# Patient Record
Sex: MALE | Race: WHITE | Employment: OTHER | URBAN - METROPOLITAN AREA
[De-identification: names, ages, dates, MRNs, and addresses within clinical notes are randomized per-mention and may not be internally consistent; named-entity substitution may affect disease eponyms.]

---

## 2017-04-03 RX ORDER — ALPRAZOLAM 0.5 MG/1
TABLET ORAL
Qty: 30 TABLET | Refills: 0 | OUTPATIENT
Start: 2017-04-03

## 2017-04-10 RX ORDER — ALPRAZOLAM 0.5 MG/1
0.5 TABLET ORAL NIGHTLY PRN
Qty: 30 TABLET | Refills: 0 | OUTPATIENT
Start: 2017-04-10 | End: 2018-01-31 | Stop reason: SDUPTHER

## 2017-04-12 RX ORDER — OXYCODONE HYDROCHLORIDE 20 MG/1
TABLET ORAL
Refills: 0 | COMMUNITY
Start: 2017-03-18 | End: 2020-09-09

## 2017-04-12 RX ORDER — CYCLOBENZAPRINE HCL 10 MG
TABLET ORAL
Refills: 0 | COMMUNITY
Start: 2017-04-03 | End: 2018-07-19

## 2017-08-16 ENCOUNTER — OFFICE VISIT (OUTPATIENT)
Dept: FAMILY MEDICINE CLINIC | Age: 57
End: 2017-08-16
Payer: MEDICARE

## 2017-08-16 VITALS
WEIGHT: 245.4 LBS | HEART RATE: 96 BPM | HEIGHT: 71 IN | TEMPERATURE: 97.5 F | OXYGEN SATURATION: 97 % | BODY MASS INDEX: 34.35 KG/M2 | DIASTOLIC BLOOD PRESSURE: 82 MMHG | SYSTOLIC BLOOD PRESSURE: 168 MMHG

## 2017-08-16 DIAGNOSIS — M54.41 CHRONIC MIDLINE LOW BACK PAIN WITH BILATERAL SCIATICA: Chronic | ICD-10-CM

## 2017-08-16 DIAGNOSIS — Z23 NEED FOR PNEUMOCOCCAL VACCINATION: ICD-10-CM

## 2017-08-16 DIAGNOSIS — Z79.899 CHRONIC PRESCRIPTION BENZODIAZEPINE USE: ICD-10-CM

## 2017-08-16 DIAGNOSIS — G47.00 INSOMNIA, UNSPECIFIED TYPE: ICD-10-CM

## 2017-08-16 DIAGNOSIS — Z72.0 TOBACCO ABUSE: ICD-10-CM

## 2017-08-16 DIAGNOSIS — E78.2 MIXED HYPERLIPIDEMIA: ICD-10-CM

## 2017-08-16 DIAGNOSIS — M54.42 CHRONIC MIDLINE LOW BACK PAIN WITH BILATERAL SCIATICA: Chronic | ICD-10-CM

## 2017-08-16 DIAGNOSIS — G89.29 CHRONIC MIDLINE LOW BACK PAIN WITH BILATERAL SCIATICA: Chronic | ICD-10-CM

## 2017-08-16 DIAGNOSIS — E03.9 ACQUIRED HYPOTHYROIDISM: ICD-10-CM

## 2017-08-16 DIAGNOSIS — F33.1 MODERATE EPISODE OF RECURRENT MAJOR DEPRESSIVE DISORDER (HCC): Primary | ICD-10-CM

## 2017-08-16 DIAGNOSIS — Z12.5 SPECIAL SCREENING FOR MALIGNANT NEOPLASM OF PROSTATE: ICD-10-CM

## 2017-08-16 PROCEDURE — G8598 ASA/ANTIPLAT THER USED: HCPCS | Performed by: NURSE PRACTITIONER

## 2017-08-16 PROCEDURE — 99213 OFFICE O/P EST LOW 20 MIN: CPT | Performed by: NURSE PRACTITIONER

## 2017-08-16 PROCEDURE — G8427 DOCREV CUR MEDS BY ELIG CLIN: HCPCS | Performed by: NURSE PRACTITIONER

## 2017-08-16 PROCEDURE — G0009 ADMIN PNEUMOCOCCAL VACCINE: HCPCS | Performed by: NURSE PRACTITIONER

## 2017-08-16 PROCEDURE — 4004F PT TOBACCO SCREEN RCVD TLK: CPT | Performed by: NURSE PRACTITIONER

## 2017-08-16 PROCEDURE — 90732 PPSV23 VACC 2 YRS+ SUBQ/IM: CPT | Performed by: NURSE PRACTITIONER

## 2017-08-16 PROCEDURE — G8417 CALC BMI ABV UP PARAM F/U: HCPCS | Performed by: NURSE PRACTITIONER

## 2017-08-16 PROCEDURE — 3017F COLORECTAL CA SCREEN DOC REV: CPT | Performed by: NURSE PRACTITIONER

## 2017-08-16 RX ORDER — DULOXETIN HYDROCHLORIDE 30 MG/1
30 CAPSULE, DELAYED RELEASE ORAL DAILY
Qty: 30 CAPSULE | Refills: 0 | Status: SHIPPED | OUTPATIENT
Start: 2017-08-16 | End: 2018-01-26 | Stop reason: DRUGHIGH

## 2017-08-16 RX ORDER — FENTANYL 25 UG/H
PATCH TRANSDERMAL
Refills: 0 | COMMUNITY
Start: 2017-07-11 | End: 2018-06-26 | Stop reason: ALTCHOICE

## 2017-08-16 ASSESSMENT — ENCOUNTER SYMPTOMS
BLURRED VISION: 0
ABDOMINAL PAIN: 0
CHEST TIGHTNESS: 0
SHORTNESS OF BREATH: 0
VOMITING: 0
COUGH: 0
NAUSEA: 0
BACK PAIN: 1

## 2017-08-16 ASSESSMENT — PATIENT HEALTH QUESTIONNAIRE - PHQ9
3. TROUBLE FALLING OR STAYING ASLEEP: 2
SUM OF ALL RESPONSES TO PHQ9 QUESTIONS 1 & 2: 3
4. FEELING TIRED OR HAVING LITTLE ENERGY: 2
1. LITTLE INTEREST OR PLEASURE IN DOING THINGS: 2
6. FEELING BAD ABOUT YOURSELF - OR THAT YOU ARE A FAILURE OR HAVE LET YOURSELF OR YOUR FAMILY DOWN: 0
7. TROUBLE CONCENTRATING ON THINGS, SUCH AS READING THE NEWSPAPER OR WATCHING TELEVISION: 1
5. POOR APPETITE OR OVEREATING: 2
2. FEELING DOWN, DEPRESSED OR HOPELESS: 1
8. MOVING OR SPEAKING SO SLOWLY THAT OTHER PEOPLE COULD HAVE NOTICED. OR THE OPPOSITE, BEING SO FIGETY OR RESTLESS THAT YOU HAVE BEEN MOVING AROUND A LOT MORE THAN USUAL: 0
SUM OF ALL RESPONSES TO PHQ QUESTIONS 1-9: 10
9. THOUGHTS THAT YOU WOULD BE BETTER OFF DEAD, OR OF HURTING YOURSELF: 0

## 2018-01-26 ENCOUNTER — OFFICE VISIT (OUTPATIENT)
Dept: FAMILY MEDICINE CLINIC | Age: 58
End: 2018-01-26
Payer: MEDICARE

## 2018-01-26 VITALS
OXYGEN SATURATION: 98 % | BODY MASS INDEX: 34.72 KG/M2 | SYSTOLIC BLOOD PRESSURE: 170 MMHG | HEIGHT: 71 IN | DIASTOLIC BLOOD PRESSURE: 94 MMHG | TEMPERATURE: 97.5 F | WEIGHT: 248 LBS | HEART RATE: 98 BPM

## 2018-01-26 DIAGNOSIS — F33.1 MAJOR DEPRESSIVE DISORDER, RECURRENT, MODERATE (HCC): ICD-10-CM

## 2018-01-26 DIAGNOSIS — M54.16 CHRONIC LUMBAR RADICULOPATHY: Primary | ICD-10-CM

## 2018-01-26 DIAGNOSIS — M96.1 POSTLAMINECTOMY SYNDROME, LUMBAR REGION: Chronic | ICD-10-CM

## 2018-01-26 DIAGNOSIS — M51.36 DDD (DEGENERATIVE DISC DISEASE), LUMBAR: Chronic | ICD-10-CM

## 2018-01-26 DIAGNOSIS — F43.29 ADJUSTMENT DISORDER WITH OTHER SYMPTOM: ICD-10-CM

## 2018-01-26 DIAGNOSIS — J44.9 CHRONIC OBSTRUCTIVE PULMONARY DISEASE, UNSPECIFIED COPD TYPE (HCC): ICD-10-CM

## 2018-01-26 DIAGNOSIS — I48.20 CHRONIC ATRIAL FIBRILLATION (HCC): ICD-10-CM

## 2018-01-26 DIAGNOSIS — Z79.899 ENCOUNTER FOR LONG-TERM (CURRENT) USE OF MEDICATIONS: ICD-10-CM

## 2018-01-26 DIAGNOSIS — M54.10 RADICULAR LEG PAIN: ICD-10-CM

## 2018-01-26 DIAGNOSIS — Z12.11 SCREEN FOR COLON CANCER: ICD-10-CM

## 2018-01-26 DIAGNOSIS — F10.29 ALCOHOL DEPENDENCE WITH UNSPECIFIED ALCOHOL-INDUCED DISORDER (HCC): ICD-10-CM

## 2018-01-26 DIAGNOSIS — Z13.31 POSITIVE DEPRESSION SCREENING: ICD-10-CM

## 2018-01-26 DIAGNOSIS — I50.22 HEART FAILURE, SYSTOLIC, CHRONIC (HCC): ICD-10-CM

## 2018-01-26 PROCEDURE — G8431 POS CLIN DEPRES SCRN F/U DOC: HCPCS | Performed by: NURSE PRACTITIONER

## 2018-01-26 PROCEDURE — 99213 OFFICE O/P EST LOW 20 MIN: CPT | Performed by: NURSE PRACTITIONER

## 2018-01-26 RX ORDER — DULOXETIN HYDROCHLORIDE 60 MG/1
60 CAPSULE, DELAYED RELEASE ORAL DAILY
Qty: 30 CAPSULE | Refills: 3 | Status: SHIPPED | OUTPATIENT
Start: 2018-01-26 | End: 2019-02-26 | Stop reason: SDUPTHER

## 2018-01-26 ASSESSMENT — ENCOUNTER SYMPTOMS
BACK PAIN: 1
SHORTNESS OF BREATH: 0
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
## 2018-01-26 NOTE — PROGRESS NOTES
Visit Information    Have you changed or started any medications since your last visit including any over-the-counter medicines, vitamins, or herbal medicines? no   Have you stopped taking any of your medications? Is so, why? -  no  Are you having any side effects from any of your medications? - no    Have you seen any other physician or provider since your last visit?  no   Have you had any other diagnostic tests since your last visit?  no   Have you been seen in the emergency room and/or had an admission in a hospital since we last saw you?  no   Have you had your routine dental cleaning in the past 6 months?  no     Do you have an active MyChart account? If no, what is the barrier?   No: na    Patient Care Team:  Norma Stewart CNP as PCP - General (Nurse Practitioner)  Sonja Jacques MD as Consulting Physician (Pain Management)    Medical History Review  Past Medical, Family, and Social History reviewed and  contribute to the patient presenting condition    Health Maintenance   Topic Date Due    HIV screen  03/30/1974    DTaP/Tdap/Td vaccine (1 - Tdap) 03/30/1978    Diabetes screen  03/30/1999    Colon cancer screen colonoscopy  03/30/2009    Smoker: low dose lung CT screening  11/07/2015    TSH testing  08/26/2016    Flu vaccine (1) 09/01/2017    Potassium monitoring  09/15/2017    Creatinine monitoring  09/15/2017    Lipid screen  08/26/2020    Pneumococcal med risk  Completed    Hepatitis C screen  Completed

## 2018-01-27 ENCOUNTER — HOSPITAL ENCOUNTER (OUTPATIENT)
Age: 58
Discharge: HOME | End: 2018-01-27
Payer: MEDICARE

## 2018-01-27 DIAGNOSIS — E78.2 MIXED HYPERLIPIDEMIA: ICD-10-CM

## 2018-01-27 DIAGNOSIS — F33.1 MODERATE EPISODE OF RECURRENT MAJOR DEPRESSIVE DISORDER (HCC): ICD-10-CM

## 2018-01-27 DIAGNOSIS — G47.00 INSOMNIA, UNSPECIFIED TYPE: ICD-10-CM

## 2018-01-27 DIAGNOSIS — E03.9 ACQUIRED HYPOTHYROIDISM: ICD-10-CM

## 2018-01-27 DIAGNOSIS — Z12.5 SPECIAL SCREENING FOR MALIGNANT NEOPLASM OF PROSTATE: ICD-10-CM

## 2018-01-27 LAB
ALBUMIN SERPL-MCNC: 4.3 G/DL (ref 3.5–5.2)
ALBUMIN/GLOBULIN RATIO: ABNORMAL (ref 1–2.5)
ALP BLD-CCNC: 100 U/L (ref 40–129)
ALT SERPL-CCNC: 12 U/L (ref 5–41)
ANION GAP SERPL CALCULATED.3IONS-SCNC: 15 MMOL/L (ref 9–17)
AST SERPL-CCNC: 19 U/L
BILIRUB SERPL-MCNC: 0.36 MG/DL (ref 0.3–1.2)
BUN BLDV-MCNC: 5 MG/DL (ref 6–20)
BUN/CREAT BLD: 6 (ref 9–20)
CALCIUM SERPL-MCNC: 9.5 MG/DL (ref 8.6–10.4)
CHLORIDE BLD-SCNC: 95 MMOL/L (ref 98–107)
CHOLESTEROL/HDL RATIO: 4.9
CHOLESTEROL: 209 MG/DL
CO2: 26 MMOL/L (ref 20–31)
CREAT SERPL-MCNC: 0.82 MG/DL (ref 0.7–1.2)
GFR AFRICAN AMERICAN: >60 ML/MIN
GFR NON-AFRICAN AMERICAN: >60 ML/MIN
GFR SERPL CREATININE-BSD FRML MDRD: ABNORMAL ML/MIN/{1.73_M2}
GFR SERPL CREATININE-BSD FRML MDRD: ABNORMAL ML/MIN/{1.73_M2}
GLUCOSE BLD-MCNC: 118 MG/DL (ref 70–99)
HDLC SERPL-MCNC: 43 MG/DL
LDL CHOLESTEROL: 132 MG/DL (ref 0–130)
POTASSIUM SERPL-SCNC: 3.8 MMOL/L (ref 3.7–5.3)
PROSTATE SPECIFIC ANTIGEN: 0.17 UG/L
SODIUM BLD-SCNC: 136 MMOL/L (ref 135–144)
THYROXINE, FREE: 1.3 NG/DL (ref 0.93–1.7)
TOTAL PROTEIN: 7.6 G/DL (ref 6.4–8.3)
TRIGL SERPL-MCNC: 172 MG/DL
TSH SERPL DL<=0.05 MIU/L-ACNC: 3.14 MIU/L (ref 0.3–5)
VLDLC SERPL CALC-MCNC: ABNORMAL MG/DL (ref 1–30)

## 2018-01-27 PROCEDURE — G0103 PSA SCREENING: HCPCS

## 2018-01-27 PROCEDURE — 84439 ASSAY OF FREE THYROXINE: CPT

## 2018-01-27 PROCEDURE — 80053 COMPREHEN METABOLIC PANEL: CPT

## 2018-01-27 PROCEDURE — 80061 LIPID PANEL: CPT

## 2018-01-27 PROCEDURE — 84443 ASSAY THYROID STIM HORMONE: CPT

## 2018-01-27 PROCEDURE — 36415 COLL VENOUS BLD VENIPUNCTURE: CPT

## 2018-01-30 LAB

## 2018-01-31 DIAGNOSIS — Z79.899 ENCOUNTER FOR LONG-TERM (CURRENT) USE OF MEDICATIONS: ICD-10-CM

## 2018-01-31 RX ORDER — ALPRAZOLAM 0.5 MG/1
0.5 TABLET ORAL NIGHTLY PRN
Qty: 30 TABLET | Refills: 0 | Status: SHIPPED | OUTPATIENT
Start: 2018-01-31 | End: 2018-03-02

## 2018-02-01 RX ORDER — SIMVASTATIN 20 MG
20 TABLET ORAL EVERY EVENING
Qty: 90 TABLET | Refills: 1 | Status: SHIPPED | OUTPATIENT
Start: 2018-02-01 | End: 2019-07-01 | Stop reason: SDUPTHER

## 2018-02-09 ENCOUNTER — HOSPITAL ENCOUNTER (OUTPATIENT)
Dept: INTERVENTIONAL RADIOLOGY/VASCULAR | Age: 58
Discharge: HOME | End: 2018-02-11
Payer: MEDICARE

## 2018-02-09 ENCOUNTER — HOSPITAL ENCOUNTER (OUTPATIENT)
Dept: CT IMAGING | Age: 58
Discharge: HOME | End: 2018-02-11
Payer: MEDICARE

## 2018-02-09 VITALS
HEART RATE: 83 BPM | OXYGEN SATURATION: 98 % | RESPIRATION RATE: 15 BRPM | DIASTOLIC BLOOD PRESSURE: 64 MMHG | SYSTOLIC BLOOD PRESSURE: 147 MMHG | TEMPERATURE: 98.6 F

## 2018-02-09 DIAGNOSIS — M51.36 DDD (DEGENERATIVE DISC DISEASE), LUMBAR: Chronic | ICD-10-CM

## 2018-02-09 DIAGNOSIS — M54.10 RADICULAR LEG PAIN: ICD-10-CM

## 2018-02-09 DIAGNOSIS — M96.1 POSTLAMINECTOMY SYNDROME, LUMBAR REGION: Chronic | ICD-10-CM

## 2018-02-09 DIAGNOSIS — M54.16 CHRONIC LUMBAR RADICULOPATHY: ICD-10-CM

## 2018-02-09 PROCEDURE — 6360000004 HC RX CONTRAST MEDICATION: Performed by: RADIOLOGY

## 2018-02-09 PROCEDURE — 62304 MYELOGRAPHY LUMBAR INJECTION: CPT | Performed by: RADIOLOGY

## 2018-02-09 PROCEDURE — 72132 CT LUMBAR SPINE W/DYE: CPT

## 2018-02-09 PROCEDURE — 7100000010 HC PHASE II RECOVERY - FIRST 15 MIN

## 2018-02-09 PROCEDURE — 6370000000 HC RX 637 (ALT 250 FOR IP): Performed by: RADIOLOGY

## 2018-02-09 PROCEDURE — 7100000011 HC PHASE II RECOVERY - ADDTL 15 MIN

## 2018-02-09 PROCEDURE — 2580000003 HC RX 258: Performed by: RADIOLOGY

## 2018-02-09 RX ORDER — SODIUM CHLORIDE 9 MG/ML
INJECTION, SOLUTION INTRAVENOUS CONTINUOUS
Status: DISCONTINUED | OUTPATIENT
Start: 2018-02-09 | End: 2018-02-12 | Stop reason: HOSPADM

## 2018-02-09 RX ORDER — OXYCODONE HYDROCHLORIDE AND ACETAMINOPHEN 5; 325 MG/1; MG/1
2 TABLET ORAL ONCE
Status: COMPLETED | OUTPATIENT
Start: 2018-02-09 | End: 2018-02-09

## 2018-02-09 RX ORDER — SODIUM CHLORIDE 0.9 % (FLUSH) 0.9 %
10 SYRINGE (ML) INJECTION PRN
Status: DISCONTINUED | OUTPATIENT
Start: 2018-02-09 | End: 2018-02-12 | Stop reason: HOSPADM

## 2018-02-09 RX ORDER — ACETAMINOPHEN 325 MG/1
650 TABLET ORAL EVERY 4 HOURS PRN
Status: DISCONTINUED | OUTPATIENT
Start: 2018-02-09 | End: 2018-02-12 | Stop reason: HOSPADM

## 2018-02-09 RX ADMIN — SODIUM CHLORIDE: 9 INJECTION, SOLUTION INTRAVENOUS at 08:23

## 2018-02-09 RX ADMIN — OXYCODONE HYDROCHLORIDE AND ACETAMINOPHEN 2 TABLET: 5; 325 TABLET ORAL at 10:45

## 2018-02-09 RX ADMIN — IOPAMIDOL 12 ML: 408 INJECTION, SOLUTION INTRATHECAL at 09:48

## 2018-02-09 ASSESSMENT — PAIN DESCRIPTION - LOCATION
LOCATION: BACK
LOCATION: BACK

## 2018-02-09 ASSESSMENT — PAIN SCALES - GENERAL
PAINLEVEL_OUTOF10: 10
PAINLEVEL_OUTOF10: 8
PAINLEVEL_OUTOF10: 10

## 2018-02-09 ASSESSMENT — PAIN DESCRIPTION - PAIN TYPE
TYPE: CHRONIC PAIN
TYPE: CHRONIC PAIN

## 2018-02-09 ASSESSMENT — PAIN DESCRIPTION - PROGRESSION: CLINICAL_PROGRESSION: RAPIDLY IMPROVING

## 2018-02-09 NOTE — PROGRESS NOTES
Pt tolerated procedure without distress. Dressing applied to procedure site. Pt taken to ct for further testing and then will be taken to pacu.

## 2018-02-09 NOTE — H&P
DAY 3/25/16   Hector Byers CNP   furosemide (LASIX) 40 MG tablet TAKE 1 TABLET EVERY DAY 12/24/15   Yolis Vargas MD   diltiazem (CARDIZEM) 120 MG tablet Take 1 tablet by mouth 2 times daily 12/24/15   Yolis Vargas MD   digoxin [facility] TRANSPLANT [facility]) 250 MCG tablet Take 1 tablet by mouth daily 12/11/15   Yolis Vargas MD   levalbuterol [facility]) 45 MCG/ACT inhaler Inhale 1-2 puffs into the lungs every 6 hours as needed for Wheezing.  1/23/16  Yolis Vargas MD       This is a 61 y.o. male who is pleasant, cooperative, alert and oriented x3, in no acute distress. Heart: Heart sounds are normal.  HR regular rate and rhythm without murmur, gallop or rub. Lungs: Normal respiratory effort with good air exchange and clear to auscultation without wheezes or rales bilaterally   Abdomen: soft, nontender, nondistended with bowel sounds . Labs:  Recent Labs      01/27/18   1341   NA  136   K  3.8   CL  95*   CO2  26   BUN  5*   CREATININE  0.82   GLUCOSE  118*   AST  19   ALT  12   LABALBU  4.3       ALBERT HOLMAN, ANP-BC  Electronically signed 2/9/2018 at 8:07 AM     Hector Byers CNP Nurse Practitioner Signed   Progress Notes Date of Service: 1/26/2018  1:06 PM   Related encounter: Office Visit from 1/26/2018 in [facility] [address] All Collapse All    []Hide copied text  [][place]ver for attribution information    [address]  [id number]  [place], [facility] [address]  [phone]        Cy Crystal is a 61 y.o. male who presents today for his  medical conditions/complaints as noted below. Cy Crystal is c/o of Back Pain (needs referral to neurosurgeon,cannot have MRI,needs CT w/dye TTC,seeing pain mgt)  .     HPI:   Pt here for orders for new imaging on his back. Last testing was done in July 2016 and then had last back surgery 3 months later. Has never felt right since surgery and pain and mobility worsening.  He is using a cane and     Types: Cigarettes    Smokeless tobacco: Never Used         Comment: states smokes 1 PPD of cigarettes, down from 2 ppd    Alcohol use No      Current Facility-Administered Medications          Current Outpatient Prescriptions   Medication Sig Dispense Refill    DULoxetine (CYMBALTA) 60 MG extended release capsule Take 1 capsule by mouth daily 30 capsule 3    fentaNYL (DURAGESIC) 25 MCG/HR apply 1 patch and replace every 72 hours   0    cyclobenzaprine (FLEXERIL) 10 MG tablet     0    oxyCODONE (OXY-IR) 15 MG immediate release tablet take 1 tablet by mouth every 4 hours if needed maximum daily dose of 4   0    rivaroxaban (XARELTO) 20 MG TABS tablet Take 1 tablet by mouth daily 90 tablet 3    metoprolol (TOPROL-XL) 25 MG XL tablet Take 3 tablets bid 540 tablet 3    levothyroxine (SYNTHROID) 50 MCG tablet Take 1 tablet by mouth Daily 90 tablet 3    lisinopril (PRINIVIL;ZESTRIL) 2.5 MG tablet TAKE 1 TABLET EVERY DAY 90 tablet 2    simvastatin (ZOCOR) 10 MG tablet Take 1 tablet by mouth nightly 90 tablet 3    furosemide (LASIX) 40 MG tablet TAKE 1 TABLET EVERY DAY 90 tablet 2    diltiazem (CARDIZEM) 120 MG tablet Take 1 tablet by mouth 2 times daily 180 tablet 3    digoxin (LANOXIN) 250 MCG tablet Take 1 tablet by mouth daily 90 tablet 3      No current facility-administered medications for this visit.          No Known Allergies          Health Maintenance   Topic Date Due    HIV screen  03/30/1974    DTaP/Tdap/Td vaccine (1 - Tdap) 03/30/1978    Diabetes screen  03/30/1999    Colon cancer screen colonoscopy  03/30/2009    Smoker: low dose lung CT screening  11/07/2015    TSH testing  08/26/2016    Potassium monitoring  09/15/2017    Creatinine monitoring  09/15/2017    Flu vaccine (1) 01/16/2019 (Originally 9/1/2017)    Lipid screen  08/26/2020    Pneumococcal med risk  Completed    Hepatitis C screen  Completed         Subjective:      Review of Systems   Constitutional: Positive for activity change. Negative for chills, fatigue and fever. Respiratory: Negative for shortness of breath. Cardiovascular: Negative for chest pain and leg swelling. Gastrointestinal:        No bowel or bladder control issues   Genitourinary: Negative for flank pain. Musculoskeletal: Positive for arthralgias, back pain, gait problem and myalgias. Negative for joint swelling, neck pain and neck stiffness. Skin: Negative for rash and wound. Neurological: Negative for dizziness, weakness, numbness and headaches. Psychiatric/Behavioral: Positive for dysphoric mood and sleep disturbance. Negative for agitation, behavioral problems, confusion, self-injury and suicidal ideas. The patient is nervous/anxious.             Objective:      Physical Exam   Constitutional: He is oriented to person, place, and time. He appears well-developed and well-nourished. No distress. HENT:   Head: Normocephalic and atraumatic. Neck: Normal range of motion. Neck supple. Cardiovascular: Normal rate and normal heart sounds. An irregularly irregular rhythm present. Pulmonary/Chest: Effort normal and breath sounds normal.   Musculoskeletal: He exhibits tenderness (midline lumbar apsine, bilateral hips, very stiff, very limited ROM). He exhibits no edema. Neurological: He is alert and oriented to person, place, and time. No cranial nerve deficit. He exhibits normal muscle tone. Coordination normal.   Skin: Skin is warm and dry. No rash noted. He is not diaphoretic. No erythema. Psychiatric: He has a normal mood and affect. His behavior is normal.   Nursing note and vitals reviewed.        Assessment:      1. Chronic lumbar radiculopathy  CT LUMBAR SPINE W CONTRAST     XR INJ MYELOGRAM LUMBAR CT   2. DDD (degenerative disc disease), lumbar  CT LUMBAR SPINE W CONTRAST     XR INJ MYELOGRAM LUMBAR CT   3. Postlaminectomy syndrome, lumbar region  CT LUMBAR SPINE W CONTRAST     XR INJ MYELOGRAM LUMBAR CT   4.  Radicular leg pain CNP)  Documentation: Possible medication side effects, risk of tolerance and/or dependence, and alternative treatments discussed., No signs of potential drug abuse or diversion identified. Jenna Davidson CNP)  Will send xanax if appropriate     Quality & Risk Score Accuracy - MEDICARE ADVANTAGE    Visit Dx:  Chronic obstructive pulmonary disease, unspecified COPD type ([facility]  Asymptomatic. Continue current treatment plan and follow up at least yearly. Visit Dx: Heart failure, systolic, chronic (HCC)  Asymptomatic. Continue current treatment plan and follow up at least yearly. Visit Dx: Major depressive disorder, recurrent, moderate (HCC)  Worsening based upon symptoms and exam. See progress note/ orders/ disposition for monitoring and/ or treatment plan changes. Visit Dx:  Chronic atrial fibrillation ([facility]  Stable based upon review of recent symptoms and physical exam. Continue current treatment plan and follow up at least yearly. Visit Dx:  Alcohol dependence with unspecified alcohol-induced disorder ([facility]  Episodic based upon review of alcohol use history. See progress note/orders/disposition for treatment plan changes. Additional documentation:  Based on review of the following:  Alcohol use: No. 0 Standard drinks or equivalent Total use: 0 oz/week  Last edited 01/26/18 13:15 EST by Jenna Davidson CNP               Patient given educational materials - see patient instructions. Discussed use, benefit, and side effects of prescribed medications. All patient questions answered. Pt voiced understanding. Reviewed health maintenance.   Instructed to continue current medications, diet and exercise.     Electronically signed by Almita Kulkarni CNP on 1/26/2018 at 1:15 PM

## 2018-02-12 DIAGNOSIS — M96.1 POSTLAMINECTOMY SYNDROME, LUMBAR REGION: Primary | Chronic | ICD-10-CM

## 2018-04-20 RX ORDER — ALPRAZOLAM 0.5 MG/1
TABLET ORAL
Refills: 0 | COMMUNITY
Start: 2018-01-31 | End: 2018-04-20 | Stop reason: SDUPTHER

## 2018-04-20 RX ORDER — FENTANYL 75 UG/H
PATCH TRANSDERMAL
Refills: 0 | COMMUNITY
Start: 2018-03-20 | End: 2018-04-20

## 2018-04-23 RX ORDER — ALPRAZOLAM 0.5 MG/1
0.5 TABLET ORAL NIGHTLY PRN
Qty: 30 TABLET | Refills: 0 | Status: SHIPPED | OUTPATIENT
Start: 2018-04-23 | End: 2018-09-11 | Stop reason: SDUPTHER

## 2018-06-22 ENCOUNTER — TELEPHONE (OUTPATIENT)
Dept: FAMILY MEDICINE CLINIC | Age: 58
End: 2018-06-22

## 2018-06-26 ENCOUNTER — OFFICE VISIT (OUTPATIENT)
Dept: FAMILY MEDICINE CLINIC | Age: 58
End: 2018-06-26
Payer: MEDICARE

## 2018-06-26 ENCOUNTER — TELEPHONE (OUTPATIENT)
Dept: FAMILY MEDICINE CLINIC | Age: 58
End: 2018-06-26

## 2018-06-26 VITALS
TEMPERATURE: 98.2 F | RESPIRATION RATE: 16 BRPM | OXYGEN SATURATION: 97 % | DIASTOLIC BLOOD PRESSURE: 78 MMHG | HEART RATE: 100 BPM | HEIGHT: 71 IN | WEIGHT: 246 LBS | SYSTOLIC BLOOD PRESSURE: 148 MMHG | BODY MASS INDEX: 34.44 KG/M2

## 2018-06-26 DIAGNOSIS — Z12.11 SCREENING FOR COLON CANCER: ICD-10-CM

## 2018-06-26 DIAGNOSIS — Z01.818 PREOPERATIVE CLEARANCE: ICD-10-CM

## 2018-06-26 DIAGNOSIS — M16.12 PRIMARY OSTEOARTHRITIS OF LEFT HIP: Primary | ICD-10-CM

## 2018-06-26 PROCEDURE — 99212 OFFICE O/P EST SF 10 MIN: CPT | Performed by: INTERNAL MEDICINE

## 2018-06-26 RX ORDER — FENTANYL 75 UG/H
PATCH TRANSDERMAL
Refills: 0 | COMMUNITY
Start: 2018-06-21 | End: 2020-09-09 | Stop reason: SDUPTHER

## 2018-06-26 ASSESSMENT — ENCOUNTER SYMPTOMS
WHEEZING: 0
ABDOMINAL PAIN: 0
STRIDOR: 0
CHEST TIGHTNESS: 0
BLOOD IN STOOL: 0
COUGH: 0
SHORTNESS OF BREATH: 0
NAUSEA: 0
DIARRHEA: 0
COLOR CHANGE: 0
CHOKING: 0
BACK PAIN: 1
CONSTIPATION: 0

## 2018-06-28 ENCOUNTER — TELEPHONE (OUTPATIENT)
Dept: FAMILY MEDICINE CLINIC | Age: 58
End: 2018-06-28

## 2018-07-19 ENCOUNTER — TELEPHONE (OUTPATIENT)
Dept: PHARMACY | Facility: CLINIC | Age: 58
End: 2018-07-19

## 2018-07-19 DIAGNOSIS — Z79.899 ENCOUNTER FOR MEDICATION REVIEW: Primary | ICD-10-CM

## 2018-07-19 PROCEDURE — 1111F DSCHRG MED/CURRENT MED MERGE: CPT | Performed by: PHARMACIST

## 2018-07-19 RX ORDER — CELECOXIB 200 MG/1
200 CAPSULE ORAL DAILY
COMMUNITY
Start: 2018-06-30 | End: 2019-02-26

## 2018-07-19 RX ORDER — OXYCODONE HYDROCHLORIDE AND ACETAMINOPHEN 5; 325 MG/1; MG/1
2 TABLET ORAL
COMMUNITY
End: 2020-09-09

## 2018-07-19 NOTE — TELEPHONE ENCOUNTER
CLINICAL PHARMACY NOTE - Post-Discharge Transitions of Care (EVER)  Patient outreach to review discharge medications and provide medication review and management. Spoke with patient. Non-face-to-face services provided:  Assessment and support for treatment adherence and medication management. SUBJECTIVE/OBJECTIVE:     Raissa Shultz is a 60 y.o. male discharged from [facility] on 6/30/18. Primary diagnosis: Osteoarthritis of Left hip/ hip replacement            Discharge Medications (New Medications)  Medication Sig Comment    Celecoxib 200 mg capsule Take by mouth daily for 30      Oxycodone- Acetominophen One or two tablet every 4-6 hours PRN pain      Changed Medications  Medication Sig Comment    rivaroxaban (XARELTO) 10 MG TABS tablet Take 1 tablet by mouth for 12 dose Confirmed with patient to switch back to 20mg dose after 12 days     Continued Medications  Medication Sig Comment    fentaNYL (DURAGESIC) 75 MCG/HR apply 1 patch every 3 days     simvastatin (ZOCOR) 20 MG tablet Take 1 tablet by mouth every evening     DULoxetine (CYMBALTA) 60 MG extended release capsule Take 1 capsule by mouth daily     oxyCODONE (ROXICODONE) 20 MG immediate release tablet take 1 tablet by mouth every 4 hours if needed maximum daily dose of 4     metoprolol (TOPROL-XL) 25 MG XL tablet Take 3 tablets bid     levothyroxine (SYNTHROID) 50 MCG tablet Take 1 tablet by mouth Daily     lisinopril (PRINIVIL;ZESTRIL) 2.5 MG tablet TAKE 1 TABLET EVERY DAY     furosemide (LASIX) 40 MG tablet TAKE 1 TABLET EVERY DAY     diltiazem (CARDIZEM) 120 MG tablet Take 1 tablet by mouth 2 times daily     digoxin (LANOXIN) 250 MCG tablet Take 1 tablet by mouth daily      Additional Medications  · None per patient    Allergies  No Known Allergies     Compliance  Patient has filled new medications ordered after this hospital discharge. Patient is taking medications as directed by discharging physician.      Identified

## 2018-09-11 ENCOUNTER — OFFICE VISIT (OUTPATIENT)
Dept: FAMILY MEDICINE CLINIC | Age: 58
End: 2018-09-11
Payer: MEDICARE

## 2018-09-11 VITALS
HEART RATE: 83 BPM | TEMPERATURE: 97.9 F | OXYGEN SATURATION: 97 % | WEIGHT: 258 LBS | DIASTOLIC BLOOD PRESSURE: 78 MMHG | SYSTOLIC BLOOD PRESSURE: 156 MMHG | HEIGHT: 71 IN | BODY MASS INDEX: 36.12 KG/M2

## 2018-09-11 DIAGNOSIS — F39 MOOD DISORDER (HCC): ICD-10-CM

## 2018-09-11 DIAGNOSIS — Z01.818 PRE-OP EXAM: Primary | ICD-10-CM

## 2018-09-11 DIAGNOSIS — Z72.0 TOBACCO ABUSE: ICD-10-CM

## 2018-09-11 PROCEDURE — 99213 OFFICE O/P EST LOW 20 MIN: CPT | Performed by: NURSE PRACTITIONER

## 2018-09-11 RX ORDER — OXYCODONE HYDROCHLORIDE 30 MG/1
TABLET ORAL
Refills: 0 | COMMUNITY
Start: 2018-07-13 | End: 2020-09-09

## 2018-09-11 RX ORDER — ALPRAZOLAM 0.5 MG/1
0.5 TABLET ORAL NIGHTLY PRN
Qty: 30 TABLET | Refills: 0 | Status: SHIPPED | OUTPATIENT
Start: 2018-09-11 | End: 2018-12-17 | Stop reason: SDUPTHER

## 2018-09-11 ASSESSMENT — ENCOUNTER SYMPTOMS
SORE THROAT: 0
ABDOMINAL PAIN: 0
SINUS PRESSURE: 0
BLOOD IN STOOL: 0
VOMITING: 0
SHORTNESS OF BREATH: 0
RHINORRHEA: 0
DIARRHEA: 0
NAUSEA: 0
CHEST TIGHTNESS: 0
COUGH: 0
TROUBLE SWALLOWING: 0
CONSTIPATION: 0

## 2018-09-11 NOTE — PROGRESS NOTES
TABLET EVERY DAY 90 tablet 2     No current facility-administered medications for this visit. Allergies   Allergen Reactions    Sulfa Antibiotics Other (See Comments)       Health Maintenance   Topic Date Due    HIV screen  03/30/1974    DTaP/Tdap/Td vaccine (1 - Tdap) 03/30/1978    Diabetes screen  03/30/1999    Shingles Vaccine (1 of 2 - 2 Dose Series) 03/30/2009    Colon cancer screen colonoscopy  03/30/2009    Low dose CT lung screening  03/30/2014    Flu vaccine (1) 01/16/2019 (Originally 9/1/2018)    TSH testing  01/27/2019    Potassium monitoring  01/27/2019    Creatinine monitoring  01/27/2019    Lipid screen  01/27/2023    Pneumococcal med risk  Completed    Hepatitis C screen  Completed       Subjective:      Review of Systems   Constitutional: Negative for appetite change, chills, diaphoresis, fatigue and fever. HENT: Negative for congestion, postnasal drip, rhinorrhea, sinus pressure, sore throat and trouble swallowing. Eyes: Negative for visual disturbance. Respiratory: Negative for cough, chest tightness and shortness of breath. Cardiovascular: Negative for chest pain, palpitations and leg swelling. Gastrointestinal: Negative for abdominal pain, blood in stool, constipation, diarrhea, nausea and vomiting. Genitourinary: Negative for difficulty urinating, flank pain, hematuria and urgency. Musculoskeletal: Positive for arthralgias and gait problem (using cane). Skin: Negative for rash. Neurological: Negative for dizziness, weakness, light-headedness and headaches. Hematological: Negative for adenopathy. Psychiatric/Behavioral: Negative for sleep disturbance. The patient is nervous/anxious (would like refill on xanax, takes as needed, currently not taking any pain meds). Objective:     Physical Exam   Constitutional: He is oriented to person, place, and time. He appears well-developed and well-nourished. No distress.    HENT:   Head: Normocephalic and

## 2018-12-17 DIAGNOSIS — F39 MOOD DISORDER (HCC): ICD-10-CM

## 2018-12-17 RX ORDER — ALPRAZOLAM 0.5 MG/1
0.5 TABLET ORAL NIGHTLY PRN
Qty: 30 TABLET | Refills: 0 | Status: SHIPPED | OUTPATIENT
Start: 2018-12-17 | End: 2019-03-18 | Stop reason: SDUPTHER

## 2019-02-26 ENCOUNTER — OFFICE VISIT (OUTPATIENT)
Dept: FAMILY MEDICINE CLINIC | Age: 59
End: 2019-02-26
Payer: MEDICARE

## 2019-02-26 VITALS
HEART RATE: 96 BPM | WEIGHT: 273 LBS | SYSTOLIC BLOOD PRESSURE: 144 MMHG | BODY MASS INDEX: 38.22 KG/M2 | TEMPERATURE: 97.6 F | OXYGEN SATURATION: 97 % | DIASTOLIC BLOOD PRESSURE: 72 MMHG | HEIGHT: 71 IN

## 2019-02-26 DIAGNOSIS — Z12.5 SPECIAL SCREENING FOR MALIGNANT NEOPLASM OF PROSTATE: ICD-10-CM

## 2019-02-26 DIAGNOSIS — I50.22 CHRONIC SYSTOLIC CONGESTIVE HEART FAILURE (HCC): ICD-10-CM

## 2019-02-26 DIAGNOSIS — Z00.00 ROUTINE GENERAL MEDICAL EXAMINATION AT A HEALTH CARE FACILITY: ICD-10-CM

## 2019-02-26 DIAGNOSIS — F43.29 ADJUSTMENT DISORDER WITH OTHER SYMPTOM: ICD-10-CM

## 2019-02-26 DIAGNOSIS — J42 CHRONIC BRONCHITIS, UNSPECIFIED CHRONIC BRONCHITIS TYPE (HCC): ICD-10-CM

## 2019-02-26 DIAGNOSIS — Z12.11 SCREEN FOR COLON CANCER: ICD-10-CM

## 2019-02-26 DIAGNOSIS — Z23 NEED FOR PROPHYLACTIC VACCINATION AND INOCULATION AGAINST VARICELLA: ICD-10-CM

## 2019-02-26 DIAGNOSIS — E03.9 ACQUIRED HYPOTHYROIDISM: Primary | ICD-10-CM

## 2019-02-26 DIAGNOSIS — I48.21 PERMANENT ATRIAL FIBRILLATION (HCC): ICD-10-CM

## 2019-02-26 DIAGNOSIS — F39 MOOD DISORDER (HCC): ICD-10-CM

## 2019-02-26 DIAGNOSIS — I48.91 ATRIAL FIBRILLATION WITH RVR (HCC): ICD-10-CM

## 2019-02-26 DIAGNOSIS — Z13.220 SCREENING FOR LIPID DISORDERS: ICD-10-CM

## 2019-02-26 PROCEDURE — 99213 OFFICE O/P EST LOW 20 MIN: CPT | Performed by: NURSE PRACTITIONER

## 2019-02-26 RX ORDER — DULOXETIN HYDROCHLORIDE 60 MG/1
60 CAPSULE, DELAYED RELEASE ORAL DAILY
Qty: 30 CAPSULE | Refills: 3 | Status: SHIPPED | OUTPATIENT
Start: 2019-02-26 | End: 2019-07-01 | Stop reason: SDUPTHER

## 2019-02-26 ASSESSMENT — ENCOUNTER SYMPTOMS
NAUSEA: 0
CHEST TIGHTNESS: 0
DIARRHEA: 0
SHORTNESS OF BREATH: 1
BLURRED VISION: 0
VOMITING: 0
WHEEZING: 0
ORTHOPNEA: 0
BACK PAIN: 1
CONSTIPATION: 0
ABDOMINAL PAIN: 0
COUGH: 0

## 2019-03-18 DIAGNOSIS — F39 MOOD DISORDER (HCC): ICD-10-CM

## 2019-03-18 RX ORDER — ALPRAZOLAM 0.5 MG/1
0.5 TABLET ORAL NIGHTLY PRN
Qty: 30 TABLET | Refills: 0 | Status: SHIPPED | OUTPATIENT
Start: 2019-03-18 | End: 2019-05-10 | Stop reason: SDUPTHER

## 2019-05-10 DIAGNOSIS — F39 MOOD DISORDER (HCC): ICD-10-CM

## 2019-05-10 RX ORDER — ALPRAZOLAM 0.5 MG/1
TABLET ORAL
Qty: 30 TABLET | Refills: 0 | Status: SHIPPED | OUTPATIENT
Start: 2019-05-10 | End: 2019-06-25 | Stop reason: SDUPTHER

## 2019-06-21 ENCOUNTER — TELEPHONE (OUTPATIENT)
Dept: FAMILY MEDICINE CLINIC | Age: 59
End: 2019-06-21

## 2019-07-01 ENCOUNTER — OFFICE VISIT (OUTPATIENT)
Dept: FAMILY MEDICINE CLINIC | Age: 59
End: 2019-07-01
Payer: MEDICARE

## 2019-07-01 VITALS
DIASTOLIC BLOOD PRESSURE: 72 MMHG | TEMPERATURE: 95.8 F | WEIGHT: 251 LBS | HEIGHT: 71 IN | OXYGEN SATURATION: 98 % | BODY MASS INDEX: 35.14 KG/M2 | HEART RATE: 64 BPM | SYSTOLIC BLOOD PRESSURE: 128 MMHG

## 2019-07-01 DIAGNOSIS — Z23 NEED FOR PROPHYLACTIC VACCINATION AND INOCULATION AGAINST VARICELLA: ICD-10-CM

## 2019-07-01 DIAGNOSIS — Z00.00 ROUTINE GENERAL MEDICAL EXAMINATION AT A HEALTH CARE FACILITY: ICD-10-CM

## 2019-07-01 DIAGNOSIS — Z12.5 SCREENING FOR PROSTATE CANCER: ICD-10-CM

## 2019-07-01 DIAGNOSIS — Z13.1 ENCOUNTER FOR SCREENING FOR DIABETES MELLITUS: ICD-10-CM

## 2019-07-01 DIAGNOSIS — E03.9 ACQUIRED HYPOTHYROIDISM: ICD-10-CM

## 2019-07-01 DIAGNOSIS — Z13.220 SCREENING FOR LIPID DISORDERS: ICD-10-CM

## 2019-07-01 DIAGNOSIS — Z00.00 MEDICARE ANNUAL WELLNESS VISIT, INITIAL: Primary | ICD-10-CM

## 2019-07-01 DIAGNOSIS — F43.29 ADJUSTMENT DISORDER WITH OTHER SYMPTOM: ICD-10-CM

## 2019-07-01 DIAGNOSIS — Z72.0 TOBACCO ABUSE: ICD-10-CM

## 2019-07-01 DIAGNOSIS — Z12.11 SCREEN FOR COLON CANCER: ICD-10-CM

## 2019-07-01 PROCEDURE — G0438 PPPS, INITIAL VISIT: HCPCS | Performed by: NURSE PRACTITIONER

## 2019-07-01 RX ORDER — DULOXETIN HYDROCHLORIDE 60 MG/1
60 CAPSULE, DELAYED RELEASE ORAL DAILY
Qty: 90 CAPSULE | Refills: 1 | Status: SHIPPED | OUTPATIENT
Start: 2019-07-01 | End: 2020-09-09 | Stop reason: SDUPTHER

## 2019-07-01 RX ORDER — SIMVASTATIN 20 MG
20 TABLET ORAL EVERY EVENING
Qty: 90 TABLET | Refills: 1 | Status: ON HOLD | OUTPATIENT
Start: 2019-07-01 | End: 2022-07-21

## 2019-07-01 ASSESSMENT — LIFESTYLE VARIABLES
HAVE YOU OR SOMEONE ELSE BEEN INJURED AS A RESULT OF YOUR DRINKING: 0
HOW OFTEN DURING THE LAST YEAR HAVE YOU FAILED TO DO WHAT WAS NORMALLY EXPECTED FROM YOU BECAUSE OF DRINKING: 0
AUDIT-C TOTAL SCORE: 2
HOW OFTEN DURING THE LAST YEAR HAVE YOU BEEN UNABLE TO REMEMBER WHAT HAPPENED THE NIGHT BEFORE BECAUSE YOU HAD BEEN DRINKING: 0
HOW OFTEN DO YOU HAVE SIX OR MORE DRINKS ON ONE OCCASION: 0
HOW OFTEN DURING THE LAST YEAR HAVE YOU HAD A FEELING OF GUILT OR REMORSE AFTER DRINKING: 0
HOW MANY STANDARD DRINKS CONTAINING ALCOHOL DO YOU HAVE ON A TYPICAL DAY: 1
HOW OFTEN DURING THE LAST YEAR HAVE YOU FOUND THAT YOU WERE NOT ABLE TO STOP DRINKING ONCE YOU HAD STARTED: 0
HAS A RELATIVE, FRIEND, DOCTOR, OR ANOTHER HEALTH PROFESSIONAL EXPRESSED CONCERN ABOUT YOUR DRINKING OR SUGGESTED YOU CUT DOWN: 0
HOW OFTEN DURING THE LAST YEAR HAVE YOU NEEDED AN ALCOHOLIC DRINK FIRST THING IN THE MORNING TO GET YOURSELF GOING AFTER A NIGHT OF HEAVY DRINKING: 0
HOW OFTEN DO YOU HAVE A DRINK CONTAINING ALCOHOL: 1
AUDIT TOTAL SCORE: 2

## 2019-07-01 ASSESSMENT — ANXIETY QUESTIONNAIRES: GAD7 TOTAL SCORE: 1

## 2019-07-01 ASSESSMENT — PATIENT HEALTH QUESTIONNAIRE - PHQ9
SUM OF ALL RESPONSES TO PHQ QUESTIONS 1-9: 2
SUM OF ALL RESPONSES TO PHQ QUESTIONS 1-9: 2

## 2019-07-01 NOTE — PROGRESS NOTES
Problem Relation Age of Onset    Other Mother         dementia    Other Father        CareTeam (Including outside providers/suppliers regularly involved in providing care):   Patient Care Team:  RIVER Chin CNP as PCP - General (Nurse Practitioner)  RIVER Chin CNP as PCP - [facility] Empaneled Provider  Austen Coles MD as Consulting Physician (Pain Management)    Wt Readings from Last 3 Encounters:   07/01/19 251 lb (113.9 kg)   02/26/19 273 lb (123.8 kg)   09/11/18 258 lb (117 kg)     Vitals:    07/01/19 0902   BP: 128/72   Site: Right Lower Arm   Position: Sitting   Cuff Size: Medium Adult   Pulse: 64   Temp: 95.8 °F (35.4 °C)   TempSrc: Tympanic   SpO2: 98%   Weight: 251 lb (113.9 kg)   Height: 5' 10.84\" (1.799 m)     Body mass index is 35.17 kg/m². Based upon direct observation of the patient, evaluation of cognition reveals recent and remote memory intact. Patient's complete Health Risk Assessment and screening values have been reviewed and are found in Flowsheets. The following problems were reviewed today and where indicated follow up appointments were made and/or referrals ordered.     Positive Risk Factor Screenings with Interventions:     Cognitive:  Clock Drawing Test (CDT) Score: Normal  Total Score Interpretation: Positive Mini-Cog  Cognitive Impairment Interventions:  · Patient declines any further evaluation/treatment for cognitive impairment    Substance Abuse:  Social History     Tobacco History     Smoking Status  Current Every Day Smoker Smoking Frequency  2 packs/day for 40 years ([de-identified] pk yrs) Smoking Tobacco Type  Cigarettes    Smokeless Tobacco Use  Never Used    Tobacco Comment  states smokes 1 PPD of cigarettes, down from 2 ppd          Alcohol History     Alcohol Use Status  Yes Drinks/Week  0 Standard drinks or equivalent per week Amount  0.0 oz alcohol/wk Comment  daily, 2-3 drinks          Drug Use     Drug Use Status  No          Sexual Activity     Sexually exercise recommended- 3-5 times per week, 30-45 minutes per session, relaxation techniques discussed    Health Habits/Nutrition:  Health Habits/Nutrition  Do you exercise for at least 20 minutes 2-3 times per week?: (!) No  Have you lost any weight without trying in the past 3 months?: (!) Yes  Do you eat fewer than 2 meals per day?: (!) Yes  Have you seen a dentist within the past year?: (!) No  Body mass index is 35.17 kg/m². Health Habits/Nutrition Interventions:  · Inadequate physical activity:  patient agrees to increase physical activity as follows: per home bike 20 mins per day, he has been trying to loose weight changing to one meal per day d/t being sedentary, he will call dentist for appt  ·     Hearing/Vision:  Hearing/Vision  Do you or your family notice any trouble with your hearing?: No  Do you have difficulty driving, watching TV, or doing any of your daily activities because of your eyesight?: No  Have you had an eye exam within the past year?: (!) No  Hearing/Vision Interventions:  · Vision concerns:  patient encouraged to make appointment with his/her eye specialist    Safety:  Safety  Do you have working smoke detectors?: Yes  Have all throw rugs been removed or fastened?: (!) No  Do you have non-slip mats in all bathtubs?: (!) No  Do all of your stairways have a railing or banister?: Yes  Are your doorways, halls and stairs free of clutter?: Yes  Do you always fasten your seatbelt when you are in a car?: Yes  Safety Interventions:  · Home safety tips provided    ADL:  ADLs  In the past 7 days, did you need help from others to perform any of the following everyday activities? Eating, dressing, grooming, bathing, toileting, or walking/balance?: None  In the past 7 days, did you need help from others to take care of any of the following?  Laundry, housekeeping, banking/finances, shopping, telephone use, food preparation, transportation, or taking medications?: [employer]  ADL

## 2019-07-26 DIAGNOSIS — F39 MOOD DISORDER (HCC): ICD-10-CM

## 2019-07-26 RX ORDER — ALPRAZOLAM 0.5 MG/1
TABLET ORAL
Qty: 30 TABLET | Refills: 0 | Status: SHIPPED | OUTPATIENT
Start: 2019-07-26 | End: 2019-09-04 | Stop reason: SDUPTHER

## 2019-09-04 DIAGNOSIS — F39 MOOD DISORDER (HCC): ICD-10-CM

## 2019-09-04 RX ORDER — ALPRAZOLAM 0.5 MG/1
TABLET ORAL
Qty: 30 TABLET | Refills: 0 | Status: SHIPPED | OUTPATIENT
Start: 2019-09-04 | End: 2019-10-02 | Stop reason: SDUPTHER

## 2019-09-09 ENCOUNTER — TELEPHONE (OUTPATIENT)
Dept: FAMILY MEDICINE CLINIC | Age: 59
End: 2019-09-09

## 2019-09-09 DIAGNOSIS — M25.551 RIGHT HIP PAIN: Primary | ICD-10-CM

## 2019-10-02 DIAGNOSIS — F39 MOOD DISORDER (HCC): ICD-10-CM

## 2019-10-02 RX ORDER — ALPRAZOLAM 0.5 MG/1
TABLET ORAL
Qty: 30 TABLET | Refills: 0 | Status: SHIPPED | OUTPATIENT
Start: 2019-10-02 | End: 2019-11-03 | Stop reason: SDUPTHER

## 2019-11-03 DIAGNOSIS — F39 MOOD DISORDER (HCC): ICD-10-CM

## 2019-11-03 RX ORDER — ALPRAZOLAM 0.5 MG/1
TABLET ORAL
Qty: 30 TABLET | Refills: 0 | Status: SHIPPED | OUTPATIENT
Start: 2019-11-03 | End: 2019-12-02 | Stop reason: SDUPTHER

## 2019-12-02 DIAGNOSIS — F39 MOOD DISORDER (HCC): ICD-10-CM

## 2019-12-02 RX ORDER — ALPRAZOLAM 0.5 MG/1
TABLET ORAL
Qty: 30 TABLET | Refills: 0 | Status: SHIPPED | OUTPATIENT
Start: 2019-12-02 | End: 2019-12-30

## 2019-12-29 DIAGNOSIS — F39 MOOD DISORDER (HCC): ICD-10-CM

## 2019-12-30 RX ORDER — ALPRAZOLAM 0.5 MG/1
TABLET ORAL
Qty: 30 TABLET | Refills: 0 | Status: SHIPPED | OUTPATIENT
Start: 2019-12-30 | End: 2020-01-30

## 2020-07-15 LAB
CREATININE: 0.7 MG/DL
POTASSIUM (K+): 3.5

## 2020-09-09 ENCOUNTER — OFFICE VISIT (OUTPATIENT)
Dept: FAMILY MEDICINE CLINIC | Age: 60
End: 2020-09-09
Payer: MEDICARE

## 2020-09-09 VITALS
SYSTOLIC BLOOD PRESSURE: 135 MMHG | DIASTOLIC BLOOD PRESSURE: 76 MMHG | OXYGEN SATURATION: 94 % | HEART RATE: 91 BPM | BODY MASS INDEX: 32.9 KG/M2 | TEMPERATURE: 98.1 F | HEIGHT: 71 IN | WEIGHT: 235 LBS

## 2020-09-09 PROBLEM — E66.01 MORBIDLY OBESE (HCC): Status: ACTIVE | Noted: 2020-09-09

## 2020-09-09 PROCEDURE — G0008 ADMIN INFLUENZA VIRUS VAC: HCPCS | Performed by: NURSE PRACTITIONER

## 2020-09-09 PROCEDURE — G0439 PPPS, SUBSEQ VISIT: HCPCS | Performed by: NURSE PRACTITIONER

## 2020-09-09 PROCEDURE — 90686 IIV4 VACC NO PRSV 0.5 ML IM: CPT | Performed by: NURSE PRACTITIONER

## 2020-09-09 RX ORDER — DULOXETIN HYDROCHLORIDE 60 MG/1
60 CAPSULE, DELAYED RELEASE ORAL DAILY
Qty: 90 CAPSULE | Refills: 3 | Status: SHIPPED | OUTPATIENT
Start: 2020-09-09 | End: 2021-11-21

## 2020-09-09 RX ORDER — HYDROXYZINE 50 MG/1
50 TABLET, FILM COATED ORAL NIGHTLY
Qty: 30 TABLET | Refills: 0 | Status: SHIPPED | OUTPATIENT
Start: 2020-09-09 | End: 2020-10-20

## 2020-09-09 ASSESSMENT — PATIENT HEALTH QUESTIONNAIRE - PHQ9
SUM OF ALL RESPONSES TO PHQ9 QUESTIONS 1 & 2: 4
2. FEELING DOWN, DEPRESSED OR HOPELESS: 2
1. LITTLE INTEREST OR PLEASURE IN DOING THINGS: 2
SUM OF ALL RESPONSES TO PHQ QUESTIONS 1-9: 4
SUM OF ALL RESPONSES TO PHQ QUESTIONS 1-9: 4

## 2020-09-09 ASSESSMENT — LIFESTYLE VARIABLES: HOW OFTEN DO YOU HAVE A DRINK CONTAINING ALCOHOL: 0
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
## 2020-09-09 NOTE — PROGRESS NOTES
Visit Information    Have you changed or started any medications since your last visit including any over-the-counter medicines, vitamins, or herbal medicines? no   Have you stopped taking any of your medications? Is so, why? -  no  Are you having any side effects from any of your medications? - no    Have you seen any other physician or provider since your last visit?  no   Have you had any other diagnostic tests since your last visit?  no   Have you been seen in the emergency room and/or had an admission in a hospital since we last saw you?  no   Have you had your routine dental cleaning in the past 6 months?  no     Do you have an active MyChart account? If no, what is the barrier? No: na    Patient Care Team:  RIVER Campos CNP as PCP - General (Nurse Practitioner)  RIVER Campos CNP [place] [facility] [place] [facility] [place] [facility]  Denny Gordon MD as Consulting Physician (Pain Management)    Medical History Review  Past Medical, Family, and Social History reviewed and  contribute to the patient presenting condition    Health Maintenance   Topic Date Due    HIV screen  03/30/1974    DTaP/Tdap/Td vaccine (1 - Tdap) 03/30/1978    Shingles Vaccine (1 of 2) 03/30/2009    Colon cancer screen colonoscopy  03/30/2009    Lipid screen  01/27/2019    TSH testing  01/27/2019    Potassium monitoring  01/27/2019    Creatinine monitoring  01/27/2019    Annual Wellness Visit (AWV)  05/29/2019    Flu vaccine (1) 09/01/2020    Pneumococcal 0-64 years Vaccine  Completed    Hepatitis C screen  Completed    Hepatitis A vaccine  Aged Out    Hepatitis B vaccine  Aged Out    Hib vaccine  Aged Out    Meningococcal (ACWY) vaccine  Aged Out       After obtaining consent, and per orders of Saranya Velez CNP, injection of flu vaccine given in Left deltoid by Kathya Arcos. Patient instructed to remain in clinic for 20 minutes afterwards, and to report any adverse reaction to me immediately.

## 2020-09-09 NOTE — PATIENT INSTRUCTIONS
Personalized Preventive Plan for Eli Vasquez - 9/9/2020  Medicare offers a range of preventive health benefits. Some of the tests and screenings are paid in full while other may be subject to a deductible, co-insurance, and/or copay. Some of these benefits include a comprehensive review of your medical history including lifestyle, illnesses that may run in your family, and various assessments and screenings as appropriate. After reviewing your medical record and screening and assessments performed today your provider may have ordered immunizations, labs, imaging, and/or referrals for you. A list of these orders (if applicable) as well as your Preventive Care list are included within your After Visit Summary for your review. Other Preventive Recommendations:    · A preventive eye exam performed by an eye specialist is recommended every 1-2 years to screen for glaucoma; cataracts, macular degeneration, and other eye disorders. · A preventive dental visit is recommended every 6 months. · Try to get at least 150 minutes of exercise per week or 10,000 steps per day on a pedometer . · Order or download the FREE \"Exercise & Physical Activity: Your Everyday Guide\" from [employer]. Call 9-762.557.4690 or search [employer] online. · You need 8943-3150 mg of calcium and 7348-4080 IU of vitamin D per day. It is possible to meet your calcium requirement with diet alone, but a vitamin D supplement is usually necessary to meet this goal.  · When exposed to the sun, use a sunscreen that protects against both UVA and UVB radiation with an SPF of 30 or greater. Reapply every 2 to 3 hours or after sweating, drying off with a towel, or swimming. · Always wear a seat belt when traveling in a car. Always wear a helmet when riding a bicycle or motorcycle.

## 2020-09-10 ENCOUNTER — TELEPHONE (OUTPATIENT)
Dept: FAMILY MEDICINE CLINIC | Age: 60
End: 2020-09-10

## 2020-11-03 PROBLEM — I48.91 AF (ATRIAL FIBRILLATION) (HCC): Status: RESOLVED | Noted: 2020-11-03 | Resolved: 2020-11-03

## 2020-11-05 ENCOUNTER — TELEPHONE (OUTPATIENT)
Dept: OTHER | Facility: CLINIC | Age: 60
End: 2020-11-05

## 2021-01-27 NOTE — TELEPHONE ENCOUNTER
Patient is having heart surgery on Tuesday and he states that he needs the Xarelto.      Please advise   Thank you

## 2021-09-24 ENCOUNTER — HOSPITAL ENCOUNTER (OUTPATIENT)
Age: 61
Setting detail: SPECIMEN
Discharge: HOME | End: 2021-09-24
Payer: MEDICARE

## 2021-09-24 LAB
ABSOLUTE EOS #: 0.34 K/UL (ref 0–0.44)
ABSOLUTE IMMATURE GRANULOCYTE: 0.04 K/UL (ref 0–0.3)
ABSOLUTE LYMPH #: 1.78 K/UL (ref 1.1–3.7)
ABSOLUTE MONO #: 0.72 K/UL (ref 0.1–1.2)
ANION GAP SERPL CALCULATED.3IONS-SCNC: 14 MMOL/L (ref 9–17)
BASOPHILS # BLD: 0 % (ref 0–2)
BASOPHILS ABSOLUTE: 0.04 K/UL (ref 0–0.2)
BUN BLDV-MCNC: 5 MG/DL (ref 8–23)
BUN/CREAT BLD: ABNORMAL (ref 9–20)
CALCIUM SERPL-MCNC: 9.3 MG/DL (ref 8.6–10.4)
CHLORIDE BLD-SCNC: 98 MMOL/L (ref 98–107)
CO2: 25 MMOL/L (ref 20–31)
CREAT SERPL-MCNC: 0.81 MG/DL (ref 0.7–1.2)
DIFFERENTIAL TYPE: ABNORMAL
EOSINOPHILS RELATIVE PERCENT: 4 % (ref 1–4)
GFR AFRICAN AMERICAN: >60 ML/MIN
GFR NON-AFRICAN AMERICAN: >60 ML/MIN
GFR SERPL CREATININE-BSD FRML MDRD: ABNORMAL ML/MIN/{1.73_M2}
GFR SERPL CREATININE-BSD FRML MDRD: ABNORMAL ML/MIN/{1.73_M2}
GLUCOSE BLD-MCNC: 94 MG/DL (ref 70–99)
HCT VFR BLD CALC: 47.9 % (ref 40.7–50.3)
HEMOGLOBIN: 15 G/DL (ref 13–17)
IMMATURE GRANULOCYTES: 0 %
LYMPHOCYTES # BLD: 19 % (ref 24–43)
MCH RBC QN AUTO: 29.9 PG (ref 25.2–33.5)
MCHC RBC AUTO-ENTMCNC: 31.3 G/DL (ref 28.4–34.8)
MCV RBC AUTO: 95.4 FL (ref 82.6–102.9)
MONOCYTES # BLD: 8 % (ref 3–12)
NRBC AUTOMATED: 0 PER 100 WBC
PDW BLD-RTO: 13.2 % (ref 11.8–14.4)
PLATELET # BLD: 294 K/UL (ref 138–453)
PLATELET ESTIMATE: ABNORMAL
PMV BLD AUTO: 10.5 FL (ref 8.1–13.5)
POTASSIUM SERPL-SCNC: 4.1 MMOL/L (ref 3.7–5.3)
RBC # BLD: 5.02 M/UL (ref 4.21–5.77)
RBC # BLD: ABNORMAL 10*6/UL
SEG NEUTROPHILS: 69 % (ref 36–65)
SEGMENTED NEUTROPHILS ABSOLUTE COUNT: 6.38 K/UL (ref 1.5–8.1)
SODIUM BLD-SCNC: 137 MMOL/L (ref 135–144)
WBC # BLD: 9.3 K/UL (ref 3.5–11.3)
WBC # BLD: ABNORMAL 10*3/UL

## 2022-02-25 DIAGNOSIS — F43.29 ADJUSTMENT DISORDER WITH OTHER SYMPTOM: ICD-10-CM

## 2022-02-25 RX ORDER — DULOXETIN HYDROCHLORIDE 60 MG/1
CAPSULE, DELAYED RELEASE ORAL
Qty: 30 CAPSULE | Refills: 0 | OUTPATIENT
Start: 2022-02-25

## 2022-02-25 RX ORDER — METOPROLOL SUCCINATE 100 MG/1
TABLET, EXTENDED RELEASE ORAL
Qty: 90 TABLET | Refills: 0 | OUTPATIENT
Start: 2022-02-25

## 2022-03-09 ENCOUNTER — CARE COORDINATION (OUTPATIENT)
Dept: CARE COORDINATION | Age: 62
End: 2022-03-09

## 2022-07-21 ENCOUNTER — APPOINTMENT (OUTPATIENT)
Dept: GENERAL RADIOLOGY | Age: 62
DRG: 309 | End: 2022-07-21
Payer: MEDICARE

## 2022-07-21 ENCOUNTER — HOSPITAL ENCOUNTER (INPATIENT)
Age: 62
LOS: 1 days | Discharge: HOME | DRG: 309 | End: 2022-07-22
Attending: EMERGENCY MEDICINE | Admitting: INTERNAL MEDICINE
Payer: MEDICARE

## 2022-07-21 DIAGNOSIS — R06.02 SHORTNESS OF BREATH: Primary | ICD-10-CM

## 2022-07-21 DIAGNOSIS — R00.2 PALPITATIONS: ICD-10-CM

## 2022-07-21 DIAGNOSIS — I49.49 ECTOPIC CARDIAC BEATS: ICD-10-CM

## 2022-07-21 DIAGNOSIS — E87.3 RESPIRATORY ALKALOSIS: ICD-10-CM

## 2022-07-21 DIAGNOSIS — E87.6 HYPOKALEMIA: ICD-10-CM

## 2022-07-21 PROBLEM — E66.9 OBESITY (BMI 30.0-34.9): Status: ACTIVE | Noted: 2022-07-21

## 2022-07-21 PROBLEM — E66.811 OBESITY (BMI 30.0-34.9): Status: ACTIVE | Noted: 2022-07-21

## 2022-07-21 LAB
ABSOLUTE EOS #: <0.03 K/UL (ref 0–0.44)
ABSOLUTE IMMATURE GRANULOCYTE: 0.05 K/UL (ref 0–0.3)
ABSOLUTE LYMPH #: 3.12 K/UL (ref 1.1–3.7)
ABSOLUTE MONO #: 0.64 K/UL (ref 0.1–1.2)
ACTION: NORMAL
ALBUMIN SERPL-MCNC: 4 G/DL (ref 3.5–5.2)
ALP BLD-CCNC: 138 U/L (ref 40–129)
ALT SERPL-CCNC: 32 U/L (ref 5–41)
ANION GAP SERPL CALCULATED.3IONS-SCNC: 22 MMOL/L (ref 9–17)
AST SERPL-CCNC: 58 U/L
BASOPHILS # BLD: 0 % (ref 0–2)
BASOPHILS ABSOLUTE: 0.06 K/UL (ref 0–0.2)
BILIRUB SERPL-MCNC: 1.75 MG/DL (ref 0.3–1.2)
BUN BLDV-MCNC: 9 MG/DL (ref 8–23)
BUN/CREAT BLD: 8 (ref 9–20)
CALCIUM SERPL-MCNC: 9.6 MG/DL (ref 8.6–10.4)
CHLORIDE BLD-SCNC: 93 MMOL/L (ref 98–107)
CO2: 19 MMOL/L (ref 20–31)
CREAT SERPL-MCNC: 1.17 MG/DL (ref 0.7–1.2)
DATE AND TIME: NORMAL
DIGOXIN LEVEL: <0.3 NG/ML (ref 0.5–2)
EOSINOPHILS RELATIVE PERCENT: 0 % (ref 1–4)
GFR AFRICAN AMERICAN: >60 ML/MIN
GFR NON-AFRICAN AMERICAN: >60 ML/MIN
GFR SERPL CREATININE-BSD FRML MDRD: ABNORMAL ML/MIN/{1.73_M2}
GLUCOSE BLD-MCNC: 164 MG/DL (ref 70–99)
HCO3 VENOUS: 17.2 MMOL/L (ref 22–29)
HCT VFR BLD CALC: 56.4 % (ref 40.7–50.3)
HEMOGLOBIN: 19.3 G/DL (ref 13–17)
IMMATURE GRANULOCYTES: 0 %
LYMPHOCYTES # BLD: 20 % (ref 24–43)
MAGNESIUM: 2.1 MG/DL (ref 1.6–2.6)
MCH RBC QN AUTO: 31.4 PG (ref 25.2–33.5)
MCHC RBC AUTO-ENTMCNC: 34.2 G/DL (ref 28.4–34.8)
MCV RBC AUTO: 91.7 FL (ref 82.6–102.9)
MONOCYTES # BLD: 4 % (ref 3–12)
NOTIFY: NORMAL
NRBC AUTOMATED: 0 PER 100 WBC
O2 SAT, VEN: 100 % (ref 60–85)
PCO2, VEN: 14.3 MM HG (ref 41–51)
PDW BLD-RTO: 15.6 % (ref 11.8–14.4)
PH VENOUS: 7.69 (ref 7.32–7.43)
PLATELET # BLD: 206 K/UL (ref 138–453)
PMV BLD AUTO: 10 FL (ref 8.1–13.5)
PO2, VEN: 137.7 MM HG (ref 30–50)
POSITIVE BASE EXCESS, VEN: 2 (ref 0–3)
POTASSIUM SERPL-SCNC: 2.8 MMOL/L (ref 3.7–5.3)
POTASSIUM SERPL-SCNC: 3.7 MMOL/L (ref 3.7–5.3)
PRO-BNP: 5130 PG/ML
RBC # BLD: 6.15 M/UL (ref 4.21–5.77)
RBC # BLD: ABNORMAL 10*6/UL
READ BACK: NO
SEG NEUTROPHILS: 76 % (ref 36–65)
SEGMENTED NEUTROPHILS ABSOLUTE COUNT: 11.92 K/UL (ref 1.5–8.1)
SODIUM BLD-SCNC: 134 MMOL/L (ref 135–144)
THYROXINE, FREE: 1.5 NG/DL (ref 0.93–1.7)
TOTAL PROTEIN: 7.7 G/DL (ref 6.4–8.3)
TROPONIN, HIGH SENSITIVITY: 36 NG/L (ref 0–22)
TROPONIN, HIGH SENSITIVITY: 37 NG/L (ref 0–22)
TSH SERPL DL<=0.05 MIU/L-ACNC: 7.52 UIU/ML (ref 0.3–5)
WBC # BLD: 15.8 K/UL (ref 3.5–11.3)

## 2022-07-21 PROCEDURE — 99285 EMERGENCY DEPT VISIT HI MDM: CPT

## 2022-07-21 PROCEDURE — 82803 BLOOD GASES ANY COMBINATION: CPT

## 2022-07-21 PROCEDURE — 83735 ASSAY OF MAGNESIUM: CPT

## 2022-07-21 PROCEDURE — 96368 THER/DIAG CONCURRENT INF: CPT

## 2022-07-21 PROCEDURE — 2700000000 HC OXYGEN THERAPY PER DAY

## 2022-07-21 PROCEDURE — 6360000002 HC RX W HCPCS: Performed by: EMERGENCY MEDICINE

## 2022-07-21 PROCEDURE — 2060000000 HC ICU INTERMEDIATE R&B

## 2022-07-21 PROCEDURE — 85025 COMPLETE CBC W/AUTO DIFF WBC: CPT

## 2022-07-21 PROCEDURE — G0378 HOSPITAL OBSERVATION PER HR: HCPCS

## 2022-07-21 PROCEDURE — 84439 ASSAY OF FREE THYROXINE: CPT

## 2022-07-21 PROCEDURE — 2580000003 HC RX 258: Performed by: NURSE PRACTITIONER

## 2022-07-21 PROCEDURE — 96374 THER/PROPH/DIAG INJ IV PUSH: CPT

## 2022-07-21 PROCEDURE — 96375 TX/PRO/DX INJ NEW DRUG ADDON: CPT

## 2022-07-21 PROCEDURE — 2580000003 HC RX 258: Performed by: EMERGENCY MEDICINE

## 2022-07-21 PROCEDURE — 87040 BLOOD CULTURE FOR BACTERIA: CPT

## 2022-07-21 PROCEDURE — 94640 AIRWAY INHALATION TREATMENT: CPT

## 2022-07-21 PROCEDURE — 93005 ELECTROCARDIOGRAM TRACING: CPT | Performed by: EMERGENCY MEDICINE

## 2022-07-21 PROCEDURE — 80053 COMPREHEN METABOLIC PANEL: CPT

## 2022-07-21 PROCEDURE — 99222 1ST HOSP IP/OBS MODERATE 55: CPT | Performed by: NURSE PRACTITIONER

## 2022-07-21 PROCEDURE — 96366 THER/PROPH/DIAG IV INF ADDON: CPT

## 2022-07-21 PROCEDURE — 6370000000 HC RX 637 (ALT 250 FOR IP): Performed by: EMERGENCY MEDICINE

## 2022-07-21 PROCEDURE — 96365 THER/PROPH/DIAG IV INF INIT: CPT

## 2022-07-21 PROCEDURE — 84145 PROCALCITONIN (PCT): CPT

## 2022-07-21 PROCEDURE — 36415 COLL VENOUS BLD VENIPUNCTURE: CPT

## 2022-07-21 PROCEDURE — 84443 ASSAY THYROID STIM HORMONE: CPT

## 2022-07-21 PROCEDURE — 84484 ASSAY OF TROPONIN QUANT: CPT

## 2022-07-21 PROCEDURE — 71045 X-RAY EXAM CHEST 1 VIEW: CPT

## 2022-07-21 PROCEDURE — 96376 TX/PRO/DX INJ SAME DRUG ADON: CPT

## 2022-07-21 PROCEDURE — 80162 ASSAY OF DIGOXIN TOTAL: CPT

## 2022-07-21 PROCEDURE — 6360000002 HC RX W HCPCS: Performed by: NURSE PRACTITIONER

## 2022-07-21 PROCEDURE — 84132 ASSAY OF SERUM POTASSIUM: CPT

## 2022-07-21 PROCEDURE — 83880 ASSAY OF NATRIURETIC PEPTIDE: CPT

## 2022-07-21 RX ORDER — POTASSIUM CHLORIDE 20 MEQ/1
40 TABLET, EXTENDED RELEASE ORAL PRN
Status: DISCONTINUED | OUTPATIENT
Start: 2022-07-21 | End: 2022-07-22 | Stop reason: HOSPADM

## 2022-07-21 RX ORDER — LISINOPRIL 2.5 MG/1
1 TABLET ORAL DAILY
Status: DISCONTINUED | OUTPATIENT
Start: 2022-07-22 | End: 2022-07-21

## 2022-07-21 RX ORDER — SODIUM CHLORIDE 9 MG/ML
INJECTION, SOLUTION INTRAVENOUS CONTINUOUS
Status: ACTIVE | OUTPATIENT
Start: 2022-07-21 | End: 2022-07-22

## 2022-07-21 RX ORDER — LISINOPRIL 5 MG/1
5 TABLET ORAL DAILY
Status: DISCONTINUED | OUTPATIENT
Start: 2022-07-22 | End: 2022-07-22 | Stop reason: HOSPADM

## 2022-07-21 RX ORDER — SODIUM CHLORIDE 0.9 % (FLUSH) 0.9 %
10 SYRINGE (ML) INJECTION PRN
Status: DISCONTINUED | OUTPATIENT
Start: 2022-07-21 | End: 2022-07-22 | Stop reason: HOSPADM

## 2022-07-21 RX ORDER — MAGNESIUM SULFATE IN WATER 40 MG/ML
2000 INJECTION, SOLUTION INTRAVENOUS ONCE
Status: COMPLETED | OUTPATIENT
Start: 2022-07-21 | End: 2022-07-21

## 2022-07-21 RX ORDER — MORPHINE SULFATE 4 MG/ML
4 INJECTION, SOLUTION INTRAMUSCULAR; INTRAVENOUS
Status: DISCONTINUED | OUTPATIENT
Start: 2022-07-21 | End: 2022-07-22 | Stop reason: HOSPADM

## 2022-07-21 RX ORDER — ACETAMINOPHEN 325 MG/1
650 TABLET ORAL EVERY 6 HOURS PRN
Status: DISCONTINUED | OUTPATIENT
Start: 2022-07-21 | End: 2022-07-22 | Stop reason: HOSPADM

## 2022-07-21 RX ORDER — METOPROLOL SUCCINATE 50 MG/1
100 TABLET, EXTENDED RELEASE ORAL DAILY
Status: DISCONTINUED | OUTPATIENT
Start: 2022-07-22 | End: 2022-07-21

## 2022-07-21 RX ORDER — POTASSIUM CHLORIDE 7.45 MG/ML
10 INJECTION INTRAVENOUS
Status: COMPLETED | OUTPATIENT
Start: 2022-07-21 | End: 2022-07-21

## 2022-07-21 RX ORDER — ONDANSETRON 4 MG/1
4 TABLET, ORALLY DISINTEGRATING ORAL EVERY 8 HOURS PRN
Status: DISCONTINUED | OUTPATIENT
Start: 2022-07-21 | End: 2022-07-22 | Stop reason: HOSPADM

## 2022-07-21 RX ORDER — POTASSIUM CHLORIDE 20 MEQ/1
40 TABLET, EXTENDED RELEASE ORAL ONCE
Status: DISCONTINUED | OUTPATIENT
Start: 2022-07-21 | End: 2022-07-21

## 2022-07-21 RX ORDER — POTASSIUM CHLORIDE 7.45 MG/ML
10 INJECTION INTRAVENOUS PRN
Status: DISCONTINUED | OUTPATIENT
Start: 2022-07-21 | End: 2022-07-22 | Stop reason: HOSPADM

## 2022-07-21 RX ORDER — ONDANSETRON 2 MG/ML
4 INJECTION INTRAMUSCULAR; INTRAVENOUS ONCE
Status: COMPLETED | OUTPATIENT
Start: 2022-07-21 | End: 2022-07-21

## 2022-07-21 RX ORDER — SODIUM CHLORIDE FOR INHALATION 0.9 %
3 VIAL, NEBULIZER (ML) INHALATION EVERY 8 HOURS PRN
Status: DISCONTINUED | OUTPATIENT
Start: 2022-07-21 | End: 2022-07-22 | Stop reason: HOSPADM

## 2022-07-21 RX ORDER — METHYLPREDNISOLONE SODIUM SUCCINATE 125 MG/2ML
125 INJECTION, POWDER, LYOPHILIZED, FOR SOLUTION INTRAMUSCULAR; INTRAVENOUS ONCE
Status: COMPLETED | OUTPATIENT
Start: 2022-07-21 | End: 2022-07-21

## 2022-07-21 RX ORDER — MORPHINE SULFATE 4 MG/ML
4 INJECTION, SOLUTION INTRAMUSCULAR; INTRAVENOUS ONCE
Status: COMPLETED | OUTPATIENT
Start: 2022-07-21 | End: 2022-07-21

## 2022-07-21 RX ORDER — POLYETHYLENE GLYCOL 3350 17 G/17G
17 POWDER, FOR SOLUTION ORAL DAILY PRN
Status: DISCONTINUED | OUTPATIENT
Start: 2022-07-21 | End: 2022-07-22 | Stop reason: HOSPADM

## 2022-07-21 RX ORDER — POTASSIUM CHLORIDE 7.45 MG/ML
10 INJECTION INTRAVENOUS
Status: DISCONTINUED | OUTPATIENT
Start: 2022-07-21 | End: 2022-07-21

## 2022-07-21 RX ORDER — LANOLIN ALCOHOL/MO/W.PET/CERES
3 CREAM (GRAM) TOPICAL NIGHTLY PRN
Status: DISCONTINUED | OUTPATIENT
Start: 2022-07-21 | End: 2022-07-22 | Stop reason: HOSPADM

## 2022-07-21 RX ORDER — LEVALBUTEROL 1.25 MG/.5ML
1.25 SOLUTION, CONCENTRATE RESPIRATORY (INHALATION) ONCE
Status: COMPLETED | OUTPATIENT
Start: 2022-07-21 | End: 2022-07-21

## 2022-07-21 RX ORDER — SODIUM CHLORIDE 9 MG/ML
INJECTION, SOLUTION INTRAVENOUS CONTINUOUS
Status: DISCONTINUED | OUTPATIENT
Start: 2022-07-21 | End: 2022-07-21

## 2022-07-21 RX ORDER — ASPIRIN 81 MG/1
324 TABLET, CHEWABLE ORAL ONCE
Status: COMPLETED | OUTPATIENT
Start: 2022-07-21 | End: 2022-07-21

## 2022-07-21 RX ORDER — ONDANSETRON 2 MG/ML
4 INJECTION INTRAMUSCULAR; INTRAVENOUS EVERY 6 HOURS PRN
Status: DISCONTINUED | OUTPATIENT
Start: 2022-07-21 | End: 2022-07-22 | Stop reason: HOSPADM

## 2022-07-21 RX ORDER — SODIUM CHLORIDE 0.9 % (FLUSH) 0.9 %
5-40 SYRINGE (ML) INJECTION EVERY 12 HOURS SCHEDULED
Status: DISCONTINUED | OUTPATIENT
Start: 2022-07-21 | End: 2022-07-22 | Stop reason: HOSPADM

## 2022-07-21 RX ORDER — ACETAMINOPHEN 650 MG/1
650 SUPPOSITORY RECTAL EVERY 6 HOURS PRN
Status: DISCONTINUED | OUTPATIENT
Start: 2022-07-21 | End: 2022-07-22 | Stop reason: HOSPADM

## 2022-07-21 RX ORDER — SODIUM CHLORIDE 9 MG/ML
INJECTION, SOLUTION INTRAVENOUS PRN
Status: DISCONTINUED | OUTPATIENT
Start: 2022-07-21 | End: 2022-07-22 | Stop reason: HOSPADM

## 2022-07-21 RX ORDER — LEVALBUTEROL 1.25 MG/.5ML
1.25 SOLUTION, CONCENTRATE RESPIRATORY (INHALATION) EVERY 8 HOURS PRN
Status: DISCONTINUED | OUTPATIENT
Start: 2022-07-21 | End: 2022-07-22 | Stop reason: HOSPADM

## 2022-07-21 RX ORDER — 0.9 % SODIUM CHLORIDE 0.9 %
1000 INTRAVENOUS SOLUTION INTRAVENOUS ONCE
Status: DISCONTINUED | OUTPATIENT
Start: 2022-07-21 | End: 2022-07-21

## 2022-07-21 RX ORDER — MORPHINE SULFATE 2 MG/ML
2 INJECTION, SOLUTION INTRAMUSCULAR; INTRAVENOUS
Status: DISCONTINUED | OUTPATIENT
Start: 2022-07-21 | End: 2022-07-22 | Stop reason: HOSPADM

## 2022-07-21 RX ORDER — SODIUM CHLORIDE FOR INHALATION 0.9 %
3 VIAL, NEBULIZER (ML) INHALATION ONCE
Status: COMPLETED | OUTPATIENT
Start: 2022-07-21 | End: 2022-07-21

## 2022-07-21 RX ADMIN — SODIUM CHLORIDE, PRESERVATIVE FREE 10 ML: 5 INJECTION INTRAVENOUS at 22:39

## 2022-07-21 RX ADMIN — LEVALBUTEROL 1.25 MG: 1.25 SOLUTION, CONCENTRATE RESPIRATORY (INHALATION) at 18:52

## 2022-07-21 RX ADMIN — ASPIRIN 81 MG CHEWABLE TABLET 324 MG: 81 TABLET CHEWABLE at 17:39

## 2022-07-21 RX ADMIN — METHYLPREDNISOLONE SODIUM SUCCINATE 125 MG: 125 INJECTION, POWDER, FOR SOLUTION INTRAMUSCULAR; INTRAVENOUS at 17:40

## 2022-07-21 RX ADMIN — SODIUM CHLORIDE: 9 INJECTION, SOLUTION INTRAVENOUS at 22:38

## 2022-07-21 RX ADMIN — POTASSIUM CHLORIDE 10 MEQ: 7.46 INJECTION, SOLUTION INTRAVENOUS at 19:26

## 2022-07-21 RX ADMIN — MAGNESIUM SULFATE HEPTAHYDRATE 2000 MG: 40 INJECTION, SOLUTION INTRAVENOUS at 17:42

## 2022-07-21 RX ADMIN — Medication 3 ML: at 18:54

## 2022-07-21 RX ADMIN — ONDANSETRON 4 MG: 2 INJECTION INTRAMUSCULAR; INTRAVENOUS at 19:44

## 2022-07-21 RX ADMIN — POTASSIUM CHLORIDE 10 MEQ: 7.46 INJECTION, SOLUTION INTRAVENOUS at 20:25

## 2022-07-21 RX ADMIN — MORPHINE SULFATE 4 MG: 4 INJECTION, SOLUTION INTRAMUSCULAR; INTRAVENOUS at 18:09

## 2022-07-21 RX ADMIN — SODIUM CHLORIDE: 9 INJECTION, SOLUTION INTRAVENOUS at 19:28

## 2022-07-21 RX ADMIN — MORPHINE SULFATE 4 MG: 4 INJECTION, SOLUTION INTRAMUSCULAR; INTRAVENOUS at 22:32

## 2022-07-21 ASSESSMENT — ENCOUNTER SYMPTOMS
CONSTIPATION: 0
NAUSEA: 1
CHEST TIGHTNESS: 0
VOMITING: 1
EYES NEGATIVE: 1
BACK PAIN: 0
VOMITING: 0
SHORTNESS OF BREATH: 1
ABDOMINAL PAIN: 1
DIARRHEA: 0
NAUSEA: 0
COUGH: 0
WHEEZING: 0

## 2022-07-21 ASSESSMENT — PAIN SCALES - GENERAL
PAINLEVEL_OUTOF10: 8

## 2022-07-21 ASSESSMENT — PAIN DESCRIPTION - LOCATION: LOCATION: GENERALIZED;HIP;KNEE

## 2022-07-21 ASSESSMENT — PAIN SCALES - WONG BAKER: WONGBAKER_NUMERICALRESPONSE: 0

## 2022-07-21 NOTE — ED PROVIDER NOTES
[address] [facility]  Emergency Department Encounter     Pt Name: Lourdes Beverly  MRN: [id number]  [place] 1960  Date of evaluation: 7/21/22  PCP:  RIVER Tpaia CNP    CHIEF COMPLAINT       Chief Complaint   Patient presents with    Chest Pain    Shortness of Breath       HISTORY OF PRESENT ILLNESS  (Location/Symptom, Timing/Onset, Context/Setting, Quality, Duration, Modifying Factors, Severity.)    Lourdes Beverly is a 60 y.o. male who presents with shortness of breath, heart palpitations. Patient has a history of atrial fibrillation is anticoagulated. He also has a history of coronary artery disease, congestive heart failure, prior MI and has a AICD/pacemaker in place with his known most recent EF of 15%. He was found to be in atrial fibrillation by EMS. Placed on nonrebreather, given 20 mg push of Cardizem, however he became hypotensive. He is still currently smoking, however has no known diagnosis of COPD and does not wear oxygen at home. PAST MEDICAL / SURGICAL / SOCIAL / FAMILY HISTORY    has a past medical history of Adjustment disorder, AF (atrial fibrillation) ([address] AICD (automatic cardioverter/defibrillator) present, Alcohol dependence ([address] CAD (coronary artery disease), Cardiomyopathy ([address] CHF (congestive heart failure) ([address] COPD (chronic obstructive pulmonary disease) ([address] DDD (degenerative disc disease), lumbar, Herniated cervical disc, Hyperlipidemia, Hypertension, Major depression, Post laminectomy syndrome, Sciatica, Snores, Tobacco abuse, and Traumatic brain injury ([address] has a past surgical history that includes back surgery; Rotator cuff repair (Right); Carpal tunnel release (Right); Nerve Block (7/23/13); Nerve Block (N/A, 3/21/2013 ); other surgical history (4/25/2016); Tonsillectomy; pacemaker placement (09/2015); back surgery; other surgical history (02/09/2018); and joint replacement (Right, 06/2018).     Social History Socioeconomic History    Marital status:      Spouse name: Not on file    Number of children: Not on file    Years of education: Not on file    Highest education level: Not on file   Occupational History     Employer: [employer]   Tobacco Use    Smoking status: Every Day     Packs/day: 1.50     Years: 40.00     Pack years: 60.00     Types: Cigarettes    Smokeless tobacco: Never   Substance and Sexual Activity    Alcohol use: Not Currently     Alcohol/week: 0.0 standard drinks    Drug use: Not Currently     Comment: ediables with thc    Sexual activity: Not on file   Other Topics Concern    Not on file   Social History Narrative    Not on file     Social Determinants of Health     Financial Resource Strain: Not on file   Food Insecurity: Not on file   Transportation Needs: Not on file   Physical Activity: Not on file   Stress: Not on file   Social Connections: Not on file   Intimate Partner Violence: Not on file   Housing Stability: Not on file       Family History   Problem Relation Age of Onset    Other Mother         dementia    Other Father        Allergies:    Sulfa antibiotics    Home Medications:  Prior to Admission medications    Medication Sig Start Date End Date Taking?  Authorizing Provider   DULoxetine (CYMBALTA) 60 MG extended release capsule take 1 capsule by mouth once daily  Patient not taking: Reported on 7/21/2022 11/21/21   RIVER Wei CNP   metoprolol succinate (TOPROL XL) 100 MG extended release tablet take 1 tablet by mouth once daily 5/7/21   RIVER Wei CNP   hydrOXYzine (ATARAX) 50 MG tablet take 1 tablet by mouth every evening  Patient not taking: Reported on 7/21/2022 2/22/21   RIVER Wei CNP   rivaroxaban (XARELTO) 10 MG TABS tablet Take 2 tablets by mouth daily (with breakfast) 1/27/21   RIVER Wei CNP   simvastatin (ZOCOR) 20 MG tablet Take 1 tablet by mouth every evening  Patient not taking: Reported on 7/21/2022 7/1/19   Brenda Schmidt APRN - CNP   levothyroxine (SYNTHROID) 50 MCG tablet Take 1 tablet by mouth Daily  Patient not taking: No sig reported 3/25/16   Hannah Clemons APRTREVA Yanet CNP   lisinopril (PRINIVIL;ZESTRIL) 2.5 MG tablet TAKE 1 TABLET EVERY DAY 3/25/16   Hannah Clemons APRTREVA - CNP   furosemide (LASIX) 40 MG tablet TAKE 1 TABLET EVERY DAY  Patient not taking: No sig reported 12/24/15   Rc Fajardo MD   diltiazem (CARDIZEM) 120 MG tablet Take 1 tablet by mouth 2 times daily  Patient not taking: No sig reported 12/24/15   Rc Fajardo MD   digoxin (LANOXIN) 250 MCG tablet Take 1 tablet by mouth daily  Patient not taking: No sig reported 12/11/15   Rc Fajardo MD   [facility]) 45 MCG/ACT inhaler Inhale 1-2 puffs into the lungs every 6 hours as needed for Wheezing.  1/23/16  Rc Fajardo MD       REVIEW OF SYSTEMS    (2-9 systems for level 4, 10 or more for level 5)    Review of Systems   Constitutional:  Positive for diaphoresis. Respiratory:  Positive for shortness of breath. Cardiovascular:  Positive for chest pain and palpitations. Negative for leg swelling. Gastrointestinal:  Negative for nausea and vomiting. Genitourinary:  Negative for flank pain. Musculoskeletal:  Negative for back pain. Neurological:  Negative for dizziness, syncope, weakness and light-headedness. Hematological:  Bruises/bleeds easily. All other systems reviewed and are negative. PHYSICAL EXAM   (up to 7 for level 4, 8 or more for level 5)    VITALS:   Vitals:    07/21/22 1724 07/21/22 1853   BP: (!) 129/90    Pulse: (!) 125    Resp: 15    Temp: 97.3 °F (36.3 °C)    TempSrc: Oral    SpO2: 97% 99%   Weight: 230 lb (104.3 kg)    Height: 5' 11\" (1.803 m)        Physical Exam  Vitals and nursing note reviewed. Constitutional:       General: He is not in acute distress. Appearance: He is well-developed. He is obese. He is diaphoretic. HENT:      Head: Normocephalic and atraumatic.    Eyes: Conjunctiva/sclera: Conjunctivae normal.   Cardiovascular:      Rate and Rhythm: Regular rhythm. Tachycardia present. FrequentExtrasystoles are present. Heart sounds: Normal heart sounds. Pulmonary:      Effort: Tachypnea, accessory muscle usage and prolonged expiration present. No respiratory distress. Breath sounds: Decreased breath sounds present. No wheezing, rhonchi or rales. Abdominal:      General: There is no distension. Palpations: Abdomen is soft. Tenderness: There is no abdominal tenderness. Musculoskeletal:         General: Normal range of motion. Cervical back: Normal range of motion and neck supple. Right lower leg: No edema. Left lower leg: No edema. Skin:     General: Skin is warm. Coloration: Skin is not pale. Neurological:      General: No focal deficit present. Mental Status: He is alert.    Psychiatric:         Behavior: Behavior normal.       DIFFERENTIAL  DIAGNOSIS   PLAN (LABS / IMAGING / EKG):  Orders Placed This Encounter   Procedures    XR CHEST 1 VIEW    CBC with Auto Differential    Comprehensive Metabolic Panel w/ Reflex to MG    Troponin    Brain Natriuretic Peptide    Digoxin Level    TSH w/reflex to FT4    T4, Free    Magnesium    Troponin    Telemetry monitoring - continuous duration    Inpatient consult to Cardiology    Inpatient consult to Hospitalist    Respiratory care evaluation only    Pacer Interrogate    Venous Blood Gas, POC    Notification Panel, POC    EKG 12 Lead    EKG 12 Lead    Insert peripheral IV    ADMIT TO INPATIENT       MEDICATIONS ORDERED:  Orders Placed This Encounter   Medications    methylPREDNISolone sodium (SOLU-MEDROL) injection 125 mg    magnesium sulfate 2000 mg in 50 mL IVPB premix    levalbuterol (XOPENEX) nebulizer solution 1.25 mg    sodium chloride nebulizer 0.9 % solution 3 mL    aspirin chewable tablet 324 mg    morphine sulfate (PF) injection 4 mg    DISCONTD: potassium chloride 10 mEq/100 mL IVPB (Peripheral Line)    DISCONTD: potassium chloride (KLOR-CON M) extended release tablet 40 mEq    potassium bicarb-citric acid (EFFER-K) effervescent tablet 40 mEq    potassium chloride 10 mEq/100 mL IVPB (Peripheral Line)    0.9 % sodium chloride infusion    ondansetron (ZOFRAN) injection 4 mg     DIAGNOSTIC RESULTS / EMERGENCYDEPARTMENT COURSE / MDM   LABS:  Labs Reviewed   CBC WITH AUTO DIFFERENTIAL - Abnormal; Notable for the following components:       Result Value    WBC 15.8 (*)     RBC 6.15 (*)     Hemoglobin 19.3 (*)     Hematocrit 56.4 (*)     RDW 15.6 (*)     Seg Neutrophils 76 (*)     Lymphocytes 20 (*)     Eosinophils % 0 (*)     Segs Absolute 11.92 (*)     All other components within normal limits   COMPREHENSIVE METABOLIC PANEL W/ REFLEX TO MG FOR LOW K - Abnormal; Notable for the following components:    Glucose 164 (*)     Bun/Cre Ratio 8 (*)     Sodium 134 (*)     Potassium 2.8 (*)     Chloride 93 (*)     CO2 19 (*)     Anion Gap 22 (*)     Alkaline Phosphatase 138 (*)     AST 58 (*)     Total Bilirubin 1.75 (*)     All other components within normal limits   TROPONIN - Abnormal; Notable for the following components:    Troponin, High Sensitivity 37 (*)     All other components within normal limits   BRAIN NATRIURETIC PEPTIDE - Abnormal; Notable for the following components:    Pro-BNP 5,130 (*)     All other components within normal limits   DIGOXIN LEVEL - Abnormal; Notable for the following components:    Digoxin Lvl <0.3 (*)     All other components within normal limits   TSH WITH REFLEX - Abnormal; Notable for the following components:    TSH 7.52 (*)     All other components within normal limits   TROPONIN - Abnormal; Notable for the following components:    Troponin, High Sensitivity 36 (*)     All other components within normal limits   VENOUS BLOOD GAS, POINT OF CARE - Abnormal; Notable for the following components:    pH, Dennis 7.688 (*)     pCO2, Dennis 14.3 (*)     pO2, Dennis 137.7 (*) HCO3, Venous 17.2 (*)     O2 Sat, Dennis 100 (*)     All other components within normal limits   T4, FREE   MAGNESIUM   NOTIFICATION PANEL, POC       RADIOLOGY:  XR CHEST 1 VIEW    Result Date: 7/21/2022  EXAMINATION: ONE XRAY VIEW OF THE CHEST 7/21/2022 5:36 pm COMPARISON: 08/26/2015 HISTORY: ORDERING SYSTEM PROVIDED HISTORY: SOB TECHNOLOGIST PROVIDED HISTORY: SOB Reason for Exam: chest pain, SOB FINDINGS: Lungs are hyperinflated. No acute airspace disease, pneumothorax or pleural effusion. Heart size and configuration are normal.  There is a 3 lead left subclavian AICD. No acute bone finding. Hyperinflation with no evidence of acute disease. 3 lead left subclavian AICD. EKG    EKG Interpretation    Interpreted by emergency department physician    Rhythm: sinus tachycardia  Rate: 120-130  Axis: normal  Ectopy: premature atrial contraction  Conduction: Qtc 502  ST Segments: no acute change  T Waves: no acute change  Q Waves: aVr    Clinical Impression: non-specific EKG. No recent prior visual for comparison.      All EKG's are interpreted by the Emergency Department Physician whoeither signs or Co-signs this chart in the absence of a cardiologist.    EMERGENCY DEPARTMENT COURSE:  ED Course as of 07/21/22 2006   Thu Jul 21, 2022   1735 Dr. Jim Diaz MD is cardiologist  [AO]   [facility]  [AO]   1741 CBC with Auto Differential(!):    WBC 15.8(!)   RBC 6.15(!)   Hemoglobin Quant 19.3(!)   Hematocrit 56.4(!)   MCV 91.7   MCH 31.4   MCHC 34.2   RDW 15.6(!)   Platelet Count 424   MPV 10.0   NRBC Automated 0.0   Seg Neutrophils 76(!)   Lymphocytes 20(!)   Monocytes 4   Eosinophils % 0(!)   Basophils 0   Immature Granulocytes 0   Segs Absolute 11.92(!)   Absolute Lymph # 3.12   Absolute Mono # 0.64   Absolute Eos # <0.03   Basophils Absolute 0.06   Absolute Immature Granulocyte 0.05   RBC Morphology ANISOCYTOSIS PRESENT [AO]   1750 Venous Blood Gas, POC(!!):    pH, Dennis 7.688(!!)   pCO2, Dennis 14.3(!)   pO2, Dennis 137.7(!)   HCO3, Venous 17. 2(!)   Positive Base Excess, Dennis 2   O2 Sat, Dennis 100(!) [AO]   1750 XR CHEST 1 VIEW [AO]   [address] rep. Functionality okay. Around July 7th runs of SVT and frequent runs today since 11am. Questionable runs of VT with no shocks. Rep would like to make adjustments if admitted tomorrow morning  [AO]   1814 Digoxin Lvl(!): <0.3 [AO]   1814 TSH(!): 7.52 [AO]   1814 Pro-BNP(!): 5,130 [AO]   1814 Troponin, High Sensitivity(!): 37 [AO]   1814 Comprehensive Metabolic Panel w/ Reflex to MG(!!):    GLUCOSE, FASTING,(!)   BUN,BUNPL 9   Creatinine 1.17   Bun/Cre Ratio 8(!)   CALCIUM, SERUM, 176548 9.6   Sodium 134(!)   Potassium 2.8(!!)   Chloride 93(!)   CO2 19(!)   Anion Gap 22(!)   Alk Phos 138(!)   ALT 32   AST 58(!)   Bilirubin 1.75(!)   Total Protein 7.7   Albumin 4.0   GFR Non- >60   GFR  >60   GFR Comment      [AO]   1845 Spoke to Dr. Ivet Culp. Load with dig if needed. Amio with lower defib threshold. Will see in AM [AO]   1927 Repeat EKG unchanged from initial in ED. HR improved to 110. [AO]   1928 Thyroxine, Free: 1.50 [AO]   1940 Pt having emesis  [AO]   1947 Troponin, High Sensitivity(!): 36 [AO]   2002 [facility] accepted [AO]      ED Course User Index  [AO] Marisa Banda 1721, DO       MDM  Number of Diagnoses or Management Options  Ectopic cardiac beats  Hypokalemia  Palpitations  Respiratory alkalosis  Shortness of breath  Diagnosis management comments: 28-year-old male with multiple cardiac comorbidities, no formal diagnosis of history of COPD, however still currently smoking presents emergency department for palpitations, shortness of breath. Found to be in A. fib with RVR by EMS, given 20 mg of Cardizem with likely successful conversion, however blood pressure did dip a little bit. On initial evaluation he is diaphoretic, tachypneic. Decreased breath sounds bilaterally but no wheezes rhonchi or rales. No peripheral leg swelling.   EKG showed sinus tachycardia with frequent PACs. BNP elevated, however appears to also have been elevated in the past and with his chest x-ray and physical exam I do not believe that he is currently in an acute heart failure exacerbation. I believe the patient also has a likely undiagnosed COPD component was so given his Xopenex to avoid further tachycardia, Solu-Medrol, 2 g of magnesium. Is already anticoagulated on Xarelto for his history of A. fib and so therefore PE was very low on my concerns. He was on oxygen in the ER for comfort are not out of necessity. Potassium replaced IV as he cannot tolerate p.o. and had vomiting, given Zofran. Spoke to on-call cardiologist, states will see him in the morning. If he goes back into A. fib with RVR would recommend loading with digoxin as he has been on this and tolerated it well in the past.  Admit to hospitalist.  Echo July 2020 showed EF 35-40%       Amount and/or Complexity of Data Reviewed  Clinical lab tests: ordered and reviewed  Tests in the radiology section of CPT®: ordered and reviewed  Review and summarize past medical records: yes  Discuss the patient with other providers: yes  Independent visualization of images, tracings, or specimens: yes    Critical Care  Total time providing critical care: < 30 minutes    Patient Progress  Patient progress: stable      PROCEDURES:  Procedures     CONSULTS:  IP CONSULT TO CARDIOLOGY  IP CONSULT TO HOSPITALIST    CRITICAL CARE:  There was significant risk of life threatening deterioration of patient's condition requiring my direct management. Critical care time 22 minutes, excluding any documented procedures. FINAL IMPRESSION     1. Shortness of breath    2. Respiratory alkalosis    3. Palpitations    4. Hypokalemia    5. Ectopic cardiac beats       DISPOSITION / PLAN   DISPOSITION Admitted 07/21/2022 08:03:04 PM    Raquel Valverde, [place]  Emergency Medicine Physician    (Please note that portions of this note were completed with a voice recognition program.  Efforts were made to edit the dictations but occasionally words are mis-transcribed.)        Marisa Banda 1721, DO  07/21/22 2006

## 2022-07-22 VITALS
TEMPERATURE: 97.8 F | HEIGHT: 71 IN | DIASTOLIC BLOOD PRESSURE: 82 MMHG | OXYGEN SATURATION: 98 % | HEART RATE: 74 BPM | BODY MASS INDEX: 31.43 KG/M2 | RESPIRATION RATE: 17 BRPM | SYSTOLIC BLOOD PRESSURE: 117 MMHG | WEIGHT: 224.5 LBS

## 2022-07-22 LAB
ALBUMIN SERPL-MCNC: 3.8 G/DL (ref 3.5–5.2)
ALP BLD-CCNC: 103 U/L (ref 40–129)
ALT SERPL-CCNC: 26 U/L (ref 5–41)
ANION GAP SERPL CALCULATED.3IONS-SCNC: 16 MMOL/L (ref 9–17)
AST SERPL-CCNC: 37 U/L
BILIRUB SERPL-MCNC: 1.18 MG/DL (ref 0.3–1.2)
BUN BLDV-MCNC: 13 MG/DL (ref 8–23)
BUN/CREAT BLD: 13 (ref 9–20)
CALCIUM SERPL-MCNC: 8.9 MG/DL (ref 8.6–10.4)
CHLORIDE BLD-SCNC: 93 MMOL/L (ref 98–107)
CHOLESTEROL, FASTING: 176 MG/DL
CHOLESTEROL/HDL RATIO: 3.3
CO2: 23 MMOL/L (ref 20–31)
CREAT SERPL-MCNC: 1.03 MG/DL (ref 0.7–1.2)
EKG ATRIAL RATE: 110 BPM
EKG ATRIAL RATE: 124 BPM
EKG P AXIS: 64 DEGREES
EKG P AXIS: 77 DEGREES
EKG P-R INTERVAL: 136 MS
EKG P-R INTERVAL: 144 MS
EKG Q-T INTERVAL: 350 MS
EKG Q-T INTERVAL: 394 MS
EKG QRS DURATION: 100 MS
EKG QRS DURATION: 102 MS
EKG QTC CALCULATION (BAZETT): 502 MS
EKG QTC CALCULATION (BAZETT): 533 MS
EKG R AXIS: 52 DEGREES
EKG R AXIS: 9 DEGREES
EKG T AXIS: 52 DEGREES
EKG T AXIS: 66 DEGREES
EKG VENTRICULAR RATE: 110 BPM
EKG VENTRICULAR RATE: 124 BPM
GFR AFRICAN AMERICAN: >60 ML/MIN
GFR NON-AFRICAN AMERICAN: >60 ML/MIN
GFR SERPL CREATININE-BSD FRML MDRD: ABNORMAL ML/MIN/{1.73_M2}
GLUCOSE BLD-MCNC: 128 MG/DL (ref 70–99)
HCT VFR BLD CALC: 52.1 % (ref 40.7–50.3)
HDLC SERPL-MCNC: 54 MG/DL
HEMOGLOBIN: 17.3 G/DL (ref 13–17)
INR BLD: 1.4
LDL CHOLESTEROL: 99 MG/DL (ref 0–130)
LV EF: 60 %
LVEF MODALITY: NORMAL
MAGNESIUM: 2.6 MG/DL (ref 1.6–2.6)
MCH RBC QN AUTO: 31.2 PG (ref 25.2–33.5)
MCHC RBC AUTO-ENTMCNC: 33.2 G/DL (ref 28.4–34.8)
MCV RBC AUTO: 93.9 FL (ref 82.6–102.9)
NRBC AUTOMATED: 0 PER 100 WBC
PDW BLD-RTO: 15.2 % (ref 11.8–14.4)
PLATELET # BLD: 165 K/UL (ref 138–453)
PMV BLD AUTO: 10.2 FL (ref 8.1–13.5)
POTASSIUM SERPL-SCNC: 3.5 MMOL/L (ref 3.7–5.3)
PRO-BNP: 1697 PG/ML
PROCALCITONIN: 0.13 NG/ML
PROTHROMBIN TIME: 16.8 SEC (ref 11.5–14.2)
RBC # BLD: 5.55 M/UL (ref 4.21–5.77)
SODIUM BLD-SCNC: 132 MMOL/L (ref 135–144)
TOTAL PROTEIN: 6.8 G/DL (ref 6.4–8.3)
TRIGLYCERIDE, FASTING: 117 MG/DL
WBC # BLD: 8.5 K/UL (ref 3.5–11.3)

## 2022-07-22 PROCEDURE — 6370000000 HC RX 637 (ALT 250 FOR IP): Performed by: NURSE PRACTITIONER

## 2022-07-22 PROCEDURE — 36415 COLL VENOUS BLD VENIPUNCTURE: CPT

## 2022-07-22 PROCEDURE — 2580000003 HC RX 258: Performed by: NURSE PRACTITIONER

## 2022-07-22 PROCEDURE — 99232 SBSQ HOSP IP/OBS MODERATE 35: CPT | Performed by: INTERNAL MEDICINE

## 2022-07-22 PROCEDURE — 83880 ASSAY OF NATRIURETIC PEPTIDE: CPT

## 2022-07-22 PROCEDURE — 83735 ASSAY OF MAGNESIUM: CPT

## 2022-07-22 PROCEDURE — 85610 PROTHROMBIN TIME: CPT

## 2022-07-22 PROCEDURE — 80053 COMPREHEN METABOLIC PANEL: CPT

## 2022-07-22 PROCEDURE — 93306 TTE W/DOPPLER COMPLETE: CPT

## 2022-07-22 PROCEDURE — 80061 LIPID PANEL: CPT

## 2022-07-22 PROCEDURE — 85027 COMPLETE CBC AUTOMATED: CPT

## 2022-07-22 PROCEDURE — G0378 HOSPITAL OBSERVATION PER HR: HCPCS

## 2022-07-22 PROCEDURE — 93010 ELECTROCARDIOGRAM REPORT: CPT | Performed by: INTERNAL MEDICINE

## 2022-07-22 RX ORDER — METOPROLOL SUCCINATE 50 MG/1
50 TABLET, EXTENDED RELEASE ORAL DAILY
Qty: 30 TABLET | Refills: 3 | Status: SHIPPED | OUTPATIENT
Start: 2022-07-23

## 2022-07-22 RX ORDER — LISINOPRIL 5 MG/1
5 TABLET ORAL DAILY
COMMUNITY

## 2022-07-22 RX ORDER — METOPROLOL SUCCINATE 50 MG/1
100 TABLET, EXTENDED RELEASE ORAL DAILY
Status: DISCONTINUED | OUTPATIENT
Start: 2022-07-22 | End: 2022-07-22

## 2022-07-22 RX ORDER — SIMVASTATIN 20 MG
20 TABLET ORAL EVERY EVENING
Qty: 90 TABLET | Refills: 1 | Status: SHIPPED | OUTPATIENT
Start: 2022-07-22

## 2022-07-22 RX ORDER — METOPROLOL SUCCINATE 50 MG/1
50 TABLET, EXTENDED RELEASE ORAL DAILY
Status: DISCONTINUED | OUTPATIENT
Start: 2022-07-23 | End: 2022-07-22 | Stop reason: HOSPADM

## 2022-07-22 RX ADMIN — LISINOPRIL 5 MG: 5 TABLET ORAL at 08:54

## 2022-07-22 RX ADMIN — METOPROLOL SUCCINATE 100 MG: 50 TABLET, EXTENDED RELEASE ORAL at 10:03

## 2022-07-22 RX ADMIN — RIVAROXABAN 20 MG: 20 TABLET, FILM COATED ORAL at 17:00

## 2022-07-22 RX ADMIN — SODIUM CHLORIDE: 9 INJECTION, SOLUTION INTRAVENOUS at 09:05

## 2022-07-22 RX ADMIN — POTASSIUM CHLORIDE 40 MEQ: 1500 TABLET, EXTENDED RELEASE ORAL at 10:03

## 2022-07-22 NOTE — PROGRESS NOTES
[facility]  Office: [address], DO, Yari Arredondo, DO, Brittni Dry, DO, Lyssa Navarrou Blood, DO, Isabella Haney MD, Sherry Connolly MD, Guanako Tolentino MD, Prem Flores MD,  Eleazar Barrera MD, Tarsha Barnes MD, Kathie Rodríguez, DO, Ashutosh Martínez MD,  Nori Chappell MD, Osbaldo White MD, Jazmine Wyman, DO, Cristo Phillips MD, Oscar Mccallum MD, Yolande Chaidez MD, Claudell Orion, DO, Casper Aguirre MD, Susan Kyle MD, Milady Canseco, CNP,  Darin Vargas, CNP, Taty Pelletier, CNP, Ute Topete, CNP, Yaneth Lombardi PA-C, Honey Villa, [facility], Camille Parry, CNP, Berlinda Boast, CNP, Alanna Avina, CNP, Pari Rae, CNP, Prasanna Schwartz, CNS, Lanny Cox, [facility], Fercho Loya, CNP, Stephanie Ochoa, CNP, Briana Yu, CNP, Denys Rock, CNP           [facility]    Progress Note    7/22/2022    2:04 PM    Name:   Fauzia Dean  MRN:     [id number]     Acct:      [de-identified]   Room:   1002/1002-02   Day:  1  Admit Date:  7/21/2022  5:17 PM    PCP:   RIVER Carranza CNP  Code Status:  Full Code    Subjective:     C/C:   Chief Complaint   Patient presents with    Chest Pain    Shortness of Breath     Interval History Status: improved. Patient continues with some degree of chest pressure, described as mild at this time. Continued shortness of breath with mild orthopnea symptoms. Denies any cough or sputum duction, fevers or chills, abdominal pain or other acute complaints. Brief History: This is a 27-year-old male with history of chronic atrial fibrillation that presents after developing chest pressure and shortness of breath with diaphoresis after going around the block and was intermittent. He presented the emergency room was found to have rapid atrial fibrillation.   He went routine investigation emergency room and was given IV magnesium, breathing treatments and ultimately his heart rate was improving. Admitted for further evaluation and heart rate control. Review of Systems:     Constitutional:  negative for chills, fevers, sweats  Respiratory:  negative for cough, positive for dyspnea on exertion, shortness of breath, wheezing  Cardiovascular:  negative for chest pain, edema, positive for chest pressure/discomfort, palpitations  Gastrointestinal:  negative for abdominal pain, constipation, diarrhea, nausea, vomiting  Neurological:  negative for dizziness, headache    Medications: Allergies: Allergies   Allergen Reactions    Augmentin Es-600 [Amoxicillin-Pot Clavulanate]      Other reaction(s): Unknown    Sulfa Antibiotics Other (See Comments)       Current Meds:   Scheduled Meds:    metoprolol succinate  100 mg Oral Daily    sodium chloride flush  5-40 mL IntraVENous 2 times per day    rivaroxaban  20 mg Oral Daily    lisinopril  5 mg Oral Daily     Continuous Infusions:    sodium chloride       PRN Meds: sodium chloride flush, sodium chloride, ondansetron **OR** ondansetron, polyethylene glycol, acetaminophen **OR** acetaminophen, morphine **OR** morphine, levalbuterol, sodium chloride nebulizer, melatonin, potassium chloride **OR** potassium alternative oral replacement **OR** potassium chloride    Data:     Past Medical History:   has a past medical history of Adjustment disorder, AF (atrial fibrillation) ([facility] AICD (automatic cardioverter/defibrillator) present, Alcohol dependence ([facility] CAD (coronary artery disease), Cardiomyopathy ([facility] [address] CHF (congestive heart failure) ([address] COPD (chronic obstructive pulmonary disease) ([facility] [address] DDD (degenerative disc disease), lumbar, Herniated cervical disc, Hyperlipidemia, Hypertension, Major depression, Post laminectomy syndrome, Sciatica, Snores, Tobacco abuse, and Traumatic brain injury ([facility] [address] Social History:   reports that he has been smoking cigarettes. He has a 60.00 pack-year smoking history.  He has never used smokeless tobacco. He reports that he does not currently use alcohol. He reports that he does not currently use drugs. Family History:   Family History   Problem Relation Age of Onset    Other Mother         dementia    Other Father        Vitals:  /88   Pulse 79   Temp 97.7 °F (36.5 °C)   Resp 17   Ht 5' 11\" (1.803 m)   Wt 224 lb 8 oz (101.8 kg)   SpO2 96%   BMI 31.31 kg/m²   Temp (24hrs), Av.9 °F (36.6 °C), Min:97.3 °F (36.3 °C), Max:98.2 °F (36.8 °C)    No results for input(s): POCGLU in the last 72 hours. I/O (24Hr):   No intake or output data in the 24 hours ending 22 1404    Labs:  Hematology:  Recent Labs     22  0529   WBC 15.8* 8.5   RBC 6.15* 5.55   HGB 19.3* 17.3*   HCT 56.4* 52.1*   MCV 91.7 93.9   MCH 31.4 31.2   MCHC 34.2 33.2   RDW 15.6* 15.2*    165   MPV 10.0 10.2   INR  --  1.4     Chemistry:  Recent Labs     22  0529   *  --   --  132*   K 2.8*  --  3.7 3.5*   CL 93*  --   --  93*   CO2 19*  --   --  23   GLUCOSE 164*  --   --  128*   BUN 9  --   --  13   CREATININE 1.17  --   --  1.03   MG 2.1  --   --  2.6   ANIONGAP 22*  --   --  16   LABGLOM >60  --   --  >60   GFRAA >60  --   --  >60   CALCIUM 9.6  --   --  8.9   PROBNP 5,130*  --   --  1,697*   TROPHS 37* 36*  --   --    DIGOXIN <0.3*  --   --   --      Recent Labs     22  0529   PROT 7.7 6.8   LABALBU 4.0 3.8   TSH 7.52*  --    AST 58* 37   ALT 32 26   ALKPHOS 138* 103   BILITOT 1.75* 1.18   HDL  --  54   LDLCHOLESTEROL  --  99   CHOLHDLRATIO  --  3.3     ABG:No results found for: POCPH, PHART, PH, POCPCO2, XUD9SPY, PCO2, POCPO2, PO2ART, PO2, POCHCO3, OTO6RQF, HCO3, NBEA, PBEA, BEART, BE, THGBART, THB, EQU7YZR, YAKP4YNV, T6MDKCUU, O2SAT, FIO2  Lab Results   Component Value Date/Time    SPECIAL RT HAND 20ML 2022 11:03 PM     Lab Results   Component Value Date/Time    CULTURE NO GROWTH <24 HRS 2022 11:03 PM Radiology:  XR CHEST 1 VIEW    Result Date: 7/21/2022  Hyperinflation with no evidence of acute disease. 3 lead left subclavian AICD.        Physical Examination:        General appearance:  alert, cooperative and no distress  Mental Status:  oriented to person, place and time and normal affect  Lungs: Scattered rhonchi bilaterally, normal effort  Heart:  regular rate and rhythm, no murmur  Abdomen:  soft, nontender, nondistended, normal bowel sounds, no masses, hepatomegaly, splenomegaly  Extremities:  no edema, redness, tenderness in the calves  Skin:  no gross lesions, rashes, induration    Assessment:        Hospital Problems             Last Modified POA    * (Principal) Atrial fibrillation with RVR ([address] 7/21/2022 Yes    Hypokalemia 7/21/2022 Yes    Obesity (BMI 30.0-34.9) 7/21/2022 Yes    Hyperlipidemia 7/21/2022 Yes    Essential hypertension 7/21/2022 Yes    COPD without exacerbation ([address] 7/22/2022 Yes       Plan:        Continue with metoprolol for heart rate control  Monitor and control blood pressure  Aerosols as ordered  Await echocardiogram  Supplement potassium and magnesium as appropriate, keep potassium greater than 4 and magnesium greater than 2  Continue home Xarelto for stroke and DVT prophylaxis  Activity as tolerated  Discharge planning pending echo results and continued heart rate control    Jacqueline Ordaz DO  7/22/2022  2:04 PM

## 2022-07-22 NOTE — CONSULTS
and joint replacement (Right, 06/2018). Allergies: Allergies   Allergen Reactions    Augmentin Es-600 [Amoxicillin-Pot Clavulanate]      Other reaction(s): Unknown    Sulfa Antibiotics Other (See Comments)        Home Meds:    Prior to Admission medications    Medication Sig Start Date End Date Taking?  Authorizing Provider   DULoxetine (CYMBALTA) 60 MG extended release capsule take 1 capsule by mouth once daily  Patient not taking: Reported on 7/21/2022 11/21/21 7/21/22  RIVER Hilliard CNP   metoprolol succinate (TOPROL XL) 100 MG extended release tablet take 1 tablet by mouth once daily 5/7/21   RIVER Hilliard CNP   rivaroxaban (XARELTO) 10 MG TABS tablet Take 2 tablets by mouth daily (with breakfast) 1/27/21   RIVER Hilliard CNP   simvastatin (ZOCOR) 20 MG tablet Take 1 tablet by mouth every evening  Patient not taking: Reported on 7/21/2022 7/1/19 7/21/22  RIVER Hilliard CNP   lisinopril (PRINIVIL;ZESTRIL) 2.5 MG tablet TAKE 1 TABLET EVERY DAY 3/25/16   RIVER Hilliard CNP   levothyroxine (SYNTHROID) 50 MCG tablet Take 1 tablet by mouth Daily  Patient not taking: No sig reported 3/25/16 7/21/22  RIVER Hilliard CNP   furosemide (LASIX) 40 MG tablet TAKE 1 TABLET EVERY DAY  Patient not taking: No sig reported 12/24/15 7/21/22  Ami Guardado MD   diltiazem (CARDIZEM) 120 MG tablet Take 1 tablet by mouth 2 times daily  Patient not taking: No sig reported 12/24/15 7/21/22  Ami Guardado MD   levalbuterol [facility] HFA) 45 MCG/ACT inhaler Inhale 1-2 puffs into the lungs every 6 hours as needed for Wheezing.  1/23/16  Ami Guardado MD        [facility] Meds:    Current Facility-Administered Medications   Medication Dose Route Frequency Provider Last Rate Last Admin    sodium chloride flush 0.9 % injection 5-40 mL  5-40 mL IntraVENous 2 times per day RIVER Mast CNP   10 mL at 07/21/22 0297    sodium chloride flush 0.9 % injection 10 mL  10 mL IntraVENous PRN Jimmie Rebecca, APRN - CNP        0.9 % sodium chloride infusion   IntraVENous PRN Jimmie Rebecca, APRN - CNP        ondansetron (ZOFRAN-ODT) disintegrating tablet 4 mg  4 mg Oral Q8H PRN Jimmie [place], APRN - CNP        Or    ondansetron [facility]) injection 4 mg  4 mg IntraVENous Q6H PRN Jimmie Rebecca, APRN - CNP        polyethylene glycol (GLYCOLAX) packet 17 g  17 g Oral Daily PRN Jimmie [place], APRN - CNP        acetaminophen (TYLENOL) tablet 650 mg  650 mg Oral Q6H PRN Jimmie [place], APRN - CNP        Or    acetaminophen (TYLENOL) suppository 650 mg  650 mg Rectal Q6H PRN Jimmie Rebecca, APRN - CNP        morphine (PF) injection 2 mg  2 mg IntraVENous Q2H PRN Jimmie [place], APRN - CNP        Or    morphine sulfate (PF) injection 4 mg  4 mg IntraVENous Q2H PRN Jimmie Rebecca, APRN - CNP   4 mg at 07/21/22 2232    levalbuterol (XOPENEX) nebulizer solution 1.25 mg  1.25 mg Nebulization Q8H PRN Jimmie [place], APRN - CNP        sodium chloride nebulizer 0.9 % solution 3 mL  3 mL Nebulization Q8H PRN Jimmie [place], APRN - CNP        melatonin tablet 3 mg  3 mg Oral Nightly PRN Jimmie Rebecca, APRN - CNP        potassium chloride (KLOR-CON M) extended release tablet 40 mEq  40 mEq Oral PRN Harman Brittle, APRN - NP        Or    potassium bicarb-citric acid (EFFER-K) effervescent tablet 40 mEq  40 mEq Oral PRN Harman Brittle, APRN - NP        Or    potassium chloride 10 mEq/100 mL IVPB (Peripheral Line)  10 mEq IntraVENous PRN Harman Brittle, APRN - NP        0.9 % sodium chloride infusion   IntraVENous Continuous Harman Brittle, APRN - NP 75 mL/hr at 07/22/22 0905 New Bag at 07/22/22 0905    rivaroxaban (XARELTO) tablet 20 mg  20 mg Oral Daily Jimmie [place], APRN - CNP        lisinopril (PRINIVIL;ZESTRIL) tablet 5 mg  5 mg Oral Daily RIVER Diaz CNP   5 mg at 07/22/22 4644 Social History:       TOBACCO:   reports that he has been smoking cigarettes. He has a 60.00 pack-year smoking history. He has never used smokeless tobacco.  ETOH:   reports that he does not currently use alcohol. DRUGS:  reports that he does not currently use drugs. OCCUPATION:          Family Histroy:         Problem Relation Age of Onset    Other Mother         dementia    Other Father            Review of Systems:   Constitutional: there has been no unanticipated weight loss. There's been no change in energy level, sleep pattern, or activity level. Eyes: No visual changes or diplopia. No scleral icterus. ENT: No Headaches, hearing loss or vertigo. No mouth sores or sore throat. Cardiovascular:   See HPI  Respiratory: No cough or wheezing, no sputum production. No hematemesis. Gastrointestinal: No abdominal pain, appetite loss, blood in stools. No change in bowel or bladder habits. Genitourinary: No dysuria, trouble voiding, or hematuria. Musculoskeletal:  No gait disturbance, weakness or joint complaints. Integumentary: No rash or pruritis. Neurological: No headache, diplopia, change in muscle strength, numbness or tingling. No change in gait, balance, coordination, mood, affect, memory, mentation, behavior. Psychiatric: No anxiety, or depression. Endocrine: No temperature intolerance. No excessive thirst, fluid intake, or urination. No tremor. Hematologic/Lymphatic: No abnormal bruising or bleeding, blood clots or swollen lymph nodes. Allergic/Immunologic: No nasal congestion or hives. Physical Exam    Vital Signs: /78   Pulse 87   Temp 97.9 °F (36.6 °C) (Oral)   Resp 17   Ht 5' 11\" (1.803 m)   Wt 224 lb 8 oz (101.8 kg)   SpO2 94%   BMI 31.31 kg/m²  O2 Flow Rate (L/min): 2 L/min     Admission Weight: 230 lb (104.3 kg)     General appearance: Awake, Alert Cooperative    Head: Normocephalic, without obvious abnormality, atraumatic    Eyes: Conjunctivae/corneas clear. PERRL, EOM's intact. Fundi benign    Neck: no adenopathy, no carotid bruit, no JVD, supple, symmetrical, trachea midline and thyroid: not enlarged, symmetric, no tenderness/mass/nodules    Lungs: clear to auscultation bilaterally    Heart: regular rate and rhythm, S1, S2 normal, no murmur, click, rub or gallop    Abdomen: Soft, non-tender. Bowel sounds normal. No masses,  no organomegaly    Extremities: extremities normal, atraumatic, no cyanosis or edema    Skin: Skin color, texture, turgor normal. No rashes or lesions    Neurologic: Grossly normal        MEDICAL DECISION MAKING/TESTING    Echo/Stress:  Jt Keys  Echo complete W/ contrast  Order# [id number]  Reading physician: Cristina Pike MD Ordering physician: Brandy Lyons PA-C Study date: 7/14/20         Department    Name Address Phone Fax   [address] [id number] [phone]     PACS Images Roselind Severe)     Show images for Echo complete W/ contrast    Reason for Exam    Reason for exam? History of nonischemic cardiomyopathy, alcohol abuse     Interpretation Summary         Left Ventricle: Left ventricle appears normal in size. Left Ventricle: There is mild increased wall thickness/hypertrophy. Left Ventricle: Systolic function is moderately decreased with an ejection fraction of 35-40%. Left Ventricle: Grade I diastolic dysfunction (impaired relaxation) is present. Left Atrium: Left atrium is normal in size. The left atrial volume index is 17.0 mL/m2. Right Ventricle: Right ventricular size appears normal.    Right Ventricle: Systolic function is moderately reduced. Right Ventricle: A pacer wire is present in the right ventricle. Mitral Valve: There is mild regurgitation. There is no evidence of mitral valve stenosis. Tricuspid Valve: There is mild regurgitation. There is no evidence of tricuspid valve stenosis.         EKG:          Labs:      CBC: Recent Labs     22  0529   WBC 15.8* 8.5   HGB 19.3* 17.3*   HCT 56.4* 52.1*   MCV 91.7 93.9    165     BMP:   Recent Labs     22  2254 22  0529   *  --  132*   K 2.8* 3.7 3.5*   CL 93*  --  93*   CO2 19*  --  23   BUN 9  --  13   CREATININE 1.17  --  1.03     PT/INR:   Recent Labs     22  0529   PROTIME 16.8*   INR 1.4     APTT: No results for input(s): APTT in the last 72 hours. MAG:   Recent Labs     22  0529   MG 2.1 2.6     D Dimer: No results for input(s): DDIMER in the last 72 hours. Troponin T No results for input(s): TROPONINT in the last 72 hours. ProBNP Invalid input(s): PRO-BNP          Diagnosis:      Atrial fibrillation with RVR (HCC)  -history of paroxysmal atrial fibrillation with ablation  -continue anticoagulation with Xarelto     Chest pain  -sensitivity troponin 36  -does not appear to be ACS  -repeat echocardiogram    Heart failure with reduced ejection fraction   - TTE 2020 with an EF of 35-40%   -repeat echocardiogram  - on lisinopril and Toprol at home  -will start home Toprol      Hypokalemia      Obesity (BMI 30.0-34. 9)      Hyperlipidemia      Essential hypertension  - patient is hypertensive  -restarting home Toprol      COPD (chronic obstructive pulmonary disease) ([facility] [address]     Biventricular ICD   - will have device interrogated while in the hospital      Plan:    Patient has had no further afib on telemetry. Will check echocardiogram and have ICD interrogated. Anticipate no further cardiac workup. Will discuss with attending. Patient seen and examined. Care discussed with advanced practice provider and staff. Agree with above my findings below           [address]  [phone]              Patient Name:  Noemi Horton    :  1960  2022 3:42 PM      SUBJECTIVE       Mr. Vasquez Challenger  with history as abovee. 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 Had CRT-D with improvement in LV systolic function when paced. Prior afib and aflutter ablations but still with occasional breakthrough afib episodes. Admitted with RVR and generally feeling lousy. Converted to sinus rhythm and now feels well. Has not been taking beta blocker as apparently inadvertently dropped from meds. OBJECTIVE     Vital signs:    /88   Pulse 79   Temp 97.7 °F (36.5 °C) (Oral)   Resp 17   Ht 5' 11\" (1.803 m)   Wt 224 lb 8 oz (101.8 kg)   SpO2 96%   BMI 31.31 kg/m²  2 L/min  .tro    Admit Weight:  230 lb (104.3 kg)    Last 3 weights: Wt Readings from Last 3 Encounters:   07/22/22 224 lb 8 oz (101.8 kg)   09/09/20 235 lb (106.6 kg)   07/01/19 251 lb (113.9 kg)       BMI: Body mass index is 31.31 kg/m². Input/Output:     No intake or output data in the 24 hours ending 07/22/22 1542      Exam:     General appearance: awake and alert moves all ext   Lungs: no rhonchi, no wheezes, no rales. Neck veins flat. Heart: S1 and S2 no murmur. Device site clean. Abdomen: positive bowel sounds, no bruits, no masses  Extremities: warm and dry, no cyanosis, no clubbing        Laboratory Studies:     CBC:   Recent Labs     07/21/22 1732 07/22/22  0529   WBC 15.8* 8.5   HGB 19.3* 17.3*   HCT 56.4* 52.1*   MCV 91.7 93.9    165     BMP:   Recent Labs     07/21/22 1732 07/21/22  2254 07/22/22  0529   *  --  132*   K 2.8* 3.7 3.5*   CL 93*  --  93*   CO2 19*  --  23   BUN 9  --  13   CREATININE 1.17  --  1.03     PT/INR:   Recent Labs     07/22/22  0529   PROTIME 16.8*   INR 1.4     APTT: No results for input(s): APTT in the last 72 hours. MAG:   Recent Labs     07/21/22 1732 07/22/22  0529   MG 2.1 2.6     D Dimer: No results for input(s): DDIMER in the last 72 hours.   Troponin    Recent Labs     07/21/22 1732 07/21/22  1913   TROPHS 37* 36* BNP No results for input(s): BNP in the last 72 hours. Recent Labs     22  1732 22  0529   PROBNP 5,130* 1,697*         Pulse Ox: SpO2  Av.6 %  Min: 94 %  Max: 100 %  Supplemental O2: O2 Flow Rate (L/min): 2 L/min     Current Meds:    [START ON 2022] metoprolol succinate  50 mg Oral Daily    sodium chloride flush  5-40 mL IntraVENous 2 times per day    rivaroxaban  20 mg Oral Daily    lisinopril  5 mg Oral Daily     Continuous Infusions:    sodium chloride           XR CHEST 1 VIEW    Result Date: 2022  Hyperinflation with no evidence of acute disease. 3 lead left subclavian AICD. Echo:      ASSESSMENT     Principal Problem:    Atrial fibrillation with RVR (HCC)  Active Problems:    Hypokalemia    Obesity (BMI 30.0-34. 9)    Hyperlipidemia    Essential hypertension    COPD without exacerbation (HCC)  Resolved Problems:    * No resolved hospital problems. *      PLAN         Paroxysmal atrial fibrillation. Converted to sinus rhythm. Would continue Xarelto and resume Toprol 50mg daily. Device is interrogated and functioning normally. Does not seem afib episodes are very frequent. No further intervention or testing planned. Will review echo but will not change threapy at present. Non-ischemic cardiomyopathy. Lisinopril, Toprol and follow expectantly. Chronic systolic conjestive heart failure. Actually does not look grossly volume overloaded. BNP is elevated but probably does not need more diuretic. Potassium being corrected  You other diagnoses. No objection from my standpoint to discharge home. My office will call followup.

## 2022-07-22 NOTE — FLOWSHEET NOTE
[facility]  PROGRESS NOTE    Room # 1002/1002-02   Name: Yessenia Sadler            Baptism: [address]     Reason for visit: Routine    I visited the patient. Admit Date & Time: 7/21/2022  5:17 PM    Assessment:  Yessenia Sadler is a 60 y.o. male in the hospital because atrial fib. Upon entering the room pt was lying in bed      Intervention:  I introduced myself and my title as  I offered space for pt  to express feelings, needs, and concerns and provided a ministry presence. Pt did not express any needs or concerns to this writer    Outcome:  Pt expressed gratitude for visit    Plan:  Chaplains will remain available to offer spiritual and emotional support as needed.     Electronically signed by Ami Haines on 7/22/2022 at 9:39 AM.  Jen       07/22/22 8761   Encounter Summary   Service Provided For: Patient   Referral/Consult From: [facility] Family members   Last Encounter  07/22/22   Complexity of Encounter Low   Begin Time 0920   End Time  0925   Total Time Calculated 5 min   Encounter    Type Initial Screen/Assessment   Assessment/Intervention/Outcome   Assessment Calm;Coping   Intervention Active listening;Sustaining Presence/Ministry of presence   Outcome Engaged in conversation;Expressed Gratitude;Coping

## 2022-07-22 NOTE — PROGRESS NOTES
Transitions of Care Pharmacy Service   Medication Review    The patient's list of current home medications was reviewed with him earlier today prior to discharge. The list below reflects what he was taking at home prior to admission; it does not reflect the changes made to the list at discharge. Source(s) of information: patient, Care Everywhere,[facility], [place] refill report        Please feel free to call me with any questions about this encounter. Thank you. Georgette Bird, [address] [place] [address]   Transitions of Care Pharmacy Service  Phone:  [phone]  Fax: [phone]      Electronically signed by Georgette Bird [address] [place] [address] on 7/22/2022 at 6:16 PM         Prior to Admission medications    Medication Sig       lisinopril (PRINIVIL;ZESTRIL) 5 MG tablet Take 5 mg by mouth in the morning.        rivaroxaban (XARELTO) 20 MG TABS tablet Take 20 mg by mouth daily (with breakfast)
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
## 2022-07-22 NOTE — DISCHARGE SUMMARY
week  No follow-up provider specified. Requiring Further Evaluation/Follow Up POST HOSPITALIZATION/Incidental Findings: Follow-up labs at discretion of PCP    Diet: cardiac diet    Activity: As tolerated    Instructions to Patient: Take medication as prescribed    Discharge Medications:      Medication List        START taking these medications      metoprolol succinate 50 MG extended release tablet  Commonly known as: TOPROL XL  Take 1 tablet by mouth in the morning. Start taking on: July 23, 2022            CHANGE how you take these medications      lisinopril 5 MG tablet  Commonly known as: PRINIVIL;ZESTRIL  What changed: Another medication with the same name was removed. Continue taking this medication, and follow the directions you see here. CONTINUE taking these medications      simvastatin 20 MG tablet  Commonly known as: Zocor  Take 1 tablet by mouth every evening     Xarelto 20 MG Tabs tablet  Generic drug: rivaroxaban            STOP taking these medications      dilTIAZem 120 MG tablet  Commonly known as: CARDIZEM     DULoxetine 60 MG extended release capsule  Commonly known as: CYMBALTA     furosemide 40 MG tablet  Commonly known as: LASIX     levothyroxine 50 MCG tablet  Commonly known as: SYNTHROID               Where to Get Your Medications        These medications were sent to [address] [place] [address] [place], [facility] [phone]  [address] [place] [id number]      Phone: [phone]   metoprolol succinate 50 MG extended release tablet  simvastatin 20 MG tablet         No discharge procedures on file. Time Spent on discharge is   26 minutes  in patient examination, evaluation, counseling as well as medication reconciliation, prescriptions for required medications, discharge plan and follow up.     Electronically signed by   Bonita Wallace DO  7/22/2022  5:24 PM      Thank you Dr. Jolene Dowd, APRN - CNP for the opportunity to be involved in this patient's care.

## 2022-07-22 NOTE — CARE COORDINATION
DC Planning    Spoke with pt at bedside. Pt declines any needs, has insurance, pcp, rx and ride home.

## 2022-07-22 NOTE — PROGRESS NOTES
Spoke with Yari Monroe NP, updated on results from pacemaker interrogation, no new orders at this time

## 2022-07-27 LAB
CULTURE: NORMAL
CULTURE: NORMAL
Lab: NORMAL
Lab: NORMAL
SPECIMEN DESCRIPTION: NORMAL
SPECIMEN DESCRIPTION: NORMAL

## 2022-12-14 RX ORDER — METOPROLOL SUCCINATE 50 MG/1
TABLET, EXTENDED RELEASE ORAL
Qty: 30 TABLET | Refills: 3 | OUTPATIENT
Start: 2022-12-14

## 2023-02-02 RX ORDER — METOPROLOL SUCCINATE 50 MG/1
TABLET, EXTENDED RELEASE ORAL
Qty: 30 TABLET | Refills: 3 | OUTPATIENT
Start: 2023-02-02

## 2023-02-20 ENCOUNTER — OFFICE VISIT (OUTPATIENT)
Dept: FAMILY MEDICINE CLINIC | Age: 63
End: 2023-02-20
Payer: MEDICARE

## 2023-02-20 VITALS
DIASTOLIC BLOOD PRESSURE: 68 MMHG | HEART RATE: 101 BPM | OXYGEN SATURATION: 94 % | HEIGHT: 71 IN | TEMPERATURE: 97.2 F | SYSTOLIC BLOOD PRESSURE: 136 MMHG | BODY MASS INDEX: 32.31 KG/M2 | WEIGHT: 230.8 LBS

## 2023-02-20 DIAGNOSIS — Z00.00 MEDICARE ANNUAL WELLNESS VISIT, SUBSEQUENT: Primary | ICD-10-CM

## 2023-02-20 DIAGNOSIS — I10 PRIMARY HYPERTENSION: ICD-10-CM

## 2023-02-20 PROCEDURE — 3078F DIAST BP <80 MM HG: CPT | Performed by: NURSE PRACTITIONER

## 2023-02-20 PROCEDURE — 3017F COLORECTAL CA SCREEN DOC REV: CPT | Performed by: NURSE PRACTITIONER

## 2023-02-20 PROCEDURE — G0439 PPPS, SUBSEQ VISIT: HCPCS | Performed by: NURSE PRACTITIONER

## 2023-02-20 PROCEDURE — 3075F SYST BP GE 130 - 139MM HG: CPT | Performed by: NURSE PRACTITIONER

## 2023-02-20 PROCEDURE — G8484 FLU IMMUNIZE NO ADMIN: HCPCS | Performed by: NURSE PRACTITIONER

## 2023-02-20 RX ORDER — SACUBITRIL AND VALSARTAN 24; 26 MG/1; MG/1
TABLET, FILM COATED ORAL
COMMUNITY
Start: 2022-12-27

## 2023-02-20 RX ORDER — SIMVASTATIN 20 MG
20 TABLET ORAL EVERY EVENING
Qty: 30 TABLET | Refills: 11 | Status: SHIPPED | OUTPATIENT
Start: 2023-02-20

## 2023-02-20 RX ORDER — METOPROLOL SUCCINATE 50 MG/1
50 TABLET, EXTENDED RELEASE ORAL DAILY
Qty: 30 TABLET | Refills: 11 | Status: SHIPPED | OUTPATIENT
Start: 2023-02-20

## 2023-02-20 SDOH — ECONOMIC STABILITY: FOOD INSECURITY: WITHIN THE PAST 12 MONTHS, YOU WORRIED THAT YOUR FOOD WOULD RUN OUT BEFORE YOU GOT MONEY TO BUY MORE.: NEVER TRUE

## 2023-02-20 SDOH — ECONOMIC STABILITY: HOUSING INSECURITY
IN THE LAST 12 MONTHS, WAS THERE A TIME WHEN YOU DID NOT HAVE A STEADY PLACE TO SLEEP OR SLEPT IN A SHELTER (INCLUDING NOW)?: NO

## 2023-02-20 SDOH — ECONOMIC STABILITY: INCOME INSECURITY: HOW HARD IS IT FOR YOU TO PAY FOR THE VERY BASICS LIKE FOOD, HOUSING, MEDICAL CARE, AND HEATING?: NOT HARD AT ALL

## 2023-02-20 SDOH — ECONOMIC STABILITY: FOOD INSECURITY: WITHIN THE PAST 12 MONTHS, THE FOOD YOU BOUGHT JUST DIDN'T LAST AND YOU DIDN'T HAVE MONEY TO GET MORE.: NEVER TRUE

## 2023-02-20 ASSESSMENT — PATIENT HEALTH QUESTIONNAIRE - PHQ9
9. THOUGHTS THAT YOU WOULD BE BETTER OFF DEAD, OR OF HURTING YOURSELF: 0
6. FEELING BAD ABOUT YOURSELF - OR THAT YOU ARE A FAILURE OR HAVE LET YOURSELF OR YOUR FAMILY DOWN: 0
8. MOVING OR SPEAKING SO SLOWLY THAT OTHER PEOPLE COULD HAVE NOTICED. OR THE OPPOSITE, BEING SO FIGETY OR RESTLESS THAT YOU HAVE BEEN MOVING AROUND A LOT MORE THAN USUAL: 0
SUM OF ALL RESPONSES TO PHQ9 QUESTIONS 1 & 2: 0
10. IF YOU CHECKED OFF ANY PROBLEMS, HOW DIFFICULT HAVE THESE PROBLEMS MADE IT FOR YOU TO DO YOUR WORK, TAKE CARE OF THINGS AT HOME, OR GET ALONG WITH OTHER PEOPLE: 0
7. TROUBLE CONCENTRATING ON THINGS, SUCH AS READING THE NEWSPAPER OR WATCHING TELEVISION: 0
SUM OF ALL RESPONSES TO PHQ QUESTIONS 1-9: 0
SUM OF ALL RESPONSES TO PHQ QUESTIONS 1-9: 0
4. FEELING TIRED OR HAVING LITTLE ENERGY: 0
SUM OF ALL RESPONSES TO PHQ QUESTIONS 1-9: 0
5. POOR APPETITE OR OVEREATING: 0
1. LITTLE INTEREST OR PLEASURE IN DOING THINGS: 0
3. TROUBLE FALLING OR STAYING ASLEEP: 0
SUM OF ALL RESPONSES TO PHQ QUESTIONS 1-9: 0
2. FEELING DOWN, DEPRESSED OR HOPELESS: 0

## 2023-02-20 ASSESSMENT — LIFESTYLE VARIABLES
HOW OFTEN DO YOU HAVE A DRINK CONTAINING ALCOHOL: MONTHLY OR LESS
HOW MANY STANDARD DRINKS CONTAINING ALCOHOL DO YOU HAVE ON A TYPICAL DAY: 1 OR 2

## 2023-02-20 NOTE — PROGRESS NOTES
Medicare Annual Wellness Visit    Naina Brantley is here for Hypertension (Out of metoprolol and simvistatin) and Medicare AWV    Assessment & Plan   Medicare annual wellness visit, subsequent  F/u with cardio as planned  He is [facility]-  Declines colon cancer screening, vaccines  Stable on current meds  Declines smoking cessation  Primary hypertension   Controlled  Continue same meds  Refills given  Labs utd    Wt Readings from Last 3 Encounters:   02/20/23 230 lb 12.8 oz (104.7 kg)   07/22/22 224 lb 8 oz (101.8 kg)   09/09/20 235 lb (106.6 kg)     BP Readings from Last 3 Encounters:   02/20/23 136/68   07/22/22 117/82   09/09/20 135/76     Declines labs  Lab Results   Component Value Date    CHOL 209 (H) 01/27/2018    CHOL 163 08/26/2015    CHOL 211 (H) 12/05/2011     Lab Results   Component Value Date    TRIG 172 (H) 01/27/2018    TRIG 88 08/26/2015    TRIG 116 12/05/2011     Lab Results   Component Value Date    HDL 54 07/22/2022    HDL 43 01/27/2018    HDL 44 08/26/2015     Lab Results   Component Value Date    LDLCHOLESTEROL 99 07/22/2022    LDLCHOLESTEROL 132 (H) 01/27/2018    LDLCHOLESTEROL 101 08/26/2015     Lab Results   Component Value Date    VLDL NOT REPORTED 01/27/2018    VLDL NOT REPORTED 08/26/2015    VLDL NOT REPORTED 12/05/2011     Lab Results   Component Value Date    CHOLHDLRATIO 3.3 07/22/2022    CHOLHDLRATIO 4.9 01/27/2018    CHOLHDLRATIO 3.7 08/26/2015     Lab Results   Component Value Date    WBC 8.5 07/22/2022    HGB 17.3 (H) 07/22/2022    HCT 52.1 (H) 07/22/2022    MCV 93.9 07/22/2022     07/22/2022     Lab Results   Component Value Date/Time    MG 2.6 07/22/2022 05:29 AM       Chemistry        Component Value Date/Time     (L) 07/22/2022 0529    K 3.5 (L) 07/22/2022 0529    CL 93 (L) 07/22/2022 0529    CO2 23 07/22/2022 0529    BUN 13 07/22/2022 0529    CREATININE 1.03 07/22/2022 0529    CREATININE 0.7 07/15/2020 0000        Component Value Date/Time    CALCIUM 8.9 07/22/2022 0529    ALKPHOS 103 07/22/2022 0529    AST 37 07/22/2022 0529    ALT 26 07/22/2022 0529    BILITOT 1.18 07/22/2022 0529        Lab Results   Component Value Date    TSH 7.52 (H) 07/21/2022   Declines tx or new labs for thyroid       Recommendations for Preventive Services Due: see orders and patient instructions/AVS.  Recommended screening schedule for the next 5-10 years is provided to the patient in written form: see Patient Instructions/AVS.     Return for Medicare Annual Wellness Visit in 1 year. Subjective       Patient's complete Health Risk Assessment and screening values have been reviewed and are found in Flowsheets. The following problems were reviewed today and where indicated follow up appointments were made and/or referrals ordered. Positive Risk Factor Screenings with Interventions:              Self-assessment of health: In general, how would you say your health is?: (!) Poor    Weight and Activity:  Physical Activity: Inactive    Days of Exercise per Week: 0 days    Minutes of Exercise per Session: 0 min     On average, how many days per week do you engage in moderate to strenuous exercise (like a brisk walk)?: 0 days  Have you lost any weight without trying in the past 3 months?: No  Body mass index: (!) 32.19    Obesity Interventions:  Patient declines any further evaluation or treatment            Vision Screen:  Do you have difficulty driving, watching TV, or doing any of your daily activities because of your eyesight?: (!) Yes  Have you had an eye exam within the past year?: Yes  No results found.      Tobacco Use:  Tobacco Use: High Risk    Smoking Tobacco Use: Every Day    Smokeless Tobacco Use: Never    Passive Exposure: Not on file     E-cigarette/Vaping       Questions Responses    E-cigarette/Vaping Use     Start Date     Passive Exposure     Quit Date     Counseling Given     Comments           Interventions:  Patient declined any further intervention or treatment Objective   Vitals:    02/20/23 1416 02/20/23 1425   BP: (!) 158/80 136/68   Site: Left Upper Arm Left Upper Arm   Position: Sitting Sitting   Cuff Size: Medium Adult Medium Adult   Pulse: (!) 101    Temp: 97.2 °F (36.2 °C)    TempSrc: Tympanic    SpO2: 94%    Weight: 230 lb 12.8 oz (104.7 kg)    Height: 5' 11\" (1.803 m)       Body mass index is 32.19 kg/m². General Appearance: alert and oriented to person, place and time, well-developed and well-nourished, in no acute distress  Pulmonary/Chest: clear to auscultation bilaterally- no wheezes, rales or rhonchi, normal air movement, no respiratory distress  Cardiovascular: normal rate, normal S1 and S2, no gallops, intact distal pulses, and no carotid bruits  Neurologic: gait and coordination normal and speech normal       Allergies   Allergen Reactions    Augmentin Es-600 [Amoxicillin-Pot Clavulanate]      Other reaction(s): Unknown    Sulfa Antibiotics Other (See Comments)     Prior to Visit Medications    Medication Sig Taking?  Authorizing Provider   simvastatin (ZOCOR) 20 MG tablet Take 1 tablet by mouth every evening Yes RIVER Mcknight CNP   metoprolol succinate (TOPROL XL) 50 MG extended release tablet Take 1 tablet by mouth daily Yes RIVER Mcknight CNP   rivaroxaban (XARELTO) 20 MG TABS tablet Take 20 mg by mouth daily (with breakfast) Yes Historical Provider, MD   ENTRESTO 24-26 MG per tablet   Historical Provider, MD   DULoxetine (CYMBALTA) 60 MG extended release capsule take 1 capsule by mouth once daily  Patient not taking: Reported on 7/21/2022  RIVER Mcknight CNP   levothyroxine (SYNTHROID) 50 MCG tablet Take 1 tablet by mouth Daily  Patient not taking: No sig reported  RIVER Mcknight CNP   furosemide (LASIX) 40 MG tablet TAKE 1 TABLET EVERY DAY  Patient not taking: No sig reported  Ambrosio Gayle MD   diltiazem (CARDIZEM) 120 MG tablet Take 1 tablet by mouth 2 times daily  Patient not taking: No sig reported  Benedict Arreloa MD   levalbuterol [facility]) 45 MCG/ACT inhaler Inhale 1-2 puffs into the lungs every 6 hours as needed for Wheezing.   Benedict Arreola MD       [facility] (Including outside providers/suppliers regularly involved in providing care):   Patient Care Team:  RIVER Pardo CNP as PCP - General (Nurse Practitioner)  RIVER Pardo CNP as PCP - Empaneled Provider  Ashwin Avila MD as Consulting Physician (Pain Management)     Reviewed and updated this visit:  Tobacco  Allergies  Meds  Problems  Med Hx  Surg Hx  Soc Hx  Fam Hx               RIVER Pardo CNP

## 2023-02-20 NOTE — PATIENT INSTRUCTIONS
Advance Directives: Care Instructions  Overview  An advance directive is a legal way to state your wishes at the end of your life. It tells your family and your doctor what to do if you can't say what you want. There are two main types of advance directives. You can change them any time your wishes change. Living will. This form tells your family and your doctor your wishes about life support and other treatment. The form is also called a declaration. Medical power of . This form lets you name a person to make treatment decisions for you when you can't speak for yourself. This person is called a health care agent (health care proxy, health care surrogate). The form is also called a durable power of  for health care. If you do not have an advance directive, decisions about your medical care may be made by a family member, or by a doctor or a  who doesn't know you. It may help to think of an advance directive as a gift to the people who care for you. If you have one, they won't have to make tough decisions by themselves. For more information, including forms for your state, see the [address] website (www.caringinfo.org/planning/advance-directives/). Follow-up care is a key part of your treatment and safety. Be sure to make and go to all appointments, and call your doctor if you are having problems. It's also a good idea to know your test results and keep a list of the medicines you take. What should you include in an advance directive? Many states have a unique advance directive form. (It may ask you to address specific issues.) Or you might use a universal form that's approved by many states. If your form doesn't tell you what to address, it may be hard to know what to include in your advance directive. Use the questions below to help you get started. Who do you want to make decisions about your medical care if you are not able to?   What life-support measures do you want if you have a serious illness that gets worse over time or can't be cured? What are you most afraid of that might happen? (Maybe you're afraid of having pain, losing your independence, or being kept alive by machines.)  Where would you prefer to die? (Your home? A hospital? A nursing home?)  Do you want to donate your organs when you die? Do you want certain Anglican practices performed before you die? When should you call for help? Be sure to contact your doctor if you have any questions. Where can you learn more? Go to http://www.redd.com/ and enter R264 to learn more about \"Advance Directives: Care Instructions. \"  Current as of: June 16, 2022               Content Version: 13.5  © 5644-8293 Healthwise, [employer]. Care instructions adapted under license by [facility] ([facility]). If you have questions about a medical condition or this instruction, always ask your healthcare professional. [place] [address] any warranty or liability for your use of this information. Starting a Weight Loss Plan: Care Instructions  Overview     If you're thinking about losing weight, it can be hard to know where to start. Your doctor can help you set up a weight loss plan that best meets your needs. You may want to take a class on nutrition or exercise, or you could join a weight loss support group. If you have questions about how to make changes to your eating or exercise habits, ask your doctor about seeing a registered dietitian or an exercise specialist.  It can be a big challenge to lose weight. But you don't have to make huge changes at once. Make small changes, and stick with them. When those changes become habit, add a few more changes. If you don't think you're ready to make changes right now, try to pick a date in the future. Make an appointment to see your doctor to discuss whether the time is right for you to start a plan. Follow-up care is a key part of your treatment and safety.  Be sure to make and go to all appointments, and call your doctor if you are having problems. It's also a good idea to know your test results and keep a list of the medicines you take. How can you care for yourself at home? Set realistic goals. Many people expect to lose much more weight than is likely. A weight loss of 5% to 10% of your body weight may be enough to improve your health. Get family and friends involved to provide support. Talk to them about why you are trying to lose weight, and ask them to help. They can help by participating in exercise and having meals with you, even if they may be eating something different. Find what works best for you. If you do not have time or do not like to cook, a program that offers meal replacement bars or shakes may be better for you. Or if you like to prepare meals, finding a plan that includes daily menus and recipes may be best.  Ask your doctor about other health professionals who can help you achieve your weight loss goals. A dietitian can help you make healthy changes in your diet. An exercise specialist or  can help you develop a safe and effective exercise program.  A counselor or psychiatrist can help you cope with issues such as depression, anxiety, or family problems that can make it hard to focus on weight loss. Consider joining a support group for people who are trying to lose weight. Your doctor can suggest groups in your area. Where can you learn more? Go to http://www.woods.com/ and enter U357 to learn more about \"Starting a Weight Loss Plan: Care Instructions. \"  Current as of: August 25, 2022               Content Version: 13.5  © 2006-2022 Healthwise, Inc[employer]. Care instructions adapted under license by [facility] ([facility]).  If you have questions about a medical condition or this instruction, always ask your healthcare professional. [address] any warranty or liability for your use of this information. A Healthy Heart: Care Instructions  Your Care Instructions     Coronary artery disease, also called heart disease, occurs when a substance called plaque builds up in the vessels that supply oxygen-rich blood to your heart muscle. This can narrow the blood vessels and reduce blood flow. A heart attack happens when blood flow is completely blocked. A high-fat diet, smoking, and other factors increase the risk of heart disease. Your doctor has found that you have a chance of having heart disease. You can do lots of things to keep your heart healthy. It may not be easy, but you can change your diet, exercise more, and quit smoking. These steps really work to lower your chance of heart disease. Follow-up care is a key part of your treatment and safety. Be sure to make and go to all appointments, and call your doctor if you are having problems. It's also a good idea to know your test results and keep a list of the medicines you take. How can you care for yourself at home? Diet    Use less salt when you cook and eat. This helps lower your blood pressure. Taste food before salting. Add only a little salt when you think you need it. With time, your taste buds will adjust to less salt.     Eat fewer snack items, fast foods, canned soups, and other high-salt, high-fat, processed foods.     Read food labels and try to avoid saturated and trans fats. They increase your risk of heart disease by raising cholesterol levels.     Limit the amount of solid fat-butter, margarine, and shortening-you eat. Use olive, peanut, or canola oil when you cook. Bake, broil, and steam foods instead of frying them.     Eat a variety of fruit and vegetables every day. Dark green, deep orange, red, or yellow fruits and vegetables are especially good for you. Examples include spinach, carrots, peaches, and berries.     Foods high in fiber can reduce your cholesterol and provide important vitamins and minerals.  High-fiber foods include whole-grain cereals and breads, oatmeal, beans, brown rice, citrus fruits, and apples.     Eat lean proteins. Heart-healthy proteins include seafood, lean meats and poultry, eggs, beans, peas, nuts, seeds, and soy products.     Limit drinks and foods with added sugar. These include candy, desserts, and soda pop. Lifestyle changes    If your doctor recommends it, get more exercise. Walking is a good choice. Bit by bit, increase the amount you walk every day. Try for at least 30 minutes on most days of the week. You also may want to swim, bike, or do other activities.     Do not smoke. If you need help quitting, talk to your doctor about stop-smoking programs and medicines. These can increase your chances of quitting for good. Quitting smoking may be the most important step you can take to protect your heart. It is never too late to quit.     Limit alcohol to 2 drinks a day for men and 1 drink a day for women. Too much alcohol can cause health problems.     Manage other health problems such as diabetes, high blood pressure, and high cholesterol. If you think you may have a problem with alcohol or drug use, talk to your doctor. Medicines    Take your medicines exactly as prescribed. Call your doctor if you think you are having a problem with your medicine.     If your doctor recommends aspirin, take the amount directed each day. Make sure you take aspirin and not another kind of pain reliever, such as acetaminophen (Tylenol). When should you call for help? Call 911 if you have symptoms of a heart attack. These may include:    Chest pain or pressure, or a strange feeling in the chest.     Sweating.     Shortness of breath.     Pain, pressure, or a strange feeling in the back, neck, jaw, or upper belly or in one or both shoulders or arms.     Lightheadedness or sudden weakness.     A fast or irregular heartbeat.    After you call 911, the  may tell you to chew 1 adult-strength or 2 to 4 low-dose aspirin. Wait for an ambulance. Do not try to drive yourself. Watch closely for changes in your health, and be sure to contact your doctor if you have any problems. Where can you learn more? Go to http://www.redd.com/ and enter F075 to learn more about \"A Healthy Heart: Care Instructions. \"  Current as of: September 7, 2022               Content Version: 13.5  © 2006-2022 [employer]. Care instructions adapted under license by [address] [place] [address]. If you have questions about a medical condition or this instruction, always ask your healthcare professional. [place] [address] any warranty or liability for your use of this information. Personalized Preventive Plan for Sandra Dey - 2/20/2023  Medicare offers a range of preventive health benefits. Some of the tests and screenings are paid in full while other may be subject to a deductible, co-insurance, and/or copay. Some of these benefits include a comprehensive review of your medical history including lifestyle, illnesses that may run in your family, and various assessments and screenings as appropriate. After reviewing your medical record and screening and assessments performed today your provider may have ordered immunizations, labs, imaging, and/or referrals for you. A list of these orders (if applicable) as well as your Preventive Care list are included within your After Visit Summary for your review. Other Preventive Recommendations:    A preventive eye exam performed by an eye specialist is recommended every 1-2 years to screen for glaucoma; cataracts, macular degeneration, and other eye disorders. A preventive dental visit is recommended every 6 months. Try to get at least 150 minutes of exercise per week or 10,000 steps per day on a pedometer . Order or download the FREE \"Exercise & Physical Activity: Your Everyday Guide\" from [employer].  Call 9-419.638.7067 or search [employer] online. You need 2283-5723 mg of calcium and 1425-6172 IU of vitamin D per day. It is possible to meet your calcium requirement with diet alone, but a vitamin D supplement is usually necessary to meet this goal.  When exposed to the sun, use a sunscreen that protects against both UVA and UVB radiation with an SPF of 30 or greater. Reapply every 2 to 3 hours or after sweating, drying off with a towel, or swimming. Always wear a seat belt when traveling in a car. Always wear a helmet when riding a bicycle or motorcycle.

## 2023-02-20 NOTE — PROGRESS NOTES
Visit Information    Have you changed or started any medications since your last visit including any over-the-counter medicines, vitamins, or herbal medicines? no   Have you stopped taking any of your medications? Is so, why? -  no  Are you having any side effects from any of your medications? - no    Have you seen any other physician or provider since your last visit?  no   Have you had any other diagnostic tests since your last visit?  no   Have you been seen in the emergency room and/or had an admission in a hospital since we last saw you?  no   Have you had your routine dental cleaning in the past 6 months?  no     Do you have an active MyChart account? If no, what is the barrier?   No: na    Patient Care Team:  RIVER Swartz CNP as PCP - General (Nurse Practitioner)  RIVER Swartz CNP as PCP - Empaneled Provider  Julia Perry MD as Consulting Physician (Pain Management)    Medical History Review  Past Medical, Family, and Social History reviewed and  contribute to the patient presenting condition    Health Maintenance   Topic Date Due    Depression Monitoring  Never done    HIV screen  Never done    DTaP/Tdap/Td vaccine (1 - Tdap) Never done    Diabetes screen  Never done    Colorectal Cancer Screen  Never done    Shingles vaccine (1 of 2) Never done    COVID-19 Vaccine (2 - Booster for Alec series) 07/07/2021    Annual Wellness Visit (AWV)  07/22/2022    Flu vaccine (1) 08/01/2022    Lipids  07/22/2023    Pneumococcal 0-64 years Vaccine  Completed    Hepatitis C screen  Completed    Hepatitis A vaccine  Aged Out    Hib vaccine  Aged Out    Meningococcal (ACWY) vaccine  Aged Out    Low dose CT lung screening  Discontinued

## 2023-05-01 RX ORDER — METOPROLOL SUCCINATE 50 MG/1
50 TABLET, EXTENDED RELEASE ORAL DAILY
Qty: 30 TABLET | Refills: 11 | Status: SHIPPED | OUTPATIENT
Start: 2023-05-01

## 2023-06-29 ENCOUNTER — OFFICE VISIT (OUTPATIENT)
Dept: FAMILY MEDICINE CLINIC | Age: 63
End: 2023-06-29
Payer: MEDICARE

## 2023-06-29 ENCOUNTER — HOSPITAL ENCOUNTER (OUTPATIENT)
Age: 63
Setting detail: SPECIMEN
Discharge: HOME | End: 2023-06-29

## 2023-06-29 VITALS
DIASTOLIC BLOOD PRESSURE: 82 MMHG | HEART RATE: 130 BPM | OXYGEN SATURATION: 98 % | WEIGHT: 215.8 LBS | BODY MASS INDEX: 30.21 KG/M2 | TEMPERATURE: 97.4 F | SYSTOLIC BLOOD PRESSURE: 188 MMHG | HEIGHT: 71 IN

## 2023-06-29 DIAGNOSIS — E78.2 MIXED HYPERLIPIDEMIA: ICD-10-CM

## 2023-06-29 DIAGNOSIS — I10 PRIMARY HYPERTENSION: ICD-10-CM

## 2023-06-29 DIAGNOSIS — R73.9 ELEVATED BLOOD SUGAR: ICD-10-CM

## 2023-06-29 DIAGNOSIS — Z13.1 DIABETES MELLITUS SCREENING: ICD-10-CM

## 2023-06-29 DIAGNOSIS — F41.9 ANXIETY: ICD-10-CM

## 2023-06-29 DIAGNOSIS — I48.91 ATRIAL FIBRILLATION WITH RVR (HCC): ICD-10-CM

## 2023-06-29 DIAGNOSIS — Z00.00 ROUTINE GENERAL MEDICAL EXAMINATION AT A HEALTH CARE FACILITY: ICD-10-CM

## 2023-06-29 DIAGNOSIS — Z12.5 SPECIAL SCREENING FOR MALIGNANT NEOPLASM OF PROSTATE: ICD-10-CM

## 2023-06-29 DIAGNOSIS — R00.0 TACHYCARDIA: ICD-10-CM

## 2023-06-29 DIAGNOSIS — Z13.9 ENCOUNTER FOR SCREENING INVOLVING SOCIAL DETERMINANTS OF HEALTH (SDOH): ICD-10-CM

## 2023-06-29 DIAGNOSIS — F43.9 STRESS AT HOME: Primary | ICD-10-CM

## 2023-06-29 LAB
CHOLEST SERPL-MCNC: 216 MG/DL
CHOLESTEROL/HDL RATIO: 4.8
EST. AVERAGE GLUCOSE BLD GHB EST-MCNC: 88 MG/DL
HBA1C MFR BLD: 4.7 % (ref 4–6)
HDLC SERPL-MCNC: 45 MG/DL
LDLC SERPL CALC-MCNC: 149 MG/DL (ref 0–130)
PSA SERPL-MCNC: 0.39 NG/ML
TRIGL SERPL-MCNC: 111 MG/DL

## 2023-06-29 PROCEDURE — 3079F DIAST BP 80-89 MM HG: CPT | Performed by: NURSE PRACTITIONER

## 2023-06-29 PROCEDURE — G8427 DOCREV CUR MEDS BY ELIG CLIN: HCPCS | Performed by: NURSE PRACTITIONER

## 2023-06-29 PROCEDURE — G8417 CALC BMI ABV UP PARAM F/U: HCPCS | Performed by: NURSE PRACTITIONER

## 2023-06-29 PROCEDURE — 3017F COLORECTAL CA SCREEN DOC REV: CPT | Performed by: NURSE PRACTITIONER

## 2023-06-29 PROCEDURE — 3077F SYST BP >= 140 MM HG: CPT | Performed by: NURSE PRACTITIONER

## 2023-06-29 PROCEDURE — 99214 OFFICE O/P EST MOD 30 MIN: CPT | Performed by: NURSE PRACTITIONER

## 2023-06-29 PROCEDURE — 4004F PT TOBACCO SCREEN RCVD TLK: CPT | Performed by: NURSE PRACTITIONER

## 2023-06-29 RX ORDER — BUSPIRONE HYDROCHLORIDE 7.5 MG/1
7.5 TABLET ORAL 2 TIMES DAILY
Qty: 60 TABLET | Refills: 0 | Status: SHIPPED | OUTPATIENT
Start: 2023-06-29 | End: 2023-07-29

## 2023-06-29 ASSESSMENT — ENCOUNTER SYMPTOMS
ABDOMINAL PAIN: 0
VOMITING: 0
SHORTNESS OF BREATH: 0
NAUSEA: 0
CHEST TIGHTNESS: 0
COUGH: 0

## 2023-07-02 LAB
ARSENIC BLD-MCNC: <10 UG/L
CADMIUM BLD-MCNC: <1 UG/L
LEAD RBC-MCNC: 1 UG/DL (ref 0–4)
MERCURY BLD-MCNC: <2.5 UG/L

## 2023-07-20 DIAGNOSIS — F41.9 ANXIETY: ICD-10-CM

## 2023-07-20 DIAGNOSIS — F43.9 STRESS AT HOME: ICD-10-CM

## 2023-07-20 RX ORDER — BUSPIRONE HYDROCHLORIDE 7.5 MG/1
TABLET ORAL
Qty: 60 TABLET | Refills: 5 | Status: ON HOLD | OUTPATIENT
Start: 2023-07-20

## 2023-09-18 ENCOUNTER — OFFICE VISIT (OUTPATIENT)
Dept: FAMILY MEDICINE CLINIC | Age: 63
End: 2023-09-18
Payer: MEDICARE

## 2023-09-18 VITALS
WEIGHT: 208.6 LBS | TEMPERATURE: 96.8 F | BODY MASS INDEX: 29.2 KG/M2 | SYSTOLIC BLOOD PRESSURE: 156 MMHG | OXYGEN SATURATION: 98 % | HEIGHT: 71 IN | DIASTOLIC BLOOD PRESSURE: 88 MMHG | HEART RATE: 133 BPM

## 2023-09-18 DIAGNOSIS — I42.9 CARDIOMYOPATHY, UNSPECIFIED TYPE (HCC): ICD-10-CM

## 2023-09-18 DIAGNOSIS — R06.02 SOB (SHORTNESS OF BREATH) ON EXERTION: ICD-10-CM

## 2023-09-18 DIAGNOSIS — G89.4 CHRONIC PAIN SYNDROME: ICD-10-CM

## 2023-09-18 DIAGNOSIS — J44.9 CHRONIC OBSTRUCTIVE PULMONARY DISEASE, UNSPECIFIED COPD TYPE (HCC): ICD-10-CM

## 2023-09-18 DIAGNOSIS — I50.22 CHRONIC SYSTOLIC CONGESTIVE HEART FAILURE (HCC): ICD-10-CM

## 2023-09-18 DIAGNOSIS — K13.70 ORAL LESION: ICD-10-CM

## 2023-09-18 DIAGNOSIS — R62.7 FAILURE TO THRIVE IN ADULT: Primary | ICD-10-CM

## 2023-09-18 DIAGNOSIS — R11.2 NAUSEA AND VOMITING, UNSPECIFIED VOMITING TYPE: ICD-10-CM

## 2023-09-18 DIAGNOSIS — R63.4 UNINTENTIONAL WEIGHT LOSS: ICD-10-CM

## 2023-09-18 PROCEDURE — G8427 DOCREV CUR MEDS BY ELIG CLIN: HCPCS | Performed by: NURSE PRACTITIONER

## 2023-09-18 PROCEDURE — 3077F SYST BP >= 140 MM HG: CPT | Performed by: NURSE PRACTITIONER

## 2023-09-18 PROCEDURE — G8417 CALC BMI ABV UP PARAM F/U: HCPCS | Performed by: NURSE PRACTITIONER

## 2023-09-18 PROCEDURE — 99213 OFFICE O/P EST LOW 20 MIN: CPT | Performed by: NURSE PRACTITIONER

## 2023-09-18 PROCEDURE — 3017F COLORECTAL CA SCREEN DOC REV: CPT | Performed by: NURSE PRACTITIONER

## 2023-09-18 PROCEDURE — 3023F SPIROM DOC REV: CPT | Performed by: NURSE PRACTITIONER

## 2023-09-18 PROCEDURE — 4004F PT TOBACCO SCREEN RCVD TLK: CPT | Performed by: NURSE PRACTITIONER

## 2023-09-18 PROCEDURE — 3079F DIAST BP 80-89 MM HG: CPT | Performed by: NURSE PRACTITIONER

## 2023-09-18 RX ORDER — ESCITALOPRAM OXALATE 5 MG/1
5 TABLET ORAL DAILY
Status: ON HOLD | COMMUNITY
Start: 2023-08-27

## 2023-09-18 ASSESSMENT — ENCOUNTER SYMPTOMS
SHORTNESS OF BREATH: 1
COUGH: 0
VOMITING: 0
TROUBLE SWALLOWING: 1
CHEST TIGHTNESS: 0
NAUSEA: 0
BACK PAIN: 1
WHEEZING: 0
ABDOMINAL PAIN: 0
SORE THROAT: 0

## 2023-09-18 NOTE — PROGRESS NOTES
Visit Information    Have you changed or started any medications since your last visit including any over-the-counter medicines, vitamins, or herbal medicines? no   Have you stopped taking any of your medications? Is so, why? -  no  Are you having any side effects from any of your medications? - no    Have you seen any other physician or provider since your last visit?  no   Have you had any other diagnostic tests since your last visit?  no   Have you been seen in the emergency room and/or had an admission in a hospital since we last saw you?  no   Have you had your routine dental cleaning in the past 6 months?  no     Do you have an active MyChart account? If no, what is the barrier?   No: na    Patient Care Team:  RIVER Kim CNP as PCP - General (Nurse Practitioner)  RIVER Kim CNP as PCP - Empaneled Provider  Janice Davila MD as Consulting Physician (Pain Management)    Medical History Review  Past Medical, Family, and Social History reviewed and  contribute to the patient presenting condition    Health Maintenance   Topic Date Due    HIV screen  Never done    DTaP/Tdap/Td vaccine (1 - Tdap) Never done    Colorectal Cancer Screen  Never done    Shingles vaccine (1 of 2) Never done    Pneumococcal 0-64 years Vaccine (2 - PCV) 08/16/2018    COVID-19 Vaccine (2 - Booster for Alec series) 07/07/2021    Flu vaccine (1) 08/01/2023    Depression Monitoring  02/20/2024    Annual Wellness Visit (AWV)  02/21/2024    Lipids  06/29/2024    Diabetes screen  06/29/2026    Hepatitis C screen  Completed    Hepatitis A vaccine  Aged Out    Hepatitis B vaccine  Aged Out    Hib vaccine  Aged Out    Meningococcal (ACWY) vaccine  Aged Out    Low dose CT lung screening &/or counseling  Discontinued    Prostate Specific Antigen (PSA) Screening or Monitoring  Discontinued
Effort: Pulmonary effort is normal. No respiratory distress. Comments: Refusing heart/lung assessment  Musculoskeletal:      Cervical back: Neck supple. Skin:     General: Skin is warm and dry. Coloration: Skin is not pale. Findings: No erythema. Neurological:      General: No focal deficit present. Mental Status: He is alert and oriented to person, place, and time. Mental status is at baseline. Gait: Gait abnormal.   Psychiatric:         Mood and Affect: Mood normal.         Behavior: Behavior normal.         Thought Content: Thought content normal.         Judgment: Judgment normal.         Assessment:       Diagnosis Orders   1. Failure to thrive in adult  [facility]      2. Unintentional weight loss  [address] [facility]      3. Chronic obstructive pulmonary disease, unspecified COPD type [facility])  [facility]      4. Oral lesion  [address] [facility]      5. SOB (shortness of breath) on exertion  [facility]      6. Chronic systolic congestive heart failure [facility])  [address] [place] [address]      7. Cardiomyopathy, unspecified type [facility])  [facility]      8. Chronic pain syndrome  [facility]      9. Nausea and vomiting, unspecified vomiting type  [facility]        Wt Readings from Last 3 Encounters:   09/18/23 208 lb 9.6 oz (94.6 kg)   06/29/23 215 lb 12.8 oz (97.9 kg)   02/20/23 230 lb 12.8 oz (104.7 kg)     BP Readings from Last 3 Encounters:   09/18/23 (!) 156/88   06/29/23 (!) 188/82   02/20/23 136/68       Plan:      No follow-ups on file.   Orders Placed This Encounter   Procedures    [facility]

## 2023-09-19 ENCOUNTER — TELEPHONE (OUTPATIENT)
Dept: FAMILY MEDICINE CLINIC | Age: 63
End: 2023-09-19

## 2023-09-19 DIAGNOSIS — R63.4 UNINTENTIONAL WEIGHT LOSS: ICD-10-CM

## 2023-09-19 DIAGNOSIS — R06.02 SOB (SHORTNESS OF BREATH) ON EXERTION: ICD-10-CM

## 2023-09-19 DIAGNOSIS — I42.9 CARDIOMYOPATHY, UNSPECIFIED TYPE (HCC): ICD-10-CM

## 2023-09-19 DIAGNOSIS — I50.22 CHRONIC SYSTOLIC CONGESTIVE HEART FAILURE (HCC): ICD-10-CM

## 2023-09-19 DIAGNOSIS — J44.9 CHRONIC OBSTRUCTIVE PULMONARY DISEASE, UNSPECIFIED COPD TYPE (HCC): ICD-10-CM

## 2023-09-19 DIAGNOSIS — G89.4 CHRONIC PAIN SYNDROME: ICD-10-CM

## 2023-09-19 DIAGNOSIS — R62.7 FAILURE TO THRIVE IN ADULT: Primary | ICD-10-CM

## 2023-09-19 DIAGNOSIS — K13.70 ORAL LESION: ICD-10-CM

## 2023-09-19 NOTE — TELEPHONE ENCOUNTER
[facility] hospice called and stated that she went out to evaluate the patient and he does not qualify for hospice. Per connie patient was agreeable for palliative care.  Can you please place order

## 2023-09-20 ENCOUNTER — APPOINTMENT (OUTPATIENT)
Dept: CT IMAGING | Age: 63
DRG: 917 | End: 2023-09-20
Payer: MEDICARE

## 2023-09-20 ENCOUNTER — HOSPITAL ENCOUNTER (INPATIENT)
Age: 63
LOS: 5 days | Discharge: ANOTHER HOSPITAL | DRG: 917 | End: 2023-09-25
Attending: EMERGENCY MEDICINE | Admitting: STUDENT IN AN ORGANIZED HEALTH CARE EDUCATION/TRAINING PROGRAM
Payer: MEDICARE

## 2023-09-20 ENCOUNTER — APPOINTMENT (OUTPATIENT)
Dept: GENERAL RADIOLOGY | Age: 63
DRG: 917 | End: 2023-09-20
Payer: MEDICARE

## 2023-09-20 DIAGNOSIS — R41.82 ALTERED MENTAL STATUS, UNSPECIFIED ALTERED MENTAL STATUS TYPE: Primary | ICD-10-CM

## 2023-09-20 DIAGNOSIS — E87.20 LACTIC ACIDOSIS: ICD-10-CM

## 2023-09-20 DIAGNOSIS — A41.9 SEPTICEMIA (HCC): ICD-10-CM

## 2023-09-20 DIAGNOSIS — I42.8 NONISCHEMIC CARDIOMYOPATHY (HCC): ICD-10-CM

## 2023-09-20 LAB
ALBUMIN SERPL-MCNC: 4.9 G/DL (ref 3.5–5.2)
ALP SERPL-CCNC: 104 U/L (ref 40–129)
ALT SERPL-CCNC: 11 U/L (ref 5–41)
AMMONIA PLAS-SCNC: 323 UMOL/L (ref 16–60)
AMPHET UR QL SCN: NEGATIVE
ANION GAP SERPL CALCULATED.3IONS-SCNC: 39 MMOL/L (ref 9–17)
AST SERPL-CCNC: 22 U/L
ATYPICAL LYMPHOCYTE ABSOLUTE COUNT: 0.34 K/UL
ATYPICAL LYMPHOCYTES: 2 %
BACTERIA URNS QL MICRO: ABNORMAL
BARBITURATES UR QL SCN: NEGATIVE
BASOPHILS # BLD: 0.17 K/UL (ref 0–0.2)
BASOPHILS NFR BLD: 1 %
BENZODIAZ UR QL: NEGATIVE
BILIRUB DIRECT SERPL-MCNC: 0.2 MG/DL
BILIRUB INDIRECT SERPL-MCNC: 0.4 MG/DL (ref 0–1)
BILIRUB SERPL-MCNC: 0.6 MG/DL (ref 0.3–1.2)
BILIRUB UR QL STRIP: NEGATIVE
BUN SERPL-MCNC: 6 MG/DL (ref 8–23)
BUN/CREAT SERPL: 6 (ref 9–20)
CALCIUM SERPL-MCNC: 10.8 MG/DL (ref 8.6–10.4)
CANNABINOIDS UR QL SCN: POSITIVE
CASTS #/AREA URNS LPF: ABNORMAL /LPF
CASTS #/AREA URNS LPF: ABNORMAL /LPF
CHLORIDE SERPL-SCNC: 96 MMOL/L (ref 98–107)
CLARITY UR: CLEAR
CO2 SERPL-SCNC: 8 MMOL/L (ref 20–31)
COCAINE UR QL SCN: NEGATIVE
COLOR UR: YELLOW
CREAT SERPL-MCNC: 1 MG/DL (ref 0.7–1.2)
CRITICAL ACTION: NORMAL
CRITICAL ACTION: NORMAL
CRITICAL NOTIFICATION DATE/TIME: NORMAL
CRITICAL NOTIFICATION DATE/TIME: NORMAL
CRITICAL NOTIFICATION: NORMAL
CRITICAL VALUE READ BACK: YES
CRITICAL VALUE READ BACK: YES
EOSINOPHIL # BLD: 0.17 K/UL (ref 0–0.4)
EOSINOPHILS RELATIVE PERCENT: 1 % (ref 1–4)
EPI CELLS #/AREA URNS HPF: ABNORMAL /HPF (ref 0–5)
ERYTHROCYTE [DISTWIDTH] IN BLOOD BY AUTOMATED COUNT: 13.3 % (ref 11.8–14.4)
FENTANYL UR QL: NEGATIVE
FIO2: 100
FIO2: 100
GFR SERPL CREATININE-BSD FRML MDRD: >60 ML/MIN/1.73M2
GLUCOSE SERPL-MCNC: 180 MG/DL (ref 70–99)
GLUCOSE UR STRIP-MCNC: ABNORMAL MG/DL
HCO3 VENOUS: 10.6 MMOL/L (ref 22–29)
HCT VFR BLD AUTO: 57.1 % (ref 40.7–50.3)
HGB BLD-MCNC: 17.1 G/DL (ref 13–17)
HGB UR QL STRIP.AUTO: ABNORMAL
IMM GRANULOCYTES # BLD AUTO: 0 K/UL (ref 0–0.3)
IMM GRANULOCYTES NFR BLD: 0 %
KETONES UR STRIP-MCNC: ABNORMAL MG/DL
LACTATE BLDV-SCNC: 25.3 MMOL/L (ref 0.5–1.9)
LACTATE BLDV-SCNC: 4.7 MMOL/L (ref 0.5–1.9)
LEUKOCYTE ESTERASE UR QL STRIP: NEGATIVE
LYMPHOCYTES NFR BLD: 5.13 K/UL (ref 1–4.8)
LYMPHOCYTES RELATIVE PERCENT: 30 % (ref 24–44)
MAGNESIUM SERPL-MCNC: 2.7 MG/DL (ref 1.6–2.6)
MCH RBC QN AUTO: 29.7 PG (ref 25.2–33.5)
MCHC RBC AUTO-ENTMCNC: 29.9 G/DL (ref 28–38)
MCV RBC AUTO: 99.1 FL (ref 82.6–102.9)
METHADONE UR QL: NEGATIVE
MODE: AC
MODE: AC
MONOCYTES NFR BLD: 1.03 K/UL (ref 0.2–0.8)
MONOCYTES NFR BLD: 6 % (ref 1–7)
NEGATIVE BASE EXCESS, ART: 1.3 MMOL/L (ref 0–2)
NEGATIVE BASE EXCESS, ART: 16.4 MMOL/L (ref 0–2)
NEGATIVE BASE EXCESS, VEN: 22.6 MMOL/L (ref 0–2)
NEUTROPHILS NFR BLD: 60 % (ref 36–66)
NEUTS SEG NFR BLD: 10.26 K/UL (ref 1.8–7.7)
NITRITE UR QL STRIP: NEGATIVE
O2 DELIVERY DEVICE: ABNORMAL
O2 DELIVERY DEVICE: ABNORMAL
O2 SAT, VEN: 98.2 % (ref 60–85)
OPIATES UR QL SCN: NEGATIVE
OXYCODONE UR QL SCN: POSITIVE
PCO2, VEN: 50.9 MM HG (ref 41–51)
PCP UR QL SCN: NEGATIVE
PH UR STRIP: 6.5 [PH] (ref 5–8)
PH VENOUS: 6.92 (ref 7.32–7.43)
PHOSPHATE SERPL-MCNC: 4.1 MG/DL (ref 2.5–4.5)
PLATELET # BLD AUTO: 363 K/UL (ref 138–453)
PMV BLD AUTO: 10.4 FL (ref 8.1–13.5)
PO2, VEN: 173.9 MM HG (ref 30–50)
POC HCO3: 13.1 MMOL/L (ref 21–28)
POC HCO3: 22.4 MMOL/L (ref 21–28)
POC O2 SATURATION: 100 % (ref 94–98)
POC O2 SATURATION: 100 % (ref 94–98)
POC PCO2: 33.7 MM HG (ref 35–48)
POC PCO2: 42.9 MM HG (ref 35–48)
POC PH: 7.09 (ref 7.35–7.45)
POC PH: 7.43 (ref 7.35–7.45)
POC PO2: 395.2 MM HG (ref 83–108)
POC PO2: 621.4 MM HG (ref 83–108)
POTASSIUM SERPL-SCNC: 3.1 MMOL/L (ref 3.7–5.3)
PROT SERPL-MCNC: 8.5 G/DL (ref 6.4–8.3)
PROT UR STRIP-MCNC: ABNORMAL MG/DL
RBC # BLD AUTO: 5.76 M/UL (ref 4.21–5.77)
RBC #/AREA URNS HPF: ABNORMAL /HPF (ref 0–2)
SARS-COV-2 RDRP RESP QL NAA+PROBE: NOT DETECTED
SODIUM SERPL-SCNC: 143 MMOL/L (ref 135–144)
SP GR UR STRIP: 1.02 (ref 1–1.03)
SPECIMEN DESCRIPTION: NORMAL
TEST INFORMATION: ABNORMAL
TROPONIN I SERPL HS-MCNC: 27 NG/L (ref 0–22)
TROPONIN I SERPL HS-MCNC: 44 NG/L (ref 0–22)
UROBILINOGEN UR STRIP-ACNC: NORMAL EU/DL (ref 0–1)
WBC #/AREA URNS HPF: ABNORMAL /HPF (ref 0–5)
WBC OTHER # BLD: 17.1 K/UL (ref 3.5–11.3)

## 2023-09-20 PROCEDURE — 87635 SARS-COV-2 COVID-19 AMP PRB: CPT

## 2023-09-20 PROCEDURE — 36620 INSERTION CATHETER ARTERY: CPT

## 2023-09-20 PROCEDURE — 71045 X-RAY EXAM CHEST 1 VIEW: CPT

## 2023-09-20 PROCEDURE — 99285 EMERGENCY DEPT VISIT HI MDM: CPT

## 2023-09-20 PROCEDURE — 71260 CT THORAX DX C+: CPT

## 2023-09-20 PROCEDURE — 80307 DRUG TEST PRSMV CHEM ANLYZR: CPT

## 2023-09-20 PROCEDURE — 2700000000 HC OXYGEN THERAPY PER DAY

## 2023-09-20 PROCEDURE — 94761 N-INVAS EAR/PLS OXIMETRY MLT: CPT

## 2023-09-20 PROCEDURE — 96365 THER/PROPH/DIAG IV INF INIT: CPT

## 2023-09-20 PROCEDURE — 31500 INSERT EMERGENCY AIRWAY: CPT

## 2023-09-20 PROCEDURE — 36415 COLL VENOUS BLD VENIPUNCTURE: CPT

## 2023-09-20 PROCEDURE — 84484 ASSAY OF TROPONIN QUANT: CPT

## 2023-09-20 PROCEDURE — 74177 CT ABD & PELVIS W/CONTRAST: CPT

## 2023-09-20 PROCEDURE — G0480 DRUG TEST DEF 1-7 CLASSES: HCPCS

## 2023-09-20 PROCEDURE — 2000000000 HC ICU R&B

## 2023-09-20 PROCEDURE — 84100 ASSAY OF PHOSPHORUS: CPT

## 2023-09-20 PROCEDURE — 80179 DRUG ASSAY SALICYLATE: CPT

## 2023-09-20 PROCEDURE — 82803 BLOOD GASES ANY COMBINATION: CPT

## 2023-09-20 PROCEDURE — 83605 ASSAY OF LACTIC ACID: CPT

## 2023-09-20 PROCEDURE — 80143 DRUG ASSAY ACETAMINOPHEN: CPT

## 2023-09-20 PROCEDURE — 6360000002 HC RX W HCPCS: Performed by: EMERGENCY MEDICINE

## 2023-09-20 PROCEDURE — 99223 1ST HOSP IP/OBS HIGH 75: CPT | Performed by: NURSE PRACTITIONER

## 2023-09-20 PROCEDURE — 96360 HYDRATION IV INFUSION INIT: CPT

## 2023-09-20 PROCEDURE — 82140 ASSAY OF AMMONIA: CPT

## 2023-09-20 PROCEDURE — 81001 URINALYSIS AUTO W/SCOPE: CPT

## 2023-09-20 PROCEDURE — 2580000003 HC RX 258: Performed by: EMERGENCY MEDICINE

## 2023-09-20 PROCEDURE — 93005 ELECTROCARDIOGRAM TRACING: CPT | Performed by: EMERGENCY MEDICINE

## 2023-09-20 PROCEDURE — 83735 ASSAY OF MAGNESIUM: CPT

## 2023-09-20 PROCEDURE — 6360000004 HC RX CONTRAST MEDICATION: Performed by: EMERGENCY MEDICINE

## 2023-09-20 PROCEDURE — 80076 HEPATIC FUNCTION PANEL: CPT

## 2023-09-20 PROCEDURE — 36600 WITHDRAWAL OF ARTERIAL BLOOD: CPT

## 2023-09-20 PROCEDURE — 94002 VENT MGMT INPAT INIT DAY: CPT

## 2023-09-20 PROCEDURE — 85025 COMPLETE CBC W/AUTO DIFF WBC: CPT

## 2023-09-20 PROCEDURE — 70450 CT HEAD/BRAIN W/O DYE: CPT

## 2023-09-20 PROCEDURE — 2500000003 HC RX 250 WO HCPCS: Performed by: EMERGENCY MEDICINE

## 2023-09-20 PROCEDURE — 96375 TX/PRO/DX INJ NEW DRUG ADDON: CPT

## 2023-09-20 PROCEDURE — 96361 HYDRATE IV INFUSION ADD-ON: CPT

## 2023-09-20 PROCEDURE — 84146 ASSAY OF PROLACTIN: CPT

## 2023-09-20 PROCEDURE — 80048 BASIC METABOLIC PNL TOTAL CA: CPT

## 2023-09-20 RX ORDER — 0.9 % SODIUM CHLORIDE 0.9 %
1000 INTRAVENOUS SOLUTION INTRAVENOUS ONCE
Status: COMPLETED | OUTPATIENT
Start: 2023-09-20 | End: 2023-09-20

## 2023-09-20 RX ORDER — ETOMIDATE 2 MG/ML
20 INJECTION INTRAVENOUS ONCE
Status: COMPLETED | OUTPATIENT
Start: 2023-09-20 | End: 2023-09-20

## 2023-09-20 RX ORDER — ESCITALOPRAM OXALATE 10 MG/1
5 TABLET ORAL DAILY
Status: DISCONTINUED | OUTPATIENT
Start: 2023-09-21 | End: 2023-09-25 | Stop reason: HOSPADM

## 2023-09-20 RX ORDER — MIDAZOLAM HYDROCHLORIDE 1 MG/ML
2 INJECTION INTRAMUSCULAR; INTRAVENOUS ONCE
Status: COMPLETED | OUTPATIENT
Start: 2023-09-20 | End: 2023-09-20

## 2023-09-20 RX ORDER — METRONIDAZOLE 500 MG/100ML
500 INJECTION, SOLUTION INTRAVENOUS EVERY 8 HOURS
Status: DISCONTINUED | OUTPATIENT
Start: 2023-09-21 | End: 2023-09-21

## 2023-09-20 RX ORDER — BUSPIRONE HYDROCHLORIDE 5 MG/1
7.5 TABLET ORAL 2 TIMES DAILY
Status: DISCONTINUED | OUTPATIENT
Start: 2023-09-20 | End: 2023-09-25 | Stop reason: HOSPADM

## 2023-09-20 RX ORDER — FENTANYL CITRATE 50 UG/ML
100 INJECTION, SOLUTION INTRAMUSCULAR; INTRAVENOUS ONCE
Status: COMPLETED | OUTPATIENT
Start: 2023-09-20 | End: 2023-09-20

## 2023-09-20 RX ORDER — LACTULOSE 10 G/15ML
20 SOLUTION ORAL 3 TIMES DAILY
Status: DISCONTINUED | OUTPATIENT
Start: 2023-09-20 | End: 2023-09-21

## 2023-09-20 RX ORDER — SODIUM CHLORIDE 0.9 % (FLUSH) 0.9 %
5-40 SYRINGE (ML) INJECTION PRN
Status: DISCONTINUED | OUTPATIENT
Start: 2023-09-20 | End: 2023-09-25 | Stop reason: HOSPADM

## 2023-09-20 RX ORDER — SODIUM CHLORIDE 9 MG/ML
INJECTION, SOLUTION INTRAVENOUS PRN
Status: DISCONTINUED | OUTPATIENT
Start: 2023-09-20 | End: 2023-09-25 | Stop reason: HOSPADM

## 2023-09-20 RX ORDER — NOREPINEPHRINE BITARTRATE 0.06 MG/ML
1-100 INJECTION, SOLUTION INTRAVENOUS CONTINUOUS
Status: DISCONTINUED | OUTPATIENT
Start: 2023-09-20 | End: 2023-09-25 | Stop reason: HOSPADM

## 2023-09-20 RX ORDER — PROPOFOL 10 MG/ML
5-50 INJECTION, EMULSION INTRAVENOUS CONTINUOUS
Status: DISCONTINUED | OUTPATIENT
Start: 2023-09-20 | End: 2023-09-21

## 2023-09-20 RX ORDER — 0.9 % SODIUM CHLORIDE 0.9 %
80 INTRAVENOUS SOLUTION INTRAVENOUS ONCE
Status: COMPLETED | OUTPATIENT
Start: 2023-09-20 | End: 2023-09-20

## 2023-09-20 RX ORDER — SUCCINYLCHOLINE CHLORIDE 20 MG/ML
150 INJECTION INTRAMUSCULAR; INTRAVENOUS ONCE
Status: COMPLETED | OUTPATIENT
Start: 2023-09-20 | End: 2023-09-20

## 2023-09-20 RX ORDER — MIDAZOLAM HYDROCHLORIDE 1 MG/ML
1 INJECTION INTRAMUSCULAR; INTRAVENOUS EVERY 30 MIN PRN
Status: DISCONTINUED | OUTPATIENT
Start: 2023-09-20 | End: 2023-09-22

## 2023-09-20 RX ORDER — VECURONIUM BROMIDE 1 MG/ML
10 INJECTION, POWDER, LYOPHILIZED, FOR SOLUTION INTRAVENOUS ONCE
Status: COMPLETED | OUTPATIENT
Start: 2023-09-20 | End: 2023-09-20

## 2023-09-20 RX ORDER — SODIUM CHLORIDE 0.9 % (FLUSH) 0.9 %
5-40 SYRINGE (ML) INJECTION EVERY 12 HOURS SCHEDULED
Status: DISCONTINUED | OUTPATIENT
Start: 2023-09-20 | End: 2023-09-25 | Stop reason: HOSPADM

## 2023-09-20 RX ORDER — SODIUM CHLORIDE 0.9 % (FLUSH) 0.9 %
10 SYRINGE (ML) INJECTION PRN
Status: DISCONTINUED | OUTPATIENT
Start: 2023-09-20 | End: 2023-09-20

## 2023-09-20 RX ORDER — 0.9 % SODIUM CHLORIDE 0.9 %
1000 INTRAVENOUS SOLUTION INTRAVENOUS ONCE
Status: COMPLETED | OUTPATIENT
Start: 2023-09-20 | End: 2023-09-21

## 2023-09-20 RX ADMIN — Medication 5 MCG/MIN: at 21:50

## 2023-09-20 RX ADMIN — ETOMIDATE INJECTION 20 MG: 2 SOLUTION INTRAVENOUS at 18:40

## 2023-09-20 RX ADMIN — CEFEPIME 2000 MG: 2 INJECTION, POWDER, FOR SOLUTION INTRAVENOUS at 22:07

## 2023-09-20 RX ADMIN — SODIUM CHLORIDE 1000 ML: 9 INJECTION, SOLUTION INTRAVENOUS at 22:18

## 2023-09-20 RX ADMIN — SODIUM CHLORIDE 80 ML: 9 INJECTION, SOLUTION INTRAVENOUS at 19:48

## 2023-09-20 RX ADMIN — VECURONIUM BROMIDE 10 MG: 1 INJECTION, POWDER, LYOPHILIZED, FOR SOLUTION INTRAVENOUS at 18:57

## 2023-09-20 RX ADMIN — IOPAMIDOL 75 ML: 755 INJECTION, SOLUTION INTRAVENOUS at 19:48

## 2023-09-20 RX ADMIN — SODIUM CHLORIDE, PRESERVATIVE FREE 10 ML: 5 INJECTION INTRAVENOUS at 19:48

## 2023-09-20 RX ADMIN — FENTANYL CITRATE 100 MCG: 50 INJECTION INTRAMUSCULAR; INTRAVENOUS at 20:43

## 2023-09-20 RX ADMIN — SODIUM CHLORIDE 1000 ML: 9 INJECTION, SOLUTION INTRAVENOUS at 18:50

## 2023-09-20 RX ADMIN — MIDAZOLAM HYDROCHLORIDE 2 MG: 1 INJECTION, SOLUTION INTRAMUSCULAR; INTRAVENOUS at 18:57

## 2023-09-20 RX ADMIN — SODIUM CHLORIDE 1000 ML: 9 INJECTION, SOLUTION INTRAVENOUS at 20:01

## 2023-09-20 RX ADMIN — SODIUM CHLORIDE 1000 ML: 9 INJECTION, SOLUTION INTRAVENOUS at 20:00

## 2023-09-20 RX ADMIN — SUCCINYLCHOLINE CHLORIDE 150 MG: 20 INJECTION, SOLUTION INTRAMUSCULAR; INTRAVENOUS at 18:40

## 2023-09-20 RX ADMIN — PROPOFOL 20 MCG/KG/MIN: 10 INJECTION, EMULSION INTRAVENOUS at 18:50

## 2023-09-20 RX ADMIN — MIDAZOLAM 2 MG: 1 INJECTION INTRAMUSCULAR; INTRAVENOUS at 18:50

## 2023-09-20 RX ADMIN — Medication 2 MG/HR: at 21:18

## 2023-09-20 ASSESSMENT — PAIN - FUNCTIONAL ASSESSMENT: PAIN_FUNCTIONAL_ASSESSMENT: 0-10

## 2023-09-20 ASSESSMENT — PAIN SCALES - GENERAL: PAINLEVEL_OUTOF10: 0

## 2023-09-20 ASSESSMENT — PULMONARY FUNCTION TESTS
PIF_VALUE: 23
PIF_VALUE: 21

## 2023-09-20 NOTE — ED PROVIDER NOTES
RADIOLOGY TESTS ORDERED BELOW ARE REVIEWED BY THE ED CLINICIAN:    RADIOLOGY: All x-rays, CT, MRI, and formal ultrasound images (except ED bedside ultrasound) are read by the radiologist, see reports below, unless otherwise noted in MDM or here. Reports below are reviewed by myself. XR CHEST PORTABLE   Final Result   1. ETT has migrated more proximally now terminating about level of clavicular   heads. 2. No focal consolidation. CT ABDOMEN PELVIS W IV CONTRAST Additional Contrast? None   Final Result   1. No evidence of pulmonary embolism or acute pulmonary abnormality. 2.  Findings suggestive of diarrheal process, otherwise no acute inflammatory   process identified in the abdomen or pelvis. 3.  Cholelithiasis. 4.  Diverticulosis. CT CHEST PULMONARY EMBOLISM W CONTRAST   Final Result   1. No evidence of pulmonary embolism or acute pulmonary abnormality. 2.  Findings suggestive of diarrheal process, otherwise no acute inflammatory   process identified in the abdomen or pelvis. 3.  Cholelithiasis. 4.  Diverticulosis. CT HEAD WO CONTRAST   Final Result   No acute intracranial abnormality identified. Old left cerebellar infarct. XR CHEST PORTABLE   Final Result   No acute cardiopulmonary disease identified. Enteric tube which extends just into the stomach and should be advanced. XR CHEST PORTABLE    (Results Pending)   MRI BRAIN W WO CONTRAST    (Results Pending)       LABS: Lab orders shown below, the results are reviewed by myself, and all abnormals are listed below.   Labs Reviewed   AMMONIA - Abnormal; Notable for the following components:       Result Value    Ammonia 323 (*)     All other components within normal limits   BASIC METABOLIC PANEL - Abnormal; Notable for the following components:    Potassium 3.1 (*)     Chloride 96 (*)     CO2 8 (*)     Anion Gap 39 (*)     Glucose 180 (*)     BUN 6 (*)     Bun/Cre Ratio 6 (*)

## 2023-09-21 ENCOUNTER — APPOINTMENT (OUTPATIENT)
Dept: GENERAL RADIOLOGY | Age: 63
DRG: 917 | End: 2023-09-21
Payer: MEDICARE

## 2023-09-21 PROBLEM — E66.01 MORBIDLY OBESE (HCC): Status: RESOLVED | Noted: 2020-09-09 | Resolved: 2023-09-21

## 2023-09-21 PROBLEM — R65.10 SIRS (SYSTEMIC INFLAMMATORY RESPONSE SYNDROME) (HCC): Status: ACTIVE | Noted: 2023-09-21

## 2023-09-21 PROBLEM — G92.8 TOXIC METABOLIC ENCEPHALOPATHY: Status: ACTIVE | Noted: 2023-09-21

## 2023-09-21 PROBLEM — Z71.89 ACP (ADVANCE CARE PLANNING): Status: ACTIVE | Noted: 2023-09-21

## 2023-09-21 PROBLEM — Z71.89 DNR (DO NOT RESUSCITATE) DISCUSSION: Status: ACTIVE | Noted: 2023-09-21

## 2023-09-21 PROBLEM — Z71.89 GOALS OF CARE, COUNSELING/DISCUSSION: Status: ACTIVE | Noted: 2023-09-21

## 2023-09-21 PROBLEM — J96.21 ACUTE AND CHRONIC RESPIRATORY FAILURE WITH HYPOXIA (HCC): Status: ACTIVE | Noted: 2023-09-21

## 2023-09-21 PROBLEM — F05 DELIRIUM DUE TO ANOTHER MEDICAL CONDITION: Status: ACTIVE | Noted: 2023-09-21

## 2023-09-21 PROBLEM — Z51.5 PALLIATIVE CARE ENCOUNTER: Status: ACTIVE | Noted: 2023-09-21

## 2023-09-21 PROBLEM — Z86.73 HISTORY OF CVA (CEREBROVASCULAR ACCIDENT): Status: ACTIVE | Noted: 2023-09-21

## 2023-09-21 PROBLEM — E87.29 HIGH ANION GAP METABOLIC ACIDOSIS: Status: ACTIVE | Noted: 2023-09-21

## 2023-09-21 PROBLEM — R56.9 SEIZURE-LIKE ACTIVITY (HCC): Status: ACTIVE | Noted: 2023-09-21

## 2023-09-21 PROBLEM — E72.20 HYPERAMMONEMIA (HCC): Status: ACTIVE | Noted: 2023-09-21

## 2023-09-21 LAB
ALBUMIN SERPL-MCNC: 3.5 G/DL (ref 3.5–5.2)
ALP SERPL-CCNC: 70 U/L (ref 40–129)
ALT SERPL-CCNC: 7 U/L (ref 5–41)
AMMONIA PLAS-SCNC: 26 UMOL/L (ref 16–60)
AMMONIA PLAS-SCNC: 28 UMOL/L (ref 16–60)
ANION GAP SERPL CALCULATED.3IONS-SCNC: 13 MMOL/L (ref 9–17)
APAP SERPL-MCNC: <10 UG/ML (ref 10–30)
AST SERPL-CCNC: 18 U/L
BASOPHILS # BLD: 0.09 K/UL (ref 0–0.2)
BASOPHILS NFR BLD: 1 % (ref 0–2)
BILIRUB SERPL-MCNC: 0.3 MG/DL (ref 0.3–1.2)
BUN SERPL-MCNC: 6 MG/DL (ref 8–23)
BUN/CREAT SERPL: 8 (ref 9–20)
CALCIUM SERPL-MCNC: 8.4 MG/DL (ref 8.6–10.4)
CHLORIDE SERPL-SCNC: 107 MMOL/L (ref 98–107)
CO2 SERPL-SCNC: 21 MMOL/L (ref 20–31)
CREAT SERPL-MCNC: 0.8 MG/DL (ref 0.7–1.2)
EOSINOPHIL # BLD: 0.24 K/UL (ref 0–0.44)
EOSINOPHILS RELATIVE PERCENT: 2 % (ref 1–4)
ERYTHROCYTE [DISTWIDTH] IN BLOOD BY AUTOMATED COUNT: 13.7 % (ref 11.8–14.4)
ETHANOL PERCENT: <0.01 %
ETHANOLAMINE SERPL-MCNC: <10 MG/DL
FIO2: 40
FOLATE SERPL-MCNC: 2.6 NG/ML
GFR SERPL CREATININE-BSD FRML MDRD: >60 ML/MIN/1.73M2
GLUCOSE SERPL-MCNC: 114 MG/DL (ref 70–99)
HCT VFR BLD AUTO: 41.6 % (ref 40.7–50.3)
HGB BLD-MCNC: 13.7 G/DL (ref 13–17)
IMM GRANULOCYTES # BLD AUTO: 0.04 K/UL (ref 0–0.3)
IMM GRANULOCYTES NFR BLD: 0 %
LACTATE BLDV-SCNC: 0.8 MMOL/L (ref 0.5–2.2)
LACTATE BLDV-SCNC: 3 MMOL/L (ref 0.5–1.9)
LYMPHOCYTES NFR BLD: 3.83 K/UL (ref 1.1–3.7)
LYMPHOCYTES RELATIVE PERCENT: 35 % (ref 24–43)
MAGNESIUM SERPL-MCNC: 2.3 MG/DL (ref 1.6–2.6)
MCH RBC QN AUTO: 29.7 PG (ref 25.2–33.5)
MCHC RBC AUTO-ENTMCNC: 32.9 G/DL (ref 28.4–34.8)
MCV RBC AUTO: 90 FL (ref 82.6–102.9)
MONOCYTES NFR BLD: 1.16 K/UL (ref 0.1–1.2)
MONOCYTES NFR BLD: 11 % (ref 3–12)
NEGATIVE BASE EXCESS, ART: 2 MMOL/L (ref 0–2)
NEUTROPHILS NFR BLD: 51 % (ref 36–65)
NEUTS SEG NFR BLD: 5.5 K/UL (ref 1.5–8.1)
NRBC BLD-RTO: 0 PER 100 WBC
PATIENT TEMP: 37
PLATELET # BLD AUTO: 247 K/UL (ref 138–453)
PMV BLD AUTO: 9.9 FL (ref 8.1–13.5)
POC HCO3: 21.3 MMOL/L (ref 21–28)
POC O2 SATURATION: 99.8 % (ref 94–98)
POC PCO2: 31.6 MM HG (ref 35–48)
POC PH: 7.44 (ref 7.35–7.45)
POC PO2: 201 MM HG (ref 83–108)
POTASSIUM SERPL-SCNC: 2.9 MMOL/L (ref 3.7–5.3)
POTASSIUM SERPL-SCNC: 3.3 MMOL/L (ref 3.7–5.3)
PROCALCITONIN SERPL-MCNC: 0.24 NG/ML
PROCALCITONIN SERPL-MCNC: 0.27 NG/ML
PROLACTIN SERPL-MCNC: 16.6 NG/ML (ref 4.04–15.2)
PROT SERPL-MCNC: 6.1 G/DL (ref 6.4–8.3)
RBC # BLD AUTO: 4.62 M/UL (ref 4.21–5.77)
SALICYLATES SERPL-MCNC: <1 MG/DL (ref 3–10)
SODIUM SERPL-SCNC: 141 MMOL/L (ref 135–144)
TOXIC TRICYCLIC SC,BLOOD: NEGATIVE
TROPONIN I SERPL HS-MCNC: 82 NG/L (ref 0–22)
TROPONIN I SERPL HS-MCNC: 85 NG/L (ref 0–22)
TSH SERPL DL<=0.05 MIU/L-ACNC: 0.52 UIU/ML (ref 0.3–5)
VIT B12 SERPL-MCNC: 161 PG/ML (ref 232–1245)
WBC OTHER # BLD: 10.9 K/UL (ref 3.5–11.3)

## 2023-09-21 PROCEDURE — 6360000002 HC RX W HCPCS: Performed by: INTERNAL MEDICINE

## 2023-09-21 PROCEDURE — 85025 COMPLETE CBC W/AUTO DIFF WBC: CPT

## 2023-09-21 PROCEDURE — 6370000000 HC RX 637 (ALT 250 FOR IP): Performed by: STUDENT IN AN ORGANIZED HEALTH CARE EDUCATION/TRAINING PROGRAM

## 2023-09-21 PROCEDURE — 99233 SBSQ HOSP IP/OBS HIGH 50: CPT | Performed by: STUDENT IN AN ORGANIZED HEALTH CARE EDUCATION/TRAINING PROGRAM

## 2023-09-21 PROCEDURE — 71045 X-RAY EXAM CHEST 1 VIEW: CPT

## 2023-09-21 PROCEDURE — 82607 VITAMIN B-12: CPT

## 2023-09-21 PROCEDURE — 82140 ASSAY OF AMMONIA: CPT

## 2023-09-21 PROCEDURE — 84132 ASSAY OF SERUM POTASSIUM: CPT

## 2023-09-21 PROCEDURE — 37799 UNLISTED PX VASCULAR SURGERY: CPT

## 2023-09-21 PROCEDURE — 6360000002 HC RX W HCPCS: Performed by: NURSE PRACTITIONER

## 2023-09-21 PROCEDURE — 2000000000 HC ICU R&B

## 2023-09-21 PROCEDURE — 99222 1ST HOSP IP/OBS MODERATE 55: CPT | Performed by: NURSE PRACTITIONER

## 2023-09-21 PROCEDURE — 6370000000 HC RX 637 (ALT 250 FOR IP): Performed by: INTERNAL MEDICINE

## 2023-09-21 PROCEDURE — 84484 ASSAY OF TROPONIN QUANT: CPT

## 2023-09-21 PROCEDURE — 6370000000 HC RX 637 (ALT 250 FOR IP): Performed by: NURSE PRACTITIONER

## 2023-09-21 PROCEDURE — 99221 1ST HOSP IP/OBS SF/LOW 40: CPT

## 2023-09-21 PROCEDURE — 84443 ASSAY THYROID STIM HORMONE: CPT

## 2023-09-21 PROCEDURE — 99223 1ST HOSP IP/OBS HIGH 75: CPT | Performed by: NURSE PRACTITIONER

## 2023-09-21 PROCEDURE — 2700000000 HC OXYGEN THERAPY PER DAY

## 2023-09-21 PROCEDURE — 94640 AIRWAY INHALATION TREATMENT: CPT

## 2023-09-21 PROCEDURE — 6360000002 HC RX W HCPCS: Performed by: STUDENT IN AN ORGANIZED HEALTH CARE EDUCATION/TRAINING PROGRAM

## 2023-09-21 PROCEDURE — A4216 STERILE WATER/SALINE, 10 ML: HCPCS | Performed by: STUDENT IN AN ORGANIZED HEALTH CARE EDUCATION/TRAINING PROGRAM

## 2023-09-21 PROCEDURE — 84145 PROCALCITONIN (PCT): CPT

## 2023-09-21 PROCEDURE — 87040 BLOOD CULTURE FOR BACTERIA: CPT

## 2023-09-21 PROCEDURE — 94761 N-INVAS EAR/PLS OXIMETRY MLT: CPT

## 2023-09-21 PROCEDURE — C9113 INJ PANTOPRAZOLE SODIUM, VIA: HCPCS | Performed by: STUDENT IN AN ORGANIZED HEALTH CARE EDUCATION/TRAINING PROGRAM

## 2023-09-21 PROCEDURE — 80053 COMPREHEN METABOLIC PANEL: CPT

## 2023-09-21 PROCEDURE — 99222 1ST HOSP IP/OBS MODERATE 55: CPT | Performed by: PSYCHIATRY & NEUROLOGY

## 2023-09-21 PROCEDURE — 82803 BLOOD GASES ANY COMBINATION: CPT

## 2023-09-21 PROCEDURE — 83605 ASSAY OF LACTIC ACID: CPT

## 2023-09-21 PROCEDURE — 82746 ASSAY OF FOLIC ACID SERUM: CPT

## 2023-09-21 PROCEDURE — 94003 VENT MGMT INPAT SUBQ DAY: CPT

## 2023-09-21 PROCEDURE — 36415 COLL VENOUS BLD VENIPUNCTURE: CPT

## 2023-09-21 PROCEDURE — 83735 ASSAY OF MAGNESIUM: CPT

## 2023-09-21 PROCEDURE — 2580000003 HC RX 258: Performed by: STUDENT IN AN ORGANIZED HEALTH CARE EDUCATION/TRAINING PROGRAM

## 2023-09-21 PROCEDURE — 2580000003 HC RX 258: Performed by: NURSE PRACTITIONER

## 2023-09-21 RX ORDER — THIAMINE MONONITRATE (VIT B1) 100 MG
100 TABLET ORAL DAILY
Status: ON HOLD | COMMUNITY
End: 2023-10-02 | Stop reason: SDUPTHER

## 2023-09-21 RX ORDER — NICOTINE 21 MG/24HR
1 PATCH, TRANSDERMAL 24 HOURS TRANSDERMAL DAILY
Status: DISCONTINUED | OUTPATIENT
Start: 2023-09-21 | End: 2023-09-25 | Stop reason: HOSPADM

## 2023-09-21 RX ORDER — POTASSIUM CHLORIDE 20 MEQ/1
40 TABLET, EXTENDED RELEASE ORAL PRN
Status: DISCONTINUED | OUTPATIENT
Start: 2023-09-21 | End: 2023-09-25 | Stop reason: HOSPADM

## 2023-09-21 RX ORDER — MORPHINE SULFATE 2 MG/ML
2 INJECTION, SOLUTION INTRAMUSCULAR; INTRAVENOUS EVERY 4 HOURS PRN
Status: DISCONTINUED | OUTPATIENT
Start: 2023-09-21 | End: 2023-09-25 | Stop reason: HOSPADM

## 2023-09-21 RX ORDER — ENOXAPARIN SODIUM 100 MG/ML
40 INJECTION SUBCUTANEOUS DAILY
Status: DISCONTINUED | OUTPATIENT
Start: 2023-09-21 | End: 2023-09-22

## 2023-09-21 RX ORDER — POTASSIUM CHLORIDE 7.45 MG/ML
10 INJECTION INTRAVENOUS PRN
Status: DISCONTINUED | OUTPATIENT
Start: 2023-09-21 | End: 2023-09-25 | Stop reason: HOSPADM

## 2023-09-21 RX ORDER — POTASSIUM CHLORIDE 20 MEQ/1
40 TABLET, EXTENDED RELEASE ORAL ONCE
Status: COMPLETED | OUTPATIENT
Start: 2023-09-21 | End: 2023-09-21

## 2023-09-21 RX ORDER — IPRATROPIUM BROMIDE AND ALBUTEROL SULFATE 2.5; .5 MG/3ML; MG/3ML
1 SOLUTION RESPIRATORY (INHALATION) EVERY 4 HOURS PRN
Status: DISCONTINUED | OUTPATIENT
Start: 2023-09-21 | End: 2023-09-25 | Stop reason: HOSPADM

## 2023-09-21 RX ORDER — OXYCODONE HYDROCHLORIDE 5 MG/1
10 TABLET ORAL EVERY 4 HOURS PRN
Status: DISCONTINUED | OUTPATIENT
Start: 2023-09-21 | End: 2023-09-25 | Stop reason: HOSPADM

## 2023-09-21 RX ORDER — SPIRONOLACTONE 25 MG/1
25 TABLET ORAL DAILY
Status: ON HOLD | COMMUNITY
End: 2023-10-02 | Stop reason: SDUPTHER

## 2023-09-21 RX ORDER — 0.9 % SODIUM CHLORIDE 0.9 %
1000 INTRAVENOUS SOLUTION INTRAVENOUS ONCE
Status: COMPLETED | OUTPATIENT
Start: 2023-09-21 | End: 2023-09-21

## 2023-09-21 RX ORDER — OXYCODONE HYDROCHLORIDE 5 MG/1
5 TABLET ORAL EVERY 4 HOURS PRN
Status: DISCONTINUED | OUTPATIENT
Start: 2023-09-21 | End: 2023-09-25 | Stop reason: HOSPADM

## 2023-09-21 RX ORDER — METOPROLOL SUCCINATE 100 MG/1
100 TABLET, EXTENDED RELEASE ORAL DAILY
Status: ON HOLD | COMMUNITY
End: 2023-10-02 | Stop reason: HOSPADM

## 2023-09-21 RX ORDER — BUDESONIDE AND FORMOTEROL FUMARATE DIHYDRATE 160; 4.5 UG/1; UG/1
2 AEROSOL RESPIRATORY (INHALATION)
Status: DISCONTINUED | OUTPATIENT
Start: 2023-09-21 | End: 2023-09-25 | Stop reason: HOSPADM

## 2023-09-21 RX ORDER — QUETIAPINE FUMARATE 25 MG/1
50 TABLET, FILM COATED ORAL NIGHTLY
Status: DISCONTINUED | OUTPATIENT
Start: 2023-09-21 | End: 2023-09-25 | Stop reason: HOSPADM

## 2023-09-21 RX ORDER — FOLIC ACID 1 MG/1
1 TABLET ORAL DAILY
Status: ON HOLD | COMMUNITY
End: 2023-10-02 | Stop reason: SDUPTHER

## 2023-09-21 RX ADMIN — SODIUM CHLORIDE, PRESERVATIVE FREE 10 ML: 5 INJECTION INTRAVENOUS at 01:44

## 2023-09-21 RX ADMIN — BUSPIRONE HYDROCHLORIDE 7.5 MG: 5 TABLET ORAL at 00:41

## 2023-09-21 RX ADMIN — SODIUM CHLORIDE 40 MG: 9 INJECTION INTRAMUSCULAR; INTRAVENOUS; SUBCUTANEOUS at 10:23

## 2023-09-21 RX ADMIN — CEFEPIME 2000 MG: 2 INJECTION, POWDER, FOR SOLUTION INTRAVENOUS at 05:54

## 2023-09-21 RX ADMIN — OXYCODONE HYDROCHLORIDE 10 MG: 5 TABLET ORAL at 12:09

## 2023-09-21 RX ADMIN — ENOXAPARIN SODIUM 40 MG: 100 INJECTION SUBCUTANEOUS at 10:23

## 2023-09-21 RX ADMIN — POTASSIUM BICARBONATE 40 MEQ: 782 TABLET, EFFERVESCENT ORAL at 16:44

## 2023-09-21 RX ADMIN — POTASSIUM CHLORIDE 10 MEQ: 10 INJECTION, SOLUTION INTRAVENOUS at 08:54

## 2023-09-21 RX ADMIN — PROPOFOL 40 MCG/KG/MIN: 10 INJECTION, EMULSION INTRAVENOUS at 02:32

## 2023-09-21 RX ADMIN — ESCITALOPRAM OXALATE 5 MG: 10 TABLET ORAL at 09:13

## 2023-09-21 RX ADMIN — POTASSIUM CHLORIDE 40 MEQ: 1500 TABLET, EXTENDED RELEASE ORAL at 01:28

## 2023-09-21 RX ADMIN — SODIUM CHLORIDE 1000 ML: 9 INJECTION, SOLUTION INTRAVENOUS at 03:45

## 2023-09-21 RX ADMIN — SODIUM CHLORIDE, PRESERVATIVE FREE 10 ML: 5 INJECTION INTRAVENOUS at 20:39

## 2023-09-21 RX ADMIN — PROPOFOL 25 MCG/KG/MIN: 10 INJECTION, EMULSION INTRAVENOUS at 07:22

## 2023-09-21 RX ADMIN — MORPHINE SULFATE 2 MG: 2 INJECTION, SOLUTION INTRAMUSCULAR; INTRAVENOUS at 15:44

## 2023-09-21 RX ADMIN — POTASSIUM CHLORIDE 10 MEQ: 10 INJECTION, SOLUTION INTRAVENOUS at 10:58

## 2023-09-21 RX ADMIN — LACTULOSE 20 G: 20 SOLUTION ORAL at 00:41

## 2023-09-21 RX ADMIN — MORPHINE SULFATE 2 MG: 2 INJECTION, SOLUTION INTRAMUSCULAR; INTRAVENOUS at 11:40

## 2023-09-21 RX ADMIN — POTASSIUM CHLORIDE 10 MEQ: 10 INJECTION, SOLUTION INTRAVENOUS at 08:03

## 2023-09-21 RX ADMIN — METRONIDAZOLE 500 MG: 500 INJECTION, SOLUTION INTRAVENOUS at 08:53

## 2023-09-21 RX ADMIN — POTASSIUM CHLORIDE 10 MEQ: 10 INJECTION, SOLUTION INTRAVENOUS at 09:56

## 2023-09-21 RX ADMIN — BUDESONIDE AND FORMOTEROL FUMARATE DIHYDRATE 2 PUFF: 160; 4.5 AEROSOL RESPIRATORY (INHALATION) at 20:08

## 2023-09-21 RX ADMIN — METRONIDAZOLE 500 MG: 500 INJECTION, SOLUTION INTRAVENOUS at 00:41

## 2023-09-21 RX ADMIN — SODIUM CHLORIDE: 9 INJECTION, SOLUTION INTRAVENOUS at 00:39

## 2023-09-21 ASSESSMENT — PAIN SCALES - GENERAL
PAINLEVEL_OUTOF10: 10
PAINLEVEL_OUTOF10: 10
PAINLEVEL_OUTOF10: 0
PAINLEVEL_OUTOF10: 10

## 2023-09-21 ASSESSMENT — PULMONARY FUNCTION TESTS
PIF_VALUE: 14
PIF_VALUE: 21
PIF_VALUE: 23
PIF_VALUE: 22

## 2023-09-21 ASSESSMENT — PAIN DESCRIPTION - LOCATION
LOCATION: GENERALIZED
LOCATION: GENERALIZED

## 2023-09-21 ASSESSMENT — PAIN SCALES - WONG BAKER
WONGBAKER_NUMERICALRESPONSE: 0

## 2023-09-21 NOTE — CONSULTS
Palliative Care Inpatient Consult    NAME:  Varun LIZ RECORD NUMBER:  [id number]  AGE: 58 y.o. GENDER: male  : 1960  TODAY'S DATE:  2023    Reasons for Consultation:    Symptom and/or pain management  Provision of information regarding PC and/or hospice philosophies  Complex, time-intensive communication and interdisciplinary psychosocial support  Clarification of goals of care and/or assistance with difficult decision-making  Guidance in regards to resources and transition(s)    Members of PC team contributing to this consultation are : Roque Buck, Palliative Care APRN-CNP    Plan      Palliative Interaction: I went to see patient and he was lying in bed with eyes closed. He awakened to verbal stimuli and is oriented x2. He did report month as August and/or September but was unable to state year or reason for admission. He does have a 1:1 sitter and psych consult today. Patients daughter Toi Hernandez at bedside and asked to meet with writer. We went to conference room and placed son Stevenson on phone. Both report that patient does not have HCPOA. We discussed [place] Next of [address] [place] [address] law and patient is  and has 3 bio children. Majority of children atleast 2 of 3 are decision makers. I discussed patients history and current hospitalized problems in detail. Children tell me that they believe patient stopped drinking 6 months ago and started seeing psychologist at [facility] x3 months. Son reports that he Stopped going a week or two ago. Children are unsure if patient was taking his meds but believe he could have been non compliant. Family feels something neurological or psychological is going on, because patient was doing so well and finally starting to seek treatment. I discussed palliative care vs hospice in detail. Patient had palliative care vs hospice care order from PCP. [facility] reports that they have referral for hospice, but has not seen patient.  [facility] has order for

## 2023-09-21 NOTE — PROGRESS NOTES
Transitions of Care Pharmacy Service   Medication Review    The patient's list of current home medications has been reviewed. Source(s) of information: [employer], discharge medication list and sure scripts     Based on information provided by the above source(s), I have updated the patient's home med list as described below. I changed or updated the following medications on the patient's home medication list:  Discontinued metoprolol succinate (TOPROL XL) 50 MG extended release tablet  levalbuterol (XOPENEX HFA) 45 MCG/ACT inhaler  diltiazem (CARDIZEM) 120 MG tablet  furosemide (LASIX) 40 MG tablet  levothyroxine (SYNTHROID) 50 MCG tablet  DULoxetine (CYMBALTA) 60 MG extended release capsule     Added vitamin B-1 (THIAMINE) 100 MG tablet  spironolactone (ALDACTONE) 25 MG tablet  folic acid (FOLVITE) 1 MG tablet  metoprolol succinate (TOPROL XL) 100 MG extended release tablet     Adjusted   ENTRESTO 24-26 MG per tablet     Other Notes Patient is non-compliant with medication. He is overdue on refills on all of his medication. Patient has not filled or picked up ENTRESTO 24-26 MG per tablet since 11/25/2022. Patient metoprolol succinate (TOPROL XL) 50 MG extended release tablet was changed to  metoprolol succinate (TOPROL XL) 100 MG extended release tablet at discharge on 6/3/2023           Please feel free to call me with any questions about this encounter. Thank you. This note will be reviewed and co-signed by the [facility] Emergency Department Pharmacist. The pharmacist will review inpatient orders and contact the physician about any discrepancies.     Regino Murphy, pharmacy technician  Transitions [facility] Pharmacy Service  Phone:  [phone]  Fax: [phone]      Electronically signed by Regino Murphy on 9/21/2023 at 3:25 PM       Prior to Admission medications    Medication Sig   metoprolol succinate (TOPROL XL) 100 MG extended release tablet Take 1 tablet by mouth daily   folic acid (FOLVITE) 1

## 2023-09-21 NOTE — ED NOTES
Pt's son states he told his family he is under palliative care but will not disclose the reason, son states he has mentioned oral cancer and that he lost 30lbs in 1 month.       Crista Joiner RN  09/20/23 2030

## 2023-09-21 NOTE — PROGRESS NOTES
Patient was extubated; patient also received anointing from [place]. Aleah Mckenzie. Writer visited with daughter who current live in [place]; (arrived in town last night). Daughter and writer engaged in conversation, and daughter shares feelings and concern about moving back home to be with family (daughter shares family dynamics of drug and alcohol abuse). Writer provided words of encouragement and comfort. Daughter was tearful but very receptive and thankful for the visit and conversation. Writer stated he will pray for continued healing, rest, and comfort for the patient, as well as inner strength and peace for her and the family. Spiritual care will maintain daily follow up and support as needed or requested. 09/21/23 1254   Encounter Summary   Encounter Overview/Reason   Encounter   Service Provided For: Patient and family together   Referral/Consult From: Palliative Care;Clergy/;Rounding   Support System Children   Last Encounter  09/21/23  (9/21/2023 Anointed Elizabeth Varghese)   Complexity of Encounter Moderate   Begin Time 1145   End Time  1205   Total Time Calculated 20 min   Spiritual/Emotional needs   Type Spiritual Support   Rituals, Rites and Sacraments   Type Anointing   Grief, Loss, and Adjustments   Type Life Adjustments   Palliative Care   Type Palliative Care, Initial/Spiritual Assessment   Assessment/Intervention/Outcome   Assessment Decisional conflict; Tearful   Intervention Active listening;Confronted/Challenged; Discussed meaning/purpose;Discussed relationship with God;Nurtured Hope   Outcome Engaged in conversation;Encouraged;Expressed Gratitude   Plan and Referrals   Plan/Referrals Continue to visit, (comment); Continue Support (comment)

## 2023-09-21 NOTE — ED NOTES
Report received from [place], [place]. Sons at bedside. On monitor. On ventilator. Dr Candelaria Ingram at bedside. Nolberto Chapa, RT, at bedside. Awaiting bed assignment in ICU. HOB flat as BP low. Versed drip started. Propofol drip titrating down for low BP. IVF bolus cont wide open. RN remains at bedside.      Dmitry Prajapati RN  09/20/23 2122

## 2023-09-21 NOTE — PLAN OF CARE
Problem: Discharge Planning  Goal: Discharge to home or other facility with appropriate resources  Outcome: Not Progressing  Flowsheets (Taken 9/21/2023 0400)  Discharge to home or other facility with appropriate resources: Identify barriers to discharge with patient and caregiver     Problem: Pain  Goal: Verbalizes/displays adequate comfort level or baseline comfort level  Outcome: Progressing     Problem: Safety - Medical Restraint  Goal: Remains free of injury from restraints (Restraint for Interference with Medical Device)  Description: INTERVENTIONS:  1. Determine that other, less restrictive measures have been tried or would not be effective before applying the restraint  2. Evaluate the patient's condition at the time of restraint application  3. Inform patient/family regarding the reason for restraint  4.  Q2H: Monitor safety, psychosocial status, comfort, nutrition and hydration  Outcome: Progressing     Problem: Safety - Adult  Goal: Free from fall injury  Outcome: Progressing     Problem: Respiratory - Adult  Goal: Achieves optimal ventilation and oxygenation  9/21/2023 0416 by Astrid Sun RN  Outcome: Progressing     Problem: Discharge Planning  Goal: Discharge to home or other facility with appropriate resources  Outcome: Not Progressing  Flowsheets (Taken 9/21/2023 0400)  Discharge to home or other facility with appropriate resources: Identify barriers to discharge with patient and caregiver

## 2023-09-21 NOTE — PLAN OF CARE
Problem: Respiratory - Adult  Goal: Achieves optimal ventilation and oxygenation  Outcome: Progressing     Problem: Respiratory - Adult  Goal: Patient's breath sounds will be clear and equal  Outcome: Progressing     Problem: Respiratory - Adult  Goal: Adequate oxygenation  Outcome: Progressing
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
## 2023-09-21 NOTE — ED NOTES
Pt at max for propofol, pt eyes open and trying to talk, Dr. Dillon Arvizu made aware     Wiley Ross RN  09/20/23 2035

## 2023-09-21 NOTE — ED NOTES
Pt currently on Propofol at 40mg, pt turning head and attempting to lift arms.  Bilateral SWR in place     Janice Millard  09/20/23 2017

## 2023-09-21 NOTE — PROGRESS NOTES
Received report from [facility] ed. Pt came over intubated, was very restless, agitated, titrated sedation as appropriate. Got patient settled in icu bed. Family in waiting room. Spoke to family, they are agreeable with plan and care. Son Dinh Horan is next of kin and primary contact. Family left with no further questions, will come back tomorrow.

## 2023-09-21 NOTE — ED NOTES
Report given to Irma Biswas, ICU RN. Son, Debra Tripp at nurses station. Next of kin-phone number added to chart.  [place]  09/20/23 1078

## 2023-09-21 NOTE — ED NOTES
Pt arrived unresponsive, requiring ventilation via ambu, intubated per Dr. Mariela Kim without difficulty. Monitor shows sinus tach.  Defib pads placed     Victor Manuel Dorman RN  09/20/23 2016       Victor Manuel Dorman RN  09/20/23 2032

## 2023-09-21 NOTE — PLAN OF CARE
Daily Note     Today's date: 2019  Patient name: Amaury Lindo  : 1965  MRN: [id number]  Referring provider: Quinten Lopez MD  Dx:   Encounter Diagnosis     ICD-10-CM    1  Subacromial impingement of left shoulder M75 42                   Subjective: Patient reports her shoulder has been feeling good  Objective: See treatment diary below  Precautions: none     Daily Treatment Diary      Manual  12/31  1/3  1/8 1/10   1/15  1/17  1/21  1/24  1/29     STM to proximal biceps tendon and post cap 5'                     L shoulder distraction and post mob 5'                      L PROM   HS            SAHIL        medial n  glides    HS  HS  HS  HS  HS SAHIL  HS      ulnar nerve glides     HS  HS  HS  HS  HS  SAHIL  HS           Exercise Diary  12/31  1/3  1/8  1/10  1/15  1/17  1/21  1/24 1/29                             UBE nv  3'/3'  3'/3'  3'/3'  4'/4'  4'/4'  4'/4' 10'  4'/4'     pec stretch 3x30"            3x30"    3x30"     Chin tucks nv    5" 2x10  5" 2x10  5" 3x10  5" 2x10  5"   2x10  5" 2x10  5" 2x10     UT stretch 3x30"  30"x3                   Levator scap stretch nv  30"x3                   Rows and extensions nv  OTB 2x10 ea  OTB 2x10 ea  OTB 3x10 ea  OTB  3x10 ea  GTB  3x10 ea GTB  3x10  ea  GTB 3x10 ea  GTB 3x10 ea     sidelying ER nv  5"  2x10            2# 2x10       Band IR/ER nv  OTB 2x10                    T's, Y's, I's     2x10 ea 3x10  3x10  3x10  3x10  ea          B/L ER w/ TB     OTB 5" 2x10  OTB  5" 2x10  OTB  5" 2x10  OTB 5"  2x10  OTB  5"  2x10  OTB 5" 2x10  OTB 5" 3x10      medial n  glides     x10  x10  x10  x10  x10  x10  x10      scap punches       3" 3x10  2# 3"  3x10  2# 3" 3x10  3# 3"  3x10  3# 3x10  3# 3"  3x10                                                                                                           Modalities                       HP/CP PRN np                                                                       Assessment: Tolerated treatment well  Problem: Respiratory - Adult  Goal: Achieves optimal ventilation and oxygenation  Outcome: Progressing     Problem: Respiratory - Adult  Goal: Will be able to breathe spontaneously, without ventilator support  Description: Will be able to breathe spontaneously, without ventilator support  Outcome: Progressing     Problem: Respiratory - Adult  Goal: Patient's breath sounds will be clear and equal  Outcome: Progressing     Problem: Respiratory - Adult  Goal: Adequate oxygenation  Outcome: Progressing Patient exhibited good technique with therapeutic exercises      Plan: Continue per plan of care

## 2023-09-21 NOTE — H&P
[facility]  Office: [phone]  Jessie Simeon DO, Evan Jameson DO, Orlando March, DO, Blair Falcon MD, Shivani Flores MD, Bonnie Man MD, Joi Alcala MD,  Mari Martinez MD, Elise Colvin MD, Nehal Portillo DO, Tierra Millan MD,  Soy Sims MD, Emilia Reece MD, Shruti Sheppard DO, Evin Conley MD,  Bladimir Garvin MD, Tremaine Green MD, Caprice Lewis MD, Reinier Carmichael MD,  Roddy Gupta MD, Vee Martinez MD, Bubba Schwab, MD, Hernesto Abdi MD, Boaz Fernandes DO, Wilber Gastelum MD,  Rosalina Poole MD, Flory Baltazar MD, Torres Noriega, CNP,  Megan Amato, CNP,, Antonino Fischer, [facility],  Pretty Pan DNP, Felipe Tirado, [facility], Demar Raymond CNP, Chayo Zee, CNP, Flip Kohler, CNP, Lisa Hook, CNP, Judy Shanks, CNP, Jigna Vázquez, [facility], Lorna Barnes, CNP, Aditya Stoll, [address] [place] [address]    HISTORY AND PHYSICAL EXAMINATION            Date:   9/21/2023  Patient name:  Caprice Lewis  Date of admission:  9/20/2023  6:31 PM  MRN:   [id number]  Account:  [de-identified]  YOB: 1960  PCP:    RIVER Higgins CNP  Room:   6659/0487-42  Code Status:    Prior    Chief Complaint:     Chief Complaint   Patient presents with    Unresponsive       History Obtained From:     family member - brother, reason patient could not give history:  on ventilator    History of Present Illness:     Caprice Lewis is a 58 y.o. Unavailable / unknown male who presents with Unresponsive   and is admitted to the hospital for the management of Sepsis ([address] [facility]). Patient's brother and former wife at bedside. Patient presented to the eD unresponsive and required intubation.  Patient reportedly has been feeling poorly for the past few days and was being brought to the hospital by his son for evaluation when the  patient had seizure like activity in the RARE (A) None   Troponin    Collection Time: 09/20/23  8:36 PM   Result Value Ref Range    Troponin, High Sensitivity 44 (H) 0 - 22 ng/L   Lactate, Sepsis    Collection Time: 09/20/23  8:36 PM   Result Value Ref Range    Lactic Acid, Sepsis 4.7 (H) 0.5 - 1.9 mmol/L   Arterial Blood Gas, POC    Collection Time: 09/20/23  9:12 PM   Result Value Ref Range    POC pH 7.429 7.350 - 7.450    POC pCO2 33.7 (L) 35.0 - 48.0 mm Hg    POC PO2 395.2 (H) 83.0 - 108.0 mm Hg    POC HCO3 22.4 21.0 - 28.0 mmol/L    Negative Base Excess, Art 1.3 0.0 - 2.0 mmol/L    POC O2 .0 (H) 94.0 - 98.0 %    O2 Delivery Device Adult Ventilator     Mode AC     FIO2 100.0    Prolactin    Collection Time: 09/20/23 10:00 PM   Result Value Ref Range    Prolactin 16.60 (H) 4.04 - 15.2 ng/mL   Lactate, Sepsis    Collection Time: 09/21/23  1:06 AM   Result Value Ref Range    Lactic Acid, Sepsis 3.0 (H) 0.5 - 1.9 mmol/L   Ammonia    Collection Time: 09/21/23  1:20 AM   Result Value Ref Range    Ammonia 28 16 - 60 umol/L   Arterial Blood Gas, POC    Collection Time: 09/21/23  3:59 AM   Result Value Ref Range    POC pH 7.437 7.350 - 7.450    POC pCO2 31.6 (L) 35.0 - 48.0 mm Hg    POC PO2 201.0 (H) 83.0 - 108.0 mm Hg    POC HCO3 21.3 21.0 - 28.0 mmol/L    Negative Base Excess, Art 2.0 0.0 - 2.0 mmol/L    POC O2 SAT 99.8 (H) 94.0 - 98.0 %    FIO2 40.0     Pt Temp 37.0        Imaging/Diagnostics:  CT CHEST PULMONARY EMBOLISM W CONTRAST    Result Date: 9/20/2023  1. No evidence of pulmonary embolism or acute pulmonary abnormality. 2.  Findings suggestive of diarrheal process, otherwise no acute inflammatory process identified in the abdomen or pelvis. 3.  Cholelithiasis. 4.  Diverticulosis. CT ABDOMEN PELVIS W IV CONTRAST Additional Contrast? None    Result Date: 9/20/2023  1. No evidence of pulmonary embolism or acute pulmonary abnormality.  2.  Findings suggestive of diarrheal process, otherwise no acute inflammatory process identified in the

## 2023-09-21 NOTE — ED NOTES
To room #1108 per monitored stretcher. Remains on ventilator. Mallory Max, RT, and Sofía, RT with pt and RN. Pt opened eyes and has spontaneous purposeful movement with oral and OET suctioning. Bloody frothy oral secretions obtained. Bloody OET secretions obtained. Positive cough with strong gag reflex. Assisted to bed. Bedside updates given to [place] Hemp, ICU RN. Ex-wife and sonAddy Harshil to waiting room. Family wants to take home bags of belongings. Encouraged to allow ICU staff to [address] belongings going home with family and staying at bedside. SonBrad, requests that black glasses, cell phone,and cell phone  remain at bedside.  List of five home medications found in clothing bag, and given to ICU RN.      Wilian Montoya RN  09/20/23 0511

## 2023-09-21 NOTE — ED NOTES
Ex-wife, Shirley Reynoso, and brother, Bandar Michael speak with Cem Jolly CNP. Discussing hospital admission. Next of kin=son, Cristian Henson. Pt currently involved with palliative care, yet remains full code.       Mari Mosqueda, RN  09/20/23 1073

## 2023-09-21 NOTE — PROGRESS NOTES
[x] Tre Montes    [] [facility]    []  [address]       SUICIDE PRECAUTION ADMISSION    Orders    [x]  Suicide/Security Watch Precautions initiated as checked below:   9/21/23 11:15 AM EDT 1108/1108-01    [x] Notified physician:  Binu Zayas DO  9/21/23 12:58 PM EDT    [x] Orders obtained as appropriate:     [x] 1:1 Observer     [x] Psych Consult     [x] Psych Consult    Name: Dr. Jc Brought Date:9/21/23 Time: 6165    [x] 1:1 Observer, Notified by:  Christina Argueta, RN    Contact Nurse Supervisor    [x] Remove all personal clothes from room and place in snap/paper gown/pants. Slipper only    [x] Remove all personal belongings from room and secured away from patient. Documentation    [x] Initiate/continue paper constant observer flow sheet(every 15 minute documentation by constant observer)    [x] Start Suicide Precautions Flowsheet    [x] C-SSRS Screening and C-SSRS Risk Assessment Completed    [x] C-SSRS Frequent Screening Completed with transfer of care    [x] 1:1 Observer in place; instructions provided. Suicide precautions require observer approximately 4-7 feet from the patient, positioned between door and patient.     [x] Initiate RN assessment and documentation    [x] Initiate care plan Self harm/Suicidality    Environmental Scan  Search Criteria and Process: OPTIONAL, see Search Policy    [] Reason for search:    [] Nursing in presence of Security to search patient    [] Patient notified of reason for body assessment and belongings search:     Persons present during search:   Results of search and disposition:       Searchers Name: none     These items or items similar should be removed from the room:   [x] Chairs   [x] Telephone   [x] Trash cans and liners   [x] Disinfectant wipes and hand    [x] O2 flowmeters/Suction regulators not in use   [x] All personal clothing/belongings removed   [x] All unnecessary lead wires, electrical cords, draw cords, etc. (Please

## 2023-09-21 NOTE — PROGRESS NOTES
[facility]  Office: [phone]  Phuong Salinas DO, Antoine Alcaraz DO, Camryn March, DO, Celeste Nunez MD, Washington Gutierrez MD, Meagan Mccartney MD, Sandro Ramirez MD,  Harshil Arriola MD, Flex Díaz MD, Beatriz Durand, DO, Tuan Pillai MD,  Anibal Stewart MD, Brittny Ott MD, Kody Aviles DO, Carlos Alberto Knapp MD,  Dinesh Stanley MD, David Faith MD, Eliseo East MD, Jen Mulligan MD,  Jarek Hernandez MD, Rubina Reinoso MD, Denise Gil MD, Keenan Edmondson MD, Nikki Begum, DO, Pete Rockwell MD,  Binu Christianson MD, Kathleen Patrick MD, Jose Angel Tran, CNP,  Edilson Carbajal, CNP,, Chica [place], CNP,  Breonna Meier, [place], Brennon Crowell, [facility], Charo Earl, CNP, Delgado Galindo, CNP, Janusz Gamez, CNP, Kerrie Soni, CNP, Rayshawn Kerr, CNP, Sandra Hall, CNS, Fei Ariza, CNP, Emanuel Romero, [facility]    Progress Note    9/21/2023    9:00 AM    Name:   Eliseo East  MRN:     [id number]     Acct:      [de-identified]   Room:   [address] [place] [id number] Day:  1  Admit Date:  9/20/2023  6:31 PM    PCP:   RIVER Corley CNP  Code Status:  Prior    Subjective:     C/C:   Chief Complaint   Patient presents with    Unresponsive     Interval History Status: not changed. Vitals reviewed, afebrile and hemodynamically stable. Saturating well on ventilator. Labs reviewed, hypokalemia 2.9, lactic acidosis 3.0 but improving, elevated prolactin 16.6, leukocytosis 17.1 > 10.9. Urinalysis not consistent with UTI but urine drug tox positive for cannabinoids and oxycodone. Chest x-ray demonstrated no acute cardiopulmonary disease. CT head negative for intracranial abnormality but did demonstrate old left cerebral infarction.   CT PE and CT abdomen pelvis demonstrated no acute pulmonary emboli or acute pulmonary abnormality but did suggest diarrheal process with

## 2023-09-21 NOTE — CONSULTS
Pulmonary Medicine and [address] Teddy Murrieta MD      Patient - Kannan Peters   MRN -  [id number]   Acct # - [de-identified]   - 1960      Date of Admission -  2023  6:31 PM  Date of evaluation -  2023  Room - [address] [place] [address]   [facility],  Primary Care Physician - RIVER Daniel - CNP     Reason for Consult    Change in mental status    Assessment   Acute hypoxic respiratory failure  Propofol drip  Versed drip  Decrease sedation and do a CPAP trial with intention of extubation  If not extubated will start patient on tube feeds afternoon    Change in mental status,? OD on oxycodone, positive for marijuana  Hypotension/sepsis? IV cefepime  IV Flagyl  Obtain procalcitonin level  Titrate Levophed for MAP 65 to 75 mmHg    A-fib  CAD/cardiomyopathy/CHF, s/p AICD  COPD  Symbicort  DuoNebs    History of EtOH abuse  Watch for DTs  Hypokalemia  Replace potassium  Monitor levels  Elevated troponins  Repeat level  Elevated ammonia level  Repeat level  DVT prophylaxis with low molecular weight heparin  Will follow with you    KALPANA     Kannan Peters is 58 y.o.,  male, admitted because of change in mental status. Patient apparently has seen palliative care nurse couple of days before coming to emergency room for hospice/comfort care. Patient was brought in by his brother. Patient became unresponsive on the way to the emergency room. He was intubated for airway protection in the emergency room. Now patient sedated, intubated on the ventilator. Patient tox screen/alcohol levels were negative except for positive for opiates and marijuana.     PMHx   Past Medical History      Diagnosis Date    Adjustment disorder     AF (atrial fibrillation) ([facility])     AICD (automatic cardioverter/defibrillator) present 2015    PM    Alcohol dependence ([address])     CAD (coronary artery disease)     Dr. Ivan Levy cardiologist    Cardiomyopathy [facility])     CHF no masses or organomegaly  Neurologic - CN II-XII are grossly intact. There are no focal motor or sensory deficits, unable to do detailed exam secondary to sedation. Skin - No bruising or bleeding  Extremities - No cyanosis, clubbing or edema    Labs  - Old records and notes have been reviewed in [facility]   CBC:   Recent Labs     09/20/23 1845 09/21/23  0830   WBC 17.1* 10.9   HGB 17.1* 13.7   HCT 57.1* 41.6    247     BMP:   Recent Labs     09/20/23 1845 09/21/23  0608    141   K 3.1* 2.9*   CO2 8* 21   BUN 6* 6*   CREATININE 1.0 0.8   LABGLOM >60 >60   GLUCOSE 180* 114*   LIVER PROFILE:  Recent Labs     09/20/23 1845 09/21/23  0608   AST 22 18   ALT 11 7   LABALBU 4.9 3.5     ABG:  Lab Results   Component Value Date/Time    FIO2 40.0 09/21/2023 03:59 AM       Lab Results   Component Value Date/Time    POCPH 7.437 09/21/2023 03:59 AM    POCPCO2 31.6 09/21/2023 03:59 AM    POCPO2 201.0 09/21/2023 03:59 AM    POCHCO3 21.3 09/21/2023 03:59 AM    ENTJ0LCU 99.8 09/21/2023 03:59 AM    FIO2 40.0 09/21/2023 03:59 AM       Radiology    CXR  X-ray chest: 9/21/2023  Endotracheal tube is high    CT chest: No PE or definite infiltrate. (See actual reports for details)    \"Thank you for asking us to see this patient\"    Case discussed with nurse and ICU pharmacist.  Questions and concerns addressed.     Electronically signed by     Kari Good MD on 9/21/2023 at 9:51 AM  Pulmonary Critical Care and Sleep Medicine,  [facility]: [phone]

## 2023-09-21 NOTE — ACP (ADVANCE CARE PLANNING)
Advance Care Planning     Advance Care Planning (ACP) Physician/NP/PA Conversation    Date of Conversation: 9/20/2023  Conducted with:  Healthcare Decision Maker: Next of Kin by law (only applies in absence of above) (name) 2 of 3 atleast of bio children    Healthcare Decision Maker:      Primary Decision Maker: [address] - Next of [address] [facility] - [phone]    Primary Decision Maker: Jennifer Grimes - [phone]    Primary Decision Maker: Ivon Tejeda - Child - [phone]    Click here to [place] including selection of the Healthcare Decision Maker Relationship (ie \"Primary\")  Today we documented Decision Maker(s) consistent with Legal Next of Kin hierarchy. Care Preferences:    Hospitalization: \"If your health worsens and it becomes clear that your chance of recovery is unlikely, what would be your preference regarding hospitalization? \"  The patient would prefer hospitalization. Ventilation: \"If you were unable to breath on your own and your chance of recovery was unlikely, what would be your preference about the use of a ventilator (breathing machine) if it was available to you? \"  The patient is unsure. Resuscitation: \"In the event your heart stopped as a result of an underlying serious health condition, would you want attempts made to restart your heart, or would you prefer a natural death? \"  No, do NOT attempt to resuscitate.     treatment goals, benefit/burden of treatment options, ventilation preferences, hospitalization preferences, and resuscitation preferences    Conversation Outcomes / Follow-Up Plan:  ACP in process - information provided, considering goals and options  Reviewed DNR/DNI and patient elects DNR order - completed portable DNR form & placed order    Length of Voluntary ACP Conversation in minutes:  <16 minutes (Non-Billable)    Darwin Vallejo, APRN - CNP

## 2023-09-21 NOTE — CARE COORDINATION
Case Management Assessment  Initial Evaluation    Date/Time of Evaluation: 9/21/2023 4:10 PM  Assessment Completed by: Karin Marti RN    If patient is discharged prior to next notation, then this note serves as note for discharge by case management. Patient Name: Gene Bateman                   YOB: 1960  Diagnosis: Sepsis [facility]) [A41.9]                   Date / Time: 9/20/2023  6:31 PM    Patient Admission Status: Inpatient   Readmission Risk (Low < 19, Mod (19-27), High > 27): Readmission Risk Score: 13.2    Current PCP: RIVER Corbett CNP  PCP verified by CM? Yes    Chart Reviewed: Yes      History Provided by: Patient  Patient Orientation: Alert and Oriented, Person    Patient Cognition: Other (see comment) (delerium)    Hospitalization in the last 30 days (Readmission):  No    If yes, Readmission Assessment in CM Navigator will be completed. Advance Directives:      Code Status: DNR-CCA   Patient's Primary Decision Maker is: Legal Next of Kin    Primary Decision Maker: David Brooks - Next of [address] [facility] - [phone]    Primary Decision Maker: Ana Cristina [facility] [phone]    Primary Decision Maker: Ngoc Durbin - Child  [phone]    Discharge Planning:    Patient lives with: Family Members Type of Home: House  Primary Care Giver: Self  Patient Support Systems include: Children, Family Members   Current Financial resources: None  Current community resources: None  Current services prior to admission: None            Current DME:              Type of Home Care services:  OT, PT, Skilled Therapy    ADLS  Prior functional level: Independent in ADLs/IADLs  Current functional level: Assistance with the following:, Cooking, Housework, Shopping, Mobility, Bathing    PT AM-PAC:   /24  OT AM-PAC:   /24    Family can provide assistance at DC: No  Would you like Case Management to discuss the discharge plan with any other family members/significant others, and if so, who?  Yes (son [FreeTextEntry1] : no current complaints. [de-identified] : past hx atrial fib/flutter underwent ablation at [facility] wearing loop recorder returns for routine medical follow-up.receives regular cardiac evaluations and seen within last month.
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
## 2023-09-21 NOTE — PROGRESS NOTES
End Of Shift Note  [facility]  Summary of shift: pt arrived to icu at 2300. Pt was restless, combative, versed, propofol, titrated as needed. Pt settled down. Pt was calm throughout rest of shift. Levophed was titrated all throughout shift, see MAR. Per son Osmar Saldivar pt stated multiple times he wanted to be a DNR, Son is next of kin. ..but stated he wants pt to be awake and able to make own decision. Gave pt 40 meq of K for K of 3.1, K recheck 2.9. Regan Ferrara day shift RN contacted provider. Pt received 3L bolus of ns in ed and an additional 1L in icu. Neurology consulted. Ammonia 323 on admission, recheck 28. .. Pt remains of propofol, versed, levophed.      Vitals:    Vitals:    09/21/23 0620 09/21/23 0625 09/21/23 0630 09/21/23 0635   BP:       Pulse: 73 69 67 70   Resp: 24 24 24 24   Temp:       TempSrc:       SpO2: 100% 99% 99% 99%   Weight:       Height:            I&O:   Intake/Output Summary (Last 24 hours) at 9/21/2023 0710  Last data filed at 9/21/2023 8882  Gross per 24 hour   Intake 5148.9 ml   Output 1850 ml   Net 3298.9 ml       Resp Status: ventilator    Ventilator Settings:  Vent Mode: AC/PRVC Resp Rate (Set): 24 bmp/Vt (Set, mL): 550 mL/ /FiO2 : 30 %    Critical Care IV infusions:   propofol 25 mcg/kg/min (09/21/23 0636)    midazolam 6 mg/hr (09/21/23 0636)    norepinephrine 18 mcg/min (09/21/23 0636)    sodium chloride Stopped (09/21/23 0344)        LDA:   Peripheral IV 09/20/23 Right Antecubital (Active)   Number of days: 0       Peripheral IV 09/20/23 Left Forearm (Active)   Number of days: 0       NG/OG/NJ/NE Tube Nasogastric Left nostril (Active)   Number of days: 0       Urinary Catheter 09/20/23 Christian (Active)   Number of days: 0       ETT  (Active)   Number of days: 0       Arterial Line 09/20/23 Right Radial (Active)   Number of days: 0       Incision 09/14/16 Back Lower (Active)   Number of days: 2562       Puncture 02/09/18 Back (Active)   Number of days: 2049

## 2023-09-21 NOTE — PROGRESS NOTES
Comprehensive Nutrition Assessment    Type and Reason for Visit:  Initial    Nutrition Recommendations/Plan:   NPO diet  Advance diet when appropriate  Monitor medical plan and labs     Malnutrition Assessment:  Malnutrition Status:  Mild malnutrition (09/21/23 1331)    Context:  Acute Illness     Findings of the 6 clinical characteristics of malnutrition:  Energy Intake:  50% or less of estimated energy requirements for 5 or more days  Weight Loss:  5% over 1 month (7.1% loss in 2 months)     Body Fat Loss:  Unable to assess     Muscle Mass Loss:  Unable to assess    Fluid Accumulation:  No significant fluid accumulation     Strength:  Not Performed    Nutrition Assessment:    Patient admission is related to sepsis. Patient was brought into the ED after becoming unresponsive in his brothers car. Patient was intubated in the ED for airway protection. Per ICU huddle: patient has not shared his medical history with family but he did mention that he had oral cancer. Patient is reportedly sober from alcohol for 3-6 months. Admission ammonia was 326 (H) but dropped significantly to 28 (WDL) very quickly. Unsure if first ammonia lab was accurate. Patient requested Hospice Care due to severe COPD from PCP. Patient's PCP note from 9/18 sent a consult for palliative. Patient might be homeless. Patient was extubated this afternoon. Patient remains NPO at this time. According to electronic weight records patient has lost 7.1% loss in 2 months. Monitor diet advancement and labs. Nutrition Related Findings:    No edema. Hypoactive bowel sounds. Might be homeless. Claims to have oral cancer, severe COPD Wound Type: None       Current Nutrition Intake & Therapies:    Average Meal Intake: NPO  Average Supplements Intake: NPO  Diet NPO    Anthropometric Measures:  Height: 5' 11\" (180.3 cm)  Ideal Body Weight (IBW): 172 lbs (78 kg)       Current Body Weight: 208 lb (94.3 kg), 120.9 % IBW.  Weight Source: Not Specified  Current

## 2023-09-21 NOTE — CONSULTS
Department of Psychiatry  Consult Service   Psychiatric Assessment        REASON FOR CONSULT: Eval for suicidal ideation. Possible overdose? Moderate risk per screening    CONSULTING PHYSICIAN: Joanne Frorester    History obtained from: Patient and EMR    HISTORY OF PRESENT ILLNESS:          The patient is a 58 y.o. male with significant past history of hypokalemia, hyperlipidemia, hypertension, CAD, CHF, COPD, chronic low back pain, and mental illness who presents to the ER with altered mental status. He is admitted medically for the management of sepsis. Per ER documentation patient presented to the ER accompanied by his son. He was unresponsive after what was described like a seizure like episode. He required intubation upon admission to the ER. He was extubated this morning 9/21/23. Vanessa Riley is seen at bedside today. He is somewhat agitated and irritated however does agree to conversation with writer. He is alert to place and name but believes it is 2021 and cannot remember events leading up to hospitalization. This writer did attempt to ask Vanessa Riley if she could speak with his daughter who is at bedside however he becomes extremely irritated and states, \"no the fuck you cannot. \"  Daughter at this point leaves the room. Vanessa Riley is extremely confused, irritable, and not coherent. He makes very bizarre statements that make no sense whatsoever. He is very hard to engage in sustained meaningful conversation secondary to his significant thought disorganization. He is extremely tangential and circumstantial.  When asked who he currently lives with he responds, \"I was the prettiest and all city one and I did everything for everyone. \"  When asked about events leading up to hospitalization he cannot even tell this writer why he is in the hospital.  He is extremely paranoid and states, \"they are messing with my brain. They probably tried to kill me. \"  When asked who they are he states, \"my family. \"  This writer attempts History:        Procedure Laterality Date    BACK SURGERY      x2, Dr. Patrick Parisi      L3-4 decompression    CARDIAC ELECTROPHYSIOLOGY STUDY AND ABLATION      CARPAL TUNNEL RELEASE Right     JOINT REPLACEMENT Right 06/2018    NERVE BLOCK  07/23/2013    dduramorph  celestone 9mg    NERVE BLOCK N/A 03/21/2013    lumbar RASHMI    OTHER SURGICAL HISTORY  04/25/2016    Lumbar RASHMI    OTHER SURGICAL HISTORY  02/09/2018    lumbosacral myelogram    PACEMAKER PLACEMENT  09/2015    with defib    ROTATOR CUFF REPAIR Right     TONSILLECTOMY      HAS NO TONSILS, NEVER HAD SURGERY       Family Medical and Psychiatric History:     Endorses family psychiatric history and drug abuse history.         Problem Relation Age of Onset    Other Mother         dementia    Other Father        Medications Prior to Admission:   Medications Prior to Admission: metoprolol succinate (TOPROL XL) 100 MG extended release tablet, Take 1 tablet by mouth daily  folic acid (FOLVITE) 1 MG tablet, Take 1 tablet by mouth daily  spironolactone (ALDACTONE) 25 MG tablet, Take 1 tablet by mouth daily  vitamin B-1 (THIAMINE) 100 MG tablet, Take 1 tablet by mouth daily  escitalopram (LEXAPRO) 5 MG tablet, Take 1 tablet by mouth daily  busPIRone (BUSPAR) 7.5 MG tablet, take 1 tablet by mouth twice a day  [DISCONTINUED] metoprolol succinate (TOPROL XL) 50 MG extended release tablet, Take 1 tablet by mouth daily  ENTRESTO 24-26 MG per tablet, Take 1 tablet by mouth 2 times daily  simvastatin (ZOCOR) 20 MG tablet, Take 1 tablet by mouth every evening  rivaroxaban (XARELTO) 20 MG TABS tablet, Take 1 tablet by mouth daily (with breakfast)    Allergies:  Augmentin es-600 [amoxicillin-pot clavulanate] and Sulfa antibiotics    Lifetime Psychiatric Review of Systems         Obsessions and Compulsions: Denies       Shobha or Hypomania: Denies     Hallucinations: Denies     Panic Attacks:  Denies     Delusions:  Denies     Phobias:  Denies     Trauma:

## 2023-09-22 ENCOUNTER — APPOINTMENT (OUTPATIENT)
Age: 63
DRG: 917 | End: 2023-09-22
Attending: INTERNAL MEDICINE
Payer: MEDICARE

## 2023-09-22 ENCOUNTER — APPOINTMENT (OUTPATIENT)
Dept: MRI IMAGING | Age: 63
DRG: 917 | End: 2023-09-22
Payer: MEDICARE

## 2023-09-22 ENCOUNTER — APPOINTMENT (OUTPATIENT)
Dept: GENERAL RADIOLOGY | Age: 63
DRG: 917 | End: 2023-09-22
Payer: MEDICARE

## 2023-09-22 LAB
ANION GAP SERPL CALCULATED.3IONS-SCNC: 9 MMOL/L (ref 9–17)
BASOPHILS # BLD: 0.06 K/UL (ref 0–0.2)
BASOPHILS NFR BLD: 1 % (ref 0–2)
BUN SERPL-MCNC: 4 MG/DL (ref 8–23)
BUN/CREAT SERPL: 5 (ref 9–20)
CALCIUM SERPL-MCNC: 8.8 MG/DL (ref 8.6–10.4)
CHLORIDE SERPL-SCNC: 107 MMOL/L (ref 98–107)
CO2 SERPL-SCNC: 26 MMOL/L (ref 20–31)
CREAT SERPL-MCNC: 0.8 MG/DL (ref 0.7–1.2)
EOSINOPHIL # BLD: 0.4 K/UL (ref 0–0.44)
EOSINOPHILS RELATIVE PERCENT: 6 % (ref 1–4)
ERYTHROCYTE [DISTWIDTH] IN BLOOD BY AUTOMATED COUNT: 14.1 % (ref 11.8–14.4)
GFR SERPL CREATININE-BSD FRML MDRD: >60 ML/MIN/1.73M2
GLUCOSE SERPL-MCNC: 80 MG/DL (ref 70–99)
HCT VFR BLD AUTO: 39.5 % (ref 40.7–50.3)
HGB BLD-MCNC: 12.7 G/DL (ref 13–17)
IMM GRANULOCYTES # BLD AUTO: 0.02 K/UL (ref 0–0.3)
IMM GRANULOCYTES NFR BLD: 0 %
LYMPHOCYTES NFR BLD: 2.58 K/UL (ref 1.1–3.7)
LYMPHOCYTES RELATIVE PERCENT: 37 % (ref 24–43)
MAGNESIUM SERPL-MCNC: 2.2 MG/DL (ref 1.6–2.6)
MCH RBC QN AUTO: 30.4 PG (ref 25.2–33.5)
MCHC RBC AUTO-ENTMCNC: 32.2 G/DL (ref 28.4–34.8)
MCV RBC AUTO: 94.5 FL (ref 82.6–102.9)
MONOCYTES NFR BLD: 0.85 K/UL (ref 0.1–1.2)
MONOCYTES NFR BLD: 12 % (ref 3–12)
NEUTROPHILS NFR BLD: 44 % (ref 36–65)
NEUTS SEG NFR BLD: 3.13 K/UL (ref 1.5–8.1)
NRBC BLD-RTO: 0 PER 100 WBC
PLATELET # BLD AUTO: 165 K/UL (ref 138–453)
PMV BLD AUTO: 10 FL (ref 8.1–13.5)
POTASSIUM SERPL-SCNC: 4.1 MMOL/L (ref 3.7–5.3)
RBC # BLD AUTO: 4.18 M/UL (ref 4.21–5.77)
SODIUM SERPL-SCNC: 142 MMOL/L (ref 135–144)
WBC OTHER # BLD: 7 K/UL (ref 3.5–11.3)

## 2023-09-22 PROCEDURE — 0BH17EZ INSERTION OF ENDOTRACHEAL AIRWAY INTO TRACHEA, VIA NATURAL OR ARTIFICIAL OPENING: ICD-10-PCS | Performed by: STUDENT IN AN ORGANIZED HEALTH CARE EDUCATION/TRAINING PROGRAM

## 2023-09-22 PROCEDURE — 94640 AIRWAY INHALATION TREATMENT: CPT

## 2023-09-22 PROCEDURE — 6360000004 HC RX CONTRAST MEDICATION: Performed by: PSYCHIATRY & NEUROLOGY

## 2023-09-22 PROCEDURE — 99232 SBSQ HOSP IP/OBS MODERATE 35: CPT | Performed by: PSYCHIATRY & NEUROLOGY

## 2023-09-22 PROCEDURE — A4216 STERILE WATER/SALINE, 10 ML: HCPCS | Performed by: STUDENT IN AN ORGANIZED HEALTH CARE EDUCATION/TRAINING PROGRAM

## 2023-09-22 PROCEDURE — 99232 SBSQ HOSP IP/OBS MODERATE 35: CPT

## 2023-09-22 PROCEDURE — 2580000003 HC RX 258: Performed by: STUDENT IN AN ORGANIZED HEALTH CARE EDUCATION/TRAINING PROGRAM

## 2023-09-22 PROCEDURE — 93306 TTE W/DOPPLER COMPLETE: CPT

## 2023-09-22 PROCEDURE — 85025 COMPLETE CBC W/AUTO DIFF WBC: CPT

## 2023-09-22 PROCEDURE — 80048 BASIC METABOLIC PNL TOTAL CA: CPT

## 2023-09-22 PROCEDURE — 70553 MRI BRAIN STEM W/O & W/DYE: CPT

## 2023-09-22 PROCEDURE — 95816 EEG AWAKE AND DROWSY: CPT

## 2023-09-22 PROCEDURE — A9579 GAD-BASE MR CONTRAST NOS,1ML: HCPCS | Performed by: PSYCHIATRY & NEUROLOGY

## 2023-09-22 PROCEDURE — 6360000002 HC RX W HCPCS: Performed by: STUDENT IN AN ORGANIZED HEALTH CARE EDUCATION/TRAINING PROGRAM

## 2023-09-22 PROCEDURE — C9113 INJ PANTOPRAZOLE SODIUM, VIA: HCPCS | Performed by: STUDENT IN AN ORGANIZED HEALTH CARE EDUCATION/TRAINING PROGRAM

## 2023-09-22 PROCEDURE — 6370000000 HC RX 637 (ALT 250 FOR IP): Performed by: INTERNAL MEDICINE

## 2023-09-22 PROCEDURE — 36415 COLL VENOUS BLD VENIPUNCTURE: CPT

## 2023-09-22 PROCEDURE — 6370000000 HC RX 637 (ALT 250 FOR IP): Performed by: NURSE PRACTITIONER

## 2023-09-22 PROCEDURE — 99232 SBSQ HOSP IP/OBS MODERATE 35: CPT | Performed by: INTERNAL MEDICINE

## 2023-09-22 PROCEDURE — 2060000000 HC ICU INTERMEDIATE R&B

## 2023-09-22 PROCEDURE — 93005 ELECTROCARDIOGRAM TRACING: CPT | Performed by: STUDENT IN AN ORGANIZED HEALTH CARE EDUCATION/TRAINING PROGRAM

## 2023-09-22 PROCEDURE — 71045 X-RAY EXAM CHEST 1 VIEW: CPT

## 2023-09-22 PROCEDURE — 5A1935Z RESPIRATORY VENTILATION, LESS THAN 24 CONSECUTIVE HOURS: ICD-10-PCS | Performed by: STUDENT IN AN ORGANIZED HEALTH CARE EDUCATION/TRAINING PROGRAM

## 2023-09-22 PROCEDURE — 2580000003 HC RX 258: Performed by: NURSE PRACTITIONER

## 2023-09-22 PROCEDURE — 83735 ASSAY OF MAGNESIUM: CPT

## 2023-09-22 PROCEDURE — 99232 SBSQ HOSP IP/OBS MODERATE 35: CPT | Performed by: STUDENT IN AN ORGANIZED HEALTH CARE EDUCATION/TRAINING PROGRAM

## 2023-09-22 PROCEDURE — 6370000000 HC RX 637 (ALT 250 FOR IP): Performed by: STUDENT IN AN ORGANIZED HEALTH CARE EDUCATION/TRAINING PROGRAM

## 2023-09-22 PROCEDURE — 94761 N-INVAS EAR/PLS OXIMETRY MLT: CPT

## 2023-09-22 RX ORDER — PANTOPRAZOLE SODIUM 40 MG/1
40 TABLET, DELAYED RELEASE ORAL
Status: DISCONTINUED | OUTPATIENT
Start: 2023-09-23 | End: 2023-09-25 | Stop reason: HOSPADM

## 2023-09-22 RX ORDER — FOLIC ACID 1 MG/1
1 TABLET ORAL DAILY
Status: DISCONTINUED | OUTPATIENT
Start: 2023-09-22 | End: 2023-09-25 | Stop reason: HOSPADM

## 2023-09-22 RX ORDER — GAUZE BANDAGE 2" X 2"
100 BANDAGE TOPICAL DAILY
Status: DISCONTINUED | OUTPATIENT
Start: 2023-09-22 | End: 2023-09-25 | Stop reason: HOSPADM

## 2023-09-22 RX ORDER — SPIRONOLACTONE 25 MG/1
25 TABLET ORAL DAILY
Status: DISCONTINUED | OUTPATIENT
Start: 2023-09-22 | End: 2023-09-25 | Stop reason: HOSPADM

## 2023-09-22 RX ORDER — FUROSEMIDE 40 MG/1
40 TABLET ORAL DAILY
Status: DISCONTINUED | OUTPATIENT
Start: 2023-09-22 | End: 2023-09-25 | Stop reason: HOSPADM

## 2023-09-22 RX ORDER — METOPROLOL SUCCINATE 25 MG/1
25 TABLET, EXTENDED RELEASE ORAL DAILY
Status: DISCONTINUED | OUTPATIENT
Start: 2023-09-22 | End: 2023-09-25 | Stop reason: HOSPADM

## 2023-09-22 RX ORDER — LANOLIN ALCOHOL/MO/W.PET/CERES
1000 CREAM (GRAM) TOPICAL DAILY
Status: DISCONTINUED | OUTPATIENT
Start: 2023-09-22 | End: 2023-09-25 | Stop reason: HOSPADM

## 2023-09-22 RX ORDER — MULTIVITAMIN WITH IRON
1 TABLET ORAL DAILY
Status: DISCONTINUED | OUTPATIENT
Start: 2023-09-22 | End: 2023-09-25 | Stop reason: HOSPADM

## 2023-09-22 RX ADMIN — QUETIAPINE FUMARATE 50 MG: 25 TABLET ORAL at 19:27

## 2023-09-22 RX ADMIN — CYANOCOBALAMIN TAB 1000 MCG 1000 MCG: 1000 TAB at 10:13

## 2023-09-22 RX ADMIN — SODIUM CHLORIDE, PRESERVATIVE FREE 10 ML: 5 INJECTION INTRAVENOUS at 10:25

## 2023-09-22 RX ADMIN — FOLIC ACID 1 MG: 1 TABLET ORAL at 10:13

## 2023-09-22 RX ADMIN — MULTIVITAMIN TABLET 1 TABLET: TABLET at 10:15

## 2023-09-22 RX ADMIN — SACUBITRIL AND VALSARTAN 1 TABLET: 24; 26 TABLET, FILM COATED ORAL at 10:13

## 2023-09-22 RX ADMIN — BUDESONIDE AND FORMOTEROL FUMARATE DIHYDRATE 2 PUFF: 160; 4.5 AEROSOL RESPIRATORY (INHALATION) at 19:26

## 2023-09-22 RX ADMIN — SODIUM CHLORIDE, PRESERVATIVE FREE 10 ML: 5 INJECTION INTRAVENOUS at 19:32

## 2023-09-22 RX ADMIN — OXYCODONE HYDROCHLORIDE 5 MG: 5 TABLET ORAL at 19:43

## 2023-09-22 RX ADMIN — SPIRONOLACTONE 25 MG: 25 TABLET ORAL at 10:15

## 2023-09-22 RX ADMIN — SACUBITRIL AND VALSARTAN 1 TABLET: 24; 26 TABLET, FILM COATED ORAL at 19:27

## 2023-09-22 RX ADMIN — SODIUM CHLORIDE 40 MG: 9 INJECTION INTRAMUSCULAR; INTRAVENOUS; SUBCUTANEOUS at 10:13

## 2023-09-22 RX ADMIN — METOPROLOL SUCCINATE 25 MG: 25 TABLET, EXTENDED RELEASE ORAL at 10:15

## 2023-09-22 RX ADMIN — GADOTERIDOL 18 ML: 279.3 INJECTION, SOLUTION INTRAVENOUS at 16:28

## 2023-09-22 RX ADMIN — BUSPIRONE HYDROCHLORIDE 7.5 MG: 5 TABLET ORAL at 19:27

## 2023-09-22 RX ADMIN — BUSPIRONE HYDROCHLORIDE 7.5 MG: 5 TABLET ORAL at 10:15

## 2023-09-22 RX ADMIN — RIVAROXABAN 20 MG: 20 TABLET, FILM COATED ORAL at 10:26

## 2023-09-22 RX ADMIN — FUROSEMIDE 40 MG: 40 TABLET ORAL at 10:15

## 2023-09-22 RX ADMIN — ESCITALOPRAM OXALATE 5 MG: 10 TABLET ORAL at 10:13

## 2023-09-22 RX ADMIN — Medication 100 MG: at 10:15

## 2023-09-22 ASSESSMENT — ENCOUNTER SYMPTOMS
GASTROINTESTINAL NEGATIVE: 1
BACK PAIN: 1
RESPIRATORY NEGATIVE: 1

## 2023-09-22 ASSESSMENT — PAIN SCALES - GENERAL
PAINLEVEL_OUTOF10: 5
PAINLEVEL_OUTOF10: 2

## 2023-09-22 NOTE — CARE COORDINATION
Discharge planning    Pt. Alert and oriented. More calm today. EEG and MRI pending per neurology. Psych nurse following. Daughter was at bedside today. Extubated on 9/21. Possible home vs inpt. Rehab. Lives with brother.  Will need PT/OT to eval.

## 2023-09-22 NOTE — PLAN OF CARE
Problem: Respiratory - Adult  Goal: Achieves optimal ventilation and oxygenation  9/22/2023 1956 by Raul Ruiz RCP  Outcome: Progressing     Problem: Respiratory - Adult  Goal: Will be able to breathe spontaneously, without ventilator support  Description: Will be able to breathe spontaneously, without ventilator support  Outcome: Progressing     Problem: Respiratory - Adult  Goal: Patient's breath sounds will be clear and equal  Outcome: Progressing     Problem: Respiratory - Adult  Goal: Adequate oxygenation  Outcome: Progressing

## 2023-09-22 NOTE — PROGRESS NOTES
[facility]  Office: [phone]  Luis Daniel Cleaning DO, Concetta Benton, DO, Hernesto March, DO, Gerber Alvarez MD, Patric Hernadnez MD, Delia Rausch MD, Yumiko Kovacs MD,  Farhana Thomas MD, Cecy Pepe MD, Moira Cid DO, Ana Rosa Hernandez MD,  Marco Arreola MD, Tyshawn Rodriguez MD, Leo Nichole DO, Suzi Charlton MD,  Joanne Forrester DO, Bunny Arriaza MD, Nasreen Longo MD, oDrota Richardson MD, Barb Nsah MD,  Camille Mccrary MD, Nicholas Garcia MD, Daniel Avendano MD, Jeremy Echevarria MD, Carla Alcantara DO, Sheron Araya MD,  Yahir Byers MD, Elizabeth Prieto MD, Zohra Parikh, [facility],  Edith Mix, CNP,, Manuela Alcala, CNP,  John Childs, [facility], Nancy Garcia, CNP, Sapna Brooks, [facility], Raul Brown, CNP, Dolly Christianson, CNP, Lisa Amin, CNP, Jose Melara, CNP, Heather Gonzalez, CNS, Josue Fraire, CNP, Simba Ballwin, [address] [place] [address]    Progress Note    9/22/2023    7:58 AM    Name:   Dorota Richardson  MRN:     [id number]     Acct:      [de-identified]   Room:   [address] [place] [address] Day:  2  Admit Date:  9/20/2023  6:31 PM    PCP:   RIVER Henson CNP  Code Status:  DNR-CCA    Subjective:     C/C:   Chief Complaint   Patient presents with    Unresponsive     Interval History Status: not changed. Vitals reviewed, afebrile and hemodynamically stable. Saturating well on room air. Labs reviewed, folic acid and M58 low. Leukocytosis has completely resolved. Otherwise labs relatively stable. Urinalysis not consistent with UTI. Urine drug tox positive for cannabinoids and oxycodone. Chest x-ray demonstrated no acute cardiopulmonary disease. CT head negative for intracranial abnormality but did demonstrate old left cerebral infarction.     CT PE and CT abdomen pelvis demonstrated no acute pulmonary emboli or acute pulmonary abnormality but did suggest diarrheal process with

## 2023-09-22 NOTE — PROGRESS NOTES
Department of Psychiatry  Consult Progress Note  9/22/2023    Patient Name: Jewels Zamorano    Reason for initial consult:  Eval for suicidal ideation. Possible overdose. Moderate risk per screening          Interval History:      Cornelious Kussmaul is seen at bedside today. He is much more alert and oriented today and able to engage in meaningful conversation. A SLUMs was performed yesterday with a score of 4/30, today after repeat score is 25/30. Patient is alert and oriented to person, place time and situation. When asked about events leading up to hospitalization he states that he cannot remember. Per documentation patient came into ER unresponsive and needed intubation. Urine drug screen was positive for cannabis and oxycodone. Prior to hospitalization on 9/18/23, patient was seen by his PCP. At that time he was requesting hospice care from his PCP however unclear why patient was asking for hospice as he has no terminal illness that would require hospice care. At that appointment he made a comment that he was \"tired of living like this. \"  Today this writer asked patient if he remembered that appointment and he stated yes. When asked why he believes he needs hospice he states, \"I can't live this like anymore, my body is shutting down. \"  This writer asked if patient knows what hospice is for and he states, \"yes right before you die. \"  This writer asks if patient has a terminal illness and believes he is going to die and he states, \"well know but somebody like me doesn't need to live anymore. \"  He endorses significant depression all day nearly everyday. He reports that he lives in his brother's basement and that he can no longer find ramin in things. He endorses poor energy and motivation. He denies suicidal ideation currently stating he would not do something like this.   However, due to circumstances surrounding admission such as possible opioid overdose, recent appointment with PCP asking for hospice, and collateral

## 2023-09-22 NOTE — CONSULTS
[address] Andrey GarciaCash [facility]  [phone]               Cardiology Consult           Date of Admission:  9/20/2023  Date of Consultation:  9/22/2023      PCP:  RIVER Ramírez CNP    Consult for: Elevated troponin/determine if AICD is MRI compatible    History of Present Illness:  Ursula Givens is a 58 y.o. male who presents to the emergency room with unresponsiveness requiring intubation. According to the chart the patient was feeling poorly for a few days prior to this event and his son stated that he had seizure-like activity and went unresponsive. Patient has had a history of substance abuse in the past but had been reportedly sober for prior 3 months. Urine tox screen was positive for cannabinoids and opiates. CT scan of abdomen in the emergency room showed evidence of cholelithiasis and diverticulosis. Cardiology consulted because of elevated high-sensitivity troponins around 80 and flat. Also wanting to determine whether or not patient's AICD is MRI compatible. Patient has not been followed in our office for the last 14 months and has not had a device check performed since that time. He has a [employer] biventricular device that is not considered MRI compatible. Patient has a history of paroxysmal atrial fibrillation and atrial flutter with rapid ventricular response status post pulmonary vein isolation procedure and CTI dependent atrial flutter radiofrequency ablation in February 21. Patient had prior inappropriate ICD shocks due to atrial arrhythmias with RVR and baseline left bundle branch block. Patient also has a known history of chronic heart failure with reduced ejection fraction. He is status postplacement of a right atrial lead in February 2021. He is known to not have obstructive coronary disease. His last device check in our office was February 2022. He has not had the device checks since that time.     Patient denies

## 2023-09-22 NOTE — PROGRESS NOTES
[address]  PROGRESS NOTE    Room # 3401/3353-80   Name: Ciara Anaya              Reason for visit: Routine    I visited the patient. Admit Date & Time: 9/20/2023  6:31 PM    Assessment:  Ciara Anaya is a 58 y.o. male.  consult. Upon entering the room PT: + daughter is present. patient states about their medical condition, states struggles with their medical situation. States worries, fears frustrations. Patient states, treated well. PT: shares about Gnosticism background. PT: was confused, jumped to one topic to another, some not making any sense to . A few times PT: confronted  about a certain topic,  had to stand up for himself. Intervention:   provided a ministry presence, listening and prayer. PT: was grateful for the visit. Outcome:  Patient open to visit. Plan:  Chaplains will remain available to offer spiritual and emotional support as needed. Electronically signed by Chaplain Katharine, on 9/21/2023 at 8:15 PM.  [address]      09/21/23 2011   Encounter Summary   Encounter Overview/Reason  Follow-up; Spiritual/Emotional Needs   Service Provided For: Patient and family together   Referral/Consult From: [address]   Last Encounter  09/21/23   Complexity of Encounter High   Begin Time 0710   End Time  0731   Total Time Calculated 21 min   Crisis   Type Emotional distress   Spiritual/Emotional needs   Type Spiritual Support   Grief, Loss, and Adjustments   Type Life Adjustments; Adjustment to illness   Palliative Care   Type Palliative Care, Family Care   Assessment/Intervention/Outcome   Assessment Anxious; Angry; Concerns with suffering;Decisional conflict; Powerlessness;Questioning meaning and purpose; Unable to assess   Intervention Active listening;Discussed belief system/Gnosticism practices/elizabeth;Discussed illness injury and

## 2023-09-22 NOTE — PLAN OF CARE
Problem: Discharge Planning  Goal: Discharge to home or other facility with appropriate resources  Outcome: Progressing     Problem: Pain  Goal: Verbalizes/displays adequate comfort level or baseline comfort level  Outcome: Progressing     Problem: Safety - Medical Restraint  Goal: Remains free of injury from restraints (Restraint for Interference with Medical Device)  Description: INTERVENTIONS:  1. Determine that other, less restrictive measures have been tried or would not be effective before applying the restraint  2. Evaluate the patient's condition at the time of restraint application  3. Inform patient/family regarding the reason for restraint  4. Q2H: Monitor safety, psychosocial status, comfort, nutrition and hydration  Outcome: Progressing     Problem: Safety - Adult  Goal: Free from fall injury  Outcome: Progressing     Problem: Respiratory - Adult  Goal: Achieves optimal ventilation and oxygenation  Outcome: Progressing     Problem: Skin/Tissue Integrity  Goal: Absence of new skin breakdown  Description: 1. Monitor for areas of redness and/or skin breakdown  2. Assess vascular access sites hourly  3. Every 4-6 hours minimum:  Change oxygen saturation probe site  4. Every 4-6 hours:  If on nasal continuous positive airway pressure, respiratory therapy assess nares and determine need for appliance change or resting period.   Outcome: Progressing     Problem: ABCDS Injury Assessment  Goal: Absence of physical injury  Outcome: Progressing     Problem: Chronic Conditions and Co-morbidities  Goal: Patient's chronic conditions and co-morbidity symptoms are monitored and maintained or improved  Outcome: Progressing     Problem: Nutrition Deficit:  Goal: Optimize nutritional status  Outcome: Progressing
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
## 2023-09-23 PROBLEM — F33.2 MAJOR DEPRESSIVE DISORDER, RECURRENT SEVERE WITHOUT PSYCHOTIC FEATURES (HCC): Status: ACTIVE | Noted: 2023-09-23

## 2023-09-23 PROBLEM — R41.82 ALTERED MENTAL STATUS: Status: ACTIVE | Noted: 2023-09-23

## 2023-09-23 LAB
ANION GAP SERPL CALCULATED.3IONS-SCNC: 10 MMOL/L (ref 9–17)
BASOPHILS # BLD: 0.04 K/UL (ref 0–0.2)
BASOPHILS NFR BLD: 1 % (ref 0–2)
BUN SERPL-MCNC: 6 MG/DL (ref 8–23)
BUN/CREAT SERPL: 10 (ref 9–20)
CALCIUM SERPL-MCNC: 8.6 MG/DL (ref 8.6–10.4)
CHLORIDE SERPL-SCNC: 102 MMOL/L (ref 98–107)
CO2 SERPL-SCNC: 25 MMOL/L (ref 20–31)
CREAT SERPL-MCNC: 0.6 MG/DL (ref 0.7–1.2)
ECHO AO ROOT DIAM: 3.3 CM
ECHO AO ROOT INDEX: 1.54 CM/M2
ECHO AV MEAN GRADIENT: 10 MMHG
ECHO AV MEAN VELOCITY: 1.4 M/S
ECHO AV PEAK GRADIENT: 14 MMHG
ECHO AV PEAK VELOCITY: 1.8 M/S
ECHO AV VELOCITY RATIO: 0.61
ECHO AV VTI: 42.5 CM
ECHO BSA: 2.17 M2
ECHO EST RA PRESSURE: 3 MMHG
ECHO LA AREA 2C: 16.4 CM2
ECHO LA AREA 4C: 17.8 CM2
ECHO LA DIAMETER INDEX: 1.54 CM/M2
ECHO LA DIAMETER: 3.3 CM
ECHO LA MAJOR AXIS: 5 CM
ECHO LA MINOR AXIS: 5 CM
ECHO LA TO AORTIC ROOT RATIO: 1
ECHO LA VOL 2C: 43 ML (ref 18–58)
ECHO LA VOL 4C: 51 ML (ref 18–58)
ECHO LA VOL BP: 47 ML (ref 18–58)
ECHO LA VOL/BSA BIPLANE: 22 ML/M2 (ref 16–34)
ECHO LA VOLUME INDEX A2C: 20 ML/M2 (ref 16–34)
ECHO LA VOLUME INDEX A4C: 24 ML/M2 (ref 16–34)
ECHO LV E' LATERAL VELOCITY: 10 CM/S
ECHO LV E' SEPTAL VELOCITY: 7 CM/S
ECHO LV FRACTIONAL SHORTENING: 42 % (ref 28–44)
ECHO LV INTERNAL DIMENSION DIASTOLE INDEX: 2.43 CM/M2
ECHO LV INTERNAL DIMENSION DIASTOLIC: 5.2 CM (ref 4.2–5.9)
ECHO LV INTERNAL DIMENSION SYSTOLIC INDEX: 1.4 CM/M2
ECHO LV INTERNAL DIMENSION SYSTOLIC: 3 CM
ECHO LV IVSD: 1.3 CM (ref 0.6–1)
ECHO LV MASS 2D: 278.4 G (ref 88–224)
ECHO LV MASS INDEX 2D: 130.1 G/M2 (ref 49–115)
ECHO LV POSTERIOR WALL DIASTOLIC: 1.3 CM (ref 0.6–1)
ECHO LV RELATIVE WALL THICKNESS RATIO: 0.5
ECHO LVOT PEAK GRADIENT: 5 MMHG
ECHO LVOT PEAK VELOCITY: 1.1 M/S
ECHO MV A VELOCITY: 0.65 M/S
ECHO MV E DECELERATION TIME (DT): 257 MS
ECHO MV E VELOCITY: 0.76 M/S
ECHO MV E/A RATIO: 1.17
ECHO MV E/E' LATERAL: 7.6
ECHO MV E/E' RATIO (AVERAGED): 9.23
ECHO MV E/E' SEPTAL: 10.86
ECHO RIGHT VENTRICULAR SYSTOLIC PRESSURE (RVSP): 33 MMHG
ECHO TV REGURGITANT MAX VELOCITY: 2.76 M/S
ECHO TV REGURGITANT PEAK GRADIENT: 30 MMHG
EKG ATRIAL RATE: 150 BPM
EKG ATRIAL RATE: 71 BPM
EKG P AXIS: 70 DEGREES
EKG P-R INTERVAL: 150 MS
EKG P-R INTERVAL: 88 MS
EKG Q-T INTERVAL: 344 MS
EKG Q-T INTERVAL: 444 MS
EKG QRS DURATION: 106 MS
EKG QRS DURATION: 110 MS
EKG QTC CALCULATION (BAZETT): 482 MS
EKG QTC CALCULATION (BAZETT): 543 MS
EKG R AXIS: 9 DEGREES
EKG R AXIS: 94 DEGREES
EKG T AXIS: 74 DEGREES
EKG T AXIS: 91 DEGREES
EKG VENTRICULAR RATE: 150 BPM
EKG VENTRICULAR RATE: 71 BPM
EOSINOPHIL # BLD: 0.29 K/UL (ref 0–0.44)
EOSINOPHILS RELATIVE PERCENT: 4 % (ref 1–4)
ERYTHROCYTE [DISTWIDTH] IN BLOOD BY AUTOMATED COUNT: 13.7 % (ref 11.8–14.4)
GFR SERPL CREATININE-BSD FRML MDRD: >60 ML/MIN/1.73M2
GLUCOSE SERPL-MCNC: 93 MG/DL (ref 70–99)
HCT VFR BLD AUTO: 39 % (ref 40.7–50.3)
HGB BLD-MCNC: 12.8 G/DL (ref 13–17)
IMM GRANULOCYTES # BLD AUTO: 0.02 K/UL (ref 0–0.3)
IMM GRANULOCYTES NFR BLD: 0 %
LYMPHOCYTES NFR BLD: 2.6 K/UL (ref 1.1–3.7)
LYMPHOCYTES RELATIVE PERCENT: 38 % (ref 24–43)
MAGNESIUM SERPL-MCNC: 1.9 MG/DL (ref 1.6–2.6)
MCH RBC QN AUTO: 30.4 PG (ref 25.2–33.5)
MCHC RBC AUTO-ENTMCNC: 32.8 G/DL (ref 28.4–34.8)
MCV RBC AUTO: 92.6 FL (ref 82.6–102.9)
MONOCYTES NFR BLD: 0.7 K/UL (ref 0.1–1.2)
MONOCYTES NFR BLD: 10 % (ref 3–12)
NEUTROPHILS NFR BLD: 47 % (ref 36–65)
NEUTS SEG NFR BLD: 3.23 K/UL (ref 1.5–8.1)
NRBC BLD-RTO: 0 PER 100 WBC
PLATELET # BLD AUTO: 160 K/UL (ref 138–453)
PMV BLD AUTO: 10.2 FL (ref 8.1–13.5)
POTASSIUM SERPL-SCNC: 2.9 MMOL/L (ref 3.7–5.3)
POTASSIUM SERPL-SCNC: 3.7 MMOL/L (ref 3.7–5.3)
RBC # BLD AUTO: 4.21 M/UL (ref 4.21–5.77)
SODIUM SERPL-SCNC: 137 MMOL/L (ref 135–144)
WBC OTHER # BLD: 6.9 K/UL (ref 3.5–11.3)

## 2023-09-23 PROCEDURE — 83735 ASSAY OF MAGNESIUM: CPT

## 2023-09-23 PROCEDURE — 6370000000 HC RX 637 (ALT 250 FOR IP): Performed by: INTERNAL MEDICINE

## 2023-09-23 PROCEDURE — 94761 N-INVAS EAR/PLS OXIMETRY MLT: CPT

## 2023-09-23 PROCEDURE — 99232 SBSQ HOSP IP/OBS MODERATE 35: CPT | Performed by: STUDENT IN AN ORGANIZED HEALTH CARE EDUCATION/TRAINING PROGRAM

## 2023-09-23 PROCEDURE — 95816 EEG AWAKE AND DROWSY: CPT | Performed by: PSYCHIATRY & NEUROLOGY

## 2023-09-23 PROCEDURE — 93010 ELECTROCARDIOGRAM REPORT: CPT | Performed by: INTERNAL MEDICINE

## 2023-09-23 PROCEDURE — 6370000000 HC RX 637 (ALT 250 FOR IP): Performed by: NURSE PRACTITIONER

## 2023-09-23 PROCEDURE — 84132 ASSAY OF SERUM POTASSIUM: CPT

## 2023-09-23 PROCEDURE — 6360000002 HC RX W HCPCS: Performed by: STUDENT IN AN ORGANIZED HEALTH CARE EDUCATION/TRAINING PROGRAM

## 2023-09-23 PROCEDURE — 6370000000 HC RX 637 (ALT 250 FOR IP): Performed by: STUDENT IN AN ORGANIZED HEALTH CARE EDUCATION/TRAINING PROGRAM

## 2023-09-23 PROCEDURE — 36415 COLL VENOUS BLD VENIPUNCTURE: CPT

## 2023-09-23 PROCEDURE — 85025 COMPLETE CBC W/AUTO DIFF WBC: CPT

## 2023-09-23 PROCEDURE — 2580000003 HC RX 258: Performed by: NURSE PRACTITIONER

## 2023-09-23 PROCEDURE — 2060000000 HC ICU INTERMEDIATE R&B

## 2023-09-23 PROCEDURE — 51702 INSERT TEMP BLADDER CATH: CPT

## 2023-09-23 PROCEDURE — 99232 SBSQ HOSP IP/OBS MODERATE 35: CPT | Performed by: INTERNAL MEDICINE

## 2023-09-23 PROCEDURE — 99232 SBSQ HOSP IP/OBS MODERATE 35: CPT | Performed by: NURSE PRACTITIONER

## 2023-09-23 PROCEDURE — 80048 BASIC METABOLIC PNL TOTAL CA: CPT

## 2023-09-23 PROCEDURE — 99232 SBSQ HOSP IP/OBS MODERATE 35: CPT | Performed by: PSYCHIATRY & NEUROLOGY

## 2023-09-23 PROCEDURE — 94640 AIRWAY INHALATION TREATMENT: CPT

## 2023-09-23 RX ORDER — LANOLIN ALCOHOL/MO/W.PET/CERES
6 CREAM (GRAM) TOPICAL NIGHTLY PRN
Status: DISCONTINUED | OUTPATIENT
Start: 2023-09-23 | End: 2023-09-25 | Stop reason: HOSPADM

## 2023-09-23 RX ORDER — MAGNESIUM SULFATE IN WATER 40 MG/ML
2000 INJECTION, SOLUTION INTRAVENOUS ONCE
Status: COMPLETED | OUTPATIENT
Start: 2023-09-23 | End: 2023-09-23

## 2023-09-23 RX ORDER — ATORVASTATIN CALCIUM 10 MG/1
10 TABLET, FILM COATED ORAL NIGHTLY
Status: DISCONTINUED | OUTPATIENT
Start: 2023-09-23 | End: 2023-09-25 | Stop reason: HOSPADM

## 2023-09-23 RX ADMIN — BUSPIRONE HYDROCHLORIDE 7.5 MG: 5 TABLET ORAL at 21:26

## 2023-09-23 RX ADMIN — MAGNESIUM SULFATE HEPTAHYDRATE 2000 MG: 40 INJECTION, SOLUTION INTRAVENOUS at 11:46

## 2023-09-23 RX ADMIN — OXYCODONE HYDROCHLORIDE 10 MG: 5 TABLET ORAL at 11:40

## 2023-09-23 RX ADMIN — PANTOPRAZOLE SODIUM 40 MG: 40 TABLET, DELAYED RELEASE ORAL at 07:19

## 2023-09-23 RX ADMIN — FOLIC ACID 1 MG: 1 TABLET ORAL at 09:56

## 2023-09-23 RX ADMIN — Medication 100 MG: at 10:03

## 2023-09-23 RX ADMIN — QUETIAPINE FUMARATE 50 MG: 25 TABLET ORAL at 21:25

## 2023-09-23 RX ADMIN — POTASSIUM CHLORIDE 10 MEQ: 10 INJECTION, SOLUTION INTRAVENOUS at 11:42

## 2023-09-23 RX ADMIN — BUDESONIDE AND FORMOTEROL FUMARATE DIHYDRATE 2 PUFF: 160; 4.5 AEROSOL RESPIRATORY (INHALATION) at 20:53

## 2023-09-23 RX ADMIN — METOPROLOL SUCCINATE 25 MG: 25 TABLET, EXTENDED RELEASE ORAL at 09:57

## 2023-09-23 RX ADMIN — BUDESONIDE AND FORMOTEROL FUMARATE DIHYDRATE 2 PUFF: 160; 4.5 AEROSOL RESPIRATORY (INHALATION) at 08:03

## 2023-09-23 RX ADMIN — ATORVASTATIN CALCIUM 10 MG: 10 TABLET, FILM COATED ORAL at 21:25

## 2023-09-23 RX ADMIN — SODIUM CHLORIDE: 9 INJECTION, SOLUTION INTRAVENOUS at 07:17

## 2023-09-23 RX ADMIN — SODIUM CHLORIDE, PRESERVATIVE FREE 10 ML: 5 INJECTION INTRAVENOUS at 10:05

## 2023-09-23 RX ADMIN — POTASSIUM CHLORIDE 10 MEQ: 10 INJECTION, SOLUTION INTRAVENOUS at 08:35

## 2023-09-23 RX ADMIN — MULTIVITAMIN TABLET 1 TABLET: TABLET at 09:57

## 2023-09-23 RX ADMIN — POTASSIUM CHLORIDE 10 MEQ: 10 INJECTION, SOLUTION INTRAVENOUS at 10:04

## 2023-09-23 RX ADMIN — SPIRONOLACTONE 25 MG: 25 TABLET ORAL at 09:57

## 2023-09-23 RX ADMIN — SACUBITRIL AND VALSARTAN 1 TABLET: 24; 26 TABLET, FILM COATED ORAL at 21:25

## 2023-09-23 RX ADMIN — SODIUM CHLORIDE, PRESERVATIVE FREE 10 ML: 5 INJECTION INTRAVENOUS at 21:28

## 2023-09-23 RX ADMIN — BUSPIRONE HYDROCHLORIDE 7.5 MG: 5 TABLET ORAL at 09:57

## 2023-09-23 RX ADMIN — POTASSIUM CHLORIDE 10 MEQ: 10 INJECTION, SOLUTION INTRAVENOUS at 07:18

## 2023-09-23 RX ADMIN — RIVAROXABAN 20 MG: 20 TABLET, FILM COATED ORAL at 09:57

## 2023-09-23 RX ADMIN — SACUBITRIL AND VALSARTAN 1 TABLET: 24; 26 TABLET, FILM COATED ORAL at 09:57

## 2023-09-23 RX ADMIN — CYANOCOBALAMIN TAB 1000 MCG 1000 MCG: 1000 TAB at 09:57

## 2023-09-23 RX ADMIN — FUROSEMIDE 40 MG: 40 TABLET ORAL at 09:56

## 2023-09-23 RX ADMIN — POTASSIUM CHLORIDE 10 MEQ: 10 INJECTION, SOLUTION INTRAVENOUS at 14:25

## 2023-09-23 RX ADMIN — POTASSIUM CHLORIDE 10 MEQ: 10 INJECTION, SOLUTION INTRAVENOUS at 13:08

## 2023-09-23 RX ADMIN — ESCITALOPRAM OXALATE 5 MG: 10 TABLET ORAL at 09:56

## 2023-09-23 ASSESSMENT — ENCOUNTER SYMPTOMS
GASTROINTESTINAL NEGATIVE: 1
RESPIRATORY NEGATIVE: 1
BACK PAIN: 1

## 2023-09-23 ASSESSMENT — PAIN DESCRIPTION - DESCRIPTORS: DESCRIPTORS: ACHING;THROBBING

## 2023-09-23 ASSESSMENT — PAIN SCALES - GENERAL: PAINLEVEL_OUTOF10: 8

## 2023-09-23 NOTE — PROGRESS NOTES
Shift Handoff Suicide Safety  Nurse reporting off:Myriam Kumar  Nurse receiving report: Corrinne Dublin      [x] C-SSRS frequent screening flow sheet completed    [x] Care plan Self harm/Suicidality documented for shift    [x] Hourly rounding by RN documented    [x] RN checked constant observer flow sheet for Q15 minute documentation. [x] Reinforced suicide precautions with patient. [x] Reinforced that visitors will be screened and may be limited at the discretion of the nurse. Visitor belongings are subject to be searched. Belongings may not be allowed into the patient room. [x] Room screened for safety, items removed to create a safe environment include:     [x] Utensils from tray     [x] Extra Supplies      [x] Extra Linen     [x] Personal items     [x] Pt in a safety gown       [x] Safety tray ordered: yes      [x] Expectations were discussed with sitter. Sitter positioned near exit within 4 to 7 feet from patient(per policy). Sitter reminded that patient should be observed at all times including toileting and bathing. Sitter made aware that patients head is to be visible at all times(to mitigate the risk of patient using monitor cords to cause harm to self). Sitter has constant observer flow sheet. Patient and sitter included in hourly rounds.

## 2023-09-23 NOTE — PROGRESS NOTES
[facility]  Office: [phone]  Taylor Madrid, DO, Enedina Singh, DO, Darylene Given Blood, DO, Elyssa Hunter MD, Brendon Parisi MD, Rubina Carter MD, Cesario Ariza MD,  Rena Donovan MD, Halina Cox MD, Lynda Owen DO, Bernard Laws MD,  Alfred Devi MD, Salinas Arriola MD, Madalyn Haddad, DO, Kiki Hart MD,  Bethany Martini MD, Alex Bolivar MD, Heike Marques MD, Lupe Arredondo MD,  Shonda Alcantar MD, Emma Anglin MD, Marvin Rodgers MD, Jeff Paul MD, Cookie Wong DO, Jorge L Crowell MD,  Steven Leggett MD, Sergey Leonard MD, Valarie John, CNP,  Fei Alonzo, CNP,, Monik Kendrick, CNP,  Ellen Hughes, SCHUYLER, Genevieve Slater CNP, Wiley Augustine, CNP, Candy Rose, CNP, Luis Mix, CNP, Tomasz Mercer, CNP, Mason Contreras, CNP, Lety Schofield, CNS, Aure Myles, CNP, Christi Jin, [facility]    Progress Note    9/23/2023    7:43 AM    Name:   Heike Marques  MRN:     [id number]     Acct:      [de-identified]   Room:   [address] [place] [address] Day:  3  Admit Date:  9/20/2023  6:31 PM    PCP:   RIVER Perez CNP  Code Status:  DNR-CCA    Subjective:     C/C:   Chief Complaint   Patient presents with    Unresponsive     Interval History Status: not changed. Vitals reviewed, afebrile and hemodynamically stable. Saturating well on room air. Labs reviewed, hypokalemia. Urinalysis not consistent with UTI. Urine drug tox positive for cannabinoids and oxycodone. Chest x-ray demonstrated no acute cardiopulmonary disease. CT head negative for intracranial abnormality but did demonstrate old left cerebral infarction.     CT PE and CT abdomen pelvis demonstrated no acute pulmonary emboli or acute pulmonary abnormality but did suggest diarrheal process with no acute inflammatory process identified in the abdomen or pelvis with cholelithiasis and diverticulosis. Overnight patient improved. On examination patient resting comfortably in bed. Awake and alert to person place and time as well as situation. Plan for MRI and EEG per neurology. Plan for Echo and AICD interrogation per Cardiology. BHI admission per Psych due to possible OD in setting of depression. Brief History: This is a 58-year-old male with a significant past medical history of hypertension, hyperlipidemia, depression, tobacco use disorder, COPD, atrial fibrillation on Xarelto, CAD, CHF who initially presented to the emergency department by family for unresponsiveness. Per chart review patient had a seizure-like activity at home and went unresponsive for which family brought patient in. Patient feeling poorly over the past few days. Review of Systems:     Constitutional:  negative for chills, fevers, sweats  Respiratory:  positive for shortness of breath. negative for cough, wheezing  Cardiovascular:  negative for chest pain, chest pressure/discomfort, lower extremity edema, palpitations  Gastrointestinal:  negative for abdominal pain, constipation, diarrhea, nausea, vomiting  Neurological:  negative for dizziness, headache    Medications: Allergies:     Allergies   Allergen Reactions    Augmentin Es-600 [Amoxicillin-Pot Clavulanate]      Other reaction(s): Unknown    Sulfa Antibiotics Other (See Comments)       Current Meds:   Scheduled Meds:    magnesium sulfate  2,000 mg IntraVENous Once    folic acid  1 mg Oral Daily    multivitamin  1 tablet Oral Daily    thiamine mononitrate  100 mg Oral Daily    vitamin B-12  1,000 mcg Oral Daily    metoprolol succinate  25 mg Oral Daily    sacubitril-valsartan  1 tablet Oral BID    spironolactone  25 mg Oral Daily    furosemide  40 mg Oral Daily    rivaroxaban  20 mg Oral Daily with breakfast    pantoprazole  40 mg Oral QAM AC    budesonide-formoterol  2 puff Inhalation BID RT    nicotine  1 patch TransDERmal Daily    QUEtiapine

## 2023-09-23 NOTE — PLAN OF CARE
Problem: Discharge Planning  Goal: Discharge to home or other facility with appropriate resources  9/23/2023 0051 by Emilie Smith RN  Outcome: Progressing     Problem: Pain  Goal: Verbalizes/displays adequate comfort level or baseline comfort level  9/23/2023 0051 by Emilie Smith RN  Outcome: Progressing  Flowsheets (Taken 9/22/2023 1943)  Verbalizes/displays adequate comfort level or baseline comfort level:   Encourage patient to monitor pain and request assistance   Assess pain using appropriate pain scale   Administer analgesics based on type and severity of pain and evaluate response   Implement non-pharmacological measures as appropriate and evaluate response     Problem: Safety - Adult  Goal: Free from fall injury  9/23/2023 0051 by Emilie Smith RN  Outcome: Progressing     Problem: Respiratory - Adult  Goal: Achieves optimal ventilation and oxygenation  9/23/2023 0051 by Emilie Smith RN  Outcome: Progressing     Problem: Skin/Tissue Integrity  Goal: Absence of new skin breakdown  Description: 1. Monitor for areas of redness and/or skin breakdown  2. Assess vascular access sites hourly  3. Every 4-6 hours minimum:  Change oxygen saturation probe site  4. Every 4-6 hours:  If on nasal continuous positive airway pressure, respiratory therapy assess nares and determine need for appliance change or resting period.   9/23/2023 0051 by Emilie Smith RN  Outcome: Progressing     Problem: ABCDS Injury Assessment  Goal: Absence of physical injury  9/23/2023 0051 by Emilie Smith RN  Outcome: Progressing     Problem: Chronic Conditions and Co-morbidities  Goal: Patient's chronic conditions and co-morbidity symptoms are monitored and maintained or improved  9/23/2023 0051 by Emilie Smith RN  Outcome: Progressing     Problem: Nutrition Deficit:  Goal: Optimize nutritional status  9/23/2023 0051 by Emilie Smith RN  Outcome: Progressing     Problem: Safety - Medical Restraint  Goal: Remains free of injury from restraints

## 2023-09-23 NOTE — PLAN OF CARE
Problem: Respiratory - Adult  Goal: Achieves optimal ventilation and oxygenation  9/23/2023 0922 by Verito Gomez RCP  Outcome: Progressing  Flowsheets (Taken 9/23/2023 9463)  Achieves optimal ventilation and oxygenation: Respiratory therapy support as indicated  9/23/2023 0051 by [address]e Street RN  Outcome: Progressing  9/22/2023 1956 by Scott Tobin RCP  Outcome: Progressing  Goal: Will be able to breathe spontaneously, without ventilator support  Description: Will be able to breathe spontaneously, without ventilator support  9/22/2023 1956 by Scott Tobin RCP  Outcome: Progressing  Goal: Patient's breath sounds will be clear and equal  9/22/2023 1956 by Scott Tobin RCP  Outcome: Progressing  Goal: Adequate oxygenation  9/22/2023 1956 by Scott Tobin RCP  Outcome: Progressing
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
## 2023-09-23 NOTE — PROGRESS NOTES
This is a 58year old male with history of ETOH abuse, polysubstance abuse, CHF, atrial fibrillation who initially presented to the ED with AMS. He has apparently bee seen by palliative care prior to admission but hs daughter at bedside is unclear as the the reason why. He arrived unresponsive and was intubated for airway protection. He is now extubated and very agitated and somewhat combative. UDS positive for opiates and marijuana. Daughter states he has a lifetime history of this. She states at baseline he has some confusion but this is very different than baseline currently. Likely TME from hospital delirium, current metabolic derangements/ possible infection and possible sedation. Today much more calm and less agitated. Plan:      Primary team to manage co-morbidities and metabolic derangements   Ammonia level reviewed  HCT showed NAP, old infarct   Limit sedation  Discussed with daughter at bedside  EEG showed encephalopathy   MRI Brain with and without contrast reviewed- showed cerebellar encephalomalacia- likely on basis of polysubstance and ETOH abuse throughout his lifetime   Psych recs- likely BHI after DC   Patient appears at baseline mentation, EEG and MRI reviewed. Neurology to sign off. Lease call with questions. This note is created with the assistance of a speech-recognition program. While intending to generate a document that actually reflects the content of the visit, the document can still have some errors including those of syntax and sound a- like substitutions which may escape proofreading. In such instances, actual meaning can be extrapolated by contextual derivation.

## 2023-09-23 NOTE — PROGRESS NOTES
Crirical K 2.9, notified on call DO ainsley drew.  Following replacement protocol, starting iv K, needs 60mEq

## 2023-09-23 NOTE — PROGRESS NOTES
Shift Handoff Suicide Safety  Nurse reporting off:LY Pina  Nurse receiving report: Ta Lombardo RN      [x] C-SSRS frequent screening flow sheet completed    [x] Care plan Self harm/Suicidality documented for shift    [x] Hourly rounding by RN documented    [x] RN checked constant observer flow sheet for Q15 minute documentation. [x] Reinforced suicide precautions with patient. [x] Reinforced that visitors will be screened and may be limited at the discretion of the nurse. Visitor belongings are subject to be searched. Belongings may not be allowed into the patient room. [x] Room screened for safety, items removed to create a safe environment include:     [x] Utensils from tray     [x] Extra Supplies      [x] Extra Linen     [x] Personal items     [x] Pt in a safety gown       [x] Safety tray ordered: yes      [x] Expectations were discussed with sitter. Sitter positioned near exit within 4 to 7 feet from patient(per policy). Sitter reminded that patient should be observed at all times including toileting and bathing. Sitter made aware that patients head is to be visible at all times(to mitigate the risk of patient using monitor cords to cause harm to self). Sitter has constant observer flow sheet. Patient and sitter included in hourly rounds.

## 2023-09-23 NOTE — PROCEDURES
EEG REPORT       Patient: Julieta Grijalva Age: 58 y.o. MRN: [id number]      Referring Provider: No ref. provider found    History: This routine 30 minute scalp EEG was recorded with video- monitoring for a 58 y.o.. male who presented with encephalopathy. This EEG was performed to evaluate for focal and epileptiform abnormalities.      Julieta Grijalva   Current Facility-Administered Medications   Medication Dose Route Frequency Provider Last Rate Last Admin    magnesium sulfate 2000 mg in 50 mL IVPB premix  2,000 mg IntraVENous Once [place], DO        atorvastatin (LIPITOR) tablet 10 mg  10 mg Oral Nightly [place], DO        folic acid (FOLVITE) tablet 1 mg  1 mg Oral Daily [place], DO   1 mg at 09/23/23 5531    multivitamin 1 tablet  1 tablet Oral Daily [place], DO   1 tablet at 09/23/23 0957    thiamine mononitrate tablet 100 mg  100 mg Oral Daily [place], DO   100 mg at 09/23/23 1003    vitamin B-12 (CYANOCOBALAMIN) tablet 1,000 mcg  1,000 mcg Oral Daily [place], DO   1,000 mcg at 09/23/23 0957    metoprolol succinate (TOPROL XL) extended release tablet 25 mg  25 mg Oral Daily Ze German MD   25 mg at 09/23/23 0957    sacubitril-valsartan (ENTRESTO) 24-26 MG per tablet 1 tablet  1 tablet Oral BID Ze German MD   1 tablet at 09/23/23 0957    spironolactone (ALDACTONE) tablet 25 mg  25 mg Oral Daily Ze German MD   25 mg at 09/23/23 0957    furosemide (LASIX) tablet 40 mg  40 mg Oral Daily Ze German MD   40 mg at 09/23/23 1314    rivaroxaban (XARELTO) tablet 20 mg  20 mg Oral Daily with breakfast [facility]   20 mg at 09/23/23 0957    pantoprazole (PROTONIX) tablet 40 mg  40 mg Oral QAM AC [facility], DO   40 mg at 09/23/23 0719    potassium chloride (KLOR-CON M) extended release tablet 40 mEq  40 mEq Oral PRN [place], DO        Or    potassium bicarb-citric acid (EFFER-K) effervescent tablet 40 mEq  40 mEq Oral PRN [place], DO   40

## 2023-09-23 NOTE — PROGRESS NOTES
End Of Shift Note  [facility] ICU  Summary of shift: pt had un eventful night. Pt was calm and sleeping all through the night. Had complaints of pain at beginning of shift, gave prn oxycodone and pain subsided. Pt was very pleasant and agreeable. Vitals remained stable. Output was 1050, no BM.      Vitals:    Vitals:    09/23/23 0029 09/23/23 0100 09/23/23 0200 09/23/23 0300   BP:       Pulse: 69 60 69 61   Resp:       Temp:       TempSrc:       SpO2: 94% 94% 93% 93%   Weight:       Height:            I&O:   Intake/Output Summary (Last 24 hours) at 9/23/2023 0534  Last data filed at 9/23/2023 0533  Gross per 24 hour   Intake --   Output 2500 ml   Net -2500 ml       Resp Status: room air    Ventilator Settings:  Vent Mode: CPAP/PS Resp Rate (Set): 24 bmp/Vt (Set, mL): 550 mL/ /FiO2 : 30 %    Critical Care IV infusions:   norepinephrine Stopped (09/21/23 1058)    sodium chloride Stopped (09/21/23 0344)        LDA:   Peripheral IV 09/20/23 Right Antecubital (Active)   Number of days: 2       Peripheral IV 09/20/23 Left Forearm (Active)   Number of days: 2       Urinary Catheter 09/20/23 Christian (Active)   Number of days: 2       Incision 09/14/16 Back Lower (Active)   Number of days: 2564       Puncture 02/09/18 Back (Active)   Number of days: 2051

## 2023-09-23 NOTE — PLAN OF CARE
Pt remains safe and free from falls thus far in shift  Calm and cooperative today, pts brother did get him agitated when visiting  Christian remains in place, draining yellow clear urine with output of 1450 ml  EEG and Echo completed  C/o pain states all over so prn roxicodone given which helped relieve pain  VSS  Sitter remains in place and suicide precautions are still initiated  Potassium and magnesium replaced  Discharge planning in progress  Bed locked and lowest position  Call light in reach  Bed alarm on for safety  Care ongoing    Problem: Discharge Planning  Goal: Discharge to home or other facility with appropriate resources  9/23/2023 1430 by Ana Martin RN  Outcome: Progressing     Problem: Pain  Goal: Verbalizes/displays adequate comfort level or baseline comfort level  9/23/2023 1430 by Ana Martin RN  Outcome: Progressing     Problem: Safety - Adult  Goal: Free from fall injury  9/23/2023 1430 by Ana Martin RN  Outcome: Progressing     Problem: Skin/Tissue Integrity  Goal: Absence of new skin breakdown  Description: 1. Monitor for areas of redness and/or skin breakdown  2. Assess vascular access sites hourly  3. Every 4-6 hours minimum:  Change oxygen saturation probe site  4. Every 4-6 hours:  If on nasal continuous positive airway pressure, respiratory therapy assess nares and determine need for appliance change or resting period.   9/23/2023 1430 by Ana Martin RN  Outcome: Progressing     Problem: ABCDS Injury Assessment  Goal: Absence of physical injury  9/23/2023 1430 by Ana Martin RN  Outcome: Progressing     Problem: Chronic Conditions and Co-morbidities  Goal: Patient's chronic conditions and co-morbidity symptoms are monitored and maintained or improved  9/23/2023 1430 by Ana Martin RN  Outcome: Progressing     Problem: Nutrition Deficit:  Goal: Optimize nutritional status  9/23/2023 1430 by Ana Martin RN  Outcome: Progressing

## 2023-09-24 LAB
ANION GAP SERPL CALCULATED.3IONS-SCNC: 11 MMOL/L (ref 9–17)
BUN SERPL-MCNC: 9 MG/DL (ref 8–23)
BUN/CREAT SERPL: 13 (ref 9–20)
CALCIUM SERPL-MCNC: 8.9 MG/DL (ref 8.6–10.4)
CHLORIDE SERPL-SCNC: 102 MMOL/L (ref 98–107)
CO2 SERPL-SCNC: 27 MMOL/L (ref 20–31)
CREAT SERPL-MCNC: 0.7 MG/DL (ref 0.7–1.2)
GFR SERPL CREATININE-BSD FRML MDRD: >60 ML/MIN/1.73M2
GLUCOSE SERPL-MCNC: 122 MG/DL (ref 70–99)
MAGNESIUM SERPL-MCNC: 2.1 MG/DL (ref 1.6–2.6)
POTASSIUM SERPL-SCNC: 3.1 MMOL/L (ref 3.7–5.3)
POTASSIUM SERPL-SCNC: 3.6 MMOL/L (ref 3.7–5.3)
SODIUM SERPL-SCNC: 140 MMOL/L (ref 135–144)

## 2023-09-24 PROCEDURE — 2060000000 HC ICU INTERMEDIATE R&B

## 2023-09-24 PROCEDURE — 99232 SBSQ HOSP IP/OBS MODERATE 35: CPT | Performed by: STUDENT IN AN ORGANIZED HEALTH CARE EDUCATION/TRAINING PROGRAM

## 2023-09-24 PROCEDURE — 2580000003 HC RX 258: Performed by: NURSE PRACTITIONER

## 2023-09-24 PROCEDURE — 6370000000 HC RX 637 (ALT 250 FOR IP): Performed by: NURSE PRACTITIONER

## 2023-09-24 PROCEDURE — 84132 ASSAY OF SERUM POTASSIUM: CPT

## 2023-09-24 PROCEDURE — 6370000000 HC RX 637 (ALT 250 FOR IP): Performed by: STUDENT IN AN ORGANIZED HEALTH CARE EDUCATION/TRAINING PROGRAM

## 2023-09-24 PROCEDURE — 6370000000 HC RX 637 (ALT 250 FOR IP): Performed by: INTERNAL MEDICINE

## 2023-09-24 PROCEDURE — 83735 ASSAY OF MAGNESIUM: CPT

## 2023-09-24 PROCEDURE — 2700000000 HC OXYGEN THERAPY PER DAY

## 2023-09-24 PROCEDURE — 94640 AIRWAY INHALATION TREATMENT: CPT

## 2023-09-24 PROCEDURE — 36415 COLL VENOUS BLD VENIPUNCTURE: CPT

## 2023-09-24 PROCEDURE — 94761 N-INVAS EAR/PLS OXIMETRY MLT: CPT

## 2023-09-24 PROCEDURE — 80048 BASIC METABOLIC PNL TOTAL CA: CPT

## 2023-09-24 RX ORDER — BUSPIRONE HYDROCHLORIDE 5 MG/1
7.5 TABLET ORAL 2 TIMES DAILY
Status: CANCELLED | OUTPATIENT
Start: 2023-09-24

## 2023-09-24 RX ORDER — DOCUSATE SODIUM 100 MG/1
100 CAPSULE, LIQUID FILLED ORAL 2 TIMES DAILY
Status: DISCONTINUED | OUTPATIENT
Start: 2023-09-24 | End: 2023-09-25 | Stop reason: HOSPADM

## 2023-09-24 RX ORDER — MULTIVITAMIN WITH IRON
1 TABLET ORAL DAILY
Status: CANCELLED | OUTPATIENT
Start: 2023-09-25

## 2023-09-24 RX ORDER — METOPROLOL SUCCINATE 25 MG/1
25 TABLET, EXTENDED RELEASE ORAL DAILY
Status: CANCELLED | OUTPATIENT
Start: 2023-09-25

## 2023-09-24 RX ORDER — DOCUSATE SODIUM 100 MG/1
100 CAPSULE, LIQUID FILLED ORAL 2 TIMES DAILY
Status: CANCELLED | OUTPATIENT
Start: 2023-09-24

## 2023-09-24 RX ORDER — SPIRONOLACTONE 25 MG/1
25 TABLET ORAL DAILY
Status: CANCELLED | OUTPATIENT
Start: 2023-09-25

## 2023-09-24 RX ORDER — NICOTINE 21 MG/24HR
1 PATCH, TRANSDERMAL 24 HOURS TRANSDERMAL DAILY
Status: CANCELLED | OUTPATIENT
Start: 2023-09-25

## 2023-09-24 RX ORDER — LANOLIN ALCOHOL/MO/W.PET/CERES
1000 CREAM (GRAM) TOPICAL DAILY
Status: CANCELLED | OUTPATIENT
Start: 2023-09-25

## 2023-09-24 RX ORDER — BUDESONIDE AND FORMOTEROL FUMARATE DIHYDRATE 160; 4.5 UG/1; UG/1
2 AEROSOL RESPIRATORY (INHALATION)
Status: CANCELLED | OUTPATIENT
Start: 2023-09-24

## 2023-09-24 RX ORDER — LANOLIN ALCOHOL/MO/W.PET/CERES
6 CREAM (GRAM) TOPICAL NIGHTLY PRN
Status: CANCELLED | OUTPATIENT
Start: 2023-09-24

## 2023-09-24 RX ORDER — FOLIC ACID 1 MG/1
1 TABLET ORAL DAILY
Status: CANCELLED | OUTPATIENT
Start: 2023-09-25

## 2023-09-24 RX ORDER — GAUZE BANDAGE 2" X 2"
100 BANDAGE TOPICAL DAILY
Status: CANCELLED | OUTPATIENT
Start: 2023-09-25

## 2023-09-24 RX ORDER — ATORVASTATIN CALCIUM 10 MG/1
10 TABLET, FILM COATED ORAL NIGHTLY
Status: CANCELLED | OUTPATIENT
Start: 2023-09-24

## 2023-09-24 RX ORDER — SENNOSIDES A AND B 8.6 MG/1
1 TABLET, FILM COATED ORAL NIGHTLY
Status: DISCONTINUED | OUTPATIENT
Start: 2023-09-24 | End: 2023-09-25 | Stop reason: HOSPADM

## 2023-09-24 RX ORDER — IPRATROPIUM BROMIDE AND ALBUTEROL SULFATE 2.5; .5 MG/3ML; MG/3ML
1 SOLUTION RESPIRATORY (INHALATION) EVERY 4 HOURS PRN
Status: CANCELLED | OUTPATIENT
Start: 2023-09-24

## 2023-09-24 RX ORDER — FUROSEMIDE 40 MG/1
40 TABLET ORAL DAILY
Status: CANCELLED | OUTPATIENT
Start: 2023-09-25

## 2023-09-24 RX ORDER — BUDESONIDE AND FORMOTEROL FUMARATE DIHYDRATE 160; 4.5 UG/1; UG/1
2 AEROSOL RESPIRATORY (INHALATION)
Qty: 10.2 G | Refills: 3 | Status: ON HOLD | OUTPATIENT
Start: 2023-09-24 | End: 2023-10-02 | Stop reason: HOSPADM

## 2023-09-24 RX ORDER — ESCITALOPRAM OXALATE 10 MG/1
5 TABLET ORAL DAILY
Status: CANCELLED | OUTPATIENT
Start: 2023-09-25

## 2023-09-24 RX ADMIN — OXYCODONE HYDROCHLORIDE 10 MG: 5 TABLET ORAL at 16:53

## 2023-09-24 RX ADMIN — Medication 100 MG: at 10:30

## 2023-09-24 RX ADMIN — BUSPIRONE HYDROCHLORIDE 7.5 MG: 5 TABLET ORAL at 21:54

## 2023-09-24 RX ADMIN — BUDESONIDE AND FORMOTEROL FUMARATE DIHYDRATE 2 PUFF: 160; 4.5 AEROSOL RESPIRATORY (INHALATION) at 08:18

## 2023-09-24 RX ADMIN — POTASSIUM BICARBONATE 40 MEQ: 782 TABLET, EFFERVESCENT ORAL at 12:34

## 2023-09-24 RX ADMIN — OXYCODONE HYDROCHLORIDE 10 MG: 5 TABLET ORAL at 21:59

## 2023-09-24 RX ADMIN — CYANOCOBALAMIN TAB 1000 MCG 1000 MCG: 1000 TAB at 10:29

## 2023-09-24 RX ADMIN — BUDESONIDE AND FORMOTEROL FUMARATE DIHYDRATE 2 PUFF: 160; 4.5 AEROSOL RESPIRATORY (INHALATION) at 20:29

## 2023-09-24 RX ADMIN — SODIUM CHLORIDE, PRESERVATIVE FREE 10 ML: 5 INJECTION INTRAVENOUS at 10:47

## 2023-09-24 RX ADMIN — BUSPIRONE HYDROCHLORIDE 7.5 MG: 5 TABLET ORAL at 10:29

## 2023-09-24 RX ADMIN — SODIUM CHLORIDE, PRESERVATIVE FREE 10 ML: 5 INJECTION INTRAVENOUS at 22:00

## 2023-09-24 RX ADMIN — ESCITALOPRAM OXALATE 5 MG: 10 TABLET ORAL at 10:31

## 2023-09-24 RX ADMIN — SENNOSIDES 8.6 MG: 8.6 TABLET, FILM COATED ORAL at 21:58

## 2023-09-24 RX ADMIN — SACUBITRIL AND VALSARTAN 1 TABLET: 24; 26 TABLET, FILM COATED ORAL at 21:57

## 2023-09-24 RX ADMIN — OXYCODONE HYDROCHLORIDE 10 MG: 5 TABLET ORAL at 10:43

## 2023-09-24 RX ADMIN — ATORVASTATIN CALCIUM 10 MG: 10 TABLET, FILM COATED ORAL at 21:54

## 2023-09-24 RX ADMIN — FUROSEMIDE 40 MG: 40 TABLET ORAL at 10:30

## 2023-09-24 RX ADMIN — MULTIVITAMIN TABLET 1 TABLET: TABLET at 10:30

## 2023-09-24 RX ADMIN — FOLIC ACID 1 MG: 1 TABLET ORAL at 10:29

## 2023-09-24 RX ADMIN — Medication 6 MG: at 00:00

## 2023-09-24 RX ADMIN — SACUBITRIL AND VALSARTAN 1 TABLET: 24; 26 TABLET, FILM COATED ORAL at 10:31

## 2023-09-24 RX ADMIN — QUETIAPINE FUMARATE 50 MG: 25 TABLET ORAL at 21:58

## 2023-09-24 RX ADMIN — PANTOPRAZOLE SODIUM 40 MG: 40 TABLET, DELAYED RELEASE ORAL at 06:13

## 2023-09-24 RX ADMIN — DOCUSATE SODIUM 100 MG: 100 CAPSULE, LIQUID FILLED ORAL at 21:58

## 2023-09-24 RX ADMIN — Medication 6 MG: at 21:58

## 2023-09-24 RX ADMIN — RIVAROXABAN 20 MG: 20 TABLET, FILM COATED ORAL at 10:38

## 2023-09-24 RX ADMIN — METOPROLOL SUCCINATE 25 MG: 25 TABLET, EXTENDED RELEASE ORAL at 10:31

## 2023-09-24 RX ADMIN — SPIRONOLACTONE 25 MG: 25 TABLET ORAL at 10:30

## 2023-09-24 ASSESSMENT — PAIN SCALES - WONG BAKER
WONGBAKER_NUMERICALRESPONSE: 0

## 2023-09-24 ASSESSMENT — PAIN - FUNCTIONAL ASSESSMENT
PAIN_FUNCTIONAL_ASSESSMENT: PREVENTS OR INTERFERES SOME ACTIVE ACTIVITIES AND ADLS

## 2023-09-24 ASSESSMENT — PAIN SCALES - GENERAL
PAINLEVEL_OUTOF10: 8
PAINLEVEL_OUTOF10: 6
PAINLEVEL_OUTOF10: 8
PAINLEVEL_OUTOF10: 9

## 2023-09-24 ASSESSMENT — PAIN DESCRIPTION - ORIENTATION
ORIENTATION: OTHER (COMMENT)

## 2023-09-24 ASSESSMENT — PAIN DESCRIPTION - LOCATION
LOCATION: BACK;CHEST;HIP;LEG
LOCATION: BACK;CHEST;HIP;LEG
LOCATION: CHEST;BACK;HIP;LEG
LOCATION: BACK;CHEST;HIP
LOCATION: CHEST;BACK;HIP;LEG

## 2023-09-24 ASSESSMENT — PAIN DESCRIPTION - DESCRIPTORS
DESCRIPTORS: ACHING

## 2023-09-24 ASSESSMENT — PAIN DESCRIPTION - ONSET
ONSET: ON-GOING

## 2023-09-24 ASSESSMENT — PAIN DESCRIPTION - FREQUENCY
FREQUENCY: CONTINUOUS

## 2023-09-24 ASSESSMENT — PAIN DESCRIPTION - PAIN TYPE
TYPE: CHRONIC PAIN

## 2023-09-24 NOTE — DISCHARGE SUMMARY
[facility]  Office: [id number], DO, Jn Andersen, DO, Mynor Schwab, DO, Alessandra March, DO, Gabriela Holman MD, Valente Favre, MD, Aleida Turk MD, Michelle Curtis MD,  Santa Sawyer MD, Divya Killian MD, hCirag Newton DO, Howard Cartagena MD,  Veronique Rodriguez DO, Gabriella Bird MD, Desmond Perdue MD, Heather Luna DO, Jay Herrera MD,  Devyn Bangura DO, Gerardo Vaca MD, Trace Valderrama MD, Bard Todd MD, Raine Cordero MD,  Uzair Augustine MD, Janee Srinivasan MD, Brent Curtis MD, Justin Lomax MD, Dae Aquino DO, Michael Martin MD,  Linda Bee MD, Jose Alejandro Cuadra MD, Claudette Means, CNP,  Sanford Philip, CNP,, Guillermina Uribe, CNP,  Rhiannon Barnett, DNP, Jess Akins, CNP, Tena Alpers, CNP, Ashley Ruiz, CNP, Teresa Garcia, CNP, Otoniel Ralph, CNP, Kaila Serna, CNP, Usha Muñoz, CNS, Sita Downing, CNP, Zaid Mares, [address]    Discharge Summary     Patient ID: Bard Brooks  :  1960   MRN: [id number]     ACCOUNT:  [de-identified]   Patient's PCP: RIVER Carr CNP  Admit Date: 2023   Discharge Date: 2023     Length of Stay: 5  Code Status:  DNR-CCA  Admitting Physician: Devyn Bangura DO  Discharge Physician: Devyn Bangura DO     Active Discharge Diagnoses:     Hospital Problem Lists:  Principal Problem:    Toxic metabolic encephalopathy  Active Problems:    Hypokalemia    Hyperlipidemia    Essential hypertension    Major depression    Tobacco abuse    CAD (coronary artery disease)    Atrial fibrillation with RVR [facility])    Hypothyroidism    Chronic systolic congestive heart failure (HCC)    Seizure-like activity (HCC)    Hyperammonemia (HCC)    SIRS (systemic inflammatory response syndrome) ([address])    High anion gap metabolic acidosis    History of CVA (cerebrovascular accident)    Acute and chronic respiratory failure with hypoxia ([address])

## 2023-09-24 NOTE — PLAN OF CARE
Problem: Respiratory - Adult  Goal: Achieves optimal ventilation and oxygenation  9/24/2023 0848 by Luis A Herrera RCP  Outcome: Progressing  9/24/2023 0419 by Stephy Mccormick RN  Outcome: Progressing  9/23/2023 2023 by Joane Lesches, RCP  Outcome: Progressing  Goal: Will be able to breathe spontaneously, without ventilator support  Description: Will be able to breathe spontaneously, without ventilator support  9/23/2023 2023 by Joane Lesches, RCP  Outcome: Completed  Goal: Patient's breath sounds will be clear and equal  9/23/2023 2023 by Joane Lesches, RCP  Outcome: Completed  Goal: Adequate oxygenation  Outcome: Progressing

## 2023-09-24 NOTE — PLAN OF CARE
Problem: Respiratory - Adult  Goal: Achieves optimal ventilation and oxygenation  9/24/2023 1928 by Carlos Regan RCP  Outcome: Progressing  Flowsheets (Taken 9/24/2023 0900 by Maryann Rose RN)  Achieves optimal ventilation and oxygenation:   Assess for changes in respiratory status   Assess for changes in mentation and behavior   Position to facilitate oxygenation and minimize respiratory effort   Oxygen supplementation based on oxygen saturation or arterial blood gases   Respiratory therapy support as indicated   Assess and instruct to report shortness of breath or any respiratory difficulty   Assess the need for suctioning and aspirate as needed   Encourage broncho-pulmonary hygiene including cough, deep breathe, incentive spirometry     Problem: Respiratory - Adult  Goal: Adequate oxygenation  9/24/2023 1928 by Carlos Regan RCP  Outcome: Progressing

## 2023-09-24 NOTE — PROGRESS NOTES
9/20/2023  1. No evidence of pulmonary embolism or acute pulmonary abnormality. 2.  Findings suggestive of diarrheal process, otherwise no acute inflammatory process identified in the abdomen or pelvis. 3.  Cholelithiasis. 4.  Diverticulosis. CT HEAD WO CONTRAST    Result Date: 9/20/2023  No acute intracranial abnormality identified. Old left cerebellar infarct. XR CHEST PORTABLE    Result Date: 9/20/2023  No acute cardiopulmonary disease identified. Enteric tube which extends just into the stomach and should be advanced. Physical Examination:        General appearance:  Alert, in no distress. Mental Status:  oriented to person place and time. Lungs:  Breath sounds bilaterally. No wheezing or crackles. Heart:  regular rate and rhythm, no murmur  Abdomen:  soft, nontender, nondistended, bowel sounds sluggish.    Extremities:  no edema, redness, tenderness in the calves  Skin:  no gross lesions, rashes, induration    Assessment:        Hospital Problems             Last Modified POA    * (Principal) Toxic metabolic encephalopathy 2/45/0803 Yes    Hypokalemia 9/21/2023 Yes    Hyperlipidemia 9/21/2023 Yes    Essential hypertension 9/21/2023 Yes    Major depression 9/21/2023 Yes    Tobacco abuse 9/21/2023 Yes    CAD (coronary artery disease) 9/21/2023 Yes    Atrial fibrillation with RVR ([address]) 9/21/2023 Yes    Hypothyroidism 9/21/2023 Yes    Chronic systolic congestive heart failure ([address]) 9/21/2023 Yes    Seizure-like activity ([address]) 9/21/2023 Yes    Hyperammonemia ([address]) 9/21/2023 Yes    SIRS (systemic inflammatory response syndrome) ([address]) 9/21/2023 Yes    High anion gap metabolic acidosis 2/60/7825 Yes    History of CVA (cerebrovascular accident) 9/21/2023 Yes    Acute and chronic respiratory failure with hypoxia ([address]) 9/21/2023 Yes    Goals of care, counseling/discussion 9/21/2023 Yes    DNR (do not resuscitate) discussion 9/21/2023 Yes    ACP (advance care planning) 9/21/2023 Yes    Palliative care encounter 9/21/2023 Yes    Delirium due to another medical condition 9/21/2023 Yes    Altered mental status 9/23/2023 Yes    Major depressive disorder, recurrent severe without psychotic features ([address]) 9/23/2023 Yes   Plan:        Acute Toxic Metabolic Encephalopathy. Improved. Likely multifactorial from seizure like activity, electrolyte derangements, possible sepsis, dehydration. Neurology and Psych following. MRI brain negative for acute intracranial abnormality. EEG demonstrated mild to moderate encephalopathy. Psych recommending [facility] admission for possible suicidal ideation/attempt. High Anion Gap Metabolic Acidosis. Possibly secondary to Sepsis vs Dehydration. Resolved with IV Fluids. Seizure Like Activity. Neurology following. EEG did not demonstrate any seizure-like activity. Mild to moderate encephalopathy. SIRS Positive. No definitive source but meets SIRs criteria. Diarrheal disease on CT but no inflammation noted. ID consulted. Off Antibiotics. Hyperammonemia. Resolved with fluids. Elevated troponin. Troponin 27 > 44 > 85 > 82. Cardiology consulted. Plan for Echo and AICD interrogation. Echocardiogram negative. Cardiology signed off. Hypertension. Stable. Hyperlipidemia. Simvastatin 20 mg substituted with Lipitor 10 mg nightly. Afib with RVR. Currently in NSR. On Toprol XL 50 mg daily, Cardizem 120 mg BID and Xeralto 20 mg daily. CAD. Continue Lipitor 10 mg nightly, Toprol-XL 25 mg daily, Entresto twice daily and Aldactone 25 mg daily. Hypothyroidism. TSH 0.52. Has Levothyroxine 50 mcg daily per home meds but indicates patient does not take. Depression. Continue Lexapro and Buspar. Tobacco Use Disorder. Encourage cessation. History of CVA. Resume Simvastatin. DVT: Xeralto. GI: Protonix 40 mg daily. Discharge planning: Patient is medically stable for discharge.   MRI brain and EEG unremarkable for intracranial abnormality or seizure-like activity

## 2023-09-24 NOTE — DISCHARGE INSTR - COC
Continuity of Care Form    Patient Name: Julieta Grijalva   :  1960  MRN:  [id number]    Admit date:  2023  Discharge date:  ***    Code Status Order: DNR-CCA   Advance Directives:     Admitting Physician:  Jami Hassan DO  PCP: Kishore Byers, APRN - CNP    Discharging Nurse: [facility] Unit/Room#: 9223/0827-67  Discharging Unit Phone Number: ***    Emergency Contact:   Extended Emergency Contact Information  Primary Emergency Contact: [address] Phone: [phone]  Mobile Phone: [phone]  Relation: Next of Kin  Secondary Emergency Contact:  [address] Phone: [phone]  Mobile Phone: [phone]  Relation: Brother/Sister    Past Surgical History:  Past Surgical History:   Procedure Laterality Date    BACK SURGERY      x2, Dr. Praveen Pacheco      L3-4 decompression    CARDIAC ELECTROPHYSIOLOGY STUDY AND ABLATION      CARPAL TUNNEL RELEASE Right     JOINT REPLACEMENT Right 2018    NERVE BLOCK  2013    dduramorph  celestone 9mg    NERVE BLOCK N/A 2013    lumbar RASHMI    OTHER SURGICAL HISTORY  2016    Lumbar RASHMI    OTHER SURGICAL HISTORY  2018    lumbosacral myelogram    PACEMAKER PLACEMENT  2015    with defib    ROTATOR CUFF REPAIR Right     TONSILLECTOMY      HAS NO TONSILS, NEVER HAD SURGERY       Immunization History:   Immunization History   Administered Date(s) Administered    COVID-19, J&J, (age 18y+), IM, 0.5 mL 2021    Influenza, FLUARIX, FLULAVAL, FLUZONE (age 10 mo+) AND AFLURIA, (age 1 y+), PF, 0.5mL 2020    Pneumococcal, PPSV23, PNEUMOVAX 23, (age 2y+), SC/IM, 0.5mL 2017       Active Problems:  Patient Active Problem List   Diagnosis Code    Postlaminectomy syndrome, lumbar region M96.1    Cervical spondylosis with myelopathy M47.12    Arthropathy, unspecified, other specified sites M12.9    Cerebral contusion ([address]) S06. 33AA    DDD (degenerative disc disease), lumbar M51.36    Hyperlipidemia {EQUIPMENT:351639295}  Other Treatments: ***    Patient's personal belongings (please select all that are sent with patient):  {CHP DME Belongings:372131681}    RN SIGNATURE:  {Esignature:[id number]}    CASE MANAGEMENT/SOCIAL WORK SECTION    Inpatient Status Date: ***    Readmission Risk Assessment Score:  Readmission Risk              Risk of Unplanned Readmission:  18           Discharging to Facility/ Agency   Name:   Address:  Phone:  Fax:    Dialysis Facility (if applicable)   Name:  Address:  Dialysis Schedule:  Phone:  Fax:    / signature: {Esignature:[id number]}    PHYSICIAN SECTION    Prognosis: Fair    Condition at Discharge: Stable    Rehab Potential (if transferring to Rehab): Fair    Recommended Labs or Other Treatments After Discharge: Follow up with your PCP in 3 days. Call for an appointment as soon as possible. Follow-up with specialist as instructed. Call for an appointment as soon as possible. Medications as instructed. Return to the emergency department immediately for any new or worsening concerns. Physician Certification: I certify the above information and transfer of Karo Alvarez  is necessary for the continuing treatment of the diagnosis listed and that he requires BHI for less 30 days.      Update Admission H&P: No change in H&P    PHYSICIAN SIGNATURE:  Electronically signed by Susan Esparza DO on 9/24/23 at 6:48 PM EDT

## 2023-09-24 NOTE — PLAN OF CARE
Problem: Respiratory - Adult  Goal: Achieves optimal ventilation and oxygenation  9/23/2023 2023 by Leola Parkinson RCP  Outcome: Progressing

## 2023-09-24 NOTE — PROGRESS NOTES
Department of Psychiatry  Consult Progress Note  9/23/2023    Patient Name: Jose Reza    Reason for initial consult:  Eval for suicidal ideation. Possible overdose. Moderate risk per screening          Interval History:    Vanessa Orr is seen at bedside today with safety sitter present. He was alert and oriented to self, location, date, and situation. He was agreeable to conversation. Patient continues to minimize recent events and states that he did not attempt to overdose on opioids. He is now also modifying the conversation he had with his PCP regarding wanting to enter hospice. He is stating that he only talked about hospice with his PCP because this is what he wants to do \"in a few years when that time comes\". He does endorse low mood related to chronic pain and he states \"there is nothing that is going to fix any of this\". He continues to feel very hopeless and helpless. Patient reports he has had visitors from family and friends and this has lifted his spirits somewhat. He is encouraged to consider follow-up with community mental health support for ongoing psychotherapy. At this time, due to circumstances surrounding admission including possible overdose and history of depression, patient would likely be unsafe from himself outside of the hospital. Patient will require admission to [address] for safety and stability once medically cleared.          Mental Status Exam  Level of consciousness: Alert and awake   Appearance: Appropriate attire for setting, seated on bed, with poor grooming and hygiene   Behavior/Motor: Approachable, engages with interviewer, somewhat irritable  Attitude toward examiner: Mostly cooperative, attentive, good eye contact  Speech: normal rate, normal volume, slightly irritable tone  Mood: Euthymic, somewhat irritable  Affect: Mood-congruent  Thought processes: linear and coherent   Thought content:  Denies homicidal ideation  Suicidal Ideation: Denies suicidal ideations however it

## 2023-09-24 NOTE — PLAN OF CARE
Problem: Discharge Planning  Goal: Discharge to home or other facility with appropriate resources  9/24/2023 0419 by Sandy Carr RN  Outcome: Progressing  Flowsheets (Taken 9/23/2023 1945)  Discharge to home or other facility with appropriate resources: Identify barriers to discharge with patient and caregiver  9/23/2023 1430 by Richardean Landau, RN  Outcome: Progressing     Problem: Pain  Goal: Verbalizes/displays adequate comfort level or baseline comfort level  9/24/2023 0419 by Sandy Carr RN  Outcome: Progressing  9/23/2023 1430 by Richardean Landau, RN  Outcome: Progressing     Problem: Safety - Adult  Goal: Free from fall injury  9/24/2023 0419 by Sandy Carr RN  Outcome: Sade Parks (Taken 9/23/2023 1945)  Free From Fall Injury: Instruct family/caregiver on patient safety  9/23/2023 1430 by Richardean Landau, RN  Outcome: Progressing     Problem: Respiratory - Adult  Goal: Achieves optimal ventilation and oxygenation  9/24/2023 0419 by Sandy Carr RN  Outcome: Progressing  9/23/2023 2023 by Cynthia Ordonez RCP  Outcome: Progressing  9/23/2023 1430 by Richardean Landau, RN  Outcome: Progressing     Problem: Skin/Tissue Integrity  Goal: Absence of new skin breakdown  Description: 1. Monitor for areas of redness and/or skin breakdown  2. Assess vascular access sites hourly  3. Every 4-6 hours minimum:  Change oxygen saturation probe site  4. Every 4-6 hours:  If on nasal continuous positive airway pressure, respiratory therapy assess nares and determine need for appliance change or resting period.   9/24/2023 0419 by Sandy Carr RN  Outcome: Progressing  9/23/2023 1430 by Richardean Landau, RN  Outcome: Progressing     Problem: ABCDS Injury Assessment  Goal: Absence of physical injury  9/24/2023 0419 by Sandy Carr RN  Outcome: Progressing  Flowsheets (Taken 9/23/2023

## 2023-09-25 ENCOUNTER — HOSPITAL ENCOUNTER (INPATIENT)
Age: 63
LOS: 7 days | Discharge: HOME | DRG: 885 | End: 2023-10-02
Attending: PSYCHIATRY & NEUROLOGY | Admitting: PSYCHIATRY & NEUROLOGY
Payer: MEDICARE

## 2023-09-25 VITALS
HEIGHT: 71 IN | OXYGEN SATURATION: 92 % | SYSTOLIC BLOOD PRESSURE: 120 MMHG | TEMPERATURE: 98.5 F | HEART RATE: 66 BPM | DIASTOLIC BLOOD PRESSURE: 70 MMHG | BODY MASS INDEX: 28.95 KG/M2 | RESPIRATION RATE: 16 BRPM | WEIGHT: 206.79 LBS

## 2023-09-25 DIAGNOSIS — F43.9 STRESS AT HOME: ICD-10-CM

## 2023-09-25 DIAGNOSIS — F41.9 ANXIETY: ICD-10-CM

## 2023-09-25 PROBLEM — F32.A DEPRESSION WITH SUICIDAL IDEATION: Status: ACTIVE | Noted: 2023-09-25

## 2023-09-25 PROBLEM — R45.851 DEPRESSION WITH SUICIDAL IDEATION: Status: ACTIVE | Noted: 2023-09-25

## 2023-09-25 LAB
ANION GAP SERPL CALCULATED.3IONS-SCNC: 5 MMOL/L (ref 9–17)
BUN SERPL-MCNC: 9 MG/DL (ref 8–23)
BUN/CREAT SERPL: 11 (ref 9–20)
CALCIUM SERPL-MCNC: 9 MG/DL (ref 8.6–10.4)
CHLORIDE SERPL-SCNC: 103 MMOL/L (ref 98–107)
CO2 SERPL-SCNC: 32 MMOL/L (ref 20–31)
CREAT SERPL-MCNC: 0.8 MG/DL (ref 0.7–1.2)
GFR SERPL CREATININE-BSD FRML MDRD: >60 ML/MIN/1.73M2
GLUCOSE SERPL-MCNC: 111 MG/DL (ref 70–99)
MAGNESIUM SERPL-MCNC: 1.9 MG/DL (ref 1.6–2.6)
POTASSIUM SERPL-SCNC: 3.7 MMOL/L (ref 3.7–5.3)
SODIUM SERPL-SCNC: 140 MMOL/L (ref 135–144)

## 2023-09-25 PROCEDURE — 36415 COLL VENOUS BLD VENIPUNCTURE: CPT

## 2023-09-25 PROCEDURE — 83735 ASSAY OF MAGNESIUM: CPT

## 2023-09-25 PROCEDURE — 6370000000 HC RX 637 (ALT 250 FOR IP): Performed by: NURSE PRACTITIONER

## 2023-09-25 PROCEDURE — 6370000000 HC RX 637 (ALT 250 FOR IP): Performed by: INTERNAL MEDICINE

## 2023-09-25 PROCEDURE — 99231 SBSQ HOSP IP/OBS SF/LOW 25: CPT | Performed by: STUDENT IN AN ORGANIZED HEALTH CARE EDUCATION/TRAINING PROGRAM

## 2023-09-25 PROCEDURE — 94640 AIRWAY INHALATION TREATMENT: CPT

## 2023-09-25 PROCEDURE — 80048 BASIC METABOLIC PNL TOTAL CA: CPT

## 2023-09-25 PROCEDURE — 2580000003 HC RX 258: Performed by: NURSE PRACTITIONER

## 2023-09-25 PROCEDURE — 1240000000 HC EMOTIONAL WELLNESS R&B

## 2023-09-25 PROCEDURE — 2700000000 HC OXYGEN THERAPY PER DAY

## 2023-09-25 PROCEDURE — 94761 N-INVAS EAR/PLS OXIMETRY MLT: CPT

## 2023-09-25 PROCEDURE — 6370000000 HC RX 637 (ALT 250 FOR IP): Performed by: STUDENT IN AN ORGANIZED HEALTH CARE EDUCATION/TRAINING PROGRAM

## 2023-09-25 RX ORDER — MAGNESIUM HYDROXIDE/ALUMINUM HYDROXICE/SIMETHICONE 120; 1200; 1200 MG/30ML; MG/30ML; MG/30ML
30 SUSPENSION ORAL EVERY 6 HOURS PRN
Status: DISCONTINUED | OUTPATIENT
Start: 2023-09-25 | End: 2023-10-02 | Stop reason: HOSPADM

## 2023-09-25 RX ORDER — POLYETHYLENE GLYCOL 3350 17 G/17G
17 POWDER, FOR SOLUTION ORAL DAILY PRN
Status: DISCONTINUED | OUTPATIENT
Start: 2023-09-25 | End: 2023-10-02 | Stop reason: HOSPADM

## 2023-09-25 RX ORDER — ACETAMINOPHEN 325 MG/1
650 TABLET ORAL EVERY 4 HOURS PRN
Status: DISCONTINUED | OUTPATIENT
Start: 2023-09-25 | End: 2023-10-02 | Stop reason: HOSPADM

## 2023-09-25 RX ORDER — HYDROXYZINE 50 MG/1
50 TABLET, FILM COATED ORAL 3 TIMES DAILY PRN
Status: DISCONTINUED | OUTPATIENT
Start: 2023-09-25 | End: 2023-10-02 | Stop reason: HOSPADM

## 2023-09-25 RX ORDER — TRAZODONE HYDROCHLORIDE 50 MG/1
50 TABLET ORAL NIGHTLY PRN
Status: DISCONTINUED | OUTPATIENT
Start: 2023-09-25 | End: 2023-10-02 | Stop reason: HOSPADM

## 2023-09-25 RX ORDER — POLYETHYLENE GLYCOL 3350 17 G
2 POWDER IN PACKET (EA) ORAL
Status: DISCONTINUED | OUTPATIENT
Start: 2023-09-25 | End: 2023-10-02 | Stop reason: HOSPADM

## 2023-09-25 RX ADMIN — DOCUSATE SODIUM 100 MG: 100 CAPSULE, LIQUID FILLED ORAL at 08:55

## 2023-09-25 RX ADMIN — BUSPIRONE HYDROCHLORIDE 7.5 MG: 5 TABLET ORAL at 08:55

## 2023-09-25 RX ADMIN — PANTOPRAZOLE SODIUM 40 MG: 40 TABLET, DELAYED RELEASE ORAL at 07:13

## 2023-09-25 RX ADMIN — BUDESONIDE AND FORMOTEROL FUMARATE DIHYDRATE 2 PUFF: 160; 4.5 AEROSOL RESPIRATORY (INHALATION) at 09:44

## 2023-09-25 RX ADMIN — SACUBITRIL AND VALSARTAN 1 TABLET: 24; 26 TABLET, FILM COATED ORAL at 08:54

## 2023-09-25 RX ADMIN — RIVAROXABAN 20 MG: 20 TABLET, FILM COATED ORAL at 08:55

## 2023-09-25 RX ADMIN — HYDROXYZINE HYDROCHLORIDE 50 MG: 50 TABLET, FILM COATED ORAL at 20:36

## 2023-09-25 RX ADMIN — OXYCODONE HYDROCHLORIDE 10 MG: 5 TABLET ORAL at 07:20

## 2023-09-25 RX ADMIN — MULTIVITAMIN TABLET 1 TABLET: TABLET at 08:54

## 2023-09-25 RX ADMIN — FUROSEMIDE 40 MG: 40 TABLET ORAL at 08:54

## 2023-09-25 RX ADMIN — CYANOCOBALAMIN TAB 1000 MCG 1000 MCG: 1000 TAB at 08:56

## 2023-09-25 RX ADMIN — TRAZODONE HYDROCHLORIDE 50 MG: 50 TABLET ORAL at 20:36

## 2023-09-25 RX ADMIN — Medication 100 MG: at 08:54

## 2023-09-25 RX ADMIN — ESCITALOPRAM OXALATE 5 MG: 10 TABLET ORAL at 08:56

## 2023-09-25 RX ADMIN — SPIRONOLACTONE 25 MG: 25 TABLET ORAL at 08:55

## 2023-09-25 RX ADMIN — FOLIC ACID 1 MG: 1 TABLET ORAL at 08:55

## 2023-09-25 RX ADMIN — METOPROLOL SUCCINATE 25 MG: 25 TABLET, EXTENDED RELEASE ORAL at 08:54

## 2023-09-25 RX ADMIN — SODIUM CHLORIDE, PRESERVATIVE FREE 10 ML: 5 INJECTION INTRAVENOUS at 09:00

## 2023-09-25 RX ADMIN — OXYCODONE HYDROCHLORIDE 10 MG: 5 TABLET ORAL at 16:56

## 2023-09-25 ASSESSMENT — PAIN SCALES - GENERAL
PAINLEVEL_OUTOF10: 8
PAINLEVEL_OUTOF10: 8
PAINLEVEL_OUTOF10: 0
PAINLEVEL_OUTOF10: 8

## 2023-09-25 ASSESSMENT — LIFESTYLE VARIABLES
HOW MANY STANDARD DRINKS CONTAINING ALCOHOL DO YOU HAVE ON A TYPICAL DAY: 1 OR 2
HOW OFTEN DO YOU HAVE A DRINK CONTAINING ALCOHOL: NEVER

## 2023-09-25 ASSESSMENT — SLEEP AND FATIGUE QUESTIONNAIRES
DO YOU HAVE DIFFICULTY SLEEPING: YES
AVERAGE NUMBER OF SLEEP HOURS: 6
DO YOU USE A SLEEP AID: YES
SLEEP PATTERN: DISTURBED/INTERRUPTED SLEEP

## 2023-09-25 ASSESSMENT — PATIENT HEALTH QUESTIONNAIRE - PHQ9
SUM OF ALL RESPONSES TO PHQ QUESTIONS 1-9: 0
2. FEELING DOWN, DEPRESSED OR HOPELESS: 0
SUM OF ALL RESPONSES TO PHQ QUESTIONS 1-9: 0
1. LITTLE INTEREST OR PLEASURE IN DOING THINGS: 0
SUM OF ALL RESPONSES TO PHQ QUESTIONS 1-9: 0
SUM OF ALL RESPONSES TO PHQ QUESTIONS 1-9: 0
SUM OF ALL RESPONSES TO PHQ9 QUESTIONS 1 & 2: 0

## 2023-09-25 ASSESSMENT — PAIN SCALES - WONG BAKER: WONGBAKER_NUMERICALRESPONSE: 2

## 2023-09-25 ASSESSMENT — PAIN DESCRIPTION - LOCATION: LOCATION: GENERALIZED

## 2023-09-25 NOTE — PROGRESS NOTES
Diverticulosis. CT ABDOMEN PELVIS W IV CONTRAST Additional Contrast? None    Result Date: 9/20/2023  1. No evidence of pulmonary embolism or acute pulmonary abnormality. 2.  Findings suggestive of diarrheal process, otherwise no acute inflammatory process identified in the abdomen or pelvis. 3.  Cholelithiasis. 4.  Diverticulosis. CT HEAD WO CONTRAST    Result Date: 9/20/2023  No acute intracranial abnormality identified. Old left cerebellar infarct. XR CHEST PORTABLE    Result Date: 9/20/2023  No acute cardiopulmonary disease identified. Enteric tube which extends just into the stomach and should be advanced. Physical Examination:        General appearance:  Alert, in no distress. Mental Status:  oriented to person place and time. Lungs:  Breath sounds bilaterally. No wheezing or crackles. Heart:  regular rate and rhythm, no murmur  Abdomen:  soft, nontender, nondistended, bowel sounds sluggish.    Extremities:  no edema, redness, tenderness in the calves  Skin:  no gross lesions, rashes, induration    Assessment:        Hospital Problems             Last Modified POA    * (Principal) Toxic metabolic encephalopathy 5/83/4131 Yes    Hypokalemia 9/21/2023 Yes    Hyperlipidemia 9/21/2023 Yes    Essential hypertension 9/21/2023 Yes    Major depression 9/21/2023 Yes    Tobacco abuse 9/21/2023 Yes    CAD (coronary artery disease) 9/21/2023 Yes    Atrial fibrillation with RVR ([address]) 9/21/2023 Yes    Hypothyroidism 9/21/2023 Yes    Chronic systolic congestive heart failure ([address]) 9/21/2023 Yes    Seizure-like activity ([address]) 9/21/2023 Yes    Hyperammonemia ([address]) 9/21/2023 Yes    SIRS (systemic inflammatory response syndrome) ([address]) 9/21/2023 Yes    High anion gap metabolic acidosis 6/70/2882 Yes    History of CVA (cerebrovascular accident) 9/21/2023 Yes    Acute and chronic respiratory failure with hypoxia ([address]) 9/21/2023 Yes    Goals of care, counseling/discussion 9/21/2023 Yes    DNR (do not for discharge. MRI brain and EEG unremarkable for intracranial abnormality or seizure-like activity respectively. Neurology signed off. Echocardiogram unremarkable. Cardiology signed off. Psych recommending discharge to [facility] due to concern for suicidal ideation/attempt with opiate overdose. DC to [facility] today.     Devyn Bangura DO  9/25/2023  7:56 AM

## 2023-09-25 NOTE — BH NOTE
Patient given tobacco quitline number 71924231220 at this time, refusing to call at this time, states \" I just dont want to quit now\"- patient given information as to the dangers of long term tobacco use. Continue to reinforce the importance of tobacco cessation.

## 2023-09-25 NOTE — PLAN OF CARE
Problem: Discharge Planning  Goal: Discharge to home or other facility with appropriate resources  9/25/2023 0413 by Jaymie Lindsey RN  Outcome: Progressing     Problem: Pain  Goal: Verbalizes/displays adequate comfort level or baseline comfort level  9/25/2023 0413 by Jaymie Lindsey RN  Outcome: Progressing     Problem: Safety - Adult  Goal: Free from fall injury  9/25/2023 0413 by Jaymie Lindsey RN  Outcome: Progressing     Problem: Respiratory - Adult  Goal: Achieves optimal ventilation and oxygenation  9/25/2023 0413 by Jaymie Lindsey RN  Outcome: Progressing     Problem: Skin/Tissue Integrity  Goal: Absence of new skin breakdown  Description: 1. Monitor for areas of redness and/or skin breakdown  2. Assess vascular access sites hourly  3. Every 4-6 hours minimum:  Change oxygen saturation probe site  4. Every 4-6 hours:  If on nasal continuous positive airway pressure, respiratory therapy assess nares and determine need for appliance change or resting period.   9/25/2023 0413 by Jaymie Lindsey RN  Outcome: Progressing     Problem: Chronic Conditions and Co-morbidities  Goal: Patient's chronic conditions and co-morbidity symptoms are monitored and maintained or improved  9/25/2023 0413 by Jaymie Lindsey RN  Outcome: Progressing     Problem: Nutrition Deficit:  Goal: Optimize nutritional status  9/25/2023 0413 by Jaymie Lindsey RN  Outcome: Progressing

## 2023-09-25 NOTE — CARE COORDINATION
Discharge planning    Patient voluntary slip signed for psych admission. [facility] assess notified. Patient information provided. Waiting on bed availability for I.
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
## 2023-09-25 NOTE — PROGRESS NOTES
RN sent message to Pretty Greer NP for Psychiatry about Dr. Linda Romo and the rest of the medical teams feels patient is medically stable to be transferred to [facility] and placed Discharge orders. We would like to get the process started for the transfer over to [facility]. Awaiting response.

## 2023-09-25 NOTE — PLAN OF CARE
Problem: Discharge Planning  Goal: Discharge to home or other facility with appropriate resources  Outcome: Progressing  Flowsheets  Taken 9/24/2023 1630  Discharge to home or other facility with appropriate resources:   Identify barriers to discharge with patient and caregiver   Arrange for needed discharge resources and transportation as appropriate   Identify discharge learning needs (meds, wound care, etc)   Refer to discharge planning if patient needs post-hospital services based on physician order or complex needs related to functional status, cognitive ability or social support system  Taken 9/24/2023 0900  Discharge to home or other facility with appropriate resources:   Identify barriers to discharge with patient and caregiver   Arrange for needed discharge resources and transportation as appropriate   Identify discharge learning needs (meds, wound care, etc)   Refer to discharge planning if patient needs post-hospital services based on physician order or complex needs related to functional status, cognitive ability or social support system     Problem: Pain  Goal: Verbalizes/displays adequate comfort level or baseline comfort level  Outcome: Progressing  Flowsheets (Taken 9/24/2023 0800)  Verbalizes/displays adequate comfort level or baseline comfort level:   Encourage patient to monitor pain and request assistance   Assess pain using appropriate pain scale   Administer analgesics based on type and severity of pain and evaluate response   Implement non-pharmacological measures as appropriate and evaluate response   Notify Licensed Independent Practitioner if interventions unsuccessful or patient reports new pain   Consider cultural and social influences on pain and pain management     Problem: Safety - Adult  Goal: Free from fall injury  Outcome: Progressing  Flowsheets (Taken 9/24/2023 2125)  Free From Fall Injury: Instruct family/caregiver on patient safety     Problem: Respiratory - Adult  Goal:

## 2023-09-25 NOTE — PROGRESS NOTES
Report given to [facility] nurse.  Pt left with ems after 6pm.

## 2023-09-26 LAB
MICROORGANISM SPEC CULT: NORMAL
MICROORGANISM SPEC CULT: NORMAL
SERVICE CMNT-IMP: NORMAL
SERVICE CMNT-IMP: NORMAL
SPECIMEN DESCRIPTION: NORMAL
SPECIMEN DESCRIPTION: NORMAL

## 2023-09-26 PROCEDURE — 6370000000 HC RX 637 (ALT 250 FOR IP): Performed by: PSYCHIATRY & NEUROLOGY

## 2023-09-26 PROCEDURE — 97166 OT EVAL MOD COMPLEX 45 MIN: CPT

## 2023-09-26 PROCEDURE — APPSS60 APP SPLIT SHARED TIME 46-60 MINUTES: Performed by: NURSE PRACTITIONER

## 2023-09-26 PROCEDURE — 6370000000 HC RX 637 (ALT 250 FOR IP): Performed by: NURSE PRACTITIONER

## 2023-09-26 PROCEDURE — 90792 PSYCH DIAG EVAL W/MED SRVCS: CPT | Performed by: PSYCHIATRY & NEUROLOGY

## 2023-09-26 PROCEDURE — 97535 SELF CARE MNGMENT TRAINING: CPT

## 2023-09-26 PROCEDURE — 6370000000 HC RX 637 (ALT 250 FOR IP): Performed by: STUDENT IN AN ORGANIZED HEALTH CARE EDUCATION/TRAINING PROGRAM

## 2023-09-26 PROCEDURE — 99223 1ST HOSP IP/OBS HIGH 75: CPT | Performed by: INTERNAL MEDICINE

## 2023-09-26 PROCEDURE — 1240000000 HC EMOTIONAL WELLNESS R&B

## 2023-09-26 RX ORDER — PROPRANOLOL HYDROCHLORIDE 20 MG/1
10 TABLET ORAL 3 TIMES DAILY
Status: DISCONTINUED | OUTPATIENT
Start: 2023-09-26 | End: 2023-10-02 | Stop reason: HOSPADM

## 2023-09-26 RX ORDER — DOCUSATE SODIUM 100 MG/1
100 CAPSULE, LIQUID FILLED ORAL 2 TIMES DAILY
Status: DISCONTINUED | OUTPATIENT
Start: 2023-09-26 | End: 2023-10-02 | Stop reason: HOSPADM

## 2023-09-26 RX ORDER — ESCITALOPRAM OXALATE 10 MG/1
5 TABLET ORAL DAILY
Status: DISCONTINUED | OUTPATIENT
Start: 2023-09-26 | End: 2023-09-26

## 2023-09-26 RX ORDER — METOPROLOL SUCCINATE 25 MG/1
25 TABLET, EXTENDED RELEASE ORAL DAILY
Status: DISCONTINUED | OUTPATIENT
Start: 2023-09-26 | End: 2023-09-26

## 2023-09-26 RX ORDER — GAUZE BANDAGE 2" X 2"
100 BANDAGE TOPICAL DAILY
Status: DISCONTINUED | OUTPATIENT
Start: 2023-09-26 | End: 2023-10-02 | Stop reason: HOSPADM

## 2023-09-26 RX ORDER — IPRATROPIUM BROMIDE AND ALBUTEROL SULFATE 2.5; .5 MG/3ML; MG/3ML
1 SOLUTION RESPIRATORY (INHALATION) EVERY 4 HOURS PRN
Status: DISCONTINUED | OUTPATIENT
Start: 2023-09-26 | End: 2023-10-02 | Stop reason: HOSPADM

## 2023-09-26 RX ORDER — BUDESONIDE AND FORMOTEROL FUMARATE DIHYDRATE 160; 4.5 UG/1; UG/1
2 AEROSOL RESPIRATORY (INHALATION)
Status: DISCONTINUED | OUTPATIENT
Start: 2023-09-26 | End: 2023-10-02 | Stop reason: HOSPADM

## 2023-09-26 RX ORDER — LANOLIN ALCOHOL/MO/W.PET/CERES
6 CREAM (GRAM) TOPICAL NIGHTLY PRN
Status: DISCONTINUED | OUTPATIENT
Start: 2023-09-26 | End: 2023-10-02 | Stop reason: HOSPADM

## 2023-09-26 RX ORDER — SPIRONOLACTONE 25 MG/1
25 TABLET ORAL DAILY
Status: DISCONTINUED | OUTPATIENT
Start: 2023-09-26 | End: 2023-10-02 | Stop reason: HOSPADM

## 2023-09-26 RX ORDER — LANOLIN ALCOHOL/MO/W.PET/CERES
1000 CREAM (GRAM) TOPICAL DAILY
Status: DISCONTINUED | OUTPATIENT
Start: 2023-09-26 | End: 2023-10-02 | Stop reason: HOSPADM

## 2023-09-26 RX ORDER — FUROSEMIDE 40 MG/1
40 TABLET ORAL DAILY
Status: DISCONTINUED | OUTPATIENT
Start: 2023-09-26 | End: 2023-10-02 | Stop reason: HOSPADM

## 2023-09-26 RX ORDER — FOLIC ACID 1 MG/1
1 TABLET ORAL DAILY
Status: DISCONTINUED | OUTPATIENT
Start: 2023-09-26 | End: 2023-10-02 | Stop reason: HOSPADM

## 2023-09-26 RX ORDER — DULOXETIN HYDROCHLORIDE 20 MG/1
20 CAPSULE, DELAYED RELEASE ORAL DAILY
Status: DISCONTINUED | OUTPATIENT
Start: 2023-09-26 | End: 2023-10-02 | Stop reason: HOSPADM

## 2023-09-26 RX ORDER — BUSPIRONE HYDROCHLORIDE 7.5 MG/1
7.5 TABLET ORAL 2 TIMES DAILY
Status: DISCONTINUED | OUTPATIENT
Start: 2023-09-26 | End: 2023-10-02 | Stop reason: HOSPADM

## 2023-09-26 RX ORDER — ATORVASTATIN CALCIUM 10 MG/1
10 TABLET, FILM COATED ORAL NIGHTLY
Status: DISCONTINUED | OUTPATIENT
Start: 2023-09-26 | End: 2023-10-02 | Stop reason: HOSPADM

## 2023-09-26 RX ORDER — M-VIT,TX,IRON,MINS/CALC/FOLIC 27MG-0.4MG
1 TABLET ORAL DAILY
Status: DISCONTINUED | OUTPATIENT
Start: 2023-09-26 | End: 2023-10-02 | Stop reason: HOSPADM

## 2023-09-26 RX ADMIN — Medication 2 PUFF: at 21:48

## 2023-09-26 RX ADMIN — SACUBITRIL AND VALSARTAN 1 TABLET: 24; 26 TABLET, FILM COATED ORAL at 21:45

## 2023-09-26 RX ADMIN — FUROSEMIDE 40 MG: 40 TABLET ORAL at 10:50

## 2023-09-26 RX ADMIN — BUSPIRONE HYDROCHLORIDE 7.5 MG: 7.5 TABLET ORAL at 10:50

## 2023-09-26 RX ADMIN — DULOXETINE HYDROCHLORIDE 20 MG: 20 CAPSULE, DELAYED RELEASE ORAL at 10:50

## 2023-09-26 RX ADMIN — FOLIC ACID 1 MG: 1 TABLET ORAL at 10:51

## 2023-09-26 RX ADMIN — HYDROXYZINE HYDROCHLORIDE 50 MG: 50 TABLET, FILM COATED ORAL at 09:54

## 2023-09-26 RX ADMIN — Medication 6 MG: at 21:48

## 2023-09-26 RX ADMIN — ATORVASTATIN CALCIUM 10 MG: 10 TABLET, FILM COATED ORAL at 21:48

## 2023-09-26 RX ADMIN — POLYETHYLENE GLYCOL 3350 17 G: 17 POWDER, FOR SOLUTION ORAL at 12:48

## 2023-09-26 RX ADMIN — BUSPIRONE HYDROCHLORIDE 7.5 MG: 7.5 TABLET ORAL at 21:48

## 2023-09-26 RX ADMIN — NICOTINE POLACRILEX 2 MG: 2 LOZENGE ORAL at 10:53

## 2023-09-26 RX ADMIN — THIAMINE HCL TAB 100 MG 100 MG: 100 TAB at 10:50

## 2023-09-26 RX ADMIN — TRAZODONE HYDROCHLORIDE 50 MG: 50 TABLET ORAL at 21:47

## 2023-09-26 RX ADMIN — SPIRONOLACTONE 25 MG: 25 TABLET ORAL at 12:28

## 2023-09-26 RX ADMIN — RIVAROXABAN 20 MG: 20 TABLET, FILM COATED ORAL at 12:29

## 2023-09-26 RX ADMIN — CYANOCOBALAMIN TAB 1000 MCG 1000 MCG: 1000 TAB at 12:28

## 2023-09-26 RX ADMIN — DOCUSATE SODIUM 100 MG: 100 CAPSULE, LIQUID FILLED ORAL at 21:47

## 2023-09-26 RX ADMIN — MULTIPLE VITAMINS W/ MINERALS TAB 1 TABLET: TAB at 10:50

## 2023-09-26 RX ADMIN — ACETAMINOPHEN 650 MG: 325 TABLET ORAL at 12:48

## 2023-09-26 RX ADMIN — Medication 2 PUFF: at 12:30

## 2023-09-26 RX ADMIN — SACUBITRIL AND VALSARTAN 1 TABLET: 24; 26 TABLET, FILM COATED ORAL at 12:30

## 2023-09-26 RX ADMIN — PROPRANOLOL HYDROCHLORIDE 10 MG: 20 TABLET ORAL at 21:46

## 2023-09-26 RX ADMIN — DOCUSATE SODIUM 100 MG: 100 CAPSULE, LIQUID FILLED ORAL at 10:50

## 2023-09-26 ASSESSMENT — PAIN DESCRIPTION - PAIN TYPE: TYPE: CHRONIC PAIN

## 2023-09-26 ASSESSMENT — PAIN DESCRIPTION - FREQUENCY: FREQUENCY: CONTINUOUS

## 2023-09-26 ASSESSMENT — PAIN DESCRIPTION - DESCRIPTORS: DESCRIPTORS: DISCOMFORT;ACHING

## 2023-09-26 ASSESSMENT — PAIN DESCRIPTION - ONSET: ONSET: ON-GOING

## 2023-09-26 ASSESSMENT — PAIN SCALES - GENERAL: PAINLEVEL_OUTOF10: 9

## 2023-09-26 ASSESSMENT — PAIN DESCRIPTION - LOCATION: LOCATION: GENERALIZED

## 2023-09-26 NOTE — H&P
Department of Psychiatry  Attending Physician Psychiatric Assessment     Reason for Admission to Psychiatric Unit:  Concerns about patient's safety in the community    CHIEF COMPLAINT:  Depression and possible overdose attempt    History obtained from: Patient, electronic medical record          HISTORY OF PRESENT ILLNESS:    Mercy Villalta is a 58 y.o. male who has a past medical history of adjustment disorder; alcohol dependence; major depression; TBI; atrial fibrillation; CAD; CHF; hyperlipidemia; hypertension; automatic cardioverter/defibrillator. Patient presented to the ED on 9/20/23 unresponsive by his son and was intubated following what was described like a seizure like episode. He was then admitted medically for sepsis. Patient was extubated on 9/21/23. He is admitted voluntarily to the [facility] for depression like symptoms and possible overdose attempt. Prior to his admission to the hospital patient visited his PCP on 9/18/23 for a referral request for hospice care. At that time patient reported severe COPD, a \"bad heart\", and the belief that he might have oral cancer, because of mouth lesions and exposed jaw bone, recent weight loss, and being short of breath all the time. However, he had not followed up with any medical testing for cancer screening, seen a dentist, or followed up with other medical providers who could have helped in making this diagnosis. [facility] was consulted on 9/21/23 for evaluation for suicidal ideation, following a possible overdose. His urine toxicology report upon admission to the hospital was positive for oxycodone and cannabinoid. At that time he was very agitated and delirious, making it difficult to assess him. He was only A&O to place and person. He thought it was 2021, and he didn't know what led to his hospitalization. A SLUMS exam was performed with a score of 4/30. A SLUMS test was given again the following day in which the patient scored 25/30.  At this and other
Department of Psychiatry  Attending Physician Psychiatric Assessment     Reason for Admission to Psychiatric Unit:  Concerns about patient's safety in the community    CHIEF COMPLAINT:  Depression and possible overdose attempt    History obtained from: Patient, electronic medical record          HISTORY OF PRESENT ILLNESS:    Tania Xie is a 58 y.o. male who has a past medical history of adjustment disorder; alcohol dependence; major depression; TBI; atrial fibrillation; CAD; CHF; hyperlipidemia; hypertension; and automatic cardioverter/defibrillator. Patient presented to the ED on 9/20/23 unresponsive by his son and was intubated following what was described like a seizure like episode. He was then admitted medically for sepsis. Patient was extubated on 9/21/23. Patient exhibiting delusional thoughts and altered mental status initially. Noted that prior to overdose patient requesting to be placed on hospice, but has no terminal diagnoses. Patient was unable to confirm that his overdose was not a suicide attempt. He is admitted voluntarily to the [facility]. Reviewed EMR, prior to his admission to the hospital patient visited his PCP on 9/18/23 for a referral request for hospice care. At that time patient reported severe COPD, a \"bad heart\", and the belief that he might have oral cancer, because of mouth lesions and exposed jaw bone, recent weight loss, and being short of breath all the time. However, he had not followed up with any medical testing for cancer screening, seen a dentist, or followed up with other medical providers who could have helped in making this diagnosis. Patient also had  similar presentation to [place] ED, where he overdosed and required Narcan for arousal. Patient had expressed at that time, that he believed his family was trying to poison him. Uncertain if patient was admitted to another psychiatric facility at that time.      [facility] was consulted on 9/21/23 for evaluation for suicidal
sent to Dr. Jimbo Guan APRN - CNP    Please note that this chart was generated using voice recognition Dragon dictation software. Although every effort was made to ensure the accuracy of this automated transcription, some errors in transcription may have occurred.

## 2023-09-26 NOTE — BH NOTE
Internal Medicine messaged regarding PT being a new admit last, DNRCC-Arrest, and home medications needing to be restarted/ordered.

## 2023-09-26 NOTE — GROUP NOTE
Group Therapy Note    Date: 9/26/2023    Group Start Time: 1000  Group End Time: 2219  Group Topic: Psychoeducation    [address], LSW        Group Therapy Note    Attendees: 0/8       patient refused to attend psychoeducation group at [address] after encouragement from staff.   1:1 talk time provided as alternative to group session

## 2023-09-26 NOTE — GROUP NOTE
Group Therapy Note    Date: 9/26/2023    Group Start Time: 0902  Group End Time: 5283  Group Topic: Group Documentation    FERCHO Gil RN        Group Therapy Note    Attendees: 1/8  Community Meeting Group Note        Date: September 26, 2023 Start Time:  9:02am   End Time:  9:07am      Number of Participants in Group & Unit Census:  1/8    Topic: Goals group    Goal of Group: To establish attainable daily goal(s)      Comments:     Patient did not participate in [employer] group, despite staff encouragement and explanation of benefits. Patient remain seclusive to self. Q15 minute safety checks maintained for patient safety and will continue to encourage patient to attend unit programming.

## 2023-09-26 NOTE — GROUP NOTE
Group Therapy Note    Date: 9/26/2023    Group Start Time: 1100  Group End Time: 9707  Group Topic: Cognitive Skills    COSME Howard    Cognitive Skills Group Note        Date: September 26, 2023 Start Time: 11am  End Time:  1140am      Number of Participants in Group & Unit Census:  2/8    Topic: cognitive skills     Goal of Group: pt will demonstrate improved coping skills and improved self awareness       Comments:     Patient did not participate in Cognitive Skills group, despite staff encouragement and explanation of benefits. Patient remain seclusive to self. Q15 minute safety checks maintained for patient safety and will continue to encourage patient to attend unit programming.             Signature:  Murriel Habermann

## 2023-09-26 NOTE — CARE COORDINATION
[facility] Biopsychosocial Assessment    Current Level of Psychosocial Functioning     Independent xx  Dependent    Minimal Assist     Comments:    Psychosocial High Risk Factors (check all that apply)    Unable to obtain meds   Chronic illness/pain    Substance abuse   Lack of Family Support   Financial stress   Isolation   Inadequate Community Resources xx  Suicide attempt(s)  Not taking medications xx  Victim of crime   Developmental Delay  Unable to manage personal needs    Age 72 or older   Homeless  No transportation   Readmission within 30 days  Unemployment  Traumatic Event    Comments:   Psychiatric Advanced Directives: none reported     Family to Involve in Treatment: pt denies having family support, lives with his brother     Sexual Orientation:  n/a    Patient Strengths: pt has stable living environment     Patient Barriers: pt is not linked with outpatient resource       Opiate Education Provided:  pt denies       CMHC/mental health history: pt is not linked with outpatient provider; to be linked with appropriate resource depending upon discharge plan/location     Plan of Care   medication management, group/individual therapies, family meetings, psycho -education, treatment team meetings to assist with stabilization    Initial Discharge Plan:  pt states he will return home at discharge       Clinical Summary:  Osiris Chacon is a 58year old single male who has been admitted to [address] [place] [address] with report of paranoia and suicidal ideation. SW met with pt for assessment, pt is observed as hostile throughout assessment, SW offered reassurance and support. Pt is discharged focused throughout assessment, states he will return home although he states he will return home with his brother despite claiming his brother \"is trying to poison\" him. Pt is not linked with outpatient mental health provider. Pt does not want SW to reach out to any supports. Pt denies AOD issues, pt chart does report history of alcohol abuse.  Francisco Hatchet

## 2023-09-27 PROCEDURE — APPSS30 APP SPLIT SHARED TIME 16-30 MINUTES

## 2023-09-27 PROCEDURE — 1240000000 HC EMOTIONAL WELLNESS R&B

## 2023-09-27 PROCEDURE — 6370000000 HC RX 637 (ALT 250 FOR IP): Performed by: NURSE PRACTITIONER

## 2023-09-27 PROCEDURE — 6370000000 HC RX 637 (ALT 250 FOR IP): Performed by: STUDENT IN AN ORGANIZED HEALTH CARE EDUCATION/TRAINING PROGRAM

## 2023-09-27 PROCEDURE — 99232 SBSQ HOSP IP/OBS MODERATE 35: CPT | Performed by: PSYCHIATRY & NEUROLOGY

## 2023-09-27 PROCEDURE — 6370000000 HC RX 637 (ALT 250 FOR IP): Performed by: PSYCHIATRY & NEUROLOGY

## 2023-09-27 RX ADMIN — MULTIPLE VITAMINS W/ MINERALS TAB 1 TABLET: TAB at 08:37

## 2023-09-27 RX ADMIN — DOCUSATE SODIUM 100 MG: 100 CAPSULE, LIQUID FILLED ORAL at 08:38

## 2023-09-27 RX ADMIN — FUROSEMIDE 40 MG: 40 TABLET ORAL at 08:37

## 2023-09-27 RX ADMIN — BUSPIRONE HYDROCHLORIDE 7.5 MG: 7.5 TABLET ORAL at 20:43

## 2023-09-27 RX ADMIN — ATORVASTATIN CALCIUM 10 MG: 10 TABLET, FILM COATED ORAL at 20:43

## 2023-09-27 RX ADMIN — TRAZODONE HYDROCHLORIDE 50 MG: 50 TABLET ORAL at 20:43

## 2023-09-27 RX ADMIN — BUSPIRONE HYDROCHLORIDE 7.5 MG: 7.5 TABLET ORAL at 08:36

## 2023-09-27 RX ADMIN — DULOXETINE HYDROCHLORIDE 20 MG: 20 CAPSULE, DELAYED RELEASE ORAL at 08:39

## 2023-09-27 RX ADMIN — PROPRANOLOL HYDROCHLORIDE 10 MG: 20 TABLET ORAL at 08:37

## 2023-09-27 RX ADMIN — SACUBITRIL AND VALSARTAN 1 TABLET: 24; 26 TABLET, FILM COATED ORAL at 20:43

## 2023-09-27 RX ADMIN — THIAMINE HCL TAB 100 MG 100 MG: 100 TAB at 08:37

## 2023-09-27 RX ADMIN — Medication 6 MG: at 20:43

## 2023-09-27 RX ADMIN — SACUBITRIL AND VALSARTAN 1 TABLET: 24; 26 TABLET, FILM COATED ORAL at 08:38

## 2023-09-27 RX ADMIN — Medication 2 PUFF: at 20:45

## 2023-09-27 RX ADMIN — RIVAROXABAN 20 MG: 20 TABLET, FILM COATED ORAL at 08:38

## 2023-09-27 RX ADMIN — PROPRANOLOL HYDROCHLORIDE 10 MG: 20 TABLET ORAL at 15:23

## 2023-09-27 RX ADMIN — DOCUSATE SODIUM 100 MG: 100 CAPSULE, LIQUID FILLED ORAL at 20:43

## 2023-09-27 RX ADMIN — FOLIC ACID 1 MG: 1 TABLET ORAL at 08:39

## 2023-09-27 RX ADMIN — CYANOCOBALAMIN TAB 1000 MCG 1000 MCG: 1000 TAB at 08:38

## 2023-09-27 RX ADMIN — Medication 2 PUFF: at 08:42

## 2023-09-27 RX ADMIN — PROPRANOLOL HYDROCHLORIDE 10 MG: 20 TABLET ORAL at 20:43

## 2023-09-27 RX ADMIN — HYDROXYZINE HYDROCHLORIDE 50 MG: 50 TABLET, FILM COATED ORAL at 11:48

## 2023-09-27 RX ADMIN — SPIRONOLACTONE 25 MG: 25 TABLET ORAL at 08:38

## 2023-09-27 NOTE — GROUP NOTE
Group Therapy Note    Date: 9/27/2023    Group Start Time: 1100  Group End Time: 4961  Group Topic: Cognitive Skills    COSME Saini        Group Therapy Note    Attendees: 6/11       Patient's Goal:  pt will demonstrate improved coping skills and improved social skills     Notes:   pt was pleasant and participated well    Status After Intervention:  Improved    Participation Level:  Active Listener and Interactive    Participation Quality: Appropriate, Attentive, Sharing, and Supportive      Speech:  normal      Thought Process/Content: loose and disorganized at times       Affective Functioning: Congruent      Mood: euthymic      Level of consciousness:  Alert      Response to Learning: Able to verbalize current knowledge/experience and Progressing to goal      Endings: None Reported    Modes of Intervention: Education, Support, Socialization, and Activity      Discipline Responsible: Psychoeducational Specialist      Signature:  Ector Gambino

## 2023-09-27 NOTE — GROUP NOTE
Group Therapy Note    Date: 9/27/2023    Group Start Time: 1330  Group End Time: 7088  Group Topic: Recreational    FERCHO Humphrey, CTRS    Recreation Group Note        Date: September 27, 2023 Start Time: 1:30pm  End Time:  210pm      Number of Participants in Group & Unit Census:  3/11    Topic: recreation group     Goal of Group: pt will demonstrate improved coping skills and improved leisure awareness       Comments:     Patient did not participate in Recreation group, despite staff encouragement and explanation of benefits. Patient remain seclusive to self. Q15 minute safety checks maintained for patient safety and will continue to encourage patient to attend unit programming.                 Signature:  Ector Gambino

## 2023-09-27 NOTE — GROUP NOTE
Group Therapy Note    Date: 9/27/2023    Group Start Time: 1000  Group End Time: 1030  Group Topic: Psychoeducation    FABIAN Pompa        Group Therapy Note    Attendees: 3/11       patient refused to attend psychoeducation group at [address] after encouragement from staff.   1:1 talk time provided as alternative to group session

## 2023-09-28 PROCEDURE — 99232 SBSQ HOSP IP/OBS MODERATE 35: CPT | Performed by: INTERNAL MEDICINE

## 2023-09-28 PROCEDURE — 99232 SBSQ HOSP IP/OBS MODERATE 35: CPT | Performed by: PSYCHIATRY & NEUROLOGY

## 2023-09-28 PROCEDURE — APPSS30 APP SPLIT SHARED TIME 16-30 MINUTES

## 2023-09-28 PROCEDURE — 6370000000 HC RX 637 (ALT 250 FOR IP): Performed by: NURSE PRACTITIONER

## 2023-09-28 PROCEDURE — 6370000000 HC RX 637 (ALT 250 FOR IP): Performed by: PSYCHIATRY & NEUROLOGY

## 2023-09-28 PROCEDURE — 6370000000 HC RX 637 (ALT 250 FOR IP): Performed by: STUDENT IN AN ORGANIZED HEALTH CARE EDUCATION/TRAINING PROGRAM

## 2023-09-28 PROCEDURE — 1240000000 HC EMOTIONAL WELLNESS R&B

## 2023-09-28 RX ADMIN — SPIRONOLACTONE 25 MG: 25 TABLET ORAL at 08:18

## 2023-09-28 RX ADMIN — SACUBITRIL AND VALSARTAN 1 TABLET: 24; 26 TABLET, FILM COATED ORAL at 08:17

## 2023-09-28 RX ADMIN — TRAZODONE HYDROCHLORIDE 50 MG: 50 TABLET ORAL at 20:48

## 2023-09-28 RX ADMIN — MULTIPLE VITAMINS W/ MINERALS TAB 1 TABLET: TAB at 08:18

## 2023-09-28 RX ADMIN — ATORVASTATIN CALCIUM 10 MG: 10 TABLET, FILM COATED ORAL at 20:47

## 2023-09-28 RX ADMIN — SACUBITRIL AND VALSARTAN 1 TABLET: 24; 26 TABLET, FILM COATED ORAL at 20:48

## 2023-09-28 RX ADMIN — DOCUSATE SODIUM 100 MG: 100 CAPSULE, LIQUID FILLED ORAL at 20:47

## 2023-09-28 RX ADMIN — PROPRANOLOL HYDROCHLORIDE 10 MG: 20 TABLET ORAL at 20:48

## 2023-09-28 RX ADMIN — FUROSEMIDE 40 MG: 40 TABLET ORAL at 08:17

## 2023-09-28 RX ADMIN — DULOXETINE HYDROCHLORIDE 20 MG: 20 CAPSULE, DELAYED RELEASE ORAL at 08:18

## 2023-09-28 RX ADMIN — RIVAROXABAN 20 MG: 20 TABLET, FILM COATED ORAL at 08:20

## 2023-09-28 RX ADMIN — THIAMINE HCL TAB 100 MG 100 MG: 100 TAB at 08:17

## 2023-09-28 RX ADMIN — PROPRANOLOL HYDROCHLORIDE 10 MG: 20 TABLET ORAL at 08:18

## 2023-09-28 RX ADMIN — PROPRANOLOL HYDROCHLORIDE 10 MG: 20 TABLET ORAL at 14:27

## 2023-09-28 RX ADMIN — BUSPIRONE HYDROCHLORIDE 7.5 MG: 7.5 TABLET ORAL at 20:47

## 2023-09-28 RX ADMIN — Medication 2 PUFF: at 08:18

## 2023-09-28 RX ADMIN — NICOTINE POLACRILEX 2 MG: 2 LOZENGE ORAL at 10:04

## 2023-09-28 RX ADMIN — FOLIC ACID 1 MG: 1 TABLET ORAL at 08:18

## 2023-09-28 RX ADMIN — CYANOCOBALAMIN TAB 1000 MCG 1000 MCG: 1000 TAB at 08:18

## 2023-09-28 RX ADMIN — BUSPIRONE HYDROCHLORIDE 7.5 MG: 7.5 TABLET ORAL at 08:18

## 2023-09-28 RX ADMIN — Medication 2 PUFF: at 20:46

## 2023-09-28 RX ADMIN — Medication 6 MG: at 20:47

## 2023-09-28 RX ADMIN — DOCUSATE SODIUM 100 MG: 100 CAPSULE, LIQUID FILLED ORAL at 08:18

## 2023-09-28 NOTE — BH NOTE
Patient Daughter Claudy Irby (Chavez Cedeno) [phone] called because she is worried that Odell Arrieta will talk his way into being discharged to soon. She states he continues to be paranoid and making delusional statements when she talks to him. He has made several statement that he is going into hospice. She talked about how the brothers have been trying to get Odell Arrieta out of the house and they continue to take his money to pay bills. They have went as far as taking Odell Arrieta to the shelter.  She would like to speak with the providers regarding patient treatment plan and diagnosis so she can further help him outside of the hospital.

## 2023-09-28 NOTE — GROUP NOTE
Group Therapy Note    Date: 9/28/2023    Group Start Time: 1330  Group End Time: 9944  Group Topic: Recreational    [address], [place]    Recreation Group Note        Date: September 28, 2023 Start Time: 1:30pm End Time:  2:10 pm      Number of Participants in Group & Unit Census:  2/12    Topic: Leisure awareness, leisure participation, social skills    Goal of Group: To increase awareness of healthy leisure activities. To increase leisure participation. To improve social skills through communication with others. Comments:     Patient did not participate in Recreation group, despite staff encouragement and explanation of benefits. Patient remain seclusive to self. Q15 minute safety checks maintained for patient safety and will continue to encourage patient to attend unit programming.           Signature:  Glendell Angelucci, [place]

## 2023-09-28 NOTE — GROUP NOTE
Group Therapy Note    Date: 9/28/2023    Group Start Time: 1100  Group End Time: 1150  Group Topic: Cognitive Skills    FERCHO NEVAREZ Cherelle Lar[place], [place]    Cognitive Skills Group Note        Date: September 28, 2023 Start Time: 11am End Time:  11:50 am      Number of Participants in Group & Unit Census:  2/11    Topic: Cognitive skills, concentration, decision making    Goal of Group: To increase interpersonal interactions with peers. To improve cognitive thinking through turn taking, decision making, and improving concentration/focus as it relates to task      Comments:     Patient did not participate in Cognitive Skills group, despite staff encouragement and explanation of benefits. Patient remain seclusive to self. Q15 minute safety checks maintained for patient safety and will continue to encourage patient to attend unit programming.         Signature:  Sophie Abel

## 2023-09-29 ENCOUNTER — APPOINTMENT (OUTPATIENT)
Dept: GENERAL RADIOLOGY | Age: 63
DRG: 885 | End: 2023-09-29
Attending: PSYCHIATRY & NEUROLOGY
Payer: MEDICARE

## 2023-09-29 PROBLEM — F23 ACUTE PSYCHOSIS (HCC): Status: ACTIVE | Noted: 2023-09-29

## 2023-09-29 PROCEDURE — 6360000002 HC RX W HCPCS

## 2023-09-29 PROCEDURE — 2040000000 HC PSYCH ICU R&B

## 2023-09-29 PROCEDURE — 6370000000 HC RX 637 (ALT 250 FOR IP): Performed by: STUDENT IN AN ORGANIZED HEALTH CARE EDUCATION/TRAINING PROGRAM

## 2023-09-29 PROCEDURE — 99232 SBSQ HOSP IP/OBS MODERATE 35: CPT | Performed by: INTERNAL MEDICINE

## 2023-09-29 PROCEDURE — 6370000000 HC RX 637 (ALT 250 FOR IP): Performed by: PSYCHIATRY & NEUROLOGY

## 2023-09-29 PROCEDURE — 99232 SBSQ HOSP IP/OBS MODERATE 35: CPT | Performed by: PSYCHIATRY & NEUROLOGY

## 2023-09-29 PROCEDURE — 6370000000 HC RX 637 (ALT 250 FOR IP): Performed by: NURSE PRACTITIONER

## 2023-09-29 PROCEDURE — APPSS30 APP SPLIT SHARED TIME 16-30 MINUTES

## 2023-09-29 PROCEDURE — 73502 X-RAY EXAM HIP UNI 2-3 VIEWS: CPT

## 2023-09-29 RX ORDER — DIPHENHYDRAMINE HYDROCHLORIDE 50 MG/ML
50 INJECTION INTRAMUSCULAR; INTRAVENOUS EVERY 6 HOURS PRN
Status: DISCONTINUED | OUTPATIENT
Start: 2023-09-29 | End: 2023-10-02 | Stop reason: HOSPADM

## 2023-09-29 RX ORDER — HALOPERIDOL 5 MG/1
5 TABLET ORAL EVERY 6 HOURS PRN
Status: DISCONTINUED | OUTPATIENT
Start: 2023-09-29 | End: 2023-10-02 | Stop reason: HOSPADM

## 2023-09-29 RX ORDER — LIDOCAINE 4 G/G
1 PATCH TOPICAL DAILY
Status: DISCONTINUED | OUTPATIENT
Start: 2023-09-29 | End: 2023-10-02 | Stop reason: HOSPADM

## 2023-09-29 RX ORDER — RISPERIDONE 1 MG/1
0.5 TABLET ORAL 2 TIMES DAILY
Status: DISCONTINUED | OUTPATIENT
Start: 2023-09-29 | End: 2023-10-02 | Stop reason: HOSPADM

## 2023-09-29 RX ORDER — HALOPERIDOL 5 MG/ML
5 INJECTION INTRAMUSCULAR EVERY 6 HOURS PRN
Status: DISCONTINUED | OUTPATIENT
Start: 2023-09-29 | End: 2023-10-02 | Stop reason: HOSPADM

## 2023-09-29 RX ADMIN — TRAZODONE HYDROCHLORIDE 50 MG: 50 TABLET ORAL at 21:25

## 2023-09-29 RX ADMIN — SACUBITRIL AND VALSARTAN 1 TABLET: 24; 26 TABLET, FILM COATED ORAL at 08:05

## 2023-09-29 RX ADMIN — FUROSEMIDE 40 MG: 40 TABLET ORAL at 08:06

## 2023-09-29 RX ADMIN — ATORVASTATIN CALCIUM 10 MG: 10 TABLET, FILM COATED ORAL at 21:26

## 2023-09-29 RX ADMIN — HYDROXYZINE HYDROCHLORIDE 50 MG: 50 TABLET, FILM COATED ORAL at 21:25

## 2023-09-29 RX ADMIN — SPIRONOLACTONE 25 MG: 25 TABLET ORAL at 08:05

## 2023-09-29 RX ADMIN — Medication 6 MG: at 21:25

## 2023-09-29 RX ADMIN — FOLIC ACID 1 MG: 1 TABLET ORAL at 08:05

## 2023-09-29 RX ADMIN — RISPERIDONE 0.5 MG: 1 TABLET ORAL at 08:05

## 2023-09-29 RX ADMIN — DOCUSATE SODIUM 100 MG: 100 CAPSULE, LIQUID FILLED ORAL at 08:05

## 2023-09-29 RX ADMIN — THIAMINE HCL TAB 100 MG 100 MG: 100 TAB at 08:06

## 2023-09-29 RX ADMIN — DOCUSATE SODIUM 100 MG: 100 CAPSULE, LIQUID FILLED ORAL at 21:25

## 2023-09-29 RX ADMIN — ACETAMINOPHEN 650 MG: 325 TABLET ORAL at 21:25

## 2023-09-29 RX ADMIN — RISPERIDONE 0.5 MG: 1 TABLET ORAL at 21:25

## 2023-09-29 RX ADMIN — RIVAROXABAN 20 MG: 20 TABLET, FILM COATED ORAL at 08:05

## 2023-09-29 RX ADMIN — DULOXETINE HYDROCHLORIDE 20 MG: 20 CAPSULE, DELAYED RELEASE ORAL at 08:05

## 2023-09-29 RX ADMIN — SACUBITRIL AND VALSARTAN 1 TABLET: 24; 26 TABLET, FILM COATED ORAL at 21:25

## 2023-09-29 RX ADMIN — HALOPERIDOL LACTATE 5 MG: 5 INJECTION, SOLUTION INTRAMUSCULAR at 12:24

## 2023-09-29 RX ADMIN — BUSPIRONE HYDROCHLORIDE 7.5 MG: 7.5 TABLET ORAL at 21:25

## 2023-09-29 RX ADMIN — PROPRANOLOL HYDROCHLORIDE 10 MG: 20 TABLET ORAL at 08:05

## 2023-09-29 RX ADMIN — Medication 2 PUFF: at 21:25

## 2023-09-29 RX ADMIN — DIPHENHYDRAMINE HYDROCHLORIDE 50 MG: 50 INJECTION INTRAMUSCULAR; INTRAVENOUS at 12:25

## 2023-09-29 RX ADMIN — Medication 2 PUFF: at 08:09

## 2023-09-29 RX ADMIN — NICOTINE POLACRILEX 2 MG: 2 LOZENGE ORAL at 19:28

## 2023-09-29 RX ADMIN — CYANOCOBALAMIN TAB 1000 MCG 1000 MCG: 1000 TAB at 08:05

## 2023-09-29 RX ADMIN — MULTIPLE VITAMINS W/ MINERALS TAB 1 TABLET: TAB at 08:05

## 2023-09-29 RX ADMIN — BUSPIRONE HYDROCHLORIDE 7.5 MG: 7.5 TABLET ORAL at 08:05

## 2023-09-29 ASSESSMENT — PAIN SCALES - GENERAL
PAINLEVEL_OUTOF10: 3
PAINLEVEL_OUTOF10: 0
PAINLEVEL_OUTOF10: 8

## 2023-09-29 ASSESSMENT — PAIN DESCRIPTION - LOCATION: LOCATION: GENERALIZED

## 2023-09-29 NOTE — GROUP NOTE
Group Therapy Note    Date: 9/29/2023    Group Start Time: 1123  Group End Time: 7405  Group Topic: Recreational    [facility]    Abril Esparza    Recreation Group Note        Date: 0719 Start Time: 2134  End Time: 4243      Number of Participants in Group & Unit Census:  2/5    Topic: Patients engaged in games group, requesting a specific game of [place]. Goal of Group: Goals to increase sense of community; Increase socialization; Normalization of the environment; Demonstrate positive use of time;       Comments:     Patient did not participate in Recreation group, despite staff encouragement and explanation of benefits. Patient remain seclusive to self. Q15 minute safety checks maintained for patient safety and will continue to encourage patient to attend unit programming.

## 2023-09-29 NOTE — BH NOTE
Patient was walking with walker throughout day room patient is upset about interaction with NP and wanting discharged. Patient appears to have bumped his foot into a chair which results in a slow, controlled exaggerated fall. Patient gets self up and yells that he needs out of this damn place. Patient reports that he fell because his hip gave out and he now needs a MRI of his hip and Bilateral knees.  Patient allows vitals and assessment

## 2023-09-29 NOTE — GROUP NOTE
Group Therapy Note    Date: 9/29/2023    Group Start Time: 1100  Group End Time: 6336  Group Topic: Cognitive Skills    STLENO BHHarinder Jiménez    Cognitive Skills Group Note        Date: September 29, 2023 Start Time: 11am  End Time: 11:45am      Number of Participants in Group & Unit Census:  3/12    Topic: cognitive skills     Goal of Group: pt will demonstrate improved coping skills and improved leisure awareness      Comments:     Patient did not participate in Cognitive Skills group, despite staff encouragement and explanation of benefits. Patient remain seclusive to self. Q15 minute safety checks maintained for patient safety and will continue to encourage patient to attend unit programming.               Signature:  Nat Reynaga

## 2023-09-30 PROCEDURE — 99232 SBSQ HOSP IP/OBS MODERATE 35: CPT | Performed by: INTERNAL MEDICINE

## 2023-09-30 PROCEDURE — 6370000000 HC RX 637 (ALT 250 FOR IP): Performed by: STUDENT IN AN ORGANIZED HEALTH CARE EDUCATION/TRAINING PROGRAM

## 2023-09-30 PROCEDURE — 99232 SBSQ HOSP IP/OBS MODERATE 35: CPT

## 2023-09-30 PROCEDURE — 6370000000 HC RX 637 (ALT 250 FOR IP): Performed by: NURSE PRACTITIONER

## 2023-09-30 PROCEDURE — 6370000000 HC RX 637 (ALT 250 FOR IP): Performed by: PSYCHIATRY & NEUROLOGY

## 2023-09-30 PROCEDURE — 2040000000 HC PSYCH ICU R&B

## 2023-09-30 PROCEDURE — 6370000000 HC RX 637 (ALT 250 FOR IP): Performed by: INTERNAL MEDICINE

## 2023-09-30 PROCEDURE — 97162 PT EVAL MOD COMPLEX 30 MIN: CPT

## 2023-09-30 RX ADMIN — MULTIPLE VITAMINS W/ MINERALS TAB 1 TABLET: TAB at 09:03

## 2023-09-30 RX ADMIN — Medication 2 PUFF: at 21:37

## 2023-09-30 RX ADMIN — FOLIC ACID 1 MG: 1 TABLET ORAL at 08:36

## 2023-09-30 RX ADMIN — SPIRONOLACTONE 25 MG: 25 TABLET ORAL at 08:36

## 2023-09-30 RX ADMIN — RIVAROXABAN 20 MG: 20 TABLET, FILM COATED ORAL at 09:05

## 2023-09-30 RX ADMIN — Medication 6 MG: at 21:36

## 2023-09-30 RX ADMIN — ACETAMINOPHEN 650 MG: 325 TABLET ORAL at 21:36

## 2023-09-30 RX ADMIN — TRAZODONE HYDROCHLORIDE 50 MG: 50 TABLET ORAL at 21:36

## 2023-09-30 RX ADMIN — NICOTINE POLACRILEX 2 MG: 2 LOZENGE ORAL at 12:52

## 2023-09-30 RX ADMIN — BUSPIRONE HYDROCHLORIDE 7.5 MG: 7.5 TABLET ORAL at 09:03

## 2023-09-30 RX ADMIN — THIAMINE HCL TAB 100 MG 100 MG: 100 TAB at 09:03

## 2023-09-30 RX ADMIN — RISPERIDONE 0.5 MG: 1 TABLET ORAL at 21:36

## 2023-09-30 RX ADMIN — NICOTINE POLACRILEX 2 MG: 2 LOZENGE ORAL at 10:16

## 2023-09-30 RX ADMIN — ACETAMINOPHEN 650 MG: 325 TABLET ORAL at 09:02

## 2023-09-30 RX ADMIN — FUROSEMIDE 40 MG: 40 TABLET ORAL at 09:06

## 2023-09-30 RX ADMIN — ATORVASTATIN CALCIUM 10 MG: 10 TABLET, FILM COATED ORAL at 21:37

## 2023-09-30 RX ADMIN — BUSPIRONE HYDROCHLORIDE 7.5 MG: 7.5 TABLET ORAL at 21:37

## 2023-09-30 RX ADMIN — SACUBITRIL AND VALSARTAN 1 TABLET: 24; 26 TABLET, FILM COATED ORAL at 21:36

## 2023-09-30 RX ADMIN — NICOTINE POLACRILEX 2 MG: 2 LOZENGE ORAL at 17:33

## 2023-09-30 RX ADMIN — DULOXETINE HYDROCHLORIDE 20 MG: 20 CAPSULE, DELAYED RELEASE ORAL at 09:03

## 2023-09-30 RX ADMIN — HYDROXYZINE HYDROCHLORIDE 50 MG: 50 TABLET, FILM COATED ORAL at 21:36

## 2023-09-30 RX ADMIN — RISPERIDONE 0.5 MG: 1 TABLET ORAL at 08:36

## 2023-09-30 RX ADMIN — HYDROXYZINE HYDROCHLORIDE 50 MG: 50 TABLET, FILM COATED ORAL at 09:04

## 2023-09-30 RX ADMIN — CYANOCOBALAMIN TAB 1000 MCG 1000 MCG: 1000 TAB at 08:36

## 2023-09-30 RX ADMIN — DOCUSATE SODIUM 100 MG: 100 CAPSULE, LIQUID FILLED ORAL at 21:37

## 2023-09-30 RX ADMIN — Medication 2 PUFF: at 08:36

## 2023-09-30 RX ADMIN — SACUBITRIL AND VALSARTAN 1 TABLET: 24; 26 TABLET, FILM COATED ORAL at 09:05

## 2023-09-30 RX ADMIN — PROPRANOLOL HYDROCHLORIDE 10 MG: 20 TABLET ORAL at 09:04

## 2023-09-30 RX ADMIN — DOCUSATE SODIUM 100 MG: 100 CAPSULE, LIQUID FILLED ORAL at 09:03

## 2023-09-30 ASSESSMENT — PAIN DESCRIPTION - LOCATION
LOCATION: HIP
LOCATION: GENERALIZED

## 2023-09-30 ASSESSMENT — LIFESTYLE VARIABLES: HOW OFTEN DO YOU HAVE A DRINK CONTAINING ALCOHOL: NEVER

## 2023-09-30 ASSESSMENT — PAIN SCALES - GENERAL
PAINLEVEL_OUTOF10: 3
PAINLEVEL_OUTOF10: 3
PAINLEVEL_OUTOF10: 8

## 2023-09-30 ASSESSMENT — PAIN DESCRIPTION - DESCRIPTORS: DESCRIPTORS: ACHING;DISCOMFORT

## 2023-09-30 NOTE — GROUP NOTE
Group Therapy Note    Date: 9/30/2023    Group Start Time: 0945  Group End Time: 1288  Group Topic: Group Therapy    [facility]    Destiney Kerr RN        Group Therapy Note    Attendees: 3/7       Patient's Goal:  To remove people from my life that bring me problems and to focus on me. Notes:  Patient is calm and cooperative    Status After Intervention:  Improved    Participation Level:  Active Listener and Interactive    Participation Quality: Appropriate and Attentive      Speech:  normal      Thought Process/Content: Logical  Linear      Affective Functioning: Congruent      Mood: depressed      Level of consciousness:  Alert, Oriented x4, and Attentive      Response to Learning: Able to verbalize current knowledge/experience and Capable of insight      Endings: None Reported    Modes of Intervention: Support, Socialization, and Problem-solving      Discipline Responsible: Registered Nurse      Signature:  Destiney Kerr RN

## 2023-10-01 PROCEDURE — 6370000000 HC RX 637 (ALT 250 FOR IP): Performed by: INTERNAL MEDICINE

## 2023-10-01 PROCEDURE — 6370000000 HC RX 637 (ALT 250 FOR IP): Performed by: STUDENT IN AN ORGANIZED HEALTH CARE EDUCATION/TRAINING PROGRAM

## 2023-10-01 PROCEDURE — 6370000000 HC RX 637 (ALT 250 FOR IP): Performed by: NURSE PRACTITIONER

## 2023-10-01 PROCEDURE — 6370000000 HC RX 637 (ALT 250 FOR IP): Performed by: PSYCHIATRY & NEUROLOGY

## 2023-10-01 PROCEDURE — 2040000000 HC PSYCH ICU R&B

## 2023-10-01 RX ADMIN — DULOXETINE HYDROCHLORIDE 20 MG: 20 CAPSULE, DELAYED RELEASE ORAL at 08:25

## 2023-10-01 RX ADMIN — FOLIC ACID 1 MG: 1 TABLET ORAL at 08:25

## 2023-10-01 RX ADMIN — TRAZODONE HYDROCHLORIDE 50 MG: 50 TABLET ORAL at 21:43

## 2023-10-01 RX ADMIN — Medication 2 PUFF: at 21:44

## 2023-10-01 RX ADMIN — BUSPIRONE HYDROCHLORIDE 7.5 MG: 7.5 TABLET ORAL at 08:25

## 2023-10-01 RX ADMIN — RISPERIDONE 0.5 MG: 1 TABLET ORAL at 08:24

## 2023-10-01 RX ADMIN — NICOTINE POLACRILEX 2 MG: 2 LOZENGE ORAL at 06:44

## 2023-10-01 RX ADMIN — ATORVASTATIN CALCIUM 10 MG: 10 TABLET, FILM COATED ORAL at 21:44

## 2023-10-01 RX ADMIN — NICOTINE POLACRILEX 2 MG: 2 LOZENGE ORAL at 15:24

## 2023-10-01 RX ADMIN — HYDROXYZINE HYDROCHLORIDE 50 MG: 50 TABLET, FILM COATED ORAL at 21:43

## 2023-10-01 RX ADMIN — SACUBITRIL AND VALSARTAN 1 TABLET: 24; 26 TABLET, FILM COATED ORAL at 21:44

## 2023-10-01 RX ADMIN — ACETAMINOPHEN 650 MG: 325 TABLET ORAL at 21:43

## 2023-10-01 RX ADMIN — DOCUSATE SODIUM 100 MG: 100 CAPSULE, LIQUID FILLED ORAL at 08:24

## 2023-10-01 RX ADMIN — MULTIPLE VITAMINS W/ MINERALS TAB 1 TABLET: TAB at 08:25

## 2023-10-01 RX ADMIN — BUSPIRONE HYDROCHLORIDE 7.5 MG: 7.5 TABLET ORAL at 21:44

## 2023-10-01 RX ADMIN — THIAMINE HCL TAB 100 MG 100 MG: 100 TAB at 08:24

## 2023-10-01 RX ADMIN — PROPRANOLOL HYDROCHLORIDE 10 MG: 20 TABLET ORAL at 15:27

## 2023-10-01 RX ADMIN — RIVAROXABAN 20 MG: 20 TABLET, FILM COATED ORAL at 08:25

## 2023-10-01 RX ADMIN — CYANOCOBALAMIN TAB 1000 MCG 1000 MCG: 1000 TAB at 08:23

## 2023-10-01 RX ADMIN — DOCUSATE SODIUM 100 MG: 100 CAPSULE, LIQUID FILLED ORAL at 21:44

## 2023-10-01 RX ADMIN — RISPERIDONE 0.5 MG: 1 TABLET ORAL at 21:44

## 2023-10-01 RX ADMIN — Medication 6 MG: at 21:44

## 2023-10-01 RX ADMIN — Medication 2 PUFF: at 08:23

## 2023-10-01 ASSESSMENT — LIFESTYLE VARIABLES: HOW OFTEN DO YOU HAVE A DRINK CONTAINING ALCOHOL: NEVER

## 2023-10-01 ASSESSMENT — PAIN SCALES - GENERAL: PAINLEVEL_OUTOF10: 3

## 2023-10-01 NOTE — GROUP NOTE
Group Therapy Note    Date: 10/1/2023    Group Start Time: 1000  Group End Time: 3864  Group Topic: Music Therapy    [facility]    Henry Cassidy        Group Therapy Note    Attendees: 3/7       Patient's Goal:  Patients shared music, and linked themes in their songs with mental health quotes, as provided to patient by this writer on a handout. Goals to reflect on mental health related topics they find important; Increase self-expression; Increase sense of community; increase socialization;     Notes:  Patient attended and participated group having positive interactions with peers and staff. Shared music and focused on the topic of \"things you want versus things you need. \" Talked about being an addict much of his life and how his consequences matched the actions he was choosing. States this was hard for him to accept, but also realizes his choices going forward are more important now than his past poor choices    Status After Intervention:  Improved    Participation Level:  Active Listener and Interactive    Participation Quality: Appropriate, Attentive, and Sharing      Speech:  normal      Thought Process/Content: Logical  Linear      Affective Functioning: Congruent      Mood: euthymic      Level of consciousness:  Alert and Attentive      Response to Learning: Able to verbalize current knowledge/experience, Capable of insight, and Progressing to goal      Endings: None Reported    Modes of Intervention: Support, Socialization, Exploration, Activity, Media, and Reality-testing      Discipline Responsible: Psychoeducational Specialist      Signature:  Henry Cassidy

## 2023-10-02 VITALS
HEIGHT: 71 IN | RESPIRATION RATE: 14 BRPM | HEART RATE: 91 BPM | SYSTOLIC BLOOD PRESSURE: 128 MMHG | WEIGHT: 206 LBS | TEMPERATURE: 98.1 F | DIASTOLIC BLOOD PRESSURE: 65 MMHG | BODY MASS INDEX: 28.84 KG/M2

## 2023-10-02 PROCEDURE — 99239 HOSP IP/OBS DSCHRG MGMT >30: CPT | Performed by: PSYCHIATRY & NEUROLOGY

## 2023-10-02 PROCEDURE — 6370000000 HC RX 637 (ALT 250 FOR IP): Performed by: STUDENT IN AN ORGANIZED HEALTH CARE EDUCATION/TRAINING PROGRAM

## 2023-10-02 PROCEDURE — 6370000000 HC RX 637 (ALT 250 FOR IP): Performed by: PSYCHIATRY & NEUROLOGY

## 2023-10-02 PROCEDURE — 6370000000 HC RX 637 (ALT 250 FOR IP): Performed by: INTERNAL MEDICINE

## 2023-10-02 RX ORDER — PSEUDOEPHEDRINE HCL 30 MG
100 TABLET ORAL 2 TIMES DAILY
Qty: 60 CAPSULE | Refills: 0 | Status: SHIPPED | OUTPATIENT
Start: 2023-10-02

## 2023-10-02 RX ORDER — SACUBITRIL AND VALSARTAN 24; 26 MG/1; MG/1
1 TABLET, FILM COATED ORAL 2 TIMES DAILY
Qty: 60 TABLET | Refills: 0 | Status: SHIPPED | OUTPATIENT
Start: 2023-10-02

## 2023-10-02 RX ORDER — THIAMINE MONONITRATE (VIT B1) 100 MG
100 TABLET ORAL DAILY
Qty: 30 TABLET | Refills: 0 | Status: SHIPPED | OUTPATIENT
Start: 2023-10-02

## 2023-10-02 RX ORDER — BUSPIRONE HYDROCHLORIDE 7.5 MG/1
7.5 TABLET ORAL 2 TIMES DAILY
Qty: 60 TABLET | Refills: 0 | Status: SHIPPED | OUTPATIENT
Start: 2023-10-02

## 2023-10-02 RX ORDER — FOLIC ACID 1 MG/1
1 TABLET ORAL DAILY
Qty: 30 TABLET | Refills: 0 | Status: SHIPPED | OUTPATIENT
Start: 2023-10-02

## 2023-10-02 RX ORDER — LIDOCAINE 4 G/G
1 PATCH TOPICAL DAILY
Qty: 30 PATCH | Refills: 0 | Status: SHIPPED | OUTPATIENT
Start: 2023-10-03

## 2023-10-02 RX ORDER — HYDROXYZINE HYDROCHLORIDE 10 MG/1
10 TABLET, FILM COATED ORAL 3 TIMES DAILY PRN
Qty: 30 TABLET | Refills: 0 | Status: SHIPPED | OUTPATIENT
Start: 2023-10-02 | End: 2023-10-12

## 2023-10-02 RX ORDER — FUROSEMIDE 40 MG/1
40 TABLET ORAL DAILY
Qty: 30 TABLET | Refills: 0 | Status: SHIPPED | OUTPATIENT
Start: 2023-10-03

## 2023-10-02 RX ORDER — RISPERIDONE 0.5 MG/1
0.5 TABLET ORAL 2 TIMES DAILY
Qty: 60 TABLET | Refills: 0 | Status: SHIPPED | OUTPATIENT
Start: 2023-10-02

## 2023-10-02 RX ORDER — BUDESONIDE AND FORMOTEROL FUMARATE DIHYDRATE 160; 4.5 UG/1; UG/1
2 AEROSOL RESPIRATORY (INHALATION)
Qty: 10.2 G | Refills: 0 | Status: SHIPPED | OUTPATIENT
Start: 2023-10-02

## 2023-10-02 RX ORDER — SPIRONOLACTONE 25 MG/1
25 TABLET ORAL DAILY
Qty: 30 TABLET | Refills: 0 | Status: SHIPPED | OUTPATIENT
Start: 2023-10-02

## 2023-10-02 RX ORDER — ATORVASTATIN CALCIUM 10 MG/1
10 TABLET, FILM COATED ORAL NIGHTLY
Qty: 30 TABLET | Refills: 0 | Status: SHIPPED | OUTPATIENT
Start: 2023-10-02

## 2023-10-02 RX ORDER — PROPRANOLOL HYDROCHLORIDE 10 MG/1
10 TABLET ORAL 3 TIMES DAILY
Qty: 90 TABLET | Refills: 0 | Status: SHIPPED | OUTPATIENT
Start: 2023-10-02

## 2023-10-02 RX ORDER — TRAZODONE HYDROCHLORIDE 50 MG/1
50 TABLET ORAL NIGHTLY PRN
Qty: 30 TABLET | Refills: 0 | Status: SHIPPED | OUTPATIENT
Start: 2023-10-02

## 2023-10-02 RX ORDER — M-VIT,TX,IRON,MINS/CALC/FOLIC 27MG-0.4MG
1 TABLET ORAL DAILY
Qty: 30 TABLET | Refills: 0 | Status: SHIPPED | OUTPATIENT
Start: 2023-10-03

## 2023-10-02 RX ORDER — DULOXETIN HYDROCHLORIDE 20 MG/1
20 CAPSULE, DELAYED RELEASE ORAL DAILY
Qty: 30 CAPSULE | Refills: 0 | Status: SHIPPED | OUTPATIENT
Start: 2023-10-03

## 2023-10-02 RX ADMIN — CYANOCOBALAMIN TAB 1000 MCG 1000 MCG: 1000 TAB at 08:31

## 2023-10-02 RX ADMIN — PROPRANOLOL HYDROCHLORIDE 10 MG: 20 TABLET ORAL at 08:32

## 2023-10-02 RX ADMIN — DOCUSATE SODIUM 100 MG: 100 CAPSULE, LIQUID FILLED ORAL at 08:31

## 2023-10-02 RX ADMIN — BUSPIRONE HYDROCHLORIDE 7.5 MG: 7.5 TABLET ORAL at 08:31

## 2023-10-02 RX ADMIN — THIAMINE HCL TAB 100 MG 100 MG: 100 TAB at 08:31

## 2023-10-02 RX ADMIN — FOLIC ACID 1 MG: 1 TABLET ORAL at 08:31

## 2023-10-02 RX ADMIN — RIVAROXABAN 20 MG: 20 TABLET, FILM COATED ORAL at 08:31

## 2023-10-02 RX ADMIN — DULOXETINE HYDROCHLORIDE 20 MG: 20 CAPSULE, DELAYED RELEASE ORAL at 08:31

## 2023-10-02 RX ADMIN — Medication 2 PUFF: at 08:31

## 2023-10-02 RX ADMIN — RISPERIDONE 0.5 MG: 1 TABLET ORAL at 08:31

## 2023-10-02 RX ADMIN — SPIRONOLACTONE 25 MG: 25 TABLET ORAL at 08:31

## 2023-10-02 RX ADMIN — SACUBITRIL AND VALSARTAN 1 TABLET: 24; 26 TABLET, FILM COATED ORAL at 08:50

## 2023-10-02 RX ADMIN — FUROSEMIDE 40 MG: 40 TABLET ORAL at 08:31

## 2023-10-02 RX ADMIN — MULTIPLE VITAMINS W/ MINERALS TAB 1 TABLET: TAB at 08:31

## 2023-10-02 ASSESSMENT — PAIN SCALES - GENERAL: PAINLEVEL_OUTOF10: 3

## 2023-10-02 ASSESSMENT — PAIN DESCRIPTION - LOCATION: LOCATION: HIP

## 2023-10-02 ASSESSMENT — PAIN DESCRIPTION - FREQUENCY: FREQUENCY: CONTINUOUS

## 2023-10-02 ASSESSMENT — PAIN DESCRIPTION - ORIENTATION: ORIENTATION: RIGHT

## 2023-10-02 ASSESSMENT — PAIN DESCRIPTION - ONSET: ONSET: ON-GOING

## 2023-10-02 ASSESSMENT — PAIN DESCRIPTION - DESCRIPTORS: DESCRIPTORS: ACHING;DISCOMFORT

## 2023-10-02 ASSESSMENT — PAIN DESCRIPTION - PAIN TYPE: TYPE: CHRONIC PAIN

## 2023-10-02 NOTE — BH NOTE
[facility]  Discharge Note     Pt belongings: Retrieved from room/safe, reviewed and packed to take with. Dental Appliances: None  Vision - Corrective Lenses: Eyeglasses  Hearing Aid: None  Jewelry: None  Body Piercings Removed: N/A  Clothing: Other (Comment) (None)  Other Valuables: Other (Comment) (None)       Patient discharged to Home via Taxi. Instructed on discharge instructions, pt verbalizes understanding and signs AVS. Pt in control at time of discharge. Pt ambulates to the [facility] entrance with psych staff x2. Rx Sent with patient . No complaints voiced at this time. Status EXAM upon discharge:  Mental Status and Behavioral Exam  Normal: Yes  Level of Assistance: Independent/Self  Facial Expression: Brightened  Affect: Appropriate  Level of Consciousness: Alert  Frequency of Checks: 4 times per hour, close  Mood:Normal: Yes  Mood: Anxious  Motor Activity:Normal: Yes  Motor Activity: Unusual posture/gait  Eye Contact: Good  Observed Behavior: Cooperative, Friendly  Sexual Misconduct History: Current - no  Preception: [place] to person, [place] to time, [place] to place, [place] to situation  Attention:Normal: Yes  Attention: Distractible  Thought Processes: Unremarkable  Thought Content:Normal: Yes  Thought Content: Preoccupations  Depression Symptoms: No problems reported or observed. Anxiety Symptoms: No problems reported or observed. Shobha Symptoms: No problems reported or observed.   Hallucinations: None  Delusions: No  Delusions: Paranoid  Memory:Normal: Yes  Memory: Poor recent  Insight and Judgment: Yes  Insight and Judgment: Poor insight, Poor judgment    Keven Soni LPN

## 2023-10-02 NOTE — GROUP NOTE
Group Therapy Note    Date: 10/2/2023    Group Start Time: 1100  Group End Time: 1150  Group Topic: Recreational    [facility]    Alethea Mcpherson        Group Therapy Note    Attendees: 1/5       Patient's Goal:  Increase cognitive stimulation; Increase rapport with staff; Increase socialization;    Notes: Patient was only attendee, and requested to listen to music and play a game of chess. Patinet was pleasant and engaging throughout and expressed \"Oh man this is exactly what I needed. Some good music, a game of chess. Jamil [place] Jamil [place] \"     Status After Intervention:  Improved    Participation Level:  Active Listener and Interactive    Participation Quality: Appropriate, Attentive, and Sharing      Speech:  normal      Thought Process/Content: Logical  Linear      Affective Functioning: Congruent      Mood: euthymic      Level of consciousness:  Alert and Attentive      Response to Learning: Able to verbalize current knowledge/experience and Progressing to goal      Endings: None Reported    Modes of Intervention: Socialization, Activity, and Media      Discipline Responsible: Psychoeducational Specialist      Signature:  Alethea Mcpherson

## 2023-10-02 NOTE — BH NOTE
Patient given tobacco quitline number 57144801696 at this time, refusing to call at this time, states \" I just dont want to quit now\"- patient given information as to the dangers of long term tobacco use.  Continue to reinforce the importance of tobacco cessation
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
## 2023-10-02 NOTE — DISCHARGE INSTRUCTIONS
Information:  Medications:   Medication summary provided   I understand that I should take only the medications on my list.     -why and when I need to take each medicine.     -which side effects to watch for.     -that I should carry my medication information at all times in case of     Emergency situations. I will take all of my medicines to follow up appointments.     -check with my physician or pharmacist before taking any new    Medication, over the counter product or drink alcohol.    -Ask about food, drug or dietary supplement interactions.    -discard old lists and update records with medication providers. Notify Physician:  Notify physician if you notice:   Always call 911 if you feel your life is in danger  In case of an emergency call 911 immediately! If 911 is not available call your local emergency medical system for help    Behavioral Health Follow Up:  Original Referral Source: [facility]  Discharge Diagnosis: Depression with suicidal ideation [F32. A, R45.851]  Recommendations for Level of Care: [address]  Patient status at discharge: Stable  My hospital  was: [facility], [address] [place] [address]  Aftercare plan faxed: Yes   -faxed by: RN   -date: 10/2/23   -time: 1100  Prescriptions: Sent home with patient    Smoking: Quit Smoking. Call the [employer]'s smoking quitline at 3-849-10Q-QUIT  Know the signs of a heart attack   If you have any of the following symptoms call 911 immediately, do not wait more    Than five minutes. 1. Pressure, fullness and/ or squeezing in the center of the chest spreading to    The jaw, neck or shoulder. 2. Chest discomfort with light headedness, fainting, sweating, nausea or    Shortness of breath. 3. Upper abdominal pressure or discomfort. 4. Lower chest pain, back pain, unusual fatigue, shortness of breath, nausea   Or dizziness.      General Information:   Questions regarding your bill: Call HELP program [phone]     Suicide Hotline

## 2023-10-02 NOTE — CARE COORDINATION
Name: Varun Rios    : 1960    Discharge Date: 10/2/23    Primary Auth/Cert #: .    Destination: Private residence    *If you have any specific discharge questions, please contact the /discharge planner:  . Discharge Medications:      Medication List        START taking these medications      atorvastatin 10 MG tablet  Commonly known as: LIPITOR  Take 1 tablet by mouth nightly  Replaces: simvastatin 20 MG tablet  Notes to patient: For cholesterol     cyanocobalamin 1000 MCG tablet  Take 1 tablet by mouth daily  Start taking on: October 3, 2023  Notes to patient: For supplement     docusate 100 MG Caps  Commonly known as: COLACE, DULCOLAX  Take 100 mg by mouth 2 times daily  Notes to patient: For constipation     hydrOXYzine HCl 10 MG tablet  Commonly known as: ATARAX  Take 1 tablet by mouth 3 times daily as needed for Anxiety  Notes to patient: For anxiety     lidocaine 4 % external patch  Place 1 patch onto the skin daily  Start taking on: October 3, 2023  Notes to patient: For pain/discomfort     propranolol 10 MG tablet  Commonly known as: INDERAL  Take 1 tablet by mouth 3 times daily  Notes to patient: For blood pressure     risperiDONE 0.5 MG tablet  Commonly known as: RISPERDAL  Take 1 tablet by mouth 2 times daily  Notes to patient: For thought process     therapeutic multivitamin-minerals tablet  Take 1 tablet by mouth daily  Start taking on: October 3, 2023  Notes to patient: For supplement     traZODone 50 MG tablet  Commonly known as: DESYREL  Take 1 tablet by mouth nightly as needed for Sleep  Notes to patient: For sleep            CHANGE how you take these medications      DULoxetine 20 MG extended release capsule  Commonly known as: CYMBALTA  Take 1 capsule by mouth daily  Start taking on: October 3, 2023  What changed:   medication strength  how much to take  Notes to patient:  For mood     furosemide 40 MG tablet  Commonly known as: LASIX  Take 1 tablet by mouth

## 2023-10-03 NOTE — DISCHARGE SUMMARY
mouth nightly as needed for Sleep, Disp-30 tablet, R-0Normal      vitamin B-12 1000 MCG tablet Take 1 tablet by mouth daily, Disp-30 tablet, R-3Normal           CONTINUE these medications which have CHANGED    Details   budesonide-formoterol (SYMBICORT) 160-4.5 MCG/ACT AERO Inhale 2 puffs into the lungs in the morning and 2 puffs in the evening., Disp-10.2 g, R-0Normal      busPIRone (BUSPAR) 7.5 MG tablet Take 1 tablet by mouth 2 times daily, Disp-60 tablet, R-0Normal      DULoxetine (CYMBALTA) 20 MG extended release capsule Take 1 capsule by mouth daily, Disp-30 capsule, R-0Normal      ENTRESTO 24-26 MG per tablet Take 1 tablet by mouth 2 times daily, Disp-60 tablet, R-0, DAWNormal      folic acid (FOLVITE) 1 MG tablet Take 1 tablet by mouth daily, Disp-30 tablet, R-0Normal      furosemide (LASIX) 40 MG tablet Take 1 tablet by mouth daily, Disp-30 tablet, R-0Normal      rivaroxaban (XARELTO) 20 MG TABS tablet Take 1 tablet by mouth daily (with breakfast), Disp-30 tablet, R-0Normal      spironolactone (ALDACTONE) 25 MG tablet Take 1 tablet by mouth daily, Disp-30 tablet, R-0Normal      vitamin B-1 (THIAMINE) 100 MG tablet Take 1 tablet by mouth daily, Disp-30 tablet, R-0Normal           STOP taking these medications       metoprolol succinate (TOPROL XL) 100 MG extended release tablet Comments:   Reason for Stopping:         escitalopram (LEXAPRO) 5 MG tablet Comments:   Reason for Stopping:         simvastatin (ZOCOR) 20 MG tablet Comments:   Reason for Stopping:         levothyroxine (SYNTHROID) 50 MCG tablet Comments:   Reason for Stopping:         diltiazem (CARDIZEM) 120 MG tablet Comments:   Reason for Stopping:                Core Measures statement:   Not applicable    Discharge Exam:  Level of consciousness:  Within normal limits  Appearance: Street clothes, seated, with good grooming  Behavior/Motor: No abnormalities noted  Attitude toward examiner:  Cooperative, attentive, good eye contact  Speech:

## 2023-10-10 ENCOUNTER — NURSE TRIAGE (OUTPATIENT)
Dept: OTHER | Facility: CLINIC | Age: 63
End: 2023-10-10

## 2023-10-10 NOTE — TELEPHONE ENCOUNTER
Location of patient: [place]    Received call from [address] at [facility] with [employer]. Subjective: Caller states \"I just got out of [facility]'s ICU and all that other shit. I totally quit taking my medication because it is very very bad. Only about 10 % of my body works right and it isn't getting better. I do have bed sores. \"     Current Symptoms: cold with difficulty breathing, bed sores,     Onset:  unsure     Associated Symptoms: NA    Pain Severity: 12/10; ;     Temperature: unsure     What has been tried: n/a    LMP: NA Pregnant: NA    Recommended disposition: could not complete triage \"patient kept saying I just need tos peak with Ira\"     Care advice provided, patient verbalizes understanding; denies any other questions or concerns; instructed to call back for any new or worsening symptoms. Connected with Bearl File in the office. Attention Provider: Thank you for allowing me to participate in the care of your patient. The patient was connected to triage in response to information provided to the ECC/PSC. Please do not respond through this encounter as the response is not directed to a shared pool.       Reason for Disposition   Caller has already spoken with the PCP (or office), and has no further questions    Protocols used: No Contact or Duplicate Contact Call-ADULT-[place]

## 2023-10-11 ENCOUNTER — OFFICE VISIT (OUTPATIENT)
Dept: FAMILY MEDICINE CLINIC | Age: 63
End: 2023-10-11
Payer: MEDICARE

## 2023-10-11 VITALS
OXYGEN SATURATION: 96 % | HEIGHT: 71 IN | TEMPERATURE: 97.1 F | WEIGHT: 205.4 LBS | DIASTOLIC BLOOD PRESSURE: 70 MMHG | BODY MASS INDEX: 28.76 KG/M2 | SYSTOLIC BLOOD PRESSURE: 136 MMHG | HEART RATE: 101 BPM

## 2023-10-11 DIAGNOSIS — Z13.9 ENCOUNTER FOR SCREENING INVOLVING SOCIAL DETERMINANTS OF HEALTH (SDOH): ICD-10-CM

## 2023-10-11 DIAGNOSIS — F32.A ANXIETY AND DEPRESSION: Primary | ICD-10-CM

## 2023-10-11 DIAGNOSIS — F41.9 ANXIETY AND DEPRESSION: Primary | ICD-10-CM

## 2023-10-11 DIAGNOSIS — F43.9 STRESS AT HOME: ICD-10-CM

## 2023-10-11 DIAGNOSIS — R45.86 MOOD SWINGS: ICD-10-CM

## 2023-10-11 PROCEDURE — G8417 CALC BMI ABV UP PARAM F/U: HCPCS | Performed by: NURSE PRACTITIONER

## 2023-10-11 PROCEDURE — 4004F PT TOBACCO SCREEN RCVD TLK: CPT | Performed by: NURSE PRACTITIONER

## 2023-10-11 PROCEDURE — 3075F SYST BP GE 130 - 139MM HG: CPT | Performed by: NURSE PRACTITIONER

## 2023-10-11 PROCEDURE — 1111F DSCHRG MED/CURRENT MED MERGE: CPT | Performed by: NURSE PRACTITIONER

## 2023-10-11 PROCEDURE — G8427 DOCREV CUR MEDS BY ELIG CLIN: HCPCS | Performed by: NURSE PRACTITIONER

## 2023-10-11 PROCEDURE — 99214 OFFICE O/P EST MOD 30 MIN: CPT | Performed by: NURSE PRACTITIONER

## 2023-10-11 PROCEDURE — G8484 FLU IMMUNIZE NO ADMIN: HCPCS | Performed by: NURSE PRACTITIONER

## 2023-10-11 PROCEDURE — 3017F COLORECTAL CA SCREEN DOC REV: CPT | Performed by: NURSE PRACTITIONER

## 2023-10-11 PROCEDURE — 3078F DIAST BP <80 MM HG: CPT | Performed by: NURSE PRACTITIONER

## 2023-10-11 ASSESSMENT — ENCOUNTER SYMPTOMS
ABDOMINAL PAIN: 0
NAUSEA: 0
COUGH: 0
CHEST TIGHTNESS: 0
VOMITING: 0
SHORTNESS OF BREATH: 0

## 2023-10-26 ENCOUNTER — APPOINTMENT (OUTPATIENT)
Dept: CT IMAGING | Age: 63
End: 2023-10-26
Payer: MEDICARE

## 2023-10-26 ENCOUNTER — APPOINTMENT (OUTPATIENT)
Dept: GENERAL RADIOLOGY | Age: 63
End: 2023-10-26
Payer: MEDICARE

## 2023-10-26 ENCOUNTER — TELEPHONE (OUTPATIENT)
Dept: FAMILY MEDICINE CLINIC | Age: 63
End: 2023-10-26

## 2023-10-26 ENCOUNTER — HOSPITAL ENCOUNTER (OUTPATIENT)
Age: 63
Setting detail: OBSERVATION
Discharge: HOME | End: 2023-10-28
Attending: EMERGENCY MEDICINE | Admitting: STUDENT IN AN ORGANIZED HEALTH CARE EDUCATION/TRAINING PROGRAM
Payer: MEDICARE

## 2023-10-26 DIAGNOSIS — R41.3 AMNESIA: Primary | ICD-10-CM

## 2023-10-26 DIAGNOSIS — I21.4 NSTEMI (NON-ST ELEVATED MYOCARDIAL INFARCTION) (HCC): ICD-10-CM

## 2023-10-26 PROBLEM — I48.0 PAROXYSMAL ATRIAL FIBRILLATION (HCC): Status: ACTIVE | Noted: 2020-11-03

## 2023-10-26 LAB
AMPHET UR QL SCN: NEGATIVE
ANION GAP SERPL CALCULATED.3IONS-SCNC: 17 MMOL/L (ref 9–17)
APAP SERPL-MCNC: <10 UG/ML (ref 10–30)
BARBITURATES UR QL SCN: NEGATIVE
BASOPHILS # BLD: 0.05 K/UL (ref 0–0.2)
BASOPHILS NFR BLD: 0 % (ref 0–2)
BENZODIAZ UR QL: NEGATIVE
BUN SERPL-MCNC: 8 MG/DL (ref 8–23)
BUN/CREAT SERPL: 8 (ref 9–20)
CALCIUM SERPL-MCNC: 9.9 MG/DL (ref 8.6–10.4)
CANNABINOIDS UR QL SCN: POSITIVE
CHLORIDE SERPL-SCNC: 99 MMOL/L (ref 98–107)
CHP ED QC CHECK: NORMAL
CK SERPL-CCNC: 54 U/L (ref 39–308)
CO2 SERPL-SCNC: 22 MMOL/L (ref 20–31)
COCAINE UR QL SCN: NEGATIVE
CREAT SERPL-MCNC: 1 MG/DL (ref 0.7–1.2)
D DIMER PPP FEU-MCNC: 0.89 UG/ML FEU (ref 0–0.59)
EOSINOPHIL # BLD: 0.04 K/UL (ref 0–0.44)
EOSINOPHILS RELATIVE PERCENT: 0 % (ref 1–4)
ERYTHROCYTE [DISTWIDTH] IN BLOOD BY AUTOMATED COUNT: 13.5 % (ref 11.8–14.4)
ETHANOL PERCENT: <0.01 %
ETHANOLAMINE SERPL-MCNC: <10 MG/DL
FENTANYL UR QL: NEGATIVE
GFR SERPL CREATININE-BSD FRML MDRD: >60 ML/MIN/1.73M2
GLUCOSE BLD-MCNC: 119 MG/DL (ref 75–110)
GLUCOSE SERPL-MCNC: 112 MG/DL (ref 70–99)
HCT VFR BLD AUTO: 48.8 % (ref 40.7–50.3)
HGB BLD-MCNC: 16.1 G/DL (ref 13–17)
IMM GRANULOCYTES # BLD AUTO: 0.07 K/UL (ref 0–0.3)
IMM GRANULOCYTES NFR BLD: 1 %
LACTATE BLDV-SCNC: 1.6 MMOL/L (ref 0.5–2.2)
LACTATE BLDV-SCNC: 4.1 MMOL/L (ref 0.5–2.2)
LYMPHOCYTES NFR BLD: 2.01 K/UL (ref 1.1–3.7)
LYMPHOCYTES RELATIVE PERCENT: 15 % (ref 24–43)
MAGNESIUM SERPL-MCNC: 2.1 MG/DL (ref 1.6–2.6)
MCH RBC QN AUTO: 30.1 PG (ref 25.2–33.5)
MCHC RBC AUTO-ENTMCNC: 33 G/DL (ref 28.4–34.8)
MCV RBC AUTO: 91.4 FL (ref 82.6–102.9)
METHADONE UR QL: NEGATIVE
MONOCYTES NFR BLD: 1.05 K/UL (ref 0.1–1.2)
MONOCYTES NFR BLD: 8 % (ref 3–12)
NEUTROPHILS NFR BLD: 76 % (ref 36–65)
NEUTS SEG NFR BLD: 10.6 K/UL (ref 1.5–8.1)
NRBC BLD-RTO: 0 PER 100 WBC
OPIATES UR QL SCN: NEGATIVE
OXYCODONE UR QL SCN: NEGATIVE
PCP UR QL SCN: NEGATIVE
PLATELET # BLD AUTO: 313 K/UL (ref 138–453)
PMV BLD AUTO: 10.3 FL (ref 8.1–13.5)
POTASSIUM SERPL-SCNC: 3.5 MMOL/L (ref 3.7–5.3)
RBC # BLD AUTO: 5.34 M/UL (ref 4.21–5.77)
SALICYLATES SERPL-MCNC: <1 MG/DL (ref 3–10)
SODIUM SERPL-SCNC: 138 MMOL/L (ref 135–144)
T4 FREE SERPL-MCNC: 1.2 NG/DL (ref 0.9–1.7)
TEST INFORMATION: ABNORMAL
TROPONIN I SERPL HS-MCNC: 73 NG/L (ref 0–22)
TSH SERPL DL<=0.05 MIU/L-ACNC: 2.29 UIU/ML (ref 0.3–5)
WBC OTHER # BLD: 13.8 K/UL (ref 3.5–11.3)

## 2023-10-26 PROCEDURE — 99285 EMERGENCY DEPT VISIT HI MDM: CPT

## 2023-10-26 PROCEDURE — 80307 DRUG TEST PRSMV CHEM ANLYZR: CPT

## 2023-10-26 PROCEDURE — 83735 ASSAY OF MAGNESIUM: CPT

## 2023-10-26 PROCEDURE — 96374 THER/PROPH/DIAG INJ IV PUSH: CPT

## 2023-10-26 PROCEDURE — 84439 ASSAY OF FREE THYROXINE: CPT

## 2023-10-26 PROCEDURE — G0480 DRUG TEST DEF 1-7 CLASSES: HCPCS

## 2023-10-26 PROCEDURE — 85025 COMPLETE CBC W/AUTO DIFF WBC: CPT

## 2023-10-26 PROCEDURE — 84484 ASSAY OF TROPONIN QUANT: CPT

## 2023-10-26 PROCEDURE — 93005 ELECTROCARDIOGRAM TRACING: CPT | Performed by: EMERGENCY MEDICINE

## 2023-10-26 PROCEDURE — G0378 HOSPITAL OBSERVATION PER HR: HCPCS

## 2023-10-26 PROCEDURE — 70450 CT HEAD/BRAIN W/O DYE: CPT

## 2023-10-26 PROCEDURE — 94761 N-INVAS EAR/PLS OXIMETRY MLT: CPT

## 2023-10-26 PROCEDURE — 84443 ASSAY THYROID STIM HORMONE: CPT

## 2023-10-26 PROCEDURE — 2580000003 HC RX 258: Performed by: NURSE PRACTITIONER

## 2023-10-26 PROCEDURE — 94640 AIRWAY INHALATION TREATMENT: CPT

## 2023-10-26 PROCEDURE — 82550 ASSAY OF CK (CPK): CPT

## 2023-10-26 PROCEDURE — 71045 X-RAY EXAM CHEST 1 VIEW: CPT

## 2023-10-26 PROCEDURE — 80143 DRUG ASSAY ACETAMINOPHEN: CPT

## 2023-10-26 PROCEDURE — 80048 BASIC METABOLIC PNL TOTAL CA: CPT

## 2023-10-26 PROCEDURE — 96361 HYDRATE IV INFUSION ADD-ON: CPT

## 2023-10-26 PROCEDURE — 83605 ASSAY OF LACTIC ACID: CPT

## 2023-10-26 PROCEDURE — 6370000000 HC RX 637 (ALT 250 FOR IP): Performed by: NURSE PRACTITIONER

## 2023-10-26 PROCEDURE — 80179 DRUG ASSAY SALICYLATE: CPT

## 2023-10-26 PROCEDURE — 6360000002 HC RX W HCPCS: Performed by: EMERGENCY MEDICINE

## 2023-10-26 PROCEDURE — 99222 1ST HOSP IP/OBS MODERATE 55: CPT | Performed by: NURSE PRACTITIONER

## 2023-10-26 PROCEDURE — 71260 CT THORAX DX C+: CPT

## 2023-10-26 PROCEDURE — 6360000004 HC RX CONTRAST MEDICATION: Performed by: EMERGENCY MEDICINE

## 2023-10-26 PROCEDURE — 2580000003 HC RX 258: Performed by: EMERGENCY MEDICINE

## 2023-10-26 PROCEDURE — 85379 FIBRIN DEGRADATION QUANT: CPT

## 2023-10-26 PROCEDURE — 36415 COLL VENOUS BLD VENIPUNCTURE: CPT

## 2023-10-26 PROCEDURE — 82947 ASSAY GLUCOSE BLOOD QUANT: CPT

## 2023-10-26 RX ORDER — ONDANSETRON 4 MG/1
4 TABLET, ORALLY DISINTEGRATING ORAL EVERY 8 HOURS PRN
Status: DISCONTINUED | OUTPATIENT
Start: 2023-10-26 | End: 2023-10-28 | Stop reason: HOSPADM

## 2023-10-26 RX ORDER — PROPRANOLOL HYDROCHLORIDE 10 MG/1
10 TABLET ORAL 3 TIMES DAILY
Status: DISCONTINUED | OUTPATIENT
Start: 2023-10-26 | End: 2023-10-28 | Stop reason: HOSPADM

## 2023-10-26 RX ORDER — SODIUM CHLORIDE 9 MG/ML
INJECTION, SOLUTION INTRAVENOUS PRN
Status: DISCONTINUED | OUTPATIENT
Start: 2023-10-26 | End: 2023-10-28 | Stop reason: HOSPADM

## 2023-10-26 RX ORDER — BUSPIRONE HYDROCHLORIDE 5 MG/1
7.5 TABLET ORAL 2 TIMES DAILY
Status: DISCONTINUED | OUTPATIENT
Start: 2023-10-26 | End: 2023-10-28 | Stop reason: HOSPADM

## 2023-10-26 RX ORDER — POTASSIUM CHLORIDE 20 MEQ/1
40 TABLET, EXTENDED RELEASE ORAL PRN
Status: DISCONTINUED | OUTPATIENT
Start: 2023-10-26 | End: 2023-10-28 | Stop reason: HOSPADM

## 2023-10-26 RX ORDER — SPIRONOLACTONE 25 MG/1
25 TABLET ORAL DAILY
Status: DISCONTINUED | OUTPATIENT
Start: 2023-10-27 | End: 2023-10-28 | Stop reason: HOSPADM

## 2023-10-26 RX ORDER — ACETAMINOPHEN 325 MG/1
650 TABLET ORAL EVERY 6 HOURS PRN
Status: DISCONTINUED | OUTPATIENT
Start: 2023-10-26 | End: 2023-10-28 | Stop reason: HOSPADM

## 2023-10-26 RX ORDER — SODIUM CHLORIDE 0.9 % (FLUSH) 0.9 %
5-40 SYRINGE (ML) INJECTION EVERY 12 HOURS SCHEDULED
Status: DISCONTINUED | OUTPATIENT
Start: 2023-10-26 | End: 2023-10-28 | Stop reason: HOSPADM

## 2023-10-26 RX ORDER — SODIUM CHLORIDE 0.9 % (FLUSH) 0.9 %
10 SYRINGE (ML) INJECTION PRN
Status: DISCONTINUED | OUTPATIENT
Start: 2023-10-26 | End: 2023-10-26

## 2023-10-26 RX ORDER — SODIUM CHLORIDE 0.9 % (FLUSH) 0.9 %
10 SYRINGE (ML) INJECTION PRN
Status: DISCONTINUED | OUTPATIENT
Start: 2023-10-26 | End: 2023-10-28 | Stop reason: HOSPADM

## 2023-10-26 RX ORDER — 0.9 % SODIUM CHLORIDE 0.9 %
100 INTRAVENOUS SOLUTION INTRAVENOUS ONCE
Status: COMPLETED | OUTPATIENT
Start: 2023-10-26 | End: 2023-10-26

## 2023-10-26 RX ORDER — RISPERIDONE 0.25 MG/1
0.5 TABLET ORAL 2 TIMES DAILY
Status: DISCONTINUED | OUTPATIENT
Start: 2023-10-26 | End: 2023-10-27

## 2023-10-26 RX ORDER — FUROSEMIDE 40 MG/1
40 TABLET ORAL DAILY
Status: DISCONTINUED | OUTPATIENT
Start: 2023-10-27 | End: 2023-10-28 | Stop reason: HOSPADM

## 2023-10-26 RX ORDER — ATORVASTATIN CALCIUM 10 MG/1
10 TABLET, FILM COATED ORAL NIGHTLY
Status: DISCONTINUED | OUTPATIENT
Start: 2023-10-26 | End: 2023-10-28 | Stop reason: HOSPADM

## 2023-10-26 RX ORDER — ONDANSETRON 2 MG/ML
4 INJECTION INTRAMUSCULAR; INTRAVENOUS ONCE
Status: COMPLETED | OUTPATIENT
Start: 2023-10-26 | End: 2023-10-26

## 2023-10-26 RX ORDER — 0.9 % SODIUM CHLORIDE 0.9 %
1000 INTRAVENOUS SOLUTION INTRAVENOUS ONCE
Status: COMPLETED | OUTPATIENT
Start: 2023-10-26 | End: 2023-10-26

## 2023-10-26 RX ORDER — M-VIT,TX,IRON,MINS/CALC/FOLIC 27MG-0.4MG
1 TABLET ORAL DAILY
Status: DISCONTINUED | OUTPATIENT
Start: 2023-10-27 | End: 2023-10-28 | Stop reason: HOSPADM

## 2023-10-26 RX ORDER — POTASSIUM CHLORIDE 7.45 MG/ML
10 INJECTION INTRAVENOUS PRN
Status: DISCONTINUED | OUTPATIENT
Start: 2023-10-26 | End: 2023-10-28 | Stop reason: HOSPADM

## 2023-10-26 RX ORDER — ONDANSETRON 2 MG/ML
4 INJECTION INTRAMUSCULAR; INTRAVENOUS EVERY 6 HOURS PRN
Status: DISCONTINUED | OUTPATIENT
Start: 2023-10-26 | End: 2023-10-28 | Stop reason: HOSPADM

## 2023-10-26 RX ORDER — POLYETHYLENE GLYCOL 3350 17 G/17G
17 POWDER, FOR SOLUTION ORAL DAILY PRN
Status: DISCONTINUED | OUTPATIENT
Start: 2023-10-26 | End: 2023-10-28 | Stop reason: HOSPADM

## 2023-10-26 RX ORDER — FOLIC ACID 1 MG/1
1 TABLET ORAL DAILY
Status: DISCONTINUED | OUTPATIENT
Start: 2023-10-27 | End: 2023-10-28 | Stop reason: HOSPADM

## 2023-10-26 RX ORDER — ACETAMINOPHEN 650 MG/1
650 SUPPOSITORY RECTAL EVERY 6 HOURS PRN
Status: DISCONTINUED | OUTPATIENT
Start: 2023-10-26 | End: 2023-10-28 | Stop reason: HOSPADM

## 2023-10-26 RX ORDER — BUDESONIDE AND FORMOTEROL FUMARATE DIHYDRATE 160; 4.5 UG/1; UG/1
2 AEROSOL RESPIRATORY (INHALATION)
Status: DISCONTINUED | OUTPATIENT
Start: 2023-10-26 | End: 2023-10-28 | Stop reason: HOSPADM

## 2023-10-26 RX ORDER — TRAZODONE HYDROCHLORIDE 50 MG/1
50 TABLET ORAL NIGHTLY PRN
Status: DISCONTINUED | OUTPATIENT
Start: 2023-10-26 | End: 2023-10-28 | Stop reason: HOSPADM

## 2023-10-26 RX ORDER — MAGNESIUM SULFATE 1 G/100ML
1000 INJECTION INTRAVENOUS PRN
Status: DISCONTINUED | OUTPATIENT
Start: 2023-10-26 | End: 2023-10-28 | Stop reason: HOSPADM

## 2023-10-26 RX ORDER — DULOXETIN HYDROCHLORIDE 20 MG/1
20 CAPSULE, DELAYED RELEASE ORAL DAILY
Status: DISCONTINUED | OUTPATIENT
Start: 2023-10-27 | End: 2023-10-28 | Stop reason: HOSPADM

## 2023-10-26 RX ORDER — GAUZE BANDAGE 2" X 2"
100 BANDAGE TOPICAL DAILY
Status: DISCONTINUED | OUTPATIENT
Start: 2023-10-27 | End: 2023-10-28 | Stop reason: HOSPADM

## 2023-10-26 RX ORDER — LANOLIN ALCOHOL/MO/W.PET/CERES
1000 CREAM (GRAM) TOPICAL DAILY
Status: DISCONTINUED | OUTPATIENT
Start: 2023-10-27 | End: 2023-10-27

## 2023-10-26 RX ORDER — PSEUDOEPHEDRINE HCL 30 MG
100 TABLET ORAL 2 TIMES DAILY
Status: DISCONTINUED | OUTPATIENT
Start: 2023-10-26 | End: 2023-10-28 | Stop reason: HOSPADM

## 2023-10-26 RX ADMIN — SODIUM CHLORIDE 1000 ML: 9 INJECTION, SOLUTION INTRAVENOUS at 16:43

## 2023-10-26 RX ADMIN — BUDESONIDE AND FORMOTEROL FUMARATE DIHYDRATE 2 PUFF: 160; 4.5 AEROSOL RESPIRATORY (INHALATION) at 21:40

## 2023-10-26 RX ADMIN — SODIUM CHLORIDE 100 ML: 9 INJECTION, SOLUTION INTRAVENOUS at 17:05

## 2023-10-26 RX ADMIN — ONDANSETRON 4 MG: 2 INJECTION INTRAMUSCULAR; INTRAVENOUS at 16:48

## 2023-10-26 RX ADMIN — SODIUM CHLORIDE, PRESERVATIVE FREE 10 ML: 5 INJECTION INTRAVENOUS at 22:00

## 2023-10-26 RX ADMIN — IOPAMIDOL 75 ML: 755 INJECTION, SOLUTION INTRAVENOUS at 17:05

## 2023-10-26 RX ADMIN — SODIUM CHLORIDE, PRESERVATIVE FREE 10 ML: 5 INJECTION INTRAVENOUS at 17:05

## 2023-10-26 ASSESSMENT — PAIN DESCRIPTION - LOCATION: LOCATION: HEAD

## 2023-10-26 ASSESSMENT — PAIN DESCRIPTION - DESCRIPTORS: DESCRIPTORS: SHARP;THROBBING

## 2023-10-26 ASSESSMENT — ENCOUNTER SYMPTOMS
NAUSEA: 0
ABDOMINAL PAIN: 0
EYE PAIN: 0
BACK PAIN: 0
VOMITING: 0
CHEST TIGHTNESS: 0
SORE THROAT: 0
EYE REDNESS: 0
FACIAL SWELLING: 0
COUGH: 0
ABDOMINAL DISTENTION: 0
SHORTNESS OF BREATH: 0
BACK PAIN: 1
EYE DISCHARGE: 0
DIARRHEA: 0
PHOTOPHOBIA: 0

## 2023-10-26 ASSESSMENT — LIFESTYLE VARIABLES
HOW MANY STANDARD DRINKS CONTAINING ALCOHOL DO YOU HAVE ON A TYPICAL DAY: PATIENT DOES NOT DRINK
HOW OFTEN DO YOU HAVE A DRINK CONTAINING ALCOHOL: 2-4 TIMES A MONTH

## 2023-10-26 ASSESSMENT — PAIN DESCRIPTION - FREQUENCY: FREQUENCY: INTERMITTENT

## 2023-10-26 ASSESSMENT — PAIN - FUNCTIONAL ASSESSMENT: PAIN_FUNCTIONAL_ASSESSMENT: 0-10

## 2023-10-26 ASSESSMENT — PAIN SCALES - GENERAL: PAINLEVEL_OUTOF10: 8
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
## 2023-10-26 NOTE — ED NOTES
Patient presents to ER from home due to memory loss. Upon examination patient is oriented to self and place. Patient states he woke up this AM with memory loss. Patient states he is unsure of fall or injury. Patient states he smokes marijuana and cigarettes. Patient states hx of Afib. Patient states he is unsure if he took medication today. Patient states he was dropped off by his brother. Patient A/O x 4, equal chest expansion with non labored breathing, wheels locked, bed in lowest position, call light in reach.      Adarsh Bolivar RN  10/26/23 2195

## 2023-10-26 NOTE — TELEPHONE ENCOUNTER
Patient calling with complaint of memory loss all day yesterday and waking up this PM throwing up, he is very concerned and doesn't know what is going on, I advised ER for eval. Patient agreed and scheduled appt with you for Tuesday next week

## 2023-10-26 NOTE — ED PROVIDER NOTES
EMERGENCY DEPARTMENT ENCOUNTER    Pt Name: Tania Xie  MRN: [id number]  [address] [facility] [address] 1960  Date of evaluation: 10/26/23  CHIEF COMPLAINT       Chief Complaint   Patient presents with    Tachycardia     Hx of A-fib    Headache     Onset this am    Nausea & Vomiting    Memory Loss     Doesn't remember  past yesterday     HISTORY OF PRESENT ILLNESS   HPI   The patient is a 42-year-old male with a history of atrial fibrillation and CHF who presented to the emergency department secondary to memory changes, headache, nausea vomiting and tachycardia. Patient states he has no recollection of the events yesterday or today. He was brought to the ER by his brother. No history of trauma or injury per the patient but he cannot remember. He is oriented to place. Patient cannot recall if he had a previous history of a stroke. Does take a blood thinner and he states he has atrial fibrillation. He does have a AICD is unsure who interrogated this or when it was last interrogated. Does not know his cardiology is. He denied a previous history of TIA or stroke. States he eats edibles and has been eating them regularly. Patient planes of a headache. Denies vision changes. Denies chest pain, shortness of breath, nausea, vomiting, fevers or chills. REVIEW OF SYSTEMS     Review of Systems   Constitutional:  Negative for chills, diaphoresis and fever. HENT:  Negative for congestion, ear pain and facial swelling. Eyes:  Negative for pain, discharge and visual disturbance. Respiratory:  Negative for chest tightness and shortness of breath. Cardiovascular:  Negative for chest pain and palpitations. Gastrointestinal:  Negative for abdominal distention and abdominal pain. Genitourinary:  Negative for difficulty urinating and flank pain. Musculoskeletal:  Negative for back pain. Skin:  Negative for wound. Neurological:  Positive for headaches. Negative for dizziness and light-headedness.         Memory

## 2023-10-27 ENCOUNTER — APPOINTMENT (OUTPATIENT)
Dept: MRI IMAGING | Age: 63
End: 2023-10-27
Payer: MEDICARE

## 2023-10-27 PROBLEM — Z95.810 AICD (AUTOMATIC CARDIOVERTER/DEFIBRILLATOR) PRESENT: Status: ACTIVE | Noted: 2023-10-27

## 2023-10-27 PROBLEM — E44.0 MODERATE MALNUTRITION (HCC): Status: ACTIVE | Noted: 2023-10-27

## 2023-10-27 PROBLEM — F12.90 CANNABIS USE DISORDER: Status: ACTIVE | Noted: 2023-10-27

## 2023-10-27 PROBLEM — F19.90 SUBSTANCE USE DISORDER: Status: ACTIVE | Noted: 2023-10-27

## 2023-10-27 PROBLEM — R41.89 COGNITIVE IMPAIRMENT: Status: ACTIVE | Noted: 2023-10-27

## 2023-10-27 LAB
AMMONIA PLAS-SCNC: 23 UMOL/L (ref 16–60)
ANION GAP SERPL CALCULATED.3IONS-SCNC: 10 MMOL/L (ref 9–17)
BUN SERPL-MCNC: 8 MG/DL (ref 8–23)
BUN/CREAT SERPL: 11 (ref 9–20)
CALCIUM SERPL-MCNC: 9.2 MG/DL (ref 8.6–10.4)
CHLORIDE SERPL-SCNC: 105 MMOL/L (ref 98–107)
CO2 SERPL-SCNC: 23 MMOL/L (ref 20–31)
CREAT SERPL-MCNC: 0.7 MG/DL (ref 0.7–1.2)
FOLATE SERPL-MCNC: 6.3 NG/ML
GFR SERPL CREATININE-BSD FRML MDRD: >60 ML/MIN/1.73M2
GLUCOSE SERPL-MCNC: 104 MG/DL (ref 70–99)
INR PPP: 1.2
MAGNESIUM SERPL-MCNC: 2.1 MG/DL (ref 1.6–2.6)
POTASSIUM SERPL-SCNC: 3.5 MMOL/L (ref 3.7–5.3)
PROTHROMBIN TIME: 15.3 SEC (ref 11.5–14.2)
SODIUM SERPL-SCNC: 138 MMOL/L (ref 135–144)
VIT B12 SERPL-MCNC: 228 PG/ML (ref 232–1245)

## 2023-10-27 PROCEDURE — 70551 MRI BRAIN STEM W/O DYE: CPT

## 2023-10-27 PROCEDURE — 84425 ASSAY OF VITAMIN B-1: CPT

## 2023-10-27 PROCEDURE — 94640 AIRWAY INHALATION TREATMENT: CPT

## 2023-10-27 PROCEDURE — 82746 ASSAY OF FOLIC ACID SERUM: CPT

## 2023-10-27 PROCEDURE — 94761 N-INVAS EAR/PLS OXIMETRY MLT: CPT

## 2023-10-27 PROCEDURE — 95819 EEG AWAKE AND ASLEEP: CPT | Performed by: PSYCHIATRY & NEUROLOGY

## 2023-10-27 PROCEDURE — 99232 SBSQ HOSP IP/OBS MODERATE 35: CPT | Performed by: STUDENT IN AN ORGANIZED HEALTH CARE EDUCATION/TRAINING PROGRAM

## 2023-10-27 PROCEDURE — G0378 HOSPITAL OBSERVATION PER HR: HCPCS

## 2023-10-27 PROCEDURE — 99223 1ST HOSP IP/OBS HIGH 75: CPT | Performed by: PSYCHIATRY & NEUROLOGY

## 2023-10-27 PROCEDURE — 36415 COLL VENOUS BLD VENIPUNCTURE: CPT

## 2023-10-27 PROCEDURE — 95816 EEG AWAKE AND DROWSY: CPT

## 2023-10-27 PROCEDURE — 6370000000 HC RX 637 (ALT 250 FOR IP): Performed by: NURSE PRACTITIONER

## 2023-10-27 PROCEDURE — 80048 BASIC METABOLIC PNL TOTAL CA: CPT

## 2023-10-27 PROCEDURE — 2580000003 HC RX 258: Performed by: NURSE PRACTITIONER

## 2023-10-27 PROCEDURE — 82140 ASSAY OF AMMONIA: CPT

## 2023-10-27 PROCEDURE — 83735 ASSAY OF MAGNESIUM: CPT

## 2023-10-27 PROCEDURE — 82607 VITAMIN B-12: CPT

## 2023-10-27 PROCEDURE — 85610 PROTHROMBIN TIME: CPT

## 2023-10-27 PROCEDURE — 99221 1ST HOSP IP/OBS SF/LOW 40: CPT | Performed by: NURSE PRACTITIONER

## 2023-10-27 RX ORDER — CYANOCOBALAMIN 1000 UG/ML
1000 INJECTION, SOLUTION INTRAMUSCULAR; SUBCUTANEOUS DAILY
Status: DISCONTINUED | OUTPATIENT
Start: 2023-10-27 | End: 2023-10-27

## 2023-10-27 RX ORDER — CYANOCOBALAMIN 1000 UG/ML
1000 INJECTION, SOLUTION INTRAMUSCULAR; SUBCUTANEOUS DAILY
Status: DISCONTINUED | OUTPATIENT
Start: 2023-10-27 | End: 2023-10-28 | Stop reason: HOSPADM

## 2023-10-27 RX ADMIN — TRAZODONE HYDROCHLORIDE 50 MG: 50 TABLET ORAL at 19:38

## 2023-10-27 RX ADMIN — SODIUM CHLORIDE, PRESERVATIVE FREE 10 ML: 5 INJECTION INTRAVENOUS at 19:38

## 2023-10-27 RX ADMIN — POTASSIUM CHLORIDE 40 MEQ: 1500 TABLET, EXTENDED RELEASE ORAL at 16:14

## 2023-10-27 RX ADMIN — BUDESONIDE AND FORMOTEROL FUMARATE DIHYDRATE 2 PUFF: 160; 4.5 AEROSOL RESPIRATORY (INHALATION) at 07:28

## 2023-10-27 RX ADMIN — SODIUM CHLORIDE, PRESERVATIVE FREE 10 ML: 5 INJECTION INTRAVENOUS at 09:00

## 2023-10-27 RX ADMIN — RISPERIDONE 0.5 MG: 0.25 TABLET, FILM COATED ORAL at 19:38

## 2023-10-27 NOTE — CONSULTS
MD SPENCER   rivaroxaban (XARELTO) 20 MG TABS tablet Take 1 tablet by mouth daily (with breakfast) 10/3/23   Nicole Lea MD   atorvastatin (LIPITOR) 10 MG tablet Take 1 tablet by mouth nightly 10/2/23   Elaine Fernandez MD   spironolactone (ALDACTONE) 25 MG tablet Take 1 tablet by mouth daily 10/2/23   Elaine Fernandez MD   Multiple Vitamins-Minerals (THERAPEUTIC MULTIVITAMIN-MINERALS) tablet Take 1 tablet by mouth daily 10/3/23   Elaine Fernandez MD   traZODone (DESYREL) 50 MG tablet Take 1 tablet by mouth nightly as needed for Sleep 10/2/23   Elaine Fernandez MD   vitamin B-1 (THIAMINE) 100 MG tablet Take 1 tablet by mouth daily 10/2/23   Elaine Fernandez MD   vitamin B-12 1000 MCG tablet Take 1 tablet by mouth daily 10/3/23   Elaine Fernandez MD   levothyroxine (SYNTHROID) 50 MCG tablet Take 1 tablet by mouth Daily  Patient not taking: No sig reported 3/25/16 7/21/22  RIVER Knox CNP   diltiazem (CARDIZEM) 120 MG tablet Take 1 tablet by mouth 2 times daily  Patient not taking: No sig reported 12/24/15 7/21/22  Mikki Hoover MD   levalbuterol [facility]) 45 MCG/ACT inhaler Inhale 1-2 puffs into the lungs every 6 hours as needed for Wheezing.  1/23/16  Mikki Hoover MD        Allergies:     Augmentin es-600 [amoxicillin-pot clavulanate] and Sulfa antibiotics    Social History:     Tobacco:    reports that he has been smoking cigarettes. He has a 40.00 pack-year smoking history. He has never used smokeless tobacco.  Alcohol:      reports that he does not currently use alcohol. Drug Use:  reports current drug use. Drug: Marijuana Guillermina Howe). Family History:     Family History   Problem Relation Age of Onset    Other Mother         dementia    Other Father        Review of Systems:       Constitutional Negative for fever and chills   HEENT Negative for ear discharge, ear pain, nosebleed. Negative for photophobia, headache.     Musculoskeletal +chronic back pain   Respiratory Negative for without atrophy or fasciculation     Motor function  Strength: 5/5 RUE, 5/5 LUE, 4+/5 bilateral LE limited due to chronic back pain  Normal bulk and tone. No tremors                      Sensory function Intact to touch throughout     Cerebellar Intact finger-nose-finger testing. Reflex function 1/4 symmetric throughout . Downgoing plantar response bilaterally. (-)Handley's sign bilaterally    Gait                  Deferred             Diagnostics:      Laboratory Testing:  CBC:   Recent Labs     10/26/23  1543   WBC 13.8*   HGB 16.1        BMP:    Recent Labs     10/26/23  1543 10/27/23  0535    138   K 3.5* 3.5*   CL 99 105   CO2 22 23   BUN 8 8   CREATININE 1.0 0.7   GLUCOSE 112* 104*         Lab Results   Component Value Date    CHOL 216 (H) 06/29/2023    LDLCHOLESTEROL 149 (H) 06/29/2023    HDL 45 06/29/2023    TRIG 111 06/29/2023    ALT 7 09/21/2023    AST 18 09/21/2023    TSH 2.29 10/26/2023    INR 1.2 10/27/2023    LABA1C 4.7 06/29/2023    TTSIRXCZ36 161 (L) 09/21/2023    MG 2.1 10/26/2023    PHOS 4.1 09/20/2023       Imaging/Diagnostics:      Results for orders placed during the hospital encounter of 09/20/23    MRI BRAIN W WO CONTRAST    Narrative  EXAMINATION:  MRI OF THE BRAIN WITHOUT AND WITH CONTRAST  9/22/2023 4:18 pm    TECHNIQUE:  Multiplanar multisequence MRI of the head/brain was performed without and  with the administration of intravenous contrast.    COMPARISON:  None. HISTORY:  ORDERING SYSTEM PROVIDED HISTORY: ams  TECHNOLOGIST PROVIDED HISTORY:  ams    FINDINGS:  INTRACRANIAL STRUCTURES/VENTRICLES:  No acute infarct or mass. Left greater  than right cerebellar encephalomalacia. Mild chronic white matter  microvascular ischemic changes. No mass effect or midline shift. No evidence  of an acute intracranial hemorrhage. The ventricles and sulci are normal in  size and configuration. The sellar/suprasellar regions appear unremarkable.   The normal signal voids within

## 2023-10-27 NOTE — PLAN OF CARE
Problem: Discharge Planning  Goal: Discharge to home or other facility with appropriate resources  Outcome: Progressing     Problem: Pain  Goal: Verbalizes/displays adequate comfort level or baseline comfort level  Outcome: Progressing     Problem: Safety - Adult  Goal: Free from fall injury  Outcome: Progressing     Problem: Respiratory - Adult  Goal: Able to breathe comfortably  10/26/2023 2139 by Erna Oliva RCP  Outcome: Progressing  Goal: Patient's breath sounds will be clear and equal  10/26/2023 2139 by Erna Oliva RCP  Outcome: Progressing     Problem: Chronic Conditions and Co-morbidities  Goal: Patient's chronic conditions and co-morbidity symptoms are monitored and maintained or improved  Outcome: Progressing

## 2023-10-27 NOTE — PLAN OF CARE
Problem: Discharge Planning  Goal: Discharge to home or other facility with appropriate resources  10/27/2023 1940 by Peter Moreno RN  Outcome: Progressing     Problem: Pain  Goal: Verbalizes/displays adequate comfort level or baseline comfort level  10/27/2023 1940 by Peter Moreno RN  Outcome: Progressing     Problem: Safety - Adult  Goal: Free from fall injury  10/27/2023 1940 by Peter Moreno RN  Outcome: Progressing     Problem: Respiratory - Adult  Goal: Able to breathe comfortably  10/27/2023 0730 by Donell Nicholson RCP  Outcome: Progressing

## 2023-10-27 NOTE — PROGRESS NOTES
Patient receives Sacrament of the Sick (anointing) from [facility] .    [address] [place] will follow as needed. (writer charting for [employer] .)    31/88/69 1021   Encounter Summary   Encounter Overview/Reason  [facility]   Service Provided For: Patient   Referral/Consult From: Elsa   Last Encounter  10/27/23   Rituals, Rites and Sacraments   Type Sacrament of Sick; Anointing

## 2023-10-27 NOTE — PLAN OF CARE
Pt alert and oriented. Up stand by. MRI and EEG completed. Was reported his AICD was interrogated in MRI and had not fired. Psych and neuro both saw patient today.   Problem: Discharge Planning  Goal: Discharge to home or other facility with appropriate resources  10/27/2023 1936 by Jocelyn Moreno RN  Outcome: Progressing     Problem: Pain  Goal: Verbalizes/displays adequate comfort level or baseline comfort level  10/27/2023 1936 by Jocelyn Moreno RN  Outcome: Progressing     Problem: Safety - Adult  Goal: Free from fall injury  10/27/2023 1936 by Jocelyn Moreno RN  Outcome: Progressing     Problem: Chronic Conditions and Co-morbidities  Goal: Patient's chronic conditions and co-morbidity symptoms are monitored and maintained or improved  10/27/2023 1936 by Jocelyn Moreno RN  Outcome: Progressing     Problem: Nutrition Deficit:  Goal: Optimize nutritional status  Outcome: Progressing

## 2023-10-27 NOTE — PLAN OF CARE
Problem: Respiratory - Adult  Goal: Able to breathe comfortably  10/27/2023 0730 by Josefina Abdul RCP  Outcome: Progressing     Problem: Respiratory - Adult  Goal: Patient's breath sounds will be clear and equal  10/27/2023 0730 by Josefina Abdul RCP  Outcome: Progressing

## 2023-10-27 NOTE — PROCEDURES
EEG REPORT       Patient: Eliseo East Age: 58 y.o. MRN: [id number]      Referring Provider: No ref. provider found    History: This routine 30 minute scalp EEG was recorded with video- monitoring for a 58 y.o.. male  who presented with encephalopathy. This EEG was performed to evaluate for focal and epileptiform abnormalities.      Eliseo East   Current Facility-Administered Medications   Medication Dose Route Frequency Provider Last Rate Last Admin    cyanocobalamin injection 1,000 mcg  1,000 mcg SubCUTAneous Daily Calista Elder MD        atorvastatin (LIPITOR) tablet 10 mg  10 mg Oral Nightly Brennon Place, APRN - NP        budesonide-formoterol (SYMBICORT) 160-4.5 MCG/ACT inhaler 2 puff  2 puff Inhalation BID RT Forsyth Place, APRN - NP   2 puff at 10/27/23 0728    busPIRone (BUSPAR) tablet 7.5 mg  7.5 mg Oral BID Brennon Place, APRN - NP        docusate (COLACE, DULCOLAX) CAPS 100 mg  100 mg Oral BID Forsyth Place, APRN - NP        DULoxetine (CYMBALTA) extended release capsule 20 mg  20 mg Oral Daily Forsyth Place, APRN - NP        sacubitril-valsartan (ENTRESTO) 24-26 MG per tablet 1 tablet  1 tablet Oral BID Forsyth Place, APRN - NP        folic acid (FOLVITE) tablet 1 mg  1 mg Oral Daily Forsyth Place, APRN - NP        furosemide (LASIX) tablet 40 mg  40 mg Oral Daily Forsyth Place, APRN - NP        therapeutic multivitamin-minerals 1 tablet  1 tablet Oral Daily Forsyth Place, APRN - NP        propranolol (INDERAL) tablet 10 mg  10 mg Oral TID Brennon Place, APRN - NP        risperiDONE (RisperDAL) tablet 0.5 mg  0.5 mg Oral BID Brennon Place, APRN - NP        rivaroxaban Arnaldo Rout) tablet 20 mg  20 mg Oral Dinner [facility], APRN - NP        traZODone (DESYREL) tablet 50 mg  50 mg Oral Nightly PRN Forsyth Place, APRN - NP        thiamine mononitrate tablet 100 mg  100 mg Oral Daily Brennon Place, APRN - NP        sodium chloride flush 0.9 % injection 5-40 mL  5-40 mL IntraVENous 2

## 2023-10-27 NOTE — CARE COORDINATION
Case Management Assessment  Initial Evaluation    Date/Time of Evaluation: 10/27/2023 3:29 PM  Assessment Completed by: FABIAN Blair    If patient is discharged prior to next notation, then this note serves as note for discharge by case management. Patient Name: Kerri Ny                   YOB: 1960  Diagnosis: Memory loss [R41.3]  Amnesia [R41.3]  NSTEMI (non-ST elevated myocardial infarction) [facility]) [I21.4]                   Date / Time: 10/26/2023  3:22 PM    Patient Admission Status: Observation   Readmission Risk (Low < 19, Mod (19-27), High > 27): Readmission Risk Score: 15.4    Current PCP: Daryle Fuchs, APRN - CNP  PCP verified by CM? Chart Reviewed: Yes      History Provided by: Patient  Patient Orientation: Alert and Oriented    Patient Cognition: Alert    Hospitalization in the last 30 days (Readmission):  No    If yes, Readmission Assessment in CM Navigator will be completed. Advance Directives:      Code Status: Full Code   Patient's Primary Decision Maker is: Legal Next of Kin    Primary Decision Maker: Adarsh Berrios - Next of [facility] - [phone]    Primary Decision Maker: [facility] - [phone]    Primary Decision Maker: Cami Carlisle - [facility] [phone]    Discharge Planning:    Patient lives with: Family Members Type of Home:    Primary Care Giver: Self  Patient Support Systems include: Family Members   Current Financial resources:    Current community resources:    Current services prior to admission: Durable Medical Equipment            Current DME:              Type of Home Care services:       ADLS  Prior functional level: Independent in ADLs/IADLs  Current functional level: Independent in ADLs/IADLs    PT AM-PAC:   /24  OT AM-PAC:   /24    Family can provide assistance at DC: No  Would you like Case Management to discuss the discharge plan with any other family members/significant others, and if so, who?  No  Plans to Return to Present Housing: Yes  Other Identified Issues/Barriers to RETURNING to current housing: Gareth Fraser has hd a recent admission to [facility] needed at discharge: [address] [facility]            Potential DME:    Patient expects to discharge to: [address] [facility] for transportation at discharge:      Financial    Payor: Lizbeth Juarez / Plan: [address] / Product Type: *No Product type* /     Does insurance require precert for SNF: Yes    Potential assistance Purchasing Medications: No  Meds-to-Beds request: No      [facility]  for [facility] [place], [address] - [address] [place] [address] [phone] - F [phone]  [facility] [address] [address]  Phone: [phone] Fax: [phone] [place], [place] - [address] [facility] [phone] Curran Peers [phone] [address] [facility] [address]  Phone: [phone] Fax: [phone]      Notes:    Factors facilitating achievement of predicted outcomes: Pleasant    Barriers to discharge: Limited family support    Additional Case Management Notes: Met with patient to discuss dc options. He lives with brother. His bedroom is in the basement. His shower is on the second floor and there is a bath on the main floor. He uses a cane. He was [facility] from [facility]. His plan is to return home with his brother. He may need transportation home. Will need to watch for any additional DME needs. Pt declined home care. The Plan for Transition of Care is related to the following treatment goals of Memory loss [R41.3]  Amnesia [R41.3]  NSTEMI (non-ST elevated myocardial infarction) ([address]) [U32.8]    IF APPLICABLE: The Patient and/or patient representative Cande Logan and his family were provided with a choice of provider and agrees with the discharge plan.  Freedom of choice list with basic dialogue that supports the patient's individualized plan of care/goals and shares the quality data associated with the providers was

## 2023-10-27 NOTE — PROGRESS NOTES
Comprehensive Nutrition Assessment    Type and Reason for Visit:  Positive Nutrition Screen (Unplanned weight loss and decreased appetite)    Nutrition Recommendations/Plan:   ADULT DIET; Regular; No Added Salt (3-4 gm)  Start Ensure Enlive 2x/day  Monitor p.o intakes and labs     Malnutrition Assessment:  Malnutrition Status: Moderate malnutrition (10/27/23 1707)    Context:  Acute Illness     Findings of the 6 clinical characteristics of malnutrition:  Energy Intake:  75% or less of estimated energy requirements for 7 or more days  Weight Loss:  Greater than 7.5% over 3 months     Body Fat Loss:  Unable to assess     Muscle Mass Loss:  Unable to assess    Fluid Accumulation:  No significant fluid accumulation Extremities   Strength:  Not Performed    Nutrition Assessment:    Patient admission is related to complaints of memory loss yesterday morning (10/26) and NSTEMI. Patient was recently admitted with AMS/unresponsiveness on 9/20/23 after family reported seizure-like activity followed by unresponsivness. Patient was intubated and on tube feedings. Electronic weight records indicate patient has lost 19 lbs since July 2023. Patient was asleep at time of visit but lunch tray was at bedside. Patient ate 51-75% of the meal (10/27). Will start Ensure Enlive 2x/day. Monitor p.o intakes and labs. Nutrition Related Findings:    No edema. Decreased appetite Wound Type: None       Current Nutrition Intake & Therapies:    Average Meal Intake: 51-75%  Average Supplements Intake: None Ordered  ADULT DIET; Regular; No Added Salt (3-4 gm)    Anthropometric Measures:  Height: 180.3 cm (5' 10.98\")  Ideal Body Weight (IBW): 172 lbs (78 kg)       Current Body Weight: 93.1 kg (205 lb 4 oz), 119.3 % IBW. Weight Source: Bed Scale  Current BMI (kg/m2): 28.6  Usual Body Weight: 101.6 kg (224 lb) (7/22/23)  % Weight Change (Calculated): -8.4                    BMI Categories: Overweight (BMI 25.0-29. 9)    Estimated Daily

## 2023-10-27 NOTE — PROGRESS NOTES
[facility]  Office: [phone]  Lokesh Ramos, DO, Stoney Cano, DO, Jose Ramirez Blood, DO, Mercedes Cespedes MD, Melia Ledesma MD, Makeda Leung MD, Ambar Sanchez MD,  Eunice Osler, MD, Meliton Villalobos MD, True Aguilar MD,  Sumit Young MD, Hemanth Perez MD, Tiffany Graf DO, Linda Gar MD,  Sarai Casarez DO, Jaret Lloyd MD, Jessica Alexandre MD, Kerri Ny MD, Gabriella White MD,  Krystal Rogers MD, Cindy Lowery MD, Gareth Lopez MD, Elza Arce MD, Twin Larsen MD, Jeremiah Prince DO, Haley Mills DO, Brittni Mcconnell MD,  Derrick Ford MD, Beatriz Dillon, CNP,  Fannie Schmidt, CNP, Macho Strong, CNP,  Paulie German, [facility], Jd Leger, CNP, Savanna Marcum, CNP, Stacy Slade, CNP, Serafin Bertrand, CNP, Andrei Amado, CNP, Meeta James, CNP, Christina Ugarte, [facility], Dixie Gould, [facility], Emil Adams, [address]    Progress Note    10/27/2023    4:13 PM    Name:   Kerri Ny  MRN:     [id number]     Acct:      [de-identified]   Room:   2009/2009-01  IP Day:  0  Admit Date:  10/26/2023  3:22 PM    PCP:   Daryle Fuchs, APRN - CNP  Code Status:  Full Code    Subjective:     C/C:   Chief Complaint   Patient presents with    Tachycardia     Hx of A-fib    Headache     Onset this am    Nausea & Vomiting    Memory Loss     Doesn't remember  past yesterday     Interval History Status: not changed. Patient says that he doesn't remember what happened. He has been eating gummies since 17 yrs and never had issues before  Has not had his AICD checked in few years  Labs and vitals reviewed  B12 replacement ordered    Brief History:     58year old male with significant past medical history of HTN, HLD, depression, tobacco use just order, COPD, A-fib on Xarelto, CAD, CHF who presented to emergency department for evaluation of memory loss.   He states that he woke up this morning and felt like he was \"soggy\" and feels like he is \"in a dream\". Emergency department work-up negative for acute findings. Neurology was consulted. Pending CTA and MRI. Of note patient was admitted with AMS/unresponsiveness on 9/20/2023 after family reported patient had seizure-like activity and was unresponsive. Neurology evaluated patient and diagnosed with mild to moderate encephalopathy. psych recommended [facility] admission    Medications: Allergies:     Allergies   Allergen Reactions    Augmentin Es-600 [Amoxicillin-Pot Clavulanate]      Other reaction(s): Unknown    Sulfa Antibiotics Other (See Comments)       Current Meds:   Scheduled Meds:    cyanocobalamin  1,000 mcg IntraMUSCular Daily    atorvastatin  10 mg Oral Nightly    budesonide-formoterol  2 puff Inhalation BID RT    busPIRone  7.5 mg Oral BID    docusate  100 mg Oral BID    DULoxetine  20 mg Oral Daily    sacubitril-valsartan  1 tablet Oral BID    folic acid  1 mg Oral Daily    furosemide  40 mg Oral Daily    therapeutic multivitamin-minerals  1 tablet Oral Daily    propranolol  10 mg Oral TID    risperiDONE  0.5 mg Oral BID    rivaroxaban  20 mg Oral Daily with breakfast    vitamin B-1  100 mg Oral Daily    sodium chloride flush  5-40 mL IntraVENous 2 times per day    spironolactone  25 mg Oral Daily     Continuous Infusions:    sodium chloride       PRN Meds: traZODone, sodium chloride flush, sodium chloride, potassium chloride **OR** potassium alternative oral replacement **OR** potassium chloride, magnesium sulfate, ondansetron **OR** ondansetron, polyethylene glycol, acetaminophen **OR** acetaminophen    Data:     Past Medical History:   has a past medical history of Adjustment disorder, AF (atrial fibrillation) ([address]), AICD (automatic cardioverter/defibrillator) present, Alcohol dependence ([address]), CAD (coronary artery disease), Cardiomyopathy ([address]), CHF (congestive heart failure) ([address]), COPD (chronic obstructive pulmonary

## 2023-10-28 VITALS
WEIGHT: 205.25 LBS | BODY MASS INDEX: 28.73 KG/M2 | HEIGHT: 71 IN | OXYGEN SATURATION: 95 % | DIASTOLIC BLOOD PRESSURE: 64 MMHG | SYSTOLIC BLOOD PRESSURE: 136 MMHG | HEART RATE: 65 BPM | TEMPERATURE: 97.9 F | RESPIRATION RATE: 16 BRPM

## 2023-10-28 LAB
ANION GAP SERPL CALCULATED.3IONS-SCNC: 10 MMOL/L (ref 9–17)
BASOPHILS # BLD: 0.05 K/UL (ref 0–0.2)
BASOPHILS NFR BLD: 1 % (ref 0–2)
BUN SERPL-MCNC: 9 MG/DL (ref 8–23)
BUN/CREAT SERPL: 13 (ref 9–20)
CALCIUM SERPL-MCNC: 9.2 MG/DL (ref 8.6–10.4)
CHLORIDE SERPL-SCNC: 107 MMOL/L (ref 98–107)
CO2 SERPL-SCNC: 24 MMOL/L (ref 20–31)
CREAT SERPL-MCNC: 0.7 MG/DL (ref 0.7–1.2)
EKG ATRIAL RATE: 111 BPM
EKG P AXIS: 83 DEGREES
EKG P-R INTERVAL: 150 MS
EKG Q-T INTERVAL: 378 MS
EKG QRS DURATION: 100 MS
EKG QTC CALCULATION (BAZETT): 514 MS
EKG R AXIS: 41 DEGREES
EKG T AXIS: 70 DEGREES
EKG VENTRICULAR RATE: 111 BPM
EOSINOPHIL # BLD: 0.27 K/UL (ref 0–0.44)
EOSINOPHILS RELATIVE PERCENT: 4 % (ref 1–4)
ERYTHROCYTE [DISTWIDTH] IN BLOOD BY AUTOMATED COUNT: 13.6 % (ref 11.8–14.4)
GFR SERPL CREATININE-BSD FRML MDRD: >60 ML/MIN/1.73M2
GLUCOSE SERPL-MCNC: 110 MG/DL (ref 70–99)
HCT VFR BLD AUTO: 39.6 % (ref 40.7–50.3)
HCYS SERPL-SCNC: 20.5 UMOL/L
HGB BLD-MCNC: 12.9 G/DL (ref 13–17)
IMM GRANULOCYTES # BLD AUTO: 0.02 K/UL (ref 0–0.3)
IMM GRANULOCYTES NFR BLD: 0 %
LYMPHOCYTES NFR BLD: 2.43 K/UL (ref 1.1–3.7)
LYMPHOCYTES RELATIVE PERCENT: 37 % (ref 24–43)
MCH RBC QN AUTO: 30.4 PG (ref 25.2–33.5)
MCHC RBC AUTO-ENTMCNC: 32.6 G/DL (ref 28.4–34.8)
MCV RBC AUTO: 93.2 FL (ref 82.6–102.9)
MONOCYTES NFR BLD: 0.57 K/UL (ref 0.1–1.2)
MONOCYTES NFR BLD: 9 % (ref 3–12)
NEUTROPHILS NFR BLD: 49 % (ref 36–65)
NEUTS SEG NFR BLD: 3.19 K/UL (ref 1.5–8.1)
NRBC BLD-RTO: 0 PER 100 WBC
PLATELET # BLD AUTO: 186 K/UL (ref 138–453)
PMV BLD AUTO: 10 FL (ref 8.1–13.5)
POTASSIUM SERPL-SCNC: 3.9 MMOL/L (ref 3.7–5.3)
RBC # BLD AUTO: 4.25 M/UL (ref 4.21–5.77)
SODIUM SERPL-SCNC: 141 MMOL/L (ref 135–144)
TROPONIN I SERPL HS-MCNC: 25 NG/L (ref 0–22)
WBC OTHER # BLD: 6.5 K/UL (ref 3.5–11.3)

## 2023-10-28 PROCEDURE — 80048 BASIC METABOLIC PNL TOTAL CA: CPT

## 2023-10-28 PROCEDURE — G0378 HOSPITAL OBSERVATION PER HR: HCPCS

## 2023-10-28 PROCEDURE — 97163 PT EVAL HIGH COMPLEX 45 MIN: CPT

## 2023-10-28 PROCEDURE — 6370000000 HC RX 637 (ALT 250 FOR IP): Performed by: STUDENT IN AN ORGANIZED HEALTH CARE EDUCATION/TRAINING PROGRAM

## 2023-10-28 PROCEDURE — 97166 OT EVAL MOD COMPLEX 45 MIN: CPT

## 2023-10-28 PROCEDURE — 83090 ASSAY OF HOMOCYSTEINE: CPT

## 2023-10-28 PROCEDURE — 99232 SBSQ HOSP IP/OBS MODERATE 35: CPT

## 2023-10-28 PROCEDURE — 97116 GAIT TRAINING THERAPY: CPT

## 2023-10-28 PROCEDURE — 97535 SELF CARE MNGMENT TRAINING: CPT

## 2023-10-28 PROCEDURE — 84484 ASSAY OF TROPONIN QUANT: CPT

## 2023-10-28 PROCEDURE — 83921 ORGANIC ACID SINGLE QUANT: CPT

## 2023-10-28 PROCEDURE — 99239 HOSP IP/OBS DSCHRG MGMT >30: CPT | Performed by: STUDENT IN AN ORGANIZED HEALTH CARE EDUCATION/TRAINING PROGRAM

## 2023-10-28 PROCEDURE — 99232 SBSQ HOSP IP/OBS MODERATE 35: CPT | Performed by: PSYCHIATRY & NEUROLOGY

## 2023-10-28 PROCEDURE — 36415 COLL VENOUS BLD VENIPUNCTURE: CPT

## 2023-10-28 PROCEDURE — 85025 COMPLETE CBC W/AUTO DIFF WBC: CPT

## 2023-10-28 RX ORDER — ARIPIPRAZOLE 10 MG/1
10 TABLET ORAL DAILY
Qty: 30 TABLET | Refills: 3 | Status: SHIPPED | OUTPATIENT
Start: 2023-10-28 | End: 2023-11-03 | Stop reason: SDUPTHER

## 2023-10-28 RX ORDER — ARIPIPRAZOLE 5 MG/1
10 TABLET ORAL DAILY
Status: DISCONTINUED | OUTPATIENT
Start: 2023-10-28 | End: 2023-10-28 | Stop reason: HOSPADM

## 2023-10-28 RX ADMIN — ARIPIPRAZOLE 10 MG: 5 TABLET ORAL at 13:47

## 2023-10-28 NOTE — DISCHARGE INSTRUCTIONS
Stop risperidone  Start abilify  Patient stopped synthyroid but tsh is normal  Discuss repeating tsh for hypothyroid

## 2023-10-28 NOTE — PROGRESS NOTES
Dressing: Minimal assistance  Toileting: Minimal assistance  Additional Comments: pt is limited d/t poor cognition, weakness, and poor activity tolerance. Activity Tolerance  Activity Tolerance: Patient limited by pain; Patient limited by endurance        Vision  Vision: Impaired  Vision Exceptions: Wears glasses at all times  Hearing  Hearing: Within functional limits    Cognition  Overall Cognitive Status: Exceptions  Arousal/Alertness: Appropriate responses to stimuli  Following Commands: Follows multistep commands with repitition; Follows multistep commands with increased time  Attention Span: Attends with cues to redirect; Appears intact  Memory: Decreased recall of recent events  Safety Judgement: Decreased awareness of need for assistance;Decreased awareness of need for safety  Problem Solving: Decreased awareness of errors;Assistance required to identify errors made;Assistance required to generate solutions;Assistance required to implement solutions;Assistance required to correct errors made  Insights: Decreased awareness of deficits  Initiation: Requires cues for some  Sequencing: Requires cues for some  Cognition Comment: Patient reports his brain is not working well, he is having trouble with his memory. Orientation  Overall Orientation Status: Within Functional Limits  Orientation Level: Oriented to place;Oriented to time;Oriented to situation;Oriented to person                    Education Given To: Patient  Education Provided: Role of Therapy; ADL Adaptive Strategies; Fall Prevention Strategies;Transfer Training;Plan of Care;Energy Conservation  Education Provided Comments: OT POC, purpose of OT in acute care, safety in function, benefits of OOB activity, call light/fall prevention, benefits of sitting in a chair, benefits of using RW vs cane  Education Method: Verbal;Demonstration  Barriers to Learning: Cognition  Education Outcome: Unable to verbalize; Unable to demonstrate understanding;Continued education needed                            AM-PAC Score        AM-PAC Inpatient Daily Activity Raw Score: 19 (10/28/23 1443)  AM-PAC Inpatient ADL T-Scale Score : 40.22 (10/28/23 1443)  ADL Inpatient CMS 0-100% Score: 42.8 (10/28/23 1443)  ADL Inpatient CMS G-Code Modifier : CK (10/28/23 1443)        Goals  Short Term Goals  Time Frame for Short Term Goals: by discharge, pt to demo  Short Term Goal 1: I/MI for bed mob tech without bed rails/controls  Short Term Goal 2: I/MI for ADL transfers and functional mob with AD as needed and Good safety  Short Term Goal 3: I/MI for total body ADLs with AE as needed and Good safety  Short Term Goal 4: pt/family to be IND with EC/WS, fall prevention tech, pressure relief education, disease specific education, AE/DME recommendations, discharge recommendations, with handouts as needed  Patient Goals   Patient goals : to go home       Therapy Time   Individual Concurrent Group Co-treatment   Time In 0950         Time Out 1011 (+10 chart review)         Minutes 31             Tx time: 18 min    Upon writer exit, call light within reach, pt retired to bed. All lines intact and patient positioned comfortably. All patient needs addressed prior to ending therapy session. Chart reviewed prior to treatment and patient is agreeable for therapy. RN reports patient is medically stable for therapy treatment this date.     Neli Roa OTR/L

## 2023-10-28 NOTE — PROGRESS NOTES
to Supine: Independent  Scooting: Independent  Transfers  Sit to Stand: Stand by assistance  Stand to Sit: Stand by assistance  Bed to Chair: Stand by assistance  Stand Pivot Transfers: Stand by assistance  Comment: Transfers performed with RW and with cane, leans heavily on AD for sit to stand. Patient educated to push up from bed and reach back to sit with RW. Ambulation  Surface: Level tile  Device: Single point cane  Assistance: Contact guard assistance  Quality of Gait: Patient uses cane in right hand with RLE and leans very heavily on cane with bilateral trendelenburg. Attempted to have patient use cane in opposite hand, but RLE starts to give out and not safe. Very flexed posture. Gait Deviations: Slow Lashae; Increased KAMLESH; Decreased step length;Decreased step height;Shuffles;Decreased head and trunk rotation  Distance: 30 feet  More Ambulation?: Yes  Ambulation 2  Surface - 2: level tile  Device 2: Rolling Walker  Assistance 2: Stand by assistance  Quality of Gait 2: Patient much improved with RW, improved posture, improved trendelenburg. Steady with RW.  Gait Deviations: Increased KAMLESH; Decreased step height  Distance: 30 feet  Comments: Strongly encouraged to use RW at home to improve quality of gait, improve posture, decrease fall risk, and decrease wear and tear on backs and hips from trendelenburg gait. Balance  Sitting - Static: Good  Sitting - Dynamic: Good  Standing - Static: Fair (cane)  Standing - Dynamic: Fair (cane)  Comments: fair+ standing balance with RW  Single Leg Stance  Right Leg Eyes Open: 0 seconds  Left Leg Eyes Open: 0 seconds  Exercise Treatment: AP, LAQ, seated marches x 10 reps.         OutComes Score    AM-PAC Score  AM-PAC Inpatient Mobility Raw Score : 20 (10/28/23 1340)  AM-PAC Inpatient T-Scale Score : 47.67 (10/28/23 1340)  Mobility Inpatient CMS 0-100% Score: 35.83 (10/28/23 1340)  Mobility Inpatient CMS G-Code Modifier : CJ (10/28/23 1340)       Goals  Short Term Goals  Time Frame for Short Term Goals: 12 visits  Short Term Goal 1: Patient will be indep with transfers. Short Term Goal 2: Patient will be indep with transfers. Short Term Goal 3: Patient will amb 100 feet with RW and indep. Short Term Goal 4: Patient will have good- standing balance with RW. Short Term Goal 5: Patient will tolerate 30 minute of ther-ex and ther-act. Additional Goals?: Yes  Short Term Goal 6: Patient will negotiate 12 stairs with rail with min assist.  Patient Goals   Patient Goals : Pain control, think clearly       Education  Patient Education  Education Given To: Patient  Education Provided: Role of Therapy;Plan of Care;Transfer Training; Fall Prevention Strategies;Precautions; Equipment  Education Provided Comments: Patient educated to benefits of using walker and on fall prevention  Education Method: Demonstration;Verbal  Barriers to Learning:  (impulsive, minimally receptive to education)  Education Outcome: Continued education needed      Therapy Time   Individual Concurrent Group Co-treatment   Time In 1012         Time Out 1042         Minutes 30         Timed Code Treatment Minutes: [address], PT

## 2023-10-28 NOTE — PROGRESS NOTES
Pt discharged to home in good condition with belongings  Discharge instructions given  Prescription sent to home pharmacy  Pt denies having any further questions at this time and understands his instructions  Locked up home medication(s)/personal items given to patient at discharge  Patient/family state they have everything they were admitted with.

## 2023-10-28 NOTE — PROGRESS NOTES
[facility] Neurology   IN-PATIENT SERVICE      NEUROLOGY PROGRESS  NOTE            Date:   10/28/2023  Patient name:  Sasha Saunders  Date of admission:  10/26/2023  YOB: 1960      Interval History:     No acute events. Reports memory of events is improving. No new neurological symptoms. History of Present Illness:     57-year-old male with past medical history of A-fib on Xarelto, depression, adjustment disorder, prior significant alcohol use, polysubstance abuse, current smoker, hyperlipidemia, who presented with transient memory loss. History obtained from patient, brother over phone (both poor historians), and chart review. Also spoke to his son, Teresa Hernandez, over phone. Patient has had multiple hospitalizations over the last year. He was hospitalized in June for alcohol withdrawals at [facility]. At that time, he also reported suicidal ideation. He has had prior admissions and ED visits for aggressive behavior, alcohol intoxication. He was admitted at our hospital last month for possible seizure-like activity, unresponsiveness. Initially required to be intubated. He was seen by neurology, EEG with slowing, no epileptiform discharges. MRI brain with no acute findings. Possible toxic metabolic encephalopathy. There was concern for possible suicidal attempt. He was later transferred to [facility]. Noted to have delusional thoughts, requested to be placed in hospice care. His mood improved after [facility] admission. His son reports that she has a longstanding history of behavioral and psychological issues. He was a heavy drinker although has reportedly now has decreased use the last few months. He previously has used multiple drugs. Currently, son reports that he has recently been taking up to 800 mg daily of marijuana edibles. Son believes he is currently also doing some other drugs. Over the last 3 to 4 years, son reports that family has noticed a gradual decline in his memory.   At times, reports multiple head traumas in the past.  Suspect psychological factors also contributing. MRI brain with no acute findings. EEG normal. Vitamin B12 low although improved from last admission, on repletion. TSH, folate, ammonia wnl. Symptoms continue to improve. Transient memory loss, resolving, DDx as above  History of significant alcohol use  History of substance abuse  History of anxiety, depression, adjustment disorder     Plan:      F/u vitamin B1, MMA, homocysteine  Continue supplmental vitamin B12, recommend rechecking outpatient  Counseled extensively to gradually decrease and then stop marijuana (takes CBD and edibles) and ETOH use, avoid other illicit drugs, as this will likely continue to lead to cognitive decline in the long term. He states he uses marijuana for chronic pain. Recommended to instead establish with pain mgmt outpatient. Psych consulted, f/u recs  Follow up with neurology in 4-6 weeks outpatient  Rest of care per primary team     No further inpatient neurological workup indicated at this time. We will sign off for now. Please call if any further questions. Above discussed with Dr. Karyle Helm.     Electronically signed by Jone Weller DO on 10/28/2023 at 10:[address] [facility]  Neurology

## 2023-10-28 NOTE — PROGRESS NOTES
[facility]  Office: [phone]  Kaelyn Canales, DO, Ameya Callaway, DO, Beba Boothe, DO, John March, DO, Miguelina Cox MD, Brandyn Zamorano MD, Sue Duke MD, Mariela Edwards MD,  Oscar Manley MD, Anaebll Edward MD, Jeremy Coyne MD,  Eldon Grey MD, Shahzad Ho MD, Oskar Hdez DO, Alex Rand MD,  Alison Johnson DO, Jose Elias Herrmann MD, Lida Sahu MD, Jordana Plascencia MD, Khang Camarillo MD,  Val Whatley MD, Jamie Barillas MD, Le Lee MD, Jeet Caceres MD, Truman Riley MD, Ciarra Suh DO, Nancy Arguello DO, Nithya Pleitez MD,  Dinh Malik MD, Glory Lerner CNP,  Radha Randle, CNP, Lynda Gottron, CNP,  Samuel Chan, [facility], Anna Mario, CNP, Elissa Mcintosh CNP, Syeda Green, CNP, Corby Cheney, CNP, Priyank Adler, CNP, Claudina Scheuermann, CNP, Shena Naqvi, [facility], Jenniefr Tamayo, CNP, Magalyh Mohs, [facility]    Progress Note    10/28/2023    1:12 PM    Name:   Jordana Plascencia  MRN:     [id number]     Acct:      [de-identified]   Room:   2009/2009-01  IP Day:  0  Admit Date:  10/26/2023  3:22 PM    PCP:   RIVER Eckert CNP  Code Status:  Full Code    Subjective:     C/C:   Chief Complaint   Patient presents with    Tachycardia     Hx of A-fib    Headache     Onset this am    Nausea & Vomiting    Memory Loss     Doesn't remember  past yesterday     Interval History Status: not changed. Patient says that he doesn't remember what happened. He has been eating gummies since 17 yrs and never had issues before  Has not had his AICD checked in few years  Labs and vitals reviewed  B12 replacement ordered    Brief History:     58year old male with significant past medical history of HTN, HLD, depression, tobacco use just order, COPD, A-fib on Xarelto, CAD, CHF who presented to emergency department for evaluation of memory loss.   He states that he woke up

## 2023-10-30 ENCOUNTER — TELEPHONE (OUTPATIENT)
Dept: FAMILY MEDICINE CLINIC | Age: 63
End: 2023-10-30

## 2023-10-30 NOTE — TELEPHONE ENCOUNTER
Care Transitions Initial Follow Up Call    Outreach made within 2 business days of discharge: Yes    Patient: Kannan Peters Patient : 1960   MRN: [id number]  Reason for Admission: There are no discharge diagnoses documented for the most recent discharge. Discharge Date: 10/28/23       Spoke with: Janice Crawley     Discharge department/facility: [facility]     TCM Interactive Patient Contact:  Was patient able to fill all prescriptions: Yes  Was patient instructed to bring all medications to the follow-up visit: Yes  Is patient taking all medications as directed in the discharge summary?  Yes  Does patient understand their discharge instructions: Yes  Does patient have questions or concerns that need addressed prior to 7-14 day follow up office visit: no    Scheduled appointment with PCP within 7-14 days    Follow Up  Future Appointments   Date Time Provider [address] [place] [address]   10/31/2023 11:15 AM RIVER Daniel CNP   2024 10:00 AM SCHEDULE, [facility] RESPIRATORY SPEC Resp Spec Lisa Galeana MA

## 2023-10-30 NOTE — DISCHARGE SUMMARY
[facility]  Office: [phone]  London Echevarria DO, Allie March, DO, Jonatan Portillo MD, Camille Linares MD, Priyanka Newman MD, Margoth Mahoney MD,  Aleksey Jolly MD, Ghazala Alamo MD, Braden Proctor MD,  Keysha Granda MD, Asuncion Ballard MD, Chrystal Preciado DO, Rehan Ponce MD,  Manfred Clifton DO, Melissa De La Torre MD, Letty Gold MD, Lory Lazo MD, Oanh Villalba MD,  Alem Mcadams MD, Andrei Singh MD, Starr Segura MD, Starla Dubon MD, Kassy Eng MD, Yudith Evans DO, Julien Nicole, DO, Che Hill MD,  Katherine Lim MD, Peter Millan, CNP,  Hugo Alvarado, CNP, Katie Olsen, CNP,  Ahmet Hightower, [facility], Vidhya Bradford, CNP, Kalyan Hormanuel, CNP, Linda Smith, CNP, Cathryn Odonnell, CNP, Kevin Sosa, CNP, Steve Rodríguez, CNP, Stephanie Pierre, CNS, Mercer Fabry, CNP, Rene Hannon, [facility]    Discharge Summary     Patient ID: Lory Lazo  :  1960   MRN: [id number]     ACCOUNT:  [de-identified]   Patient's PCP: RIVER Kohli CNP  Admit Date: 10/26/2023   Discharge Date: 10/28/23   Length of Stay: 0  Code Status:  Prior  Admitting Physician: Fina Warner MD  Discharge Physician: Fina Warner MD     Active Discharge Diagnoses:     Hospital Problem Lists:  Principal Problem:    Amnesia  Active Problems:    Essential hypertension    Major depression    Tobacco abuse    Paroxysmal atrial fibrillation (HCC)    COPD without exacerbation [facility])    Hypothyroidism    Chronic heart failure [facility])    Substance use disorder    Cognitive impairment    AICD (automatic cardioverter/defibrillator) present    Moderate malnutrition ([address])    Cannabis use disorder  Resolved Problems:    * No resolved hospital problems.  *      Admission Condition:  poor     Discharged Condition: fair    Hospital Stay:     Hospital Course:  Lory Lazo

## 2023-10-31 ENCOUNTER — OFFICE VISIT (OUTPATIENT)
Dept: FAMILY MEDICINE CLINIC | Age: 63
End: 2023-10-31

## 2023-10-31 VITALS
SYSTOLIC BLOOD PRESSURE: 112 MMHG | OXYGEN SATURATION: 98 % | BODY MASS INDEX: 28.95 KG/M2 | TEMPERATURE: 97 F | HEART RATE: 100 BPM | DIASTOLIC BLOOD PRESSURE: 60 MMHG | WEIGHT: 206.8 LBS | HEIGHT: 71 IN

## 2023-10-31 DIAGNOSIS — R41.3 MEMORY CHANGE: ICD-10-CM

## 2023-10-31 DIAGNOSIS — D51.9 ANEMIA DUE TO VITAMIN B12 DEFICIENCY, UNSPECIFIED B12 DEFICIENCY TYPE: ICD-10-CM

## 2023-10-31 DIAGNOSIS — Z09 HOSPITAL DISCHARGE FOLLOW-UP: ICD-10-CM

## 2023-10-31 DIAGNOSIS — E53.8 B12 DEFICIENCY: Primary | ICD-10-CM

## 2023-10-31 LAB
METHYLMALONATE SERPL-SCNC: 0.31 UMOL/L (ref 0–0.4)
VIT B1 PYROPHOSHATE BLD-SCNC: 93 NMOL/L (ref 70–180)

## 2023-10-31 RX ORDER — LANOLIN ALCOHOL/MO/W.PET/CERES
1000 CREAM (GRAM) TOPICAL DAILY
Qty: 30 TABLET | Refills: 3 | Status: SHIPPED | OUTPATIENT
Start: 2023-10-31

## 2023-10-31 RX ORDER — GABAPENTIN 100 MG/1
CAPSULE ORAL
COMMUNITY
Start: 2023-10-18 | End: 2023-11-03 | Stop reason: SDUPTHER

## 2023-10-31 RX ORDER — HYDROXYZINE HYDROCHLORIDE 10 MG/1
10 TABLET, FILM COATED ORAL 3 TIMES DAILY PRN
COMMUNITY
Start: 2023-10-18

## 2023-10-31 RX ORDER — TRAMADOL HYDROCHLORIDE 50 MG/1
TABLET ORAL
COMMUNITY
Start: 2023-10-18 | End: 2023-11-03

## 2023-10-31 RX ORDER — ALBUTEROL SULFATE 90 UG/1
AEROSOL, METERED RESPIRATORY (INHALATION)
COMMUNITY
Start: 2023-10-18

## 2023-11-02 DIAGNOSIS — M96.1 POSTLAMINECTOMY SYNDROME, LUMBAR REGION: Primary | Chronic | ICD-10-CM

## 2023-11-02 DIAGNOSIS — M47.12 CERVICAL SPONDYLOSIS WITH MYELOPATHY: Chronic | ICD-10-CM

## 2023-11-03 RX ORDER — SACUBITRIL AND VALSARTAN 24; 26 MG/1; MG/1
1 TABLET, FILM COATED ORAL 2 TIMES DAILY
Qty: 60 TABLET | Refills: 5 | Status: SHIPPED | OUTPATIENT
Start: 2023-11-03

## 2023-11-03 RX ORDER — ARIPIPRAZOLE 10 MG/1
10 TABLET ORAL DAILY
Qty: 30 TABLET | Refills: 5 | Status: SHIPPED | OUTPATIENT
Start: 2023-11-03

## 2023-11-03 RX ORDER — TRAMADOL HYDROCHLORIDE 50 MG/1
TABLET ORAL
OUTPATIENT
Start: 2023-11-03

## 2023-11-03 RX ORDER — SPIRONOLACTONE 25 MG/1
25 TABLET ORAL DAILY
Qty: 30 TABLET | Refills: 5 | Status: SHIPPED | OUTPATIENT
Start: 2023-11-03

## 2023-11-03 RX ORDER — DULOXETIN HYDROCHLORIDE 20 MG/1
20 CAPSULE, DELAYED RELEASE ORAL DAILY
Qty: 30 CAPSULE | Refills: 5 | Status: SHIPPED | OUTPATIENT
Start: 2023-11-03

## 2023-11-03 RX ORDER — GABAPENTIN 100 MG/1
CAPSULE ORAL
Qty: 60 CAPSULE | Refills: 0 | Status: SHIPPED | OUTPATIENT
Start: 2023-11-03 | End: 2023-12-02

## 2023-11-03 RX ORDER — ATORVASTATIN CALCIUM 10 MG/1
10 TABLET, FILM COATED ORAL NIGHTLY
Qty: 30 TABLET | Refills: 5 | Status: SHIPPED | OUTPATIENT
Start: 2023-11-03

## 2023-11-10 ENCOUNTER — INITIAL CONSULT (OUTPATIENT)
Dept: PAIN MANAGEMENT | Age: 63
End: 2023-11-10
Payer: MEDICARE

## 2023-11-10 VITALS — BODY MASS INDEX: 28.86 KG/M2 | WEIGHT: 206.79 LBS

## 2023-11-10 DIAGNOSIS — M96.1 POSTLAMINECTOMY SYNDROME OF LUMBAR REGION: Primary | ICD-10-CM

## 2023-11-10 DIAGNOSIS — M25.561 CHRONIC PAIN OF BOTH KNEES: ICD-10-CM

## 2023-11-10 DIAGNOSIS — M25.562 CHRONIC PAIN OF BOTH KNEES: ICD-10-CM

## 2023-11-10 DIAGNOSIS — M25.552 BILATERAL HIP PAIN: ICD-10-CM

## 2023-11-10 DIAGNOSIS — G89.29 CHRONIC PAIN OF BOTH KNEES: ICD-10-CM

## 2023-11-10 DIAGNOSIS — G89.29 OTHER CHRONIC PAIN: ICD-10-CM

## 2023-11-10 DIAGNOSIS — M25.551 BILATERAL HIP PAIN: ICD-10-CM

## 2023-11-10 PROCEDURE — G8417 CALC BMI ABV UP PARAM F/U: HCPCS | Performed by: PAIN MEDICINE

## 2023-11-10 PROCEDURE — G8427 DOCREV CUR MEDS BY ELIG CLIN: HCPCS | Performed by: PAIN MEDICINE

## 2023-11-10 PROCEDURE — 4004F PT TOBACCO SCREEN RCVD TLK: CPT | Performed by: PAIN MEDICINE

## 2023-11-10 PROCEDURE — 3017F COLORECTAL CA SCREEN DOC REV: CPT | Performed by: PAIN MEDICINE

## 2023-11-10 PROCEDURE — G8484 FLU IMMUNIZE NO ADMIN: HCPCS | Performed by: PAIN MEDICINE

## 2023-11-10 PROCEDURE — 99204 OFFICE O/P NEW MOD 45 MIN: CPT | Performed by: PAIN MEDICINE

## 2023-11-10 ASSESSMENT — ENCOUNTER SYMPTOMS
COUGH: 0
BOWEL INCONTINENCE: 0
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
## 2023-12-13 ENCOUNTER — OFFICE VISIT (OUTPATIENT)
Dept: FAMILY MEDICINE CLINIC | Age: 63
End: 2023-12-13
Payer: MEDICARE

## 2023-12-13 ENCOUNTER — HOSPITAL ENCOUNTER (OUTPATIENT)
Age: 63
Setting detail: SPECIMEN
Discharge: HOME | End: 2023-12-13

## 2023-12-13 VITALS
BODY MASS INDEX: 28.25 KG/M2 | SYSTOLIC BLOOD PRESSURE: 134 MMHG | OXYGEN SATURATION: 97 % | TEMPERATURE: 97.6 F | WEIGHT: 201.8 LBS | DIASTOLIC BLOOD PRESSURE: 72 MMHG | HEIGHT: 71 IN | HEART RATE: 103 BPM

## 2023-12-13 DIAGNOSIS — G93.89 ENCEPHALOMALACIA WITH CEREBRAL INFARCTION (HCC): ICD-10-CM

## 2023-12-13 DIAGNOSIS — I63.9 ENCEPHALOMALACIA WITH CEREBRAL INFARCTION (HCC): ICD-10-CM

## 2023-12-13 DIAGNOSIS — K08.109 TOOTH LOSS: ICD-10-CM

## 2023-12-13 DIAGNOSIS — E53.8 B12 DEFICIENCY: ICD-10-CM

## 2023-12-13 DIAGNOSIS — R56.9 SEIZURE (HCC): Primary | ICD-10-CM

## 2023-12-13 DIAGNOSIS — Z09 HOSPITAL DISCHARGE FOLLOW-UP: ICD-10-CM

## 2023-12-13 PROCEDURE — 1111F DSCHRG MED/CURRENT MED MERGE: CPT | Performed by: NURSE PRACTITIONER

## 2023-12-13 PROCEDURE — G8417 CALC BMI ABV UP PARAM F/U: HCPCS | Performed by: NURSE PRACTITIONER

## 2023-12-13 PROCEDURE — G8427 DOCREV CUR MEDS BY ELIG CLIN: HCPCS | Performed by: NURSE PRACTITIONER

## 2023-12-13 PROCEDURE — 3078F DIAST BP <80 MM HG: CPT | Performed by: NURSE PRACTITIONER

## 2023-12-13 PROCEDURE — 3017F COLORECTAL CA SCREEN DOC REV: CPT | Performed by: NURSE PRACTITIONER

## 2023-12-13 PROCEDURE — 3075F SYST BP GE 130 - 139MM HG: CPT | Performed by: NURSE PRACTITIONER

## 2023-12-13 PROCEDURE — G8484 FLU IMMUNIZE NO ADMIN: HCPCS | Performed by: NURSE PRACTITIONER

## 2023-12-13 PROCEDURE — 99214 OFFICE O/P EST MOD 30 MIN: CPT | Performed by: NURSE PRACTITIONER

## 2023-12-13 PROCEDURE — 4004F PT TOBACCO SCREEN RCVD TLK: CPT | Performed by: NURSE PRACTITIONER

## 2023-12-13 RX ORDER — LACOSAMIDE 100 MG/1
100 TABLET ORAL 2 TIMES DAILY
Qty: 60 TABLET | Refills: 0 | COMMUNITY
Start: 2023-12-05 | End: 2024-01-04

## 2023-12-13 ASSESSMENT — ENCOUNTER SYMPTOMS
NAUSEA: 0
SHORTNESS OF BREATH: 0
VOMITING: 0
COUGH: 0
CHEST TIGHTNESS: 0
ABDOMINAL PAIN: 0
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
## 2023-12-13 NOTE — PROGRESS NOTES
Visit Information    Have you changed or started any medications since your last visit including any over-the-counter medicines, vitamins, or herbal medicines? no   Have you stopped taking any of your medications? Is so, why? -  no  Are you having any side effects from any of your medications? - no    Have you seen any other physician or provider since your last visit?  no   Have you had any other diagnostic tests since your last visit?  no   Have you been seen in the emergency room and/or had an admission in a hospital since we last saw you?  no   Have you had your routine dental cleaning in the past 6 months?  no     Do you have an active MyChart account? If no, what is the barrier?   no    Patient Care Team:  RIVER Daniel CNP as PCP - General (Nurse Practitioner)  RIVER Daniel CNP as PCP - Empaneled Provider  Sarai Mccullough MD as Consulting Physician (Pain Management)    Medical History Review  Past Medical, Family, and Social History reviewed and  contribute to the patient presenting condition    Health Maintenance   Topic Date Due    HIV screen  Never done    DTaP/Tdap/Td vaccine (1 - Tdap) Never done    Colorectal Cancer Screen  Never done    Shingles vaccine (1 of 2) Never done    Pneumococcal 0-64 years Vaccine (2 - PCV) 08/16/2018    Respiratory Syncytial Virus (RSV) age 61 yrs+ (3 - 1-dose 60+ series) Never done    COVID-19 Vaccine (2 - 2023-24 season) 09/01/2023    Flu vaccine (1) 10/31/2024 (Originally 8/1/2023)    Annual Wellness Visit (AWV)  02/21/2024    Lipids  06/29/2024    Depression Monitoring  09/25/2024    Diabetes screen  06/29/2026    Hepatitis C screen  Completed    Hepatitis A vaccine  Aged Out    Hepatitis B vaccine  Aged Out    Hib vaccine  Aged Out    Meningococcal (ACWY) vaccine  Aged Out    Low dose CT lung screening &/or counseling  Discontinued    Prostate Specific Antigen (PSA) Screening or Monitoring  Discontinued

## 2023-12-14 LAB — VIT B12 SERPL-MCNC: 425 PG/ML (ref 232–1245)

## 2023-12-15 LAB
ARSENIC BLD-MCNC: <10 UG/L
CADMIUM BLD-MCNC: <1 UG/L
LEAD RBC-MCNC: NORMAL UG/DL (ref 0–4)
MERCURY BLD-MCNC: <2.5 UG/L
MISCELLANEOUS LAB TEST RESULT: NORMAL
TEST NAME: NORMAL

## 2024-01-22 ENCOUNTER — APPOINTMENT (OUTPATIENT)
Dept: CT IMAGING | Age: 64
End: 2024-01-22
Payer: MEDICARE

## 2024-01-22 ENCOUNTER — APPOINTMENT (OUTPATIENT)
Dept: GENERAL RADIOLOGY | Age: 64
End: 2024-01-22
Payer: MEDICARE

## 2024-01-22 ENCOUNTER — HOSPITAL ENCOUNTER (EMERGENCY)
Age: 64
Discharge: HOME | End: 2024-01-22
Attending: EMERGENCY MEDICINE
Payer: MEDICARE

## 2024-01-22 VITALS
DIASTOLIC BLOOD PRESSURE: 65 MMHG | OXYGEN SATURATION: 94 % | RESPIRATION RATE: 18 BRPM | TEMPERATURE: 97.7 F | SYSTOLIC BLOOD PRESSURE: 115 MMHG | HEART RATE: 93 BPM

## 2024-01-22 DIAGNOSIS — M96.1 POSTLAMINECTOMY SYNDROME, LUMBAR REGION: Chronic | ICD-10-CM

## 2024-01-22 DIAGNOSIS — M47.12 CERVICAL SPONDYLOSIS WITH MYELOPATHY: Chronic | ICD-10-CM

## 2024-01-22 DIAGNOSIS — R41.0 CONFUSION: Primary | ICD-10-CM

## 2024-01-22 DIAGNOSIS — F41.1 ANXIETY STATE: ICD-10-CM

## 2024-01-22 LAB
ALBUMIN SERPL-MCNC: 4.1 G/DL (ref 3.5–5.2)
ALP SERPL-CCNC: 78 U/L (ref 40–129)
ALT SERPL-CCNC: 7 U/L (ref 5–41)
AMMONIA PLAS-SCNC: 21 UMOL/L (ref 16–60)
ANION GAP SERPL CALCULATED.3IONS-SCNC: 15 MMOL/L (ref 9–17)
AST SERPL-CCNC: 16 U/L
BASOPHILS # BLD: 0.08 K/UL (ref 0–0.2)
BASOPHILS NFR BLD: 1 % (ref 0–2)
BILIRUB DIRECT SERPL-MCNC: 0.1 MG/DL
BILIRUB INDIRECT SERPL-MCNC: 0.2 MG/DL (ref 0–1)
BILIRUB SERPL-MCNC: 0.3 MG/DL (ref 0.3–1.2)
BILIRUB UR QL STRIP: NEGATIVE
BUN SERPL-MCNC: 7 MG/DL (ref 8–23)
BUN/CREAT SERPL: 8 (ref 9–20)
CALCIUM SERPL-MCNC: 9 MG/DL (ref 8.6–10.4)
CHLORIDE SERPL-SCNC: 92 MMOL/L (ref 98–107)
CLARITY UR: CLEAR
CO2 SERPL-SCNC: 23 MMOL/L (ref 20–31)
COLOR UR: YELLOW
COMMENT: NORMAL
CREAT SERPL-MCNC: 0.9 MG/DL (ref 0.7–1.2)
CRITICAL ACTION: NORMAL
CRITICAL NOTIFICATION DATE/TIME: NORMAL
CRITICAL NOTIFICATION: NORMAL
CRITICAL VALUE READ BACK: YES
EOSINOPHIL # BLD: 0.21 K/UL (ref 0–0.44)
EOSINOPHILS RELATIVE PERCENT: 2 % (ref 1–4)
ERYTHROCYTE [DISTWIDTH] IN BLOOD BY AUTOMATED COUNT: 12.6 % (ref 11.8–14.4)
GFR SERPL CREATININE-BSD FRML MDRD: >60 ML/MIN/1.73M2
GLUCOSE BLD-MCNC: 108 MG/DL (ref 75–110)
GLUCOSE SERPL-MCNC: 110 MG/DL (ref 70–99)
GLUCOSE UR STRIP-MCNC: NEGATIVE MG/DL
HCO3 VENOUS: 23.7 MMOL/L (ref 22–29)
HCT VFR BLD AUTO: 37.8 % (ref 40.7–50.3)
HGB BLD-MCNC: 12.8 G/DL (ref 13–17)
HGB UR QL STRIP.AUTO: NEGATIVE
IMM GRANULOCYTES # BLD AUTO: 0.06 K/UL (ref 0–0.3)
IMM GRANULOCYTES NFR BLD: 1 %
KETONES UR STRIP-MCNC: NEGATIVE MG/DL
LEUKOCYTE ESTERASE UR QL STRIP: NEGATIVE
LYMPHOCYTES NFR BLD: 2.81 K/UL (ref 1.1–3.7)
LYMPHOCYTES RELATIVE PERCENT: 23 % (ref 24–43)
MAGNESIUM SERPL-MCNC: 1.9 MG/DL (ref 1.6–2.6)
MCH RBC QN AUTO: 30.5 PG (ref 25.2–33.5)
MCHC RBC AUTO-ENTMCNC: 33.9 G/DL (ref 28.4–34.8)
MCV RBC AUTO: 90 FL (ref 82.6–102.9)
MONOCYTES NFR BLD: 0.88 K/UL (ref 0.1–1.2)
MONOCYTES NFR BLD: 7 % (ref 3–12)
NEGATIVE BASE EXCESS, VEN: 1 MMOL/L (ref 0–2)
NEUTROPHILS NFR BLD: 66 % (ref 36–65)
NEUTS SEG NFR BLD: 8.01 K/UL (ref 1.5–8.1)
NITRITE UR QL STRIP: NEGATIVE
NRBC BLD-RTO: 0 PER 100 WBC
O2 SAT, VEN: 56.1 % (ref 60–85)
PCO2, VEN: 38.4 MM HG (ref 41–51)
PH UR STRIP: 6 [PH] (ref 5–8)
PH VENOUS: 7.4 (ref 7.32–7.43)
PLATELET # BLD AUTO: 319 K/UL (ref 138–453)
PMV BLD AUTO: 9.7 FL (ref 8.1–13.5)
PO2, VEN: 29.4 MM HG (ref 30–50)
POTASSIUM SERPL-SCNC: 3.2 MMOL/L (ref 3.7–5.3)
PROT SERPL-MCNC: 6.9 G/DL (ref 6.4–8.3)
PROT UR STRIP-MCNC: NEGATIVE MG/DL
RBC # BLD AUTO: 4.2 M/UL (ref 4.21–5.77)
SODIUM SERPL-SCNC: 130 MMOL/L (ref 135–144)
SP GR UR STRIP: 1.01 (ref 1–1.03)
UROBILINOGEN UR STRIP-ACNC: NORMAL EU/DL (ref 0–1)
WBC OTHER # BLD: 12.1 K/UL (ref 3.5–11.3)

## 2024-01-22 PROCEDURE — 82947 ASSAY GLUCOSE BLOOD QUANT: CPT

## 2024-01-22 PROCEDURE — 80307 DRUG TEST PRSMV CHEM ANLYZR: CPT

## 2024-01-22 PROCEDURE — 80048 BASIC METABOLIC PNL TOTAL CA: CPT

## 2024-01-22 PROCEDURE — G0480 DRUG TEST DEF 1-7 CLASSES: HCPCS

## 2024-01-22 PROCEDURE — 96374 THER/PROPH/DIAG INJ IV PUSH: CPT

## 2024-01-22 PROCEDURE — 70450 CT HEAD/BRAIN W/O DYE: CPT

## 2024-01-22 PROCEDURE — 83735 ASSAY OF MAGNESIUM: CPT

## 2024-01-22 PROCEDURE — 6360000002 HC RX W HCPCS: Performed by: EMERGENCY MEDICINE

## 2024-01-22 PROCEDURE — 80143 DRUG ASSAY ACETAMINOPHEN: CPT

## 2024-01-22 PROCEDURE — 71045 X-RAY EXAM CHEST 1 VIEW: CPT

## 2024-01-22 PROCEDURE — 81003 URINALYSIS AUTO W/O SCOPE: CPT

## 2024-01-22 PROCEDURE — 82803 BLOOD GASES ANY COMBINATION: CPT

## 2024-01-22 PROCEDURE — 80179 DRUG ASSAY SALICYLATE: CPT

## 2024-01-22 PROCEDURE — 93005 ELECTROCARDIOGRAM TRACING: CPT | Performed by: EMERGENCY MEDICINE

## 2024-01-22 PROCEDURE — 82140 ASSAY OF AMMONIA: CPT

## 2024-01-22 PROCEDURE — 99285 EMERGENCY DEPT VISIT HI MDM: CPT

## 2024-01-22 PROCEDURE — 85025 COMPLETE CBC W/AUTO DIFF WBC: CPT

## 2024-01-22 PROCEDURE — 80076 HEPATIC FUNCTION PANEL: CPT

## 2024-01-22 RX ORDER — DULOXETIN HYDROCHLORIDE 20 MG/1
20 CAPSULE, DELAYED RELEASE ORAL DAILY
Qty: 30 CAPSULE | Refills: 5 | Status: SHIPPED | OUTPATIENT
Start: 2024-01-22

## 2024-01-22 RX ORDER — LORAZEPAM 1 MG/1
1 TABLET ORAL EVERY 8 HOURS PRN
Qty: 6 TABLET | Refills: 0 | Status: SHIPPED | OUTPATIENT
Start: 2024-01-22 | End: 2024-02-21

## 2024-01-22 RX ORDER — GABAPENTIN 100 MG/1
CAPSULE ORAL
Qty: 60 CAPSULE | Refills: 0 | Status: SHIPPED | OUTPATIENT
Start: 2024-01-22 | End: 2024-02-20

## 2024-01-22 RX ORDER — SPIRONOLACTONE 25 MG/1
25 TABLET ORAL DAILY
Qty: 30 TABLET | Refills: 5 | Status: SHIPPED | OUTPATIENT
Start: 2024-01-22

## 2024-01-22 RX ORDER — HYDROXYZINE HYDROCHLORIDE 10 MG/1
10 TABLET, FILM COATED ORAL 3 TIMES DAILY PRN
Qty: 90 TABLET | Refills: 0 | Status: SHIPPED | OUTPATIENT
Start: 2024-01-22

## 2024-01-22 RX ORDER — LORAZEPAM 2 MG/ML
1 INJECTION INTRAMUSCULAR ONCE
Status: COMPLETED | OUTPATIENT
Start: 2024-01-22 | End: 2024-01-22

## 2024-01-22 RX ORDER — SACUBITRIL AND VALSARTAN 24; 26 MG/1; MG/1
1 TABLET, FILM COATED ORAL 2 TIMES DAILY
Qty: 60 TABLET | Refills: 5 | Status: SHIPPED | OUTPATIENT
Start: 2024-01-22

## 2024-01-22 RX ADMIN — LORAZEPAM 1 MG: 2 INJECTION INTRAMUSCULAR; INTRAVENOUS at 22:43

## 2024-01-22 ASSESSMENT — ENCOUNTER SYMPTOMS
EYE REDNESS: 0
COLOR CHANGE: 0
EYE DISCHARGE: 0
DIARRHEA: 0
SORE THROAT: 0
SHORTNESS OF BREATH: 0
RHINORRHEA: 0
VOMITING: 0
NAUSEA: 0
COUGH: 0

## 2024-01-22 ASSESSMENT — PAIN - FUNCTIONAL ASSESSMENT: PAIN_FUNCTIONAL_ASSESSMENT: 0-10

## 2024-01-23 LAB
APAP SERPL-MCNC: <10 UG/ML (ref 10–30)
EKG ATRIAL RATE: 107 BPM
EKG P AXIS: 81 DEGREES
EKG P-R INTERVAL: 160 MS
EKG Q-T INTERVAL: 394 MS
EKG QRS DURATION: 104 MS
EKG QTC CALCULATION (BAZETT): 525 MS
EKG R AXIS: -5 DEGREES
EKG T AXIS: 45 DEGREES
EKG VENTRICULAR RATE: 107 BPM
ETHANOL PERCENT: <0.01 %
ETHANOLAMINE SERPL-MCNC: <10 MG/DL
SALICYLATES SERPL-MCNC: <1 MG/DL (ref 3–10)
TOXIC TRICYCLIC SC,BLOOD: NEGATIVE

## 2024-01-23 NOTE — ED NOTES
Patient presents to ED per son and daughter in law from home with c/o left side upper arm numbness and weakness, confusion, memory loss, slurred speech and falls. Patient states symptoms started about a week ago, son states patient texted him around 2 pm today. Patient is A&O x 4, blood sugar was just above 100, no neuro deficits noted on assessment.

## 2024-01-23 NOTE — ED PROVIDER NOTES
EMERGENCY DEPARTMENT ENCOUNTER    Pt Name: Mahesh Brownlee  MRN: [id number]  Birthdate 1960  Date of evaluation: 1/22/24  CHIEF COMPLAINT       Chief Complaint   Patient presents with    Altered Mental Status     Pt family states around 2pm patient has been confused, having falls, slurring speech. Slurred speech for about week.       HISTORY OF PRESENT ILLNESS   This is a 63-year-old male with an extensive medical history that presents with complaints of confusion.  Patient states that for the past week or so he has been having problems with some confusion and just generally not acting appropriately.  The patient was noted by his family to be confused today, he has been having some falls and they are stating that he had some slurred speech.  The patient denies any chest pain, he has no fevers or chills, he denies any cough or sputum production.           REVIEW OF SYSTEMS     Review of Systems   Constitutional:  Positive for fatigue. Negative for chills and fever.   HENT:  Negative for rhinorrhea and sore throat.    Eyes:  Negative for discharge, redness and visual disturbance.   Respiratory:  Negative for cough and shortness of breath.    Cardiovascular:  Negative for chest pain, palpitations and leg swelling.   Gastrointestinal:  Negative for diarrhea, nausea and vomiting.   Musculoskeletal:  Negative for arthralgias, myalgias and neck pain.   Skin:  Negative for color change and rash.   Neurological:  Positive for dizziness and speech difficulty. Negative for seizures, weakness and headaches.        Confusion     Psychiatric/Behavioral:  Negative for hallucinations, self-injury and suicidal ideas.      PASTMEDICAL HISTORY     Past Medical History:   Diagnosis Date    Adjustment disorder     AF (atrial fibrillation) ([facility])     AICD (automatic cardioverter/defibrillator) present 09/2015    PM    Alcohol dependence ([facility])     CAD (coronary artery disease)     Dr. Bolton cardiologist    Cardiomyopathy ([facility])

## 2024-02-15 DIAGNOSIS — M96.1 POSTLAMINECTOMY SYNDROME, LUMBAR REGION: Chronic | ICD-10-CM

## 2024-02-15 DIAGNOSIS — M47.12 CERVICAL SPONDYLOSIS WITH MYELOPATHY: Chronic | ICD-10-CM

## 2024-02-16 RX ORDER — GABAPENTIN 100 MG/1
CAPSULE ORAL
Qty: 180 CAPSULE | Refills: 0 | Status: SHIPPED | OUTPATIENT
Start: 2024-02-16 | End: 2024-03-16

## 2024-02-22 ENCOUNTER — ENROLLMENT (OUTPATIENT)
Dept: PHARMACY | Facility: CLINIC | Age: 64
End: 2024-02-22

## 2024-03-03 ENCOUNTER — HOSPITAL ENCOUNTER (EMERGENCY)
Age: 64
Discharge: HOME | End: 2024-03-03
Attending: EMERGENCY MEDICINE
Payer: MEDICARE

## 2024-03-03 VITALS
BODY MASS INDEX: 28.7 KG/M2 | TEMPERATURE: 97.3 F | WEIGHT: 205 LBS | OXYGEN SATURATION: 96 % | HEIGHT: 71 IN | RESPIRATION RATE: 16 BRPM | DIASTOLIC BLOOD PRESSURE: 94 MMHG | SYSTOLIC BLOOD PRESSURE: 148 MMHG | HEART RATE: 101 BPM

## 2024-03-03 DIAGNOSIS — F10.930 ALCOHOL WITHDRAWAL SYNDROME WITHOUT COMPLICATION (HCC): Primary | ICD-10-CM

## 2024-03-03 LAB
ANION GAP SERPL CALCULATED.3IONS-SCNC: 17 MMOL/L (ref 9–17)
BASOPHILS # BLD: 0.05 K/UL (ref 0–0.2)
BASOPHILS NFR BLD: 0 % (ref 0–2)
BUN SERPL-MCNC: 12 MG/DL (ref 8–23)
BUN/CREAT SERPL: 15 (ref 9–20)
CALCIUM SERPL-MCNC: 9.1 MG/DL (ref 8.6–10.4)
CHLORIDE SERPL-SCNC: 99 MMOL/L (ref 98–107)
CO2 SERPL-SCNC: 25 MMOL/L (ref 20–31)
CREAT SERPL-MCNC: 0.8 MG/DL (ref 0.7–1.2)
EOSINOPHIL # BLD: 0.04 K/UL (ref 0–0.44)
EOSINOPHILS RELATIVE PERCENT: 0 % (ref 1–4)
ERYTHROCYTE [DISTWIDTH] IN BLOOD BY AUTOMATED COUNT: 13.5 % (ref 11.8–14.4)
ETHANOL PERCENT: <0.01 %
ETHANOLAMINE SERPL-MCNC: <10 MG/DL
GFR SERPL CREATININE-BSD FRML MDRD: >60 ML/MIN/1.73M2
GLUCOSE SERPL-MCNC: 143 MG/DL (ref 70–99)
HCT VFR BLD AUTO: 40.9 % (ref 40.7–50.3)
HGB BLD-MCNC: 13.8 G/DL (ref 13–17)
IMM GRANULOCYTES # BLD AUTO: 0.07 K/UL (ref 0–0.3)
IMM GRANULOCYTES NFR BLD: 1 %
LYMPHOCYTES NFR BLD: 1.43 K/UL (ref 1.1–3.7)
LYMPHOCYTES RELATIVE PERCENT: 10 % (ref 24–43)
MCH RBC QN AUTO: 29.7 PG (ref 25.2–33.5)
MCHC RBC AUTO-ENTMCNC: 33.7 G/DL (ref 28.4–34.8)
MCV RBC AUTO: 88.1 FL (ref 82.6–102.9)
MONOCYTES NFR BLD: 0.8 K/UL (ref 0.1–1.2)
MONOCYTES NFR BLD: 6 % (ref 3–12)
NEUTROPHILS NFR BLD: 83 % (ref 36–65)
NEUTS SEG NFR BLD: 11.82 K/UL (ref 1.5–8.1)
NRBC BLD-RTO: 0 PER 100 WBC
PLATELET # BLD AUTO: 207 K/UL (ref 138–453)
PMV BLD AUTO: 8.9 FL (ref 8.1–13.5)
POTASSIUM SERPL-SCNC: 3.3 MMOL/L (ref 3.7–5.3)
RBC # BLD AUTO: 4.64 M/UL (ref 4.21–5.77)
SODIUM SERPL-SCNC: 141 MMOL/L (ref 135–144)
WBC OTHER # BLD: 14.2 K/UL (ref 3.5–11.3)

## 2024-03-03 PROCEDURE — 2580000003 HC RX 258: Performed by: EMERGENCY MEDICINE

## 2024-03-03 PROCEDURE — 96361 HYDRATE IV INFUSION ADD-ON: CPT

## 2024-03-03 PROCEDURE — G0480 DRUG TEST DEF 1-7 CLASSES: HCPCS

## 2024-03-03 PROCEDURE — 36415 COLL VENOUS BLD VENIPUNCTURE: CPT

## 2024-03-03 PROCEDURE — 96375 TX/PRO/DX INJ NEW DRUG ADDON: CPT

## 2024-03-03 PROCEDURE — 96376 TX/PRO/DX INJ SAME DRUG ADON: CPT

## 2024-03-03 PROCEDURE — 99284 EMERGENCY DEPT VISIT MOD MDM: CPT

## 2024-03-03 PROCEDURE — 96374 THER/PROPH/DIAG INJ IV PUSH: CPT

## 2024-03-03 PROCEDURE — 80048 BASIC METABOLIC PNL TOTAL CA: CPT

## 2024-03-03 PROCEDURE — 85025 COMPLETE CBC W/AUTO DIFF WBC: CPT

## 2024-03-03 PROCEDURE — 6360000002 HC RX W HCPCS: Performed by: EMERGENCY MEDICINE

## 2024-03-03 RX ORDER — ONDANSETRON 2 MG/ML
4 INJECTION INTRAMUSCULAR; INTRAVENOUS ONCE
Status: COMPLETED | OUTPATIENT
Start: 2024-03-03 | End: 2024-03-03

## 2024-03-03 RX ORDER — CHLORDIAZEPOXIDE HYDROCHLORIDE 25 MG/1
25 CAPSULE, GELATIN COATED ORAL 3 TIMES DAILY PRN
Qty: 9 CAPSULE | Refills: 0 | Status: SHIPPED | OUTPATIENT
Start: 2024-03-03 | End: 2024-03-06

## 2024-03-03 RX ORDER — 0.9 % SODIUM CHLORIDE 0.9 %
1000 INTRAVENOUS SOLUTION INTRAVENOUS ONCE
Status: COMPLETED | OUTPATIENT
Start: 2024-03-03 | End: 2024-03-03

## 2024-03-03 RX ORDER — LORAZEPAM 2 MG/ML
1 INJECTION INTRAMUSCULAR ONCE
Status: COMPLETED | OUTPATIENT
Start: 2024-03-03 | End: 2024-03-03

## 2024-03-03 RX ORDER — ALPRAZOLAM 0.5 MG/1
0.5 TABLET ORAL 3 TIMES DAILY PRN
Qty: 12 TABLET | Refills: 0 | Status: SHIPPED | OUTPATIENT
Start: 2024-03-03 | End: 2024-04-11

## 2024-03-03 RX ADMIN — SODIUM CHLORIDE 1000 ML: 9 INJECTION, SOLUTION INTRAVENOUS at 09:31

## 2024-03-03 RX ADMIN — LORAZEPAM 1 MG: 2 INJECTION, SOLUTION INTRAMUSCULAR; INTRAVENOUS at 10:49

## 2024-03-03 RX ADMIN — ONDANSETRON 4 MG: 2 INJECTION INTRAMUSCULAR; INTRAVENOUS at 09:31

## 2024-03-03 RX ADMIN — LORAZEPAM 1 MG: 2 INJECTION, SOLUTION INTRAMUSCULAR; INTRAVENOUS at 09:31

## 2024-03-03 RX ADMIN — SODIUM CHLORIDE 1000 ML: 9 INJECTION, SOLUTION INTRAVENOUS at 10:06

## 2024-03-03 RX ADMIN — LORAZEPAM 1 MG: 2 INJECTION, SOLUTION INTRAMUSCULAR; INTRAVENOUS at 10:05

## 2024-03-03 ASSESSMENT — PAIN - FUNCTIONAL ASSESSMENT: PAIN_FUNCTIONAL_ASSESSMENT: 0-10

## 2024-03-03 ASSESSMENT — PAIN SCALES - GENERAL: PAINLEVEL_OUTOF10: 10

## 2024-03-03 NOTE — ED NOTES
Pt's brother to nurses' station requesting water - informed pt cannot have anything until cleared by physician.
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
## 2024-03-03 NOTE — ED NOTES
Dr. Paez at bedside to mike pt.  Pt reports he is a daily drinker and has not had a drink since yesterday d/t running out. States he is not trying to quit, that he is taking a break. Moderate amount of active emesis in trash can. C/o not feeling well. No active tremors present. Pt behaving appropriately and cooperative with care. Pt states appreciation for care received.

## 2024-03-03 NOTE — ED NOTES
Pt resting on stretcher with brother at bedside. Pt states he is feeling better and denies nausea at this time - requests and is given water - states appreciation. HR improved. IV fluids infusing without difficulty. Pt c/o continuing mild tremors - will administer ativan as ordered.

## 2024-03-27 ENCOUNTER — TELEPHONE (OUTPATIENT)
Dept: FAMILY MEDICINE CLINIC | Age: 64
End: 2024-03-27

## 2024-03-27 NOTE — TELEPHONE ENCOUNTER
Meenakshi from the area office contacted office stating on paperwork scanned into chart on 3/22 needs providers signature on the 2nd page and refaxed.     fx- [phone]    Please advise.

## 2024-04-05 ENCOUNTER — TELEMEDICINE (OUTPATIENT)
Dept: FAMILY MEDICINE CLINIC | Age: 64
End: 2024-04-05
Payer: MEDICARE

## 2024-04-05 DIAGNOSIS — I50.42 CHRONIC COMBINED SYSTOLIC AND DIASTOLIC HEART FAILURE (HCC): ICD-10-CM

## 2024-04-05 DIAGNOSIS — F43.9 STRESS AT HOME: Primary | ICD-10-CM

## 2024-04-05 DIAGNOSIS — F41.9 ANXIETY AND DEPRESSION: ICD-10-CM

## 2024-04-05 DIAGNOSIS — I42.9 CARDIOMYOPATHY, UNSPECIFIED TYPE (HCC): ICD-10-CM

## 2024-04-05 DIAGNOSIS — F32.A ANXIETY AND DEPRESSION: ICD-10-CM

## 2024-04-05 PROCEDURE — 99213 OFFICE O/P EST LOW 20 MIN: CPT | Performed by: NURSE PRACTITIONER

## 2024-04-05 PROCEDURE — 1123F ACP DISCUSS/DSCN MKR DOCD: CPT | Performed by: NURSE PRACTITIONER

## 2024-04-05 PROCEDURE — 3017F COLORECTAL CA SCREEN DOC REV: CPT | Performed by: NURSE PRACTITIONER

## 2024-04-05 PROCEDURE — G8427 DOCREV CUR MEDS BY ELIG CLIN: HCPCS | Performed by: NURSE PRACTITIONER

## 2024-04-05 RX ORDER — LORAZEPAM 1 MG/1
1 TABLET ORAL 3 TIMES DAILY
COMMUNITY

## 2024-04-05 SDOH — ECONOMIC STABILITY: FOOD INSECURITY: WITHIN THE PAST 12 MONTHS, THE FOOD YOU BOUGHT JUST DIDN'T LAST AND YOU DIDN'T HAVE MONEY TO GET MORE.: NEVER TRUE

## 2024-04-05 SDOH — ECONOMIC STABILITY: FOOD INSECURITY: WITHIN THE PAST 12 MONTHS, YOU WORRIED THAT YOUR FOOD WOULD RUN OUT BEFORE YOU GOT MONEY TO BUY MORE.: NEVER TRUE

## 2024-04-05 SDOH — ECONOMIC STABILITY: INCOME INSECURITY: HOW HARD IS IT FOR YOU TO PAY FOR THE VERY BASICS LIKE FOOD, HOUSING, MEDICAL CARE, AND HEATING?: NOT HARD AT ALL

## 2024-04-05 ASSESSMENT — PATIENT HEALTH QUESTIONNAIRE - PHQ9
SUM OF ALL RESPONSES TO PHQ QUESTIONS 1-9: 0
4. FEELING TIRED OR HAVING LITTLE ENERGY: NOT AT ALL
SUM OF ALL RESPONSES TO PHQ QUESTIONS 1-9: 0
1. LITTLE INTEREST OR PLEASURE IN DOING THINGS: NOT AT ALL
3. TROUBLE FALLING OR STAYING ASLEEP: NOT AT ALL
SUM OF ALL RESPONSES TO PHQ QUESTIONS 1-9: 0
SUM OF ALL RESPONSES TO PHQ9 QUESTIONS 1 & 2: 0
2. FEELING DOWN, DEPRESSED OR HOPELESS: NOT AT ALL
9. THOUGHTS THAT YOU WOULD BE BETTER OFF DEAD, OR OF HURTING YOURSELF: NOT AT ALL
10. IF YOU CHECKED OFF ANY PROBLEMS, HOW DIFFICULT HAVE THESE PROBLEMS MADE IT FOR YOU TO DO YOUR WORK, TAKE CARE OF THINGS AT HOME, OR GET ALONG WITH OTHER PEOPLE: NOT DIFFICULT AT ALL
7. TROUBLE CONCENTRATING ON THINGS, SUCH AS READING THE NEWSPAPER OR WATCHING TELEVISION: NOT AT ALL
SUM OF ALL RESPONSES TO PHQ QUESTIONS 1-9: 0
6. FEELING BAD ABOUT YOURSELF - OR THAT YOU ARE A FAILURE OR HAVE LET YOURSELF OR YOUR FAMILY DOWN: NOT AT ALL
5. POOR APPETITE OR OVEREATING: NOT AT ALL
8. MOVING OR SPEAKING SO SLOWLY THAT OTHER PEOPLE COULD HAVE NOTICED. OR THE OPPOSITE, BEING SO FIGETY OR RESTLESS THAT YOU HAVE BEEN MOVING AROUND A LOT MORE THAN USUAL: NOT AT ALL

## 2024-04-05 ASSESSMENT — LIFESTYLE VARIABLES
HOW MANY STANDARD DRINKS CONTAINING ALCOHOL DO YOU HAVE ON A TYPICAL DAY: PATIENT DOES NOT DRINK
HOW OFTEN DO YOU HAVE A DRINK CONTAINING ALCOHOL: NEVER

## 2024-04-05 NOTE — PROGRESS NOTES
Mahesh Brownlee, was evaluated through a synchronous (real-time) audio  encounter. The patient (or guardian if applicable) is aware that this is a billable service, which includes applicable co-pays. This Virtual Visit was conducted with patient's (and/or legal guardian's) consent. Patient identification was verified, and a caregiver was present when appropriate.   The patient was located at Home: [address] [facility] [place] [address]  Provider was located at Home (Appt Dept State): [place]  Confirm you are appropriately licensed, registered, or certified to deliver care in the state where the patient is located as indicated above. If you are not or unsure, please re-schedule the visit: Yes, I confirm.     Mahesh Brownlee (:  1960) is a Established patient, presenting virtually for evaluation of the following:    Assessment & Plan   Below is the assessment and plan developed based on review of pertinent history, physical exam, labs, studies, and medications.  1. Stress at home  Moving into assisted living with no other option for housing  Feels overwhelmed and losing all of his money    2. Anxiety and depression  Continue cymbalta for now and getting ativan from another provider he is unsure who she is?    3. Cardiomyopathy, unspecified type (HCC)  Continues with cardio and on entreso    4. Chronic combined systolic and diastolic heart failure (HCC)  See above      Form filled out for assisted living  Avoid tobacco, drugs and alcohol    No follow-ups on file.       Subjective   HPI  Patient calling in today for assistance to fill out form received by [facility] assisted living.  States he is currently living with his brother who is now going to sell his home and he will be homeless.  He found 1 nursing facility and the entire city that will take his insurance and accept new clients.  States he is very upset about this and very worried as they are taking away all of his assets and will have only $600 to live on for

## 2024-04-05 NOTE — PROGRESS NOTES
Visit Information    Have you changed or started any medications since your last visit including any over-the-counter medicines, vitamins, or herbal medicines? no   Have you stopped taking any of your medications? Is so, why? -  no  Are you having any side effects from any of your medications? - no    Have you seen any other physician or provider since your last visit?  no   Have you had any other diagnostic tests since your last visit?  no   Have you been seen in the emergency room and/or had an admission in a hospital since we last saw you?  no   Have you had your routine dental cleaning in the past 6 months?  no     Do you have an active MyChart account? If no, what is the barrier?  No: na    Patient Care Team:  Ira Roberts APRN - CNP as PCP - General (Nurse Practitioner)  Ira Roberts APRN - CNP as PCP - Empaneled Provider  Kunal Conteh MD as Consulting Physician (Pain Management)  Amauri Fischer MD as Consulting Physician (Pulmonary Disease)  Shiloh Johansen [facility] as Pharmacist (Pharmacist)    Medical History Review  Past Medical, Family, and Social History reviewed and  contribute to the patient presenting condition    Health Maintenance   Topic Date Due    HIV screen  Never done    Colorectal Cancer Screen  Never done    Shingles vaccine (1 of 2) Never done    Pneumococcal 65+ years Vaccine (2 of 2 - PCV) 08/16/2018    Respiratory Syncytial Virus (RSV) Pregnant or age 60 yrs+ (1 - 1-dose 60+ series) Never done    COVID-19 Vaccine (2 - 2023-24 season) 09/01/2023    Annual Wellness Visit (Medicare Advantage)  01/01/2024    DTaP/Tdap/Td vaccine (1 - Tdap) 04/25/2024 (Originally 3/30/1978)    Flu vaccine (Season Ended) 10/31/2024 (Originally 8/1/2024)    Lipids  06/29/2024    Depression Monitoring  09/25/2024    Diabetes screen  06/29/2026    AAA screen  Completed    Hepatitis C screen  Completed    Hepatitis A vaccine  Aged Out    Hepatitis B vaccine  Aged Out    Hib vaccine  Aged Out    Polio

## 2024-04-15 ENCOUNTER — TELEPHONE (OUTPATIENT)
Dept: FAMILY MEDICINE CLINIC | Age: 64
End: 2024-04-15

## 2024-04-15 NOTE — TELEPHONE ENCOUNTER
I do not know as I did not order the placement here? I only filled out form for his current needs in regards to daily care

## 2024-04-15 NOTE — TELEPHONE ENCOUNTER
Pt contacted office asking for clarification on if he is going into hospice, states he only wants to go into hospice not a nursing home type of care. Pt is set to move into [facility] the first week on may, but wants to make sure its for hospice.     Please advise.

## 2024-04-16 NOTE — TELEPHONE ENCOUNTER
Pt is stating he will need percocet and valium if he has to be placed in a nursing home setting pt has scheduled an appointment to talk to pcp  about his needs

## 2024-04-17 ENCOUNTER — OFFICE VISIT (OUTPATIENT)
Dept: FAMILY MEDICINE CLINIC | Age: 64
End: 2024-04-17
Payer: MEDICARE

## 2024-04-17 VITALS
HEART RATE: 89 BPM | SYSTOLIC BLOOD PRESSURE: 138 MMHG | DIASTOLIC BLOOD PRESSURE: 70 MMHG | TEMPERATURE: 97.5 F | BODY MASS INDEX: 29.26 KG/M2 | OXYGEN SATURATION: 96 % | HEIGHT: 71 IN | WEIGHT: 209 LBS

## 2024-04-17 DIAGNOSIS — Z51.81 ENCOUNTER FOR THERAPEUTIC DRUG LEVEL MONITORING: ICD-10-CM

## 2024-04-17 DIAGNOSIS — R41.3 SHORT-TERM MEMORY LOSS: Primary | ICD-10-CM

## 2024-04-17 PROCEDURE — 3078F DIAST BP <80 MM HG: CPT | Performed by: NURSE PRACTITIONER

## 2024-04-17 PROCEDURE — 1123F ACP DISCUSS/DSCN MKR DOCD: CPT | Performed by: NURSE PRACTITIONER

## 2024-04-17 PROCEDURE — 4004F PT TOBACCO SCREEN RCVD TLK: CPT | Performed by: NURSE PRACTITIONER

## 2024-04-17 PROCEDURE — G8427 DOCREV CUR MEDS BY ELIG CLIN: HCPCS | Performed by: NURSE PRACTITIONER

## 2024-04-17 PROCEDURE — G8417 CALC BMI ABV UP PARAM F/U: HCPCS | Performed by: NURSE PRACTITIONER

## 2024-04-17 PROCEDURE — 3017F COLORECTAL CA SCREEN DOC REV: CPT | Performed by: NURSE PRACTITIONER

## 2024-04-17 PROCEDURE — 99214 OFFICE O/P EST MOD 30 MIN: CPT | Performed by: NURSE PRACTITIONER

## 2024-04-17 PROCEDURE — 3075F SYST BP GE 130 - 139MM HG: CPT | Performed by: NURSE PRACTITIONER

## 2024-04-17 RX ORDER — METOPROLOL TARTRATE 50 MG/1
50 TABLET, FILM COATED ORAL 2 TIMES DAILY
COMMUNITY

## 2024-04-17 ASSESSMENT — LIFESTYLE VARIABLES: HOW OFTEN DO YOU HAVE A DRINK CONTAINING ALCOHOL: 2-4 TIMES A MONTH

## 2024-04-17 ASSESSMENT — ENCOUNTER SYMPTOMS
VOMITING: 0
COUGH: 0
EYE ITCHING: 0
CHEST TIGHTNESS: 0
PHOTOPHOBIA: 0
ABDOMINAL PAIN: 0
SHORTNESS OF BREATH: 0
TROUBLE SWALLOWING: 0
NAUSEA: 0
EYE DISCHARGE: 0

## 2024-04-17 ASSESSMENT — PATIENT HEALTH QUESTIONNAIRE - PHQ9
10. IF YOU CHECKED OFF ANY PROBLEMS, HOW DIFFICULT HAVE THESE PROBLEMS MADE IT FOR YOU TO DO YOUR WORK, TAKE CARE OF THINGS AT HOME, OR GET ALONG WITH OTHER PEOPLE: EXTREMELY DIFFICULT
2. FEELING DOWN, DEPRESSED OR HOPELESS: NEARLY EVERY DAY
4. FEELING TIRED OR HAVING LITTLE ENERGY: NEARLY EVERY DAY
9. THOUGHTS THAT YOU WOULD BE BETTER OFF DEAD, OR OF HURTING YOURSELF: NOT AT ALL
SUM OF ALL RESPONSES TO PHQ QUESTIONS 1-9: 8
SUM OF ALL RESPONSES TO PHQ9 QUESTIONS 1 & 2: 3
1. LITTLE INTEREST OR PLEASURE IN DOING THINGS: NOT AT ALL
7. TROUBLE CONCENTRATING ON THINGS, SUCH AS READING THE NEWSPAPER OR WATCHING TELEVISION: NOT AT ALL
6. FEELING BAD ABOUT YOURSELF - OR THAT YOU ARE A FAILURE OR HAVE LET YOURSELF OR YOUR FAMILY DOWN: NOT AT ALL
3. TROUBLE FALLING OR STAYING ASLEEP: MORE THAN HALF THE DAYS
5. POOR APPETITE OR OVEREATING: NOT AT ALL
SUM OF ALL RESPONSES TO PHQ QUESTIONS 1-9: 8
SUM OF ALL RESPONSES TO PHQ QUESTIONS 1-9: 8
8. MOVING OR SPEAKING SO SLOWLY THAT OTHER PEOPLE COULD HAVE NOTICED. OR THE OPPOSITE, BEING SO FIGETY OR RESTLESS THAT YOU HAVE BEEN MOVING AROUND A LOT MORE THAN USUAL: NOT AT ALL
SUM OF ALL RESPONSES TO PHQ QUESTIONS 1-9: 8

## 2024-04-17 NOTE — PROGRESS NOTES
Visit Information    Have you changed or started any medications since your last visit including any over-the-counter medicines, vitamins, or herbal medicines? no   Have you stopped taking any of your medications? Is so, why? -  no  Are you having any side effects from any of your medications? - no    Have you seen any other physician or provider since your last visit?  no   Have you had any other diagnostic tests since your last visit?  no   Have you been seen in the emergency room and/or had an admission in a hospital since we last saw you?  no   Have you had your routine dental cleaning in the past 6 months?  no     Do you have an active MyChart account? If no, what is the barrier?  no    Patient Care Team:  Ira Roberts APRN - CNP as PCP - General (Nurse Practitioner)  Ira Roberts APRN - CNP as PCP - Empaneled Provider  Kunal Conteh MD as Consulting Physician (Pain Management)  Amauri Fischer MD as Consulting Physician (Pulmonary Disease)  Shiloh Johansen RPH as Pharmacist (Pharmacist)    Medical History Review  Past Medical, Family, and Social History reviewed and  contribute to the patient presenting condition    Health Maintenance   Topic Date Due    HIV screen  Never done    Colorectal Cancer Screen  Never done    Shingles vaccine (1 of 2) Never done    Pneumococcal 65+ years Vaccine (2 of 2 - PCV) 08/16/2018    Respiratory Syncytial Virus (RSV) Pregnant or age 60 yrs+ (1 - 1-dose 60+ series) Never done    COVID-19 Vaccine (2 - 2023-24 season) 09/01/2023    Annual Wellness Visit (Medicare Advantage)  01/01/2024    DTaP/Tdap/Td vaccine (1 - Tdap) 04/25/2024 (Originally 3/30/1978)    Flu vaccine (Season Ended) 10/31/2024 (Originally 8/1/2024)    Lipids  06/29/2024    Depression Monitoring  04/05/2025    Diabetes screen  06/29/2026    AAA screen  Completed    Hepatitis C screen  Completed    Hepatitis A vaccine  Aged Out    Hepatitis B vaccine  Aged Out    Hib vaccine  Aged Out    Polio

## 2024-04-18 ENCOUNTER — HOSPITAL ENCOUNTER (OUTPATIENT)
Dept: CT IMAGING | Facility: CLINIC | Age: 64
Discharge: HOME | End: 2024-04-20
Payer: MEDICARE

## 2024-04-18 DIAGNOSIS — R41.3 SHORT-TERM MEMORY LOSS: Primary | ICD-10-CM

## 2024-04-18 DIAGNOSIS — R41.3 SHORT-TERM MEMORY LOSS: ICD-10-CM

## 2024-04-18 DIAGNOSIS — S06.9X9S TRAUMATIC BRAIN INJURY WITH LOSS OF CONSCIOUSNESS, SEQUELA (HCC): ICD-10-CM

## 2024-04-18 DIAGNOSIS — Z86.73 HISTORY OF CVA (CEREBROVASCULAR ACCIDENT): ICD-10-CM

## 2024-04-18 PROCEDURE — 70450 CT HEAD/BRAIN W/O DYE: CPT

## 2024-04-22 DIAGNOSIS — M47.12 CERVICAL SPONDYLOSIS WITH MYELOPATHY: Chronic | ICD-10-CM

## 2024-04-22 DIAGNOSIS — M96.1 POSTLAMINECTOMY SYNDROME, LUMBAR REGION: Chronic | ICD-10-CM

## 2024-04-23 RX ORDER — GABAPENTIN 100 MG/1
CAPSULE ORAL
Qty: 180 CAPSULE | Refills: 3 | OUTPATIENT
Start: 2024-04-23

## 2024-05-07 ENCOUNTER — APPOINTMENT (OUTPATIENT)
Dept: GENERAL RADIOLOGY | Age: 64
End: 2024-05-07
Payer: MEDICARE

## 2024-05-07 ENCOUNTER — APPOINTMENT (OUTPATIENT)
Dept: CT IMAGING | Age: 64
End: 2024-05-07
Payer: MEDICARE

## 2024-05-07 ENCOUNTER — HOSPITAL ENCOUNTER (EMERGENCY)
Age: 64
Discharge: HOME | End: 2024-05-07
Attending: EMERGENCY MEDICINE
Payer: MEDICARE

## 2024-05-07 VITALS
DIASTOLIC BLOOD PRESSURE: 86 MMHG | TEMPERATURE: 97.8 F | SYSTOLIC BLOOD PRESSURE: 159 MMHG | WEIGHT: 200 LBS | OXYGEN SATURATION: 100 % | BODY MASS INDEX: 28 KG/M2 | HEIGHT: 71 IN | RESPIRATION RATE: 20 BRPM | HEART RATE: 99 BPM

## 2024-05-07 DIAGNOSIS — F10.920 ACUTE ALCOHOLIC INTOXICATION WITHOUT COMPLICATION (HCC): Primary | ICD-10-CM

## 2024-05-07 LAB
ALBUMIN SERPL-MCNC: 4.1 G/DL (ref 3.5–5.2)
ALP SERPL-CCNC: 100 U/L (ref 40–129)
ALT SERPL-CCNC: 16 U/L (ref 5–41)
AMMONIA PLAS-SCNC: 24 UMOL/L (ref 16–60)
AMPHET UR QL SCN: NEGATIVE
ANION GAP SERPL CALCULATED.3IONS-SCNC: 17 MMOL/L (ref 9–17)
APAP SERPL-MCNC: <10 UG/ML (ref 10–30)
AST SERPL-CCNC: 35 U/L
BACTERIA URNS QL MICRO: ABNORMAL
BARBITURATES UR QL SCN: NEGATIVE
BASOPHILS # BLD: 0.04 K/UL (ref 0–0.2)
BASOPHILS NFR BLD: 1 % (ref 0–2)
BENZODIAZ UR QL: NEGATIVE
BILIRUB SERPL-MCNC: 0.5 MG/DL (ref 0.3–1.2)
BILIRUB UR QL STRIP: NEGATIVE
BUN SERPL-MCNC: 8 MG/DL (ref 8–23)
BUN/CREAT SERPL: 9 (ref 9–20)
CALCIUM SERPL-MCNC: 8.6 MG/DL (ref 8.6–10.4)
CANNABINOIDS UR QL SCN: POSITIVE
CASTS #/AREA URNS LPF: ABNORMAL /LPF
CASTS #/AREA URNS LPF: ABNORMAL /LPF
CHLORIDE SERPL-SCNC: 105 MMOL/L (ref 98–107)
CLARITY UR: CLEAR
CO2 SERPL-SCNC: 20 MMOL/L (ref 20–31)
COCAINE UR QL SCN: NEGATIVE
COLOR UR: YELLOW
CREAT SERPL-MCNC: 0.9 MG/DL (ref 0.7–1.2)
EOSINOPHIL # BLD: 0.27 K/UL (ref 0–0.44)
EOSINOPHILS RELATIVE PERCENT: 4 % (ref 1–4)
EPI CELLS #/AREA URNS HPF: ABNORMAL /HPF (ref 0–5)
ERYTHROCYTE [DISTWIDTH] IN BLOOD BY AUTOMATED COUNT: 13.9 % (ref 11.8–14.4)
ETHANOL PERCENT: 0.19 %
ETHANOLAMINE SERPL-MCNC: 194 MG/DL
FENTANYL UR QL: NEGATIVE
GFR, ESTIMATED: >90 ML/MIN/1.73M2
GLUCOSE SERPL-MCNC: 122 MG/DL (ref 70–99)
GLUCOSE UR STRIP-MCNC: NEGATIVE MG/DL
HCT VFR BLD AUTO: 43.5 % (ref 40.7–50.3)
HGB BLD-MCNC: 14.3 G/DL (ref 13–17)
HGB UR QL STRIP.AUTO: NEGATIVE
IMM GRANULOCYTES # BLD AUTO: 0.02 K/UL (ref 0–0.3)
IMM GRANULOCYTES NFR BLD: 0 %
KETONES UR STRIP-MCNC: NEGATIVE MG/DL
LEUKOCYTE ESTERASE UR QL STRIP: NEGATIVE
LIPASE SERPL-CCNC: 16 U/L (ref 13–60)
LYMPHOCYTES NFR BLD: 2.06 K/UL (ref 1.1–3.7)
LYMPHOCYTES RELATIVE PERCENT: 28 % (ref 24–43)
MAGNESIUM SERPL-MCNC: 1.9 MG/DL (ref 1.6–2.6)
MCH RBC QN AUTO: 29.7 PG (ref 25.2–33.5)
MCHC RBC AUTO-ENTMCNC: 32.9 G/DL (ref 28.4–34.8)
MCV RBC AUTO: 90.2 FL (ref 82.6–102.9)
METHADONE UR QL: NEGATIVE
MONOCYTES NFR BLD: 0.41 K/UL (ref 0.1–1.2)
MONOCYTES NFR BLD: 6 % (ref 3–12)
MUCOUS THREADS URNS QL MICRO: ABNORMAL
NEUTROPHILS NFR BLD: 61 % (ref 36–65)
NEUTS SEG NFR BLD: 4.59 K/UL (ref 1.5–8.1)
NITRITE UR QL STRIP: NEGATIVE
NRBC BLD-RTO: 0 PER 100 WBC
OPIATES UR QL SCN: NEGATIVE
OXYCODONE UR QL SCN: NEGATIVE
PCP UR QL SCN: NEGATIVE
PH UR STRIP: 6 [PH] (ref 5–8)
PLATELET # BLD AUTO: 245 K/UL (ref 138–453)
PMV BLD AUTO: 8.6 FL (ref 8.1–13.5)
POTASSIUM SERPL-SCNC: 3.5 MMOL/L (ref 3.7–5.3)
PROT SERPL-MCNC: 7 G/DL (ref 6.4–8.3)
PROT UR STRIP-MCNC: ABNORMAL MG/DL
RBC # BLD AUTO: 4.82 M/UL (ref 4.21–5.77)
RBC #/AREA URNS HPF: ABNORMAL /HPF (ref 0–2)
SALICYLATES SERPL-MCNC: <1 MG/DL (ref 3–10)
SODIUM SERPL-SCNC: 142 MMOL/L (ref 135–144)
SP GR UR STRIP: 1.02 (ref 1–1.03)
TEST INFORMATION: ABNORMAL
UROBILINOGEN UR STRIP-ACNC: NORMAL EU/DL (ref 0–1)
WBC #/AREA URNS HPF: ABNORMAL /HPF (ref 0–5)
WBC OTHER # BLD: 7.4 K/UL (ref 3.5–11.3)

## 2024-05-07 PROCEDURE — 71045 X-RAY EXAM CHEST 1 VIEW: CPT

## 2024-05-07 PROCEDURE — 82140 ASSAY OF AMMONIA: CPT

## 2024-05-07 PROCEDURE — 83735 ASSAY OF MAGNESIUM: CPT

## 2024-05-07 PROCEDURE — 6360000002 HC RX W HCPCS: Performed by: PHYSICIAN ASSISTANT

## 2024-05-07 PROCEDURE — 80179 DRUG ASSAY SALICYLATE: CPT

## 2024-05-07 PROCEDURE — 80143 DRUG ASSAY ACETAMINOPHEN: CPT

## 2024-05-07 PROCEDURE — 6370000000 HC RX 637 (ALT 250 FOR IP): Performed by: PHYSICIAN ASSISTANT

## 2024-05-07 PROCEDURE — 93005 ELECTROCARDIOGRAM TRACING: CPT | Performed by: PHYSICIAN ASSISTANT

## 2024-05-07 PROCEDURE — 83690 ASSAY OF LIPASE: CPT

## 2024-05-07 PROCEDURE — 72125 CT NECK SPINE W/O DYE: CPT

## 2024-05-07 PROCEDURE — G0480 DRUG TEST DEF 1-7 CLASSES: HCPCS

## 2024-05-07 PROCEDURE — 96376 TX/PRO/DX INJ SAME DRUG ADON: CPT

## 2024-05-07 PROCEDURE — 70450 CT HEAD/BRAIN W/O DYE: CPT

## 2024-05-07 PROCEDURE — 80307 DRUG TEST PRSMV CHEM ANLYZR: CPT

## 2024-05-07 PROCEDURE — 2580000003 HC RX 258: Performed by: PHYSICIAN ASSISTANT

## 2024-05-07 PROCEDURE — 80053 COMPREHEN METABOLIC PANEL: CPT

## 2024-05-07 PROCEDURE — 99285 EMERGENCY DEPT VISIT HI MDM: CPT

## 2024-05-07 PROCEDURE — 81001 URINALYSIS AUTO W/SCOPE: CPT

## 2024-05-07 PROCEDURE — 96374 THER/PROPH/DIAG INJ IV PUSH: CPT

## 2024-05-07 PROCEDURE — 85025 COMPLETE CBC W/AUTO DIFF WBC: CPT

## 2024-05-07 RX ORDER — 0.9 % SODIUM CHLORIDE 0.9 %
1000 INTRAVENOUS SOLUTION INTRAVENOUS ONCE
Status: COMPLETED | OUTPATIENT
Start: 2024-05-07 | End: 2024-05-07

## 2024-05-07 RX ORDER — LORAZEPAM 2 MG/ML
1 INJECTION INTRAMUSCULAR ONCE
Status: COMPLETED | OUTPATIENT
Start: 2024-05-07 | End: 2024-05-07

## 2024-05-07 RX ORDER — LORAZEPAM 2 MG/ML
0.5 INJECTION INTRAMUSCULAR ONCE
Status: DISCONTINUED | OUTPATIENT
Start: 2024-05-07 | End: 2024-05-07

## 2024-05-07 RX ADMIN — POTASSIUM BICARBONATE 40 MEQ: 782 TABLET, EFFERVESCENT ORAL at 18:43

## 2024-05-07 RX ADMIN — LORAZEPAM 1 MG: 2 INJECTION INTRAMUSCULAR; INTRAVENOUS at 16:03

## 2024-05-07 RX ADMIN — METOPROLOL TARTRATE 50 MG: 25 TABLET, FILM COATED ORAL at 17:22

## 2024-05-07 RX ADMIN — SODIUM CHLORIDE 1000 ML: 9 INJECTION, SOLUTION INTRAVENOUS at 15:56

## 2024-05-07 RX ADMIN — LORAZEPAM 1 MG: 2 INJECTION INTRAMUSCULAR; INTRAVENOUS at 17:23

## 2024-05-07 RX ADMIN — SODIUM CHLORIDE 1000 ML: 9 INJECTION, SOLUTION INTRAVENOUS at 17:23

## 2024-05-07 ASSESSMENT — LIFESTYLE VARIABLES
HOW OFTEN DO YOU HAVE A DRINK CONTAINING ALCOHOL: 4 OR MORE TIMES A WEEK
HOW MANY STANDARD DRINKS CONTAINING ALCOHOL DO YOU HAVE ON A TYPICAL DAY: PATIENT UNABLE TO ANSWER

## 2024-05-07 NOTE — ED NOTES
Patient reports that he is an alcoholic. He drinks rum daily. Reports that he has been a drinker for 30 years. Reports that he has minimal contact with his children and grandchildren. Patient states that he has been in treatment before but has no interest at this time. LSW provided patient with treatment resources should he change his mind.

## 2024-05-07 NOTE — ED PROVIDER NOTES
eMERGENCY dEPARTMENT eNCOUnter   Independent Attestation     Pt Name: Mahesh Brownlee  MRN: [id number]  Birthdate 1960  Date of evaluation: 5/7/24     Mahesh Brownlee is a 64 y.o. male with CC: Alcohol Problem, Shaking (Last drink was 2 days ago, presents to the ER with shaking and anxious. Unwilling to state how much he drinks a day, here for help), and Anxiety      Based on the medical record the care appears appropriate.  I was personally available for consultation in the Emergency Department.    Elton Weaver DO  Attending Emergency Physician                  Elton Weaver DO  05/07/24 0134    
159/89 (!) 159/89 (!) 163/125 (!) 159/86   Pulse: (!) 117 (!) 125 (!) 114 99   Resp: 21  23 20   Temp:       TempSrc:       SpO2: 97%  98% 100%   Weight:       Height:         HEARTSCORE:0    Patient's vitals improve greatly after Ativan and fluids.  Patient has not been taking his blood pressure medication as well.  1 dose was given here in the ER.  Patient has no intention to stop drinking.  [facility] but declined.  He was offered transfer to a alcohol treatment facility but declined.  Patient will be discharged home with outpatient follow-up.  Patient appears to be clinically sober.  Able to stand without difficulty in the exam room.    6:18 PM EDT  Patient still requesting dc home.  Current vitals are hr 90, rr 18, bp 159/86    Evaluation and treatment course in the ED, and plan of care upon discharge was discussed in length with the patient. Patient had no further questions prior to being discharged and was instructed to return to the ED for new or worsening symptoms.          The patient was involved in his/her plan of care. The testing that was ordered was discussed with the patient. Any medications that may have been ordered were discussed with the patient.       I have reviewed the patient's previous medical records using the electronic health record that we have available.      Labs and imaging were reviewed.     CONSULTS:  None    PROCEDURES:  Procedures        FINAL IMPRESSION      1. Acute alcoholic intoxication without complication (HCC)          DISPOSITION/PLAN   DISPOSITION Discharge - Pending Orders Complete 05/07/2024 06:14:55 PM      PATIENTREFERRED TO:   Ira Roberts, APRN - CNP  [id number]  [facility] [id number] [phone]    In 3 days        DISCHARGE MEDICATIONS:     New Prescriptions    No medications on file           (Please note that portions of this note were completed with a voice recognition program.  Efforts were made to edit thedictations but

## 2024-05-07 NOTE — ED NOTES
Patient lives with brother, brother states he has had a drinking issue since age 12, today when brother got home he noticed patient having shaking and was anxious and agreed to come to hosp. When interviewing patient he denied wanting to harm self of others and denies drug use other than ETOH. States last drank 2 days ago, currently feeling very anxious, tearful and shaky. Asked if he had ever gone through rehab and patient stated No and stated he was not interested in rehab. Gait very unsteady, cooperative with care.

## 2024-05-07 NOTE — DISCHARGE INSTRUCTIONS
Follow up with doctor  in 3 -4 days.  Return to ER immediately if symptoms worsen or persist.  You were offered admission to the hospital also was offered resources to be transferred to a treatment facility for alcohol but you have declined.  You are aware that you are at risk for death loss of quality of life and having seizures.

## 2024-05-09 LAB
EKG ATRIAL RATE: 120 BPM
EKG P AXIS: 82 DEGREES
EKG P-R INTERVAL: 134 MS
EKG Q-T INTERVAL: 344 MS
EKG QRS DURATION: 108 MS
EKG QTC CALCULATION (BAZETT): 486 MS
EKG R AXIS: -39 DEGREES
EKG T AXIS: 36 DEGREES
EKG VENTRICULAR RATE: 120 BPM

## 2024-05-13 ENCOUNTER — HOSPITAL ENCOUNTER (INPATIENT)
Age: 64
LOS: 9 days | Discharge: NURSING, REHAB OR LONG-TERM CARE | DRG: 896 | End: 2024-05-22
Attending: STUDENT IN AN ORGANIZED HEALTH CARE EDUCATION/TRAINING PROGRAM | Admitting: INTERNAL MEDICINE
Payer: MEDICARE

## 2024-05-13 DIAGNOSIS — E87.6 HYPOKALEMIA: ICD-10-CM

## 2024-05-13 DIAGNOSIS — R45.851 SUICIDAL IDEATION: Primary | ICD-10-CM

## 2024-05-13 DIAGNOSIS — I42.9 CARDIOMYOPATHY, UNSPECIFIED TYPE (HCC): ICD-10-CM

## 2024-05-13 DIAGNOSIS — F10.930 ALCOHOL WITHDRAWAL SYNDROME WITHOUT COMPLICATION (HCC): ICD-10-CM

## 2024-05-13 DIAGNOSIS — M96.1 POSTLAMINECTOMY SYNDROME, LUMBAR REGION: Chronic | ICD-10-CM

## 2024-05-13 DIAGNOSIS — M47.12 CERVICAL SPONDYLOSIS WITH MYELOPATHY: Chronic | ICD-10-CM

## 2024-05-13 DIAGNOSIS — F10.939 ALCOHOL WITHDRAWAL SYNDROME WITH COMPLICATION (HCC): ICD-10-CM

## 2024-05-13 DIAGNOSIS — I50.42 CHRONIC COMBINED SYSTOLIC AND DIASTOLIC HEART FAILURE (HCC): ICD-10-CM

## 2024-05-13 LAB
ALBUMIN SERPL-MCNC: 3.7 G/DL (ref 3.5–5.2)
ALP SERPL-CCNC: 88 U/L (ref 40–129)
ALT SERPL-CCNC: 61 U/L (ref 5–41)
AMPHET UR QL SCN: NEGATIVE
ANION GAP SERPL CALCULATED.3IONS-SCNC: 11 MMOL/L (ref 9–17)
AST SERPL-CCNC: 74 U/L
ATYPICAL LYMPHOCYTE ABSOLUTE COUNT: 0.14 K/UL
ATYPICAL LYMPHOCYTES: 2 %
BARBITURATES UR QL SCN: NEGATIVE
BASOPHILS # BLD: 0 K/UL (ref 0–0.2)
BASOPHILS NFR BLD: 0 % (ref 0–2)
BENZODIAZ UR QL: NEGATIVE
BILIRUB SERPL-MCNC: 0.4 MG/DL (ref 0.3–1.2)
BILIRUB UR QL STRIP: NEGATIVE
BUN SERPL-MCNC: 13 MG/DL (ref 8–23)
CALCIUM SERPL-MCNC: 8.9 MG/DL (ref 8.6–10.4)
CANNABINOIDS UR QL SCN: NEGATIVE
CHLORIDE SERPL-SCNC: 98 MMOL/L (ref 98–107)
CLARITY UR: CLEAR
CO2 SERPL-SCNC: 26 MMOL/L (ref 20–31)
COCAINE UR QL SCN: NEGATIVE
COLOR UR: YELLOW
COMMENT: NORMAL
CREAT SERPL-MCNC: 1 MG/DL (ref 0.7–1.2)
EOSINOPHIL # BLD: 0.07 K/UL (ref 0–0.4)
EOSINOPHILS RELATIVE PERCENT: 1 % (ref 0–4)
ERYTHROCYTE [DISTWIDTH] IN BLOOD BY AUTOMATED COUNT: 14.9 % (ref 11.5–14.9)
ETHANOL PERCENT: <0.01 %
ETHANOLAMINE SERPL-MCNC: <10 MG/DL
FENTANYL UR QL: NEGATIVE
GFR, ESTIMATED: 84 ML/MIN/1.73M2
GLUCOSE SERPL-MCNC: 117 MG/DL (ref 70–99)
GLUCOSE UR STRIP-MCNC: NEGATIVE MG/DL
HCT VFR BLD AUTO: 36.3 % (ref 41–53)
HGB BLD-MCNC: 12.2 G/DL (ref 13.5–17.5)
HGB UR QL STRIP.AUTO: NEGATIVE
KETONES UR STRIP-MCNC: NEGATIVE MG/DL
LEUKOCYTE ESTERASE UR QL STRIP: NEGATIVE
LYMPHOCYTES NFR BLD: 2.06 K/UL (ref 1–4.8)
LYMPHOCYTES RELATIVE PERCENT: 29 % (ref 24–44)
MCH RBC QN AUTO: 30.4 PG (ref 26–34)
MCHC RBC AUTO-ENTMCNC: 33.7 G/DL (ref 31–37)
MCV RBC AUTO: 90.1 FL (ref 80–100)
METHADONE UR QL: NEGATIVE
MONOCYTES NFR BLD: 0.64 K/UL (ref 0.1–1.3)
MONOCYTES NFR BLD: 9 % (ref 1–7)
MORPHOLOGY: NORMAL
NEUTROPHILS NFR BLD: 59 % (ref 36–66)
NEUTS SEG NFR BLD: 4.19 K/UL (ref 1.3–9.1)
NITRITE UR QL STRIP: NEGATIVE
OPIATES UR QL SCN: NEGATIVE
OXYCODONE UR QL SCN: NEGATIVE
PCP UR QL SCN: NEGATIVE
PH UR STRIP: 6.5 [PH] (ref 5–8)
PLATELET # BLD AUTO: 179 K/UL (ref 150–450)
PMV BLD AUTO: 8.2 FL (ref 6–12)
POTASSIUM SERPL-SCNC: 2.8 MMOL/L (ref 3.7–5.3)
PROT SERPL-MCNC: 6.4 G/DL (ref 6.4–8.3)
PROT UR STRIP-MCNC: NEGATIVE MG/DL
RBC # BLD AUTO: 4.03 M/UL (ref 4.5–5.9)
SODIUM SERPL-SCNC: 135 MMOL/L (ref 135–144)
SP GR UR STRIP: 1 (ref 1–1.03)
TEST INFORMATION: NORMAL
UROBILINOGEN UR STRIP-ACNC: NORMAL EU/DL (ref 0–1)
WBC OTHER # BLD: 7.1 K/UL (ref 3.5–11)

## 2024-05-13 PROCEDURE — 80053 COMPREHEN METABOLIC PANEL: CPT

## 2024-05-13 PROCEDURE — 81003 URINALYSIS AUTO W/O SCOPE: CPT

## 2024-05-13 PROCEDURE — 2580000003 HC RX 258: Performed by: NURSE PRACTITIONER

## 2024-05-13 PROCEDURE — 6360000002 HC RX W HCPCS: Performed by: STUDENT IN AN ORGANIZED HEALTH CARE EDUCATION/TRAINING PROGRAM

## 2024-05-13 PROCEDURE — 6370000000 HC RX 637 (ALT 250 FOR IP): Performed by: STUDENT IN AN ORGANIZED HEALTH CARE EDUCATION/TRAINING PROGRAM

## 2024-05-13 PROCEDURE — G0480 DRUG TEST DEF 1-7 CLASSES: HCPCS

## 2024-05-13 PROCEDURE — 36415 COLL VENOUS BLD VENIPUNCTURE: CPT

## 2024-05-13 PROCEDURE — 2580000003 HC RX 258: Performed by: STUDENT IN AN ORGANIZED HEALTH CARE EDUCATION/TRAINING PROGRAM

## 2024-05-13 PROCEDURE — 80307 DRUG TEST PRSMV CHEM ANLYZR: CPT

## 2024-05-13 PROCEDURE — 85025 COMPLETE CBC W/AUTO DIFF WBC: CPT

## 2024-05-13 PROCEDURE — 93005 ELECTROCARDIOGRAM TRACING: CPT | Performed by: STUDENT IN AN ORGANIZED HEALTH CARE EDUCATION/TRAINING PROGRAM

## 2024-05-13 PROCEDURE — 2060000000 HC ICU INTERMEDIATE R&B

## 2024-05-13 PROCEDURE — 99285 EMERGENCY DEPT VISIT HI MDM: CPT

## 2024-05-13 PROCEDURE — 6370000000 HC RX 637 (ALT 250 FOR IP): Performed by: NURSE PRACTITIONER

## 2024-05-13 RX ORDER — LORAZEPAM 1 MG/1
4 TABLET ORAL
Status: DISCONTINUED | OUTPATIENT
Start: 2024-05-13 | End: 2024-05-19

## 2024-05-13 RX ORDER — GAUZE BANDAGE 2" X 2"
100 BANDAGE TOPICAL DAILY
Status: CANCELLED | OUTPATIENT
Start: 2024-05-14

## 2024-05-13 RX ORDER — ENOXAPARIN SODIUM 100 MG/ML
40 INJECTION SUBCUTANEOUS DAILY
Status: CANCELLED | OUTPATIENT
Start: 2024-05-14

## 2024-05-13 RX ORDER — POLYETHYLENE GLYCOL 3350 17 G/17G
17 POWDER, FOR SOLUTION ORAL DAILY PRN
Status: DISCONTINUED | OUTPATIENT
Start: 2024-05-13 | End: 2024-05-22 | Stop reason: HOSPADM

## 2024-05-13 RX ORDER — ONDANSETRON 2 MG/ML
4 INJECTION INTRAMUSCULAR; INTRAVENOUS EVERY 6 HOURS PRN
Status: CANCELLED | OUTPATIENT
Start: 2024-05-13

## 2024-05-13 RX ORDER — MAGNESIUM SULFATE HEPTAHYDRATE 40 MG/ML
2000 INJECTION, SOLUTION INTRAVENOUS PRN
Status: DISCONTINUED | OUTPATIENT
Start: 2024-05-13 | End: 2024-05-22 | Stop reason: HOSPADM

## 2024-05-13 RX ORDER — HYDROXYZINE HYDROCHLORIDE 10 MG/1
10 TABLET, FILM COATED ORAL 3 TIMES DAILY PRN
Status: DISCONTINUED | OUTPATIENT
Start: 2024-05-13 | End: 2024-05-22 | Stop reason: HOSPADM

## 2024-05-13 RX ORDER — LORAZEPAM 2 MG/ML
1 INJECTION INTRAMUSCULAR
Status: DISCONTINUED | OUTPATIENT
Start: 2024-05-13 | End: 2024-05-19

## 2024-05-13 RX ORDER — ACETAMINOPHEN 650 MG/1
650 SUPPOSITORY RECTAL EVERY 6 HOURS PRN
Status: DISCONTINUED | OUTPATIENT
Start: 2024-05-13 | End: 2024-05-22 | Stop reason: HOSPADM

## 2024-05-13 RX ORDER — POTASSIUM CHLORIDE 7.45 MG/ML
10 INJECTION INTRAVENOUS PRN
Status: DISCONTINUED | OUTPATIENT
Start: 2024-05-13 | End: 2024-05-22 | Stop reason: HOSPADM

## 2024-05-13 RX ORDER — GAUZE BANDAGE 2" X 2"
100 BANDAGE TOPICAL DAILY
Status: DISCONTINUED | OUTPATIENT
Start: 2024-05-13 | End: 2024-05-22

## 2024-05-13 RX ORDER — LORAZEPAM 2 MG/ML
3 INJECTION INTRAMUSCULAR
Status: DISCONTINUED | OUTPATIENT
Start: 2024-05-13 | End: 2024-05-19

## 2024-05-13 RX ORDER — LORAZEPAM 1 MG/1
3 TABLET ORAL
Status: DISCONTINUED | OUTPATIENT
Start: 2024-05-13 | End: 2024-05-19

## 2024-05-13 RX ORDER — LORAZEPAM 1 MG/1
2 TABLET ORAL
Status: DISCONTINUED | OUTPATIENT
Start: 2024-05-13 | End: 2024-05-19

## 2024-05-13 RX ORDER — ONDANSETRON 4 MG/1
4 TABLET, ORALLY DISINTEGRATING ORAL EVERY 8 HOURS PRN
Status: CANCELLED | OUTPATIENT
Start: 2024-05-13

## 2024-05-13 RX ORDER — LORAZEPAM 2 MG/ML
4 INJECTION INTRAMUSCULAR
Status: DISCONTINUED | OUTPATIENT
Start: 2024-05-13 | End: 2024-05-19

## 2024-05-13 RX ORDER — LORAZEPAM 1 MG/1
1 TABLET ORAL
Status: DISCONTINUED | OUTPATIENT
Start: 2024-05-13 | End: 2024-05-19

## 2024-05-13 RX ORDER — SODIUM CHLORIDE 0.9 % (FLUSH) 0.9 %
5-40 SYRINGE (ML) INJECTION PRN
Status: DISCONTINUED | OUTPATIENT
Start: 2024-05-13 | End: 2024-05-16 | Stop reason: SDUPTHER

## 2024-05-13 RX ORDER — METOPROLOL TARTRATE 50 MG/1
50 TABLET, FILM COATED ORAL 2 TIMES DAILY
Status: DISCONTINUED | OUTPATIENT
Start: 2024-05-13 | End: 2024-05-22 | Stop reason: HOSPADM

## 2024-05-13 RX ORDER — NICOTINE 21 MG/24HR
1 PATCH, TRANSDERMAL 24 HOURS TRANSDERMAL DAILY
Status: DISCONTINUED | OUTPATIENT
Start: 2024-05-13 | End: 2024-05-22 | Stop reason: HOSPADM

## 2024-05-13 RX ORDER — SODIUM CHLORIDE 0.9 % (FLUSH) 0.9 %
5-40 SYRINGE (ML) INJECTION PRN
Status: DISCONTINUED | OUTPATIENT
Start: 2024-05-13 | End: 2024-05-22 | Stop reason: HOSPADM

## 2024-05-13 RX ORDER — NICOTINE 21 MG/24HR
1 PATCH, TRANSDERMAL 24 HOURS TRANSDERMAL DAILY
Status: DISCONTINUED | OUTPATIENT
Start: 2024-05-14 | End: 2024-05-13

## 2024-05-13 RX ORDER — SODIUM CHLORIDE 9 MG/ML
INJECTION, SOLUTION INTRAVENOUS PRN
Status: DISCONTINUED | OUTPATIENT
Start: 2024-05-13 | End: 2024-05-22 | Stop reason: HOSPADM

## 2024-05-13 RX ORDER — POTASSIUM CHLORIDE 7.45 MG/ML
10 INJECTION INTRAVENOUS
Status: COMPLETED | OUTPATIENT
Start: 2024-05-13 | End: 2024-05-14

## 2024-05-13 RX ORDER — SODIUM CHLORIDE 9 MG/ML
INJECTION, SOLUTION INTRAVENOUS PRN
Status: DISCONTINUED | OUTPATIENT
Start: 2024-05-13 | End: 2024-05-16 | Stop reason: SDUPTHER

## 2024-05-13 RX ORDER — NICOTINE 21 MG/24HR
1 PATCH, TRANSDERMAL 24 HOURS TRANSDERMAL DAILY
Status: CANCELLED | OUTPATIENT
Start: 2024-05-13

## 2024-05-13 RX ORDER — SODIUM CHLORIDE 0.9 % (FLUSH) 0.9 %
5-40 SYRINGE (ML) INJECTION EVERY 12 HOURS SCHEDULED
Status: DISCONTINUED | OUTPATIENT
Start: 2024-05-13 | End: 2024-05-16 | Stop reason: SDUPTHER

## 2024-05-13 RX ORDER — POTASSIUM CHLORIDE 20 MEQ/1
40 TABLET, EXTENDED RELEASE ORAL PRN
Status: DISCONTINUED | OUTPATIENT
Start: 2024-05-13 | End: 2024-05-22 | Stop reason: HOSPADM

## 2024-05-13 RX ORDER — FOLIC ACID 1 MG/1
1 TABLET ORAL DAILY
Status: DISCONTINUED | OUTPATIENT
Start: 2024-05-14 | End: 2024-05-22 | Stop reason: HOSPADM

## 2024-05-13 RX ORDER — GABAPENTIN 100 MG/1
100 CAPSULE ORAL 2 TIMES DAILY
Status: DISCONTINUED | OUTPATIENT
Start: 2024-05-13 | End: 2024-05-22 | Stop reason: HOSPADM

## 2024-05-13 RX ORDER — SODIUM CHLORIDE 0.9 % (FLUSH) 0.9 %
5-40 SYRINGE (ML) INJECTION EVERY 12 HOURS SCHEDULED
Status: DISCONTINUED | OUTPATIENT
Start: 2024-05-13 | End: 2024-05-22 | Stop reason: HOSPADM

## 2024-05-13 RX ORDER — ACETAMINOPHEN 325 MG/1
650 TABLET ORAL EVERY 6 HOURS PRN
Status: DISCONTINUED | OUTPATIENT
Start: 2024-05-13 | End: 2024-05-22 | Stop reason: HOSPADM

## 2024-05-13 RX ORDER — LORAZEPAM 2 MG/ML
2 INJECTION INTRAMUSCULAR
Status: DISCONTINUED | OUTPATIENT
Start: 2024-05-13 | End: 2024-05-19

## 2024-05-13 RX ORDER — M-VIT,TX,IRON,MINS/CALC/FOLIC 27MG-0.4MG
1 TABLET ORAL DAILY
Status: DISCONTINUED | OUTPATIENT
Start: 2024-05-14 | End: 2024-05-22 | Stop reason: HOSPADM

## 2024-05-13 RX ORDER — SPIRONOLACTONE 25 MG/1
25 TABLET ORAL DAILY
Status: DISCONTINUED | OUTPATIENT
Start: 2024-05-14 | End: 2024-05-22 | Stop reason: HOSPADM

## 2024-05-13 RX ADMIN — POTASSIUM BICARBONATE 40 MEQ: 782 TABLET, EFFERVESCENT ORAL at 22:01

## 2024-05-13 RX ADMIN — SODIUM CHLORIDE, PRESERVATIVE FREE 10 ML: 5 INJECTION INTRAVENOUS at 22:26

## 2024-05-13 RX ADMIN — LORAZEPAM 2 MG: 1 TABLET ORAL at 20:52

## 2024-05-13 RX ADMIN — POTASSIUM CHLORIDE 10 MEQ: 7.46 INJECTION, SOLUTION INTRAVENOUS at 23:25

## 2024-05-13 RX ADMIN — THIAMINE HCL TAB 100 MG 100 MG: 100 TAB at 20:53

## 2024-05-13 RX ADMIN — GABAPENTIN 100 MG: 100 CAPSULE ORAL at 23:19

## 2024-05-13 RX ADMIN — POTASSIUM CHLORIDE 10 MEQ: 7.46 INJECTION, SOLUTION INTRAVENOUS at 22:06

## 2024-05-13 RX ADMIN — SODIUM CHLORIDE, PRESERVATIVE FREE 10 ML: 5 INJECTION INTRAVENOUS at 23:19

## 2024-05-13 RX ADMIN — LORAZEPAM 2 MG: 2 INJECTION INTRAMUSCULAR; INTRAVENOUS at 22:24

## 2024-05-13 ASSESSMENT — PAIN DESCRIPTION - LOCATION: LOCATION: BACK;HIP;KNEE

## 2024-05-13 ASSESSMENT — PAIN SCALES - GENERAL: PAINLEVEL_OUTOF10: 10

## 2024-05-13 NOTE — ED NOTES
Mahesh Brownlee is a 64 year old male who presents to the ED via pt's son. Pt is suicidal. Pt states pt has been trying to \"drink self to death.\" Pt denies HI/AH/VH. Pt drinks alcohol on a daily basis. Pt drinks a 12 pack of beer and liquor on a daily basis. Pt smokes marijuana. Pt is currently not linked. Pt was last admitted to the [facility] 9/25/23.Pt reports poor sleep and a poor appetite.       Level of Care Disposition:. Pt to be medically admitted, per ED

## 2024-05-14 PROBLEM — R45.851 SUICIDAL IDEATION: Status: ACTIVE | Noted: 2024-05-14

## 2024-05-14 LAB
ANION GAP SERPL CALCULATED.3IONS-SCNC: 8 MMOL/L (ref 9–17)
BASOPHILS # BLD: 0 K/UL (ref 0–0.2)
BASOPHILS NFR BLD: 1 % (ref 0–2)
BUN SERPL-MCNC: 9 MG/DL (ref 8–23)
CALCIUM SERPL-MCNC: 8.7 MG/DL (ref 8.6–10.4)
CHLORIDE SERPL-SCNC: 105 MMOL/L (ref 98–107)
CO2 SERPL-SCNC: 26 MMOL/L (ref 20–31)
CREAT SERPL-MCNC: 0.9 MG/DL (ref 0.7–1.2)
EKG ATRIAL RATE: 103 BPM
EKG P AXIS: 70 DEGREES
EKG P-R INTERVAL: 192 MS
EKG Q-T INTERVAL: 378 MS
EKG QRS DURATION: 108 MS
EKG QTC CALCULATION (BAZETT): 495 MS
EKG R AXIS: -8 DEGREES
EKG T AXIS: 67 DEGREES
EKG VENTRICULAR RATE: 103 BPM
EOSINOPHIL # BLD: 0.2 K/UL (ref 0–0.4)
EOSINOPHILS RELATIVE PERCENT: 3 % (ref 0–4)
ERYTHROCYTE [DISTWIDTH] IN BLOOD BY AUTOMATED COUNT: 14.6 % (ref 11.5–14.9)
GFR, ESTIMATED: >90 ML/MIN/1.73M2
GLUCOSE BLD-MCNC: 113 MG/DL (ref 75–110)
GLUCOSE BLD-MCNC: 115 MG/DL (ref 75–110)
GLUCOSE BLD-MCNC: 129 MG/DL (ref 75–110)
GLUCOSE BLD-MCNC: 97 MG/DL (ref 75–110)
GLUCOSE SERPL-MCNC: 111 MG/DL (ref 70–99)
HCT VFR BLD AUTO: 34.1 % (ref 41–53)
HGB BLD-MCNC: 11.4 G/DL (ref 13.5–17.5)
LYMPHOCYTES NFR BLD: 2 K/UL (ref 1–4.8)
LYMPHOCYTES RELATIVE PERCENT: 38 % (ref 24–44)
MAGNESIUM SERPL-MCNC: 2.1 MG/DL (ref 1.6–2.6)
MCH RBC QN AUTO: 30.6 PG (ref 26–34)
MCHC RBC AUTO-ENTMCNC: 33.5 G/DL (ref 31–37)
MCV RBC AUTO: 91.4 FL (ref 80–100)
MONOCYTES NFR BLD: 0.9 K/UL (ref 0.1–1.3)
MONOCYTES NFR BLD: 16 % (ref 1–7)
NEUTROPHILS NFR BLD: 42 % (ref 36–66)
NEUTS SEG NFR BLD: 2.2 K/UL (ref 1.3–9.1)
PHOSPHATE SERPL-MCNC: 2.5 MG/DL (ref 2.5–4.5)
PLATELET # BLD AUTO: 151 K/UL (ref 150–450)
PMV BLD AUTO: 8 FL (ref 6–12)
POTASSIUM SERPL-SCNC: 3.1 MMOL/L (ref 3.7–5.3)
RBC # BLD AUTO: 3.73 M/UL (ref 4.5–5.9)
SODIUM SERPL-SCNC: 139 MMOL/L (ref 135–144)
TSH SERPL DL<=0.05 MIU/L-ACNC: 3.38 UIU/ML (ref 0.3–5)
WBC OTHER # BLD: 5.3 K/UL (ref 3.5–11)

## 2024-05-14 PROCEDURE — 6360000002 HC RX W HCPCS: Performed by: STUDENT IN AN ORGANIZED HEALTH CARE EDUCATION/TRAINING PROGRAM

## 2024-05-14 PROCEDURE — 93010 ELECTROCARDIOGRAM REPORT: CPT | Performed by: INTERNAL MEDICINE

## 2024-05-14 PROCEDURE — 6370000000 HC RX 637 (ALT 250 FOR IP): Performed by: STUDENT IN AN ORGANIZED HEALTH CARE EDUCATION/TRAINING PROGRAM

## 2024-05-14 PROCEDURE — 6370000000 HC RX 637 (ALT 250 FOR IP): Performed by: INTERNAL MEDICINE

## 2024-05-14 PROCEDURE — 85025 COMPLETE CBC W/AUTO DIFF WBC: CPT

## 2024-05-14 PROCEDURE — 83735 ASSAY OF MAGNESIUM: CPT

## 2024-05-14 PROCEDURE — 6370000000 HC RX 637 (ALT 250 FOR IP): Performed by: NURSE PRACTITIONER

## 2024-05-14 PROCEDURE — 99221 1ST HOSP IP/OBS SF/LOW 40: CPT | Performed by: PSYCHIATRY & NEUROLOGY

## 2024-05-14 PROCEDURE — 82947 ASSAY GLUCOSE BLOOD QUANT: CPT

## 2024-05-14 PROCEDURE — 99223 1ST HOSP IP/OBS HIGH 75: CPT | Performed by: INTERNAL MEDICINE

## 2024-05-14 PROCEDURE — 2060000000 HC ICU INTERMEDIATE R&B

## 2024-05-14 PROCEDURE — 80048 BASIC METABOLIC PNL TOTAL CA: CPT

## 2024-05-14 PROCEDURE — 84100 ASSAY OF PHOSPHORUS: CPT

## 2024-05-14 PROCEDURE — 2580000003 HC RX 258: Performed by: NURSE PRACTITIONER

## 2024-05-14 PROCEDURE — 99222 1ST HOSP IP/OBS MODERATE 55: CPT | Performed by: PSYCHIATRY & NEUROLOGY

## 2024-05-14 PROCEDURE — 84443 ASSAY THYROID STIM HORMONE: CPT

## 2024-05-14 PROCEDURE — 36415 COLL VENOUS BLD VENIPUNCTURE: CPT

## 2024-05-14 RX ORDER — POTASSIUM CHLORIDE 20 MEQ/1
40 TABLET, EXTENDED RELEASE ORAL ONCE
Status: COMPLETED | OUTPATIENT
Start: 2024-05-14 | End: 2024-05-14

## 2024-05-14 RX ADMIN — POTASSIUM CHLORIDE 40 MEQ: 1500 TABLET, EXTENDED RELEASE ORAL at 20:26

## 2024-05-14 RX ADMIN — LORAZEPAM 1 MG: 1 TABLET ORAL at 20:25

## 2024-05-14 RX ADMIN — LORAZEPAM 2 MG: 1 TABLET ORAL at 23:27

## 2024-05-14 RX ADMIN — POTASSIUM CHLORIDE 10 MEQ: 7.46 INJECTION, SOLUTION INTRAVENOUS at 01:31

## 2024-05-14 RX ADMIN — SPIRONOLACTONE 25 MG: 25 TABLET ORAL at 08:26

## 2024-05-14 RX ADMIN — SODIUM CHLORIDE, PRESERVATIVE FREE 10 ML: 5 INJECTION INTRAVENOUS at 20:25

## 2024-05-14 RX ADMIN — METOPROLOL TARTRATE 50 MG: 50 TABLET, FILM COATED ORAL at 20:25

## 2024-05-14 RX ADMIN — GABAPENTIN 100 MG: 100 CAPSULE ORAL at 08:25

## 2024-05-14 RX ADMIN — GABAPENTIN 100 MG: 100 CAPSULE ORAL at 20:25

## 2024-05-14 RX ADMIN — POTASSIUM BICARBONATE 40 MEQ: 782 TABLET, EFFERVESCENT ORAL at 08:37

## 2024-05-14 RX ADMIN — RIVAROXABAN 20 MG: 20 TABLET, FILM COATED ORAL at 08:26

## 2024-05-14 RX ADMIN — Medication 1 TABLET: at 08:26

## 2024-05-14 RX ADMIN — THIAMINE HCL TAB 100 MG 100 MG: 100 TAB at 08:25

## 2024-05-14 RX ADMIN — SODIUM CHLORIDE, PRESERVATIVE FREE 10 ML: 5 INJECTION INTRAVENOUS at 08:26

## 2024-05-14 RX ADMIN — POTASSIUM CHLORIDE 10 MEQ: 7.46 INJECTION, SOLUTION INTRAVENOUS at 00:30

## 2024-05-14 RX ADMIN — FOLIC ACID 1 MG: 1 TABLET ORAL at 08:26

## 2024-05-14 NOTE — H&P
daily  Paroxysmal atrial fibrillation, on Xarelto.  Will continue  Combined heart failure, EF of 35-40% with grade 1 diastolic dysfunction noted on echocardiogram 2020.  Will repeat echocardiogram, currently patient appears euvolemic.  Given pressures are low we will hold off on Entresto.  Has an ICD, will interrogate    Consultations:   IP CONSULT TO SOCIAL WORK  IP CONSULT TO SOCIAL WORK  IP CONSULT TO PSYCHIATRY  IP CONSULT TO NEUROLOGY    Patient is admitted as inpatient status because of co-morbidities listed above, severity of signs and symptoms as outlined, requirement for current medical therapies and most importantly because of direct risk to patient if care not provided in a hospital setting.    Fior Mccord MD  5/14/2024  7:59 PM    Copy sent to Ira Benavides, APRN - CNP    Please note that this chart was generated using voice recognition Dragon dictation software.  Although every effort was made to ensure the accuracy of this automated transcription, some errors in transcription may have occurred.

## 2024-05-14 NOTE — ED NOTES
Report given to LY Mcneal from U.   Report method by phone   The following was reviewed with receiving RN:   Current vital signs:  BP (!) 144/79   Pulse 96   Temp 98 °F (36.7 °C) (Oral)   Resp 16   SpO2 99%                MEWS Score: 1     Any medication or safety alerts were reviewed. Any pending diagnostics and notifications were also reviewed, as well as any safety concerns or issues, abnormal labs, abnormal imaging, and abnormal assessment findings. Questions were answered.

## 2024-05-14 NOTE — ED NOTES
Mode of arrival (squad #, walk in, police, etc) : walk in, escorted by family member        Chief complaint(s): suicidal        Arrival Note (brief scenario, treatment PTA, etc).: pt says he is suicidal and wants to drink himself to death. Has been drinking daily for past few decades, says he experiences alcohol withdrawal and has not drank since Saturday.         C= \"Have you ever felt that you should Cut down on your drinking?\"  Yes  A= \"Have people Annoyed you by criticizing your drinking?\"  Yes  G= \"Have you ever felt bad or Guilty about your drinking?\"  Yes  E= \"Have you ever had a drink as an Eye-opener first thing in the morning to steady your nerves or to help a hangover?\"  Yes      Deferred []      Reason for deferring: N/A    *If yes to two or more: probable alcohol abuse.*
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
## 2024-05-15 ENCOUNTER — APPOINTMENT (OUTPATIENT)
Age: 64
DRG: 896 | End: 2024-05-15
Attending: INTERNAL MEDICINE
Payer: MEDICARE

## 2024-05-15 LAB
ANION GAP SERPL CALCULATED.3IONS-SCNC: 8 MMOL/L (ref 9–17)
BASOPHILS # BLD: 0.06 K/UL (ref 0–0.2)
BASOPHILS NFR BLD: 1 % (ref 0–2)
BUN SERPL-MCNC: 9 MG/DL (ref 8–23)
CALCIUM SERPL-MCNC: 8.7 MG/DL (ref 8.6–10.4)
CHLORIDE SERPL-SCNC: 104 MMOL/L (ref 98–107)
CO2 SERPL-SCNC: 24 MMOL/L (ref 20–31)
CREAT SERPL-MCNC: 0.8 MG/DL (ref 0.7–1.2)
EOSINOPHIL # BLD: 0.19 K/UL (ref 0–0.4)
EOSINOPHILS RELATIVE PERCENT: 3 % (ref 0–4)
ERYTHROCYTE [DISTWIDTH] IN BLOOD BY AUTOMATED COUNT: 15.2 % (ref 11.5–14.9)
GFR, ESTIMATED: >90 ML/MIN/1.73M2
GLUCOSE SERPL-MCNC: 97 MG/DL (ref 70–99)
HCT VFR BLD AUTO: 35.5 % (ref 41–53)
HGB BLD-MCNC: 11.5 G/DL (ref 13.5–17.5)
LYMPHOCYTES NFR BLD: 2.33 K/UL (ref 1–4.8)
LYMPHOCYTES RELATIVE PERCENT: 37 % (ref 24–44)
MAGNESIUM SERPL-MCNC: 2.1 MG/DL (ref 1.6–2.6)
MCH RBC QN AUTO: 30.8 PG (ref 26–34)
MCHC RBC AUTO-ENTMCNC: 32.4 G/DL (ref 31–37)
MCV RBC AUTO: 95 FL (ref 80–100)
MONOCYTES NFR BLD: 1.07 K/UL (ref 0.1–1.3)
MONOCYTES NFR BLD: 17 % (ref 1–7)
MORPHOLOGY: NORMAL
NEUTROPHILS NFR BLD: 42 % (ref 36–66)
NEUTS SEG NFR BLD: 2.65 K/UL (ref 1.3–9.1)
PLATELET # BLD AUTO: 169 K/UL (ref 150–450)
PMV BLD AUTO: 7.7 FL (ref 6–12)
POTASSIUM SERPL-SCNC: 3.8 MMOL/L (ref 3.7–5.3)
RBC # BLD AUTO: 3.74 M/UL (ref 4.5–5.9)
SODIUM SERPL-SCNC: 136 MMOL/L (ref 135–144)
WBC OTHER # BLD: 6.3 K/UL (ref 3.5–11)

## 2024-05-15 PROCEDURE — 2060000000 HC ICU INTERMEDIATE R&B

## 2024-05-15 PROCEDURE — 80048 BASIC METABOLIC PNL TOTAL CA: CPT

## 2024-05-15 PROCEDURE — 6360000002 HC RX W HCPCS: Performed by: STUDENT IN AN ORGANIZED HEALTH CARE EDUCATION/TRAINING PROGRAM

## 2024-05-15 PROCEDURE — 99231 SBSQ HOSP IP/OBS SF/LOW 25: CPT | Performed by: INTERNAL MEDICINE

## 2024-05-15 PROCEDURE — 6370000000 HC RX 637 (ALT 250 FOR IP): Performed by: NURSE PRACTITIONER

## 2024-05-15 PROCEDURE — 83735 ASSAY OF MAGNESIUM: CPT

## 2024-05-15 PROCEDURE — 6370000000 HC RX 637 (ALT 250 FOR IP): Performed by: STUDENT IN AN ORGANIZED HEALTH CARE EDUCATION/TRAINING PROGRAM

## 2024-05-15 PROCEDURE — 85025 COMPLETE CBC W/AUTO DIFF WBC: CPT

## 2024-05-15 PROCEDURE — 36415 COLL VENOUS BLD VENIPUNCTURE: CPT

## 2024-05-15 PROCEDURE — 93306 TTE W/DOPPLER COMPLETE: CPT

## 2024-05-15 PROCEDURE — 2580000003 HC RX 258: Performed by: NURSE PRACTITIONER

## 2024-05-15 RX ORDER — LORAZEPAM 0.5 MG/1
0.5 TABLET ORAL EVERY 12 HOURS PRN
Status: DISCONTINUED | OUTPATIENT
Start: 2024-05-15 | End: 2024-05-15

## 2024-05-15 RX ADMIN — SPIRONOLACTONE 25 MG: 25 TABLET ORAL at 09:24

## 2024-05-15 RX ADMIN — THIAMINE HCL TAB 100 MG 100 MG: 100 TAB at 09:24

## 2024-05-15 RX ADMIN — SODIUM CHLORIDE, PRESERVATIVE FREE 10 ML: 5 INJECTION INTRAVENOUS at 09:26

## 2024-05-15 RX ADMIN — Medication 1 TABLET: at 09:24

## 2024-05-15 RX ADMIN — HYDROXYZINE HYDROCHLORIDE 10 MG: 10 TABLET ORAL at 16:06

## 2024-05-15 RX ADMIN — METOPROLOL TARTRATE 50 MG: 50 TABLET, FILM COATED ORAL at 20:30

## 2024-05-15 RX ADMIN — LORAZEPAM 2 MG: 2 INJECTION INTRAMUSCULAR; INTRAVENOUS at 09:30

## 2024-05-15 RX ADMIN — GABAPENTIN 100 MG: 100 CAPSULE ORAL at 20:30

## 2024-05-15 RX ADMIN — SODIUM CHLORIDE, PRESERVATIVE FREE 10 ML: 5 INJECTION INTRAVENOUS at 20:31

## 2024-05-15 RX ADMIN — FOLIC ACID 1 MG: 1 TABLET ORAL at 10:20

## 2024-05-15 RX ADMIN — METOPROLOL TARTRATE 50 MG: 50 TABLET, FILM COATED ORAL at 09:24

## 2024-05-15 RX ADMIN — GABAPENTIN 100 MG: 100 CAPSULE ORAL at 09:24

## 2024-05-15 RX ADMIN — RIVAROXABAN 20 MG: 20 TABLET, FILM COATED ORAL at 09:24

## 2024-05-15 ASSESSMENT — PAIN DESCRIPTION - LOCATION: LOCATION: GENERALIZED

## 2024-05-15 ASSESSMENT — PAIN SCALES - GENERAL: PAINLEVEL_OUTOF10: 10

## 2024-05-16 PROCEDURE — 6370000000 HC RX 637 (ALT 250 FOR IP): Performed by: STUDENT IN AN ORGANIZED HEALTH CARE EDUCATION/TRAINING PROGRAM

## 2024-05-16 PROCEDURE — 99232 SBSQ HOSP IP/OBS MODERATE 35: CPT | Performed by: PSYCHIATRY & NEUROLOGY

## 2024-05-16 PROCEDURE — 99232 SBSQ HOSP IP/OBS MODERATE 35: CPT | Performed by: INTERNAL MEDICINE

## 2024-05-16 PROCEDURE — 2060000000 HC ICU INTERMEDIATE R&B

## 2024-05-16 PROCEDURE — 2580000003 HC RX 258: Performed by: NURSE PRACTITIONER

## 2024-05-16 PROCEDURE — 6370000000 HC RX 637 (ALT 250 FOR IP): Performed by: NURSE PRACTITIONER

## 2024-05-16 RX ADMIN — SODIUM CHLORIDE, PRESERVATIVE FREE 10 ML: 5 INJECTION INTRAVENOUS at 21:38

## 2024-05-16 RX ADMIN — HYDROXYZINE HYDROCHLORIDE 10 MG: 10 TABLET ORAL at 00:25

## 2024-05-16 RX ADMIN — RIVAROXABAN 20 MG: 20 TABLET, FILM COATED ORAL at 09:34

## 2024-05-16 RX ADMIN — SACUBITRIL AND VALSARTAN 1 TABLET: 24; 26 TABLET, FILM COATED ORAL at 09:34

## 2024-05-16 RX ADMIN — HYDROXYZINE HYDROCHLORIDE 10 MG: 10 TABLET ORAL at 09:34

## 2024-05-16 RX ADMIN — GABAPENTIN 100 MG: 100 CAPSULE ORAL at 09:35

## 2024-05-16 RX ADMIN — METOPROLOL TARTRATE 50 MG: 50 TABLET, FILM COATED ORAL at 09:34

## 2024-05-16 RX ADMIN — FOLIC ACID 1 MG: 1 TABLET ORAL at 09:34

## 2024-05-16 RX ADMIN — Medication 1 TABLET: at 09:34

## 2024-05-16 RX ADMIN — SPIRONOLACTONE 25 MG: 25 TABLET ORAL at 09:35

## 2024-05-16 RX ADMIN — POLYETHYLENE GLYCOL 3350 17 G: 17 POWDER, FOR SOLUTION ORAL at 09:35

## 2024-05-16 RX ADMIN — GABAPENTIN 100 MG: 100 CAPSULE ORAL at 21:38

## 2024-05-16 RX ADMIN — SODIUM CHLORIDE, PRESERVATIVE FREE 10 ML: 5 INJECTION INTRAVENOUS at 09:37

## 2024-05-16 RX ADMIN — HYDROXYZINE HYDROCHLORIDE 10 MG: 10 TABLET ORAL at 19:04

## 2024-05-16 RX ADMIN — THIAMINE HCL TAB 100 MG 100 MG: 100 TAB at 09:35

## 2024-05-16 RX ADMIN — METOPROLOL TARTRATE 50 MG: 50 TABLET, FILM COATED ORAL at 21:38

## 2024-05-16 RX ADMIN — ACETAMINOPHEN 650 MG: 325 TABLET ORAL at 19:03

## 2024-05-16 RX ADMIN — SACUBITRIL AND VALSARTAN 1 TABLET: 24; 26 TABLET, FILM COATED ORAL at 21:37

## 2024-05-16 ASSESSMENT — PAIN DESCRIPTION - LOCATION: LOCATION: GENERALIZED

## 2024-05-16 ASSESSMENT — PAIN DESCRIPTION - DESCRIPTORS: DESCRIPTORS: ACHING

## 2024-05-16 ASSESSMENT — PAIN SCALES - GENERAL: PAINLEVEL_OUTOF10: 10

## 2024-05-17 LAB
ECHO AO ROOT DIAM: 3.2 CM
ECHO AO ROOT INDEX: 1.49 CM/M2
ECHO AR MAX VEL PISA: 4.1 M/S
ECHO AV AREA PEAK VELOCITY: 1.1 CM2
ECHO AV AREA VTI: 1.1 CM2
ECHO AV AREA/BSA PEAK VELOCITY: 0.5 CM2/M2
ECHO AV AREA/BSA VTI: 0.5 CM2/M2
ECHO AV MEAN GRADIENT: 16 MMHG
ECHO AV MEAN VELOCITY: 1.9 M/S
ECHO AV PEAK GRADIENT: 27 MMHG
ECHO AV PEAK VELOCITY: 2.6 M/S
ECHO AV REGURGITANT PHT: 392 MS
ECHO AV VELOCITY RATIO: 0.35
ECHO AV VTI: 55.7 CM
ECHO BSA: 2.16 M2
ECHO EST RA PRESSURE: 3 MMHG
ECHO LA AREA 2C: 20.5 CM2
ECHO LA AREA 4C: 18.6 CM2
ECHO LA DIAMETER INDEX: 1.86 CM/M2
ECHO LA DIAMETER: 4 CM
ECHO LA MAJOR AXIS: 5.5 CM
ECHO LA MINOR AXIS: 5.5 CM
ECHO LA TO AORTIC ROOT RATIO: 1.25
ECHO LA VOL BP: 55 ML (ref 18–58)
ECHO LA VOL MOD A2C: 60 ML (ref 18–58)
ECHO LA VOL MOD A4C: 51 ML (ref 18–58)
ECHO LA VOL/BSA BIPLANE: 26 ML/M2 (ref 16–34)
ECHO LA VOLUME INDEX MOD A2C: 28 ML/M2 (ref 16–34)
ECHO LA VOLUME INDEX MOD A4C: 24 ML/M2 (ref 16–34)
ECHO LV FRACTIONAL SHORTENING: 28 % (ref 28–44)
ECHO LV INTERNAL DIMENSION DIASTOLE INDEX: 2.47 CM/M2
ECHO LV INTERNAL DIMENSION DIASTOLIC: 5.3 CM (ref 4.2–5.9)
ECHO LV INTERNAL DIMENSION SYSTOLIC INDEX: 1.77 CM/M2
ECHO LV INTERNAL DIMENSION SYSTOLIC: 3.8 CM
ECHO LV IVSD: 1.2 CM (ref 0.6–1)
ECHO LV MASS 2D: 256.6 G (ref 88–224)
ECHO LV MASS INDEX 2D: 119.3 G/M2 (ref 49–115)
ECHO LV POSTERIOR WALL DIASTOLIC: 1.2 CM (ref 0.6–1)
ECHO LV RELATIVE WALL THICKNESS RATIO: 0.45
ECHO LVOT AREA: 3.1 CM2
ECHO LVOT AV VTI INDEX: 0.35
ECHO LVOT DIAM: 2 CM
ECHO LVOT MEAN GRADIENT: 2 MMHG
ECHO LVOT PEAK GRADIENT: 3 MMHG
ECHO LVOT PEAK VELOCITY: 0.9 M/S
ECHO LVOT STROKE VOLUME INDEX: 28.8 ML/M2
ECHO LVOT SV: 61.9 ML
ECHO LVOT VTI: 19.7 CM
ECHO MV A VELOCITY: 0.71 M/S
ECHO MV AREA VTI: 2 CM2
ECHO MV E DECELERATION TIME (DT): 243 MS
ECHO MV E VELOCITY: 0.95 M/S
ECHO MV E/A RATIO: 1.34
ECHO MV LVOT VTI INDEX: 1.57
ECHO MV MAX VELOCITY: 1 M/S
ECHO MV MEAN GRADIENT: 1 MMHG
ECHO MV MEAN VELOCITY: 0.5 M/S
ECHO MV PEAK GRADIENT: 4 MMHG
ECHO MV VTI: 31 CM
ECHO RA AREA 4C: 18.5 CM2
ECHO RA END SYSTOLIC VOLUME APICAL 4 CHAMBER INDEX BSA: 23 ML/M2
ECHO RA VOLUME: 49 ML
ECHO RIGHT VENTRICULAR SYSTOLIC PRESSURE (RVSP): 35 MMHG
ECHO RV TAPSE: 2.3 CM (ref 1.7–?)
ECHO TV REGURGITANT MAX VELOCITY: 2.81 M/S
ECHO TV REGURGITANT PEAK GRADIENT: 18 MMHG

## 2024-05-17 PROCEDURE — 97166 OT EVAL MOD COMPLEX 45 MIN: CPT

## 2024-05-17 PROCEDURE — 6370000000 HC RX 637 (ALT 250 FOR IP): Performed by: NURSE PRACTITIONER

## 2024-05-17 PROCEDURE — 2580000003 HC RX 258: Performed by: NURSE PRACTITIONER

## 2024-05-17 PROCEDURE — 2060000000 HC ICU INTERMEDIATE R&B

## 2024-05-17 PROCEDURE — 97530 THERAPEUTIC ACTIVITIES: CPT

## 2024-05-17 PROCEDURE — 6370000000 HC RX 637 (ALT 250 FOR IP): Performed by: STUDENT IN AN ORGANIZED HEALTH CARE EDUCATION/TRAINING PROGRAM

## 2024-05-17 PROCEDURE — 97116 GAIT TRAINING THERAPY: CPT

## 2024-05-17 PROCEDURE — 99232 SBSQ HOSP IP/OBS MODERATE 35: CPT | Performed by: INTERNAL MEDICINE

## 2024-05-17 PROCEDURE — 97162 PT EVAL MOD COMPLEX 30 MIN: CPT

## 2024-05-17 PROCEDURE — 99232 SBSQ HOSP IP/OBS MODERATE 35: CPT | Performed by: PSYCHIATRY & NEUROLOGY

## 2024-05-17 RX ADMIN — Medication 1 TABLET: at 08:30

## 2024-05-17 RX ADMIN — THIAMINE HCL TAB 100 MG 100 MG: 100 TAB at 08:30

## 2024-05-17 RX ADMIN — METOPROLOL TARTRATE 50 MG: 50 TABLET, FILM COATED ORAL at 20:42

## 2024-05-17 RX ADMIN — RIVAROXABAN 20 MG: 20 TABLET, FILM COATED ORAL at 08:30

## 2024-05-17 RX ADMIN — SODIUM CHLORIDE, PRESERVATIVE FREE 10 ML: 5 INJECTION INTRAVENOUS at 20:42

## 2024-05-17 RX ADMIN — HYDROXYZINE HYDROCHLORIDE 10 MG: 10 TABLET ORAL at 04:46

## 2024-05-17 RX ADMIN — GABAPENTIN 100 MG: 100 CAPSULE ORAL at 08:30

## 2024-05-17 RX ADMIN — LORAZEPAM 2 MG: 1 TABLET ORAL at 20:42

## 2024-05-17 RX ADMIN — HYDROXYZINE HYDROCHLORIDE 10 MG: 10 TABLET ORAL at 20:42

## 2024-05-17 RX ADMIN — LORAZEPAM 2 MG: 1 TABLET ORAL at 16:20

## 2024-05-17 RX ADMIN — LORAZEPAM 3 MG: 1 TABLET ORAL at 23:31

## 2024-05-17 RX ADMIN — GABAPENTIN 100 MG: 100 CAPSULE ORAL at 20:42

## 2024-05-17 RX ADMIN — FOLIC ACID 1 MG: 1 TABLET ORAL at 08:30

## 2024-05-17 RX ADMIN — ACETAMINOPHEN 650 MG: 325 TABLET ORAL at 04:46

## 2024-05-17 RX ADMIN — SODIUM CHLORIDE, PRESERVATIVE FREE 10 ML: 5 INJECTION INTRAVENOUS at 08:30

## 2024-05-17 RX ADMIN — SPIRONOLACTONE 25 MG: 25 TABLET ORAL at 08:30

## 2024-05-17 RX ADMIN — METOPROLOL TARTRATE 50 MG: 50 TABLET, FILM COATED ORAL at 08:30

## 2024-05-17 RX ADMIN — SACUBITRIL AND VALSARTAN 1 TABLET: 24; 26 TABLET, FILM COATED ORAL at 20:42

## 2024-05-17 RX ADMIN — LORAZEPAM 2 MG: 1 TABLET ORAL at 11:27

## 2024-05-17 RX ADMIN — ACETAMINOPHEN 650 MG: 325 TABLET ORAL at 11:27

## 2024-05-17 RX ADMIN — LORAZEPAM 1 MG: 1 TABLET ORAL at 18:32

## 2024-05-17 ASSESSMENT — PAIN SCALES - GENERAL
PAINLEVEL_OUTOF10: 3
PAINLEVEL_OUTOF10: 8
PAINLEVEL_OUTOF10: 8

## 2024-05-17 ASSESSMENT — PAIN DESCRIPTION - DESCRIPTORS: DESCRIPTORS: ACHING

## 2024-05-17 ASSESSMENT — PAIN DESCRIPTION - ORIENTATION: ORIENTATION: MID

## 2024-05-17 ASSESSMENT — PAIN - FUNCTIONAL ASSESSMENT: PAIN_FUNCTIONAL_ASSESSMENT: ACTIVITIES ARE NOT PREVENTED

## 2024-05-17 ASSESSMENT — PAIN DESCRIPTION - LOCATION
LOCATION: HEAD
LOCATION: GENERALIZED;HEAD;KNEE
LOCATION: HEAD

## 2024-05-17 NOTE — DISCHARGE INSTR - COC
Continuity of Care Form    Patient Name: Mahesh Brownlee   :  1960  MRN:  [id number]    Admit date:  2024  Discharge date:  24    Code Status Order: Full Code   Advance Directives:     Admitting Physician:  Baltazar Brooks MD  PCP: Ira Roberts, APRN - CNP    Discharging Nurse: Kirstie Andrade  Discharging Hospital Unit/Room#: 2099/2099-01  Discharging Unit Phone Number: [phone]    Emergency Contact:   Extended Emergency Contact Information  Primary Emergency Contact: Brad Whitley  Home Phone: [phone]  Mobile Phone: [phone]  Relation: Next of Kin  Secondary Emergency Contact: Michael Whitley  Home Phone: [phone]  Mobile Phone: [phone]  Relation: Child    Past Surgical History:  Past Surgical History:   Procedure Laterality Date    BACK SURGERY      x2, Dr. Ángel Regalado     BACK SURGERY      L3-4 decompression    CARDIAC ELECTROPHYSIOLOGY STUDY AND ABLATION      CARPAL TUNNEL RELEASE Right     JOINT REPLACEMENT Right 2018    NERVE BLOCK  2013    dduramorph  celestone 9mg    NERVE BLOCK N/A 2013    lumbar RASHMI    OTHER SURGICAL HISTORY  2016    Lumbar RASHMI    OTHER SURGICAL HISTORY  2018    lumbosacral myelogram    PACEMAKER PLACEMENT  2015    with defib    ROTATOR CUFF REPAIR Right     TONSILLECTOMY      HAS NO TONSILS, NEVER HAD SURGERY       Immunization History:   Immunization History   Administered Date(s) Administered    COVID-19, J&J, (age 18y+), IM, 0.5 mL 2021    Influenza, FLUARIX, FLULAVAL, FLUZONE (age 6 mo+) AND AFLURIA, (age 3 y+), PF, 0.5mL 2020    Pneumococcal, PPSV23, PNEUMOVAX 23, (age 2y+), SC/IM, 0.5mL 2017       Active Problems:  Patient Active Problem List   Diagnosis Code    Postlaminectomy syndrome, lumbar region M96.1    Cervical spondylosis with myelopathy M47.12    Arthropathy, unspecified, other specified sites M12.9    Cerebral contusion (HCC) S06.33AA    DDD (degenerative disc disease), lumbar M51.36

## 2024-05-17 NOTE — DISCHARGE INSTR - DIET

## 2024-05-18 LAB
ALBUMIN SERPL-MCNC: 3.1 G/DL (ref 3.5–5.2)
ALP SERPL-CCNC: 67 U/L (ref 40–129)
ALT SERPL-CCNC: 26 U/L (ref 5–41)
ANION GAP SERPL CALCULATED.3IONS-SCNC: 9 MMOL/L (ref 9–17)
AST SERPL-CCNC: 22 U/L
BASOPHILS # BLD: 0.1 K/UL (ref 0–0.2)
BASOPHILS NFR BLD: 1 % (ref 0–2)
BILIRUB SERPL-MCNC: 0.2 MG/DL (ref 0.3–1.2)
BUN SERPL-MCNC: 12 MG/DL (ref 8–23)
CALCIUM SERPL-MCNC: 8.9 MG/DL (ref 8.6–10.4)
CHLORIDE SERPL-SCNC: 107 MMOL/L (ref 98–107)
CO2 SERPL-SCNC: 22 MMOL/L (ref 20–31)
CREAT SERPL-MCNC: 0.7 MG/DL (ref 0.7–1.2)
EOSINOPHIL # BLD: 0.2 K/UL (ref 0–0.4)
EOSINOPHILS RELATIVE PERCENT: 4 % (ref 0–4)
ERYTHROCYTE [DISTWIDTH] IN BLOOD BY AUTOMATED COUNT: 15.3 % (ref 11.5–14.9)
GFR, ESTIMATED: >90 ML/MIN/1.73M2
GLUCOSE SERPL-MCNC: 88 MG/DL (ref 70–99)
HCT VFR BLD AUTO: 37.5 % (ref 41–53)
HGB BLD-MCNC: 12.2 G/DL (ref 13.5–17.5)
LYMPHOCYTES NFR BLD: 2.2 K/UL (ref 1–4.8)
LYMPHOCYTES RELATIVE PERCENT: 40 % (ref 24–44)
MCH RBC QN AUTO: 30.4 PG (ref 26–34)
MCHC RBC AUTO-ENTMCNC: 32.5 G/DL (ref 31–37)
MCV RBC AUTO: 93.5 FL (ref 80–100)
MONOCYTES NFR BLD: 0.8 K/UL (ref 0.1–1.3)
MONOCYTES NFR BLD: 15 % (ref 1–7)
NEUTROPHILS NFR BLD: 40 % (ref 36–66)
NEUTS SEG NFR BLD: 2.3 K/UL (ref 1.3–9.1)
PLATELET # BLD AUTO: 291 K/UL (ref 150–450)
PMV BLD AUTO: 7.2 FL (ref 6–12)
POTASSIUM SERPL-SCNC: 4.2 MMOL/L (ref 3.7–5.3)
PROT SERPL-MCNC: 5.6 G/DL (ref 6.4–8.3)
RBC # BLD AUTO: 4.01 M/UL (ref 4.5–5.9)
SODIUM SERPL-SCNC: 138 MMOL/L (ref 135–144)
WBC OTHER # BLD: 5.6 K/UL (ref 3.5–11)

## 2024-05-18 PROCEDURE — 2060000000 HC ICU INTERMEDIATE R&B

## 2024-05-18 PROCEDURE — 2580000003 HC RX 258: Performed by: NURSE PRACTITIONER

## 2024-05-18 PROCEDURE — 36415 COLL VENOUS BLD VENIPUNCTURE: CPT

## 2024-05-18 PROCEDURE — 6370000000 HC RX 637 (ALT 250 FOR IP): Performed by: STUDENT IN AN ORGANIZED HEALTH CARE EDUCATION/TRAINING PROGRAM

## 2024-05-18 PROCEDURE — 85025 COMPLETE CBC W/AUTO DIFF WBC: CPT

## 2024-05-18 PROCEDURE — 99232 SBSQ HOSP IP/OBS MODERATE 35: CPT | Performed by: INTERNAL MEDICINE

## 2024-05-18 PROCEDURE — 6370000000 HC RX 637 (ALT 250 FOR IP): Performed by: NURSE PRACTITIONER

## 2024-05-18 PROCEDURE — 80053 COMPREHEN METABOLIC PANEL: CPT

## 2024-05-18 RX ADMIN — SACUBITRIL AND VALSARTAN 1 TABLET: 24; 26 TABLET, FILM COATED ORAL at 08:45

## 2024-05-18 RX ADMIN — METOPROLOL TARTRATE 50 MG: 50 TABLET, FILM COATED ORAL at 22:09

## 2024-05-18 RX ADMIN — THIAMINE HCL TAB 100 MG 100 MG: 100 TAB at 08:45

## 2024-05-18 RX ADMIN — LORAZEPAM 4 MG: 1 TABLET ORAL at 08:45

## 2024-05-18 RX ADMIN — LORAZEPAM 2 MG: 1 TABLET ORAL at 17:43

## 2024-05-18 RX ADMIN — METOPROLOL TARTRATE 50 MG: 50 TABLET, FILM COATED ORAL at 08:45

## 2024-05-18 RX ADMIN — RIVAROXABAN 20 MG: 20 TABLET, FILM COATED ORAL at 08:45

## 2024-05-18 RX ADMIN — Medication 1 TABLET: at 08:45

## 2024-05-18 RX ADMIN — SPIRONOLACTONE 25 MG: 25 TABLET ORAL at 08:45

## 2024-05-18 RX ADMIN — SODIUM CHLORIDE, PRESERVATIVE FREE 10 ML: 5 INJECTION INTRAVENOUS at 08:46

## 2024-05-18 RX ADMIN — SACUBITRIL AND VALSARTAN 1 TABLET: 24; 26 TABLET, FILM COATED ORAL at 22:09

## 2024-05-18 RX ADMIN — LORAZEPAM 2 MG: 1 TABLET ORAL at 22:19

## 2024-05-18 RX ADMIN — GABAPENTIN 100 MG: 100 CAPSULE ORAL at 08:45

## 2024-05-18 RX ADMIN — SODIUM CHLORIDE, PRESERVATIVE FREE 10 ML: 5 INJECTION INTRAVENOUS at 22:11

## 2024-05-18 RX ADMIN — FOLIC ACID 1 MG: 1 TABLET ORAL at 08:45

## 2024-05-18 RX ADMIN — LORAZEPAM 3 MG: 1 TABLET ORAL at 13:18

## 2024-05-18 RX ADMIN — ACETAMINOPHEN 650 MG: 325 TABLET ORAL at 17:38

## 2024-05-18 RX ADMIN — GABAPENTIN 100 MG: 100 CAPSULE ORAL at 22:09

## 2024-05-18 RX ADMIN — LORAZEPAM 2 MG: 1 TABLET ORAL at 03:31

## 2024-05-18 ASSESSMENT — PAIN SCALES - GENERAL
PAINLEVEL_OUTOF10: 8
PAINLEVEL_OUTOF10: 5

## 2024-05-18 ASSESSMENT — PAIN DESCRIPTION - LOCATION: LOCATION: HEAD

## 2024-05-18 ASSESSMENT — PAIN DESCRIPTION - DESCRIPTORS: DESCRIPTORS: ACHING
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
## 2024-05-19 PROCEDURE — 6370000000 HC RX 637 (ALT 250 FOR IP): Performed by: NURSE PRACTITIONER

## 2024-05-19 PROCEDURE — 99232 SBSQ HOSP IP/OBS MODERATE 35: CPT | Performed by: INTERNAL MEDICINE

## 2024-05-19 PROCEDURE — 2580000003 HC RX 258: Performed by: NURSE PRACTITIONER

## 2024-05-19 PROCEDURE — 6370000000 HC RX 637 (ALT 250 FOR IP): Performed by: INTERNAL MEDICINE

## 2024-05-19 PROCEDURE — 6370000000 HC RX 637 (ALT 250 FOR IP): Performed by: STUDENT IN AN ORGANIZED HEALTH CARE EDUCATION/TRAINING PROGRAM

## 2024-05-19 PROCEDURE — 2060000000 HC ICU INTERMEDIATE R&B

## 2024-05-19 RX ORDER — LORAZEPAM 0.5 MG/1
0.5 TABLET ORAL EVERY 8 HOURS PRN
Status: DISCONTINUED | OUTPATIENT
Start: 2024-05-19 | End: 2024-05-22 | Stop reason: HOSPADM

## 2024-05-19 RX ORDER — HYDROCODONE BITARTRATE AND ACETAMINOPHEN 5; 325 MG/1; MG/1
1 TABLET ORAL EVERY 6 HOURS PRN
Status: DISCONTINUED | OUTPATIENT
Start: 2024-05-19 | End: 2024-05-22 | Stop reason: HOSPADM

## 2024-05-19 RX ADMIN — GABAPENTIN 100 MG: 100 CAPSULE ORAL at 21:09

## 2024-05-19 RX ADMIN — SODIUM CHLORIDE, PRESERVATIVE FREE 10 ML: 5 INJECTION INTRAVENOUS at 21:10

## 2024-05-19 RX ADMIN — SPIRONOLACTONE 25 MG: 25 TABLET ORAL at 09:22

## 2024-05-19 RX ADMIN — HYDROXYZINE HYDROCHLORIDE 10 MG: 10 TABLET ORAL at 17:47

## 2024-05-19 RX ADMIN — Medication 1 TABLET: at 09:22

## 2024-05-19 RX ADMIN — RIVAROXABAN 20 MG: 20 TABLET, FILM COATED ORAL at 09:22

## 2024-05-19 RX ADMIN — LORAZEPAM 0.5 MG: 0.5 TABLET ORAL at 21:13

## 2024-05-19 RX ADMIN — ACETAMINOPHEN 650 MG: 325 TABLET ORAL at 12:25

## 2024-05-19 RX ADMIN — METOPROLOL TARTRATE 50 MG: 50 TABLET, FILM COATED ORAL at 21:09

## 2024-05-19 RX ADMIN — ACETAMINOPHEN 650 MG: 325 TABLET ORAL at 21:22

## 2024-05-19 RX ADMIN — GABAPENTIN 100 MG: 100 CAPSULE ORAL at 09:22

## 2024-05-19 RX ADMIN — THIAMINE HCL TAB 100 MG 100 MG: 100 TAB at 09:22

## 2024-05-19 RX ADMIN — SACUBITRIL AND VALSARTAN 1 TABLET: 24; 26 TABLET, FILM COATED ORAL at 09:22

## 2024-05-19 RX ADMIN — FOLIC ACID 1 MG: 1 TABLET ORAL at 09:22

## 2024-05-19 RX ADMIN — METOPROLOL TARTRATE 50 MG: 50 TABLET, FILM COATED ORAL at 09:22

## 2024-05-19 RX ADMIN — SODIUM CHLORIDE, PRESERVATIVE FREE 10 ML: 5 INJECTION INTRAVENOUS at 09:22

## 2024-05-19 RX ADMIN — HYDROXYZINE HYDROCHLORIDE 10 MG: 10 TABLET ORAL at 09:22

## 2024-05-19 RX ADMIN — SACUBITRIL AND VALSARTAN 1 TABLET: 24; 26 TABLET, FILM COATED ORAL at 21:09

## 2024-05-19 RX ADMIN — LORAZEPAM 0.5 MG: 0.5 TABLET ORAL at 13:27

## 2024-05-19 ASSESSMENT — PAIN DESCRIPTION - DESCRIPTORS
DESCRIPTORS: ACHING
DESCRIPTORS: ACHING

## 2024-05-19 ASSESSMENT — PAIN SCALES - GENERAL
PAINLEVEL_OUTOF10: 8
PAINLEVEL_OUTOF10: 0
PAINLEVEL_OUTOF10: 6
PAINLEVEL_OUTOF10: 8

## 2024-05-19 ASSESSMENT — PAIN DESCRIPTION - LOCATION
LOCATION: HEAD
LOCATION: HEAD

## 2024-05-20 PROCEDURE — 6370000000 HC RX 637 (ALT 250 FOR IP): Performed by: INTERNAL MEDICINE

## 2024-05-20 PROCEDURE — 2580000003 HC RX 258: Performed by: NURSE PRACTITIONER

## 2024-05-20 PROCEDURE — 99232 SBSQ HOSP IP/OBS MODERATE 35: CPT | Performed by: PSYCHIATRY & NEUROLOGY

## 2024-05-20 PROCEDURE — 97116 GAIT TRAINING THERAPY: CPT

## 2024-05-20 PROCEDURE — 6370000000 HC RX 637 (ALT 250 FOR IP): Performed by: STUDENT IN AN ORGANIZED HEALTH CARE EDUCATION/TRAINING PROGRAM

## 2024-05-20 PROCEDURE — 97530 THERAPEUTIC ACTIVITIES: CPT

## 2024-05-20 PROCEDURE — 2060000000 HC ICU INTERMEDIATE R&B

## 2024-05-20 PROCEDURE — 97110 THERAPEUTIC EXERCISES: CPT

## 2024-05-20 PROCEDURE — 6370000000 HC RX 637 (ALT 250 FOR IP): Performed by: NURSE PRACTITIONER

## 2024-05-20 PROCEDURE — 99232 SBSQ HOSP IP/OBS MODERATE 35: CPT | Performed by: INTERNAL MEDICINE

## 2024-05-20 RX ADMIN — GABAPENTIN 100 MG: 100 CAPSULE ORAL at 07:57

## 2024-05-20 RX ADMIN — METOPROLOL TARTRATE 50 MG: 50 TABLET, FILM COATED ORAL at 07:57

## 2024-05-20 RX ADMIN — RIVAROXABAN 20 MG: 20 TABLET, FILM COATED ORAL at 07:57

## 2024-05-20 RX ADMIN — FOLIC ACID 1 MG: 1 TABLET ORAL at 07:57

## 2024-05-20 RX ADMIN — LORAZEPAM 0.5 MG: 0.5 TABLET ORAL at 13:18

## 2024-05-20 RX ADMIN — LORAZEPAM 0.5 MG: 0.5 TABLET ORAL at 04:18

## 2024-05-20 RX ADMIN — HYDROCODONE BITARTRATE AND ACETAMINOPHEN 1 TABLET: 5; 325 TABLET ORAL at 20:47

## 2024-05-20 RX ADMIN — METOPROLOL TARTRATE 50 MG: 50 TABLET, FILM COATED ORAL at 19:45

## 2024-05-20 RX ADMIN — HYDROCODONE BITARTRATE AND ACETAMINOPHEN 1 TABLET: 5; 325 TABLET ORAL at 08:17

## 2024-05-20 RX ADMIN — SODIUM CHLORIDE, PRESERVATIVE FREE 10 ML: 5 INJECTION INTRAVENOUS at 07:59

## 2024-05-20 RX ADMIN — THIAMINE HCL TAB 100 MG 100 MG: 100 TAB at 07:57

## 2024-05-20 RX ADMIN — SACUBITRIL AND VALSARTAN 1 TABLET: 24; 26 TABLET, FILM COATED ORAL at 07:57

## 2024-05-20 RX ADMIN — SACUBITRIL AND VALSARTAN 1 TABLET: 24; 26 TABLET, FILM COATED ORAL at 19:45

## 2024-05-20 RX ADMIN — LORAZEPAM 0.5 MG: 0.5 TABLET ORAL at 21:47

## 2024-05-20 RX ADMIN — SPIRONOLACTONE 25 MG: 25 TABLET ORAL at 07:57

## 2024-05-20 RX ADMIN — Medication 1 TABLET: at 07:57

## 2024-05-20 RX ADMIN — SODIUM CHLORIDE, PRESERVATIVE FREE 10 ML: 5 INJECTION INTRAVENOUS at 20:49

## 2024-05-20 RX ADMIN — GABAPENTIN 100 MG: 100 CAPSULE ORAL at 20:48

## 2024-05-20 RX ADMIN — HYDROCODONE BITARTRATE AND ACETAMINOPHEN 1 TABLET: 5; 325 TABLET ORAL at 14:21

## 2024-05-20 RX ADMIN — ACETAMINOPHEN 650 MG: 325 TABLET ORAL at 04:18

## 2024-05-20 ASSESSMENT — PAIN DESCRIPTION - FREQUENCY
FREQUENCY: CONTINUOUS
FREQUENCY: CONTINUOUS

## 2024-05-20 ASSESSMENT — PAIN DESCRIPTION - DESCRIPTORS
DESCRIPTORS: ACHING
DESCRIPTORS: OTHER (COMMENT)
DESCRIPTORS: ACHING;BURNING;SHARP
DESCRIPTORS: ACHING

## 2024-05-20 ASSESSMENT — PAIN - FUNCTIONAL ASSESSMENT
PAIN_FUNCTIONAL_ASSESSMENT: ACTIVITIES ARE NOT PREVENTED
PAIN_FUNCTIONAL_ASSESSMENT: ACTIVITIES ARE NOT PREVENTED

## 2024-05-20 ASSESSMENT — PAIN SCALES - GENERAL
PAINLEVEL_OUTOF10: 8
PAINLEVEL_OUTOF10: 8
PAINLEVEL_OUTOF10: 6
PAINLEVEL_OUTOF10: 8
PAINLEVEL_OUTOF10: 8
PAINLEVEL_OUTOF10: 0
PAINLEVEL_OUTOF10: 4
PAINLEVEL_OUTOF10: 8

## 2024-05-20 ASSESSMENT — PAIN DESCRIPTION - LOCATION
LOCATION: OTHER (COMMENT)
LOCATION: OTHER (COMMENT)
LOCATION: HEAD
LOCATION: OTHER (COMMENT)
LOCATION: GENERALIZED

## 2024-05-20 ASSESSMENT — PAIN DESCRIPTION - ORIENTATION: ORIENTATION: MID

## 2024-05-20 ASSESSMENT — PAIN DESCRIPTION - ONSET
ONSET: ON-GOING
ONSET: ON-GOING

## 2024-05-20 ASSESSMENT — PAIN DESCRIPTION - PAIN TYPE
TYPE: CHRONIC PAIN
TYPE: CHRONIC PAIN

## 2024-05-21 PROCEDURE — 6370000000 HC RX 637 (ALT 250 FOR IP): Performed by: STUDENT IN AN ORGANIZED HEALTH CARE EDUCATION/TRAINING PROGRAM

## 2024-05-21 PROCEDURE — 6370000000 HC RX 637 (ALT 250 FOR IP): Performed by: NURSE PRACTITIONER

## 2024-05-21 PROCEDURE — 1200000000 HC SEMI PRIVATE

## 2024-05-21 PROCEDURE — 99232 SBSQ HOSP IP/OBS MODERATE 35: CPT | Performed by: PSYCHIATRY & NEUROLOGY

## 2024-05-21 PROCEDURE — 97530 THERAPEUTIC ACTIVITIES: CPT

## 2024-05-21 PROCEDURE — 97535 SELF CARE MNGMENT TRAINING: CPT

## 2024-05-21 PROCEDURE — 2580000003 HC RX 258: Performed by: NURSE PRACTITIONER

## 2024-05-21 PROCEDURE — 97116 GAIT TRAINING THERAPY: CPT

## 2024-05-21 PROCEDURE — 6370000000 HC RX 637 (ALT 250 FOR IP): Performed by: INTERNAL MEDICINE

## 2024-05-21 PROCEDURE — 99232 SBSQ HOSP IP/OBS MODERATE 35: CPT | Performed by: INTERNAL MEDICINE

## 2024-05-21 RX ORDER — DULOXETIN HYDROCHLORIDE 20 MG/1
20 CAPSULE, DELAYED RELEASE ORAL DAILY
Qty: 30 CAPSULE | Refills: 11 | Status: SHIPPED | OUTPATIENT
Start: 2024-05-21

## 2024-05-21 RX ADMIN — SODIUM CHLORIDE, PRESERVATIVE FREE 10 ML: 5 INJECTION INTRAVENOUS at 09:11

## 2024-05-21 RX ADMIN — Medication 1 TABLET: at 09:03

## 2024-05-21 RX ADMIN — SPIRONOLACTONE 25 MG: 25 TABLET ORAL at 09:03

## 2024-05-21 RX ADMIN — GABAPENTIN 100 MG: 100 CAPSULE ORAL at 09:02

## 2024-05-21 RX ADMIN — THIAMINE HCL TAB 100 MG 100 MG: 100 TAB at 09:03

## 2024-05-21 RX ADMIN — HYDROCODONE BITARTRATE AND ACETAMINOPHEN 1 TABLET: 5; 325 TABLET ORAL at 09:08

## 2024-05-21 RX ADMIN — FOLIC ACID 1 MG: 1 TABLET ORAL at 09:03

## 2024-05-21 RX ADMIN — SACUBITRIL AND VALSARTAN 1 TABLET: 24; 26 TABLET, FILM COATED ORAL at 09:02

## 2024-05-21 RX ADMIN — RIVAROXABAN 20 MG: 20 TABLET, FILM COATED ORAL at 09:02

## 2024-05-21 RX ADMIN — HYDROXYZINE HYDROCHLORIDE 10 MG: 10 TABLET ORAL at 22:13

## 2024-05-21 RX ADMIN — LORAZEPAM 0.5 MG: 0.5 TABLET ORAL at 20:05

## 2024-05-21 RX ADMIN — HYDROCODONE BITARTRATE AND ACETAMINOPHEN 1 TABLET: 5; 325 TABLET ORAL at 15:45

## 2024-05-21 RX ADMIN — SACUBITRIL AND VALSARTAN 1 TABLET: 24; 26 TABLET, FILM COATED ORAL at 20:10

## 2024-05-21 RX ADMIN — LORAZEPAM 0.5 MG: 0.5 TABLET ORAL at 09:08

## 2024-05-21 RX ADMIN — HYDROXYZINE HYDROCHLORIDE 10 MG: 10 TABLET ORAL at 11:41

## 2024-05-21 RX ADMIN — HYDROCODONE BITARTRATE AND ACETAMINOPHEN 1 TABLET: 5; 325 TABLET ORAL at 22:10

## 2024-05-21 RX ADMIN — METOPROLOL TARTRATE 50 MG: 50 TABLET, FILM COATED ORAL at 09:02

## 2024-05-21 RX ADMIN — GABAPENTIN 100 MG: 100 CAPSULE ORAL at 20:05

## 2024-05-21 ASSESSMENT — PAIN SCALES - WONG BAKER
WONGBAKER_NUMERICALRESPONSE: NO HURT
WONGBAKER_NUMERICALRESPONSE: NO HURT

## 2024-05-21 ASSESSMENT — PAIN SCALES - GENERAL
PAINLEVEL_OUTOF10: 8
PAINLEVEL_OUTOF10: 10
PAINLEVEL_OUTOF10: 4
PAINLEVEL_OUTOF10: 8
PAINLEVEL_OUTOF10: 10

## 2024-05-21 ASSESSMENT — PAIN DESCRIPTION - LOCATION
LOCATION: GENERALIZED

## 2024-05-21 ASSESSMENT — PAIN DESCRIPTION - DESCRIPTORS
DESCRIPTORS: ACHING
DESCRIPTORS: ACHING;DISCOMFORT;SHARP;SORE

## 2024-05-21 ASSESSMENT — PAIN DESCRIPTION - ORIENTATION
ORIENTATION: RIGHT;LEFT
ORIENTATION: RIGHT;LEFT

## 2024-05-21 ASSESSMENT — PAIN - FUNCTIONAL ASSESSMENT
PAIN_FUNCTIONAL_ASSESSMENT: PREVENTS OR INTERFERES SOME ACTIVE ACTIVITIES AND ADLS
PAIN_FUNCTIONAL_ASSESSMENT: PREVENTS OR INTERFERES SOME ACTIVE ACTIVITIES AND ADLS

## 2024-05-22 VITALS
DIASTOLIC BLOOD PRESSURE: 95 MMHG | HEART RATE: 78 BPM | HEIGHT: 71 IN | SYSTOLIC BLOOD PRESSURE: 140 MMHG | TEMPERATURE: 98 F | WEIGHT: 210.1 LBS | RESPIRATION RATE: 19 BRPM | OXYGEN SATURATION: 100 % | BODY MASS INDEX: 29.41 KG/M2

## 2024-05-22 LAB
BASOPHILS # BLD: 0.1 K/UL (ref 0–0.2)
BASOPHILS NFR BLD: 1 % (ref 0–2)
EOSINOPHIL # BLD: 0.3 K/UL (ref 0–0.4)
EOSINOPHILS RELATIVE PERCENT: 4 % (ref 0–4)
ERYTHROCYTE [DISTWIDTH] IN BLOOD BY AUTOMATED COUNT: 15.3 % (ref 11.5–14.9)
HCT VFR BLD AUTO: 38 % (ref 41–53)
HGB BLD-MCNC: 12.8 G/DL (ref 13.5–17.5)
LYMPHOCYTES NFR BLD: 2.4 K/UL (ref 1–4.8)
LYMPHOCYTES RELATIVE PERCENT: 36 % (ref 24–44)
MCH RBC QN AUTO: 31 PG (ref 26–34)
MCHC RBC AUTO-ENTMCNC: 33.8 G/DL (ref 31–37)
MCV RBC AUTO: 91.8 FL (ref 80–100)
MONOCYTES NFR BLD: 0.7 K/UL (ref 0.1–1.3)
MONOCYTES NFR BLD: 11 % (ref 1–7)
NEUTROPHILS NFR BLD: 48 % (ref 36–66)
NEUTS SEG NFR BLD: 3.2 K/UL (ref 1.3–9.1)
PLATELET # BLD AUTO: 353 K/UL (ref 150–450)
PMV BLD AUTO: 7.1 FL (ref 6–12)
RBC # BLD AUTO: 4.13 M/UL (ref 4.5–5.9)
WBC OTHER # BLD: 6.7 K/UL (ref 3.5–11)

## 2024-05-22 PROCEDURE — 6370000000 HC RX 637 (ALT 250 FOR IP): Performed by: STUDENT IN AN ORGANIZED HEALTH CARE EDUCATION/TRAINING PROGRAM

## 2024-05-22 PROCEDURE — 6370000000 HC RX 637 (ALT 250 FOR IP): Performed by: INTERNAL MEDICINE

## 2024-05-22 PROCEDURE — 99239 HOSP IP/OBS DSCHRG MGMT >30: CPT | Performed by: INTERNAL MEDICINE

## 2024-05-22 PROCEDURE — 36415 COLL VENOUS BLD VENIPUNCTURE: CPT

## 2024-05-22 PROCEDURE — 6370000000 HC RX 637 (ALT 250 FOR IP): Performed by: NURSE PRACTITIONER

## 2024-05-22 PROCEDURE — 85025 COMPLETE CBC W/AUTO DIFF WBC: CPT

## 2024-05-22 PROCEDURE — 97110 THERAPEUTIC EXERCISES: CPT

## 2024-05-22 RX ORDER — LORAZEPAM 1 MG/1
0.5 TABLET ORAL 2 TIMES DAILY PRN
Qty: 3 TABLET | Refills: 0 | Status: SHIPPED | OUTPATIENT
Start: 2024-05-22 | End: 2024-05-25

## 2024-05-22 RX ORDER — GAUZE BANDAGE 2" X 2"
100 BANDAGE TOPICAL DAILY
Status: DISCONTINUED | OUTPATIENT
Start: 2024-05-22 | End: 2024-05-22 | Stop reason: HOSPADM

## 2024-05-22 RX ORDER — DULOXETIN HYDROCHLORIDE 20 MG/1
20 CAPSULE, DELAYED RELEASE ORAL DAILY
Status: DISCONTINUED | OUTPATIENT
Start: 2024-05-22 | End: 2024-05-22 | Stop reason: HOSPADM

## 2024-05-22 RX ADMIN — SPIRONOLACTONE 25 MG: 25 TABLET ORAL at 08:57

## 2024-05-22 RX ADMIN — SACUBITRIL AND VALSARTAN 1 TABLET: 24; 26 TABLET, FILM COATED ORAL at 09:02

## 2024-05-22 RX ADMIN — METOPROLOL TARTRATE 50 MG: 50 TABLET, FILM COATED ORAL at 08:57

## 2024-05-22 RX ADMIN — Medication 1 TABLET: at 08:57

## 2024-05-22 RX ADMIN — DULOXETINE HYDROCHLORIDE 20 MG: 20 CAPSULE, DELAYED RELEASE ORAL at 12:45

## 2024-05-22 RX ADMIN — THIAMINE HCL TAB 100 MG 100 MG: 100 TAB at 08:57

## 2024-05-22 RX ADMIN — HYDROCODONE BITARTRATE AND ACETAMINOPHEN 1 TABLET: 5; 325 TABLET ORAL at 15:05

## 2024-05-22 RX ADMIN — GABAPENTIN 100 MG: 100 CAPSULE ORAL at 08:57

## 2024-05-22 RX ADMIN — FOLIC ACID 1 MG: 1 TABLET ORAL at 08:57

## 2024-05-22 RX ADMIN — HYDROXYZINE HYDROCHLORIDE 10 MG: 10 TABLET ORAL at 12:45

## 2024-05-22 RX ADMIN — LORAZEPAM 0.5 MG: 0.5 TABLET ORAL at 17:24

## 2024-05-22 RX ADMIN — HYDROCODONE BITARTRATE AND ACETAMINOPHEN 1 TABLET: 5; 325 TABLET ORAL at 08:57

## 2024-05-22 RX ADMIN — RIVAROXABAN 20 MG: 20 TABLET, FILM COATED ORAL at 08:57

## 2024-05-22 RX ADMIN — LORAZEPAM 0.5 MG: 0.5 TABLET ORAL at 09:06

## 2024-05-22 ASSESSMENT — PAIN SCALES - WONG BAKER: WONGBAKER_NUMERICALRESPONSE: NO HURT

## 2024-05-22 ASSESSMENT — PAIN DESCRIPTION - DESCRIPTORS
DESCRIPTORS: ACHING;SHARP
DESCRIPTORS: ACHING;DISCOMFORT

## 2024-05-22 ASSESSMENT — PAIN SCALES - GENERAL
PAINLEVEL_OUTOF10: 5
PAINLEVEL_OUTOF10: 9
PAINLEVEL_OUTOF10: 8
PAINLEVEL_OUTOF10: 10

## 2024-05-22 ASSESSMENT — PAIN DESCRIPTION - LOCATION
LOCATION: GENERALIZED
LOCATION: OTHER (COMMENT)

## 2024-05-22 NOTE — PROGRESS NOTES
[facility]   IN-PATIENT SERVICE   [facility]    Progress note            Date:   5/18/2024  Patient name:  Mahesh Brownlee  Date of admission:  5/13/2024  7:48 PM  MRN:   [id number]  Account:  [id number]  YOB: 1960  PCP:    Ira Roberts APRN - CNP  Room:   [id number]  Code Status:    Full Code    Chief Complaint:     Chief Complaint   Patient presents with    Suicidal    Alcohol Problem       History Obtained From:     patient    History of Present Illness:     Mahesh Brownlee is a 64 y.o. Non- / non  male who presents with Suicidal and Alcohol Problem and is admitted to the hospital for the management of Alcohol withdrawal syndrome with complication (HCC).  Medical history is significant for paroxysmal A-fib, chronic combined systolic and diastolic heart failure, HTN, HLD, COPD, tobacco abuse, alcohol abuse, hypothyroidism, history of CVA, TBI and depression.  Presented to ED with suicidal ideation.  Reports plan of drinking himself to death.  Admits to alcohol abuse daily consisting of beer and liquor but will not report amounts.  States that last drink was Saturday night.  EtOH level in ED 0.  Showing signs of withdrawal with tremors and elevated heart rate.  Hypokalemia potassium 2.8. EKG sinus tachycardia. Denies fever, chills, chest pain, cough, diarrhea, and urinary symptoms. Symptoms are reported as acute, constant and moderate in severity.     5/16  Patient reports he is depressed, has nothing in life to look forward to.  Still continues to have mild tremors.  Reports mild nausea however no emesis.  Has tolerated his diet.  Having regular bowel movements.      Past Medical History:     Past Medical History:   Diagnosis Date    Adjustment disorder     AF (atrial fibrillation) (HCC)     AICD (automatic cardioverter/defibrillator) present 09/2015    PM    Alcohol dependence (HCC)     CAD (coronary artery disease)     Dr. Bolton cardiologist    
    [facility]   IN-PATIENT SERVICE   [facility]    HISTORY AND PHYSICAL EXAMINATION            Date:   5/16/2024  Patient name:  Mahesh Brownlee  Date of admission:  5/13/2024  7:48 PM  MRN:   [id number]  Account:  [id number]  YOB: 1960  PCP:    Ira Roberts APRN - CNP  Room:   [id number]  Code Status:    Full Code    Chief Complaint:     Chief Complaint   Patient presents with    Suicidal    Alcohol Problem       History Obtained From:     patient    History of Present Illness:     Mahesh Brownlee is a 64 y.o. Non- / non  male who presents with Suicidal and Alcohol Problem and is admitted to the hospital for the management of Alcohol withdrawal syndrome with complication (HCC).  Medical history is significant for paroxysmal A-fib, chronic combined systolic and diastolic heart failure, HTN, HLD, COPD, tobacco abuse, alcohol abuse, hypothyroidism, history of CVA, TBI and depression.  Presented to ED with suicidal ideation.  Reports plan of drinking himself to death.  Admits to alcohol abuse daily consisting of beer and liquor but will not report amounts.  States that last drink was Saturday night.  EtOH level in ED 0.  Showing signs of withdrawal with tremors and elevated heart rate.  Hypokalemia potassium 2.8. EKG sinus tachycardia. Denies fever, chills, chest pain, cough, diarrhea, and urinary symptoms. Symptoms are reported as acute, constant and moderate in severity.     5/16  Patient reports he is depressed, has nothing in life to look forward to.  Still continues to have mild tremors.  Reports mild nausea however no emesis.  Has tolerated his diet.  Having regular bowel movements.      Past Medical History:     Past Medical History:   Diagnosis Date    Adjustment disorder     AF (atrial fibrillation) (HCC)     AICD (automatic cardioverter/defibrillator) present 09/2015    PM    Alcohol dependence (HCC)     CAD (coronary artery disease)     Dr. Bolton 
    [facility]   IN-PATIENT SERVICE   [facility]    Progress note            Date:   5/17/2024  Patient name:  Mahesh Brownlee  Date of admission:  5/13/2024  7:48 PM  MRN:   [id number]  Account:  [id number]  YOB: 1960  PCP:    Ira Roberts APRN - CNP  Room:   [id number]  Code Status:    Full Code    Chief Complaint:     Chief Complaint   Patient presents with    Suicidal    Alcohol Problem       History Obtained From:     patient    History of Present Illness:     Mahesh Brownlee is a 64 y.o. Non- / non  male who presents with Suicidal and Alcohol Problem and is admitted to the hospital for the management of Alcohol withdrawal syndrome with complication (HCC).  Medical history is significant for paroxysmal A-fib, chronic combined systolic and diastolic heart failure, HTN, HLD, COPD, tobacco abuse, alcohol abuse, hypothyroidism, history of CVA, TBI and depression.  Presented to ED with suicidal ideation.  Reports plan of drinking himself to death.  Admits to alcohol abuse daily consisting of beer and liquor but will not report amounts.  States that last drink was Saturday night.  EtOH level in ED 0.  Showing signs of withdrawal with tremors and elevated heart rate.  Hypokalemia potassium 2.8. EKG sinus tachycardia. Denies fever, chills, chest pain, cough, diarrhea, and urinary symptoms. Symptoms are reported as acute, constant and moderate in severity.     5/16  Patient reports he is depressed, has nothing in life to look forward to.  Still continues to have mild tremors.  Reports mild nausea however no emesis.  Has tolerated his diet.  Having regular bowel movements.      Past Medical History:     Past Medical History:   Diagnosis Date    Adjustment disorder     AF (atrial fibrillation) (HCC)     AICD (automatic cardioverter/defibrillator) present 09/2015    PM    Alcohol dependence (HCC)     CAD (coronary artery disease)     Dr. Bolton cardiologist    
   05/16/24 1418   Encounter Summary   Encounter Overview/Reason Volunteer Encounter   Service Provided For Patient   Referral/Consult From Rounding   Last Encounter  05/16/24   Complexity of Encounter Low   Spiritual/Emotional needs   Type Spiritual Support   Rituals, Rites and Sacraments   Type Baptism Communion       
   05/17/24 1443   Encounter Summary   Encounter Overview/Reason Volunteer Encounter   Service Provided For Patient   Referral/Consult From Rounding   Last Encounter  05/17/24   Complexity of Encounter Low   Spiritual/Emotional needs   Type Spiritual Support   Rituals, Rites and Sacraments   Type Sikh Communion   Assessment/Intervention/Outcome   Intervention Prayer (assurance of)/[place]       
   05/19/24 1301   Encounter Summary   Encounter Overview/Reason Volunteer Encounter   Service Provided For Patient   Referral/Consult From Rounding   Last Encounter  05/19/24   Complexity of Encounter Low   Spiritual/Emotional needs   Type Spiritual Support   Rituals, Rites and Sacraments   Type Yarsanism Communion       
   05/21/24 1905   Encounter Summary   Encounter Overview/Reason Volunteer Encounter   Service Provided For Patient   Referral/Consult From Rounding   Last Encounter  05/21/24   Complexity of Encounter Low   Spiritual/Emotional needs   Type Spiritual Support   Rituals, Rites and Sacraments   Type Pentecostal Communion       
   05/22/24 1219   Encounter Summary   Encounter Overview/Reason Volunteer Encounter   Service Provided For Patient   Referral/Consult From Rounding   Last Encounter  05/22/24   Complexity of Encounter Low   Spiritual/Emotional needs   Type Spiritual Support   Rituals, Rites and Sacraments   Type Anabaptism Communion       
  Physician Progress Note      PATIENT:               EFREM FORREST  CSN #:                  [id number]  :                       1960  ADMIT DATE:       2024 7:48 PM  DISCH DATE:  RESPONDING  PROVIDER #:        Eleanor Sanchez MD          QUERY TEXT:    Patient admitted with Alcohol withdrawal syndrome, noted to have Paroxysmal    atrial fibrillation and is maintained on Xarelto . If possible, please   document in progress notes and discharge summary if you are evaluating and/or   treating any of the following:    The medical record reflects the following:  Risk Factors: Paroxysmal  atrial fibrillation, Xarelto  Clinical Indicators: per H&P: Paroxysmal atrial fibrillation, on Xarelto  Treatment: Continue Xarelto    Thank you, Padmaja CDS  Options provided:  -- Secondary hypercoagulable state in a patient with atrial fibrillation  -- Other - I will add my own diagnosis  -- Disagree - Not applicable / Not valid  -- Disagree - Clinically unable to determine / Unknown  -- Refer to Clinical Documentation Reviewer    PROVIDER RESPONSE TEXT:    This patient has secondary hypercoagulable state in a patient with atrial   fibrillation.    Query created by: Felicita Crystal on 2024 3:51 PM      Electronically signed by:  Eleanor Sanchez MD 2024 4:39 PM          
Attempted to interrogate patient's pacemaker. Pt does not know what kind he has, or have his card. States its been over 20 years. Used Smarter Remarketer device that is available and it did not work.   
[facility]   INPATIENT OCCUPATIONAL THERAPY  PROGRESS NOTE  Date: 2024  Patient Name: Mahesh Brownlee       Room:   MRN: [id number]    : 1960  (64 y.o.)  Gender: male      Diagnosis: Suicidal ideation      Discharge Recommendations:  Further Occupational Therapy is recommended upon facility discharge.    OT Equipment Recommendations  Other: TBD    Restrictions/Precautions  Restrictions/Precautions  Restrictions/Precautions: Fall Risk;General Precautions;Seizure  Required Braces or Orthoses?: No  Implants present? : Metal implants;Pacemaker (L JUAN; back sx)    O2 Device: None (Room air)    Subjective  Subjective  Subjective: \"I do not want to use the walker because that means I gave up.\"  Comments: Ok per RN for OT/PT    Objective  Cognition  Overall Cognitive Status: Exceptions  Arousal/Alertness: Appears intact  Following Commands: Follows multistep commands with increased time;Follows multistep commands with repitition  Attention Span: Appears intact  Memory: Appears intact  Safety Judgement: Decreased awareness of need for assistance;Decreased awareness of need for safety  Problem Solving: Assistance required to identify errors made;Assistance required to correct errors made  Insights: Decreased awareness of deficits  Initiation: Requires cues for some  Sequencing: Requires cues for some    Activities of Daily Living  ADL  Feeding: Setup, Based on clinical judgement  Grooming: Stand by assistance, Based on clinical judgement  UE Bathing: Stand by assistance, Based on clinical judgement  LE Bathing: Based on clinical judgement, Maximum assistance  UE Dressing: Stand by assistance, Based on clinical judgement  LE Dressing: Based on clinical judgement, Dependent/Total  LE Dressing Skilled Clinical Factors: pt unable to reach B feet when instructed and unable to complete figure 4 when attempted  Toileting: Minimal assistance, Based on clinical judgement  Functional 
Comprehensive Nutrition Assessment    Type and Reason for Visit:  RD Nutrition Re-Screen/LOS    Nutrition Recommendations/Plan:   Will continue Regular diet.     Nutrition Assessment:    Pt admitted due to Suicidal Ideation/ETOH WD. He is consuming all food provided and has no complaints. Discharge is expected soon.    Nutrition Related Findings:      Wound Type: None       Current Nutrition Intake & Therapies:    Average Meal Intake: %     ADULT DIET; Regular    Anthropometric Measures:  Height: 180.3 cm (5' 11\")  Ideal Body Weight (IBW): 172 lbs (78 kg)       Current Body Weight: 95.3 kg (210 lb), 122.1 % IBW. Weight Source: Bed Scale  Current BMI (kg/m2): 29.3                          BMI Categories: Overweight (BMI 25.0-29.9)    Nutrition Diagnosis:   No nutrition diagnosis at this time      Nutrition Interventions:   Food and/or Nutrient Delivery: Continue Current Diet                      Nutrition Monitoring and Evaluation:      Food/Nutrient Intake Outcomes: Food and Nutrient Intake       Discharge Planning:    Continue current diet     Katherine Hernandez RD, LD  Contact: [phone]    
Date:   5/19/2024  Patient name: Mahesh Brownlee  Date of admission:  5/13/2024  7:48 PM  MRN:   [id number]  YOB: 1960  PCP: Ira Roberts, APRN - CNP    Reason for Admission: Suicidal ideation [R45.851]  Hypokalemia [E87.6]  Alcohol withdrawal syndrome with complication (HCC) [F10.939]  Alcohol withdrawal syndrome without complication (HCC) [F10.930]    Cardiology follow-up: Paroxysmal A-fib, history of ablation for A-fib, CHF systolic and diastolic       Referring physician: Dr Eleanor Sanchez     Impression     History of paroxysmal A-fib at present in sinus rhythm  History of ablation for a flutter 2-2020  Congestive heart failure systolic and diastolic  Status post A ICD about 10 years ago for cardiac arrhythmias, [place]  History of nonischemic dilated cardiomyopathy  History of COPD secondary to smoking  Alcohol abuse  Hypertension  Hyperlipidemia  History of CVA        Investigation workup     ECG 5/13/2024  Sinus tachycardia heart rate 103, PAC in pattern of bigeminy     2D echo 5/15/2024  Normal LV size mild LVH ejection fraction 50 to 55%  ICD leads noted in the right heart  Mild aortic stenosis mean gradient 16 and peak gradient 27 mmHg  Moderate aortic regurgitation  RVSP 35 mmHg  Normal IVC dimension and respiratory variation     Chest x-ray 5/7/2024  No acute cardiopulmonary process     MRI brain 10/27/2023  No acute infarct or acute intracranial process identified  Remote cerebellar infarcts unchanged  Mild chronic small vessel ischemic change    History of present illness     64-year-old gentleman with a past medical history of paroxysmal A-fib, hypertension, systolic and diastolic CHF, ICD placement presented to the ER with the suicidal and alcohol problem.  He got hospitalized for the management of alcohol withdrawal syndrome.  On admission he had a signs of withdrawal tremors and tachycardia he also had a severe hypokalemia potassium 2.8.  It was very difficult to obtain a good 
Date:   5/22/2024  Patient name: Mahesh Brownlee  Date of admission:  5/13/2024  7:48 PM  MRN:   [id number]  YOB: 1960  PCP: Ira Roberts, APRN - CNP    Reason for Admission: Suicidal ideation [R45.851]  Hypokalemia [E87.6]  Alcohol withdrawal syndrome with complication (HCC) [F10.939]  Alcohol withdrawal syndrome without complication (HCC) [F10.930]    Cardiology follow-up: Paroxysmal A-fib, history of ablation for A-fib, CHF systolic and diastolic        Referring physician: Dr Eleanor Sanchez     Impression     History of paroxysmal A-fib at present in sinus rhythm  History of ablation for a flutter 2-2020  Congestive heart failure systolic and diastolic  Status post A ICD about 10 years ago for cardiac arrhythmias, [place]  History of nonischemic dilated cardiomyopathy  History of COPD secondary to smoking  Alcohol abuse  Hypertension  Hyperlipidemia  History of CVA    Investigation workup     ECG 5/13/2024  Sinus tachycardia heart rate 103, PAC in pattern of bigeminy     2D echo 5/15/2024  Normal LV size mild LVH ejection fraction 50 to 55%  ICD leads noted in the right heart  Mild aortic stenosis mean gradient 16 and peak gradient 27 mmHg  Moderate aortic regurgitation  RVSP 35 mmHg  Normal IVC dimension and respiratory variation     Chest x-ray 5/7/2024  No acute cardiopulmonary process     MRI brain 10/27/2023  No acute infarct or acute intracranial process identified  Remote cerebellar infarcts unchanged  Mild chronic small vessel ischemic change      Lab work on admission  Sodium 135, potassium 2.8, BUN 13, creatinine 1.0, glucose 117, albumin 3.7, ALT 61, AST 74  WBC 7.1, hemoglobin 12.2, platelets 179     Current evaluation  Patient seen and examined  He was resting on bed  He denied any palpitation  He does get dizziness on standing  Also complaining of vague chest pain left upper chest on and  Shaking both hands note  Hemodynamically stable  No obvious sign of cardiac 
Department of Psychiatry  Behavioral Health Consult    REASON FOR CONSULT: Depression, suicidal ideation    CONSULTING PHYSICIAN: Dr. Mccord    History obtained from: Patient/EMR    HISTORY OF PRESENT ILLNESS:      The patient was seen at bedside.  The patient is excepting medical treatment.  He is a little dysphoric.  He denies any active suicidal thoughts.  The patient does not want to go to residential rehab.  He would like to get admitted to psychiatry but he has no clear plan of what he wants to do once he leaves the psychiatry inpatient unit.  He wants to avoid going to a shelter.    I have discussed the patient with Dr. Jiménez who knows him from his recent admission.  The patient has not previously benefited from admission to psychiatry.  He is not interested in quitting alcohol and he is chronically suicidal.  He does make suicidal statements to try to get what he wants.    I was paged by the nursing staff to say that the patient was saying he was going to shoot himself.  The patient has previously reported not having any access to guns.  The patient can be discharged to homeless shelter       The patient is not currently receiving care for the above psychiatric illness. Did not follow-up with community mental health provider upon discharge from [facility]. Linked with [facility].       Psychiatric Review of Systems           Obsessions and Compulsions: Denies       Shobha or Hypomania: Denies     Hallucinations: Denies     Panic Attacks:  Denies     Delusions:  Denies     Phobias:  Denies     Trauma: Denies      Substance Abuse History:  ETOH: Current alcohol usage:  Type of Drink(s):  Bbeer and Liquor/Mixed drink.  Frequency of use:  Daily.  Duration of alcohol use:  30+ years.  Approximate date of last drink:  5/13/24.  Marijuana: \"a lot\" a bag a day  Opiates: denies  Other Drugs: denies    Past Psychiatric History:  Prior Diagnosis: Depression with suicidal ideation, acute psychosis     Hospitalization: yes  Hx of 
Discharge called to Ira, all questions answered. All personal belongings sent with patient. Pt A&Ox4.   
Dr. Dotson notified on new consult for CHF  
Iftikhar Lea MD (psych) notified of consult.   
Occupational Therapy  Facility/Department: [facility] PROGRESSIVE CARE  Daily Treatment Note  NAME: Mahesh Brownlee  : 1960  MRN: [id number]    Date of Service: 2024    RN reports patient is medically stable for therapy treatment this date.    Chart reviewed prior to treatment and patient is agreeable for therapy.  All lines intact and patient positioned comfortably at end of treatment.  All patient needs addressed prior to ending therapy session.      Discharge Recommendations:  Patient would benefit from continued therapy after discharge  Pt currently functioning below baseline.  Recommend daily inpatient skilled therapy at time of discharge to maximize long term outcomes and prevent re-admission. Please refer to AM-PAC score for current level of function.  OT Equipment Recommendations  Equipment Needed:  (CTA)    Patient Diagnosis(es): The primary encounter diagnosis was Suicidal ideation. Diagnoses of Hypokalemia, Alcohol withdrawal syndrome without complication (HCC), Cardiomyopathy, unspecified type (HCC), and Chronic combined systolic and diastolic heart failure (HCC) were also pertinent to this visit.     Assessment    Activity Tolerance: Patient limited by pain (Pain in buttocks and BLE.)  Discharge Recommendations: Patient would benefit from continued therapy after discharge  Equipment Needed:  (CTA)      Plan   Occupational Therapy Plan  Times Per Week: 4-6  Current Treatment Recommendations: Self-Care / ADL;Strengthening;Balance training;Functional mobility training;Endurance training;Pain management;Safety education & training;Patient/Caregiver education & training;Equipment evaluation, education, & procurement;Home management training;Cognitive/Perceptual training;Coordination training     Restrictions  Restrictions/Precautions  Restrictions/Precautions: Fall Risk;General Precautions;Seizure  Required Braces or Orthoses?: No  Implants present? : Metal implants;Pacemaker (L JUAN; back sx)  Position 
Patient reports he feels worthless, does not have a family, wife, no place to go, reports not being interested in anything in life currently.  Wants to go to the I unit.   
Physical Therapy  DATE: 2024    NAME: Mahesh Brownlee  MRN: [id number]   : 1960    Patient not seen this date for Physical Therapy due to:      [] Cancel by RN or physician due to:    [] Hemodialysis    [] Critical Lab Value Level     [] Blood transfusion in progress    [] Acute or unstable cardiovascular status   _MAP < 55 or more than >115  _HR < 40 or > 130    [] Acute or unstable pulmonary status   -FiO2 > 60%   _RR < 5 or >40    _O2 sats < 85%    [] Strict Bedrest    [] Off Unit for surgery or procedure    [] Off Unit for testing       [] Pending imaging to R/O fracture    [x] Refusal by Patient; checked on pt @ [id number]. Pt lying in bed stating that he is to DC this date. Politely declining at this time.       [] Other      [] PT being discontinued at this time. Patient independent. No further needs.     [] PT being discontinued at this time as the patient has been transferred to hospice care. No further needs.      Electronically signed by Fadumo Koroma PTA on 24 at 2:14 PM EDT     
Physical Therapy  Facility/Department: [facility] CARE  Physical Therapy Initial Assessment    Name: Mahesh Brownlee  : 1960  MRN: [id number]  Date of Service: 2024    Discharge Recommendations:  Therapy recommended at discharge, Patient would benefit from continued therapy after discharge          Patient Diagnosis(es): The primary encounter diagnosis was Suicidal ideation. Diagnoses of Hypokalemia, Alcohol withdrawal syndrome without complication (HCC), Cardiomyopathy, unspecified type (HCC), and Chronic combined systolic and diastolic heart failure (HCC) were also pertinent to this visit.  Past Medical History:  has a past medical history of Adjustment disorder, AF (atrial fibrillation) (HCC), AICD (automatic cardioverter/defibrillator) present, Alcohol dependence (HCC), CAD (coronary artery disease), Cardiomyopathy (HCC), CHF (congestive heart failure) (HCC), COPD (chronic obstructive pulmonary disease) (HCC), DDD (degenerative disc disease), lumbar, Herniated cervical disc, Hyperlipidemia, Hypertension, Major depression, Post laminectomy syndrome, Sciatica, Snores, Tobacco abuse, and Traumatic brain injury (HCC).  Past Surgical History:  has a past surgical history that includes back surgery; Rotator cuff repair (Right); Carpal tunnel release (Right); Nerve Block (2013); Nerve Block (N/A, 2013); other surgical history (2016); Tonsillectomy; pacemaker placement (2015); back surgery; other surgical history (2018); joint replacement (Right, 2018); and Cardiac electrophysiology study and ablation.    Assessment   Assessment: Presents with B LE weakness, also reports pain in his legs/back limiting activity. High fall risk at this time. Will benefit from continued therapy at discharge  Treatment Diagnosis: Impaired fucntion  Therapy Prognosis: Fair  Decision Making: Medium Complexity  Requires PT Follow-Up: Yes  Activity Tolerance  Activity Tolerance: Patient limited by 
Physical Therapy  Facility/Department: [facility] CARE  Physical Therapy Initial Assessment    Name: Mahesh Brownlee  : 1960  MRN: [id number]  Date of Service: 2024    Discharge Recommendations:  Therapy recommended at discharge, Patient would benefit from continued therapy after discharge          Patient Diagnosis(es): The primary encounter diagnosis was Suicidal ideation. Diagnoses of Hypokalemia, Alcohol withdrawal syndrome without complication (HCC), Cardiomyopathy, unspecified type (HCC), and Chronic combined systolic and diastolic heart failure (HCC) were also pertinent to this visit.  Past Medical History:  has a past medical history of Adjustment disorder, AF (atrial fibrillation) (HCC), AICD (automatic cardioverter/defibrillator) present, Alcohol dependence (HCC), CAD (coronary artery disease), Cardiomyopathy (HCC), CHF (congestive heart failure) (HCC), COPD (chronic obstructive pulmonary disease) (HCC), DDD (degenerative disc disease), lumbar, Herniated cervical disc, Hyperlipidemia, Hypertension, Major depression, Post laminectomy syndrome, Sciatica, Snores, Tobacco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

## 2024-05-22 NOTE — PLAN OF CARE
Problem: Discharge Planning  Goal: Discharge to home or other facility with appropriate resources  5/14/2024 1558 by Mila Cavazos RN  Outcome: Progressing     Problem: Pain  Goal: Verbalizes/displays adequate comfort level or baseline comfort level  5/14/2024 1558 by Mila Cavazos RN  Outcome: Progressing     Problem: Skin/Tissue Integrity  Goal: Absence of new skin breakdown  Description: 1.  Monitor for areas of redness and/or skin breakdown  2.  Assess vascular access sites hourly  3.  Every 4-6 hours minimum:  Change oxygen saturation probe site  4.  Every 4-6 hours:  If on nasal continuous positive airway pressure, respiratory therapy assess nares and determine need for appliance change or resting period.  5/14/2024 1558 by Mila Cavazos RN  Outcome: Progressing     Problem: Safety - Adult  Goal: Free from fall injury  5/14/2024 1558 by Mila Cavazos RN  Outcome: Progressing     Problem: Self Harm/Suicidality  Goal: Will have no self-injury during hospital stay  Description: INTERVENTIONS:  1.  Ensure constant observer at bedside with Q15M safety checks  2.  Maintain a safe environment  3.  Secure patient belongings  4.  Ensure family/visitors adhere to safety recommendations  5.  Ensure safety tray has been added to patient's diet order  6.  Every shift and PRN: Re-assess suicidal risk via Frequent Screener    5/14/2024 1558 by Mila Cavazos RN  Outcome: Progressing     Problem: Chronic Conditions and Co-morbidities  Goal: Patient's chronic conditions and co-morbidity symptoms are monitored and maintained or improved  Outcome: Progressing     
  Problem: Discharge Planning  Goal: Discharge to home or other facility with appropriate resources  5/15/2024 0518 by Nallely Hood RN  Outcome: Progressing  Flowsheets (Taken 5/14/2024 2015)  Discharge to home or other facility with appropriate resources: Identify barriers to discharge with patient and caregiver     Problem: Pain  Goal: Verbalizes/displays adequate comfort level or baseline comfort level  5/15/2024 0518 by Nallely Hood, RN  Outcome: Progressing  Flowsheets (Taken 5/14/2024 2342)  Verbalizes/displays adequate comfort level or baseline comfort level:   Encourage patient to monitor pain and request assistance   Assess pain using appropriate pain scale   Administer analgesics based on type and severity of pain and evaluate response   Implement non-pharmacological measures as appropriate and evaluate response     Problem: Skin/Tissue Integrity  Goal: Absence of new skin breakdown  Description: 1.  Monitor for areas of redness and/or skin breakdown  2.  Assess vascular access sites hourly  3.  Every 4-6 hours minimum:  Change oxygen saturation probe site  4.  Every 4-6 hours:  If on nasal continuous positive airway pressure, respiratory therapy assess nares and determine need for appliance change or resting period.  5/15/2024 0518 by Nallely Hood, RN  Outcome: Progressing     Problem: Safety - Adult  Goal: Free from fall injury  5/15/2024 0518 by Nallely Hood RN  Outcome: Progressing     Problem: Self Harm/Suicidality  Goal: Will have no self-injury during hospital stay  Description: INTERVENTIONS:  1.  Ensure constant observer at bedside with Q15M safety checks  2.  Maintain a safe environment  3.  Secure patient belongings  4.  Ensure family/visitors adhere to safety recommendations  5.  Ensure safety tray has been added to patient's diet order  6.  Every shift and PRN: Re-assess suicidal risk via Frequent Screener    5/15/2024 0518 by Nallely Hood, RN  Outcome: Progressing  Flowsheets (Taken 
  Problem: Discharge Planning  Goal: Discharge to home or other facility with appropriate resources  5/16/2024 1720 by Gayatri Main RN  Outcome: Progressing  Note: Discharge plan pending     Problem: Pain  Goal: Verbalizes/displays adequate comfort level or baseline comfort level  5/16/2024 1720 by Gayatri Main RN  Outcome: Progressing     Problem: Skin/Tissue Integrity  Goal: Absence of new skin breakdown  Description: 1.  Monitor for areas of redness and/or skin breakdown  2.  Assess vascular access sites hourly  3.  Every 4-6 hours minimum:  Change oxygen saturation probe site  4.  Every 4-6 hours:  If on nasal continuous positive airway pressure, respiratory therapy assess nares and determine need for appliance change or resting period.  5/16/2024 1720 by Gayatri Main RN  Outcome: Progressing  Note: Free from skin breakdown      Problem: Self Harm/Suicidality  Goal: Will have no self-injury during hospital stay  Description: INTERVENTIONS:  1.  Ensure constant observer at bedside with Q15M safety checks  2.  Maintain a safe environment  3.  Secure patient belongings  4.  Ensure family/visitors adhere to safety recommendations  5.  Ensure safety tray has been added to patient's diet order  6.  Every shift and PRN: Re-assess suicidal risk via Frequent Screener    5/16/2024 1720 by Gayatri Main RN  Outcome: Progressing  Note: Patient denies any plans of harming self this shift.      
  Problem: Discharge Planning  Goal: Discharge to home or other facility with appropriate resources  5/16/2024 1722 by Gayatri Main RN  Outcome: Progressing  Note: Discharge plan pending.      Problem: Pain  Goal: Verbalizes/displays adequate comfort level or baseline comfort level  5/16/2024 1720 by Gayatri Main RN  Outcome: Progressing     Problem: Skin/Tissue Integrity  Goal: Absence of new skin breakdown  Description: 1.  Monitor for areas of redness and/or skin breakdown  2.  Assess vascular access sites hourly  3.  Every 4-6 hours minimum:  Change oxygen saturation probe site  4.  Every 4-6 hours:  If on nasal continuous positive airway pressure, respiratory therapy assess nares and determine need for appliance change or resting period.  5/16/2024 1720 by Gayatri Main RN  Outcome: Progressing  Note: Free from skin breakdown      Problem: Self Harm/Suicidality  Goal: Will have no self-injury during hospital stay  Description: INTERVENTIONS:  1.  Ensure constant observer at bedside with Q15M safety checks  2.  Maintain a safe environment  3.  Secure patient belongings  4.  Ensure family/visitors adhere to safety recommendations  5.  Ensure safety tray has been added to patient's diet order  6.  Every shift and PRN: Re-assess suicidal risk via Frequent Screener    5/16/2024 1722 by Gayatri Main RN  Note: Free from self harm this shift.      Problem: Safety - Adult  Goal: Free from fall injury  5/16/2024 1722 by Gayatri Main RN  Outcome: Progressing  Note: Free from injury this shift.      
  Problem: Discharge Planning  Goal: Discharge to home or other facility with appropriate resources  5/17/2024 1523 by Gayatri Main RN  Outcome: Progressing  Note:   Per case management-   Writer spoke to Miya at [facility], states Macarena will be here today to complete on-site. Requesting therapy notes once evals are completed.                     Problem: Self Harm/Suicidality  Goal: Will have no self-injury during hospital stay  Description: INTERVENTIONS:  1.  Ensure constant observer at bedside with Q15M safety checks  2.  Maintain a safe environment  3.  Secure patient belongings  4.  Ensure family/visitors adhere to safety recommendations  5.  Ensure safety tray has been added to patient's diet order  6.  Every shift and PRN: Re-assess suicidal risk via Frequent Screener    5/17/2024 1523 by Gayatri Main RN  Outcome: Progressing  Note: Patient reporting  thoughts of harming self. Psych is aware.      Problem: Chronic Conditions and Co-morbidities  Goal: Patient's chronic conditions and co-morbidity symptoms are monitored and maintained or improved  5/17/2024 1523 by Gayatri Main RN  Outcome: Progressing     
  Problem: Discharge Planning  Goal: Discharge to home or other facility with appropriate resources  5/17/2024 2156 by Radha Mccoy RN  Outcome: Progressing  Flowsheets (Taken 5/17/2024 2029)  Discharge to home or other facility with appropriate resources: Identify barriers to discharge with patient and caregiver  5/17/2024 1523 by Gayatri Main RN  Outcome: Progressing  Note:   Per case management-   Writer spoke to Miya at [facility], states Macarena will be here today to complete on-site. Requesting therapy notes once evals are completed.                     Problem: Pain  Goal: Verbalizes/displays adequate comfort level or baseline comfort level  Outcome: Progressing     Problem: Skin/Tissue Integrity  Goal: Absence of new skin breakdown  Description: 1.  Monitor for areas of redness and/or skin breakdown  2.  Assess vascular access sites hourly  3.  Every 4-6 hours minimum:  Change oxygen saturation probe site  4.  Every 4-6 hours:  If on nasal continuous positive airway pressure, respiratory therapy assess nares and determine need for appliance change or resting period.  Outcome: Progressing     Problem: Safety - Adult  Goal: Free from fall injury  Outcome: Progressing     Problem: Self Harm/Suicidality  Goal: Will have no self-injury during hospital stay  Description: INTERVENTIONS:  1.  Ensure constant observer at bedside with Q15M safety checks  2.  Maintain a safe environment  3.  Secure patient belongings  4.  Ensure family/visitors adhere to safety recommendations  5.  Ensure safety tray has been added to patient's diet order  6.  Every shift and PRN: Re-assess suicidal risk via Frequent Screener    5/17/2024 2156 by Radha Mccoy RN  Outcome: Progressing  5/17/2024 1523 by Gayatri Main RN  Outcome: Progressing  Note: Patient reporting  thoughts of harming self. Psych is aware.      Problem: Chronic Conditions and Co-morbidities  Goal: Patient's chronic conditions and co-morbidity symptoms are monitored 
  Problem: Discharge Planning  Goal: Discharge to home or other facility with appropriate resources  5/20/2024 0043 by Frances Dixon RN  Outcome: Progressing  5/19/2024 1913 by Libra Malcolm RN  Outcome: Progressing  Flowsheets (Taken 5/19/2024 0900)  Discharge to home or other facility with appropriate resources:   Identify barriers to discharge with patient and caregiver   Arrange for needed discharge resources and transportation as appropriate   Identify discharge learning needs (meds, wound care, etc)   Refer to discharge planning if patient needs post-hospital services based on physician order or complex needs related to functional status, cognitive ability or social support system     Problem: Pain  Goal: Verbalizes/displays adequate comfort level or baseline comfort level  5/20/2024 0043 by Frances Dixon RN  Outcome: Progressing  Note: Patient with headache pain.  Medicated with tylenol with relief of pain.  5/19/2024 1913 by Libra Malcolm RN  Outcome: Progressing     Problem: Skin/Tissue Integrity  Goal: Absence of new skin breakdown  Description: 1.  Monitor for areas of redness and/or skin breakdown  2.  Assess vascular access sites hourly  3.  Every 4-6 hours minimum:  Change oxygen saturation probe site  4.  Every 4-6 hours:  If on nasal continuous positive airway pressure, respiratory therapy assess nares and determine need for appliance change or resting period.  5/20/2024 0043 by Frances Dixon RN  Outcome: Progressing  Note: Skin intact.  Able to turn and reposition self.  5/19/2024 1913 by Libra Malcolm RN  Outcome: Progressing     Problem: Safety - Adult  Goal: Free from fall injury  5/20/2024 0043 by Frances Dixon RN  Outcome: Progressing  Note: Patient alert and oriented.  Bed alarm on.  Using call light appropriately.  5/19/2024 1913 by Libra Malcolm RN  Outcome: Progressing     Problem: Chronic Conditions and Co-morbidities  Goal: Patient's chronic conditions 
  Problem: Discharge Planning  Goal: Discharge to home or other facility with appropriate resources  5/21/2024 0103 by Demetria Dumont RN  Outcome: Progressing  Flowsheets (Taken 5/20/2024 1936)  Discharge to home or other facility with appropriate resources:   Identify barriers to discharge with patient and caregiver   Arrange for needed discharge resources and transportation as appropriate   Identify discharge learning needs (meds, wound care, etc)   Refer to discharge planning if patient needs post-hospital services based on physician order or complex needs related to functional status, cognitive ability or social support system     Problem: Pain  Goal: Verbalizes/displays adequate comfort level or baseline comfort level  5/21/2024 0103 by Demetria Dumont RN  Outcome: Progressing     Problem: Skin/Tissue Integrity  Goal: Absence of new skin breakdown  Description: 1.  Monitor for areas of redness and/or skin breakdown  2.  Assess vascular access sites hourly  3.  Every 4-6 hours minimum:  Change oxygen saturation probe site  4.  Every 4-6 hours:  If on nasal continuous positive airway pressure, respiratory therapy assess nares and determine need for appliance change or resting period.  5/21/2024 0103 by Demetria Dumont RN  Outcome: Progressing     Problem: Safety - Adult  Goal: Free from fall injury  5/21/2024 0103 by Demetria Dumont RN  Outcome: Progressing     Problem: Chronic Conditions and Co-morbidities  Goal: Patient's chronic conditions and co-morbidity symptoms are monitored and maintained or improved  Outcome: Progressing  Flowsheets (Taken 5/20/2024 1936)  Care Plan - Patient's Chronic Conditions and Co-Morbidity Symptoms are Monitored and Maintained or Improved: Monitor and assess patient's chronic conditions and comorbid symptoms for stability, deterioration, or improvement     Problem: ABCDS Injury Assessment  Goal: Absence of physical injury  Outcome: Progressing     
  Problem: Discharge Planning  Goal: Discharge to home or other facility with appropriate resources  Outcome: Progressing     Problem: Pain  Goal: Verbalizes/displays adequate comfort level or baseline comfort level  Outcome: Progressing     Problem: Skin/Tissue Integrity  Goal: Absence of new skin breakdown  Description: 1.  Monitor for areas of redness and/or skin breakdown  2.  Assess vascular access sites hourly  3.  Every 4-6 hours minimum:  Change oxygen saturation probe site  4.  Every 4-6 hours:  If on nasal continuous positive airway pressure, respiratory therapy assess nares and determine need for appliance change or resting period.  Outcome: Progressing     Problem: Safety - Adult  Goal: Free from fall injury  Outcome: Progressing     
  Problem: Discharge Planning  Goal: Discharge to home or other facility with appropriate resources  Outcome: Progressing     Problem: Pain  Goal: Verbalizes/displays adequate comfort level or baseline comfort level  Outcome: Progressing     Problem: Skin/Tissue Integrity  Goal: Absence of new skin breakdown  Description: 1.  Monitor for areas of redness and/or skin breakdown  2.  Assess vascular access sites hourly  3.  Every 4-6 hours minimum:  Change oxygen saturation probe site  4.  Every 4-6 hours:  If on nasal continuous positive airway pressure, respiratory therapy assess nares and determine need for appliance change or resting period.  Outcome: Progressing     Problem: Safety - Adult  Goal: Free from fall injury  Outcome: Progressing     Problem: Chronic Conditions and Co-morbidities  Goal: Patient's chronic conditions and co-morbidity symptoms are monitored and maintained or improved  Outcome: Progressing     Problem: ABCDS Injury Assessment  Goal: Absence of physical injury  Outcome: Progressing     
  Problem: Discharge Planning  Goal: Discharge to home or other facility with appropriate resources  Outcome: Progressing     Problem: Pain  Goal: Verbalizes/displays adequate comfort level or baseline comfort level  Outcome: Progressing     Problem: Skin/Tissue Integrity  Goal: Absence of new skin breakdown  Description: 1.  Monitor for areas of redness and/or skin breakdown  2.  Assess vascular access sites hourly  3.  Every 4-6 hours minimum:  Change oxygen saturation probe site  4.  Every 4-6 hours:  If on nasal continuous positive airway pressure, respiratory therapy assess nares and determine need for appliance change or resting period.  Outcome: Progressing     Problem: Safety - Adult  Goal: Free from fall injury  Outcome: Progressing  Flowsheets (Taken 5/22/2024 0433)  Free From Fall Injury: Instruct family/caregiver on patient safety     Problem: Chronic Conditions and Co-morbidities  Goal: Patient's chronic conditions and co-morbidity symptoms are monitored and maintained or improved  Outcome: Progressing     Problem: ABCDS Injury Assessment  Goal: Absence of physical injury  Outcome: Progressing  Flowsheets (Taken 5/22/2024 2683)  Absence of Physical Injury: Implement safety measures based on patient assessment     
  Problem: Discharge Planning  Goal: Discharge to home or other facility with appropriate resources  Outcome: Progressing  Flowsheets (Taken 5/14/2024 0005)  Discharge to home or other facility with appropriate resources: Identify barriers to discharge with patient and caregiver     Problem: Pain  Goal: Verbalizes/displays adequate comfort level or baseline comfort level  Outcome: Progressing     Problem: Skin/Tissue Integrity  Goal: Absence of new skin breakdown  Description: 1.  Monitor for areas of redness and/or skin breakdown  2.  Assess vascular access sites hourly  3.  Every 4-6 hours minimum:  Change oxygen saturation probe site  4.  Every 4-6 hours:  If on nasal continuous positive airway pressure, respiratory therapy assess nares and determine need for appliance change or resting period.  Outcome: Progressing     Problem: Safety - Adult  Goal: Free from fall injury  Outcome: Progressing   Sitter 1:1, suicide and seizure precautions, call light within reach, hourly rounding, close to nurse's station  
  Problem: Discharge Planning  Goal: Discharge to home or other facility with appropriate resources  Outcome: Progressing  Flowsheets (Taken 5/15/2024 1944)  Discharge to home or other facility with appropriate resources: Identify barriers to discharge with patient and caregiver     Problem: Pain  Goal: Verbalizes/displays adequate comfort level or baseline comfort level  5/16/2024 0511 by Nallely Hood RN  Outcome: Progressing  Flowsheets (Taken 5/15/2024 1930)  Verbalizes/displays adequate comfort level or baseline comfort level:   Encourage patient to monitor pain and request assistance   Assess pain using appropriate pain scale   Administer analgesics based on type and severity of pain and evaluate response   Implement non-pharmacological measures as appropriate and evaluate response     Problem: Skin/Tissue Integrity  Goal: Absence of new skin breakdown  Description: 1.  Monitor for areas of redness and/or skin breakdown  2.  Assess vascular access sites hourly  3.  Every 4-6 hours minimum:  Change oxygen saturation probe site  4.  Every 4-6 hours:  If on nasal continuous positive airway pressure, respiratory therapy assess nares and determine need for appliance change or resting period.  5/16/2024 0511 by Nallely Hood RN  Outcome: Progressing     Problem: Safety - Adult  Goal: Free from fall injury  5/16/2024 0511 by Nallely Hood RN  Outcome: Progressing     Problem: Self Harm/Suicidality  Goal: Will have no self-injury during hospital stay  Description: INTERVENTIONS:  1.  Ensure constant observer at bedside with Q15M safety checks  2.  Maintain a safe environment  3.  Secure patient belongings  4.  Ensure family/visitors adhere to safety recommendations  5.  Ensure safety tray has been added to patient's diet order  6.  Every shift and PRN: Re-assess suicidal risk via Frequent Screener    5/16/2024 0511 by Nallely Hood, RN  Outcome: Progressing     Problem: Chronic Conditions and Co-morbidities  Goal: 
  Problem: Discharge Planning  Goal: Discharge to home or other facility with appropriate resources  Outcome: Progressing  Flowsheets (Taken 5/18/2024 0839)  Discharge to home or other facility with appropriate resources:   Identify barriers to discharge with patient and caregiver   Arrange for needed discharge resources and transportation as appropriate   Identify discharge learning needs (meds, wound care, etc)   Refer to discharge planning if patient needs post-hospital services based on physician order or complex needs related to functional status, cognitive ability or social support system     Problem: Pain  Goal: Verbalizes/displays adequate comfort level or baseline comfort level  Outcome: Progressing     Problem: Skin/Tissue Integrity  Goal: Absence of new skin breakdown  Description: 1.  Monitor for areas of redness and/or skin breakdown  2.  Assess vascular access sites hourly  3.  Every 4-6 hours minimum:  Change oxygen saturation probe site  4.  Every 4-6 hours:  If on nasal continuous positive airway pressure, respiratory therapy assess nares and determine need for appliance change or resting period.  Outcome: Progressing     Problem: Safety - Adult  Goal: Free from fall injury  Outcome: Progressing     Problem: Self Harm/Suicidality  Goal: Will have no self-injury during hospital stay  Description: INTERVENTIONS:  1.  Ensure constant observer at bedside with Q15M safety checks  2.  Maintain a safe environment  3.  Secure patient belongings  4.  Ensure family/visitors adhere to safety recommendations  5.  Ensure safety tray has been added to patient's diet order  6.  Every shift and PRN: Re-assess suicidal risk via Frequent Screener    Outcome: Progressing     Problem: Chronic Conditions and Co-morbidities  Goal: Patient's chronic conditions and co-morbidity symptoms are monitored and maintained or improved  Outcome: Progressing  Flowsheets (Taken 5/18/2024 0839)  Care Plan - Patient's Chronic 
  Problem: Pain  Goal: Verbalizes/displays adequate comfort level or baseline comfort level  5/15/2024 1729 by Selina Ye RN  Outcome: Progressing     Problem: Skin/Tissue Integrity  Goal: Absence of new skin breakdown  Description: 1.  Monitor for areas of redness and/or skin breakdown  2.  Assess vascular access sites hourly  3.  Every 4-6 hours minimum:  Change oxygen saturation probe site  4.  Every 4-6 hours:  If on nasal continuous positive airway pressure, respiratory therapy assess nares and determine need for appliance change or resting period.  5/15/2024 1729 by Selina Ye RN  Outcome: Progressing     Problem: Safety - Adult  Goal: Free from fall injury  5/15/2024 1729 by Selina Ye RN  Outcome: Progressing     Problem: Self Harm/Suicidality  Goal: Will have no self-injury during hospital stay  Description: INTERVENTIONS:  1.  Ensure constant observer at bedside with Q15M safety checks  2.  Maintain a safe environment  3.  Secure patient belongings  4.  Ensure family/visitors adhere to safety recommendations  5.  Ensure safety tray has been added to patient's diet order  6.  Every shift and PRN: Re-assess suicidal risk via Frequent Screener    5/15/2024 1729 by Selina Ye RN  Outcome: Progressing     Problem: Self Harm/Suicidality  Goal: Will have no self-injury during hospital stay  Description: INTERVENTIONS:  1.  Ensure constant observer at bedside with Q15M safety checks  2.  Maintain a safe environment  3.  Secure patient belongings  4.  Ensure family/visitors adhere to safety recommendations  5.  Ensure safety tray has been added to patient's diet order  6.  Every shift and PRN: Re-assess suicidal risk via Frequent Screener    5/15/2024 1729 by Selina Ye RN  Outcome: Progressing     Problem: Chronic Conditions and Co-morbidities  Goal: Patient's chronic conditions and co-morbidity symptoms are monitored and maintained or improved  5/15/2024 1729 by Ephraim 
  Problem: Self Harm/Suicidality  Goal: Will have no self-injury during hospital stay  Description: INTERVENTIONS:  1.  Ensure constant observer at bedside with Q15M safety checks  2.  Maintain a safe environment  3.  Secure patient belongings  4.  Ensure family/visitors adhere to safety recommendations  5.  Ensure safety tray has been added to patient's diet order  6.  Every shift and PRN: Re-assess suicidal risk via Frequent Screener    Outcome: Progressing  Flowsheets (Taken 5/14/2024 0005)  Will have no self-injury during hospital stay:   Ensure constant observer at bedside with Q15M safety checks   Maintain a safe environment   Secure patient belongings   Ensure safety tray has been added to patient's diet order   Every shift and PRN: Re-assess suicidal risk via Frequent Screener     
Conditions and Co-Morbidity Symptoms are Monitored and Maintained or Improved:   Monitor and assess patient's chronic conditions and comorbid symptoms for stability, deterioration, or improvement   Collaborate with multidisciplinary team to address chronic and comorbid conditions and prevent exacerbation or deterioration   Update acute care plan with appropriate goals if chronic or comorbid symptoms are exacerbated and prevent overall improvement and discharge     Problem: ABCDS Injury Assessment  Goal: Absence of physical injury  Outcome: Progressing     
Conditions and Co-morbidities  Goal: Patient's chronic conditions and co-morbidity symptoms are monitored and maintained or improved  Outcome: Progressing  Flowsheets (Taken 5/16/2024 2005)  Care Plan - Patient's Chronic Conditions and Co-Morbidity Symptoms are Monitored and Maintained or Improved: Monitor and assess patient's chronic conditions and comorbid symptoms for stability, deterioration, or improvement     Problem: ABCDS Injury Assessment  Goal: Absence of physical injury  Outcome: Progressing     
Will have no self-injury during hospital stay  Description: INTERVENTIONS:  1.  Ensure constant observer at bedside with Q15M safety checks  2.  Maintain a safe environment  3.  Secure patient belongings  4.  Ensure family/visitors adhere to safety recommendations  5.  Ensure safety tray has been added to patient's diet order  6.  Every shift and PRN: Re-assess suicidal risk via Frequent Screener    5/19/2024 0251 by Frances Dixon RN  Outcome: Progressing  Note: Suicide precautions DC.  5/18/2024 1704 by Libra Malcolm RN  Outcome: Progressing     Problem: Chronic Conditions and Co-morbidities  Goal: Patient's chronic conditions and co-morbidity symptoms are monitored and maintained or improved  5/19/2024 0251 by Frances Dixon RN  Outcome: Progressing  5/18/2024 1704 by Libra Malcolm RN  Outcome: Progressing  Flowsheets (Taken 5/18/2024 0832)  Care Plan - Patient's Chronic Conditions and Co-Morbidity Symptoms are Monitored and Maintained or Improved:   Monitor and assess patient's chronic conditions and comorbid symptoms for stability, deterioration, or improvement   Collaborate with multidisciplinary team to address chronic and comorbid conditions and prevent exacerbation or deterioration   Update acute care plan with appropriate goals if chronic or comorbid symptoms are exacerbated and prevent overall improvement and discharge     Problem: ABCDS Injury Assessment  Goal: Absence of physical injury  5/19/2024 0251 by Frances Dixon RN  Outcome: Progressing  5/18/2024 1704 by Libra Malcolm RN  Outcome: Progressing

## 2024-05-22 NOTE — CARE COORDINATION
Authorization started in [employer].  Auth ID: [id number]   Electronically signed by FABIAN Blackman on 5/21/2024 at 4:57 PM    
Call back from Alexa at [facility]. Patient is able to admit to facility today.     Transportation arranged for patient to go to [facility] at 1800 via LFN. Facility informed. Bedside nurse informed.     Number for Report: [phone]    
Insurance Authorization approved via [employer] portal.     Auth ID# [id number] Good through 5/24    Message to Tanesha at [facility] to see if patient is able to admit to facility today.   
ONGOING DISCHARGE  NOTE:        Writer reviewed notes, Patient is agreeable to discharge to [place] [address]  [facility] [address]  Phone [phone]  Fax [phone]       Pre cert has not been started.       Facility to do a onsite on Monday to see if they can accept pt     Will continue to follow for additional discharge needs.      If patient is discharged prior to next notation, then this note serves as note for discharge by case management.    Electronically signed by Nahomi Landaverde RN on 5/19/2024 at 8:19 AM    
ONGOING DISCHARGE  NOTE:      Writer reviewed notes, Patient is agreeable to discharge to [place] [address]  [facility] [address]  Phone [phone]  Fax [phone]      Pre cert has not been started.      Facility to do a onsite on Monday to see if they can accept pt    Will continue to follow for additional discharge needs.     If patient is discharged prior to next notation, then this note serves as note for discharge by case management.    Electronically signed by Nahomi Landaverde RN on 5/18/2024 at 2:16 PM    
ONGOING DISCHARGE PLAN:    Patient is alert and oriented x4.    Spoke with patient regarding discharge plan and patient states he would like to go to SNF. He does NOT want inpatient substance abuse center  He would like SNF in the [place] area.  Referral sent to [facility], [facility], and [facility].    Received call from Tessie at [facility] stating they are out of network with pt's insurance.    Per Dr. Mccord's notes, pt wants BHI. Per psych notes, possible BHI pending recommendations from attending.    Will continue to follow for additional discharge needs.    If patient is discharged prior to next notation, then this note serves as note for discharge by case management.    Electronically signed by Fani Haile RN on 5/16/2024 at 1:15 PM    Spoke with Tessie from [facility] and Rosina from [facility]. Both state pt's insurance is out of network. Spoke with Miya from [facility] who states they will do an on-site eval tomorrow. Informed Miya that pt may need to go to I, but that is to be determined.    Electronically signed by Fani Haile RN on 5/16/2024 at 3:55 PM    
Patient was provided with AOD resources. Patient expresses interest in inpatient rehab at this time. Writer will meet with patient again to obtain choice and coordinate admission to treatment facility.   
Writer is following for potential discharge placement.     Writer placed message to [facility] regarding on-site visit. Facility denies this pt.    Writer met with pt to discuss denial and other SNF options along with potential barriers to admission to facility due to alcohol use. Pt states he is agreeable to writer sending referrals in the [place] area.     Referrals sent to [facility], [facility], [facility], [facility], [facility].  Electronically signed by FABIAN Blackman on 5/20/2024 at 3:07 PM    
Writer is following for potential discharge placement.    Writer placed message to Alexa with [facility]. Concerns of home discharge plan. Writer met with pt, pt confirms his brother is moving so he will not have a place to return at discharge.    Writer placed call to Alexa to inform of this. Taking this referral to [facility].    Writer spoke to Niyah with [facility], requests referral faxed to [phone].    Writer placed message to Otis with [facility] to check determination of referral. Niyah has taken over admissions at [facility] per Otis.    Writer received message from Niyah with [facility] that facility could attempt a one time authorization with this pt.  Electronically signed by FABIAN Blackman on 5/21/2024 at 4:42 PM    Writer spoke to Alexa with [facility]. Facility is agreeable to accept this pt if he is willing to start Medicaid process with the facility, Alexa will e-mail forms to [employer].    Writer met with pt to discuss. Pt states he is agreeable to filling out any Medicaid paperwork for facility. Writer placed message to Alexa with e-mail address to send form.  Electronically signed by FABIAN Blackman on 5/21/2024 at 4:45 PM    
Writer met with patient to obtain AOD treatment choice. Patient reports he is here for pain and does not understand why he would go to treatment for alcohol use. Patient eludes to \"not wanting to be here\" and when asked to elaborate he asked if being suicidal would prevent him going to alcohol treatment facility. Patient states he \"is only here because he sold all of his guns\".    Bedside nurse notified via TSO3.   
Writer spoke to Miya at [facility], states [facility] will be here today to complete on-site. Requesting therapy notes once evals are completed.   
another message left with facility to see if patient is able to admit to facility today.     
if so, who? No  Plans to Return to Present Housing: Unknown at present  Other Identified Issues/Barriers to RETURNING to current housing: no  Potential Assistance needed at discharge: Other (Comment)            Potential DME:    Patient expects to discharge to: Behavioral Health/Substance/Detox  Plan for transportation at discharge: Family    Financial    Payor: [place] MEDICARE / Plan: [place] MEDICARE COMPLETE / Product Type: *No Product type* /     Does insurance require precert for SNF: No    Potential assistance Purchasing Medications: No  Meds-to-Beds request:        [facility] - [place], [place] - [address] - P [phone] - F [phone]  [address]  [address]  [facility] [address]  Phone: [phone] Fax: [phone]      Notes:    Factors facilitating achievement of predicted outcomes: Motivated, Cooperative, and Pleasant    Barriers to discharge: Medical complications    Additional Case Management Notes: 5/14/24 [place] [employer] Pt is from home with his brother LORNA cane and walker VNS denies Pt states that he may not be able to go back.  Pt has sitter at bedside, psych consult. Will follow for needs. .//tv       The Plan for Transition of Care is related to the following treatment goals of Suicidal ideation [R45.851]  Hypokalemia [E87.6]  Alcohol withdrawal syndrome with complication (HCC) [F10.939]  Alcohol withdrawal syndrome without complication (HCC) [F10.930]    IF APPLICABLE: The Patient and/or patient representative Mahesh and his family were provided with a choice of provider and agrees with the discharge plan. Freedom of choice list with basic dialogue that supports the patient's individualized plan of care/goals and shares the quality data associated with the providers was provided to: Patient   Patient Representative Name:       The Patient and/or Patient Representative Agree with the Discharge Plan? Yes    Nahomi Landaverde RN  Case Management Department  Ph:  Fax:

## 2024-05-24 NOTE — DISCHARGE SUMMARY
IN-PATIENT SERVICE   [facility] Internal Medicine    Discharge Summary     Patient ID: Mahesh Brownlee  :  1960   MRN: [id number]     ACCOUNT:  [id number]   Patient's PCP: Ira Roberts APRN - CNP  Admit Date: 2024   Discharge Date: 2024    Length of Stay: 9  Code Status:  Prior  Admitting Physician: Fior Mccord MD  Discharge Physician: Nidia Ritter MD     Active Discharge Diagnoses:     Primary Problem  Alcohol withdrawal syndrome with complication (HCC)      Hospital Problems  Active Hospital Problems    Diagnosis Date Noted    Suicidal ideation [R45.851] 2024    Alcohol withdrawal syndrome with complication (HCC) [F10.939] 2024       Admission Condition:  fair     Discharged Condition: fair    Hospital Stay:     Hospital Course:  Mahesh Brownlee is a 64 y.o. male who was admitted for the management of Alcohol withdrawal syndrome with complication (HCC) , presented to ER with Suicidal and Alcohol Problem    Mahesh Brownlee is a 64 y.o. Non- / non  male who presents with Suicidal and Alcohol Problem and is admitted to the hospital for the management of Alcohol withdrawal syndrome with complication (HCC). Medical history is significant for paroxysmal A-fib, chronic combined systolic and diastolic heart failure, HTN, HLD, COPD, tobacco abuse, alcohol abuse, hypothyroidism, history of CVA, TBI and depression. Presented to ED with suicidal ideation. Reports plan of drinking himself to death. Admits to alcohol abuse daily consisting of beer and liquor but will not report amounts. States that last drink was Saturday night. EtOH level in ED 0. Showing signs of withdrawal with tremors and elevated heart rate. Hypokalemia potassium 2.8. EKG sinus tachycardia. Denies fever, chills, chest pain, cough, diarrhea, and urinary symptoms. Symptoms are reported as acute, constant and moderate in severity.     Patient was treated for alcohol withdrawal, seen by

## 2024-05-31 RX ORDER — SPIRONOLACTONE 25 MG/1
25 TABLET ORAL DAILY
Qty: 90 TABLET | Refills: 3 | Status: SHIPPED | OUTPATIENT
Start: 2024-05-31

## 2024-06-02 RX ORDER — RIVAROXABAN 20 MG/1
20 TABLET, FILM COATED ORAL DAILY
Qty: 90 TABLET | Refills: 3 | Status: SHIPPED | OUTPATIENT
Start: 2024-06-02

## 2024-06-16 RX ORDER — SACUBITRIL AND VALSARTAN 24; 26 MG/1; MG/1
1 TABLET, FILM COATED ORAL 2 TIMES DAILY
Qty: 180 TABLET | Refills: 3 | Status: SHIPPED | OUTPATIENT
Start: 2024-06-16

## 2024-07-02 ENCOUNTER — OFFICE VISIT (OUTPATIENT)
Dept: NEUROLOGY | Age: 64
End: 2024-07-02
Payer: MEDICARE

## 2024-07-02 VITALS
HEIGHT: 71 IN | BODY MASS INDEX: 30.52 KG/M2 | SYSTOLIC BLOOD PRESSURE: 95 MMHG | HEART RATE: 64 BPM | WEIGHT: 218 LBS | DIASTOLIC BLOOD PRESSURE: 60 MMHG

## 2024-07-02 DIAGNOSIS — Z86.73 HISTORY OF CVA (CEREBROVASCULAR ACCIDENT): ICD-10-CM

## 2024-07-02 DIAGNOSIS — R41.3 MEMORY DIFFICULTIES: Primary | ICD-10-CM

## 2024-07-02 DIAGNOSIS — F10.11 HISTORY OF ETOH ABUSE: ICD-10-CM

## 2024-07-02 DIAGNOSIS — R26.2 DIFFICULTY IN WALKING: ICD-10-CM

## 2024-07-02 PROCEDURE — 1123F ACP DISCUSS/DSCN MKR DOCD: CPT | Performed by: PSYCHIATRY & NEUROLOGY

## 2024-07-02 PROCEDURE — 3078F DIAST BP <80 MM HG: CPT | Performed by: PSYCHIATRY & NEUROLOGY

## 2024-07-02 PROCEDURE — 4004F PT TOBACCO SCREEN RCVD TLK: CPT | Performed by: PSYCHIATRY & NEUROLOGY

## 2024-07-02 PROCEDURE — 3074F SYST BP LT 130 MM HG: CPT | Performed by: PSYCHIATRY & NEUROLOGY

## 2024-07-02 PROCEDURE — G8427 DOCREV CUR MEDS BY ELIG CLIN: HCPCS | Performed by: PSYCHIATRY & NEUROLOGY

## 2024-07-02 PROCEDURE — 3017F COLORECTAL CA SCREEN DOC REV: CPT | Performed by: PSYCHIATRY & NEUROLOGY

## 2024-07-02 PROCEDURE — G8417 CALC BMI ABV UP PARAM F/U: HCPCS | Performed by: PSYCHIATRY & NEUROLOGY

## 2024-07-02 PROCEDURE — 99204 OFFICE O/P NEW MOD 45 MIN: CPT | Performed by: PSYCHIATRY & NEUROLOGY

## 2024-07-02 RX ORDER — METOPROLOL SUCCINATE 50 MG/1
TABLET, EXTENDED RELEASE ORAL
COMMUNITY
Start: 2024-05-19

## 2024-07-02 RX ORDER — LORAZEPAM 0.5 MG/1
0.5 TABLET ORAL 3 TIMES DAILY PRN
COMMUNITY
Start: 2024-04-17

## 2024-07-02 NOTE — PROGRESS NOTES
NEUROLOGY CONSULT  Patient Name:       Mahesh Brownlee  :        1960  Clinic Visit Date:    2024        Dear Ira Benavides, APRN - CNP     I had the opportunity to see your patient, Mr. Mahesh Brownlee in neurology consultation today. As you know he  is a 64 y.o.  male with c/o \"I don't know why am I here\". He denies headaches, dizziness, nausea and vomiting, etc. But he also stated that he has been having memory difficulties describing it as \"major problem\".  He is presently at [facility].  He stated that he has been on rehab facility after having had left hip replacement and back surgery x 3 so far. He also had severe arthritic disease in both knees and rt hip. He was told to have surgery done on both knees for advanced arthritic disease.  He also stated that he has ongoing difficulty walking and has had history of CVA and was unable to provide further details.  Also adds that he has been having intermittent confusion spells along with ongoing memory difficulties.      Hospitalization summary:  Patient was admitted to [facility] on 10/26/2023 for new onset A Fib and was evaluated by neurology for headaches.   63-year-old male with past medical history of A-fib on Xarelto, depression, adjustment disorder, prior significant alcohol use, polysubstance abuse, current smoker, hyperlipidemia, who presented with transient memory loss.  History obtained from patient, brother over phone (both poor historians), and chart review. Also spoke to his son, Brad, over phone.     Patient has had multiple hospitalizations over the last year.  He was hospitalized in  for alcohol withdrawals at [facility].  At that time, he also reported suicidal ideation.  He has had prior admissions and ED visits for aggressive behavior, alcohol intoxication.  He was admitted at our hospital last month for possible seizure-like activity, unresponsiveness.  Initially required to be intubated.  He was seen by

## 2024-07-14 ENCOUNTER — HOSPITAL ENCOUNTER (INPATIENT)
Age: 64
LOS: 4 days | Discharge: PSYCHIATRIC OR SUBSTANCE USE FACILITY | End: 2024-07-18
Attending: EMERGENCY MEDICINE | Admitting: PSYCHIATRY & NEUROLOGY
Payer: MEDICARE

## 2024-07-14 ENCOUNTER — APPOINTMENT (OUTPATIENT)
Dept: CT IMAGING | Age: 64
End: 2024-07-14
Payer: MEDICARE

## 2024-07-14 ENCOUNTER — APPOINTMENT (OUTPATIENT)
Dept: GENERAL RADIOLOGY | Age: 64
End: 2024-07-14
Payer: MEDICARE

## 2024-07-14 DIAGNOSIS — G93.41 METABOLIC ENCEPHALOPATHY: ICD-10-CM

## 2024-07-14 DIAGNOSIS — R41.0 CONFUSION: Primary | ICD-10-CM

## 2024-07-14 LAB
ALBUMIN SERPL-MCNC: 4.5 G/DL (ref 3.5–5.2)
ALBUMIN/GLOB SERPL: 2 {RATIO} (ref 1–2.5)
ALP SERPL-CCNC: 74 U/L (ref 40–129)
ALT SERPL-CCNC: 6 U/L (ref 10–50)
AMMONIA PLAS-SCNC: 26 UMOL/L (ref 16–60)
AMPHET UR QL SCN: NEGATIVE
ANION GAP SERPL CALCULATED.3IONS-SCNC: 12 MMOL/L (ref 9–16)
APAP SERPL-MCNC: <5 UG/ML (ref 10–30)
AST SERPL-CCNC: 22 U/L (ref 10–50)
BACTERIA URNS QL MICRO: ABNORMAL
BARBITURATES UR QL SCN: NEGATIVE
BASOPHILS # BLD: 0.04 K/UL (ref 0–0.2)
BASOPHILS NFR BLD: 0 % (ref 0–2)
BENZODIAZ UR QL: POSITIVE
BILIRUB SERPL-MCNC: 0.5 MG/DL (ref 0–1.2)
BILIRUB UR QL STRIP: NEGATIVE
BODY TEMPERATURE: 37
BUN SERPL-MCNC: 10 MG/DL (ref 8–23)
CALCIUM SERPL-MCNC: 9.4 MG/DL (ref 8.6–10.4)
CANNABINOIDS UR QL SCN: POSITIVE
CASTS #/AREA URNS LPF: ABNORMAL /LPF (ref 0–8)
CHLORIDE SERPL-SCNC: 101 MMOL/L (ref 98–107)
CLARITY UR: CLEAR
CO2 SERPL-SCNC: 24 MMOL/L (ref 20–31)
COCAINE UR QL SCN: NEGATIVE
COHGB MFR BLD: 2.6 % (ref 0–5)
COLOR UR: YELLOW
CREAT SERPL-MCNC: 0.8 MG/DL (ref 0.7–1.2)
EOSINOPHIL # BLD: <0.03 K/UL (ref 0–0.44)
EOSINOPHILS RELATIVE PERCENT: 0 % (ref 1–4)
EPI CELLS #/AREA URNS HPF: ABNORMAL /HPF (ref 0–5)
ERYTHROCYTE [DISTWIDTH] IN BLOOD BY AUTOMATED COUNT: 14 % (ref 11.8–14.4)
ERYTHROCYTE [SEDIMENTATION RATE] IN BLOOD BY PHOTOMETRIC METHOD: 3 MM/HR (ref 0–20)
ETHANOL PERCENT: <0.01 %
ETHANOLAMINE SERPL-MCNC: <10 MG/DL (ref 0–0.08)
FENTANYL UR QL: NEGATIVE
FIO2 ON VENT: ABNORMAL %
FOLATE SERPL-MCNC: >20 NG/ML (ref 4.8–24.2)
GFR, ESTIMATED: >90 ML/MIN/1.73M2
GLUCOSE BLD-MCNC: 115 MG/DL (ref 75–110)
GLUCOSE SERPL-MCNC: 115 MG/DL (ref 74–99)
GLUCOSE UR STRIP-MCNC: NEGATIVE MG/DL
HCO3 VENOUS: 25.2 MMOL/L (ref 24–30)
HCT VFR BLD AUTO: 40.9 % (ref 40.7–50.3)
HGB BLD-MCNC: 13.7 G/DL (ref 13–17)
HGB UR QL STRIP.AUTO: ABNORMAL
IMM GRANULOCYTES # BLD AUTO: 0.04 K/UL (ref 0–0.3)
IMM GRANULOCYTES NFR BLD: 0 %
KETONES UR STRIP-MCNC: NEGATIVE MG/DL
LEUKOCYTE ESTERASE UR QL STRIP: NEGATIVE
LYMPHOCYTES NFR BLD: 1.43 K/UL (ref 1.1–3.7)
LYMPHOCYTES RELATIVE PERCENT: 9 % (ref 24–43)
MAGNESIUM SERPL-MCNC: 1.8 MG/DL (ref 1.6–2.4)
MCH RBC QN AUTO: 30.9 PG (ref 25.2–33.5)
MCHC RBC AUTO-ENTMCNC: 33.5 G/DL (ref 28.4–34.8)
MCV RBC AUTO: 92.3 FL (ref 82.6–102.9)
METHADONE UR QL: NEGATIVE
MONOCYTES NFR BLD: 0.82 K/UL (ref 0.1–1.2)
MONOCYTES NFR BLD: 5 % (ref 3–12)
NEGATIVE BASE EXCESS, VEN: 0.3 MMOL/L (ref 0–2)
NEUTROPHILS NFR BLD: 86 % (ref 36–65)
NEUTS SEG NFR BLD: 12.88 K/UL (ref 1.5–8.1)
NITRITE UR QL STRIP: NEGATIVE
NRBC BLD-RTO: 0 PER 100 WBC
O2 SAT, VEN: 44.4 % (ref 60–85)
OPIATES UR QL SCN: NEGATIVE
OXYCODONE UR QL SCN: POSITIVE
PCO2 VENOUS: 46.5 MM HG (ref 39–55)
PCP UR QL SCN: NEGATIVE
PH UR STRIP: 7 [PH] (ref 5–8)
PH VENOUS: 7.35 (ref 7.32–7.42)
PLATELET # BLD AUTO: 278 K/UL (ref 138–453)
PMV BLD AUTO: 10.1 FL (ref 8.1–13.5)
PO2 VENOUS: 27.5 MM HG (ref 30–50)
POTASSIUM SERPL-SCNC: 3.9 MMOL/L (ref 3.7–5.3)
PROCALCITONIN SERPL-MCNC: 0.07 NG/ML (ref 0–0.09)
PROT SERPL-MCNC: 7.1 G/DL (ref 6.6–8.7)
PROT UR STRIP-MCNC: NEGATIVE MG/DL
RBC # BLD AUTO: 4.43 M/UL (ref 4.21–5.77)
RBC #/AREA URNS HPF: ABNORMAL /HPF (ref 0–4)
SALICYLATES SERPL-MCNC: <0.5 MG/DL (ref 0–10)
SODIUM SERPL-SCNC: 137 MMOL/L (ref 136–145)
SP GR UR STRIP: 1.04 (ref 1–1.03)
TEST INFORMATION: ABNORMAL
TROPONIN I SERPL HS-MCNC: 18 NG/L (ref 0–22)
TROPONIN I SERPL HS-MCNC: 20 NG/L (ref 0–22)
TSH SERPL DL<=0.05 MIU/L-ACNC: 1.31 UIU/ML (ref 0.27–4.2)
UROBILINOGEN UR STRIP-ACNC: NORMAL EU/DL (ref 0–1)
VIT B12 SERPL-MCNC: 385 PG/ML (ref 232–1245)
WBC #/AREA URNS HPF: ABNORMAL /HPF (ref 0–5)
WBC OTHER # BLD: 15.2 K/UL (ref 3.5–11.3)

## 2024-07-14 PROCEDURE — 84145 PROCALCITONIN (PCT): CPT

## 2024-07-14 PROCEDURE — 93005 ELECTROCARDIOGRAM TRACING: CPT | Performed by: STUDENT IN AN ORGANIZED HEALTH CARE EDUCATION/TRAINING PROGRAM

## 2024-07-14 PROCEDURE — 80307 DRUG TEST PRSMV CHEM ANLYZR: CPT

## 2024-07-14 PROCEDURE — 99285 EMERGENCY DEPT VISIT HI MDM: CPT

## 2024-07-14 PROCEDURE — 83735 ASSAY OF MAGNESIUM: CPT

## 2024-07-14 PROCEDURE — 82947 ASSAY GLUCOSE BLOOD QUANT: CPT

## 2024-07-14 PROCEDURE — 6360000002 HC RX W HCPCS: Performed by: EMERGENCY MEDICINE

## 2024-07-14 PROCEDURE — 70498 CT ANGIOGRAPHY NECK: CPT

## 2024-07-14 PROCEDURE — 84425 ASSAY OF VITAMIN B-1: CPT

## 2024-07-14 PROCEDURE — 84443 ASSAY THYROID STIM HORMONE: CPT

## 2024-07-14 PROCEDURE — 82805 BLOOD GASES W/O2 SATURATION: CPT

## 2024-07-14 PROCEDURE — 87086 URINE CULTURE/COLONY COUNT: CPT

## 2024-07-14 PROCEDURE — G0480 DRUG TEST DEF 1-7 CLASSES: HCPCS

## 2024-07-14 PROCEDURE — 6360000004 HC RX CONTRAST MEDICATION: Performed by: STUDENT IN AN ORGANIZED HEALTH CARE EDUCATION/TRAINING PROGRAM

## 2024-07-14 PROCEDURE — 2580000003 HC RX 258: Performed by: STUDENT IN AN ORGANIZED HEALTH CARE EDUCATION/TRAINING PROGRAM

## 2024-07-14 PROCEDURE — 80053 COMPREHEN METABOLIC PANEL: CPT

## 2024-07-14 PROCEDURE — 85652 RBC SED RATE AUTOMATED: CPT

## 2024-07-14 PROCEDURE — 70450 CT HEAD/BRAIN W/O DYE: CPT

## 2024-07-14 PROCEDURE — 81001 URINALYSIS AUTO W/SCOPE: CPT

## 2024-07-14 PROCEDURE — 82607 VITAMIN B-12: CPT

## 2024-07-14 PROCEDURE — 85025 COMPLETE CBC W/AUTO DIFF WBC: CPT

## 2024-07-14 PROCEDURE — 80179 DRUG ASSAY SALICYLATE: CPT

## 2024-07-14 PROCEDURE — 82140 ASSAY OF AMMONIA: CPT

## 2024-07-14 PROCEDURE — 71045 X-RAY EXAM CHEST 1 VIEW: CPT

## 2024-07-14 PROCEDURE — 36415 COLL VENOUS BLD VENIPUNCTURE: CPT

## 2024-07-14 PROCEDURE — 99223 1ST HOSP IP/OBS HIGH 75: CPT | Performed by: PSYCHIATRY & NEUROLOGY

## 2024-07-14 PROCEDURE — 82746 ASSAY OF FOLIC ACID SERUM: CPT

## 2024-07-14 PROCEDURE — 2580000003 HC RX 258: Performed by: EMERGENCY MEDICINE

## 2024-07-14 PROCEDURE — 2060000000 HC ICU INTERMEDIATE R&B

## 2024-07-14 PROCEDURE — 2500000003 HC RX 250 WO HCPCS: Performed by: EMERGENCY MEDICINE

## 2024-07-14 PROCEDURE — 80143 DRUG ASSAY ACETAMINOPHEN: CPT

## 2024-07-14 PROCEDURE — 84484 ASSAY OF TROPONIN QUANT: CPT

## 2024-07-14 RX ORDER — 0.9 % SODIUM CHLORIDE 0.9 %
1000 INTRAVENOUS SOLUTION INTRAVENOUS ONCE
Status: COMPLETED | OUTPATIENT
Start: 2024-07-14 | End: 2024-07-14

## 2024-07-14 RX ADMIN — SODIUM CHLORIDE 1000 ML: 9 INJECTION, SOLUTION INTRAVENOUS at 13:50

## 2024-07-14 RX ADMIN — THIAMINE HYDROCHLORIDE: 100 INJECTION, SOLUTION INTRAMUSCULAR; INTRAVENOUS at 22:00

## 2024-07-14 RX ADMIN — IOPAMIDOL 90 ML: 755 INJECTION, SOLUTION INTRAVENOUS at 15:23

## 2024-07-14 ASSESSMENT — PAIN - FUNCTIONAL ASSESSMENT: PAIN_FUNCTIONAL_ASSESSMENT: 0-10

## 2024-07-14 ASSESSMENT — LIFESTYLE VARIABLES: HOW OFTEN DO YOU HAVE A DRINK CONTAINING ALCOHOL: NEVER

## 2024-07-14 NOTE — ED PROVIDER NOTES
[facility] ED  eMERGENCY dEPARTMENT eNCOUnter   Attending Attestation     Pt Name: Mahesh Brownlee  MRN: [id number]  Birthdate 1960  Date of evaluation: 7/14/24       Mahesh Brownlee is a 64 y.o. male who presents with Altered Mental Status      1:49 PM EDT      History: Patient presents with altered mental status including worsening confusion.  Patient did have an episode of vomiting this morning and seemed to have worsening mental status after this.  Patient is coming from a facility.        Exam: Heart rate and rhythm are regular.  Lungs are clear to auscultation bilaterally.    Abdomen is soft, nontender.  Patient has no memory of what happened earlier today, patient has no memory of his history.  Patient is alert and oriented to self only.    Plan for labs, likely admission.  Concern for infection with vomiting and worsening confusion.      EKG shows normal sinus rhythm with rate of 78.  Normal axis.  No ST elevation or depression.  T waves are upright except in lead V2.  No blocks or arrhythmias.  Nonspecific EKG.      I performed a history and physical examination of the patient and discussed management with the resident. I reviewed the resident’s note and agree with the documented findings and plan of care. Any areas of disagreement are noted on the chart. I was personally present for the key portions of any procedures. I have documented in the chart those procedures where I was not present during the key portions. I have personally reviewed all images and agree with the resident's interpretation. I have reviewed the emergency nurses triage note. I agree with the chief complaint, past medical history, past surgical history, allergies, medications, social and family history as documented unless otherwise noted below. Documentation of the HPI, Physical Exam and Medical Decision Making performed by medical students or scribes is based on my personal performance of the HPI, PE and MDM. For  Phys Assistant/ Nurse Practitioner cases/documentation I have had a face to face evaluation of this patient and have completed at least one if not all key elements of the E/M (history, physical exam, and MDM). Additional findings are as noted.    For APC cases I have personally evaluated and examined the patient in conjunction with the APC and agree with the treatment plan and disposition of the patient as recorded by the APC.    Tomy Rivas MD  Attending Emergency  Physician       Tomy Rivas MD  07/14/24 [id number]

## 2024-07-14 NOTE — ED PROVIDER NOTES
[facility]  FACULTY HANDOFF       Handoff taken on the following patient from prior Attending Physician:  Pt Name: Mahesh Brownlee  PCP:  Ira Roberts, APRN - CNP    Attestation  I was available and discussed any additional care issues that arose and coordinated the management plans with the resident(s) caring for the patient during my duty period. Any areas of disagreement with resident's documentation of care or procedures are noted on the chart. I was personally present for the key portions of any/all procedures during my duty period. I have documented in the chart those procedures where I was not present during the key portions.           Fabian Witt MD  07/14/24 [id number]

## 2024-07-14 NOTE — ED NOTES
Patient suddenly tachycardic 130 bpm on monitor. Patient hypoxic 87%. Patient altered on arrival to the room. Nonverbal. Looking mostly only to the left. Patient tapping his right finger on the bedrail. Patient slowly became verbal. Unable to identify objects initially.   Patient placed on ceribell.  Patient placed on 2L NC oxygen with improvement of oxygen saturation to 96%.  Neurology resident at bedside.

## 2024-07-14 NOTE — CONSULTS
Endovascular Neurosurgery Consult      Reason for evaluation:     SUBJECTIVE:   History of Chief Complaint:    This is a 64 year old male with hx of A.fib on Xarelto, CAD, COPD, Htn, HLD, adjustment disorder, alcohol abuse, polysubstance abuse, and current smoking. Pt presented with recurrent episodes of vomiting and altered mental status. 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     Allergies  is allergic to augmentin es-600 [amoxicillin-pot clavulanate] and sulfa antibiotics.  Medications  Prior to Admission medications    Medication Sig Start Date End Date Taking? Authorizing Provider   LORazepam (ATIVAN) 0.5 MG tablet Take 1 tablet by mouth 3 times daily as needed for Anxiety. 4/17/24   Madan Julien MD   metoprolol succinate (TOPROL XL) 50 MG extended release tablet  5/19/24   Madan Julien MD   ENTRESTO 24-26 MG per tablet TAKE 1 TABLET BY MOUTH TWICE  DAILY 6/16/24   Ira Roberts APRN - CNP   XARELTO 20 MG TABS tablet TAKE 1 TABLET BY MOUTH DAILY  WITH BREAKFAST  Patient not taking: Reported on 7/2/2024 6/2/24   Ira Roberts, APRN - CNP   spironolactone (ALDACTONE) 25 MG tablet TAKE 1 TABLET BY MOUTH DAILY 5/31/24   Ira Roberts APRN - CNP   DULoxetine (CYMBALTA) 20 MG extended release capsule TAKE 1 CAPSULE BY MOUTH DAILY 5/21/24   Ira Roberts, RIVER - DYLAN   metoprolol tartrate (LOPRESSOR) 50 MG tablet Take 1 tablet by mouth 2 times daily    Madan Julien MD   gabapentin (NEURONTIN) 100 MG capsule TAKE 2 CAPSULES BY MOUTH EVERY  EVENING FOR NEUROPATHIC PAIN 2/16/24 7/2/24  Ira Roberts, APRN - CNP   hydrOXYzine HCl (ATARAX) 10 MG tablet Take 1 tablet by mouth 3 times daily as needed for Anxiety 1/22/24   Ira Roberts, RIVER - CNP   atorvastatin (LIPITOR) 10 MG tablet Take 1 tablet by mouth nightly  Patient not taking: Reported on 7/2/2024 11/3/23   Ira Roberts,  home Xarelto  - Get MRI brain   - Further recs after reviewing MRI brain      Discussed with Dr. Donovan.    Please arrange follow-up with Dr. Scotty Benson clinic in 4 weeks, and with Dr. Donovan in 3-4 months.    Scotty Benson MD, Pager [phone]  Electronically signed 07/14/24 at 10:03 PM  Stroke, Neurocritical Care & Neurointervention  [facility]  [facility]

## 2024-07-14 NOTE — ED NOTES
The following labs were labeled with appropriate pt sticker and tubed to lab:     [x] Blue     [x] Lavender   [] on ice  [x] Green/yellow  [x] Green/black [] on ice  [] Grey  [] on ice  [] Yellow  [x] Red  [] Pink  [] Type/ Screen  [] ABG  [] VBG    [] COVID-19 swab    [] Rapid  [] PCR  [] Flu swab  [] Peds Viral Panel     [] Urine Sample  [] Fecal Sample  [] Pelvic Cultures  [] Blood Cultures  [] X 2  [] STREP Cultures  [] Wound Cultures

## 2024-07-14 NOTE — ED PROVIDER NOTES
[facility] ED  Emergency Department Encounter  Emergency Medicine Resident     Pt Name:Mahesh Brownlee  MRN: [id number]  Birthdate 1960  Date of evaluation: 7/14/24  PCP:  Ira Roberts APRN - CNP  Note Started: 12:58 PM EDT      CHIEF COMPLAINT       Chief Complaint   Patient presents with    Altered Mental Status       HISTORY OF PRESENT ILLNESS  (Location/Symptom, Timing/Onset, Context/Setting, Quality, Duration, Modifying Factors, Severity.)      Mahesh Brownlee is a 64 y.o. male with PMH including A-fib s/p AICD placement, alcohol dependence, CHF, COPD, HTN, and history of TBI with memory issues who presents emergency department via EMS after several episodes of vomiting after taking his home pain medication while at the nursing facility.  Patient has baseline memory deficit, but after discussing on the phone with his nursing facility he is usually alert and orient x 4 and is very pleasant.  Last known well by nursing roughly 8 am.  On arrival here, does not remember vomiting this morning.  He does not recall that he had a stroke in the past or he had a traumatic brain injury.  When asked if he had chest pain or shortness of breath he responds \"probably.\"  When asked if he had active chest pain or shortness of breath at this moment he is able to tell me he does not.  He denies active headaches, visual changes, numbness and tingling in any of his extremities, slurred speech.  No slurred speech on arrival.  No vomiting on arrival.    Patient is able to tell me his full name, however thinks it is September and 2022 and believes that it is Thursday.  I informed him that the date is July 14, 2024 and it is Sunday    PAST MEDICAL / SURGICAL / SOCIAL / FAMILY HISTORY      has a past medical history of Adjustment disorder, AF (atrial fibrillation) (HCC), AICD (automatic cardioverter/defibrillator) present, Alcohol dependence (HCC), CAD (coronary artery disease), Cardiomyopathy (HCC), CHF  dry.      Findings: No erythema or rash.   Neurological:      Mental Status: He is alert.      GCS: GCS eye subscore is 4. GCS verbal subscore is 5. GCS motor subscore is 6.      Cranial Nerves: No cranial nerve deficit, dysarthria or facial asymmetry.      Comments: Oriented to self only.           DDX/DIAGNOSTIC RESULTS / EMERGENCY DEPARTMENT COURSE / [facility]     Medical Decision Making  64 y.o. male with PMH including A-fib s/p AICD placement, alcohol dependence, CHF, COPD, HTN, and history of TBI with memory issues who presents emergency department via EMS after several episodes of vomiting after taking his home pain medication while at the nursing facility.  Patient has baseline memory deficit and struggles with a thorough history.  See HPI and physical exam for further detail.  Patient arrives in no apparent distress.  Questions patient baseline.  Will confirm this with the nursing facility.  Neuroexam normal aside from his orientation to time.  Moving extremities with baseline strength and coordination (bilateral LE weakness at baseline).  Sensation intact all 4 extremities.  Cranial nerves II through XII intact.  No need for stroke alert at this time as patient was noted to have intermittent bouts of confusion per most recent neurology note.  Patient denies chest pain, shortness of breath, abdominal pains.  Plan for cardiac workup and altered mental status workup.  Will discuss the case with patient's nursing home nursing staff and if any acute changes in his mentation, then plan for CT head and CTA head and neck    Anticipate neurology consult    Amount and/or Complexity of Data Reviewed  Labs: ordered. Decision-making details documented in ED Course.  Radiology: ordered. Decision-making details documented in ED Course.  ECG/medicine tests: ordered.    Risk  Prescription drug management.        EKG  Normal sinus rhythm.  Ventricular rate 78.  Normal axis.  Normal EKG    All EKG's are interpreted by the

## 2024-07-14 NOTE — ED PROVIDER NOTES
[facility] ED  Emergency Department  Emergency Medicine Resident Turn-Over     Note Started: 5:24 PM EDT    Care of Mahesh Brownlee was assumed from Dr. Rubi and is being seen for Altered Mental Status  .  The patient's initial evaluation and plan have been discussed with the prior provider who initially evaluated the patient.     EMERGENCY DEPARTMENT COURSE / MEDICAL DECISION MAKING:       MEDICATIONS GIVEN:  Orders Placed This Encounter   Medications    sodium chloride 0.9 % bolus 1,000 mL    iopamidol (ISOVUE-370) 76 % injection 90 mL    folic acid 1 mg, thiamine (B-1) 100 mg in sodium chloride 0.9 % 50 mL IVPB    rivaroxaban (XARELTO) tablet 20 mg     Order Specific Question:   Indication of Use     Answer:   A Fib/A Flutter       LABS / RADIOLOGY:     Labs Reviewed   COMPREHENSIVE METABOLIC PANEL - Abnormal; Notable for the following components:       Result Value    Glucose 115 (*)     ALT 6 (*)     All other components within normal limits   CBC WITH AUTO DIFFERENTIAL - Abnormal; Notable for the following components:    WBC 15.2 (*)     Neutrophils % 86 (*)     Lymphocytes % 9 (*)     Eosinophils % 0 (*)     Neutrophils Absolute 12.88 (*)     All other components within normal limits   URINALYSIS WITH MICROSCOPIC - Abnormal; Notable for the following components:    Specific Gravity, UA 1.042 (*)     Urine Hgb TRACE (*)     All other components within normal limits   TOX SCR, BLD, ED - Abnormal; Notable for the following components:    Acetaminophen Level <5 (*)     All other components within normal limits   URINE DRUG SCREEN - Abnormal; Notable for the following components:    Benzodiazepine Screen, Urine POSITIVE (*)     Cannabinoid Scrn, Ur POSITIVE (*)     Oxycodone Screen, Ur POSITIVE (*)     All other components within normal limits   BLOOD GAS, VENOUS - Abnormal; Notable for the following components:    PO2, Dennis 27.5 (*)     O2 Sat, Dennis 44.4 (*)     All other components within  morning of nausea, vomiting, worsening confusion compared to normal.  ANO x 1, to self only.  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  Neuroendovascular was consulted who states they feel this is likely chronic.  Subacute stroke shown as well which they feel is part lower bleed to be further than subacute.  Recommending neurology evaluation.  Neurology was consulted and pending recommendations.  Labs otherwise notable for slight leukocytosis at 15.2.  Chest x-ray unremarkable.  Pending urinalysis.    ED Course as of 07/15/24 0330   Sun Jul 14, 2024   1405 Ethanol Lvl: <10 [GC]   1405 Acetaminophen Level(!): <5 [GC]   1405 Salicyclic Acid: <0.5 [GC]   1406 Ammonia: 26 [GC]   1406 CMP unremarkable.  Normal LFTs.  Normal renal function.  Electrolytes unremarkable.  Glucose 115 [GC]   1434 Troponin 20, down to 18 with normal EKG. Troponins at baseline [GC]   1619 CT HEAD WO CONTRAST [GC]   1619 CTA HEAD NECK W CONTRAST  IMPRESSION:  1. No acute intracranial abnormality.  2. 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  3. 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  4. No significant stenosis or large vessel occlusion in the head or neck.  5. Left upper lobe hazy opacity may represent atelectasis versus airspace  disease such as pneumonia.   [GC]   1733 Reevaluated patient.  Continues to be in no apparent distress however he asked me where he has been staying for the last couple months because he is unsure.  I discussed that he is staying [facility] and he \"had no idea.\" [GC]   1736 Discussed case with the stroke team given the left vertebral artery occlusion.  They state that this head is chronic and there is nothing acute to intervene on.  They are recommending neurology to evaluate for confusion [GC]   1737 Neurology consulted.  Awaiting their recommendations.  Patient signed out to co-resident for further

## 2024-07-14 NOTE — ED NOTES
Patient presents to ED via EMS from [facility] for evaluation of altered mental status. Patient was given a percocet around 0930 this morning, vomited shortly after, and has been acting abnormally since around 1100. Patient has hx alcohol use, TIA in the past. Patient is alert and oriented to person and place on arrival. Unable to provide the correct year/month/day. Patient denies any pain or physical complaints.  Patient is following commands. Respirations even and unlabored. Patient changed into gown, placed on full cardiac monitor, BP cuff, and pulse ox. EKG completed. IV established. Call light within reach. Will continue to monitor.

## 2024-07-14 NOTE — ED NOTES
Patient woke up and called out to the nurse's station because he urinated on himself. Patient does not recall doing it, must have urinated while in his sleep. Underwear and shorts are saturated in urine. Underwear and shorts removed. Patient's gown changed. Patient's linens changed. Patient assisted back into bed and placed back on full monitor. New warm blankets given. Awaiting neurology consult. Patient given urinal.  Patient resting comfortably and in no acute distress. Respirations even and nonlabored. Patient denies any needs at this time. Bed in lowest position. Call light within reach. Will continue to monitor.

## 2024-07-14 NOTE — CONSULTS
[facility] Neurology   IN-PATIENT SERVICE   [facility]    Neurology Consult Note            Date:   7/14/2024  Patient name:  Mahesh Brownlee  Date of admission:  7/14/2024 12:50 PM  MRN:   [id number]  Account:  [id number]  YOB: 1960  PCP:    Ira Roberts APRN - CNP  Room:   23/23  Code Status:    Prior    Chief Complaint:     Chief Complaint   Patient presents with    Altered Mental Status       History Obtained From:     patient, electronic medical record    History of Present Illness:     64-year-old male with past medical history of atrial fibrillation on Xarelto, CAD, COPD, hypertension, hyperlipidemia, adjustment disorder, significant alcohol abuse history, polysubstance abuse, current smoker presents  to [place] [facility] presents with several episodes of vomiting.  This    As per the nursing staff, last known well is roughly 8 AM on 7/14/2024.  Patient was noted to have memory deficits with evidence of not remembering that he had a stroke or TBI.  CTA showed chronic left vertebral artery occlusion, endovascular says nothing to intervene on.  Neurology consulted for confusion.    Patient was seen in outpatient neurology office on 7/2. B12, folate, and TSH are unremarkable. According to EMR, patient does have significant hx of alcohol abuse, substance abuse, and uses high dose marijuana edibles.   He was recently admitted to [facility] in June 2024 for alcohol withdrawal.  At that time he also reported suicidal ideation.  He has prior ER admissions for aggressive behavior and alcohol intoxication.  Prior EEG showed slowing with no epileptiform discharges.  MRI brain showed no acute findings.  Etiology was thought to be toxic metabolic encephalopathy.  He was also admitted to [facility] for delusional thoughts. MMSE done at office visit on 7/2 showed 28/30.     Pertinent labs include ammonia 26, troponin 20, tox screen negative,    Pertinent labs include right extremity WBC 15.2.   hearing   IX,X -     Symmetrical palate  XI    -     Symmetrical shoulder shrug  XII   -     Midline tongue, no atrophy    MOTOR FUNCTION: RUE: Significant for good strength of grade 5/5 in proximal and distal muscle groups   LUE: Significant for good strength of grade 5/5 in proximal and distal muscle groups   RLE: Significant for good strength of grade 5/5 in proximal and distal muscle groups   LLE: Significant for good strength of grade 5/5 in proximal and distal muscle groups      Normal bulk, normal tone and no involuntary movements, no tremor   SENSORY FUNCTION:  Normal touch, normal pinprick, normal vibration, normal proprioception   CEREBELLAR FUNCTION:  Intact fine motor control over upper limbs and lower limbs   REFLEX FUNCTION:  Symmetric in upper and lower extremities, no Babinski sign   STATION and GAIT  Normal gait and tandem station, normal tip toes and heel walking     INITIAL NIH STROKE SCALE:    1a.  Level of consciousness:  {LEVEL OF CONSCIOUSNESS:327538012}  1b.  Level of consciousness questions:  {QUESTIONS:917940129}  1c.  Level of consciousness questions:  {COMMANDS:405336207}  2.    Best Gaze:  {BEST GAZE:325371615}  3.    Visual:  {VISUAL:143799036}  4.    Facial Palsy:  {FACIAL PALSY:339524863}  5a.  Motor left arm:  {MOTOR-ARM:496479677}  5b.  Motor right arm:  {MOTOR-ARM:369038176}  6a.  Motor left leg:  {MOTOR-LE}  6b.  Motor right leg:  {MOTOR-LE}  7.    Limb Ataxia:  {LIMB ATAXIA:652888391}  8.    Sensory:  {SENSORY:741686494}  9.    Best Language:  {BEST LANGUAGE:485936985}  10.  Dysarthria:  {DYSARTHRIA:420506295}  11.  Extinction and Inattention:  {EXTINCTION AND INATTENTION:555728616}    TOTAL:  {NUMBERS 0-42:194429147}    Investigations:      Laboratory Testing:  Recent Results (from the past 24 hour(s))   Comprehensive Metabolic Panel    Collection Time: 24  1:04 PM   Result Value Ref Range    Sodium 137 136 - 145 mmol/L    Potassium 3.9 3.7 - 5.3

## 2024-07-15 PROBLEM — G93.41 METABOLIC ENCEPHALOPATHY: Status: ACTIVE | Noted: 2024-07-15

## 2024-07-15 PROBLEM — I50.20 HFREF (HEART FAILURE WITH REDUCED EJECTION FRACTION) (HCC): Status: ACTIVE | Noted: 2024-07-15

## 2024-07-15 PROBLEM — I65.02 OCCLUSION AND STENOSIS OF LEFT VERTEBRAL ARTERY: Status: ACTIVE | Noted: 2024-07-15

## 2024-07-15 PROBLEM — R41.0 CONFUSION: Status: ACTIVE | Noted: 2023-09-21

## 2024-07-15 PROBLEM — I48.92 PAROXYSMAL ATRIAL FLUTTER (HCC): Status: ACTIVE | Noted: 2024-07-15

## 2024-07-15 PROBLEM — F10.96 ALCOHOLIC KORSAKOFF SYNDROME (HCC): Status: ACTIVE | Noted: 2024-07-15

## 2024-07-15 PROBLEM — Z87.898 HISTORY OF ALCOHOL USE DISORDER: Status: ACTIVE | Noted: 2024-07-15

## 2024-07-15 LAB
EKG ATRIAL RATE: 78 BPM
EKG P AXIS: 83 DEGREES
EKG P-R INTERVAL: 166 MS
EKG Q-T INTERVAL: 402 MS
EKG QRS DURATION: 98 MS
EKG QTC CALCULATION (BAZETT): 458 MS
EKG R AXIS: 14 DEGREES
EKG T AXIS: 44 DEGREES
EKG VENTRICULAR RATE: 78 BPM
ETHANOL PERCENT: <0.01 %
ETHANOLAMINE SERPL-MCNC: <10 MG/DL (ref 0–0.08)
LACTIC ACID, WHOLE BLOOD: 0.9 MMOL/L (ref 0.7–2.1)
MICROORGANISM SPEC CULT: NORMAL
SERVICE CMNT-IMP: NORMAL
SPECIMEN DESCRIPTION: NORMAL

## 2024-07-15 PROCEDURE — 99222 1ST HOSP IP/OBS MODERATE 55: CPT | Performed by: INTERNAL MEDICINE

## 2024-07-15 PROCEDURE — 99223 1ST HOSP IP/OBS HIGH 75: CPT | Performed by: STUDENT IN AN ORGANIZED HEALTH CARE EDUCATION/TRAINING PROGRAM

## 2024-07-15 PROCEDURE — 83605 ASSAY OF LACTIC ACID: CPT

## 2024-07-15 PROCEDURE — 6370000000 HC RX 637 (ALT 250 FOR IP)

## 2024-07-15 PROCEDURE — 2580000003 HC RX 258

## 2024-07-15 PROCEDURE — 95719 EEG PHYS/QHP EA INCR W/O VID: CPT | Performed by: PSYCHIATRY & NEUROLOGY

## 2024-07-15 PROCEDURE — 6360000002 HC RX W HCPCS

## 2024-07-15 PROCEDURE — 36415 COLL VENOUS BLD VENIPUNCTURE: CPT

## 2024-07-15 PROCEDURE — 2060000000 HC ICU INTERMEDIATE R&B

## 2024-07-15 PROCEDURE — 99233 SBSQ HOSP IP/OBS HIGH 50: CPT | Performed by: PSYCHIATRY & NEUROLOGY

## 2024-07-15 PROCEDURE — 95813 EEG EXTND MNTR 61-119 MIN: CPT

## 2024-07-15 PROCEDURE — 95708 EEG WO VID EA 12-26HR UNMNTR: CPT

## 2024-07-15 RX ORDER — METOPROLOL TARTRATE 50 MG/1
50 TABLET, FILM COATED ORAL 2 TIMES DAILY
Status: DISCONTINUED | OUTPATIENT
Start: 2024-07-15 | End: 2024-07-16

## 2024-07-15 RX ORDER — THIAMINE HYDROCHLORIDE 100 MG/ML
100 INJECTION, SOLUTION INTRAMUSCULAR; INTRAVENOUS DAILY
Status: DISCONTINUED | OUTPATIENT
Start: 2024-07-17 | End: 2024-07-18 | Stop reason: HOSPADM

## 2024-07-15 RX ORDER — POTASSIUM CHLORIDE 7.45 MG/ML
10 INJECTION INTRAVENOUS PRN
Status: DISCONTINUED | OUTPATIENT
Start: 2024-07-15 | End: 2024-07-18 | Stop reason: HOSPADM

## 2024-07-15 RX ORDER — UREA 10 %
1000 LOTION (ML) TOPICAL DAILY
Status: DISCONTINUED | OUTPATIENT
Start: 2024-07-15 | End: 2024-07-18 | Stop reason: HOSPADM

## 2024-07-15 RX ORDER — SODIUM CHLORIDE 0.9 % (FLUSH) 0.9 %
5-40 SYRINGE (ML) INJECTION PRN
Status: DISCONTINUED | OUTPATIENT
Start: 2024-07-15 | End: 2024-07-18 | Stop reason: HOSPADM

## 2024-07-15 RX ORDER — ACETAMINOPHEN 650 MG/1
650 SUPPOSITORY RECTAL EVERY 6 HOURS PRN
Status: DISCONTINUED | OUTPATIENT
Start: 2024-07-15 | End: 2024-07-18 | Stop reason: HOSPADM

## 2024-07-15 RX ORDER — POLYETHYLENE GLYCOL 3350 17 G/17G
17 POWDER, FOR SOLUTION ORAL DAILY PRN
Status: DISCONTINUED | OUTPATIENT
Start: 2024-07-15 | End: 2024-07-18 | Stop reason: HOSPADM

## 2024-07-15 RX ORDER — LORAZEPAM 2 MG/ML
4 INJECTION INTRAMUSCULAR EVERY 5 MIN PRN
Status: DISCONTINUED | OUTPATIENT
Start: 2024-07-15 | End: 2024-07-18 | Stop reason: HOSPADM

## 2024-07-15 RX ORDER — SODIUM CHLORIDE 0.9 % (FLUSH) 0.9 %
5-40 SYRINGE (ML) INJECTION EVERY 12 HOURS SCHEDULED
Status: DISCONTINUED | OUTPATIENT
Start: 2024-07-15 | End: 2024-07-18 | Stop reason: HOSPADM

## 2024-07-15 RX ORDER — MAGNESIUM SULFATE IN WATER 40 MG/ML
2000 INJECTION, SOLUTION INTRAVENOUS PRN
Status: DISCONTINUED | OUTPATIENT
Start: 2024-07-15 | End: 2024-07-18 | Stop reason: HOSPADM

## 2024-07-15 RX ORDER — LORAZEPAM 0.5 MG/1
0.5 TABLET ORAL 3 TIMES DAILY PRN
Status: DISCONTINUED | OUTPATIENT
Start: 2024-07-15 | End: 2024-07-18 | Stop reason: HOSPADM

## 2024-07-15 RX ORDER — ONDANSETRON 2 MG/ML
4 INJECTION INTRAMUSCULAR; INTRAVENOUS EVERY 6 HOURS PRN
Status: DISCONTINUED | OUTPATIENT
Start: 2024-07-15 | End: 2024-07-18 | Stop reason: HOSPADM

## 2024-07-15 RX ORDER — HYDROXYZINE HYDROCHLORIDE 10 MG/1
10 TABLET, FILM COATED ORAL 3 TIMES DAILY PRN
Status: DISCONTINUED | OUTPATIENT
Start: 2024-07-15 | End: 2024-07-18 | Stop reason: HOSPADM

## 2024-07-15 RX ORDER — FOLIC ACID 1 MG/1
1 TABLET ORAL DAILY
Status: DISCONTINUED | OUTPATIENT
Start: 2024-07-15 | End: 2024-07-18 | Stop reason: HOSPADM

## 2024-07-15 RX ORDER — ENOXAPARIN SODIUM 100 MG/ML
40 INJECTION SUBCUTANEOUS DAILY
Status: DISCONTINUED | OUTPATIENT
Start: 2024-07-15 | End: 2024-07-15

## 2024-07-15 RX ORDER — DONEPEZIL HYDROCHLORIDE 5 MG/1
5 TABLET, FILM COATED ORAL DAILY
Status: ON HOLD | COMMUNITY
End: 2024-07-22

## 2024-07-15 RX ORDER — POTASSIUM CHLORIDE 20 MEQ/1
40 TABLET, EXTENDED RELEASE ORAL PRN
Status: DISCONTINUED | OUTPATIENT
Start: 2024-07-15 | End: 2024-07-18 | Stop reason: HOSPADM

## 2024-07-15 RX ORDER — ONDANSETRON 4 MG/1
4 TABLET, ORALLY DISINTEGRATING ORAL EVERY 8 HOURS PRN
Status: DISCONTINUED | OUTPATIENT
Start: 2024-07-15 | End: 2024-07-18 | Stop reason: HOSPADM

## 2024-07-15 RX ORDER — SODIUM CHLORIDE 9 MG/ML
INJECTION, SOLUTION INTRAVENOUS PRN
Status: DISCONTINUED | OUTPATIENT
Start: 2024-07-15 | End: 2024-07-18 | Stop reason: HOSPADM

## 2024-07-15 RX ORDER — ACETAMINOPHEN 325 MG/1
650 TABLET ORAL EVERY 6 HOURS PRN
Status: DISCONTINUED | OUTPATIENT
Start: 2024-07-15 | End: 2024-07-18 | Stop reason: HOSPADM

## 2024-07-15 RX ORDER — DULOXETIN HYDROCHLORIDE 20 MG/1
20 CAPSULE, DELAYED RELEASE ORAL DAILY
Status: DISCONTINUED | OUTPATIENT
Start: 2024-07-15 | End: 2024-07-18 | Stop reason: HOSPADM

## 2024-07-15 RX ORDER — SPIRONOLACTONE 25 MG/1
25 TABLET ORAL DAILY
Status: DISCONTINUED | OUTPATIENT
Start: 2024-07-15 | End: 2024-07-18 | Stop reason: HOSPADM

## 2024-07-15 RX ADMIN — METOPROLOL TARTRATE 50 MG: 50 TABLET, FILM COATED ORAL at 20:51

## 2024-07-15 RX ADMIN — METOPROLOL TARTRATE 50 MG: 50 TABLET, FILM COATED ORAL at 10:24

## 2024-07-15 RX ADMIN — SPIRONOLACTONE 25 MG: 25 TABLET, FILM COATED ORAL at 10:24

## 2024-07-15 RX ADMIN — THIAMINE HYDROCHLORIDE: 100 INJECTION, SOLUTION INTRAMUSCULAR; INTRAVENOUS at 15:12

## 2024-07-15 RX ADMIN — FOLIC ACID 1 MG: 1 TABLET ORAL at 10:24

## 2024-07-15 RX ADMIN — THIAMINE HYDROCHLORIDE: 100 INJECTION, SOLUTION INTRAMUSCULAR; INTRAVENOUS at 21:33

## 2024-07-15 RX ADMIN — DULOXETINE HYDROCHLORIDE 20 MG: 20 CAPSULE, DELAYED RELEASE ORAL at 10:24

## 2024-07-15 RX ADMIN — HYDROXYZINE HYDROCHLORIDE 10 MG: 10 TABLET ORAL at 20:51

## 2024-07-15 RX ADMIN — THIAMINE HYDROCHLORIDE: 100 INJECTION, SOLUTION INTRAMUSCULAR; INTRAVENOUS at 10:34

## 2024-07-15 RX ADMIN — SODIUM CHLORIDE, PRESERVATIVE FREE 10 ML: 5 INJECTION INTRAVENOUS at 20:52

## 2024-07-15 RX ADMIN — Medication 1000 MCG: at 11:37

## 2024-07-15 RX ADMIN — RIVAROXABAN 20 MG: 20 TABLET, FILM COATED ORAL at 10:24

## 2024-07-15 RX ADMIN — SACUBITRIL AND VALSARTAN 1 TABLET: 24; 26 TABLET, FILM COATED ORAL at 21:32

## 2024-07-15 RX ADMIN — SACUBITRIL AND VALSARTAN 1 TABLET: 24; 26 TABLET, FILM COATED ORAL at 10:24

## 2024-07-15 RX ADMIN — LORAZEPAM 0.5 MG: 0.5 TABLET ORAL at 20:57

## 2024-07-15 RX ADMIN — SODIUM CHLORIDE, PRESERVATIVE FREE 10 ML: 5 INJECTION INTRAVENOUS at 10:24

## 2024-07-15 ASSESSMENT — ENCOUNTER SYMPTOMS
COLOR CHANGE: 0
NAUSEA: 0
VOMITING: 0

## 2024-07-15 NOTE — PROCEDURES
PROCEDURE NOTE  Date: 7/15/2024   Name: Mahesh Brownlee  YOB: 1960    Procedures            Date: 7/15/2024  Referring physician: Dr. Tate    Indication  Patient aged 64 y with encephalopathy. EEG done to assess for epileptiform activity.    Introduction  This  13 hrs EEG was recorded using the ClientShow one band system. Automated seizure detection algorithms were applied.    Description  During the maximal alert state, a well-regulated, symmetric, and reactive 7 Hz posterior dominant rhythm was seen. No consistent focal slowing or interhemispheric asymmetry was noted. Stage I and stage II sleep were observed. There were no interictal epileptiform discharges or electrographic seizures.    Activations  Hyperventilation was not performed. Intermittent photic stimulation was not performed.    Impression  Abnormal awake EEG. The slowing mentioned above suggests mild non specific encephalopathy. Limited study.     No epileptiform discharges were identified. Please note the absence of such activity on this record cannot conclusively rule out an epileptic disorder. If such is still clinically suspected, a repeat study with sleep deprivation and/or prolonged sampling may be helpful.    Please note this EEG was meant to screen for emergent condition and is prone to artifact and with some limitations. The interpretation of this EEG result should be taken only with clinical correlation. Ideally regular EEG with full leads should be considered when possible.     Evelyn Quintero MD  Epilepsy Board Certified.  Neurology Board Certified.    Electronically Signed

## 2024-07-15 NOTE — ED NOTES
ED to inpatient nurses report      Chief Complaint:  Chief Complaint   Patient presents with    Altered Mental Status     Present to ED from: Patient suddenly tachycardic 130 bpm on monitor. Patient hypoxic 87%. Patient altered on arrival to the room. Nonverbal. Looking mostly only to the left. Patient tapping his right finger on the bedrail. Patient slowly became verbal. Unable to identify objects initially.   Patient placed on ceribell.  Patient placed on 2L NC oxygen with improvement of oxygen saturation to 96%.  Neurology resident at bedside      MOA:     LOC: alert and orientated to name, place, date  Mobility: Requires assistance * 2  Oxygen Baseline: room air    Current needs required:   Pending ED orders:   Present condition: RR non labored and even. Pt resting in bed.     Why did the patient come to the ED? Altered mental status  What is the plan? admit  Any procedures or intervention occur? EKG, meds, labs   Any safety concerns??   Fall risk   Mental Status:  Level of Consciousness: Alert (0)    Psych Assessment:   Psychosocial  Psychosocial (WDL): Within Defined Limits  Vital signs   Vitals:    07/14/24 1915 07/14/24 1945 07/14/24 2000 07/14/24 2100   BP: (!) 160/75 (!) 154/78 (!) 149/71 (!) 149/69   Pulse: 73 71 76 69   Resp: 16 11 12 10   Temp:       TempSrc:       SpO2: 98% 100% 98% 99%   Weight:       Height:            Vitals:  Patient Vitals for the past 24 hrs:   BP Temp Temp src Pulse Resp SpO2 Height Weight   07/14/24 2100 (!) 149/69 -- -- 69 10 99 % -- --   07/14/24 2000 (!) 149/71 -- -- 76 12 98 % -- --   07/14/24 1945 (!) 154/78 -- -- 71 11 100 % -- --   07/14/24 1915 (!) 160/75 -- -- 73 16 98 % -- --   07/14/24 1900 120/62 -- -- 68 14 98 % -- --   07/14/24 1845 (!) 142/64 -- -- 61 12 98 % -- --   07/14/24 1832 (!) 144/63 -- -- 69 17 100 % -- --   07/14/24 1817 126/64 -- -- 72 20 95 % -- --   07/14/24 1802 (!) 143/66 -- -- 65 17 96 % -- --   07/14/24 1747 139/68 -- -- 68 13 97 % -- --

## 2024-07-15 NOTE — CONSULTS
[facility]  Office: [phone]  Louie Rossi DO, Noah Mayers DO, Sergio Ordaz DO, Saul March DO, Willy Mc MD, Jaci Berger MD, Sivakumar Freeman MD, Mile Johnston MD,  Aric Granda MD, Garry Shepherd MD, Kelsy Wahl MD,  Evelyn Petit DO, Terry Kern MD, Addison Sol MD, Jaspreet Rossi DO, Kassy Pedro MD,  Emeka Leon DO, Susan Manjarrez MD, Krystyna Howell MD, Kitty Hawk MD, Keeley Santa MD,  Chris Ashley MD, Rick Del Rio MD, Howard Brown MD, Gemma rOdaz MD, Mateo Dey MD, Pardeep Owen MD, Carlton Cardona DO, Clay Avila DO, Kayla Moran MD,  Avi Mcneil MD, Shirley Waterhouse, CNP,  Magdalena Oviedo CNP, Gómez Gomez, CNP,  Fely Garcia, DNP, Lorena Garcia, CNP, Mariana Jordan, CNP, Dixie Garcia CNP, Shea Chirinos, CNP, Suzi Tavares, PA-C, Carmencita Wilson PA-C, Daija Mejia, CNP, Shantelle Westbrook, CNP, Vincent Armendariz, CNP, Asuncion Hollins, CNP, Brianne Arnett, CNP, ePnny Lopez, CNS, Alda Orozco, CNP, Miriam Martins CNP, Tracy Schwab, CNP         [facility]   IN-PATIENT SERVICE   [facility]  Consult examination            Date:   7/15/2024  Patient name:  Mahesh Brownlee  Date of admission:  7/14/2024 12:50 PM  MRN:   [id number]  Account:  [id number]  YOB: 1960  PCP:    Ira Roberts APRN - CNP  Room:   23/23  Code Status:    Full Code    Chief Complaint:     Chief Complaint   Patient presents with    Altered Mental Status   \" I do not know\"    Reason for consult: medical management    History Obtained From:     patient    History of Present Illness:     Mahesh Brownlee is a 64 y.o.  male w/ A-fib s/p PPM, COPD, prior stroke, HFpEF, with history of B12 deficiency, seizure disorder who presents with Altered Mental Status   and is admitted to the hospital for the management of Altered mental status.    Review of systems extremely limited with altered sensorium.  Patient states  C4-5. 4. No significant stenosis or large vessel occlusion in the head or neck. 5. Left upper lobe hazy opacity may represent atelectasis versus airspace disease such as pneumonia.     CTA HEAD NECK W CONTRAST    Result Date: 7/14/2024  1. No acute intracranial abnormality. 2. 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 3. 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 4. No significant stenosis or large vessel occlusion in the head or neck. 5. Left upper lobe hazy opacity may represent atelectasis versus airspace disease such as pneumonia.       Assessment :      Hospital Problems             Last Modified POA    * (Principal) Altered mental status 7/14/2024 Yes       Plan:         Acute encephalopathy defer to neuro. no obvious metabolic derangement.  Pending thiamine to evaluate ?  Warnicke's.  Paroxysmal A-fib status post PPM.  Consider pacemaker interrogation if further concern for arrhythmia  Chronic diastolic heart failure with coronary disease/prior stroke.  Continue medical management.  History of B12 deficiency recheck stable.  Continued on B12 folic acid supplements and thiamine  Nausea with indigestion continue GI prophylaxis.        Labs and chart reviewed independently by myself.  Call with questions or concerns.     Will continue to monitor intermittently.  Medically appropriate for discharge when okay with primary service  .    Consultations:   IP CONSULT TO NEUROLOGY  IP CONSULT TO HOSPITALIST        Kassy Pedro MD  7/15/2024  4:27 AM    Copy sent to Dr. oRberts, Ira TILLEY, APRN - CNP

## 2024-07-15 NOTE — ED NOTES
ED to inpatient nurses report      Chief Complaint:  Chief Complaint   Patient presents with    Altered Mental Status     Present to ED from: Patient suddenly tachycardic 130 bpm on monitor. Patient hypoxic 87%. Patient altered on arrival to the room. Nonverbal. Looking mostly only to the left. Patient tapping his right finger on the bedrail. Patient slowly became verbal. Unable to identify objects initially.   Patient placed on ceribell.  Patient placed on 2L NC oxygen with improvement of oxygen saturation to 96%.  Neurology resident at bedside.      MOA:     LOC: alert and orientated to name, place, date  Mobility: Requires assistance * 1  Oxygen Baseline: room air      Current needs required:   Pending ED orders:   Present condition: RR non labored and even. Pt resting in bed.     Why did the patient come to the ED? Altered mental status   What is the plan? Admit   Any procedures or intervention occur? EKG, meds, labs   Any safety concerns?? Fall risk     Mental Status:  Level of Consciousness: Alert (0)    Psych Assessment:   Psychosocial  Psychosocial (WDL): Within Defined Limits  Vital signs   Vitals:    07/14/24 2000 07/14/24 2100 07/15/24 0215 07/15/24 0315   BP: (!) 149/71 (!) 149/69 (!) 136/59 (!) 150/65   Pulse: 76 69 75 73   Resp: 12 10 20 18   Temp:       TempSrc:       SpO2: 98% 99% 98% 100%   Weight:       Height:            Vitals:  Patient Vitals for the past 24 hrs:   BP Temp Temp src Pulse Resp SpO2 Height Weight   07/15/24 0315 (!) 150/65 -- -- 73 18 100 % -- --   07/15/24 0215 (!) 136/59 -- -- 75 20 98 % -- --   07/14/24 2100 (!) 149/69 -- -- 69 10 99 % -- --   07/14/24 2000 (!) 149/71 -- -- 76 12 98 % -- --   07/14/24 1945 (!) 154/78 -- -- 71 11 100 % -- --   07/14/24 1915 (!) 160/75 -- -- 73 16 98 % -- --   07/14/24 1900 120/62 -- -- 68 14 98 % -- --   07/14/24 1845 (!) 142/64 -- -- 61 12 98 % -- --   07/14/24 1832 (!) 144/63 -- -- 69 17 100 % -- --   07/14/24 1817 126/64 -- -- 72 20 95 % --

## 2024-07-15 NOTE — PLAN OF CARE
Problem: Pain  Goal: Verbalizes/displays adequate comfort level or baseline comfort level  Outcome: Progressing  Flowsheets (Taken 7/15/2024 1131)  Verbalizes/displays adequate comfort level or baseline comfort level:   Encourage patient to monitor pain and request assistance   Assess pain using appropriate pain scale   Administer analgesics based on type and severity of pain and evaluate response

## 2024-07-15 NOTE — CONSULTS
Cardiology Consultation         Date:   7/15/2024  Patient name: Mahesh Brownlee  Date of admission:  7/14/2024 12:50 PM  MRN:   [id number]  YOB: 1960    Reason for Admission: altered sensorium     Chief Complaint:  confusion     History of present illness:     63-year-old male with pertinent cardiac history of nonischemic cardiomyopathy, status post CRT D implant, LV lead turned off because of narrow intrinsic QRS, paroxysmal atrial fibrillation and atrial flutter status post PVI as well as CTI ablation, on Xarelto for thromboembolic prophylaxis, hypertension, alcohol use and medication noncompliance admitted with altered mental status. Patient is comfortable on exam but poor historian - much hx could not be obtained. EEG suggested mild non-specific encephalopathy. He is scheduled to have MRI brain.     Past Medical History:   has a past medical history of Adjustment disorder, AF (atrial fibrillation) ([facility]), AICD (automatic cardioverter/defibrillator) present, Alcohol dependence ([facility]), CAD (coronary artery disease), Cardiomyopathy ([facility]), CHF (congestive heart failure) ([facility]), COPD (chronic obstructive pulmonary disease) ([facility]), DDD (degenerative disc disease), lumbar, Herniated cervical disc, Hyperlipidemia, Hypertension, Major depression, Post laminectomy syndrome, Sciatica, Snores, Tobacco abuse, and Traumatic brain injury ([facility]).    Past Surgical History:   has a past surgical history that includes back surgery; Rotator cuff repair (Right); Carpal tunnel release (Right); Nerve Block (07/23/2013); Nerve Block (N/A, 03/21/2013); other surgical history (04/25/2016); Tonsillectomy; pacemaker placement (09/2015); back surgery; other surgical history (02/09/2018); joint replacement (Right, 06/2018); and Cardiac electrophysiology study and ablation.     Home Medications:    Prior to Admission medications    Medication Sig Start Date End Date Taking? Authorizing Provider   donepezil (ARICEPT) 5 MG  intrinsic QRS  Hx of atrial fibrillation and flutter s/p CTI and PVI ablation in the past  Major depression   Essential hypertension   Hx of alcohol abuse   Hx of CVA       PLAN:  Will interrogate ICD pre and post MRI. The device is compatible with 1.5T MRI.   CHF is clinically compensated with no signs of volume overload on examination  Continue medical therapy with BB, ARNI, MRA  On therapeutic anticoagulation with Eliquis   Neurological work up per primary     Discussed with patient and nursing.      Dottie Mccord MD [facility]

## 2024-07-15 NOTE — PROGRESS NOTES
Endovascular Neurosurgery Progress Note      Reason for evaluation: L vert origin stenosis    SUBJECTIVE:   NAOE from ENV perspective. Encephalopathy continues.    History of Chief Complaint:    This is a 64 year old male with hx of A.fib on Xarelto, CAD, COPD, Htn, HLD, adjustment disorder, alcohol abuse, polysubstance abuse, and current smoking. Pt presented with recurrent episodes of vomiting and altered mental status. 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     Allergies  is allergic to augmentin es-600 [amoxicillin-pot clavulanate] and sulfa antibiotics.  Medications  Prior to Admission medications    Medication Sig Start Date End Date Taking? Authorizing Provider   LORazepam (ATIVAN) 0.5 MG tablet Take 1 tablet by mouth 3 times daily as needed for Anxiety. 4/17/24   Madan Julien MD   metoprolol succinate (TOPROL XL) 50 MG extended release tablet  5/19/24   Madan Julien MD   ENTRESTO 24-26 MG per tablet TAKE 1 TABLET BY MOUTH TWICE  DAILY 6/16/24   Ira Roberts APRN - CNP   XARELTO 20 MG TABS tablet TAKE 1 TABLET BY MOUTH DAILY  WITH BREAKFAST  Patient not taking: Reported on 7/2/2024 6/2/24   Ira Roberts APRN - CNP   spironolactone (ALDACTONE) 25 MG tablet TAKE 1 TABLET BY MOUTH DAILY 5/31/24   rIa Roberts APRN - CNP   DULoxetine (CYMBALTA) 20 MG extended release capsule TAKE 1 CAPSULE BY MOUTH DAILY 5/21/24   Ira Roberts APRN - CNP   metoprolol tartrate (LOPRESSOR) 50 MG tablet Take 1 tablet by mouth 2 times daily    ProviderMadan MD   gabapentin (NEURONTIN) 100 MG capsule TAKE 2 CAPSULES BY MOUTH EVERY  EVENING FOR NEUROPATHIC PAIN 2/16/24 7/2/24  Ira Roberts APRN - CNP   hydrOXYzine HCl (ATARAX) 10 MG tablet Take 1 tablet by mouth 3 times daily as needed for Anxiety 1/22/24   Ira Roberts APRN - CNP   atorvastatin (LIPITOR) 10 MG tablet Take 1 tablet by

## 2024-07-15 NOTE — ED NOTES
The following labs were labeled with appropriate pt sticker and tubed to lab:     [] Blue     [] Lavender   [] on ice  [x] Green/yellow  [] Green/black [] on ice  [] Vitale  [] on ice  [x] Yellow  [] Red  [] Pink  [] Type/ Screen  [] ABG  [] VBG    [] COVID-19 swab    [] Rapid  [] PCR  [] Flu swab  [] Peds Viral Panel     [] Urine Sample  [] Fecal Sample  [] Pelvic Cultures  [] Blood Cultures  [] X 2  [] STREP Cultures  [] Wound Cultures

## 2024-07-15 NOTE — H&P
Assay provides rapid clinical screening only.  Presumptive positive results for legal purposes should be confirmed by another method.  To request confirmation, please call the lab within 7 days of sample submission.   Vitamin B12 & Folate    Collection Time: 07/14/24  8:03 PM   Result Value Ref Range    Vitamin B-12 385 232 - 1245 pg/mL    Folate >20.0 4.8 - 24.2 ng/mL   Procalcitonin    Collection Time: 07/14/24  8:03 PM   Result Value Ref Range    Procalcitonin 0.07 0.00 - 0.09 ng/mL   Magnesium    Collection Time: 07/14/24  8:03 PM   Result Value Ref Range    Magnesium 1.8 1.6 - 2.4 mg/dL   TSH with Reflex    Collection Time: 07/14/24  8:03 PM   Result Value Ref Range    TSH 1.31 0.27 - 4.20 uIU/mL   Blood Gas, Venous    Collection Time: 07/14/24  8:18 PM   Result Value Ref Range    pH, Dennis 7.353 7.320 - 7.420    pCO2, Dennis 46.5 39 - 55 mm Hg    PO2, Dennis 27.5 (L) 30 - 50 mm Hg    HCO3, Venous 25.2 24 - 30 mmol/L    Negative Base Excess, Dennis 0.3 0.0 - 2.0 mmol/L    O2 Sat, Dennis 44.4 (L) 60.0 - 85.0 %    Carboxyhemoglobin 2.6 0 - 5 %    Pt Temp 37.0     FIO2 INFORMATION NOT PROVIDED    Sedimentation Rate    Collection Time: 07/14/24  8:53 PM   Result Value Ref Range    Sed Rate, Automated 3 0 - 20 mm/Hr         Assessment :      Primary Problem  Altered mental status    Active Hospital Problems    Diagnosis Date Noted    Altered mental status [R41.82] 09/23/2023       64-year-old male with past medical history of atrial fibrillation on Xarelto, CAD, COPD, hypertension, hyperlipidemia, adjustment disorder, significant alcohol abuse history, polysubstance abuse, current smoker presents  to [place] [facility] presents with confusion.     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

## 2024-07-15 NOTE — CARE COORDINATION
Case Management Assessment  Initial Evaluation    Date/Time of Evaluation: 7/15/2024 5:06 PM  Assessment Completed by: ASHER QIUÑONEZ    If patient is discharged prior to next notation, then this note serves as note for discharge by case management.    Patient Name: Mahesh Brownlee                   YOB: 1960  Diagnosis: Metabolic encephalopathy [G93.41]  Confusion [R41.0]  Altered mental status [R41.82]                   Date / Time: 7/14/2024 12:50 PM    Patient Admission Status: Inpatient   Readmission Risk (Low < 19, Mod (19-27), High > 27): Readmission Risk Score: 22.3    Current PCP: Ira Roberts, RIVER - CNP  PCP verified by CM? (P) Yes    Chart Reviewed: Yes      History Provided by: (P) Patient, Child/Family (Brother sandoval)  Patient Orientation: (P) Alert and Oriented, Person, Self    Patient Cognition: (P) Alert (a/o with moments of confusion)    Hospitalization in the last 30 days (Readmission):  No    If yes, Readmission Assessment in  Navigator will be completed.    Advance Directives:      Code Status: Full Code   Patient's Primary Decision Maker is: (P) Legal Next of Kin    Primary Decision Maker: Brad Whitley - Next of Kin - [phone]    Primary Decision Maker: Michael Whitley - Child - [phone]    Primary Decision Maker: Removed,Wants    Discharge Planning:    Patient lives with: (P) Other (Comment) ([facility]) Type of Home: (P) Skilled Nursing Facility  Primary Care Giver: (P) Other (Comment) (from [facility])  Patient Support Systems include: (P) Family Members, Children, Other (Comment) (From [facility])   Current Financial resources: (P) Medicare  Current community resources: (P) ECF/Home Care  Current services prior to admission: (P) Durable Medical Equipment            Current DME: (P) Cane, Walker, Wheelchair, Shower Chair            Type of Home Care services:  (P) None    ADLS  Prior functional level: (P) Assistance with the following:, Bathing, Dressing,  with the discharge plan. Freedom of choice list with basic dialogue that supports the patient's individualized plan of care/goals and shares the quality data associated with the providers was provided to: (P) Patient   Patient Representative Name:       The Patient and/or Patient Representative Agree with the Discharge Plan? (P) Yes    ASHER QUIÑONEZ  Case Management Department  Ph: [phone]

## 2024-07-16 ENCOUNTER — APPOINTMENT (OUTPATIENT)
Dept: MRI IMAGING | Age: 64
End: 2024-07-16
Payer: MEDICARE

## 2024-07-16 PROBLEM — I50.32 HEART FAILURE WITH IMPROVED EJECTION FRACTION (HFIMPEF) (HCC): Status: ACTIVE | Noted: 2024-07-15

## 2024-07-16 LAB
ANION GAP SERPL CALCULATED.3IONS-SCNC: 11 MMOL/L (ref 9–16)
BASOPHILS # BLD: 0.05 K/UL (ref 0–0.2)
BASOPHILS NFR BLD: 0 % (ref 0–2)
BUN SERPL-MCNC: 11 MG/DL (ref 8–23)
CALCIUM SERPL-MCNC: 9.4 MG/DL (ref 8.6–10.4)
CHLORIDE SERPL-SCNC: 103 MMOL/L (ref 98–107)
CO2 SERPL-SCNC: 21 MMOL/L (ref 20–31)
CREAT SERPL-MCNC: 0.9 MG/DL (ref 0.7–1.2)
EOSINOPHIL # BLD: 0.16 K/UL (ref 0–0.44)
EOSINOPHILS RELATIVE PERCENT: 1 % (ref 1–4)
ERYTHROCYTE [DISTWIDTH] IN BLOOD BY AUTOMATED COUNT: 13.6 % (ref 11.8–14.4)
GFR, ESTIMATED: >90 ML/MIN/1.73M2
GLUCOSE SERPL-MCNC: 106 MG/DL (ref 74–99)
HCT VFR BLD AUTO: 39.7 % (ref 40.7–50.3)
HGB BLD-MCNC: 13.6 G/DL (ref 13–17)
IMM GRANULOCYTES # BLD AUTO: 0.05 K/UL (ref 0–0.3)
IMM GRANULOCYTES NFR BLD: 0 %
LYMPHOCYTES NFR BLD: 2.61 K/UL (ref 1.1–3.7)
LYMPHOCYTES RELATIVE PERCENT: 23 % (ref 24–43)
MCH RBC QN AUTO: 31.3 PG (ref 25.2–33.5)
MCHC RBC AUTO-ENTMCNC: 34.3 G/DL (ref 28.4–34.8)
MCV RBC AUTO: 91.5 FL (ref 82.6–102.9)
MONOCYTES NFR BLD: 1.02 K/UL (ref 0.1–1.2)
MONOCYTES NFR BLD: 9 % (ref 3–12)
NEUTROPHILS NFR BLD: 67 % (ref 36–65)
NEUTS SEG NFR BLD: 7.27 K/UL (ref 1.5–8.1)
NRBC BLD-RTO: 0 PER 100 WBC
PLATELET # BLD AUTO: 266 K/UL (ref 138–453)
PMV BLD AUTO: 10 FL (ref 8.1–13.5)
POTASSIUM SERPL-SCNC: 4 MMOL/L (ref 3.7–5.3)
RBC # BLD AUTO: 4.34 M/UL (ref 4.21–5.77)
SODIUM SERPL-SCNC: 135 MMOL/L (ref 136–145)
WBC OTHER # BLD: 11.2 K/UL (ref 3.5–11.3)

## 2024-07-16 PROCEDURE — 99232 SBSQ HOSP IP/OBS MODERATE 35: CPT | Performed by: STUDENT IN AN ORGANIZED HEALTH CARE EDUCATION/TRAINING PROGRAM

## 2024-07-16 PROCEDURE — 99233 SBSQ HOSP IP/OBS HIGH 50: CPT | Performed by: PSYCHIATRY & NEUROLOGY

## 2024-07-16 PROCEDURE — 2060000000 HC ICU INTERMEDIATE R&B

## 2024-07-16 PROCEDURE — 6370000000 HC RX 637 (ALT 250 FOR IP)

## 2024-07-16 PROCEDURE — 95813 EEG EXTND MNTR 61-119 MIN: CPT | Performed by: PSYCHIATRY & NEUROLOGY

## 2024-07-16 PROCEDURE — 90792 PSYCH DIAG EVAL W/MED SRVCS: CPT | Performed by: PSYCHIATRY & NEUROLOGY

## 2024-07-16 PROCEDURE — 36415 COLL VENOUS BLD VENIPUNCTURE: CPT

## 2024-07-16 PROCEDURE — 6360000002 HC RX W HCPCS

## 2024-07-16 PROCEDURE — 6370000000 HC RX 637 (ALT 250 FOR IP): Performed by: FAMILY MEDICINE

## 2024-07-16 PROCEDURE — 99232 SBSQ HOSP IP/OBS MODERATE 35: CPT | Performed by: FAMILY MEDICINE

## 2024-07-16 PROCEDURE — 2580000003 HC RX 258

## 2024-07-16 PROCEDURE — 85025 COMPLETE CBC W/AUTO DIFF WBC: CPT

## 2024-07-16 PROCEDURE — 80048 BASIC METABOLIC PNL TOTAL CA: CPT

## 2024-07-16 PROCEDURE — 6370000000 HC RX 637 (ALT 250 FOR IP): Performed by: INTERNAL MEDICINE

## 2024-07-16 RX ORDER — LORAZEPAM 0.5 MG/1
0.5 TABLET ORAL
Status: COMPLETED | OUTPATIENT
Start: 2024-07-16 | End: 2024-07-16

## 2024-07-16 RX ORDER — DIPHENHYDRAMINE HYDROCHLORIDE 50 MG/ML
25 INJECTION INTRAMUSCULAR; INTRAVENOUS ONCE
Status: COMPLETED | OUTPATIENT
Start: 2024-07-16 | End: 2024-07-16

## 2024-07-16 RX ORDER — NICOTINE 21 MG/24HR
1 PATCH, TRANSDERMAL 24 HOURS TRANSDERMAL DAILY
Status: DISCONTINUED | OUTPATIENT
Start: 2024-07-16 | End: 2024-07-17

## 2024-07-16 RX ORDER — HYDROXYZINE HYDROCHLORIDE 10 MG/1
10 TABLET, FILM COATED ORAL
Status: DISCONTINUED | OUTPATIENT
Start: 2024-07-16 | End: 2024-07-16

## 2024-07-16 RX ADMIN — SACUBITRIL AND VALSARTAN 1 TABLET: 24; 26 TABLET, FILM COATED ORAL at 08:08

## 2024-07-16 RX ADMIN — METOPROLOL TARTRATE 25 MG: 25 TABLET ORAL at 19:53

## 2024-07-16 RX ADMIN — HYDROXYZINE HYDROCHLORIDE 10 MG: 10 TABLET ORAL at 13:07

## 2024-07-16 RX ADMIN — THIAMINE HYDROCHLORIDE: 100 INJECTION, SOLUTION INTRAMUSCULAR; INTRAVENOUS at 15:19

## 2024-07-16 RX ADMIN — DULOXETINE HYDROCHLORIDE 20 MG: 20 CAPSULE, DELAYED RELEASE ORAL at 08:08

## 2024-07-16 RX ADMIN — FOLIC ACID 1 MG: 1 TABLET ORAL at 08:09

## 2024-07-16 RX ADMIN — SODIUM CHLORIDE, PRESERVATIVE FREE 10 ML: 5 INJECTION INTRAVENOUS at 08:09

## 2024-07-16 RX ADMIN — SODIUM CHLORIDE, PRESERVATIVE FREE 10 ML: 5 INJECTION INTRAVENOUS at 19:53

## 2024-07-16 RX ADMIN — THIAMINE HYDROCHLORIDE: 100 INJECTION, SOLUTION INTRAMUSCULAR; INTRAVENOUS at 08:54

## 2024-07-16 RX ADMIN — HYDROXYZINE HYDROCHLORIDE 10 MG: 10 TABLET ORAL at 20:55

## 2024-07-16 RX ADMIN — METOPROLOL TARTRATE 50 MG: 50 TABLET, FILM COATED ORAL at 08:09

## 2024-07-16 RX ADMIN — LORAZEPAM 0.5 MG: 0.5 TABLET ORAL at 20:55

## 2024-07-16 RX ADMIN — LORAZEPAM 0.5 MG: 0.5 TABLET ORAL at 08:50

## 2024-07-16 RX ADMIN — LORAZEPAM 0.5 MG: 0.5 TABLET ORAL at 16:50

## 2024-07-16 RX ADMIN — SACUBITRIL AND VALSARTAN 1 TABLET: 24; 26 TABLET, FILM COATED ORAL at 19:53

## 2024-07-16 RX ADMIN — RIVAROXABAN 20 MG: 20 TABLET, FILM COATED ORAL at 08:09

## 2024-07-16 RX ADMIN — Medication 1000 MCG: at 08:09

## 2024-07-16 RX ADMIN — SPIRONOLACTONE 25 MG: 25 TABLET, FILM COATED ORAL at 08:08

## 2024-07-16 RX ADMIN — THIAMINE HYDROCHLORIDE: 100 INJECTION, SOLUTION INTRAMUSCULAR; INTRAVENOUS at 19:54

## 2024-07-16 RX ADMIN — DIPHENHYDRAMINE HYDROCHLORIDE 25 MG: 50 INJECTION INTRAMUSCULAR; INTRAVENOUS at 03:03

## 2024-07-16 ASSESSMENT — ENCOUNTER SYMPTOMS
CHOKING: 0
ALLERGIC/IMMUNOLOGIC NEGATIVE: 1
CONSTIPATION: 0
DIARRHEA: 0
CHEST TIGHTNESS: 0
EYES NEGATIVE: 1
SINUS PRESSURE: 0
ABDOMINAL PAIN: 0
BACK PAIN: 0
SHORTNESS OF BREATH: 0
GASTROINTESTINAL NEGATIVE: 1
VOMITING: 0
NAUSEA: 0
VOICE CHANGE: 0
RHINORRHEA: 0
WHEEZING: 0
RESPIRATORY NEGATIVE: 1
COUGH: 0

## 2024-07-16 ASSESSMENT — PAIN SCALES - GENERAL
PAINLEVEL_OUTOF10: 8
PAINLEVEL_OUTOF10: 10

## 2024-07-16 ASSESSMENT — PAIN - FUNCTIONAL ASSESSMENT: PAIN_FUNCTIONAL_ASSESSMENT: PREVENTS OR INTERFERES SOME ACTIVE ACTIVITIES AND ADLS

## 2024-07-16 ASSESSMENT — PAIN DESCRIPTION - LOCATION
LOCATION: GENERALIZED
LOCATION: GENERALIZED

## 2024-07-16 ASSESSMENT — PAIN DESCRIPTION - DESCRIPTORS: DESCRIPTORS: DISCOMFORT

## 2024-07-16 NOTE — PLAN OF CARE
07/16 - Cardiology form signed. W\ill need ACLS certified nurse and rep. MRI department to call and set-up time with rep.     Cardiology has not signed the clearance form yet. However, they did round and see the patient. RN to reach out to cardiologist about signing the clearance form. RN is aware that the device can be interrogated once the rep comes in for the MRI. RN is aware that once form is signed and faxed back to the department that MRI will reach out to the rep to schedule a time.    07/15 - RN said cardiology hasn't come for interrogation yet and doesn't seem to think they are going to come today at all 516pm    07/15 - Cardiologist needing to interrogate device for the numbers he wants/needs. RN called galan they told her to call the ICD department so she'll do that once the cardiologist get device interrogated and the numbers he needs. RN is SERENA 233pm    07/15 - Faxed form to floor at 9:30am   [employer] - Need cardiology consult. Nurse notified.

## 2024-07-16 NOTE — CONSULTS
Department of Psychiatry   Psychiatric Assessment      Thank you very much for allowing us to participate in the care of this patient.      Reason for Consult:  Mood disorder    HISTORY OF PRESENT ILLNESS:    Patient is a 63-year-old male with extensive history of CAD COPD alcohol abuse admitted for recurrent episodes of vomiting and altered mentation.  When approached patient is fully oriented to time place person and situation.  Reports that he has been feeling recently down and sad for last several months.  Endorses anhedonia.  Reports feeling sad and down and low support in his life.  Reports feeling helpless and extremely hopeless about his homeless and situation.  Reports that he has been living behind a random building.  Reports having trouble falling asleep and staying asleep.  Reports poor appetite.  Reports that he drinks at least 12 packs of beer every day.  Mentions that he has been struggling with alcohol use for over 30 years now.  Mentions that he had several years of sobriety.  Currently reports withdrawal symptoms as mild restlessness and tremors  Patient when asked about suicidal thoughts reports that he has been constantly thinking about that and not wanting to live.  Unable to fully contract to safety outside of hospital.  Offered inpatient [facility] admission and is agreeable to the plan at this time.  PSYCHIATRIC HISTORY:  Currently not following up with any psychiatrist.  Denies any previous suicide attempts or inpatient psychiatric hospitalizations.  Lifetime Psychiatric Review of Systems         Obsessions and Compulsions: Denies       Shobha or Hypomania: Denies     Hallucinations: Denies     Panic Attacks:  Denies     Delusions:  Denies     Phobias:  Denies     Trauma: Denies    Prior to Admission medications    Medication Sig Start Date End Date Taking? Authorizing Provider   donepezil (ARICEPT) 5 MG tablet Take 1 tablet by mouth daily   Yes Provider, MD Madan   LORazepam (ATIVAN) 0.5 MG  tablet Take 1 tablet by mouth 3 times daily as needed for Anxiety. 4/17/24   ProviderMadan MD   ENTRESTO 24-26 MG per tablet TAKE 1 TABLET BY MOUTH TWICE  DAILY 6/16/24   Ira Roberts APRN - CNP   XARELTO 20 MG TABS tablet TAKE 1 TABLET BY MOUTH DAILY  WITH BREAKFAST 6/2/24   Ira Roberts APRN - CNP   spironolactone (ALDACTONE) 25 MG tablet TAKE 1 TABLET BY MOUTH DAILY 5/31/24   Ira Roberts APRN - CNP   DULoxetine (CYMBALTA) 20 MG extended release capsule TAKE 1 CAPSULE BY MOUTH DAILY 5/21/24   Ira Roberts APRN - CNP   metoprolol tartrate (LOPRESSOR) 50 MG tablet Take 1 tablet by mouth 2 times daily    ProviderMadan MD   gabapentin (NEURONTIN) 100 MG capsule TAKE 2 CAPSULES BY MOUTH EVERY  EVENING FOR NEUROPATHIC PAIN 2/16/24 7/15/24  Ira Roberts APRN - CNP   hydrOXYzine HCl (ATARAX) 10 MG tablet Take 1 tablet by mouth 3 times daily as needed for Anxiety 1/22/24   Ira Roberts APRN - CNP   atorvastatin (LIPITOR) 10 MG tablet Take 1 tablet by mouth nightly  Patient not taking: Reported on 7/2/2024 11/3/23   Ira Roberts APRN - CNP   albuterol sulfate HFA (PROVENTIL;VENTOLIN;PROAIR) 108 (90 Base) MCG/ACT inhaler  10/18/23   ProviderMadan MD   vitamin B-12 (CYANOCOBALAMIN) 1000 MCG tablet Take 1 tablet by mouth daily 10/31/23   Ira Roberts APRN - CNP   budesonide-formoterol (SYMBICORT) 160-4.5 MCG/ACT AERO Inhale 2 puffs into the lungs in the morning and 2 puffs in the evening.  Patient not taking: Reported on 7/2/2024 10/2/23   Iftikhar Lea MD   docusate sodium (COLACE, DULCOLAX) 100 MG CAPS Take 100 mg by mouth 2 times daily 10/2/23   Iftikhar Lea MD   folic acid (FOLVITE) 1 MG tablet Take 1 tablet by mouth daily 10/2/23   Iftikhar Lea MD   lidocaine 4 % external patch Place 1 patch onto the skin daily 10/3/23   Iftikhar Lea MD   Multiple Vitamins-Minerals (THERAPEUTIC MULTIVITAMIN-MINERALS) tablet Take 1 tablet by mouth

## 2024-07-16 NOTE — PLAN OF CARE
Problem: Pain  Goal: Verbalizes/displays adequate comfort level or baseline comfort level  Outcome: Progressing  Flowsheets  Taken 7/16/2024 1113  Verbalizes/displays adequate comfort level or baseline comfort level:   Encourage patient to monitor pain and request assistance   Assess pain using appropriate pain scale   Administer analgesics based on type and severity of pain and evaluate response   Implement non-pharmacological measures as appropriate and evaluate response   Consider cultural and social influences on pain and pain management   Notify Licensed Independent Practitioner if interventions unsuccessful or patient reports new pain  Taken 7/16/2024 0803  Verbalizes/displays adequate comfort level or baseline comfort level:   Encourage patient to monitor pain and request assistance   Assess pain using appropriate pain scale   Administer analgesics based on type and severity of pain and evaluate response   Implement non-pharmacological measures as appropriate and evaluate response   Consider cultural and social influences on pain and pain management   Notify Licensed Independent Practitioner if interventions unsuccessful or patient reports new pain     Problem: Safety - Adult  Goal: Free from fall injury  Outcome: Progressing  Flowsheets (Taken 7/16/2024 1215)  Free From Fall Injury: Instruct family/caregiver on patient safety     Problem: Chronic Conditions and Co-morbidities  Goal: Patient's chronic conditions and co-morbidity symptoms are monitored and maintained or improved  Outcome: Progressing  Flowsheets (Taken 7/16/2024 0800)  Care Plan - Patient's Chronic Conditions and Co-Morbidity Symptoms are Monitored and Maintained or Improved:   Monitor and assess patient's chronic conditions and comorbid symptoms for stability, deterioration, or improvement   Collaborate with multidisciplinary team to address chronic and comorbid conditions and prevent exacerbation or deterioration   Update acute care plan

## 2024-07-16 NOTE — PLAN OF CARE
Problem: Pain  Goal: Verbalizes/displays adequate comfort level or baseline comfort level  7/15/2024 2129 by Fadumo Billy, RN  Outcome: Progressing  7/15/2024 1131 by Ramila Waller, RN  Outcome: Progressing  Flowsheets (Taken 7/15/2024 1131)  Verbalizes/displays adequate comfort level or baseline comfort level:   Encourage patient to monitor pain and request assistance   Assess pain using appropriate pain scale   Administer analgesics based on type and severity of pain and evaluate response     Problem: Safety - Adult  Goal: Free from fall injury  Outcome: Progressing     Problem: Chronic Conditions and Co-morbidities  Goal: Patient's chronic conditions and co-morbidity symptoms are monitored and maintained or improved  Outcome: Progressing

## 2024-07-16 NOTE — PROCEDURES
EEG REPORT       Patient: Mahesh Brownlee Age: 64 y.o.  MRN: [id number]      Referring Provider: No ref. provider found    History: This routine one hour 30 minute scalp EEG was recorded with video- monitoring for a 64 y.o.. male  who presented with encephalopathy. This EEG was performed to evaluate for focal and epileptiform abnormalities.     Mahesh Brownlee   Current Facility-Administered Medications   Medication Dose Route Frequency Provider Last Rate Last Admin    metoprolol tartrate (LOPRESSOR) tablet 25 mg  25 mg Oral BID Dottie Mccord MD        nicotine (NICODERM CQ) 21 MG/24HR 1 patch  1 patch TransDERmal Daily Sivakumar Freeman MD   1 patch at 07/16/24 1234    DULoxetine (CYMBALTA) extended release capsule 20 mg  20 mg Oral Daily Bebeto, Shaheen, DO   20 mg at 07/16/24 0808    sacubitril-valsartan (ENTRESTO) 24-26 MG per tablet 1 tablet  1 tablet Oral BID Bebeto, Shaheen, DO   1 tablet at 07/16/24 0808    folic acid (FOLVITE) tablet 1 mg  1 mg Oral Daily Bebeto, Shaheen, DO   1 mg at 07/16/24 0809    hydrOXYzine HCl (ATARAX) tablet 10 mg  10 mg Oral TID PRN Bebeto, Shaheen, DO   10 mg at 07/16/24 1307    LORazepam (ATIVAN) tablet 0.5 mg  0.5 mg Oral TID PRN Bebeto, Shaheen, DO   0.5 mg at 07/16/24 0850    spironolactone (ALDACTONE) tablet 25 mg  25 mg Oral Daily Bebeto, Shaheen, DO   25 mg at 07/16/24 0808    sodium chloride flush 0.9 % injection 5-40 mL  5-40 mL IntraVENous 2 times per day Bebeto, Shaheen, DO   10 mL at 07/16/24 0809    sodium chloride flush 0.9 % injection 5-40 mL  5-40 mL IntraVENous PRN Bebeto, Shaheen, DO        0.9 % sodium chloride infusion   IntraVENous PRN Bebeto, Shaheen, DO        potassium chloride (KLOR-CON M) extended release tablet 40 mEq  40 mEq Oral PRN Bebeto, Shaheen, DO        Or    potassium bicarb-citric acid (EFFER-K) effervescent tablet 40 mEq  40 mEq Oral PRN Bebeto, Shaheen, DO        Or    potassium chloride 10 mEq/100 mL IVPB (Peripheral Line)  10 mEq IntraVENous PRN Bebeto, Shaheen, DO

## 2024-07-16 NOTE — PROGRESS NOTES
Patient complaining of increased anxiety. Patient asking for anti-anxiety medication. Patient stating, \"I need something to help calm me down.\" Writer educated patient that he received PRN ativan and hydroxizine with scheduled medications at 2100. Patient still requesting anti-anxiety medication. Writer notified on call neuro resident. Orders placed per physician. See MAR.

## 2024-07-16 NOTE — CARE COORDINATION
Transitional planning      Liaison for [facility] on unit for update, patient from [facility] and can return, they are holdng his bed, currently self pay but may bring him back skilled which will require precert, has sitter.

## 2024-07-16 NOTE — PROGRESS NOTES
Rep is available at 9:00am Wednesday(tomorrow) morning.  Please have ACLS Nurse available to bring patient down to MRI and to monitor patient during MRI. Exam is about 10 minutes long total.

## 2024-07-16 NOTE — PROGRESS NOTES
[facility] Neurology   IN-PATIENT SERVICE   [facility]    Progress Note             Date:   7/16/2024  Patient name:  Mahesh Brownlee  Date of admission:  7/14/2024 12:50 PM  MRN:   [id number]  Account:  [id number]  YOB: 1960  PCP:    Ira Roberts APRN - CNP  Room:   [address] [place] [address]  Code Status:    Full Code    Chief Complaint:     Chief Complaint   Patient presents with    Altered Mental Status       Interval hx:     The patient was seen and examined at bedside. Is vitally stable, alert and oriented x 2.  Patient was complaining  anxiety at night, Atarax was ordered.  Cardiologist was consulted for clearance for MRI, will interrogate ICD pre and post MRI.  MRI pending  Patient said that still have memory issue, was able to remember what happened yesterday, seem to be more improved compared to the previous day, will continue patient on high-dose thiamine.  Concerning for suicidal ideation, psych was consulted with suicidal precaution, awaiting psych recommendation.  Today patient denies any suicidal or homicidal ideation.    Brief History of Present Illness:     Per Note.    64-year-old male with past medical history of atrial fibrillation on Xarelto, CAD, COPD, hypertension, hyperlipidemia, adjustment disorder, significant alcohol abuse history, polysubstance abuse, current smoker presents  to [place] [facility] presents with confusion.      As per the nursing staff, last known well is roughly 8 AM on 7/14/2024.  Patient was noted to have memory deficits with evidence of not remembering that he had a stroke or TBI.  CTA done in [facility] ER showed chronic left vertebral artery occlusion, endovascular says nothing to intervene on.  Neurology consulted for confusion.      Patient is alert and oriented to person only. He has tangential thought, and has difficulty concentrating. He is unable to recall details of events prior to ER admission. He states he lives with his mother last  state likely secondary to to Warnicke syndrome with a history of significant alcohol abuse.  -Ammonia unremarkable, toxicology blood screen unremarkable, UDS positive for benzos, cannabinoid and Oxy, UDS unremarkable, B12 and folate within normal, Pro-Sanjay negative, lactic acid within normal, TSH within normal, ethanol level within normal,   -CT and CTA head did not show acute intracranial abnormality with a small old left cerebellar infarct and occlusion of the left vertebral artery at the origin, endovascular consulted  -Awaiting MRI brain, cardiologist was consulted for clearance.  -Awaiting B1 elevated  -EEG suggest mild nonspecific encephalopathy limited study, no epileptiform discharge  -Continue high-dose IV thiamine  -Folic acid supplementation 1 mg daily  -B12 supplementation 1000 mg daily    History of paroxismal A-fib  -Continue home medication Xarelto 20 mg daily    Hypertension  -Continue home medication Lopressor 50 mg twice daily, Entresto twice daily and Aldactone 25 mg daily daily.    Major depressive disorder  -Continue home medication Cymbalta 20 mg daily  -Was in the left side ideation, suicidal precaution was added, consulted psych    Anxiety  -Continue home medication Ativan tablet 0.5 mg 3 times daily as needed for anxiety  -Added Atarax 10 mg 3 times daily as needed    DVT prophylaxis: xarelto 20 mg daily  GI prophylaxis: Not indicated  Diet: Regular diet  Disposition: To be decided    OT/PT    Code Status: Full code        Follow-up further recommendations after discussing case with the attending.  The plan was discussed with the patient, patient's family and the medical staff.   Consultations:   IP CONSULT TO NEUROLOGY  IP CONSULT TO HOSPITALIST  IP CONSULT TO CARDIOLOGY  IP CONSULT TO PSYCHIATRY    Patient is admitted as inpatient status because of co-morbidities listed above, severity of signs and symptoms as outlined, requirement for current medical therapies and most importantly because

## 2024-07-16 NOTE — PROGRESS NOTES
Endovascular Neurosurgery Progress Note      Reason for evaluation: L vert origin stenosis    SUBJECTIVE:   NAOE from ENV perspective. Encephalopathy continues.    History of Chief Complaint:    This is a 64 year old male with hx of A.fib on Xarelto, CAD, COPD, Htn, HLD, adjustment disorder, alcohol abuse, polysubstance abuse, and current smoking. Pt presented with recurrent episodes of vomiting and altered mental status. 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     Allergies  is allergic to augmentin es-600 [amoxicillin-pot clavulanate] and sulfa antibiotics.  Medications  Prior to Admission medications    Medication Sig Start Date End Date Taking? Authorizing Provider   donepezil (ARICEPT) 5 MG tablet Take 1 tablet by mouth daily   Yes Madan Julien MD   LORazepam (ATIVAN) 0.5 MG tablet Take 1 tablet by mouth 3 times daily as needed for Anxiety. 4/17/24   ProviderMadan MD   ENTRESTO 24-26 MG per tablet TAKE 1 TABLET BY MOUTH TWICE  DAILY 6/16/24   Ira Roberts APRN - CNP   XARELTO 20 MG TABS tablet TAKE 1 TABLET BY MOUTH DAILY  WITH BREAKFAST 6/2/24   Ira Roberts, APRTREVA - CNP   spironolactone (ALDACTONE) 25 MG tablet TAKE 1 TABLET BY MOUTH DAILY 5/31/24   Ira Roberts APRN - CNP   DULoxetine (CYMBALTA) 20 MG extended release capsule TAKE 1 CAPSULE BY MOUTH DAILY 5/21/24   Ira Roberts, APRTREVA - DYLAN   metoprolol tartrate (LOPRESSOR) 50 MG tablet Take 1 tablet by mouth 2 times daily    ProviderMadan MD   gabapentin (NEURONTIN) 100 MG capsule TAKE 2 CAPSULES BY MOUTH EVERY  EVENING FOR NEUROPATHIC PAIN 2/16/24 7/15/24  Ira Roberts, APRN - CNP   hydrOXYzine HCl (ATARAX) 10 MG tablet Take 1 tablet by mouth 3 times daily as needed for Anxiety 1/22/24   Ira Roberts APRN - CNP   atorvastatin (LIPITOR) 10 MG tablet Take 1 tablet by mouth nightly  Patient not taking: Reported on    Component Value Date    HGB 13.6 07/16/2024    WBC 11.2 07/16/2024     07/16/2024     (L) 07/16/2024    BUN 11 07/16/2024    CREATININE 0.9 07/16/2024    AST 22 07/14/2024    ALT 6 (L) 07/14/2024    MG 1.8 07/14/2024    APTT 24.7 07/20/2016    INR 1.2 10/27/2023      Lab Results   Component Value Date/Time    COVID19 Not Detected 09/20/2023 07:00 PM       RADIOLOGY:   Images were personally reviewed including:    CTA:  Small old left cerebellar infarct.  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 Donovan.    Please arrange follow-up with Dr. Scotty [facility] clinic in 4 weeks, and with Dr. Donovan in 3-4 months.    Evelyn Zamora MD   Pager [phone]  Electronically signed 07/16/24 at 2:18 PM  Stroke, Neurocritical Care & Neurointervention  [facility]  [facility]
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
## 2024-07-17 ENCOUNTER — APPOINTMENT (OUTPATIENT)
Dept: MRI IMAGING | Age: 64
End: 2024-07-17
Payer: MEDICARE

## 2024-07-17 VITALS
TEMPERATURE: 98.6 F | WEIGHT: 215 LBS | DIASTOLIC BLOOD PRESSURE: 63 MMHG | HEIGHT: 71 IN | SYSTOLIC BLOOD PRESSURE: 155 MMHG | HEART RATE: 88 BPM | OXYGEN SATURATION: 95 % | BODY MASS INDEX: 30.1 KG/M2 | RESPIRATION RATE: 14 BRPM

## 2024-07-17 LAB
ANION GAP SERPL CALCULATED.3IONS-SCNC: 13 MMOL/L (ref 9–16)
BASOPHILS # BLD: 0.07 K/UL (ref 0–0.2)
BASOPHILS NFR BLD: 1 % (ref 0–2)
BUN SERPL-MCNC: 9 MG/DL (ref 8–23)
CALCIUM SERPL-MCNC: 9.4 MG/DL (ref 8.6–10.4)
CHLORIDE SERPL-SCNC: 106 MMOL/L (ref 98–107)
CO2 SERPL-SCNC: 20 MMOL/L (ref 20–31)
CREAT SERPL-MCNC: 0.8 MG/DL (ref 0.7–1.2)
EOSINOPHIL # BLD: 0.24 K/UL (ref 0–0.44)
EOSINOPHILS RELATIVE PERCENT: 3 % (ref 1–4)
ERYTHROCYTE [DISTWIDTH] IN BLOOD BY AUTOMATED COUNT: 13.5 % (ref 11.8–14.4)
GFR, ESTIMATED: >90 ML/MIN/1.73M2
GLUCOSE SERPL-MCNC: 107 MG/DL (ref 74–99)
HCT VFR BLD AUTO: 40.1 % (ref 40.7–50.3)
HGB BLD-MCNC: 13.4 G/DL (ref 13–17)
IMM GRANULOCYTES # BLD AUTO: 0.05 K/UL (ref 0–0.3)
IMM GRANULOCYTES NFR BLD: 1 %
LYMPHOCYTES NFR BLD: 2.49 K/UL (ref 1.1–3.7)
LYMPHOCYTES RELATIVE PERCENT: 27 % (ref 24–43)
MCH RBC QN AUTO: 30.7 PG (ref 25.2–33.5)
MCHC RBC AUTO-ENTMCNC: 33.4 G/DL (ref 28.4–34.8)
MCV RBC AUTO: 91.8 FL (ref 82.6–102.9)
MONOCYTES NFR BLD: 0.96 K/UL (ref 0.1–1.2)
MONOCYTES NFR BLD: 11 % (ref 3–12)
NEUTROPHILS NFR BLD: 57 % (ref 36–65)
NEUTS SEG NFR BLD: 5.36 K/UL (ref 1.5–8.1)
NRBC BLD-RTO: 0 PER 100 WBC
PLATELET # BLD AUTO: 264 K/UL (ref 138–453)
PMV BLD AUTO: 10.3 FL (ref 8.1–13.5)
POTASSIUM SERPL-SCNC: 3.7 MMOL/L (ref 3.7–5.3)
RBC # BLD AUTO: 4.37 M/UL (ref 4.21–5.77)
SODIUM SERPL-SCNC: 139 MMOL/L (ref 136–145)
WBC OTHER # BLD: 9.2 K/UL (ref 3.5–11.3)

## 2024-07-17 PROCEDURE — 70551 MRI BRAIN STEM W/O DYE: CPT

## 2024-07-17 PROCEDURE — 97116 GAIT TRAINING THERAPY: CPT

## 2024-07-17 PROCEDURE — 85025 COMPLETE CBC W/AUTO DIFF WBC: CPT

## 2024-07-17 PROCEDURE — 2060000000 HC ICU INTERMEDIATE R&B

## 2024-07-17 PROCEDURE — 97161 PT EVAL LOW COMPLEX 20 MIN: CPT

## 2024-07-17 PROCEDURE — 6370000000 HC RX 637 (ALT 250 FOR IP): Performed by: INTERNAL MEDICINE

## 2024-07-17 PROCEDURE — 6370000000 HC RX 637 (ALT 250 FOR IP)

## 2024-07-17 PROCEDURE — 36415 COLL VENOUS BLD VENIPUNCTURE: CPT

## 2024-07-17 PROCEDURE — 6370000000 HC RX 637 (ALT 250 FOR IP): Performed by: FAMILY MEDICINE

## 2024-07-17 PROCEDURE — 99232 SBSQ HOSP IP/OBS MODERATE 35: CPT | Performed by: INTERNAL MEDICINE

## 2024-07-17 PROCEDURE — 80048 BASIC METABOLIC PNL TOTAL CA: CPT

## 2024-07-17 PROCEDURE — 99232 SBSQ HOSP IP/OBS MODERATE 35: CPT | Performed by: PSYCHIATRY & NEUROLOGY

## 2024-07-17 PROCEDURE — 99239 HOSP IP/OBS DSCHRG MGMT >30: CPT | Performed by: STUDENT IN AN ORGANIZED HEALTH CARE EDUCATION/TRAINING PROGRAM

## 2024-07-17 PROCEDURE — 2580000003 HC RX 258

## 2024-07-17 PROCEDURE — 97165 OT EVAL LOW COMPLEX 30 MIN: CPT

## 2024-07-17 PROCEDURE — 97535 SELF CARE MNGMENT TRAINING: CPT

## 2024-07-17 PROCEDURE — 99232 SBSQ HOSP IP/OBS MODERATE 35: CPT | Performed by: FAMILY MEDICINE

## 2024-07-17 PROCEDURE — 6360000002 HC RX W HCPCS

## 2024-07-17 RX ORDER — NICOTINE 21 MG/24HR
1 PATCH, TRANSDERMAL 24 HOURS TRANSDERMAL DAILY
Status: DISCONTINUED | OUTPATIENT
Start: 2024-07-17 | End: 2024-07-18 | Stop reason: HOSPADM

## 2024-07-17 RX ADMIN — METOPROLOL TARTRATE 25 MG: 25 TABLET ORAL at 19:46

## 2024-07-17 RX ADMIN — DULOXETINE HYDROCHLORIDE 20 MG: 20 CAPSULE, DELAYED RELEASE ORAL at 07:40

## 2024-07-17 RX ADMIN — FOLIC ACID 1 MG: 1 TABLET ORAL at 07:38

## 2024-07-17 RX ADMIN — SODIUM CHLORIDE, PRESERVATIVE FREE 10 ML: 5 INJECTION INTRAVENOUS at 07:42

## 2024-07-17 RX ADMIN — SACUBITRIL AND VALSARTAN 1 TABLET: 49; 51 TABLET, FILM COATED ORAL at 19:46

## 2024-07-17 RX ADMIN — METOPROLOL TARTRATE 25 MG: 25 TABLET ORAL at 07:38

## 2024-07-17 RX ADMIN — SACUBITRIL AND VALSARTAN 1 TABLET: 24; 26 TABLET, FILM COATED ORAL at 07:39

## 2024-07-17 RX ADMIN — LORAZEPAM 0.5 MG: 0.5 TABLET ORAL at 23:57

## 2024-07-17 RX ADMIN — Medication 1000 MCG: at 07:39

## 2024-07-17 RX ADMIN — HYDROXYZINE HYDROCHLORIDE 10 MG: 10 TABLET ORAL at 10:19

## 2024-07-17 RX ADMIN — LORAZEPAM 0.5 MG: 0.5 TABLET ORAL at 19:46

## 2024-07-17 RX ADMIN — THIAMINE HYDROCHLORIDE 100 MG: 100 INJECTION, SOLUTION INTRAMUSCULAR; INTRAVENOUS at 07:38

## 2024-07-17 RX ADMIN — SPIRONOLACTONE 25 MG: 25 TABLET, FILM COATED ORAL at 07:38

## 2024-07-17 RX ADMIN — SODIUM CHLORIDE, PRESERVATIVE FREE 10 ML: 5 INJECTION INTRAVENOUS at 19:46

## 2024-07-17 RX ADMIN — RIVAROXABAN 20 MG: 20 TABLET, FILM COATED ORAL at 07:39

## 2024-07-17 RX ADMIN — LORAZEPAM 0.5 MG: 0.5 TABLET ORAL at 14:58

## 2024-07-17 RX ADMIN — ACETAMINOPHEN 650 MG: 325 TABLET ORAL at 12:48

## 2024-07-17 RX ADMIN — LORAZEPAM 0.5 MG: 0.5 TABLET ORAL at 07:38

## 2024-07-17 ASSESSMENT — ENCOUNTER SYMPTOMS
ABDOMINAL PAIN: 0
CHOKING: 0
CHEST TIGHTNESS: 0
CONSTIPATION: 0
RHINORRHEA: 0
NAUSEA: 0
VOICE CHANGE: 0
VOMITING: 0
WHEEZING: 0
BACK PAIN: 0
DIARRHEA: 0
COUGH: 0
SHORTNESS OF BREATH: 0
SINUS PRESSURE: 0

## 2024-07-17 ASSESSMENT — PAIN DESCRIPTION - LOCATION: LOCATION: GENERALIZED

## 2024-07-17 ASSESSMENT — PAIN DESCRIPTION - DESCRIPTORS: DESCRIPTORS: DISCOMFORT

## 2024-07-17 NOTE — PLAN OF CARE
Problem: Pain  Goal: Verbalizes/displays adequate comfort level or baseline comfort level  Outcome: Progressing  Flowsheets (Taken 7/16/2024 1113 by Patt Ramirez, RN)  Verbalizes/displays adequate comfort level or baseline comfort level:   Encourage patient to monitor pain and request assistance   Assess pain using appropriate pain scale   Administer analgesics based on type and severity of pain and evaluate response   Implement non-pharmacological measures as appropriate and evaluate response   Consider cultural and social influences on pain and pain management   Notify Licensed Independent Practitioner if interventions unsuccessful or patient reports new pain     Problem: Safety - Adult  Goal: Free from fall injury  Outcome: Progressing  Flowsheets (Taken 7/16/2024 1215 by Patt Ramirez, RN)  Free From Fall Injury: Instruct family/caregiver on patient safety     Problem: Chronic Conditions and Co-morbidities  Goal: Patient's chronic conditions and co-morbidity symptoms are monitored and maintained or improved  Outcome: Progressing  Flowsheets (Taken 7/16/2024 0800 by Patt Ramirez, RN)  Care Plan - Patient's Chronic Conditions and Co-Morbidity Symptoms are Monitored and Maintained or Improved:   Monitor and assess patient's chronic conditions and comorbid symptoms for stability, deterioration, or improvement   Collaborate with multidisciplinary team to address chronic and comorbid conditions and prevent exacerbation or deterioration   Update acute care plan with appropriate goals if chronic or comorbid symptoms are exacerbated and prevent overall improvement and discharge

## 2024-07-17 NOTE — PROGRESS NOTES
No  Patient's  Info: set up ride  Mode of Transportation: Cab  Occupation: Retired  Type of Occupation: Construction  Leisure & Hobbies: TV  Additional Comments: Patient unsure of where they came from; questionable historian     Objective   Safety Devices  Type of Devices: Left in bed;Sitter present;Gait belt;Call light within reach;Nurse notified  Restraints  Restraints Initially in Place: No  Balance  Sitting: Intact (EOB for ~12-13 minutes with 0 LOB although demo's full body tremors intermittently; SBA grossly)  Standing: With support (EOB at CGA, Min A with hand release d/t LOB ~2-3 minutes EOB with RW for support)     AROM: Within functional limits  Strength: Within functional limits  Coordination: Within functional limits  Tone: Normal  Sensation: Intact  ADL  Feeding: Independent  Grooming: Contact guard assistance  Grooming Skilled Clinical Factors: Pt able to stand sinkside and wash hands independently, required CGA for balance; Pt stood at EOB and required CGA for support to adjust glasses d/t LOB with unilateral hand release to reach for glasses.  UE Bathing: Independent  LE Bathing: Minimal assistance  UE Dressing: Independent  LE Dressing: Minimal assistance  LE Dressing Skilled Clinical Factors: Pt demo ability to complete trunk flexion to reach distal extremities to appropriately adjust socks for proper fit. Pt is expected to require increased assistance for clothing mgmt tasks d/t decreased balance with unilateral release from RW to adjust glasses  Toileting: Minimal assistance  Functional Mobility: Moderate assistance;Minimal assistance;Adaptive equipment  Functional Mobility Skilled Clinical Factors: Mod A progressing to min A, difficulty putting weight into the R LE, demo good use of UEs to assist in compensation; Poor safety awareness with RW navigation despite verbal and tactile cues.        Bed mobility  Supine to Sit: Stand by assistance  Sit to Supine: Stand by assistance  Scooting:  Stand by assistance  Transfers  Sit to stand: Contact guard assistance  Stand to sit: Contact guard assistance  Transfer Comments: using RW for support  Vision  Vision: Impaired  Vision Exceptions: Wears glasses at all times  Hearing  Hearing: Exceptions to WFL  Hearing Exceptions: Hard of hearing/hearing concerns  Cognition  Overall Cognitive Status: Exceptions  Arousal/Alertness: Appears intact  Following Commands: Appears intact  Attention Span: Appears intact  Memory: Decreased recall of recent events  Safety Judgement: Decreased awareness of need for safety  Problem Solving: Decreased awareness of errors  Insights: Appears intact  Initiation: Appears intact  Sequencing: Appears intact  Orientation  Orientation Level: Oriented to place;Oriented to situation;Oriented to time;Oriented to person    Education Given To: Patient  Education Provided: Role of Therapy;Plan of Care;Mobility Training;Fall Prevention Strategies;ADL Adaptive Strategies;Transfer Training;Equipment;Energy Conservation  Education Method: Verbal  Barriers to Learning: Cognition  Education Outcome: Continued education needed     Hand Dominance  Hand Dominance: Right  AM-PAC - ADL  AM-PAC Daily Activity - Inpatient   How much help is needed for putting on and taking off regular lower body clothing?: A Little  How much help is needed for bathing (which includes washing, rinsing, drying)?: A Little  How much help is needed for toileting (which includes using toilet, bedpan, or urinal)?: A Little  How much help is needed for putting on and taking off regular upper body clothing?: None  How much help is needed for taking care of personal grooming?: A Little  How much help for eating meals?: None  AM-[facility] Inpatient Daily Activity Raw Score: 20  AM-PAC Inpatient ADL T-Scale Score : 42.03  ADL Inpatient CMS 0-100% Score: 38.32  ADL Inpatient CMS G-Code Modifier : CJ    Goals  Short Term Goals  Time Frame for Short Term Goals: Patient will, by

## 2024-07-17 NOTE — PROGRESS NOTES
Endovascular Neurosurgery Progress Note      Reason for evaluation: L vert origin stenosis    SUBJECTIVE:   NAOE from ENV perspective. Encephalopathy continues.    History of Chief Complaint:    This is a 64 year old male with hx of A.fib on Xarelto, CAD, COPD, Htn, HLD, adjustment disorder, alcohol abuse, polysubstance abuse, and current smoking. Pt presented with recurrent episodes of vomiting and altered mental status. 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     Allergies  is allergic to augmentin es-600 [amoxicillin-pot clavulanate] and sulfa antibiotics.  Medications  Prior to Admission medications    Medication Sig Start Date End Date Taking? Authorizing Provider   donepezil (ARICEPT) 5 MG tablet Take 1 tablet by mouth daily   Yes Madan Julien MD   LORazepam (ATIVAN) 0.5 MG tablet Take 1 tablet by mouth 3 times daily as needed for Anxiety. 4/17/24   ProviderMadan MD   ENTRESTO 24-26 MG per tablet TAKE 1 TABLET BY MOUTH TWICE  DAILY 6/16/24   Ira Roberts APRN - CNP   XARELTO 20 MG TABS tablet TAKE 1 TABLET BY MOUTH DAILY  WITH BREAKFAST 6/2/24   Ira Roberts, APRTREVA - CNP   spironolactone (ALDACTONE) 25 MG tablet TAKE 1 TABLET BY MOUTH DAILY 5/31/24   Ira Roberts APRN - CNP   DULoxetine (CYMBALTA) 20 MG extended release capsule TAKE 1 CAPSULE BY MOUTH DAILY 5/21/24   Ira Roberts, APRTREVA - DYLAN   metoprolol tartrate (LOPRESSOR) 50 MG tablet Take 1 tablet by mouth 2 times daily    ProviderMadan MD   gabapentin (NEURONTIN) 100 MG capsule TAKE 2 CAPSULES BY MOUTH EVERY  EVENING FOR NEUROPATHIC PAIN 2/16/24 7/15/24  Ira Roberts, APRN - CNP   hydrOXYzine HCl (ATARAX) 10 MG tablet Take 1 tablet by mouth 3 times daily as needed for Anxiety 1/22/24   Ira Roberts APRN - CNP   atorvastatin (LIPITOR) 10 MG tablet Take 1 tablet by mouth nightly  Patient not taking: Reported on

## 2024-07-17 NOTE — DISCHARGE SUMMARY
[facility]     Department of Neurology    INPATIENT DISCHARGE SUMMARY        Patient Identification:  Mahesh Brownlee is a 64 y.o. male.  :  1960  MRN: [id number]     Acct: [id number]   Admit Date:  2024  Discharge date and time: 2024  Attending Provider: Jon Killian                                 Admission Diagnoses:   Metabolic encephalopathy [G93.41]  Confusion [R41.0]  Altered mental status [R41.82]    Discharge Diagnoses:   Principal Problem:    Altered mental status  Active Problems:    Heart failure with improved ejection fraction (HFimpEF) (HCC)    Paroxysmal atrial flutter (HCC)    Confusion    ICD (implantable cardioverter-defibrillator) in place    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    History of alcohol use disorder    Metabolic encephalopathy    Alcoholic Korsakoff syndrome (HCC)  Resolved Problems:    * No resolved hospital problems. *       Consults:   neurology and psychiatry, Cardiology, and Internal Medicine, endovascular neurosurgery     Brief Inpatient course:    64-year-old male with past medical history of atrial fibrillation on Xarelto, CAD, COPD, hypertension, hyperlipidemia, adjustment disorder, significant alcohol abuse history, polysubstance abuse, current smoker presents  to [facility] presents with confusion.      As per the nursing staff, last known well is roughly 8 AM on 2024.  Patient was noted to have memory deficits with evidence of not remembering that he had a stroke or TBI.  CTA done in [facility] ER showed chronic left vertebral artery occlusion, endovascular says nothing to intervene on.  Neurology consulted for confusion.      Patient is alert and oriented to person only. He has tangential thought, and has difficulty concentrating. He is unable to recall details of events prior to ER admission. He states he lives with his mother last year, but is unable to recall where he lives currently. He is a very poor  historian. He states he is in \"constant pain\" from arthritis and chronic pain. Patient is apparently alert and oriented x 4 at baseline.     30 minute EEG done in October 2023 was normal with no epileptiform discharges.      30 minute EEG done on 9/23/24 showed disorganized and slow background in polymorphic delta and theta frequency.      Patient was seen in outpatient neurology office on 7/2. B12, folate, and TSH are unremarkable. According to EMR, patient does have significant hx of alcohol abuse, substance abuse, and uses high dose marijuana edibles.   He was recently admitted to [facility] in June 2024 for alcohol withdrawal.  At that time he also reported suicidal ideation.  He has prior ER admissions for aggressive behavior and alcohol intoxication.  Prior EEG showed slowing with no epileptiform discharges.  MRI brain showed no acute findings.  Etiology was thought to be toxic metabolic encephalopathy.  He was also admitted to [facility] for delusional thoughts. MMSE done at office visit on 7/2 showed 28/30.      Pertinent labs include ammonia 26, troponin 20, tox screen negative,  WBC 15.2.  Not meeting SIRS criteria          Patient is stable from neurological standpoint to be discharged.  Today:  The patient was seen and examined at bedside. Is vitally stable, alert and oriented x 2.  Patient was complaining  anxiety at night, Atarax was ordered.  Pt had MRI; unremarkable  Patient said that still have memory issue, was able to remember what happened yesterday, seem to be more improved compared to the previous day, will continue patient on high-dose thiamine.  Psych recommended [facility]; pt agreable  Today patient denies any suicidal or homicidal ideation.    Neurologic Exam      GENERAL  Appears comfortable and in no distress   HEENT  NC/ AT   HEART  S1 and S2 heard; palpation of pulses: radial pulse    NECK  Supple and no bruits heard   MENTAL STATUS:  Alert, oriented x 2 to self and place but not to time, impaired

## 2024-07-17 NOTE — PROGRESS NOTES
in the past year?: Yes (~30 this year)  ADL Assistance: Independent  Homemaking Responsibilities: No (reports not having to complete right now)  Active : No  Patient's  Info: set up ride  Mode of Transportation: Cab  Occupation: Retired  Type of Occupation: Construction  Leisure & Hobbies: TV  Additional Comments: Patient unsure of where they came from; questionable historian  Vision/Hearing  Vision  Vision: Impaired  Vision Exceptions: Wears glasses at all times  Hearing  Hearing: Exceptions to WFL  Hearing Exceptions: Hard of hearing/hearing concerns    Cognition   Cognition  Overall Cognitive Status: Exceptions  Arousal/Alertness: Appears intact  Following Commands: Appears intact  Attention Span: Appears intact  Memory: Decreased recall of recent events  Safety Judgement: Decreased awareness of need for safety  Problem Solving: Decreased awareness of errors  Insights: Appears intact  Initiation: Appears intact  Sequencing: Appears intact     Objective                 AROM RLE (degrees)  RLE AROM: WFL  AROM LLE (degrees)  LLE AROM : WFL  AROM RUE (degrees)  RUE AROM : WFL  AROM LUE (degrees)  LUE AROM : WFL  Strength RLE  Strength RLE: Exception  Comment: Limited secondary to increased pain.  R Hip Flexion: 3/5  R Knee Extension: 3+/5  R Ankle Dorsiflexion: 3+/5  R Ankle Plantar flexion: 3+/5  Strength LLE  Strength LLE: Exception  Comment: Limited secondary to increased pain.  L Hip Flexion: 3/5  L Knee Extension: 3+/5  L Ankle Dorsiflexion: 3+/5  L Ankle Plantar Flexion: 3+/5  Strength RUE  Comment: Co-eval with OT, see OT note for UE detail.  Strength LUE  Comment: Co-eval with OT, see OT note for UE detail.      Bed mobility  Supine to Sit: Stand by assistance  Sit to Supine: Stand by assistance  Scooting: Stand by assistance  Bed Mobility Comments: HOB elevated ~30 degrees with use of bedrails.  Transfers  Sit to Stand: Contact guard assistance  Stand to Sit: Contact guard assistance  Comment:  Transfers performed with a RW, verbal cues for UE placement.  Ambulation  Surface: Level tile  Device: Rolling Walker  Assistance: Moderate assistance (Mod-A progressing to min-A.)  Quality of Gait: Unsteady, bilateral knees buckle, forward flexed trunk, decreased gait speed, 2x LOB requiring mod-A to correct.  Gait Deviations: Slow Lashae;Decreased step length;Decreased step height  Distance: 12 feet, standing rest break, 12 feet.  More Ambulation?: No  Stairs/Curb  Stairs?: No     Balance  Posture: Fair  Sitting - Static: Good  Sitting - Dynamic: Fair;+  Standing - Static: Fair;-  Standing - Dynamic: Poor;+  Comments: standing balance assessed while using a RW.                                             AM-PAC - Mobility    AM-PAC Basic Mobility - Inpatient   How much help is needed turning from your back to your side while in a flat bed without using bedrails?: None  How much help is needed moving from lying on your back to sitting on the side of a flat bed without using bedrails?: A Little  How much help is needed moving to and from a bed to a chair?: A Little  How much help is needed standing up from a chair using your arms?: A Little  How much help is needed walking in hospital room?: A Lot  How much help is needed climbing 3-5 steps with a railing?: Total  AM-PAC Inpatient Mobility Raw Score : 16  AM-PAC Inpatient T-Scale Score : 40.78  Mobility Inpatient CMS 0-100% Score: 54.16  Mobility Inpatient CMS G-Code Modifier : CK           Goals  Short Term Goals  Time Frame for Short Term Goals: 14 visits  Short Term Goal 1: Pt will perform sit<>stand transfer with supervision.  Short Term Goal 2: Pt will ambulate 200 feet with least restrictive AD and SBA.  Short Term Goal 3: Pt will demonstrate fair+ dynamic standing balance to decrease fall risk.  Short Term Goal 4: Pt will negotiate 3 stairs with a unilateral handrail and CGA to increase capacity for community mobility.  Short Term Goal 5: Pt will perform

## 2024-07-17 NOTE — PLAN OF CARE
Problem: Pain  Goal: Verbalizes/displays adequate comfort level or baseline comfort level  7/16/2024 2134 by Fadumo Billy RN  Outcome: Progressing  7/16/2024 1217 by Patt Ramirez RN  Outcome: Progressing  Flowsheets  Taken 7/16/2024 1113  Verbalizes/displays adequate comfort level or baseline comfort level:   Encourage patient to monitor pain and request assistance   Assess pain using appropriate pain scale   Administer analgesics based on type and severity of pain and evaluate response   Implement non-pharmacological measures as appropriate and evaluate response   Consider cultural and social influences on pain and pain management   Notify Licensed Independent Practitioner if interventions unsuccessful or patient reports new pain  Taken 7/16/2024 0803  Verbalizes/displays adequate comfort level or baseline comfort level:   Encourage patient to monitor pain and request assistance   Assess pain using appropriate pain scale   Administer analgesics based on type and severity of pain and evaluate response   Implement non-pharmacological measures as appropriate and evaluate response   Consider cultural and social influences on pain and pain management   Notify Licensed Independent Practitioner if interventions unsuccessful or patient reports new pain     Problem: Safety - Adult  Goal: Free from fall injury  7/16/2024 2134 by Fadumo Billy RN  Outcome: Progressing  7/16/2024 1217 by Patt Ramirez RN  Outcome: Progressing  Flowsheets (Taken 7/16/2024 1215)  Free From Fall Injury: Instruct family/caregiver on patient safety     Problem: Chronic Conditions and Co-morbidities  Goal: Patient's chronic conditions and co-morbidity symptoms are monitored and maintained or improved  7/16/2024 2134 by Fadumo Billy RN  Outcome: Progressing  7/16/2024 1217 by Patt Ramirez RN  Outcome: Progressing  Flowsheets (Taken 7/16/2024 0800)  Care Plan - Patient's Chronic Conditions and Co-Morbidity Symptoms are

## 2024-07-17 NOTE — PROGRESS NOTES
Cardiology Progress Note                     Date:   7/17/2024  Patient name: Mahesh Brownlee  Date of admission:  7/14/2024 12:50 PM  MRN:   [id number]  YOB: 1960  PCP: Ira Roberts, APRN - CNP    Reason for Admission:  encephalopathy     Subjective:     Patient seen and examined.  Underwent MRI brain this morning.  Awake and oriented.  In no distress.  Denies any chest pain or dyspnea.  No lower extremity edema   Sitter at the bedside   HR is improved after decreasing dose of lopressor.     Scheduled Meds:   metoprolol tartrate  25 mg Oral BID    nicotine  1 patch TransDERmal Daily    DULoxetine  20 mg Oral Daily    sacubitril-valsartan  1 tablet Oral BID    folic acid  1 mg Oral Daily    spironolactone  25 mg Oral Daily    sodium chloride flush  5-40 mL IntraVENous 2 times per day    thiamine  100 mg IntraMUSCular Daily    vitamin B-12  1,000 mcg Oral Daily    rivaroxaban  20 mg Oral Daily       Continuous Infusions:   sodium chloride         Labs:     CBC:   Recent Labs     07/14/24  1304 07/16/24  1145 07/17/24  0754   WBC 15.2* 11.2 9.2   HGB 13.7 13.6 13.4    266 264     BMP:    Recent Labs     07/14/24  1304 07/16/24  1145 07/17/24  0754    135* 139   K 3.9 4.0 3.7    103 106   CO2 24 21 20   BUN 10 11 9   CREATININE 0.8 0.9 0.8   GLUCOSE 115* 106* 107*     Hepatic:   Recent Labs     07/14/24  1304   AST 22   ALT 6*   BILITOT 0.5   ALKPHOS 74     Troponin: No results for input(s): \"TROPONINI\" in the last 72 hours.  BNP: No results for input(s): \"BNP\" in the last 72 hours.  Lipids: No results for input(s): \"CHOL\", \"HDL\" in the last 72 hours.    Invalid input(s): \"LDLCALCU\"  INR: No results for input(s): \"INR\" in the last 72 hours.      Objective:     Vitals: BP (!) 151/81   Pulse 69   Temp 98.4 °F (36.9 °C) (Oral)   Resp 12   Ht 1.803 m (5' 11\")   Wt 97.5 kg (215 lb)   SpO2 97%   BMI 29.99 kg/m²     General appearance: awake, alert, in no apparent

## 2024-07-17 NOTE — FLOWSHEET NOTE
Awaiting AICD interrogation in order to complete MRI brain per cardiology, writer called [phone] and a local representative will be out as soon as possible.  
Patient voiced suicidal ideations to neuro while they were speaking with him.  Psychiatry consult placed, suicide precautions in place and sitter placed at bedside.  
Per [facility], transport to arrive around 0030.    1846 Writer tried to call report to [facility], they are in shift change.  Report number is [phone].    1916 Writer called report to [facility], all questions answered.      
Writer called [facility] to start process of getting patient transferred to [facility]. [facility] will call when they have bed available and to set up transport.  
patient/family

## 2024-07-17 NOTE — PROGRESS NOTES
[facility]  Office: [phone]  Louie Rossi DO, Noah Mayers DO, Sergio Ordaz DO, Saul March DO, Willy Mc MD, Jaci Berger MD, Sivakumar Freeman MD, Mile Johnston MD,  Aric Granda MD, Garry Shepherd MD, Kelsy Wahl MD,  Evelyn Petit DO, Terry Kern MD, Addison Sol MD, Jaspreet Rossi DO, Kassy Pedro MD,  Emeka Leon DO, Susan Manjarrez MD, Krystyna Howell MD, [place] MD, Keeley Santa MD,  Chris Ashley MD, Rick Del Rio MD, Howard Brown MD, Gemma Ordaz MD, Mateo Dey MD, Pardeep Owen MD, Carlton Cardona DO, Clay Avila DO, Kayla Moran MD,  Avi Mcneil MD, Shirley Waterhouse, CNP,  Magdalena Oviedo CNP, Gómez Gomez, CNP,  Fely Garcia, SCHUYLER, Lorena Garcia, CNP, Mariana Jordan, CNP, Dixie Garcia CNP, Shea Chirinos, CNP, Suzi Tavares, PA-C, Carmencita Wilson PA-C, Daija Mejia, CNP, Shantelle Westbrook, CNP, Vincent Armendariz, CNP, Asuncion Hollins, CNP, Brianne Arnett CNP, Penny Lopez, CNS, Alda Orozco, CNP, Miriam Martins CNP, Tracy Schwab, CNP       [facility]      Daily Progress Note     Admit Date: 7/14/2024  Bed/Room No.  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 *    Subjective :        Interval History/Significant events :  07/17/24    Patient is alert and oriented , able to recall his birth date , oriented to time.

## 2024-07-17 NOTE — PROGRESS NOTES
Writer notified on call neuro resident, Dr. Reyna of patient requesting medication for anxiety. Patient states. \"My anxiety feels very intense right now, can you please ask the doctor for another dose of ativan?\" Writer messaged physician via perfect serve. Orders placed per physician, see MAR.

## 2024-07-17 NOTE — CARE COORDINATION
Transitional planning        Plan is [facility], nurse to start admission process, medically cleared, transport on will call.

## 2024-07-18 ENCOUNTER — HOSPITAL ENCOUNTER (INPATIENT)
Age: 64
LOS: 4 days | Discharge: HOME | DRG: 885 | End: 2024-07-22
Attending: PSYCHIATRY & NEUROLOGY | Admitting: PSYCHIATRY & NEUROLOGY
Payer: MEDICARE

## 2024-07-18 DIAGNOSIS — E53.8 B12 DEFICIENCY: ICD-10-CM

## 2024-07-18 PROBLEM — F33.2 SEVERE EPISODE OF RECURRENT MAJOR DEPRESSIVE DISORDER, WITHOUT PSYCHOTIC FEATURES (HCC): Status: ACTIVE | Noted: 2024-07-18

## 2024-07-18 PROCEDURE — APPSS60 APP SPLIT SHARED TIME 46-60 MINUTES

## 2024-07-18 PROCEDURE — 1240000000 HC EMOTIONAL WELLNESS R&B

## 2024-07-18 PROCEDURE — 99223 1ST HOSP IP/OBS HIGH 75: CPT | Performed by: INTERNAL MEDICINE

## 2024-07-18 PROCEDURE — 99222 1ST HOSP IP/OBS MODERATE 55: CPT | Performed by: PSYCHIATRY & NEUROLOGY

## 2024-07-18 PROCEDURE — 6370000000 HC RX 637 (ALT 250 FOR IP): Performed by: PSYCHIATRY & NEUROLOGY

## 2024-07-18 PROCEDURE — 6370000000 HC RX 637 (ALT 250 FOR IP): Performed by: INTERNAL MEDICINE

## 2024-07-18 PROCEDURE — 6370000000 HC RX 637 (ALT 250 FOR IP)

## 2024-07-18 RX ORDER — OXYCODONE HYDROCHLORIDE AND ACETAMINOPHEN 5; 325 MG/1; MG/1
1 TABLET ORAL
Status: ON HOLD | COMMUNITY
Start: 2024-07-11 | End: 2024-07-22 | Stop reason: HOSPADM

## 2024-07-18 RX ORDER — SPIRONOLACTONE 25 MG/1
25 TABLET ORAL DAILY
Status: DISCONTINUED | OUTPATIENT
Start: 2024-07-18 | End: 2024-07-22 | Stop reason: HOSPADM

## 2024-07-18 RX ORDER — NICOTINE 21 MG/24HR
1 PATCH, TRANSDERMAL 24 HOURS TRANSDERMAL DAILY
Status: DISCONTINUED | OUTPATIENT
Start: 2024-07-18 | End: 2024-07-22 | Stop reason: HOSPADM

## 2024-07-18 RX ORDER — TRIAMCINOLONE ACETONIDE 1 MG/G
CREAM TOPICAL 2 TIMES DAILY
Status: ON HOLD | COMMUNITY
Start: 2024-06-28 | End: 2024-07-22 | Stop reason: HOSPADM

## 2024-07-18 RX ORDER — LANOLIN ALCOHOL/MO/W.PET/CERES
1000 CREAM (GRAM) TOPICAL DAILY
Status: DISCONTINUED | OUTPATIENT
Start: 2024-07-19 | End: 2024-07-22 | Stop reason: HOSPADM

## 2024-07-18 RX ORDER — BUDESONIDE AND FORMOTEROL FUMARATE DIHYDRATE 160; 4.5 UG/1; UG/1
2 AEROSOL RESPIRATORY (INHALATION)
Status: DISCONTINUED | OUTPATIENT
Start: 2024-07-18 | End: 2024-07-22 | Stop reason: HOSPADM

## 2024-07-18 RX ORDER — OLANZAPINE 5 MG/1
5 TABLET ORAL 3 TIMES DAILY PRN
Status: DISCONTINUED | OUTPATIENT
Start: 2024-07-18 | End: 2024-07-22 | Stop reason: HOSPADM

## 2024-07-18 RX ORDER — POLYETHYLENE GLYCOL 3350 17 G/17G
17 POWDER, FOR SOLUTION ORAL DAILY PRN
Status: DISCONTINUED | OUTPATIENT
Start: 2024-07-18 | End: 2024-07-22 | Stop reason: HOSPADM

## 2024-07-18 RX ORDER — FOLIC ACID 1 MG/1
1 TABLET ORAL DAILY
Status: DISCONTINUED | OUTPATIENT
Start: 2024-07-18 | End: 2024-07-22 | Stop reason: HOSPADM

## 2024-07-18 RX ORDER — DULOXETIN HYDROCHLORIDE 20 MG/1
20 CAPSULE, DELAYED RELEASE ORAL DAILY
Status: DISCONTINUED | OUTPATIENT
Start: 2024-07-19 | End: 2024-07-22 | Stop reason: HOSPADM

## 2024-07-18 RX ORDER — DONEPEZIL HYDROCHLORIDE 5 MG/1
5 TABLET, FILM COATED ORAL DAILY
Status: DISCONTINUED | OUTPATIENT
Start: 2024-07-19 | End: 2024-07-22 | Stop reason: HOSPADM

## 2024-07-18 RX ORDER — ATORVASTATIN CALCIUM 10 MG/1
10 TABLET, FILM COATED ORAL NIGHTLY
Status: DISCONTINUED | OUTPATIENT
Start: 2024-07-18 | End: 2024-07-22 | Stop reason: HOSPADM

## 2024-07-18 RX ORDER — BUPRENORPHINE 5 UG/H
1 PATCH TRANSDERMAL WEEKLY
Status: ON HOLD | COMMUNITY
Start: 2024-06-27 | End: 2024-07-22 | Stop reason: HOSPADM

## 2024-07-18 RX ORDER — ACETAMINOPHEN 325 MG/1
650 TABLET ORAL EVERY 4 HOURS PRN
Status: DISCONTINUED | OUTPATIENT
Start: 2024-07-18 | End: 2024-07-22 | Stop reason: HOSPADM

## 2024-07-18 RX ORDER — MAGNESIUM HYDROXIDE/ALUMINUM HYDROXICE/SIMETHICONE 120; 1200; 1200 MG/30ML; MG/30ML; MG/30ML
30 SUSPENSION ORAL EVERY 6 HOURS PRN
Status: DISCONTINUED | OUTPATIENT
Start: 2024-07-18 | End: 2024-07-22 | Stop reason: HOSPADM

## 2024-07-18 RX ORDER — LANOLIN ALCOHOL/MO/W.PET/CERES
3 CREAM (GRAM) TOPICAL NIGHTLY
Status: DISCONTINUED | OUTPATIENT
Start: 2024-07-18 | End: 2024-07-22 | Stop reason: HOSPADM

## 2024-07-18 RX ADMIN — SPIRONOLACTONE 25 MG: 25 TABLET ORAL at 16:51

## 2024-07-18 RX ADMIN — METOPROLOL TARTRATE 25 MG: 25 TABLET, FILM COATED ORAL at 21:20

## 2024-07-18 RX ADMIN — ATORVASTATIN CALCIUM 10 MG: 10 TABLET, FILM COATED ORAL at 21:47

## 2024-07-18 RX ADMIN — RIVAROXABAN 20 MG: 20 TABLET, FILM COATED ORAL at 16:50

## 2024-07-18 RX ADMIN — SACUBITRIL AND VALSARTAN 1 TABLET: 49; 51 TABLET, FILM COATED ORAL at 21:20

## 2024-07-18 RX ADMIN — FOLIC ACID 1 MG: 1 TABLET ORAL at 16:32

## 2024-07-18 RX ADMIN — Medication 3 MG: at 21:20

## 2024-07-18 RX ADMIN — BUDESONIDE AND FORMOTEROL FUMARATE DIHYDRATE 2 PUFF: 160; 4.5 AEROSOL RESPIRATORY (INHALATION) at 21:47

## 2024-07-18 RX ADMIN — SACUBITRIL AND VALSARTAN 1 TABLET: 49; 51 TABLET, FILM COATED ORAL at 16:49

## 2024-07-18 RX ADMIN — METOPROLOL TARTRATE 25 MG: 25 TABLET, FILM COATED ORAL at 16:32

## 2024-07-18 RX ADMIN — OLANZAPINE 5 MG: 5 TABLET, FILM COATED ORAL at 21:20

## 2024-07-18 RX ADMIN — ACETAMINOPHEN 650 MG: 325 TABLET ORAL at 12:36

## 2024-07-18 NOTE — GROUP NOTE
Group Therapy Note    Date: 7/18/2024    Group Start Time: 1000  Group End Time: 1045  Group Topic: Psychotherapy    [facility] Perla Kim MSW        Group Therapy Note    Attendees: 6/11       Patient's Goal:  Discuss journaling as a coping skill and practice using prompts.    Notes:  Patient mainly listened to group; Participated only when asked    Status After Intervention:  Improved    Participation Level: Active Listener    Participation Quality: Appropriate and Attentive      Speech:  normal      Thought Process/Content: Logical  Linear      Affective Functioning: Congruent      Mood: euthymic      Level of consciousness:  Alert and Oriented x4      Response to Learning: Able to verbalize current knowledge/experience and Able to verbalize/acknowledge new learning      Endings: None Reported    Modes of Intervention: Education and Support      Discipline Responsible: /Counselor      Signature:  MYRIAM Hill

## 2024-07-18 NOTE — PROGRESS NOTES
Patient left with [facility] to go to [facility]. Patient left with all belongings.

## 2024-07-18 NOTE — H&P
memory.  Admitted to feeling hopeless, anhedonia, low energy, poor appetite, poor sleep, and thoughts of not wanting to be alive for the past several months.    Upon presentation today Mahesh was found in the milieu talking to his son on the phone.  Mostly oriented but does exhibit some confusion.  He is alert and oriented to person place and time but confused about his current situation that brought him in.  He does admit to ongoing depression consisting with the symptoms as listed above but denies any current suicidal thoughts.  He states \"I do have frequent thoughts of not wanting to be alive.\"  He also endorses excessive worry, restlessness, poor sleep, poor concentration, and being nervous.  Denies any panic attacks.  Patient states he has bipolar and when asked about his symptoms and went for started he states \"it started in birth but I do not know the symptoms.\"  Unable to meet criteria at this time.    He does endorse a history of excessive alcohol use.  He states he has been drinking for over 30 years daily an average of 12 beers.  He states he was sober for short period of time in 2015 when he was \"trying to save my marriage.\"  He does not recall his last admission to the [facility] in September 2023 for altered mental status.  He does not recall the last time he had an alcoholic drink.  He was notified that his EtOH was negative upon admission.  He seems surprised by this.  He denies any illicit substance use except for marijuana.  Urine drug screen was positive for oxycodone, benzos, and marijuana.  EtOH negative upon admission.  He does endorse a history of minor alcohol withdrawal consisting of nausea and tremors.  Denies any history of PTSD.  Denies any history of hallucinations, delusions, or paranoia.    Patient does have a history of stroke and TBI.  Baseline memory deficits.  Overall he answers questions appropriately but he does appear somewhat disorganized and confused.  He is a poor historian.  Does 
lumbar region (Chronic) 7/18/2024 Yes    Hyperlipidemia 7/18/2024 Yes    Essential hypertension 7/18/2024 Yes    Tobacco abuse 7/18/2024 Yes    COPD without exacerbation (HCC) 7/18/2024 Yes    CAD (coronary artery disease) 7/18/2024 Yes    Hypothyroidism 7/18/2024 Yes    Left ventricular dysfunction 7/18/2024 Yes    Seizure-like activity (HCC) 7/18/2024 Yes    ICD (implantable cardioverter-defibrillator) in place 7/18/2024 Yes    Cardiomyopathy (HCC) 7/18/2024 Yes    Chronic combined systolic and diastolic heart failure (HCC) 7/18/2024 Yes       Plan:     Admitted to inpatient psych with suicidal ideations,  Chronic diastolic congestive heart failure, continue home medications,  AICD in place,  Hypertension, continue to monitor blood pressure  Hyperlipidemia, continue statin.  Atrial fibrillation, continue anticoagulation and rate control at this time course and labs, allergic medication reviewed    DVT prophylaxis,  Pt mobile   Full code status       Consultations:   IP CONSULT TO INTERNAL MEDICINE      Eleanor Sanchez MD  7/18/2024  1:39 PM    Copy sent to Dr. Roberts, Ira TILLEY, APRN - CNP    Please note that this chart was generated using voice recognition Dragon dictation software.  Although every effort was made to ensure the accuracy of this automated transcription, some errors in transcription may have occurred.

## 2024-07-18 NOTE — GROUP NOTE
Group Therapy Note    Date: 7/18/2024    Group Start Time: 1430  Group End Time: 1515  Group Topic: relaxation group     [facility]    Myriam Jessica CTRS        Group Therapy Note    Attendees: 4/9       Patient's Goal:  pt will demonstrate improved coping skills and identify benefits of using art for relaxation     Notes:   pt was pleasant and participated well    Status After Intervention:  Improved    Participation Level: Active Listener and Interactive    Participation Quality: Appropriate, Sharing, and Supportive      Speech:  normal      Thought Process/Content: confused      Affective Functioning: flat      Mood: anxious      Level of consciousness:  Alert      Response to Learning: Able to verbalize current knowledge/experience, Capable of insight, and Progressing to goal      Endings: None Reported    Modes of Intervention: Education, Support, and Activity      Discipline Responsible: Psychoeducational Specialist      Signature:  COSME MCCRACKEN

## 2024-07-18 NOTE — CARE COORDINATION
[facility] Biopsychosocial Assessment    Current Level of Psychosocial Functioning     Independent xxx  Dependent    Minimal Assist     Comments:    Psychosocial High Risk Factors (check all that apply)    Unable to obtain meds   Chronic illness/pain  xx  Substance abuse xx  Lack of Family Support   Financial stress   Isolation   Inadequate Community Resources xx  Suicide attempt(s)  Not taking medications  xx  Victim of crime   Developmental Delay  Unable to manage personal needs    Age 65 or older xx  Homeless  No transportation   Readmission within 30 days  Unemployment  Traumatic Event    Comments:   Psychiatric Advanced Directives: none reported     Family to Involve in Treatment: adult children are supportive     Sexual Orientation:  n/a    Patient Strengths: receives social security benefits, states his adult children are supportive     Patient Barriers: recently found out that the home he was living in was sold and that he would need to admit to a new apartment, reports excessive alcohol abuse       Opiate Education Provided:  denies opiate use       CMHC/mental health history: was referred to [facility] in , states he is not currently linked with services     Plan of Care   medication management, group/individual therapies, family meetings, psycho -education, treatment team meetings to assist with stabilization    Initial Discharge Plan:  return home       Clinical Summary:  Mahesh is a 64 year old single male who has been admitted to [facility] with report of increase in depression and suicidal ideation. Mahesh states he has been living in his  parents home, states he recently learned his older brother inherited this house and has now sold it, he states he has to move out of the home and into an apartment. Mahesh states his alcohol consumption has increased with the stress of this news, he refuses AOD recovery resources. He is not currently linked with outpatient services. Ongoing support and

## 2024-07-18 NOTE — PLAN OF CARE
Problem: Pain  Goal: Verbalizes/displays adequate comfort level or baseline comfort level  7/17/2024 2220 by Susan Caldwell RN  Outcome: Progressing  7/17/2024 1429 by Ramila Waller RN  Outcome: Progressing  Flowsheets (Taken 7/16/2024 1113 by Patt Ramirez, RN)  Verbalizes/displays adequate comfort level or baseline comfort level:   Encourage patient to monitor pain and request assistance   Assess pain using appropriate pain scale   Administer analgesics based on type and severity of pain and evaluate response   Implement non-pharmacological measures as appropriate and evaluate response   Consider cultural and social influences on pain and pain management   Notify Licensed Independent Practitioner if interventions unsuccessful or patient reports new pain     Problem: Safety - Adult  Goal: Free from fall injury  7/17/2024 2220 by Susan Caldwell RN  Outcome: Progressing  7/17/2024 1429 by Ramila Waller RN  Outcome: Progressing  Flowsheets (Taken 7/16/2024 1215 by Patt Ramirez, RN)  Free From Fall Injury: Instruct family/caregiver on patient safety     Problem: Chronic Conditions and Co-morbidities  Goal: Patient's chronic conditions and co-morbidity symptoms are monitored and maintained or improved  7/17/2024 2220 by Susan Caldwell RN  Outcome: Progressing  7/17/2024 1429 by Ramila Waller RN  Outcome: Progressing  Flowsheets (Taken 7/16/2024 0800 by Patt Ramirez, RN)  Care Plan - Patient's Chronic Conditions and Co-Morbidity Symptoms are Monitored and Maintained or Improved:   Monitor and assess patient's chronic conditions and comorbid symptoms for stability, deterioration, or improvement   Collaborate with multidisciplinary team to address chronic and comorbid conditions and prevent exacerbation or deterioration   Update acute care plan with appropriate goals if chronic or comorbid symptoms are exacerbated and prevent overall improvement and discharge

## 2024-07-18 NOTE — GROUP NOTE
Group Therapy Note    Date: 7/18/2024    Group Start Time: 1100  Group End Time: 1130  Group Topic: Cognitive Skills    Myriam Hong CTRS        Group Therapy Note    Attendees: 5/11       Patient's Goal:  pt will demonstrate improved coping skills and improved interpersonal relationships     Notes:   pt was anxious and confused. Pt had minimal participation     Status After Intervention:  Improved    Participation Level: minimal    Participation Quality: Appropriate,     Speech:  normal      Thought Process/Content: slow to process, confused at times      Affective Functioning: flat      Mood: anxious      Level of consciousness:  Alert      Response to Learning: Able to verbalize current knowledge/experience, Capable of insight, and Progressing to goal      Endings: None Reported    Modes of Intervention: Education, Support, and Activity      Discipline Responsible: Psychoeducational Specialist      Signature:  COSME MCCRACKEN

## 2024-07-18 NOTE — PLAN OF CARE
Problem: Safety - Adult  Goal: Free from fall injury  Outcome: Progressing     Problem: Chronic Conditions and Co-morbidities  Goal: Patient's chronic conditions and co-morbidity symptoms are monitored and maintained or improved  Outcome: Progressing     Problem: Self Harm/Suicidality  Goal: Will have no self-injury during hospital stay  Description: INTERVENTIONS:  1.  Ensure constant observer at bedside with Q15M safety checks  2.  Maintain a safe environment  3.  Secure patient belongings  4.  Ensure family/visitors adhere to safety recommendations  5.  Ensure safety tray has been added to patient's diet order  6.  Every shift and PRN: Re-assess suicidal risk via Frequent Screener    Outcome: Progressing     Problem: Depression  Goal: Will be euthymic at discharge  Description: INTERVENTIONS:  1. Administer medication as ordered  2. Provide emotional support via 1:1 interaction with staff  3. Encourage involvement in milieu/groups/activities  4. Monitor for social isolation  Outcome: Progressing     Problem: Pain  Goal: Verbalizes/displays adequate comfort level or baseline comfort level  Outcome: Progressing   Patient alert and oriented X 4.  Patient states very anxious and has depression noted.  Patient states has no thoughts to harm self or others. Patient pain level is a 8 due to patient states needing a hip replaced.  Patient states has no suicidal ideations at this time. Tylenol given at this time for pain.  Patient free from falls and injuries at present. Patient confused about why here but states now understands why.  Patient guarded and blunt with word . Medication being assessed by doctor.  Patient to strive for euthymism until discharge.

## 2024-07-19 PROBLEM — F10.27: Status: ACTIVE | Noted: 2024-07-19

## 2024-07-19 LAB
CHOLEST SERPL-MCNC: 193 MG/DL
CHOLESTEROL/HDL RATIO: 4.9
EST. AVERAGE GLUCOSE BLD GHB EST-MCNC: 103 MG/DL
HBA1C MFR BLD: 5.2 % (ref 4–6)
HDLC SERPL-MCNC: 39 MG/DL
LDLC SERPL CALC-MCNC: 132 MG/DL (ref 0–130)
TRIGL SERPL-MCNC: 109 MG/DL
VIT B1 PYROPHOSHATE BLD-SCNC: 177 NMOL/L (ref 70–180)

## 2024-07-19 PROCEDURE — 6370000000 HC RX 637 (ALT 250 FOR IP): Performed by: PSYCHIATRY & NEUROLOGY

## 2024-07-19 PROCEDURE — 99232 SBSQ HOSP IP/OBS MODERATE 35: CPT

## 2024-07-19 PROCEDURE — 83036 HEMOGLOBIN GLYCOSYLATED A1C: CPT

## 2024-07-19 PROCEDURE — 6370000000 HC RX 637 (ALT 250 FOR IP): Performed by: INTERNAL MEDICINE

## 2024-07-19 PROCEDURE — 97166 OT EVAL MOD COMPLEX 45 MIN: CPT

## 2024-07-19 PROCEDURE — 80061 LIPID PANEL: CPT

## 2024-07-19 PROCEDURE — 1240000000 HC EMOTIONAL WELLNESS R&B

## 2024-07-19 PROCEDURE — 36415 COLL VENOUS BLD VENIPUNCTURE: CPT

## 2024-07-19 PROCEDURE — 97162 PT EVAL MOD COMPLEX 30 MIN: CPT

## 2024-07-19 PROCEDURE — 6370000000 HC RX 637 (ALT 250 FOR IP)

## 2024-07-19 PROCEDURE — 99232 SBSQ HOSP IP/OBS MODERATE 35: CPT | Performed by: INTERNAL MEDICINE

## 2024-07-19 RX ORDER — DIVALPROEX SODIUM 500 MG/1
500 TABLET, EXTENDED RELEASE ORAL DAILY
Status: DISCONTINUED | OUTPATIENT
Start: 2024-07-19 | End: 2024-07-19

## 2024-07-19 RX ADMIN — DONEPEZIL HYDROCHLORIDE 5 MG: 5 TABLET, FILM COATED ORAL at 08:46

## 2024-07-19 RX ADMIN — SACUBITRIL AND VALSARTAN 1 TABLET: 49; 51 TABLET, FILM COATED ORAL at 20:40

## 2024-07-19 RX ADMIN — Medication 3 MG: at 20:40

## 2024-07-19 RX ADMIN — RIVAROXABAN 20 MG: 20 TABLET, FILM COATED ORAL at 08:46

## 2024-07-19 RX ADMIN — SPIRONOLACTONE 25 MG: 25 TABLET ORAL at 08:46

## 2024-07-19 RX ADMIN — METOPROLOL TARTRATE 25 MG: 25 TABLET, FILM COATED ORAL at 20:40

## 2024-07-19 RX ADMIN — ACETAMINOPHEN 650 MG: 325 TABLET ORAL at 08:46

## 2024-07-19 RX ADMIN — BUDESONIDE AND FORMOTEROL FUMARATE DIHYDRATE 2 PUFF: 160; 4.5 AEROSOL RESPIRATORY (INHALATION) at 08:45

## 2024-07-19 RX ADMIN — DULOXETINE HYDROCHLORIDE 20 MG: 20 CAPSULE, DELAYED RELEASE ORAL at 08:46

## 2024-07-19 RX ADMIN — CYANOCOBALAMIN TAB 1000 MCG 1000 MCG: 1000 TAB at 08:46

## 2024-07-19 RX ADMIN — FOLIC ACID 1 MG: 1 TABLET ORAL at 08:46

## 2024-07-19 RX ADMIN — METOPROLOL TARTRATE 25 MG: 25 TABLET, FILM COATED ORAL at 08:46

## 2024-07-19 RX ADMIN — SACUBITRIL AND VALSARTAN 1 TABLET: 49; 51 TABLET, FILM COATED ORAL at 08:45

## 2024-07-19 RX ADMIN — ATORVASTATIN CALCIUM 10 MG: 10 TABLET, FILM COATED ORAL at 20:40

## 2024-07-19 NOTE — PLAN OF CARE
Problem: Depression  Goal: Will be euthymic at discharge  Description: INTERVENTIONS:  1. Administer medication as ordered  2. Provide emotional support via 1:1 interaction with staff  3. Encourage involvement in milieu/groups/activities  4. Monitor for social isolation  Outcome: Progressing     Problem: Safety - Adult  Goal: Free from fall injury  Outcome: Progressing  Note: Pt remains free of falls; is wearing nonskid footwear; fall risk is indicated on arm band and room door. Educated on \"Stay with Me\" practices to reduce likelihood of falls; agreeable to requesting staff assistance as needed. Q15min checks maintained.      Problem: Self Harm/Suicidality  Goal: Will have no self-injury during hospital stay  Description: INTERVENTIONS:  1.  Ensure constant observer at bedside with Q15M safety checks  2.  Maintain a safe environment  3.  Secure patient belongings  4.  Ensure family/visitors adhere to safety recommendations  5.  Ensure safety tray has been added to patient's diet order  6.  Every shift and PRN: Re-assess suicidal risk via Frequent Screener    Outcome: Progressing  Note: Patient reports fleeting suicidal ideation, contracts.Denies homicidal ideation.Patient agreeable to seek out staff should thoughts of self harm/harming others worsen/arise. Patient denies hallucinations. Patient is positive for anxiety/depression but does not rate. Patient is pleasant and cooperative, and social with peers, attends group. Patient is medication compliant and behavior controlled. Comfort and reassurance provided. Safety checks maintained every 15 minutes and as needed.

## 2024-07-19 NOTE — GROUP NOTE
Group Therapy Note    Date: 7/19/2024    Group Start Time: 1100  Group End Time: 1130  Group Topic: Cognitive Skills    [facility] BHMyriam Salazar CTRS        Group Therapy Note    Attendees: 3/6       Patient's Goal:  pt will demonstrate improved coping skills and improved interpersonal relationships    Notes:   pt was pleasant and participated well    Status After Intervention:  Improved    Participation Level: Active Listener and Interactive    Participation Quality: Appropriate, Sharing, and Supportive      Speech:  normal      Thought Process/Content: Logical      Affective Functioning: flat      Mood: depressed but pleasant      Level of consciousness:  Alert      Response to Learning: Able to verbalize current knowledge/experience, Capable of insight, and Progressing to goal      Endings: None Reported    Modes of Intervention: Education, Support, and Activity      Discipline Responsible: Psychoeducational Specialist      Signature:  COSME MCCRACKEN

## 2024-07-19 NOTE — GROUP NOTE
Group Therapy Note    Date: 7/19/2024    Group Start Time: 1000  Group End Time: 1040  Group Topic: Psychotherapy    [facility] Perla Kim MSW        Group Therapy Note    Attendees: 3/6       Patient's Goal:  Using cards, practice various mindfulness skills (connection, gratitude, compassion, and insight).    Notes:     Status After Intervention:  Improved    Participation Level: Active Listener and Interactive    Participation Quality: Appropriate, Attentive, and Sharing      Speech:  normal      Thought Process/Content: Logical  Linear      Affective Functioning: Congruent      Mood: euthymic      Level of consciousness:  Alert and Oriented x4      Response to Learning: Able to verbalize current knowledge/experience and Able to verbalize/acknowledge new learning      Endings: None Reported    Modes of Intervention: Support and Socialization      Discipline Responsible: /Counselor      Signature:  MYRIAM Hill

## 2024-07-19 NOTE — PLAN OF CARE
Problem: Safety - Adult  Goal: Free from fall injury  7/18/2024 2352 by Edita Newberry LPN  Outcome: Progressing  Note: Pt denies harm to self or others. Denies delusions or hallucinations. Pt positive for dep/anx. Conditions are being monitored. Pt denies pain or discomfort at this time. Compliant with medications. States that sleep can improve but okay for now. Pt out in dayroom playing games with peers and watching tv.

## 2024-07-20 PROCEDURE — 6370000000 HC RX 637 (ALT 250 FOR IP): Performed by: PSYCHIATRY & NEUROLOGY

## 2024-07-20 PROCEDURE — 99231 SBSQ HOSP IP/OBS SF/LOW 25: CPT | Performed by: INTERNAL MEDICINE

## 2024-07-20 PROCEDURE — 99232 SBSQ HOSP IP/OBS MODERATE 35: CPT | Performed by: PSYCHIATRY & NEUROLOGY

## 2024-07-20 PROCEDURE — 1240000000 HC EMOTIONAL WELLNESS R&B

## 2024-07-20 PROCEDURE — 6370000000 HC RX 637 (ALT 250 FOR IP): Performed by: INTERNAL MEDICINE

## 2024-07-20 PROCEDURE — APPSS30 APP SPLIT SHARED TIME 16-30 MINUTES

## 2024-07-20 PROCEDURE — 6370000000 HC RX 637 (ALT 250 FOR IP)

## 2024-07-20 RX ADMIN — BUDESONIDE AND FORMOTEROL FUMARATE DIHYDRATE 2 PUFF: 160; 4.5 AEROSOL RESPIRATORY (INHALATION) at 08:41

## 2024-07-20 RX ADMIN — BUDESONIDE AND FORMOTEROL FUMARATE DIHYDRATE 2 PUFF: 160; 4.5 AEROSOL RESPIRATORY (INHALATION) at 21:38

## 2024-07-20 RX ADMIN — METOPROLOL TARTRATE 25 MG: 25 TABLET, FILM COATED ORAL at 21:39

## 2024-07-20 RX ADMIN — METOPROLOL TARTRATE 25 MG: 25 TABLET, FILM COATED ORAL at 08:34

## 2024-07-20 RX ADMIN — DONEPEZIL HYDROCHLORIDE 5 MG: 5 TABLET, FILM COATED ORAL at 08:34

## 2024-07-20 RX ADMIN — ATORVASTATIN CALCIUM 10 MG: 10 TABLET, FILM COATED ORAL at 21:40

## 2024-07-20 RX ADMIN — CYANOCOBALAMIN TAB 1000 MCG 1000 MCG: 1000 TAB at 08:35

## 2024-07-20 RX ADMIN — SACUBITRIL AND VALSARTAN 1 TABLET: 49; 51 TABLET, FILM COATED ORAL at 21:39

## 2024-07-20 RX ADMIN — SPIRONOLACTONE 25 MG: 25 TABLET ORAL at 08:34

## 2024-07-20 RX ADMIN — ACETAMINOPHEN 650 MG: 325 TABLET ORAL at 08:49

## 2024-07-20 RX ADMIN — SACUBITRIL AND VALSARTAN 1 TABLET: 49; 51 TABLET, FILM COATED ORAL at 08:34

## 2024-07-20 RX ADMIN — FOLIC ACID 1 MG: 1 TABLET ORAL at 08:34

## 2024-07-20 RX ADMIN — DULOXETINE HYDROCHLORIDE 20 MG: 20 CAPSULE, DELAYED RELEASE ORAL at 08:35

## 2024-07-20 RX ADMIN — Medication 3 MG: at 21:39

## 2024-07-20 RX ADMIN — RIVAROXABAN 20 MG: 20 TABLET, FILM COATED ORAL at 08:34

## 2024-07-20 NOTE — PLAN OF CARE
Problem: Safety - Adult  Goal: Free from fall injury  Outcome: Progressing     Problem: Chronic Conditions and Co-morbidities  Goal: Patient's chronic conditions and co-morbidity symptoms are monitored and maintained or improved  Outcome: Progressing     Problem: Self Harm/Suicidality  Goal: Will have no self-injury during hospital stay  Description: INTERVENTIONS:  1.  Ensure constant observer at bedside with Q15M safety checks  2.  Maintain a safe environment  3.  Secure patient belongings  4.  Ensure family/visitors adhere to safety recommendations  5.  Ensure safety tray has been added to patient's diet order  6.  Every shift and PRN: Re-assess suicidal risk via Frequent Screener    7/20/2024 0857 by Anju Bentley LPN  Outcome: Progressing     Problem: Depression  Goal: Will be euthymic at discharge  Description: INTERVENTIONS:  1. Administer medication as ordered  2. Provide emotional support via 1:1 interaction with staff  3. Encourage involvement in milieu/groups/activities  4. Monitor for social isolation  7/20/2024 0857 by Anju Bentley LPN  Outcome: Progressing     Problem: Pain  Goal: Verbalizes/displays adequate comfort level or baseline comfort level  Outcome: Progressing  Patient remains on the unit safe and free from self harm.  Patient endorses hip/leg pain, PRN Tylenol given, will monitor.  Patient chronic symptoms and safety will continue to be monitored with Q15 and irregular checks.

## 2024-07-20 NOTE — PLAN OF CARE
Problem: Self Harm/Suicidality  Goal: Will have no self-injury during hospital stay  Description: INTERVENTIONS:  1.  Ensure constant observer at bedside with Q15M safety checks  2.  Maintain a safe environment  3.  Secure patient belongings  4.  Ensure family/visitors adhere to safety recommendations  5.  Ensure safety tray has been added to patient's diet order  6.  Every shift and PRN: Re-assess suicidal risk via Frequent Screener    7/19/2024 2154 by Jarrod Stanton  Outcome: Progressing     Problem: Depression  Goal: Will be euthymic at discharge  Description: INTERVENTIONS:  1. Administer medication as ordered  2. Provide emotional support via 1:1 interaction with staff  3. Encourage involvement in milieu/groups/activities  4. Monitor for social isolation  7/19/2024 2154 by Jarrod Stanton  Outcome: Progressing   Patient reports depression and anxiety at this time. Patient rates anxiety a \"10\" on a scale of 0-10. Patient reports his medication helps.

## 2024-07-21 PROCEDURE — 6370000000 HC RX 637 (ALT 250 FOR IP): Performed by: PSYCHIATRY & NEUROLOGY

## 2024-07-21 PROCEDURE — 1240000000 HC EMOTIONAL WELLNESS R&B

## 2024-07-21 PROCEDURE — 6370000000 HC RX 637 (ALT 250 FOR IP)

## 2024-07-21 PROCEDURE — APPSS30 APP SPLIT SHARED TIME 16-30 MINUTES: Performed by: NURSE PRACTITIONER

## 2024-07-21 PROCEDURE — 99232 SBSQ HOSP IP/OBS MODERATE 35: CPT | Performed by: INTERNAL MEDICINE

## 2024-07-21 PROCEDURE — 6370000000 HC RX 637 (ALT 250 FOR IP): Performed by: INTERNAL MEDICINE

## 2024-07-21 PROCEDURE — 99232 SBSQ HOSP IP/OBS MODERATE 35: CPT | Performed by: PSYCHIATRY & NEUROLOGY

## 2024-07-21 RX ADMIN — Medication 3 MG: at 20:33

## 2024-07-21 RX ADMIN — CYANOCOBALAMIN TAB 1000 MCG 1000 MCG: 1000 TAB at 08:28

## 2024-07-21 RX ADMIN — DONEPEZIL HYDROCHLORIDE 5 MG: 5 TABLET, FILM COATED ORAL at 08:28

## 2024-07-21 RX ADMIN — RIVAROXABAN 20 MG: 20 TABLET, FILM COATED ORAL at 08:28

## 2024-07-21 RX ADMIN — BUDESONIDE AND FORMOTEROL FUMARATE DIHYDRATE 2 PUFF: 160; 4.5 AEROSOL RESPIRATORY (INHALATION) at 08:27

## 2024-07-21 RX ADMIN — FOLIC ACID 1 MG: 1 TABLET ORAL at 08:28

## 2024-07-21 RX ADMIN — DULOXETINE HYDROCHLORIDE 20 MG: 20 CAPSULE, DELAYED RELEASE ORAL at 08:28

## 2024-07-21 RX ADMIN — METOPROLOL TARTRATE 25 MG: 25 TABLET, FILM COATED ORAL at 08:28

## 2024-07-21 RX ADMIN — SACUBITRIL AND VALSARTAN 1 TABLET: 49; 51 TABLET, FILM COATED ORAL at 20:46

## 2024-07-21 RX ADMIN — METOPROLOL TARTRATE 25 MG: 25 TABLET, FILM COATED ORAL at 20:33

## 2024-07-21 RX ADMIN — ATORVASTATIN CALCIUM 10 MG: 10 TABLET, FILM COATED ORAL at 20:33

## 2024-07-21 RX ADMIN — SACUBITRIL AND VALSARTAN 1 TABLET: 49; 51 TABLET, FILM COATED ORAL at 08:28

## 2024-07-21 RX ADMIN — BUDESONIDE AND FORMOTEROL FUMARATE DIHYDRATE 2 PUFF: 160; 4.5 AEROSOL RESPIRATORY (INHALATION) at 20:33

## 2024-07-21 RX ADMIN — SPIRONOLACTONE 25 MG: 25 TABLET ORAL at 08:28

## 2024-07-21 NOTE — GROUP NOTE
Group Therapy Note    Date: 7/21/2024    Group Start Time: 1000  Group End Time: 1020  Group Topic: Psychoeducation    [place] [place]        Group Therapy Note    Attendees: 1/8     Patient was offered group therapy today but declined to participate despite encouragement from staff. 1:1 was offered.    Signature:  Oscar Trujillo

## 2024-07-21 NOTE — PLAN OF CARE
Problem: Safety - Adult  Goal: Free from fall injury  Outcome: Progressing     Patient remained free from falls for duration of the shift. Walker was used properly. Patient had to be reminded to use the walker when ambulating. No injuries occurred.    Problem: Self Harm/Suicidality  Goal: Will have no self-injury during hospital stay  Description: INTERVENTIONS:  1.  Ensure constant observer at bedside with Q15M safety checks  2.  Maintain a safe environment  3.  Secure patient belongings  4.  Ensure family/visitors adhere to safety recommendations  5.  Ensure safety tray has been added to patient's diet order  6.  Every shift and PRN: Re-assess suicidal risk via Frequent Screener          Outcome: Progressing     Patient denied thoughts of self harm/suicide. Safe environment and Q15 minute safety checks maintained. Patient interacted with staff in a jocular manner.

## 2024-07-21 NOTE — PLAN OF CARE
Problem: Safety - Adult  Goal: Free from fall injury  7/21/2024 1107 by Anju Bentley LPN  Outcome: Progressing     Problem: Chronic Conditions and Co-morbidities  Goal: Patient's chronic conditions and co-morbidity symptoms are monitored and maintained or improved  Outcome: Progressing     Problem: Self Harm/Suicidality  Goal: Will have no self-injury during hospital stay  Description: INTERVENTIONS:  1.  Ensure constant observer at bedside with Q15M safety checks  2.  Maintain a safe environment  3.  Secure patient belongings  4.  Ensure family/visitors adhere to safety recommendations  5.  Ensure safety tray has been added to patient's diet order  6.  Every shift and PRN: Re-assess suicidal risk via Frequent Screener    7/21/2024 1107 by Anju Bentley LPN  Outcome: Progressing     Problem: Depression  Goal: Will be euthymic at discharge  Description: INTERVENTIONS:  1. Administer medication as ordered  2. Provide emotional support via 1:1 interaction with staff  3. Encourage involvement in milieu/groups/activities  4. Monitor for social isolation  Outcome: Progressing     Problem: Pain  Goal: Verbalizes/displays adequate comfort level or baseline comfort level  Outcome: Progressing   Patient remains safe on the unit free from self harm.    Patient's pain, chronic conditions and co-morbidities will be managed and continued to be monitored via Q15 and irregular checks

## 2024-07-22 VITALS
DIASTOLIC BLOOD PRESSURE: 56 MMHG | SYSTOLIC BLOOD PRESSURE: 113 MMHG | BODY MASS INDEX: 30.1 KG/M2 | HEIGHT: 71 IN | RESPIRATION RATE: 14 BRPM | WEIGHT: 215 LBS | HEART RATE: 75 BPM | OXYGEN SATURATION: 99 % | TEMPERATURE: 97.3 F

## 2024-07-22 PROBLEM — E53.8 B12 DEFICIENCY: Status: ACTIVE | Noted: 2024-07-22

## 2024-07-22 PROCEDURE — 99231 SBSQ HOSP IP/OBS SF/LOW 25: CPT | Performed by: INTERNAL MEDICINE

## 2024-07-22 PROCEDURE — 99239 HOSP IP/OBS DSCHRG MGMT >30: CPT | Performed by: PSYCHIATRY & NEUROLOGY

## 2024-07-22 PROCEDURE — 6370000000 HC RX 637 (ALT 250 FOR IP): Performed by: PSYCHIATRY & NEUROLOGY

## 2024-07-22 PROCEDURE — 6370000000 HC RX 637 (ALT 250 FOR IP): Performed by: INTERNAL MEDICINE

## 2024-07-22 RX ORDER — DONEPEZIL HYDROCHLORIDE 5 MG/1
5 TABLET, FILM COATED ORAL DAILY
Qty: 30 TABLET | Refills: 0 | Status: SHIPPED | OUTPATIENT
Start: 2024-07-22

## 2024-07-22 RX ORDER — SPIRONOLACTONE 25 MG/1
25 TABLET ORAL DAILY
Qty: 30 TABLET | Refills: 0 | Status: SHIPPED | OUTPATIENT
Start: 2024-07-22

## 2024-07-22 RX ORDER — BUDESONIDE AND FORMOTEROL FUMARATE DIHYDRATE 160; 4.5 UG/1; UG/1
2 AEROSOL RESPIRATORY (INHALATION)
Qty: 10.2 G | Refills: 3 | Status: SHIPPED | OUTPATIENT
Start: 2024-07-22

## 2024-07-22 RX ORDER — FOLIC ACID 1 MG/1
1 TABLET ORAL DAILY
Qty: 30 TABLET | Refills: 0 | Status: SHIPPED | OUTPATIENT
Start: 2024-07-22

## 2024-07-22 RX ORDER — ATORVASTATIN CALCIUM 10 MG/1
10 TABLET, FILM COATED ORAL NIGHTLY
Qty: 30 TABLET | Refills: 0 | Status: SHIPPED | OUTPATIENT
Start: 2024-07-22

## 2024-07-22 RX ORDER — LANOLIN ALCOHOL/MO/W.PET/CERES
1000 CREAM (GRAM) TOPICAL DAILY
Qty: 30 TABLET | Refills: 0 | Status: SHIPPED | OUTPATIENT
Start: 2024-07-22

## 2024-07-22 RX ORDER — DULOXETIN HYDROCHLORIDE 20 MG/1
20 CAPSULE, DELAYED RELEASE ORAL DAILY
Qty: 30 CAPSULE | Refills: 3 | Status: SHIPPED | OUTPATIENT
Start: 2024-07-22

## 2024-07-22 RX ORDER — LANOLIN ALCOHOL/MO/W.PET/CERES
3 CREAM (GRAM) TOPICAL NIGHTLY
Qty: 30 TABLET | Refills: 0 | Status: SHIPPED | OUTPATIENT
Start: 2024-07-22

## 2024-07-22 RX ADMIN — SACUBITRIL AND VALSARTAN 1 TABLET: 49; 51 TABLET, FILM COATED ORAL at 08:43

## 2024-07-22 RX ADMIN — DULOXETINE HYDROCHLORIDE 20 MG: 20 CAPSULE, DELAYED RELEASE ORAL at 08:43

## 2024-07-22 RX ADMIN — BUDESONIDE AND FORMOTEROL FUMARATE DIHYDRATE 2 PUFF: 160; 4.5 AEROSOL RESPIRATORY (INHALATION) at 08:42

## 2024-07-22 RX ADMIN — FOLIC ACID 1 MG: 1 TABLET ORAL at 08:43

## 2024-07-22 RX ADMIN — CYANOCOBALAMIN TAB 1000 MCG 1000 MCG: 1000 TAB at 08:43

## 2024-07-22 RX ADMIN — METOPROLOL TARTRATE 25 MG: 25 TABLET, FILM COATED ORAL at 08:43

## 2024-07-22 RX ADMIN — DONEPEZIL HYDROCHLORIDE 5 MG: 5 TABLET, FILM COATED ORAL at 08:43

## 2024-07-22 RX ADMIN — RIVAROXABAN 20 MG: 20 TABLET, FILM COATED ORAL at 08:43

## 2024-07-22 RX ADMIN — SPIRONOLACTONE 25 MG: 25 TABLET ORAL at 08:43

## 2024-07-22 NOTE — GROUP NOTE
Group Therapy Note    Date: 7/22/2024    Group Start Time: 1000  Group End Time: 1020  Group Topic: Psychotherapy    [facility] Perla Lawrence MSW        Group Therapy Note    Attendees: 2/10       Patient's Goal:  Utilize conversation prompts to reflect on various aspects of life and practice socialization.    Notes:     Status After Intervention:  Improved    Participation Level: Active Listener and Interactive    Participation Quality: Appropriate and Attentive      Speech:  normal      Thought Process/Content: Logical  Linear      Affective Functioning: Congruent      Mood: euthymic      Level of consciousness:  Alert and Oriented x4      Response to Learning: Able to verbalize current knowledge/experience and Able to verbalize/acknowledge new learning      Endings: None Reported    Modes of Intervention: Socialization      Discipline Responsible: /Counselor      Signature:  MYRIAM Hill

## 2024-07-22 NOTE — TRANSITION OF CARE
Behavioral Health Transition Record    Patient Name: Mahesh Brownlee  YOB: 1960   Medical Record Number: [id number]  Date of Admission: 7/18/2024  1:30 AM   Date of Discharge: 07/22/24    Attending Provider: Iftikhar Lea MD   Discharging Provider: Venu  To contact this individual call [phone] and ask the  to page.  If unavailable, ask to be transferred to Behavioral Health Provider on call.  A Behavioral Health Provider will be available on call 24/7 and during holidays.    Primary Care Provider: Ira Roberts, APRN - CNP    Allergies   Allergen Reactions    Augmentin Es-600 [Amoxicillin-Pot Clavulanate]      Other reaction(s): Unknown    Sulfa Antibiotics Other (See Comments)       Reason for Admission: Suicidal ideation with no plan due to life stressors.     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]    * No surgery found *    Results for orders placed or performed during the hospital encounter of 07/18/24   Lipid Panel   Result Value Ref Range    Cholesterol, Total 193 <200 mg/dL    HDL 39 (L) >40 mg/dL    LDL Cholesterol 132 (H) 0 - 130 mg/dL    Chol/HDL Ratio 4.9 <5    Triglycerides 109 <150 mg/dL   Hemoglobin A1C   Result Value Ref Range    Hemoglobin A1C 5.2 4.0 - 6.0 %    Estimated Avg Glucose 103 mg/dL       Immunizations administered during this encounter:   Immunization History   Administered Date(s) Administered    COVID-19, J&J, (age 18y+), IM, 0.5 mL 05/12/2021    Influenza, FLUARIX, FLULAVAL, FLUZONE (age 6 mo+) AND AFLURIA, (age 3 y+), PF, 0.5mL 09/09/2020    Pneumococcal, PPSV23, PNEUMOVAX 23, (age 2y+), SC/IM, 0.5mL 08/16/2017     Influenza Vaccination Status: None of the above/Not documented/Unable to determine from medical record documentation    Screening for Metabolic Disorders for Patients on Antipsychotic Medications  (Data obtained from the EMR)    Estimated Body Mass Index  Body

## 2024-07-22 NOTE — DISCHARGE INSTRUCTIONS
Information:  Medications:   Medication summary provided   I understand that I should take only the medications on my list.     -why and when I need to take each medicine.     -which side effects to watch for.     -that I should carry my medication information at all times in case of     Emergency situations.    I will take all of my medicines to follow up appointments.     -check with my physician or pharmacist before taking any new    Medication, over the counter product or drink alcohol.    -Ask about food, drug or dietary supplement interactions.    -discard old lists and update records with medication providers.    Notify Physician:  Notify physician if you notice:   Always call 911 if you feel your life is in danger  In case of an emergency call 911 immediately!  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   Call the [employer]'s smoking quitline at 4-391-29R-QUIT  Know the signs of a heart attack   If you have any of the following symptoms call 911 immediately, do not wait more    Than five minutes.    1. Pressure, fullness and/ or squeezing in the center of the chest spreading to    The jaw, neck or shoulder.    2. Chest discomfort with light headedness, fainting, sweating, nausea or    Shortness of breath.   3. Upper abdominal pressure or discomfort.   4. Lower chest pain, back pain, unusual fatigue, shortness of breath, nausea   Or dizziness.     General Information:   Questions regarding your bill: Call HELP program [phone]     Suicide
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
## 2024-07-22 NOTE — PLAN OF CARE
Problem: Safety - Adult  Goal: Free from fall injury  Outcome: Completed     Problem: Chronic Conditions and Co-morbidities  Goal: Patient's chronic conditions and co-morbidity symptoms are monitored and maintained or improved  Outcome: Completed     Problem: Self Harm/Suicidality  Goal: Will have no self-injury during hospital stay  Description: INTERVENTIONS:  1.  Ensure constant observer at bedside with Q15M safety checks  2.  Maintain a safe environment  3.  Secure patient belongings  4.  Ensure family/visitors adhere to safety recommendations  5.  Ensure safety tray has been added to patient's diet order  6.  Every shift and PRN: Re-assess suicidal risk via Frequent Screener    Outcome: Completed     Problem: Depression  Goal: Will be euthymic at discharge  Description: INTERVENTIONS:  1. Administer medication as ordered  2. Provide emotional support via 1:1 interaction with staff  3. Encourage involvement in milieu/groups/activities  4. Monitor for social isolation  Outcome: Completed     Problem: Pain  Goal: Verbalizes/displays adequate comfort level or baseline comfort level  Outcome: Completed

## 2024-07-22 NOTE — GROUP NOTE
Group Therapy Note    Date: 7/22/2024    Group Start Time: 0915  Group End Time: 0930  Group Topic: Community Meeting    Bernadine James LPN        Group Therapy Note    Attendees: 3/10       Patient's Goal:  \"to get out of here\"    Status After Intervention:  Improved    Participation Level: Active Listener    Participation Quality: Appropriate      Speech:  normal      Thought Process/Content: Logical      Affective Functioning: Congruent      Mood: euthymic      Level of consciousness:  Alert, Oriented x4, and Attentive      Response to Learning: Capable of insight      Endings: None Reported    Modes of Intervention: Support and Socialization      Discipline Responsible: Registered Nurse      Signature:  Bernadine Felix RN

## 2024-07-22 NOTE — CARE COORDINATION
Social Work spoke to patient regarding discharge plans.     Patient intends on going back to [facility].  Patient would like linked with [facility].    Social Work confirmed patient is able to return to [facility].

## 2024-07-22 NOTE — PLAN OF CARE
Problem: Safety - Adult  Goal: Free from fall injury  7/21/2024 2155 by Nikolai Chavez LPN  Outcome: Progressing  Note: Patient remains free from falls and understands safety risks. Patient is wearing non skid footwear and will alert staff if assistance is needed. Q15min safety checks continue     Problem: Self Harm/Suicidality  Goal: Will have no self-injury during hospital stay  Description: INTERVENTIONS:  1.  Ensure constant observer at bedside with Q15M safety checks  2.  Maintain a safe environment  3.  Secure patient belongings  4.  Ensure family/visitors adhere to safety recommendations  5.  Ensure safety tray has been added to patient's diet order  6.  Every shift and PRN: Re-assess suicidal risk via Frequent Screener    7/21/2024 2155 by Nikolai Chavez LPN  Outcome: Progressing  Note: Patient denies any thoughts to harm self and no signs of self harm were noted. Patient encouraged to inform staff if he starts to develop any thoughts to harm self. Patient voiced understanding.      Problem: Pain  Goal: Verbalizes/displays adequate comfort level or baseline comfort level  7/21/2024 2155 by Nikolai Chavez LPN  Outcome: Progressing  Note: Patient denies any pain currently. Patient encouraged to inform staff if he develops any pain. Patient voiced understanding.

## 2024-07-23 NOTE — DISCHARGE SUMMARY
failure with hypoxia ([facility]) J96.21    Goals of care, counseling/discussion Z71.89    DNR (do not resuscitate) discussion Z71.89    ACP (advance care planning) Z71.89    Palliative care encounter Z51.5    Confusion R41.0    Altered mental status R41.82    Major depressive disorder, recurrent severe without psychotic features ([facility]) F33.2    Depression with suicidal ideation F32.A, R45.851    Acute psychosis ([facility]) F23    Amnesia R41.3    Substance use disorder F19.90    Cognitive impairment R41.89    ICD (implantable cardioverter-defibrillator) in place Z95.810    Moderate malnutrition ([facility]) E44.0    Cannabis use disorder F12.90    Encephalomalacia with cerebral infarction ([facility]) G93.89, I63.9    Cardiomyopathy ([facility]) I42.9    Chronic combined systolic and diastolic heart failure ([facility]) I50.42    Alcohol withdrawal syndrome with complication ([facility]) F10.939    Suicidal ideation R45.851    Heart failure with improved ejection fraction (HFimpEF) ([facility]) I50.32    Paroxysmal atrial flutter ([facility]) I48.92    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    History of alcohol use disorder Z87.898    Metabolic encephalopathy G93.41    Alcoholic Korsakoff syndrome ([facility]) F10.96    Severe episode of recurrent major depressive disorder, without psychotic features ([facility]) F33.2    Severe dementia associated with alcoholism, with mood disturbance ([facility]) F10.27        Admission Condition: poor    Discharged Condition: stable    Indication for Admission: threat to self    History of Present Illnes (present tense wording is of findings from admission exam and are not necessarily indicative of current findings):   Mahesh Brownlee is a 64 y.o. male who has a past medical history of TBI, encephalopathy, SIRS, seizure-like activity, atrial flutter, A-fib, alcoholism, hypothyroidism, hyperlipidemia, CVA. Patient presented to the ED on 7/14/2024 for emesis and increasing confusion.  Normally alert and oriented x 4 but does have some memory

## 2024-08-19 NOTE — PROGRESS NOTES
[facility]  [address] [place] [address]  [phone]      Mahesh Brownlee is a 64 y.o. male who presents today for his  medicalconditions/complaints as noted below.  Mahesh Brownlee is c/o of Medicare AWV (Had visit with [facility] this afternoon), Discuss Medications (Brought bag of meds, noted in chart taking, unable to answer if taking any other meds), and Trauma (States something bad happened to him but he doesn't know what )  .    HPI:    Trauma  Associated symptoms include visual disturbance. Pertinent negatives include no abdominal pain, chest pain, coughing, headaches, light-headedness, nausea, numbness, vomiting or weakness.     Pt. Here today for evaluation on new onset confusion, and short term memory loss. States he is still living at his brothers short term but is moving into assisted living soon. He states he has not been drinking or using any drugs and \"been cutting back\" as it messes with his head. He states he found a pile of money in his pocket today and unsure where it came from? Plans to call his bank. No recollection of events of certain days recently. States he is not taking anyone else's medications and has been getting decent sleep. He does not feel any weakness or change in gait or speech. Does have mild blurry vision in left eye.   Past Medical History:   Diagnosis Date    Adjustment disorder     AF (atrial fibrillation) ([facility])     AICD (automatic cardioverter/defibrillator) present 09/2015    PM    Alcohol dependence ([facility])     CAD (coronary artery disease)     Dr. Bolton cardiologist    Cardiomyopathy ([facility])     CHF (congestive heart failure) ([facility])     COPD (chronic obstructive pulmonary disease) ([facility])     DDD (degenerative disc disease), lumbar     Herniated cervical disc     C-5 x 3 years, surgery recommended    Hyperlipidemia     Hypertension     Major depression     Post laminectomy syndrome     Sciatica     Snores     Tobacco abuse     Traumatic  Detail Level: Detailed General Sunscreen Counseling: I recommended a broad spectrum sunscreen with a SPF of 30 or higher. Sunscreens should be applied at least 15 minutes prior to expected sun exposure and then every 2 hours, more often if sweating or swimming. Sun protective clothing also recommended.

## 2024-08-23 ENCOUNTER — OFFICE VISIT (OUTPATIENT)
Dept: NEUROLOGY | Age: 64
End: 2024-08-23

## 2024-08-23 VITALS
WEIGHT: 224.2 LBS | SYSTOLIC BLOOD PRESSURE: 120 MMHG | HEART RATE: 61 BPM | HEIGHT: 71 IN | DIASTOLIC BLOOD PRESSURE: 73 MMHG | BODY MASS INDEX: 31.39 KG/M2

## 2024-08-23 DIAGNOSIS — Z86.73 HISTORY OF CVA (CEREBROVASCULAR ACCIDENT): Primary | ICD-10-CM

## 2024-08-23 DIAGNOSIS — I65.02 VERTEBRAL ARTERY OCCLUSION, LEFT: ICD-10-CM

## 2024-08-23 DIAGNOSIS — I48.0 PAROXYSMAL ATRIAL FIBRILLATION (HCC): ICD-10-CM

## 2024-08-23 RX ORDER — BUPRENORPHINE 5 UG/H
1 PATCH TRANSDERMAL WEEKLY
COMMUNITY

## 2024-08-23 RX ORDER — OXYCODONE AND ACETAMINOPHEN 5; 325 MG/1; MG/1
1 TABLET ORAL EVERY 4 HOURS PRN
COMMUNITY

## 2024-08-23 NOTE — PROGRESS NOTES
Endovascular Neurosurgery Progress Note      Reason for evaluation: L vert origin stenosis    SUBJECTIVE:     Seen in clinic on 8/23. Pt states he has no new complaints. No new deficits. States he is compliant with the Xarelto. No questions today.      History of Chief Complaint:    This is a 64 year old male with hx of A.fib on Xarelto, CAD, COPD, Htn, HLD, adjustment disorder, alcohol abuse, polysubstance abuse, and current smoking. Pt presented with recurrent episodes of vomiting and altered mental status. 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     Allergies  is allergic to augmentin es-600 [amoxicillin-pot clavulanate] and sulfa antibiotics.  Medications  Prior to Admission medications    Medication Sig Start Date End Date Taking? Authorizing Provider   oxyCODONE-acetaminophen (PERCOCET) 5-325 MG per tablet Take 1 tablet by mouth every 4 hours as needed for Pain. Max Daily Amount: 6 tablets   Yes Madan Julien MD   buprenorphine (BUTRANS) 5 MCG/HR PTWK Place 1 patch onto the skin once a week. Max Daily Amount: 1 patch   Yes Madan Julien MD   atorvastatin (LIPITOR) 10 MG tablet Take 1 tablet by mouth nightly 7/22/24  Yes Iftikhar Lea MD   budesonide-formoterol (SYMBICORT) 160-4.5 MCG/ACT AERO Inhale 2 puffs into the lungs in the morning and 2 puffs in the evening.  Patient taking differently: Inhale 2 puffs into the lungs in the morning and 2 puffs in the evening. @ puff inhale orally two times a day for wheezing/SOB rinse mouth and spit after each use.. 7/22/24  Yes Iftikhar Lea MD   donepezil (ARICEPT) 5 MG tablet Take 1 tablet by mouth daily 7/22/24  Yes Iftikhar Lea MD   DULoxetine (CYMBALTA) 20 MG extended release capsule Take 1 capsule by mouth daily 7/22/24  Yes Iftikhar Lea MD   folic acid (FOLVITE) 1 MG tablet Take 1 tablet by mouth daily 7/22/24  Yes Iftikhar Lea MD  with Neurology for back pain and c/f radiculopathy +/- neuropathy  - Pt already has appt w/ Dr. Donovan scheduled.  - RTC w/ me in 6 months      Discussed with Dr. Donovan.    Scotty Benson MD   Pager [phone]  Electronically signed 08/23/24 at 10:40 AM  Stroke, Neurocritical Care & Neurointervention  [facility]         I reviewed the Fellow's note and agree with the documented findings and plan of care. Any areas of disagreement are noted on the chart. I agree with the chief complaint, past medical history, past surgical history, allergies, medications, social and family history as documented unless otherwise noted below. I have personally seen and evaluated the patient and images. I find the patient's history and physical exam are consistent with the Fellow's documentation. I agree with the critical and key portions of the care provided, treatment rendered, disposition and follow-up plan.    Late Entry Note for Date of Endovascular Neurosurgery/Stroke Service rendered on 8-.    64-year-old gentleman history of atrial fibrillation on Xarelto, CAD, COPD, HTN with left vertebral artery origin stenosis. History of left cerebellar stroke.  12-4-2024 followup with Dr. Donovan  2- with Dr. Benson      42 minutes with greater than 50% of time spent face to face, examining patient, counseling, coordinating care, obtaining history, reviewing images, labs, and other notes personally.    Clay Donovan MD  Office [phone]. Cell [phone]  Stroke, Neurocritical Care & Neurointervention  [facility] Stroke [facility] [place]

## 2024-09-27 ENCOUNTER — APPOINTMENT (OUTPATIENT)
Dept: CT IMAGING | Age: 64
DRG: 101 | End: 2024-09-27
Payer: MEDICARE

## 2024-09-27 ENCOUNTER — HOSPITAL ENCOUNTER (INPATIENT)
Age: 64
LOS: 2 days | Discharge: NURSING, REHAB OR LONG-TERM CARE | DRG: 101 | End: 2024-09-29
Attending: EMERGENCY MEDICINE | Admitting: PSYCHIATRY & NEUROLOGY
Payer: MEDICARE

## 2024-09-27 ENCOUNTER — APPOINTMENT (OUTPATIENT)
Dept: GENERAL RADIOLOGY | Age: 64
DRG: 101 | End: 2024-09-27
Payer: MEDICARE

## 2024-09-27 ENCOUNTER — APPOINTMENT (OUTPATIENT)
Dept: ULTRASOUND IMAGING | Age: 64
DRG: 101 | End: 2024-09-27
Payer: MEDICARE

## 2024-09-27 DIAGNOSIS — R56.9 SEIZURE (HCC): Primary | ICD-10-CM

## 2024-09-27 PROBLEM — D72.829 LEUKOCYTOSIS: Status: ACTIVE | Noted: 2024-09-27

## 2024-09-27 LAB
ALBUMIN SERPL-MCNC: 4.6 G/DL (ref 3.5–5.2)
ALBUMIN/GLOB SERPL: 2 {RATIO} (ref 1–2.5)
ALP SERPL-CCNC: 77 U/L (ref 40–129)
ALP SERPL-CCNC: 77 U/L (ref 40–129)
ALT SERPL-CCNC: 243 U/L (ref 10–50)
AMMONIA PLAS-SCNC: 56 UMOL/L (ref 16–60)
AMPHET UR QL SCN: NEGATIVE
ANION GAP SERPL CALCULATED.3IONS-SCNC: 23 MMOL/L (ref 9–16)
APAP SERPL-MCNC: <5 UG/ML (ref 10–30)
AST SERPL-CCNC: 100 U/L (ref 10–50)
B PARAP IS1001 DNA NPH QL NAA+NON-PROBE: NOT DETECTED
B PERT DNA SPEC QL NAA+PROBE: NOT DETECTED
BACTERIA URNS QL MICRO: ABNORMAL
BARBITURATES UR QL SCN: NEGATIVE
BASOPHILS # BLD: 0.09 K/UL (ref 0–0.2)
BASOPHILS NFR BLD: 1 % (ref 0–2)
BENZODIAZ UR QL: POSITIVE
BILIRUB SERPL-MCNC: 0.4 MG/DL (ref 0–1.2)
BILIRUB UR QL STRIP: NEGATIVE
BUN SERPL-MCNC: 9 MG/DL (ref 8–23)
C PNEUM DNA NPH QL NAA+NON-PROBE: NOT DETECTED
CALCIUM SERPL-MCNC: 9.6 MG/DL (ref 8.6–10.4)
CANNABINOIDS UR QL SCN: POSITIVE
CASTS #/AREA URNS LPF: ABNORMAL /LPF (ref 0–8)
CHLORIDE SERPL-SCNC: 103 MMOL/L (ref 98–107)
CLARITY UR: CLEAR
CO2 SERPL-SCNC: 13 MMOL/L (ref 20–31)
COCAINE UR QL SCN: NEGATIVE
COLOR UR: YELLOW
CREAT SERPL-MCNC: 0.9 MG/DL (ref 0.7–1.2)
EOSINOPHIL # BLD: 0.11 K/UL (ref 0–0.44)
EOSINOPHILS RELATIVE PERCENT: 1 % (ref 1–4)
EPI CELLS #/AREA URNS HPF: ABNORMAL /HPF (ref 0–5)
ERYTHROCYTE [DISTWIDTH] IN BLOOD BY AUTOMATED COUNT: 13.1 % (ref 11.8–14.4)
ETHANOL PERCENT: <0.01 %
ETHANOLAMINE SERPL-MCNC: <10 MG/DL (ref 0–0.08)
FENTANYL UR QL: NEGATIVE
FLUAV RNA NPH QL NAA+NON-PROBE: NOT DETECTED
FLUBV RNA NPH QL NAA+NON-PROBE: NOT DETECTED
GFR, ESTIMATED: >90 ML/MIN/1.73M2
GLUCOSE SERPL-MCNC: 146 MG/DL (ref 74–99)
GLUCOSE UR STRIP-MCNC: NEGATIVE MG/DL
HADV DNA NPH QL NAA+NON-PROBE: NOT DETECTED
HCOV 229E RNA NPH QL NAA+NON-PROBE: NOT DETECTED
HCOV HKU1 RNA NPH QL NAA+NON-PROBE: NOT DETECTED
HCOV NL63 RNA NPH QL NAA+NON-PROBE: NOT DETECTED
HCOV OC43 RNA NPH QL NAA+NON-PROBE: NOT DETECTED
HCT VFR BLD AUTO: 44.6 % (ref 40.7–50.3)
HGB BLD-MCNC: 14.5 G/DL (ref 13–17)
HGB UR QL STRIP.AUTO: ABNORMAL
HMPV RNA NPH QL NAA+NON-PROBE: NOT DETECTED
HPIV1 RNA NPH QL NAA+NON-PROBE: NOT DETECTED
HPIV2 RNA NPH QL NAA+NON-PROBE: NOT DETECTED
HPIV3 RNA NPH QL NAA+NON-PROBE: NOT DETECTED
HPIV4 RNA NPH QL NAA+NON-PROBE: NOT DETECTED
IMM GRANULOCYTES # BLD AUTO: 0.09 K/UL (ref 0–0.3)
IMM GRANULOCYTES NFR BLD: 1 %
KETONES UR STRIP-MCNC: NEGATIVE MG/DL
LACTIC ACID, WHOLE BLOOD: 10.5 MMOL/L (ref 0.7–2.1)
LACTIC ACID, WHOLE BLOOD: 9 MMOL/L (ref 0.7–2.1)
LDH SERPL-CCNC: 240 U/L (ref 135–225)
LEUKOCYTE ESTERASE UR QL STRIP: NEGATIVE
LIPASE SERPL-CCNC: 36 U/L (ref 13–60)
LYMPHOCYTES NFR BLD: 2.01 K/UL (ref 1.1–3.7)
LYMPHOCYTES RELATIVE PERCENT: 11 % (ref 24–43)
M PNEUMO DNA NPH QL NAA+NON-PROBE: NOT DETECTED
MCH RBC QN AUTO: 30.9 PG (ref 25.2–33.5)
MCHC RBC AUTO-ENTMCNC: 32.5 G/DL (ref 28.4–34.8)
MCV RBC AUTO: 95.1 FL (ref 82.6–102.9)
METHADONE UR QL: NEGATIVE
MONOCYTES NFR BLD: 1.03 K/UL (ref 0.1–1.2)
MONOCYTES NFR BLD: 6 % (ref 3–12)
NEUTROPHILS NFR BLD: 80 % (ref 36–65)
NEUTS SEG NFR BLD: 14.53 K/UL (ref 1.5–8.1)
NITRITE UR QL STRIP: NEGATIVE
NRBC BLD-RTO: 0 PER 100 WBC
OPIATES UR QL SCN: NEGATIVE
OXYCODONE UR QL SCN: POSITIVE
PCP UR QL SCN: NEGATIVE
PH UR STRIP: 6 [PH] (ref 5–8)
PLATELET # BLD AUTO: 314 K/UL (ref 138–453)
PMV BLD AUTO: 10.6 FL (ref 8.1–13.5)
POTASSIUM SERPL-SCNC: 3.6 MMOL/L (ref 3.7–5.3)
PROCALCITONIN SERPL-MCNC: 0.03 NG/ML (ref 0–0.09)
PROT SERPL-MCNC: 7.4 G/DL (ref 6.6–8.7)
PROT UR STRIP-MCNC: ABNORMAL MG/DL
RBC # BLD AUTO: 4.69 M/UL (ref 4.21–5.77)
RBC #/AREA URNS HPF: ABNORMAL /HPF (ref 0–4)
RSV RNA NPH QL NAA+NON-PROBE: NOT DETECTED
RV+EV RNA NPH QL NAA+NON-PROBE: NOT DETECTED
SALICYLATES SERPL-MCNC: <0.5 MG/DL (ref 0–10)
SARS-COV-2 RNA NPH QL NAA+NON-PROBE: NOT DETECTED
SODIUM SERPL-SCNC: 139 MMOL/L (ref 136–145)
SP GR UR STRIP: 1.01 (ref 1–1.03)
SPECIMEN DESCRIPTION: NORMAL
TEST INFORMATION: ABNORMAL
TROPONIN I SERPL HS-MCNC: 28 NG/L (ref 0–22)
TROPONIN I SERPL HS-MCNC: 35 NG/L (ref 0–22)
TSH SERPL DL<=0.05 MIU/L-ACNC: 1.31 UIU/ML (ref 0.27–4.2)
UROBILINOGEN UR STRIP-ACNC: NORMAL EU/DL (ref 0–1)
WBC #/AREA URNS HPF: ABNORMAL /HPF (ref 0–5)
WBC OTHER # BLD: 17.9 K/UL (ref 3.5–11.3)

## 2024-09-27 PROCEDURE — 2060000000 HC ICU INTERMEDIATE R&B

## 2024-09-27 PROCEDURE — 84145 PROCALCITONIN (PCT): CPT

## 2024-09-27 PROCEDURE — 76705 ECHO EXAM OF ABDOMEN: CPT

## 2024-09-27 PROCEDURE — 2580000003 HC RX 258: Performed by: PSYCHIATRY & NEUROLOGY

## 2024-09-27 PROCEDURE — 84484 ASSAY OF TROPONIN QUANT: CPT

## 2024-09-27 PROCEDURE — 84443 ASSAY THYROID STIM HORMONE: CPT

## 2024-09-27 PROCEDURE — 70450 CT HEAD/BRAIN W/O DYE: CPT

## 2024-09-27 PROCEDURE — 80143 DRUG ASSAY ACETAMINOPHEN: CPT

## 2024-09-27 PROCEDURE — 6360000002 HC RX W HCPCS

## 2024-09-27 PROCEDURE — 93005 ELECTROCARDIOGRAM TRACING: CPT

## 2024-09-27 PROCEDURE — 99223 1ST HOSP IP/OBS HIGH 75: CPT | Performed by: PSYCHIATRY & NEUROLOGY

## 2024-09-27 PROCEDURE — 96374 THER/PROPH/DIAG INJ IV PUSH: CPT

## 2024-09-27 PROCEDURE — 85025 COMPLETE CBC W/AUTO DIFF WBC: CPT

## 2024-09-27 PROCEDURE — 99285 EMERGENCY DEPT VISIT HI MDM: CPT

## 2024-09-27 PROCEDURE — 2580000003 HC RX 258

## 2024-09-27 PROCEDURE — 80179 DRUG ASSAY SALICYLATE: CPT

## 2024-09-27 PROCEDURE — 82140 ASSAY OF AMMONIA: CPT

## 2024-09-27 PROCEDURE — G0480 DRUG TEST DEF 1-7 CLASSES: HCPCS

## 2024-09-27 PROCEDURE — 6360000002 HC RX W HCPCS: Performed by: PSYCHIATRY & NEUROLOGY

## 2024-09-27 PROCEDURE — 6370000000 HC RX 637 (ALT 250 FOR IP): Performed by: PSYCHIATRY & NEUROLOGY

## 2024-09-27 PROCEDURE — 84075 ASSAY ALKALINE PHOSPHATASE: CPT

## 2024-09-27 PROCEDURE — 83605 ASSAY OF LACTIC ACID: CPT

## 2024-09-27 PROCEDURE — 83690 ASSAY OF LIPASE: CPT

## 2024-09-27 PROCEDURE — 81001 URINALYSIS AUTO W/SCOPE: CPT

## 2024-09-27 PROCEDURE — 96375 TX/PRO/DX INJ NEW DRUG ADDON: CPT

## 2024-09-27 PROCEDURE — 71045 X-RAY EXAM CHEST 1 VIEW: CPT

## 2024-09-27 PROCEDURE — 83615 LACTATE (LD) (LDH) ENZYME: CPT

## 2024-09-27 PROCEDURE — 80307 DRUG TEST PRSMV CHEM ANLYZR: CPT

## 2024-09-27 PROCEDURE — 80053 COMPREHEN METABOLIC PANEL: CPT

## 2024-09-27 PROCEDURE — 0202U NFCT DS 22 TRGT SARS-COV-2: CPT

## 2024-09-27 RX ORDER — SODIUM CHLORIDE 0.9 % (FLUSH) 0.9 %
5-40 SYRINGE (ML) INJECTION PRN
Status: DISCONTINUED | OUTPATIENT
Start: 2024-09-27 | End: 2024-09-29 | Stop reason: HOSPADM

## 2024-09-27 RX ORDER — ONDANSETRON 2 MG/ML
4 INJECTION INTRAMUSCULAR; INTRAVENOUS ONCE
Status: COMPLETED | OUTPATIENT
Start: 2024-09-27 | End: 2024-09-27

## 2024-09-27 RX ORDER — ENOXAPARIN SODIUM 100 MG/ML
40 INJECTION SUBCUTANEOUS DAILY
Status: DISCONTINUED | OUTPATIENT
Start: 2024-09-27 | End: 2024-09-27

## 2024-09-27 RX ORDER — PROCHLORPERAZINE EDISYLATE 5 MG/ML
10 INJECTION INTRAMUSCULAR; INTRAVENOUS ONCE
Status: COMPLETED | OUTPATIENT
Start: 2024-09-27 | End: 2024-09-27

## 2024-09-27 RX ORDER — POLYETHYLENE GLYCOL 3350 17 G/17G
17 POWDER, FOR SOLUTION ORAL DAILY PRN
Status: DISCONTINUED | OUTPATIENT
Start: 2024-09-27 | End: 2024-09-29 | Stop reason: HOSPADM

## 2024-09-27 RX ORDER — DULOXETIN HYDROCHLORIDE 20 MG/1
20 CAPSULE, DELAYED RELEASE ORAL DAILY
Status: DISCONTINUED | OUTPATIENT
Start: 2024-09-27 | End: 2024-09-29 | Stop reason: HOSPADM

## 2024-09-27 RX ORDER — LEVETIRACETAM 10 MG/ML
2000 INJECTION INTRAVASCULAR ONCE
Status: COMPLETED | OUTPATIENT
Start: 2024-09-27 | End: 2024-09-27

## 2024-09-27 RX ORDER — THIAMINE HYDROCHLORIDE 100 MG/ML
100 INJECTION, SOLUTION INTRAMUSCULAR; INTRAVENOUS DAILY
Status: DISCONTINUED | OUTPATIENT
Start: 2024-09-27 | End: 2024-09-29 | Stop reason: HOSPADM

## 2024-09-27 RX ORDER — ACETAMINOPHEN 325 MG/1
650 TABLET ORAL EVERY 4 HOURS PRN
Status: DISCONTINUED | OUTPATIENT
Start: 2024-09-27 | End: 2024-09-27 | Stop reason: SDUPTHER

## 2024-09-27 RX ORDER — ONDANSETRON 4 MG/1
4 TABLET, ORALLY DISINTEGRATING ORAL EVERY 8 HOURS PRN
Status: DISCONTINUED | OUTPATIENT
Start: 2024-09-27 | End: 2024-09-29 | Stop reason: HOSPADM

## 2024-09-27 RX ORDER — LORAZEPAM 2 MG/ML
2 INJECTION INTRAMUSCULAR ONCE
Status: COMPLETED | OUTPATIENT
Start: 2024-09-27 | End: 2024-09-27

## 2024-09-27 RX ORDER — ACETAMINOPHEN 325 MG/1
650 TABLET ORAL EVERY 6 HOURS PRN
Status: DISCONTINUED | OUTPATIENT
Start: 2024-09-27 | End: 2024-09-29 | Stop reason: HOSPADM

## 2024-09-27 RX ORDER — POTASSIUM CHLORIDE 7.45 MG/ML
10 INJECTION INTRAVENOUS PRN
Status: DISCONTINUED | OUTPATIENT
Start: 2024-09-27 | End: 2024-09-29 | Stop reason: HOSPADM

## 2024-09-27 RX ORDER — MAGNESIUM SULFATE IN WATER 40 MG/ML
2000 INJECTION, SOLUTION INTRAVENOUS PRN
Status: DISCONTINUED | OUTPATIENT
Start: 2024-09-27 | End: 2024-09-29 | Stop reason: HOSPADM

## 2024-09-27 RX ORDER — 0.9 % SODIUM CHLORIDE 0.9 %
1000 INTRAVENOUS SOLUTION INTRAVENOUS ONCE
Status: COMPLETED | OUTPATIENT
Start: 2024-09-27 | End: 2024-09-27

## 2024-09-27 RX ORDER — SODIUM CHLORIDE 9 MG/ML
INJECTION, SOLUTION INTRAVENOUS PRN
Status: DISCONTINUED | OUTPATIENT
Start: 2024-09-27 | End: 2024-09-29 | Stop reason: HOSPADM

## 2024-09-27 RX ORDER — ONDANSETRON 2 MG/ML
INJECTION INTRAMUSCULAR; INTRAVENOUS
Status: COMPLETED
Start: 2024-09-27 | End: 2024-09-27

## 2024-09-27 RX ORDER — LANOLIN ALCOHOL/MO/W.PET/CERES
3 CREAM (GRAM) TOPICAL NIGHTLY
Status: DISCONTINUED | OUTPATIENT
Start: 2024-09-27 | End: 2024-09-29 | Stop reason: HOSPADM

## 2024-09-27 RX ORDER — ATORVASTATIN CALCIUM 20 MG/1
10 TABLET, FILM COATED ORAL NIGHTLY
Status: DISCONTINUED | OUTPATIENT
Start: 2024-09-27 | End: 2024-09-29 | Stop reason: HOSPADM

## 2024-09-27 RX ORDER — LORAZEPAM 2 MG/ML
INJECTION INTRAMUSCULAR
Status: COMPLETED
Start: 2024-09-27 | End: 2024-09-27

## 2024-09-27 RX ORDER — ONDANSETRON 2 MG/ML
4 INJECTION INTRAMUSCULAR; INTRAVENOUS EVERY 6 HOURS PRN
Status: DISCONTINUED | OUTPATIENT
Start: 2024-09-27 | End: 2024-09-29 | Stop reason: HOSPADM

## 2024-09-27 RX ORDER — ACETAMINOPHEN 650 MG/1
650 SUPPOSITORY RECTAL EVERY 6 HOURS PRN
Status: DISCONTINUED | OUTPATIENT
Start: 2024-09-27 | End: 2024-09-29 | Stop reason: HOSPADM

## 2024-09-27 RX ORDER — POTASSIUM CHLORIDE 1500 MG/1
40 TABLET, EXTENDED RELEASE ORAL PRN
Status: DISCONTINUED | OUTPATIENT
Start: 2024-09-27 | End: 2024-09-29 | Stop reason: HOSPADM

## 2024-09-27 RX ORDER — METOPROLOL TARTRATE 25 MG/1
25 TABLET, FILM COATED ORAL 2 TIMES DAILY
Status: DISCONTINUED | OUTPATIENT
Start: 2024-09-27 | End: 2024-09-29 | Stop reason: HOSPADM

## 2024-09-27 RX ORDER — LORAZEPAM 2 MG/ML
1 INJECTION INTRAMUSCULAR ONCE
Status: COMPLETED | OUTPATIENT
Start: 2024-09-27 | End: 2024-09-27

## 2024-09-27 RX ORDER — UREA 10 %
1000 LOTION (ML) TOPICAL DAILY
Status: DISCONTINUED | OUTPATIENT
Start: 2024-09-27 | End: 2024-09-29 | Stop reason: HOSPADM

## 2024-09-27 RX ORDER — FOLIC ACID 1 MG/1
1 TABLET ORAL DAILY
Status: DISCONTINUED | OUTPATIENT
Start: 2024-09-27 | End: 2024-09-29 | Stop reason: HOSPADM

## 2024-09-27 RX ORDER — SODIUM CHLORIDE 0.9 % (FLUSH) 0.9 %
5-40 SYRINGE (ML) INJECTION EVERY 12 HOURS SCHEDULED
Status: DISCONTINUED | OUTPATIENT
Start: 2024-09-27 | End: 2024-09-29 | Stop reason: HOSPADM

## 2024-09-27 RX ORDER — SODIUM CHLORIDE, SODIUM LACTATE, POTASSIUM CHLORIDE, CALCIUM CHLORIDE 600; 310; 30; 20 MG/100ML; MG/100ML; MG/100ML; MG/100ML
INJECTION, SOLUTION INTRAVENOUS CONTINUOUS
Status: DISCONTINUED | OUTPATIENT
Start: 2024-09-27 | End: 2024-09-29 | Stop reason: HOSPADM

## 2024-09-27 RX ORDER — SPIRONOLACTONE 25 MG/1
25 TABLET ORAL DAILY
Status: DISCONTINUED | OUTPATIENT
Start: 2024-09-27 | End: 2024-09-29 | Stop reason: HOSPADM

## 2024-09-27 RX ORDER — LEVETIRACETAM 10 MG/ML
INJECTION INTRAVASCULAR
Status: COMPLETED
Start: 2024-09-27 | End: 2024-09-27

## 2024-09-27 RX ORDER — DONEPEZIL HYDROCHLORIDE 5 MG/1
5 TABLET, FILM COATED ORAL DAILY
Status: DISCONTINUED | OUTPATIENT
Start: 2024-09-27 | End: 2024-09-29 | Stop reason: HOSPADM

## 2024-09-27 RX ADMIN — SODIUM CHLORIDE, PRESERVATIVE FREE 10 ML: 5 INJECTION INTRAVENOUS at 21:18

## 2024-09-27 RX ADMIN — THIAMINE HYDROCHLORIDE 100 MG: 100 INJECTION, SOLUTION INTRAMUSCULAR; INTRAVENOUS at 14:03

## 2024-09-27 RX ADMIN — LORAZEPAM 2 MG: 2 INJECTION INTRAMUSCULAR; INTRAVENOUS at 09:00

## 2024-09-27 RX ADMIN — SODIUM CHLORIDE, POTASSIUM CHLORIDE, SODIUM LACTATE AND CALCIUM CHLORIDE: 600; 310; 30; 20 INJECTION, SOLUTION INTRAVENOUS at 13:08

## 2024-09-27 RX ADMIN — ATORVASTATIN CALCIUM 10 MG: 20 TABLET, FILM COATED ORAL at 21:13

## 2024-09-27 RX ADMIN — LEVETIRACETAM 2000 MG: 10 INJECTION INTRAVENOUS at 09:00

## 2024-09-27 RX ADMIN — ONDANSETRON 4 MG: 2 INJECTION, SOLUTION INTRAMUSCULAR; INTRAVENOUS at 08:46

## 2024-09-27 RX ADMIN — PROCHLORPERAZINE EDISYLATE 10 MG: 5 INJECTION INTRAMUSCULAR; INTRAVENOUS at 14:03

## 2024-09-27 RX ADMIN — LORAZEPAM 1 MG: 2 INJECTION INTRAMUSCULAR; INTRAVENOUS at 09:53

## 2024-09-27 RX ADMIN — LORAZEPAM 2 MG: 2 INJECTION INTRAMUSCULAR at 09:00

## 2024-09-27 RX ADMIN — ONDANSETRON 4 MG: 2 INJECTION INTRAMUSCULAR; INTRAVENOUS at 08:46

## 2024-09-27 RX ADMIN — METOPROLOL TARTRATE 25 MG: 25 TABLET, FILM COATED ORAL at 21:14

## 2024-09-27 RX ADMIN — SODIUM CHLORIDE 1000 ML: 9 INJECTION, SOLUTION INTRAVENOUS at 10:15

## 2024-09-27 RX ADMIN — Medication 3 MG: at 21:14

## 2024-09-27 RX ADMIN — ONDANSETRON 4 MG: 2 INJECTION, SOLUTION INTRAMUSCULAR; INTRAVENOUS at 11:07

## 2024-09-27 RX ADMIN — LEVETIRACETAM 2000 MG: 10 INJECTION INTRAVASCULAR at 09:00

## 2024-09-27 ASSESSMENT — LIFESTYLE VARIABLES: HOW OFTEN DO YOU HAVE A DRINK CONTAINING ALCOHOL: NEVER

## 2024-09-27 ASSESSMENT — PAIN SCALES - GENERAL: PAINLEVEL_OUTOF10: 0

## 2024-09-27 NOTE — ED PROVIDER NOTES
[facility] ED  Emergency Department Encounter  Emergency Medicine Resident     Pt Name:Mahesh Brownlee  MRN: [id number]  Birthdate 1960  Date of evaluation: 9/27/24  PCP:  Ira Roberts APRN - CNP  Note Started: 9:27 AM EDT      CHIEF COMPLAINT       Chief Complaint   Patient presents with    Seizures       HISTORY OF PRESENT ILLNESS  (Location/Symptom, Timing/Onset, Context/Setting, Quality, Duration, Modifying Factors, Severity.)      Mahesh Brownlee is a 64 y.o. male with history of A-fib on Eliquis, alcohol abuse, TBI who was brought in from [facility] for a seizure this morning.  Seizure was witnessed.  Lasted approximately 2 minutes.  No meds were given.  Patient post ictal on arrival.  Patient reportedly not on any seizure medications at this time.  Patient vomiting in the room.  Point-of-care glucose was normal.  He did wet his pants.    PAST MEDICAL / SURGICAL / SOCIAL / FAMILY HISTORY      has a past medical history of Adjustment disorder, AF (atrial fibrillation) ([facility]), AICD (automatic cardioverter/defibrillator) present, Alcohol dependence (HCC), CAD (coronary artery disease), Cardiomyopathy (HCC), CHF (congestive heart failure) (HCC), COPD (chronic obstructive pulmonary disease) (HCC), DDD (degenerative disc disease), lumbar, Herniated cervical disc, Hyperlipidemia, Hypertension, Major depression, Post laminectomy syndrome, Sciatica, Snores, Tobacco abuse, and Traumatic brain injury.     has a past surgical history that includes back surgery; Rotator cuff repair (Right); Carpal tunnel release (Right); Nerve Block (07/23/2013); Nerve Block (N/A, 03/21/2013); other surgical history (04/25/2016); Tonsillectomy; pacemaker placement (09/2015); back surgery; other surgical history (02/09/2018); joint replacement (Right, 06/2018); and Cardiac electrophysiology study and ablation.      Social History     Socioeconomic History    Marital status:      Spouse name: Not on file    Number

## 2024-09-27 NOTE — ED NOTES
4mg IV zofran given in left hand by Monik MODI per verbal order from Dr. Mason  
Admitting team at bedside toe valuate the patient  
Dr. Cronin at bedside to evaluate the patient  
Dr. Peters at bedside to evaluate the patient  
Patient moved from room 16 to 14 to be visible to staff due to patient being restless and pulling himself off monitor. Patient's linens changed two times due to patient urinating on himself. Patient placed on male external catheter at this time. No ceribell placed per neuro Dr. Peters since patient has already received ativen and keppra.  
Patient returned from xray due to possible seizure while in xray. Xray tech reports the patient's whole body tensed up and he let out a loud noise. Patient snoring respirations, unresponsive, dusky in color. Patient 67% on RA when placed back on monitor. NRB placed and jaw thrust with improvement to 100% at this time. 2mg IV ativan given in left hand by writer per verbal order from Dr. Donovan. 2G keppra started in left FA IV by Monik MODI per verbal order from Dr. Donovan.  
Patient transported from ED 14 to 141 via stretcher by Anca MAGANA  
Patient transported to xray via stretcher  
Portable x ray at bedside   
Portable xray at bedside  
The following labs were labeled with appropriate pt sticker and tubed to lab:     [] Blue     [] Lavender   [] on ice  [x] Green/yellow  [] Green/black [] on ice  [] Grey  [] on ice  [] Yellow  [] Red  [] Pink  [] Type/ Screen  [] ABG  [] VBG    [] COVID-19 swab    [] Rapid  [] PCR  [] Flu swab  [] Peds Viral Panel     [x] Urine Sample  [] Fecal Sample  [] Pelvic Cultures  [] Blood Cultures  [] X 2  [] STREP Cultures  [] Wound Cultures  
Screen, Ur POSITIVE (*)     All other components within normal limits   LACTIC ACID - Abnormal; Notable for the following components:    Lactic Acid, Whole Blood 9.0 (*)     All other components within normal limits   URINALYSIS WITH REFLEX TO CULTURE - Abnormal; Notable for the following components:    Urine Hgb SMALL (*)     Protein, UA TRACE (*)     All other components within normal limits   TROPONIN - Abnormal; Notable for the following components:    Troponin, High Sensitivity 35 (*)     All other components within normal limits   MICROSCOPIC URINALYSIS - Abnormal; Notable for the following components:    Bacteria, UA FEW (*)     All other components within normal limits   RESPIRATORY PANEL, MOLECULAR, WITH COVID-19   LIPASE   PROCALCITONIN   AMMONIA   TSH WITH REFLEX       Electronically signed by Anca Monroe RN on 9/27/2024 at 4:39 PM

## 2024-09-27 NOTE — ED PROVIDER NOTES
[facility]  Emergency Department  Faculty Attestation     I performed a history and physical examination of the patient and discussed management with the resident. I reviewed the resident’s note and agree with the documented findings and plan of care. Any areas of disagreement are noted on the chart. I was personally present for the key portions of any procedures. I have documented in the chart those procedures where I was not present during the key portions. I have reviewed the emergency nurses triage note. I agree with the chief complaint, past medical history, past surgical history, allergies, medications, social and family history as documented unless otherwise noted below.    For Physician Assistant/ Nurse Practitioner cases/documentation I have personally evaluated this patient and have completed at least one if not all key elements of the E/M (history, physical exam, and MDM). Additional findings are as noted.    Preliminary note started at 8:44 AM EDT    Primary Care Physician:  Ira Roberts, RIVER - CNP    Screenings:  [unfilled]    CHIEF COMPLAINT       Chief Complaint   Patient presents with    Seizures       RECENT VITALS:   There were no vitals taken for this visit.    LABS:  Labs Reviewed - No data to display    Radiology  No orders to display       CRITICAL CARE: There was a high probability of clinically significant/life threatening deterioration in this patient's condition which required my urgent intervention.  Total critical care time was none minutes.  This excludes any time for separately reportable procedures.     EKG:  EKG Interpretation    Interpreted by me    Rhythm: normal sinus   Rate: Tachycardia  Axis: normal  Ectopy: none  Conduction: normal  ST Segments: no acute change  T Waves: no acute change  Q Waves: none    Clinical Impression: Sinus tachycardia, PAC, no acute ischemic changes    Attending Physician Additional  Notes    Patient is

## 2024-09-28 LAB
ALBUMIN SERPL-MCNC: 4.5 G/DL (ref 3.5–5.2)
ALBUMIN/GLOB SERPL: 2 {RATIO} (ref 1–2.5)
ALP SERPL-CCNC: 74 U/L (ref 40–129)
ALT SERPL-CCNC: 172 U/L (ref 10–50)
ANION GAP SERPL CALCULATED.3IONS-SCNC: 12 MMOL/L (ref 9–16)
AST SERPL-CCNC: 57 U/L (ref 10–50)
BASOPHILS # BLD: 0.03 K/UL (ref 0–0.2)
BASOPHILS NFR BLD: 0 % (ref 0–2)
BILIRUB SERPL-MCNC: 0.7 MG/DL (ref 0–1.2)
BUN SERPL-MCNC: 9 MG/DL (ref 8–23)
CALCIUM SERPL-MCNC: 9.8 MG/DL (ref 8.6–10.4)
CHLORIDE SERPL-SCNC: 106 MMOL/L (ref 98–107)
CO2 SERPL-SCNC: 23 MMOL/L (ref 20–31)
CREAT SERPL-MCNC: 0.8 MG/DL (ref 0.7–1.2)
EOSINOPHIL # BLD: <0.03 K/UL (ref 0–0.44)
EOSINOPHILS RELATIVE PERCENT: 0 % (ref 1–4)
ERYTHROCYTE [DISTWIDTH] IN BLOOD BY AUTOMATED COUNT: 13 % (ref 11.8–14.4)
FOLATE SERPL-MCNC: >20 NG/ML (ref 4.8–24.2)
GFR, ESTIMATED: >90 ML/MIN/1.73M2
GLUCOSE SERPL-MCNC: 108 MG/DL (ref 74–99)
HCT VFR BLD AUTO: 43.4 % (ref 40.7–50.3)
HGB BLD-MCNC: 13.9 G/DL (ref 13–17)
IMM GRANULOCYTES # BLD AUTO: 0.04 K/UL (ref 0–0.3)
IMM GRANULOCYTES NFR BLD: 0 %
LACTIC ACID, WHOLE BLOOD: 1.6 MMOL/L (ref 0.7–2.1)
LYMPHOCYTES NFR BLD: 1.44 K/UL (ref 1.1–3.7)
LYMPHOCYTES RELATIVE PERCENT: 12 % (ref 24–43)
MAGNESIUM SERPL-MCNC: 2.1 MG/DL (ref 1.6–2.4)
MCH RBC QN AUTO: 30.2 PG (ref 25.2–33.5)
MCHC RBC AUTO-ENTMCNC: 32 G/DL (ref 28.4–34.8)
MCV RBC AUTO: 94.3 FL (ref 82.6–102.9)
MONOCYTES NFR BLD: 1.04 K/UL (ref 0.1–1.2)
MONOCYTES NFR BLD: 8 % (ref 3–12)
NEUTROPHILS NFR BLD: 80 % (ref 36–65)
NEUTS SEG NFR BLD: 9.92 K/UL (ref 1.5–8.1)
NRBC BLD-RTO: 0 PER 100 WBC
PLATELET # BLD AUTO: 238 K/UL (ref 138–453)
PMV BLD AUTO: 10.6 FL (ref 8.1–13.5)
POTASSIUM SERPL-SCNC: 3.5 MMOL/L (ref 3.7–5.3)
PROT SERPL-MCNC: 7.3 G/DL (ref 6.6–8.7)
RBC # BLD AUTO: 4.6 M/UL (ref 4.21–5.77)
SODIUM SERPL-SCNC: 141 MMOL/L (ref 136–145)
VIT B12 SERPL-MCNC: 1486 PG/ML (ref 232–1245)
WBC OTHER # BLD: 12.5 K/UL (ref 3.5–11.3)

## 2024-09-28 PROCEDURE — 6370000000 HC RX 637 (ALT 250 FOR IP)

## 2024-09-28 PROCEDURE — 2060000000 HC ICU INTERMEDIATE R&B

## 2024-09-28 PROCEDURE — 6370000000 HC RX 637 (ALT 250 FOR IP): Performed by: PSYCHIATRY & NEUROLOGY

## 2024-09-28 PROCEDURE — 6360000002 HC RX W HCPCS: Performed by: PSYCHIATRY & NEUROLOGY

## 2024-09-28 PROCEDURE — 2580000003 HC RX 258: Performed by: PSYCHIATRY & NEUROLOGY

## 2024-09-28 PROCEDURE — 85025 COMPLETE CBC W/AUTO DIFF WBC: CPT

## 2024-09-28 PROCEDURE — 83735 ASSAY OF MAGNESIUM: CPT

## 2024-09-28 PROCEDURE — 80053 COMPREHEN METABOLIC PANEL: CPT

## 2024-09-28 PROCEDURE — 99232 SBSQ HOSP IP/OBS MODERATE 35: CPT | Performed by: PSYCHIATRY & NEUROLOGY

## 2024-09-28 PROCEDURE — 82746 ASSAY OF FOLIC ACID SERUM: CPT

## 2024-09-28 PROCEDURE — 36415 COLL VENOUS BLD VENIPUNCTURE: CPT

## 2024-09-28 PROCEDURE — 83605 ASSAY OF LACTIC ACID: CPT

## 2024-09-28 PROCEDURE — 82607 VITAMIN B-12: CPT

## 2024-09-28 RX ORDER — POTASSIUM CHLORIDE 7.45 MG/ML
10 INJECTION INTRAVENOUS PRN
Status: DISCONTINUED | OUTPATIENT
Start: 2024-09-28 | End: 2024-09-29 | Stop reason: HOSPADM

## 2024-09-28 RX ORDER — LEVETIRACETAM 500 MG/1
500 TABLET ORAL 2 TIMES DAILY
Status: DISCONTINUED | OUTPATIENT
Start: 2024-09-28 | End: 2024-09-29 | Stop reason: HOSPADM

## 2024-09-28 RX ORDER — POTASSIUM CHLORIDE 1500 MG/1
40 TABLET, EXTENDED RELEASE ORAL PRN
Status: DISCONTINUED | OUTPATIENT
Start: 2024-09-28 | End: 2024-09-29 | Stop reason: HOSPADM

## 2024-09-28 RX ADMIN — ATORVASTATIN CALCIUM 10 MG: 20 TABLET, FILM COATED ORAL at 20:33

## 2024-09-28 RX ADMIN — Medication 3 MG: at 20:31

## 2024-09-28 RX ADMIN — METOPROLOL TARTRATE 25 MG: 25 TABLET, FILM COATED ORAL at 20:31

## 2024-09-28 RX ADMIN — SPIRONOLACTONE 25 MG: 25 TABLET, FILM COATED ORAL at 08:11

## 2024-09-28 RX ADMIN — THIAMINE HYDROCHLORIDE 100 MG: 100 INJECTION, SOLUTION INTRAMUSCULAR; INTRAVENOUS at 09:27

## 2024-09-28 RX ADMIN — SACUBITRIL AND VALSARTAN 1 TABLET: 49; 51 TABLET, FILM COATED ORAL at 08:11

## 2024-09-28 RX ADMIN — SODIUM CHLORIDE, POTASSIUM CHLORIDE, SODIUM LACTATE AND CALCIUM CHLORIDE: 600; 310; 30; 20 INJECTION, SOLUTION INTRAVENOUS at 03:21

## 2024-09-28 RX ADMIN — ACETAMINOPHEN 650 MG: 325 TABLET ORAL at 20:32

## 2024-09-28 RX ADMIN — POTASSIUM BICARBONATE 40 MEQ: 782 TABLET, EFFERVESCENT ORAL at 12:12

## 2024-09-28 RX ADMIN — ONDANSETRON 4 MG: 2 INJECTION INTRAMUSCULAR; INTRAVENOUS at 20:31

## 2024-09-28 RX ADMIN — SODIUM CHLORIDE, PRESERVATIVE FREE 10 ML: 5 INJECTION INTRAVENOUS at 20:32

## 2024-09-28 RX ADMIN — SODIUM CHLORIDE, PRESERVATIVE FREE 10 ML: 5 INJECTION INTRAVENOUS at 08:12

## 2024-09-28 RX ADMIN — DONEPEZIL HYDROCHLORIDE 5 MG: 5 TABLET, FILM COATED ORAL at 08:11

## 2024-09-28 RX ADMIN — FOLIC ACID 1 MG: 1 TABLET ORAL at 08:10

## 2024-09-28 RX ADMIN — CYANOCOBALAMIN TAB 500 MCG 1000 MCG: 500 TAB at 08:10

## 2024-09-28 RX ADMIN — LEVETIRACETAM 500 MG: 500 TABLET, FILM COATED ORAL at 20:31

## 2024-09-28 RX ADMIN — RIVAROXABAN 20 MG: 20 TABLET, FILM COATED ORAL at 17:15

## 2024-09-28 RX ADMIN — METOPROLOL TARTRATE 25 MG: 25 TABLET, FILM COATED ORAL at 08:10

## 2024-09-28 RX ADMIN — SACUBITRIL AND VALSARTAN 1 TABLET: 49; 51 TABLET, FILM COATED ORAL at 21:00

## 2024-09-28 RX ADMIN — SODIUM CHLORIDE, POTASSIUM CHLORIDE, SODIUM LACTATE AND CALCIUM CHLORIDE: 600; 310; 30; 20 INJECTION, SOLUTION INTRAVENOUS at 16:59

## 2024-09-28 RX ADMIN — DULOXETINE HYDROCHLORIDE 20 MG: 20 CAPSULE, DELAYED RELEASE ORAL at 08:11

## 2024-09-28 ASSESSMENT — PAIN SCALES - GENERAL
PAINLEVEL_OUTOF10: 8
PAINLEVEL_OUTOF10: 8

## 2024-09-28 ASSESSMENT — PAIN - FUNCTIONAL ASSESSMENT: PAIN_FUNCTIONAL_ASSESSMENT: ACTIVITIES ARE NOT PREVENTED

## 2024-09-28 ASSESSMENT — PAIN DESCRIPTION - LOCATION: LOCATION: OTHER (COMMENT)

## 2024-09-28 NOTE — CARE COORDINATION
Case Management Assessment  Initial Evaluation    Date/Time of Evaluation: 9/28/2024 5:43 PM  Assessment Completed by: WILNER BOYKIN RN    If patient is discharged prior to next notation, then this note serves as note for discharge by case management.    Patient Name: Mahesh Brownlee                   YOB: 1960  Diagnosis: Seizure (HCC) [R56.9]  Seizure-like activity (HCC) [R56.9]                   Date / Time: 9/27/2024  8:41 AM    Patient Admission Status: Inpatient   Readmission Risk (Low < 19, Mod (19-27), High > 27): Readmission Risk Score: 22.7    Current PCP: Ira Roberts APRN - CNP  PCP verified by CM? Yes (Ira HOLMAN CNP)    Chart Reviewed: Yes      History Provided by: Child/Family (pt's son Brad)  Patient Orientation: Alert and Oriented (A & O X 1)    Patient Cognition: Other (see comment) (A & O X 1)    Hospitalization in the last 30 days (Readmission):  No    If yes, Readmission Assessment in CM Navigator will be completed.    Advance Directives:      Code Status: Full Code   Patient's Primary Decision Maker is: Legal Next of Kin    Primary Decision Maker: Brad Whitley - Child - [phone]    Primary Decision Maker: Michael Whitley - Child - [phone]    Primary Decision Maker: Removed,Wants    Discharge Planning:    Patient lives with: Other (Comment) (Nursing home) Type of Home: (P) Skilled Nursing Facility  Primary Care Giver: Other (Comment) ([facility] resident)  Patient Support Systems include: Family Members   Current Financial resources: Medicare ([facility] Medicare)  Current community resources: ECF/Home Care  Current services prior to admission: (P) Durable Medical Equipment            Current DME: (P) Wheelchair            Type of Home Care services:  (P) None    ADLS  Prior functional level: Assistance with the following:, Bathing, Dressing, Toileting, Mobility  Current functional level: Assistance with the following:, Bathing, Dressing, Toileting,

## 2024-09-28 NOTE — PLAN OF CARE
Problem: Discharge Planning  Goal: Discharge to home or other facility with appropriate resources  Outcome: Progressing     Problem: Skin/Tissue Integrity  Goal: Absence of new skin breakdown  Description: 1.  Monitor for areas of redness and/or skin breakdown  2.  Assess vascular access sites hourly  3.  Every 4-6 hours minimum:  Change oxygen saturation probe site  4.  Every 4-6 hours:  If on nasal continuous positive airway pressure, respiratory therapy assess nares and determine need for appliance change or resting period.  Outcome: Progressing     Problem: ABCDS Injury Assessment  Goal: Absence of physical injury  Outcome: Progressing  Flowsheets (Taken 9/27/2024 2000)  Absence of Physical Injury: Implement safety measures based on patient assessment     Problem: Safety - Adult  Goal: Free from fall injury  Outcome: Progressing  Flowsheets (Taken 9/27/2024 2000)  Free From Fall Injury: Instruct family/caregiver on patient safety     Problem: Neurosensory - Adult  Goal: Achieves stable or improved neurological status  Outcome: Progressing     Problem: Neurosensory - Adult  Goal: Absence of seizures  Outcome: Progressing     Problem: Neurosensory - Adult  Goal: Remains free of injury related to seizures activity  Outcome: Progressing     Problem: Neurosensory - Adult  Goal: Achieves maximal functionality and self care  Outcome: Progressing

## 2024-09-29 VITALS
WEIGHT: 207.89 LBS | HEIGHT: 71 IN | SYSTOLIC BLOOD PRESSURE: 138 MMHG | RESPIRATION RATE: 16 BRPM | TEMPERATURE: 98.3 F | OXYGEN SATURATION: 94 % | DIASTOLIC BLOOD PRESSURE: 72 MMHG | BODY MASS INDEX: 29.1 KG/M2 | HEART RATE: 70 BPM

## 2024-09-29 PROCEDURE — 95816 EEG AWAKE AND DROWSY: CPT

## 2024-09-29 PROCEDURE — 6370000000 HC RX 637 (ALT 250 FOR IP)

## 2024-09-29 PROCEDURE — 6360000002 HC RX W HCPCS: Performed by: PSYCHIATRY & NEUROLOGY

## 2024-09-29 PROCEDURE — 95819 EEG AWAKE AND ASLEEP: CPT | Performed by: PSYCHIATRY & NEUROLOGY

## 2024-09-29 PROCEDURE — 2580000003 HC RX 258: Performed by: PSYCHIATRY & NEUROLOGY

## 2024-09-29 PROCEDURE — 6370000000 HC RX 637 (ALT 250 FOR IP): Performed by: PSYCHIATRY & NEUROLOGY

## 2024-09-29 PROCEDURE — 99239 HOSP IP/OBS DSCHRG MGMT >30: CPT | Performed by: PSYCHIATRY & NEUROLOGY

## 2024-09-29 RX ORDER — THIAMINE HYDROCHLORIDE 100 MG/ML
100 INJECTION, SOLUTION INTRAMUSCULAR; INTRAVENOUS DAILY
Qty: 1 EACH | Refills: 0 | Status: SHIPPED | OUTPATIENT
Start: 2024-09-30 | End: 2024-09-29 | Stop reason: HOSPADM

## 2024-09-29 RX ORDER — THIAMINE MONONITRATE (VIT B1) 100 MG
100 TABLET ORAL DAILY
Qty: 30 TABLET | Refills: 3 | Status: SHIPPED | OUTPATIENT
Start: 2024-09-29

## 2024-09-29 RX ORDER — LEVETIRACETAM 500 MG/1
500 TABLET ORAL 2 TIMES DAILY
Qty: 60 TABLET | Refills: 3 | Status: SHIPPED | OUTPATIENT
Start: 2024-09-29

## 2024-09-29 RX ADMIN — DULOXETINE HYDROCHLORIDE 20 MG: 20 CAPSULE, DELAYED RELEASE ORAL at 08:15

## 2024-09-29 RX ADMIN — METOPROLOL TARTRATE 25 MG: 25 TABLET, FILM COATED ORAL at 19:59

## 2024-09-29 RX ADMIN — SODIUM CHLORIDE, POTASSIUM CHLORIDE, SODIUM LACTATE AND CALCIUM CHLORIDE: 600; 310; 30; 20 INJECTION, SOLUTION INTRAVENOUS at 06:42

## 2024-09-29 RX ADMIN — DONEPEZIL HYDROCHLORIDE 5 MG: 5 TABLET, FILM COATED ORAL at 08:15

## 2024-09-29 RX ADMIN — THIAMINE HYDROCHLORIDE 100 MG: 100 INJECTION, SOLUTION INTRAMUSCULAR; INTRAVENOUS at 08:16

## 2024-09-29 RX ADMIN — RIVAROXABAN 20 MG: 20 TABLET, FILM COATED ORAL at 17:06

## 2024-09-29 RX ADMIN — LEVETIRACETAM 500 MG: 500 TABLET, FILM COATED ORAL at 19:58

## 2024-09-29 RX ADMIN — SPIRONOLACTONE 25 MG: 25 TABLET, FILM COATED ORAL at 08:16

## 2024-09-29 RX ADMIN — SACUBITRIL AND VALSARTAN 1 TABLET: 49; 51 TABLET, FILM COATED ORAL at 08:15

## 2024-09-29 RX ADMIN — ACETAMINOPHEN 650 MG: 325 TABLET ORAL at 19:59

## 2024-09-29 RX ADMIN — FOLIC ACID 1 MG: 1 TABLET ORAL at 08:16

## 2024-09-29 RX ADMIN — LEVETIRACETAM 500 MG: 500 TABLET, FILM COATED ORAL at 08:16

## 2024-09-29 RX ADMIN — CYANOCOBALAMIN TAB 500 MCG 1000 MCG: 500 TAB at 08:16

## 2024-09-29 ASSESSMENT — PAIN DESCRIPTION - LOCATION: LOCATION: OTHER (COMMENT)

## 2024-09-29 ASSESSMENT — PAIN SCALES - GENERAL: PAINLEVEL_OUTOF10: 8

## 2024-09-29 ASSESSMENT — PAIN - FUNCTIONAL ASSESSMENT: PAIN_FUNCTIONAL_ASSESSMENT: ACTIVITIES ARE NOT PREVENTED

## 2024-09-29 NOTE — CARE COORDINATION
Transitional Planning   informed me plan was to discharge pt today  Called [facility] [phone] no answer left VM   Called [facility] spoke with Fani [facility] [address] and she states  pt can return he was long term with them.  Informed her they have not placed discharge order yet will call back once I have a time of transport.  [facility] with [facility] for [facility] today    Called transportation to schedule as yasmeen has been completed  Transport time 8PM.  Updated Patt RN    3:18pm  Called Fani at [facility] informed her of 8pm pickup time asked if she needed a script for the percocet since he was on it prior to admit and she states no.   Updated pt with pickup time 8pm    Discharge Report    [facility]  Clinical Case Management Department  Written by: Lisa Larios RN    Patient Name: Mahesh Brownlee  Attending Provider: Bernard Cronin MD  Admit Date: 2024  8:41 AM  MRN: [id number]  Account: [id number]                     : 1960  Discharge Date: 2024      [facility]    Lisa Larios RN

## 2024-09-29 NOTE — DISCHARGE SUMMARY
[facility] Neurology   IN-PATIENT SERVICE  DISCHARGE SUMMARY  [facility]    Patient Identification:  Mahesh Brownlee is a 64 y.o. male.  :  1960  MRN: [id number]     Acct: [id number]   Admit Date:  2024  Discharge date and time: No discharge date for patient encounter.   Attending Provider: Bernard Cronin MD                                     Admission Diagnoses:   Seizure (HCC) [R56.9]  Seizure-like activity (HCC) [R56.9]    Discharge Diagnoses:   Principal Problem:    Seizure (HCC)  Active Problems:    Leukocytosis  Resolved Problems:    * No resolved hospital problems. *       Consults:   none    Brief Inpatient course:    Patient is a Mahesh Brownlee is a 64 y.o. male with a history of atrial fibrillation on Xarelto, CAD, COPD, hypertension, hyperlipidemia, adjustment disorder, significant alcohol abuse history, polysubstance abuse, current smoker presents  to [facility] presents with confusion and vomiting. Similar presentation to July admission. In the ED patient witnessed to have 2 generalized convulsions, found to have elevated WBC and LFT's, down trending, no e/o infection or liver abnormality. Loaded with Keppra and resumed 500 mg BID dose. EEG unremarkable. Pt returned to LTC on  in stable condition.       Patient is stable from neurological standpoint to be discharged.    Procedures:  EEG    Any Hospital Acquired Infections: none    Discharge Functional Status:  stable    Disposition: long term care facility    Discharge Medications:  Current Discharge Medication List        START taking these medications    Details   levETIRAcetam (KEPPRA) 500 MG tablet Take 1 tablet by mouth 2 times daily  Qty: 60 tablet, Refills: 3      thiamine (B-1) 100 MG/ML injection Infuse 1 mL intravenously daily  Qty: 1 each, Refills: 0           CONTINUE these medications which have NOT CHANGED    Details   oxyCODONE-acetaminophen (PERCOCET) 5-325 MG per tablet Take 1 tablet by mouth

## 2024-09-29 NOTE — PLAN OF CARE
Problem: Discharge Planning  Goal: Discharge to home or other facility with appropriate resources  Outcome: Progressing  Flowsheets (Taken 9/28/2024 2000)  Discharge to home or other facility with appropriate resources:   Identify barriers to discharge with patient and caregiver   Arrange for needed discharge resources and transportation as appropriate   Identify discharge learning needs (meds, wound care, etc)   Arrange for interpreters to assist at discharge as needed     Problem: Skin/Tissue Integrity  Goal: Absence of new skin breakdown  Description: 1.  Monitor for areas of redness and/or skin breakdown  2.  Assess vascular access sites hourly  3.  Every 4-6 hours minimum:  Change oxygen saturation probe site  4.  Every 4-6 hours:  If on nasal continuous positive airway pressure, respiratory therapy assess nares and determine need for appliance change or resting period.  Outcome: Progressing     Problem: ABCDS Injury Assessment  Goal: Absence of physical injury  Outcome: Progressing  Flowsheets (Taken 9/28/2024 2000)  Absence of Physical Injury: Implement safety measures based on patient assessment     Problem: Safety - Adult  Goal: Free from fall injury  Outcome: Progressing  Flowsheets (Taken 9/28/2024 2000)  Free From Fall Injury:   Instruct family/caregiver on patient safety   Based on caregiver fall risk screen, instruct family/caregiver to ask for assistance with transferring infant if caregiver noted to have fall risk factors     Problem: Neurosensory - Adult  Goal: Achieves stable or improved neurological status  Outcome: Progressing  Flowsheets (Taken 9/28/2024 2000)  Achieves stable or improved neurological status:   Assess for and report changes in neurological status   Maintain blood pressure and fluid volume within ordered parameters to optimize cerebral perfusion and minimize risk of hemorrhage   Initiate measures to prevent increased intracranial pressure   Monitor temperature, glucose, and

## 2024-09-29 NOTE — DISCHARGE INSTR - COC
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    History of alcohol use disorder Z87.898    Metabolic encephalopathy G93.41    Alcoholic Korsakoff syndrome ([facility]) F10.96    Severe episode of recurrent major depressive disorder, without psychotic features ([facility]) F33.2    Severe dementia associated with alcoholism, with mood disturbance ([facility]) F10.27    B12 deficiency E53.8    Leukocytosis D72.829       Isolation/Infection:   Isolation            No Isolation          Patient Infection Status       None to display                     Nurse Assessment:  Last Vital Signs: /75   Pulse 76   Temp 98.2 °F (36.8 °C) (Oral)   Resp 16   Ht 1.803 m (5' 11\")   Wt 94.3 kg (207 lb 14.3 oz)   SpO2 93%   BMI 29.00 kg/m²     Last documented pain score (0-10 scale): Pain Level: 8  Last Weight:   Wt Readings from Last 1 Encounters:   09/27/24 94.3 kg (207 lb 14.3 oz)     Mental Status:  oriented    IV Access:  - None    Nursing Mobility/ADLs:  Walking   Independent  Transfer  Independent  Bathing  Independent  Dressing  Independent  Toileting  Independent  Feeding  Independent  Med Admin  Independent  Med Delivery   whole    Wound Care Documentation and Therapy:        Elimination:  Continence:   Bowel: Yes  Bladder: Yes  Urinary Catheter: None   Colostomy/Ileostomy/Ileal Conduit: No       Date of Last BM: ***    Intake/Output Summary (Last 24 hours) at 9/29/2024 1214  Last data filed at 9/29/2024 0959  Gross per 24 hour   Intake 2171.76 ml   Output 1300 ml   Net 871.76 ml     I/O last 3 completed shifts:  In: 1002.9 [I.V.:1002.9]  Out: 1050 [Urine:950; Emesis/NG output:100]    Safety Concerns:     At Risk for Falls and History of

## 2024-09-29 NOTE — PROCEDURES
PROCEDURE NOTE  Date: 9/29/2024   Name: Mahesh Brownlee  YOB: 1960    Procedures            Date: 9/29/2024  Referring physician: Dr. Roseanne Allen  Patient aged 64 y with encephalopathy. EEG done to assess for epileptiform activity.    Introduction  This routine 20-minute EEG was recorded using the International 10-20 System on a [employer] workstation at 256 samples/s. Automated spike and seizure detection algorithms were applied.    Description  During the maximal alert state, a well-regulated, symmetric, and reactive 9 Hz posterior dominant rhythm was seen. No consistent focal slowing or interhemispheric asymmetry was noted. Stage I and stage II sleep were observed. There were no interictal epileptiform discharges or electrographic seizures.    Activations  Hyperventilation was not performed. Intermittent photic stimulation was performed and demonstrated no posterior driving response.    Impression  Normal awake and sleep EEG.       EKG lead did not show clear arrhythmia, if still in concern consider formal EKG or correlation with telemetry.       No epileptiform discharges were identified. Please note the absence of such activity on this record cannot conclusively rule out an epileptic disorder. If such is still clinically suspected, a repeat study with sleep deprivation and/or prolonged sampling may be helpful.    Evelyn Quintero MD  Epilepsy Board Certified.  Neurology Board Certified.    Electronically Signed

## 2024-09-30 LAB
EKG ATRIAL RATE: 101 BPM
EKG P AXIS: 81 DEGREES
EKG P-R INTERVAL: 178 MS
EKG Q-T INTERVAL: 372 MS
EKG QRS DURATION: 98 MS
EKG QTC CALCULATION (BAZETT): 482 MS
EKG R AXIS: 1 DEGREES
EKG T AXIS: 72 DEGREES
EKG VENTRICULAR RATE: 101 BPM

## 2024-09-30 PROCEDURE — 93010 ELECTROCARDIOGRAM REPORT: CPT | Performed by: INTERNAL MEDICINE

## 2024-09-30 NOTE — PROGRESS NOTES
EEG routine completed.  
Patient discharged to [facility] at this time. Care turned over to transporters.  
Perfect serve message sent to Elías Miranda MD  regarding patient's code status needing to be changed to [id number] per his request  
Physical Therapy        Physical Therapy Cancel Note      DATE: 2024    NAME: Mahesh Brownlee  MRN: [id number]   : 1960      Patient not seen this date for Physical Therapy due to:    Other: Pt off the floor PT to continue to follow and address as indicated.      Electronically signed by Danyell Sorensen PT on 2024 at 8:53 AM     
Report called to Fani at [facility], updated on unknown time for discharge  
Updated patient's son of patient being discharged today back to [facility].   
signed by Chaplain Roseanne on 9/29/2024 at 10:55 AM    
NEGATIVE NEGATIVE mg/dL    Bilirubin, Urine NEGATIVE NEGATIVE    Ketones, Urine NEGATIVE NEGATIVE mg/dL    Specific Gravity, UA 1.014 1.005 - 1.030    Urine Hgb SMALL (A) NEGATIVE    pH, Urine 6.0 5.0 - 8.0    Protein, UA TRACE (A) NEGATIVE mg/dL    Urobilinogen, Urine Normal 0.0 - 1.0 EU/dL    Nitrite, Urine NEGATIVE NEGATIVE    Leukocyte Esterase, Urine NEGATIVE NEGATIVE   Troponin    Collection Time: 09/27/24 11:20 AM   Result Value Ref Range    Troponin, High Sensitivity 35 (H) 0 - 22 ng/L   Microscopic Urinalysis    Collection Time: 09/27/24 11:20 AM   Result Value Ref Range    WBC, UA None 0 - 5 /HPF    RBC, UA 0 TO 2 0 - 4 /HPF    Casts UA  0 - 8 /LPF     None Reference range defined for non-centrifuged specimen.    Epithelial Cells, UA 0 TO 2 0 - 5 /HPF    Bacteria, UA FEW (A) None   TSH with Reflex    Collection Time: 09/27/24 11:20 AM   Result Value Ref Range    TSH 1.31 0.27 - 4.20 uIU/mL   Alkaline Phosphatase    Collection Time: 09/27/24 11:20 AM   Result Value Ref Range    Alkaline Phosphatase 77 40 - 129 U/L   Lactate Dehydrogenase    Collection Time: 09/27/24 11:20 AM   Result Value Ref Range     (H) 135 - 225 U/L   DRUG SCREEN MULTI URINE    Collection Time: 09/27/24 11:21 AM   Result Value Ref Range    Amphetamine Screen, Ur NEGATIVE NEGATIVE    Barbiturate Screen, Ur NEGATIVE NEGATIVE    Benzodiazepine Screen, Urine POSITIVE (A) NEGATIVE    Cocaine Metabolite, Urine NEGATIVE NEGATIVE    Methadone Screen, Urine NEGATIVE NEGATIVE    Opiates, Urine NEGATIVE NEGATIVE    Phencyclidine, Urine NEGATIVE NEGATIVE    Cannabinoid Scrn, Ur POSITIVE (A) NEGATIVE    Oxycodone Screen, Ur POSITIVE (A) NEGATIVE    Fentanyl, Ur NEGATIVE NEGATIVE    Test Information       Assay provides rapid clinical screening only.  Presumptive positive results for legal purposes should be confirmed by another method.  To request confirmation, please call the lab within 7 days of sample submission.     Recent Labs

## 2024-09-30 NOTE — PLAN OF CARE
Problem: Discharge Planning  Goal: Discharge to home or other facility with appropriate resources  Outcome: HH/HSPC Resolved Met     Problem: Skin/Tissue Integrity  Goal: Absence of new skin breakdown  Description: 1.  Monitor for areas of redness and/or skin breakdown  2.  Assess vascular access sites hourly  3.  Every 4-6 hours minimum:  Change oxygen saturation probe site  4.  Every 4-6 hours:  If on nasal continuous positive airway pressure, respiratory therapy assess nares and determine need for appliance change or resting period.  Outcome: HH/HSPC Resolved Met     Problem: ABCDS Injury Assessment  Goal: Absence of physical injury  Outcome: HH/HSPC Resolved Met     Problem: Safety - Adult  Goal: Free from fall injury  Outcome: HH/HSPC Resolved Met     Problem: Neurosensory - Adult  Goal: Achieves stable or improved neurological status  Outcome: HH/HSPC Resolved Met  Goal: Absence of seizures  Outcome: HH/HSPC Resolved Met  Goal: Remains free of injury related to seizures activity  Outcome: HH/HSPC Resolved Met  Goal: Achieves maximal functionality and self care  Outcome: HH/HSPC Resolved Met     Problem: Skin/Tissue Integrity - Adult  Goal: Skin integrity remains intact  Outcome: HH/HSPC Resolved Met  Goal: Incisions, wounds, or drain sites healing without S/S of infection  Outcome: HH/HSPC Resolved Met  Goal: Oral mucous membranes remain intact  Outcome: HH/HSPC Resolved Met     Problem: Pain  Goal: Verbalizes/displays adequate comfort level or baseline comfort level  Outcome: HH/HSPC Resolved Met     Problem: Chronic Conditions and Co-morbidities  Goal: Patient's chronic conditions and co-morbidity symptoms are monitored and maintained or improved  Outcome: HH/HSPC Resolved Met

## 2024-11-06 ENCOUNTER — HOSPITAL ENCOUNTER (EMERGENCY)
Age: 64
Discharge: HOME | End: 2024-11-06
Attending: EMERGENCY MEDICINE
Payer: MEDICARE

## 2024-11-06 ENCOUNTER — APPOINTMENT (OUTPATIENT)
Dept: GENERAL RADIOLOGY | Age: 64
End: 2024-11-06
Payer: MEDICARE

## 2024-11-06 ENCOUNTER — APPOINTMENT (OUTPATIENT)
Dept: CT IMAGING | Age: 64
End: 2024-11-06
Payer: MEDICARE

## 2024-11-06 VITALS
RESPIRATION RATE: 19 BRPM | SYSTOLIC BLOOD PRESSURE: 121 MMHG | TEMPERATURE: 98.7 F | HEART RATE: 93 BPM | OXYGEN SATURATION: 91 % | DIASTOLIC BLOOD PRESSURE: 96 MMHG
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
## 2024-11-06 DIAGNOSIS — F10.920 ACUTE ALCOHOLIC INTOXICATION WITHOUT COMPLICATION (HCC): ICD-10-CM

## 2024-11-06 LAB
ABO + RH BLD: NORMAL
ANION GAP SERPL CALCULATED.3IONS-SCNC: 16 MMOL/L (ref 9–16)
ARM BAND NUMBER: NORMAL
BLOOD BANK SAMPLE EXPIRATION: NORMAL
BLOOD BANK SPECIMEN: ABNORMAL
BLOOD GROUP ANTIBODIES SERPL: NEGATIVE
BODY TEMPERATURE: 37
BUN SERPL-MCNC: 7 MG/DL (ref 8–23)
CHLORIDE SERPL-SCNC: 98 MMOL/L (ref 98–107)
CO2 SERPL-SCNC: 17 MMOL/L (ref 20–31)
COHGB MFR BLD: 5.5 % (ref 0–5)
CREAT SERPL-MCNC: 0.7 MG/DL (ref 0.7–1.2)
ERYTHROCYTE [DISTWIDTH] IN BLOOD BY AUTOMATED COUNT: 13.1 % (ref 11.8–14.4)
ETHANOL PERCENT: 0.41 %
ETHANOLAMINE SERPL-MCNC: 415 MG/DL (ref 0–0.08)
FIO2 ON VENT: ABNORMAL %
GFR, ESTIMATED: >90 ML/MIN/1.73M2
GLUCOSE SERPL-MCNC: 97 MG/DL (ref 74–99)
HCG SERPL QL: NEGATIVE
HCO3 VENOUS: 14.5 MMOL/L (ref 24–30)
HCT VFR BLD AUTO: 38.1 % (ref 40.7–50.3)
HGB BLD-MCNC: 12.9 G/DL (ref 13–17)
INR PPP: 1
MCH RBC QN AUTO: 31.7 PG (ref 25.2–33.5)
MCHC RBC AUTO-ENTMCNC: 33.9 G/DL (ref 28.4–34.8)
MCV RBC AUTO: 93.6 FL (ref 82.6–102.9)
NEGATIVE BASE EXCESS, VEN: 9.3 MMOL/L (ref 0–2)
NRBC BLD-RTO: 0 PER 100 WBC
O2 SAT, VEN: 93.2 % (ref 60–85)
PARTIAL THROMBOPLASTIN TIME: 30 SEC (ref 23–36.5)
PCO2 VENOUS: 26.3 MM HG (ref 39–55)
PH VENOUS: 7.36 (ref 7.32–7.42)
PLATELET # BLD AUTO: 201 K/UL (ref 138–453)
PMV BLD AUTO: 9.2 FL (ref 8.1–13.5)
PO2 VENOUS: 75.4 MM HG (ref 30–50)
POTASSIUM SERPL-SCNC: 3.5 MMOL/L (ref 3.7–5.3)
PROTHROMBIN TIME: 13.1 SEC (ref 11.7–14.9)
RBC # BLD AUTO: 4.07 M/UL (ref 4.21–5.77)
SODIUM SERPL-SCNC: 131 MMOL/L (ref 136–145)
WBC OTHER # BLD: 7.5 K/UL (ref 3.5–11.3)

## 2024-11-06 PROCEDURE — 70450 CT HEAD/BRAIN W/O DYE: CPT

## 2024-11-06 PROCEDURE — 99284 EMERGENCY DEPT VISIT MOD MDM: CPT

## 2024-11-06 PROCEDURE — 86850 RBC ANTIBODY SCREEN: CPT

## 2024-11-06 PROCEDURE — 80051 ELECTROLYTE PANEL: CPT

## 2024-11-06 PROCEDURE — 84520 ASSAY OF UREA NITROGEN: CPT

## 2024-11-06 PROCEDURE — 85027 COMPLETE CBC AUTOMATED: CPT

## 2024-11-06 PROCEDURE — 36415 COLL VENOUS BLD VENIPUNCTURE: CPT

## 2024-11-06 PROCEDURE — 82565 ASSAY OF CREATININE: CPT

## 2024-11-06 PROCEDURE — 82805 BLOOD GASES W/O2 SATURATION: CPT

## 2024-11-06 PROCEDURE — 86900 BLOOD TYPING SEROLOGIC ABO: CPT

## 2024-11-06 PROCEDURE — 85730 THROMBOPLASTIN TIME PARTIAL: CPT

## 2024-11-06 PROCEDURE — G0480 DRUG TEST DEF 1-7 CLASSES: HCPCS

## 2024-11-06 PROCEDURE — 84703 CHORIONIC GONADOTROPIN ASSAY: CPT

## 2024-11-06 PROCEDURE — 72125 CT NECK SPINE W/O DYE: CPT

## 2024-11-06 PROCEDURE — 82947 ASSAY GLUCOSE BLOOD QUANT: CPT

## 2024-11-06 PROCEDURE — 86901 BLOOD TYPING SEROLOGIC RH(D): CPT

## 2024-11-06 PROCEDURE — 73562 X-RAY EXAM OF KNEE 3: CPT

## 2024-11-06 PROCEDURE — 85610 PROTHROMBIN TIME: CPT

## 2024-11-06 PROCEDURE — 71045 X-RAY EXAM CHEST 1 VIEW: CPT

## 2024-11-06 ASSESSMENT — PAIN DESCRIPTION - LOCATION: LOCATION: GENERALIZED

## 2024-11-06 ASSESSMENT — PAIN SCALES - GENERAL: PAINLEVEL_OUTOF10: 8

## 2024-11-06 ASSESSMENT — PAIN - FUNCTIONAL ASSESSMENT: PAIN_FUNCTIONAL_ASSESSMENT: 0-10

## 2024-11-06 NOTE — ED PROVIDER NOTES
[facility]     Emergency Department     Faculty Attestation    I performed a history and physical examination of the patient and discussed management with the resident. I reviewed the resident’s note and agree with the documented findings and plan of care. Any areas of disagreement are noted on the chart. I was personally present for the key portions of any procedures. I have documented in the chart those procedures where I was not present during the key portions. I have reviewed the emergency nurses triage note. I agree with the chief complaint, past medical history, past surgical history, allergies, medications, social and family history as documented unless otherwise noted below.        For Physician Assistant/ Nurse Practitioner cases/documentation I have personally evaluated this patient and have completed at least one if not all key elements of the E/M (history, physical exam, and MDM). Additional findings are as noted.  I have personally seen and evaluated the patient.  I find the patient's history and physical exam are consistent with the NP/PA documentation.  I agree with the care provided, treatment rendered, disposition and follow-up plan.    Patient appears to be intoxicated with bruising suggestive of mild minor trauma      Critical Care     Mahesh Ramirez M.D.  Attending Emergency  Physician            Mahesh Ramirez MD  11/06/24 [id number]

## 2024-11-06 NOTE — ED PROVIDER NOTES
FACULTY SIGN-OUT  ADDENDUM     Care of this patient was assumed from previous attending physician. The patient's initial evaluation and plan have been discussed with the prior provider who initially evaluated the patient.      Note Started: 3:51 PM EST    Attestation  I was available and discussed any additional care issues that arose and coordinated the management plans with the resident(s) caring for the patient during my duty period. Any areas of disagreement with resident's documentation of care or procedures are noted on the chart. I was personally present for the key portions of any/all procedures, during my duty period. I have documented in the chart those procedures where I was not present during the key portions.       ED COURSE      The patient was given the following medications:  No orders of the defined types were placed in this encounter.      RECENT VITALS:   Temp: 98.7 °F (37.1 °C), Pulse: 93, Respirations: 19, BP: 138/69    MEDICAL DECISION MAKING        Mahesh Brownlee is a 64 y.o. male who presents to the Emergency Department with complaints of acute alcohol intoxication.  Patient had extensive workup to include CT scans of the head and cervical spine that showed no acute pathology.  X-rays of the chest and knee also did not show any evidence of acute pathology.  Patient will be reassessed when he is clinically sober    1830 hrs.  Per the resident patient was awoken and is able to ambulate without any issues.  Headache no complaints at this time.  Patient will be discharged home.      Matt Morrow MD, MD, F.A.C.E.P.  Attending Emergency Physician    (Please note that portions of this note were completed with a voice recognition program.  Efforts were made to edit the dictations but occasionally words are mis-transcribed.)         Matt Morrow MD  11/06/24 1833

## 2024-11-06 NOTE — ED PROVIDER NOTES
[facility] ED  Emergency Department Encounter  Emergency Medicine Resident     Pt Name:Mahesh Brownlee  MRN: [id number]  Birthdate 1960  Date of evaluation: 11/6/24  PCP:  Ira Roberts APRN - CNP  Note Started: 1:35 PM EST      CHIEF COMPLAINT       Chief Complaint   Patient presents with    Alcohol Intoxication       HISTORY OF PRESENT ILLNESS  (Location/Symptom, Timing/Onset, Context/Setting, Quality, Duration, Modifying Factors, Severity.)      Mahesh Brownlee is a 64 y.o. male who presents to the ED via EMS after a presumed fall.  Patient was reportedly found intoxicated and EMS was called.  He is unable to provide any details and does not know why he is here.  Patient reports chronic 8/10 pain \"all over\" which is unchanged from baseline.  He denies any known injury.  Upon EMS arrival, they noted abrasions to bilateral hands and face as well as urinary incontinence.  Patient is a very poor historian and does not provide any other history. Xarelto is listed on home medications, unsure if patient is taking it.    PAST MEDICAL / SURGICAL / SOCIAL / FAMILY HISTORY      has a past medical history of Adjustment disorder, AF (atrial fibrillation) ([facility]), AICD (automatic cardioverter/defibrillator) present, Alcohol dependence ([facility]), CAD (coronary artery disease), Cardiomyopathy (HCC), CHF (congestive heart failure) ([facility]), COPD (chronic obstructive pulmonary disease) (HCC), DDD (degenerative disc disease), lumbar, Herniated cervical disc, Hyperlipidemia, Hypertension, Major depression, Post laminectomy syndrome, Sciatica, Snores, Tobacco abuse, and Traumatic brain injury.     has a past surgical history that includes back surgery; Rotator cuff repair (Right); Carpal tunnel release (Right); Nerve Block (07/23/2013); Nerve Block (N/A, 03/21/2013); other surgical history (04/25/2016); Tonsillectomy; pacemaker placement (09/2015); back surgery; other surgical history (02/09/2018); joint  We want to give the best care possible. If you receive a Press Ganey survey from our office, please take the time to fill out the survey and return it in the envelope provided. Your feedback helps us know how we are doing and we really appreciate it. Thank you.

## 2024-11-06 NOTE — ED NOTES
Police called due to pt not cooperating   Pt tolerated IV and blood draw   Police helped put pt back into the bed   Pt stood and was unsteady and almost fell   Pt is alert

## 2024-11-07 NOTE — ED NOTES
RN asked social work to arrange transport home for patient. Per insurance company policy, legally intoxicated patients will not receive transport home. As discussed previously with leadership, hospital will not pay for transport for legally intoxicated patients. Patient's not legally sober until 1am. Writer informed RN who will advise patient. Patient's currently stating he will contact [employer] on his own & pay for transport to his location of choice. Writer provided RN [employer] number. Writer discussed discharge with Resident who stated patient's a daily heavy drinker who will likely seize if his ETOH level drops to 200. Writer stated patient needs ETOH tx, Resident stated patient adamantly refused tx options. Writer observed patient stating he wants to leave & will order a taxi for private pay on his own.

## 2024-11-07 NOTE — ED NOTES
Pt dressed himself   Pt walked to the restroom   Pt has a staggered gait and stated this is how he usually walks due to having hip and knee problems   Pt is Alert   Pt walked back to room and RN helped pack his stuff up   Resident also seen pt walk to restroom   Pt was given discharge papers   Pt was explained he needs to have a rid home   Nurse talked to resident and Dr Morrow about Pt and current situation   Nurse also talked to Resident and DR Morrow about Nurse consulting Social work and stated that social work   Stated insurance companies will not transport intoxicated pts and the hospital will not pay for intoxicated pts.

## 2024-11-07 NOTE — ED PROVIDER NOTES
[facility] ED  Emergency Department  Emergency Medicine Resident Turn-Over     Note Started: 7:10 PM EST    Care of Mahesh Brownlee was assumed from Dr. Cox and is being seen for Alcohol Intoxication  .  The patient's initial evaluation and plan have been discussed with the prior provider who initially evaluated the patient.     EMERGENCY DEPARTMENT COURSE / MEDICAL DECISION MAKING:       MEDICATIONS GIVEN:  No orders of the defined types were placed in this encounter.      LABS / RADIOLOGY:     Labs Reviewed   TRAUMA PANEL - Abnormal; Notable for the following components:       Result Value    Ethanol Lvl 415 (*)     Ethanol percent 0.415 (*)     BUN 7 (*)     RBC 4.07 (*)     Hemoglobin 12.9 (*)     Hematocrit 38.1 (*)     Sodium 131 (*)     Potassium 3.5 (*)     CO2 17 (*)     All other components within normal limits   BLOOD GAS, VENOUS - Abnormal; Notable for the following components:    pCO2, Dennis 26.3 (*)     PO2, Dennis 75.4 (*)     HCO3, Venous 14.5 (*)     Negative Base Excess, Dennis 9.3 (*)     O2 Sat, Dennis 93.2 (*)     Carboxyhemoglobin 5.5 (*)     All other components within normal limits   PREVIOUS SPECIMEN   TYPE AND SCREEN       CT CSpine W/O Contrast    Result Date: 11/6/2024  EXAMINATION: THREE XRAY VIEWS OF THE LEFT KNEE; CT OF THE HEAD WITHOUT CONTRAST; CT OF THE CERVICAL SPINE WITHOUT CONTRAST  11/6/2024 1:59 pm; 11/6/2024 2:31 pm TECHNIQUE: CT of the head was performed without the administration of intravenous contrast. Automated exposure control, iterative reconstruction, and/or weight based adjustment of the mA/kV was utilized to reduce the radiation dose to as low as reasonably achievable.; CT of the cervical spine was performed without the administration of intravenous contrast. Multiplanar reformatted images are provided for review. Automated exposure control, iterative reconstruction, and/or weight based adjustment of the mA/kV was utilized to reduce the radiation dose

## 2024-11-13 ENCOUNTER — APPOINTMENT (OUTPATIENT)
Dept: CT IMAGING | Age: 64
DRG: 101 | End: 2024-11-13
Payer: MEDICARE

## 2024-11-13 ENCOUNTER — APPOINTMENT (OUTPATIENT)
Dept: GENERAL RADIOLOGY | Age: 64
DRG: 101 | End: 2024-11-13
Payer: MEDICARE

## 2024-11-13 ENCOUNTER — HOSPITAL ENCOUNTER (INPATIENT)
Age: 64
LOS: 5 days | Discharge: NURSING, REHAB OR LONG-TERM CARE | DRG: 101 | End: 2024-11-18
Attending: STUDENT IN AN ORGANIZED HEALTH CARE EDUCATION/TRAINING PROGRAM | Admitting: PSYCHIATRY & NEUROLOGY
Payer: MEDICARE

## 2024-11-13 DIAGNOSIS — R56.9 SEIZURES (HCC): Primary | ICD-10-CM

## 2024-11-13 DIAGNOSIS — R41.82 ALTERED MENTAL STATUS, UNSPECIFIED ALTERED MENTAL STATUS TYPE: ICD-10-CM

## 2024-11-13 DIAGNOSIS — F10.929 ACUTE ALCOHOLIC INTOXICATION WITH COMPLICATION (HCC): ICD-10-CM

## 2024-11-13 LAB
ALBUMIN SERPL-MCNC: 4.9 G/DL (ref 3.5–5.2)
ALBUMIN/GLOB SERPL: 1.8 {RATIO} (ref 1–2.5)
ALP SERPL-CCNC: 109 U/L (ref 40–129)
ALT SERPL-CCNC: 56 U/L (ref 10–50)
AMMONIA PLAS-SCNC: 41 UMOL/L (ref 16–60)
AMPHET UR QL SCN: NEGATIVE
ANION GAP SERPL CALCULATED.3IONS-SCNC: 42 MMOL/L (ref 9–16)
APAP SERPL-MCNC: <5 UG/ML (ref 10–30)
AST SERPL-CCNC: 105 U/L (ref 10–50)
B PARAP IS1001 DNA NPH QL NAA+NON-PROBE: NOT DETECTED
B PERT DNA SPEC QL NAA+PROBE: NOT DETECTED
B-OH-BUTYR SERPL-MCNC: 0.64 MMOL/L (ref 0.02–0.27)
BACTERIA URNS QL MICRO: NORMAL
BARBITURATES UR QL SCN: NEGATIVE
BASOPHILS # BLD: 0.05 K/UL (ref 0–0.2)
BASOPHILS NFR BLD: 1 % (ref 0–2)
BENZODIAZ UR QL: NEGATIVE
BILIRUB DIRECT SERPL-MCNC: 0.2 MG/DL (ref 0–0.2)
BILIRUB INDIRECT SERPL-MCNC: 0.3 MG/DL (ref 0–1)
BILIRUB SERPL-MCNC: 0.5 MG/DL (ref 0–1.2)
BILIRUB UR QL STRIP: NEGATIVE
BODY TEMPERATURE: 37
BUN BLD-MCNC: 6 MG/DL (ref 8–26)
BUN SERPL-MCNC: 8 MG/DL (ref 8–23)
C PNEUM DNA NPH QL NAA+NON-PROBE: NOT DETECTED
CA-I BLD-SCNC: 1.03 MMOL/L (ref 1.15–1.33)
CALCIUM SERPL-MCNC: 9.3 MG/DL (ref 8.6–10.4)
CANNABINOIDS UR QL SCN: POSITIVE
CASTS #/AREA URNS LPF: NORMAL /LPF (ref 0–8)
CHLORIDE BLD-SCNC: 110 MMOL/L (ref 98–107)
CHLORIDE SERPL-SCNC: 95 MMOL/L (ref 98–107)
CK SERPL-CCNC: 318 U/L (ref 39–308)
CK SERPL-CCNC: 880 U/L (ref 39–308)
CLARITY UR: CLEAR
CO2 BLD CALC-SCNC: 6 MMOL/L (ref 22–30)
CO2 SERPL-SCNC: 6 MMOL/L (ref 20–31)
COCAINE UR QL SCN: NEGATIVE
COHGB MFR BLD: 2.3 % (ref 0–5)
COLOR UR: YELLOW
CREAT SERPL-MCNC: 1.3 MG/DL (ref 0.7–1.2)
EGFR, POC: 67 ML/MIN/1.73M2
EOSINOPHIL # BLD: <0.03 K/UL (ref 0–0.44)
EOSINOPHILS RELATIVE PERCENT: 0 % (ref 1–4)
EPI CELLS #/AREA URNS HPF: NORMAL /HPF (ref 0–5)
ERYTHROCYTE [DISTWIDTH] IN BLOOD BY AUTOMATED COUNT: 13.6 % (ref 11.8–14.4)
ETHANOL PERCENT: 0.05 %
ETHANOLAMINE SERPL-MCNC: 49 MG/DL (ref 0–0.08)
FENTANYL UR QL: NEGATIVE
FIO2 ON VENT: ABNORMAL %
FLUAV RNA NPH QL NAA+NON-PROBE: NOT DETECTED
FLUBV RNA NPH QL NAA+NON-PROBE: NOT DETECTED
GFR, ESTIMATED: 61 ML/MIN/1.73M2
GLOBULIN SER CALC-MCNC: 2.8 G/DL
GLUCOSE BLD-MCNC: 121 MG/DL (ref 74–100)
GLUCOSE BLD-MCNC: 127 MG/DL (ref 74–100)
GLUCOSE SERPL-MCNC: 124 MG/DL (ref 74–99)
GLUCOSE UR STRIP-MCNC: NEGATIVE MG/DL
HADV DNA NPH QL NAA+NON-PROBE: NOT DETECTED
HCO3 VENOUS: 24 MMOL/L (ref 22–29)
HCO3 VENOUS: 6.5 MMOL/L (ref 22–29)
HCO3 VENOUS: 6.5 MMOL/L (ref 24–30)
HCOV 229E RNA NPH QL NAA+NON-PROBE: NOT DETECTED
HCOV HKU1 RNA NPH QL NAA+NON-PROBE: NOT DETECTED
HCOV NL63 RNA NPH QL NAA+NON-PROBE: NOT DETECTED
HCOV OC43 RNA NPH QL NAA+NON-PROBE: NOT DETECTED
HCT VFR BLD AUTO: 45 % (ref 41–53)
HCT VFR BLD AUTO: 46.9 % (ref 40.7–50.3)
HGB BLD-MCNC: 14.4 G/DL (ref 13–17)
HGB UR QL STRIP.AUTO: ABNORMAL
HMPV RNA NPH QL NAA+NON-PROBE: NOT DETECTED
HPIV1 RNA NPH QL NAA+NON-PROBE: NOT DETECTED
HPIV2 RNA NPH QL NAA+NON-PROBE: NOT DETECTED
HPIV3 RNA NPH QL NAA+NON-PROBE: NOT DETECTED
HPIV4 RNA NPH QL NAA+NON-PROBE: NOT DETECTED
IMM GRANULOCYTES # BLD AUTO: 0.08 K/UL (ref 0–0.3)
IMM GRANULOCYTES NFR BLD: 1 %
INR PPP: 1.1
KETONES UR STRIP-MCNC: ABNORMAL MG/DL
LACTIC ACID, WHOLE BLOOD: 15 MMOL/L (ref 0.7–2.1)
LACTIC ACID, WHOLE BLOOD: 2 MMOL/L (ref 0.7–2.1)
LACTIC ACID, WHOLE BLOOD: 28 MMOL/L (ref 0.7–2.1)
LEUKOCYTE ESTERASE UR QL STRIP: NEGATIVE
LEVETIRACETAM SERPL-MCNC: <2 UG/ML
LIPASE SERPL-CCNC: 16 U/L (ref 13–60)
LYMPHOCYTES NFR BLD: 0.9 K/UL (ref 1.1–3.7)
LYMPHOCYTES RELATIVE PERCENT: 9 % (ref 24–43)
M PNEUMO DNA NPH QL NAA+NON-PROBE: NOT DETECTED
MAGNESIUM SERPL-MCNC: 2.8 MG/DL (ref 1.6–2.4)
MCH RBC QN AUTO: 31.2 PG (ref 25.2–33.5)
MCHC RBC AUTO-ENTMCNC: 30.7 G/DL (ref 28.4–34.8)
MCV RBC AUTO: 101.5 FL (ref 82.6–102.9)
METHADONE UR QL: NEGATIVE
MONOCYTES NFR BLD: 0.55 K/UL (ref 0.1–1.2)
MONOCYTES NFR BLD: 6 % (ref 3–12)
NEGATIVE BASE EXCESS, VEN: 24.3 MMOL/L (ref 0–2)
NEGATIVE BASE EXCESS, VEN: 25.6 MMOL/L (ref 0–2)
NEUTROPHILS NFR BLD: 84 % (ref 36–65)
NEUTS SEG NFR BLD: 8.3 K/UL (ref 1.5–8.1)
NITRITE UR QL STRIP: NEGATIVE
NRBC BLD-RTO: 0 PER 100 WBC
O2 SAT, VEN: 58.9 % (ref 60–85)
O2 SAT, VEN: 67.1 % (ref 60–85)
O2 SAT, VEN: 81.3 % (ref 60–85)
OPIATES UR QL SCN: NEGATIVE
OXYCODONE UR QL SCN: NEGATIVE
PCO2 VENOUS: 28 MM HG (ref 41–51)
PCO2 VENOUS: 29 MM HG (ref 39–55)
PCO2 VENOUS: 33.2 MM HG (ref 41–51)
PCP UR QL SCN: NEGATIVE
PH UR STRIP: 5.5 [PH] (ref 5–8)
PH VENOUS: 6.97 (ref 7.32–7.43)
PH VENOUS: 6.98 (ref 7.32–7.42)
PH VENOUS: 7.47 (ref 7.32–7.43)
PLATELET # BLD AUTO: 158 K/UL (ref 138–453)
PMV BLD AUTO: 10.2 FL (ref 8.1–13.5)
PO2 VENOUS: 28.4 MM HG (ref 30–50)
PO2 VENOUS: 52.8 MM HG (ref 30–50)
PO2 VENOUS: 75.3 MM HG (ref 30–50)
POC ANION GAP: 26 MMOL/L (ref 7–16)
POC CREATININE: 1.2 MG/DL (ref 0.51–1.19)
POC HEMOGLOBIN (CALC): 15.1 G/DL (ref 13.5–17.5)
POC LACTIC ACID: 19.9 MMOL/L (ref 0.56–1.39)
POSITIVE BASE EXCESS, VEN: 0.7 MMOL/L (ref 0–3)
POTASSIUM BLD-SCNC: 4.3 MMOL/L (ref 3.5–4.5)
POTASSIUM SERPL-SCNC: 4.2 MMOL/L (ref 3.7–5.3)
PROT SERPL-MCNC: 7.7 G/DL (ref 6.6–8.7)
PROT UR STRIP-MCNC: ABNORMAL MG/DL
PROTHROMBIN TIME: 14.2 SEC (ref 11.7–14.9)
RBC # BLD AUTO: 4.62 M/UL (ref 4.21–5.77)
RBC #/AREA URNS HPF: NORMAL /HPF (ref 0–4)
RSV RNA NPH QL NAA+NON-PROBE: NOT DETECTED
RV+EV RNA NPH QL NAA+NON-PROBE: NOT DETECTED
SALICYLATES SERPL-MCNC: <0.5 MG/DL (ref 0–10)
SARS-COV-2 RNA NPH QL NAA+NON-PROBE: NOT DETECTED
SODIUM BLD-SCNC: 141 MMOL/L (ref 138–146)
SODIUM SERPL-SCNC: 143 MMOL/L (ref 136–145)
SP GR UR STRIP: 1.02 (ref 1–1.03)
SPECIMEN DESCRIPTION: NORMAL
TEST INFORMATION: ABNORMAL
TROPONIN I SERPL HS-MCNC: 41 NG/L (ref 0–22)
TROPONIN I SERPL HS-MCNC: 43 NG/L (ref 0–22)
TROPONIN I SERPL HS-MCNC: 51 NG/L (ref 0–22)
UROBILINOGEN UR STRIP-ACNC: NORMAL EU/DL (ref 0–1)
WBC #/AREA URNS HPF: NORMAL /HPF (ref 0–5)
WBC OTHER # BLD: 9.9 K/UL (ref 3.5–11.3)

## 2024-11-13 PROCEDURE — 87040 BLOOD CULTURE FOR BACTERIA: CPT

## 2024-11-13 PROCEDURE — 6370000000 HC RX 637 (ALT 250 FOR IP)

## 2024-11-13 PROCEDURE — 2500000003 HC RX 250 WO HCPCS

## 2024-11-13 PROCEDURE — 70450 CT HEAD/BRAIN W/O DYE: CPT

## 2024-11-13 PROCEDURE — 82565 ASSAY OF CREATININE: CPT

## 2024-11-13 PROCEDURE — 96365 THER/PROPH/DIAG IV INF INIT: CPT

## 2024-11-13 PROCEDURE — 87086 URINE CULTURE/COLONY COUNT: CPT

## 2024-11-13 PROCEDURE — 82550 ASSAY OF CK (CPK): CPT

## 2024-11-13 PROCEDURE — 80307 DRUG TEST PRSMV CHEM ANLYZR: CPT

## 2024-11-13 PROCEDURE — 83735 ASSAY OF MAGNESIUM: CPT

## 2024-11-13 PROCEDURE — 82330 ASSAY OF CALCIUM: CPT

## 2024-11-13 PROCEDURE — 96375 TX/PRO/DX INJ NEW DRUG ADDON: CPT

## 2024-11-13 PROCEDURE — 72125 CT NECK SPINE W/O DYE: CPT

## 2024-11-13 PROCEDURE — 83690 ASSAY OF LIPASE: CPT

## 2024-11-13 PROCEDURE — 2580000003 HC RX 258

## 2024-11-13 PROCEDURE — 5A1935Z RESPIRATORY VENTILATION, LESS THAN 24 CONSECUTIVE HOURS: ICD-10-PCS | Performed by: PSYCHIATRY & NEUROLOGY

## 2024-11-13 PROCEDURE — 80048 BASIC METABOLIC PNL TOTAL CA: CPT

## 2024-11-13 PROCEDURE — 85014 HEMATOCRIT: CPT

## 2024-11-13 PROCEDURE — 0BH17EZ INSERTION OF ENDOTRACHEAL AIRWAY INTO TRACHEA, VIA NATURAL OR ARTIFICIAL OPENING: ICD-10-PCS | Performed by: PSYCHIATRY & NEUROLOGY

## 2024-11-13 PROCEDURE — 74018 RADEX ABDOMEN 1 VIEW: CPT

## 2024-11-13 PROCEDURE — 81001 URINALYSIS AUTO W/SCOPE: CPT

## 2024-11-13 PROCEDURE — 6360000002 HC RX W HCPCS

## 2024-11-13 PROCEDURE — 80177 DRUG SCRN QUAN LEVETIRACETAM: CPT

## 2024-11-13 PROCEDURE — 82803 BLOOD GASES ANY COMBINATION: CPT

## 2024-11-13 PROCEDURE — 80051 ELECTROLYTE PANEL: CPT

## 2024-11-13 PROCEDURE — 0202U NFCT DS 22 TRGT SARS-COV-2: CPT

## 2024-11-13 PROCEDURE — 82140 ASSAY OF AMMONIA: CPT

## 2024-11-13 PROCEDURE — 84484 ASSAY OF TROPONIN QUANT: CPT

## 2024-11-13 PROCEDURE — 80179 DRUG ASSAY SALICYLATE: CPT

## 2024-11-13 PROCEDURE — 84600 ASSAY OF VOLATILES: CPT

## 2024-11-13 PROCEDURE — 82947 ASSAY GLUCOSE BLOOD QUANT: CPT

## 2024-11-13 PROCEDURE — 31500 INSERT EMERGENCY AIRWAY: CPT

## 2024-11-13 PROCEDURE — 85025 COMPLETE CBC W/AUTO DIFF WBC: CPT

## 2024-11-13 PROCEDURE — 84520 ASSAY OF UREA NITROGEN: CPT

## 2024-11-13 PROCEDURE — 85610 PROTHROMBIN TIME: CPT

## 2024-11-13 PROCEDURE — 36415 COLL VENOUS BLD VENIPUNCTURE: CPT

## 2024-11-13 PROCEDURE — 94761 N-INVAS EAR/PLS OXIMETRY MLT: CPT

## 2024-11-13 PROCEDURE — 99285 EMERGENCY DEPT VISIT HI MDM: CPT

## 2024-11-13 PROCEDURE — 82010 KETONE BODYS QUAN: CPT

## 2024-11-13 PROCEDURE — 96367 TX/PROPH/DG ADDL SEQ IV INF: CPT

## 2024-11-13 PROCEDURE — 94002 VENT MGMT INPAT INIT DAY: CPT

## 2024-11-13 PROCEDURE — 93005 ELECTROCARDIOGRAM TRACING: CPT

## 2024-11-13 PROCEDURE — 2700000000 HC OXYGEN THERAPY PER DAY

## 2024-11-13 PROCEDURE — 71045 X-RAY EXAM CHEST 1 VIEW: CPT

## 2024-11-13 PROCEDURE — 80076 HEPATIC FUNCTION PANEL: CPT

## 2024-11-13 PROCEDURE — 96368 THER/DIAG CONCURRENT INF: CPT

## 2024-11-13 PROCEDURE — 82805 BLOOD GASES W/O2 SATURATION: CPT

## 2024-11-13 PROCEDURE — 83605 ASSAY OF LACTIC ACID: CPT

## 2024-11-13 PROCEDURE — G0480 DRUG TEST DEF 1-7 CLASSES: HCPCS

## 2024-11-13 PROCEDURE — 2000000000 HC ICU R&B

## 2024-11-13 PROCEDURE — 80143 DRUG ASSAY ACETAMINOPHEN: CPT

## 2024-11-13 RX ORDER — MIDAZOLAM HYDROCHLORIDE 2 MG/2ML
2 INJECTION, SOLUTION INTRAMUSCULAR; INTRAVENOUS ONCE
Status: COMPLETED | OUTPATIENT
Start: 2024-11-13 | End: 2024-11-13

## 2024-11-13 RX ORDER — ACETAMINOPHEN 650 MG/1
650 SUPPOSITORY RECTAL EVERY 6 HOURS PRN
Status: DISCONTINUED | OUTPATIENT
Start: 2024-11-13 | End: 2024-11-18 | Stop reason: HOSPADM

## 2024-11-13 RX ORDER — PROPOFOL 10 MG/ML
5-50 INJECTION, EMULSION INTRAVENOUS CONTINUOUS
Status: DISCONTINUED | OUTPATIENT
Start: 2024-11-13 | End: 2024-11-14

## 2024-11-13 RX ORDER — POTASSIUM CHLORIDE 1500 MG/1
40 TABLET, EXTENDED RELEASE ORAL PRN
Status: DISCONTINUED | OUTPATIENT
Start: 2024-11-13 | End: 2024-11-18 | Stop reason: HOSPADM

## 2024-11-13 RX ORDER — ATORVASTATIN CALCIUM 10 MG/1
10 TABLET, FILM COATED ORAL NIGHTLY
Status: DISCONTINUED | OUTPATIENT
Start: 2024-11-13 | End: 2024-11-14

## 2024-11-13 RX ORDER — SODIUM CHLORIDE 0.9 % (FLUSH) 0.9 %
5-40 SYRINGE (ML) INJECTION EVERY 12 HOURS SCHEDULED
Status: DISCONTINUED | OUTPATIENT
Start: 2024-11-13 | End: 2024-11-18 | Stop reason: HOSPADM

## 2024-11-13 RX ORDER — LORAZEPAM 2 MG/ML
INJECTION INTRAMUSCULAR
Status: DISCONTINUED
Start: 2024-11-13 | End: 2024-11-14

## 2024-11-13 RX ORDER — 0.9 % SODIUM CHLORIDE 0.9 %
1000 INTRAVENOUS SOLUTION INTRAVENOUS ONCE
Status: COMPLETED | OUTPATIENT
Start: 2024-11-13 | End: 2024-11-13

## 2024-11-13 RX ORDER — PROPOFOL 10 MG/ML
INJECTION, EMULSION INTRAVENOUS
Status: COMPLETED
Start: 2024-11-13 | End: 2024-11-13

## 2024-11-13 RX ORDER — SUCCINYLCHOLINE CHLORIDE 20 MG/ML
100 INJECTION INTRAMUSCULAR; INTRAVENOUS ONCE
Status: COMPLETED | OUTPATIENT
Start: 2024-11-13 | End: 2024-11-13

## 2024-11-13 RX ORDER — SODIUM CHLORIDE, SODIUM LACTATE, POTASSIUM CHLORIDE, CALCIUM CHLORIDE 600; 310; 30; 20 MG/100ML; MG/100ML; MG/100ML; MG/100ML
INJECTION, SOLUTION INTRAVENOUS CONTINUOUS
Status: DISCONTINUED | OUTPATIENT
Start: 2024-11-13 | End: 2024-11-18 | Stop reason: HOSPADM

## 2024-11-13 RX ORDER — METOPROLOL TARTRATE 25 MG/1
25 TABLET, FILM COATED ORAL 2 TIMES DAILY
Status: DISCONTINUED | OUTPATIENT
Start: 2024-11-13 | End: 2024-11-14

## 2024-11-13 RX ORDER — ONDANSETRON 4 MG/1
4 TABLET, ORALLY DISINTEGRATING ORAL EVERY 8 HOURS PRN
Status: DISCONTINUED | OUTPATIENT
Start: 2024-11-13 | End: 2024-11-18 | Stop reason: HOSPADM

## 2024-11-13 RX ORDER — MAGNESIUM SULFATE IN WATER 40 MG/ML
2000 INJECTION, SOLUTION INTRAVENOUS PRN
Status: DISCONTINUED | OUTPATIENT
Start: 2024-11-13 | End: 2024-11-18 | Stop reason: HOSPADM

## 2024-11-13 RX ORDER — ONDANSETRON 2 MG/ML
4 INJECTION INTRAMUSCULAR; INTRAVENOUS EVERY 6 HOURS PRN
Status: DISCONTINUED | OUTPATIENT
Start: 2024-11-13 | End: 2024-11-18 | Stop reason: HOSPADM

## 2024-11-13 RX ORDER — POLYETHYLENE GLYCOL 3350 17 G/17G
17 POWDER, FOR SOLUTION ORAL DAILY PRN
Status: DISCONTINUED | OUTPATIENT
Start: 2024-11-13 | End: 2024-11-18 | Stop reason: HOSPADM

## 2024-11-13 RX ORDER — PROPOFOL 10 MG/ML
5-100 INJECTION, EMULSION INTRAVENOUS CONTINUOUS
Status: DISCONTINUED | OUTPATIENT
Start: 2024-11-13 | End: 2024-11-13

## 2024-11-13 RX ORDER — SODIUM CHLORIDE 9 MG/ML
INJECTION, SOLUTION INTRAVENOUS PRN
Status: DISCONTINUED | OUTPATIENT
Start: 2024-11-13 | End: 2024-11-16

## 2024-11-13 RX ORDER — LEVETIRACETAM 10 MG/ML
2000 INJECTION INTRAVASCULAR ONCE
Status: COMPLETED | OUTPATIENT
Start: 2024-11-13 | End: 2024-11-13

## 2024-11-13 RX ORDER — SODIUM CHLORIDE 0.9 % (FLUSH) 0.9 %
5-40 SYRINGE (ML) INJECTION PRN
Status: DISCONTINUED | OUTPATIENT
Start: 2024-11-13 | End: 2024-11-18 | Stop reason: HOSPADM

## 2024-11-13 RX ORDER — POTASSIUM CHLORIDE 7.45 MG/ML
10 INJECTION INTRAVENOUS PRN
Status: DISCONTINUED | OUTPATIENT
Start: 2024-11-13 | End: 2024-11-18 | Stop reason: HOSPADM

## 2024-11-13 RX ORDER — THIAMINE HYDROCHLORIDE 100 MG/ML
500 INJECTION, SOLUTION INTRAMUSCULAR; INTRAVENOUS DAILY
Status: COMPLETED | OUTPATIENT
Start: 2024-11-14 | End: 2024-11-15

## 2024-11-13 RX ORDER — ENOXAPARIN SODIUM 100 MG/ML
40 INJECTION SUBCUTANEOUS DAILY
Status: DISCONTINUED | OUTPATIENT
Start: 2024-11-13 | End: 2024-11-15

## 2024-11-13 RX ORDER — ACETAMINOPHEN 325 MG/1
650 TABLET ORAL EVERY 6 HOURS PRN
Status: DISCONTINUED | OUTPATIENT
Start: 2024-11-13 | End: 2024-11-18 | Stop reason: HOSPADM

## 2024-11-13 RX ORDER — ETOMIDATE 2 MG/ML
30 INJECTION INTRAVENOUS ONCE
Status: COMPLETED | OUTPATIENT
Start: 2024-11-13 | End: 2024-11-13

## 2024-11-13 RX ORDER — CHLORHEXIDINE GLUCONATE ORAL RINSE 1.2 MG/ML
15 SOLUTION DENTAL 2 TIMES DAILY
Status: DISCONTINUED | OUTPATIENT
Start: 2024-11-13 | End: 2024-11-14

## 2024-11-13 RX ORDER — LEVETIRACETAM 500 MG/5ML
500 INJECTION, SOLUTION, CONCENTRATE INTRAVENOUS EVERY 12 HOURS
Status: DISCONTINUED | OUTPATIENT
Start: 2024-11-13 | End: 2024-11-15

## 2024-11-13 RX ADMIN — ACYCLOVIR SODIUM 950 MG: 50 INJECTION, SOLUTION INTRAVENOUS at 23:27

## 2024-11-13 RX ADMIN — CHLORHEXIDINE GLUCONATE 15 ML: 1.2 SOLUTION ORAL at 20:36

## 2024-11-13 RX ADMIN — PROPOFOL 45 MCG/KG/MIN: 10 INJECTION, EMULSION INTRAVENOUS at 19:19

## 2024-11-13 RX ADMIN — CEFTRIAXONE SODIUM 2000 MG: 2 INJECTION, POWDER, FOR SOLUTION INTRAMUSCULAR; INTRAVENOUS at 20:14

## 2024-11-13 RX ADMIN — ATORVASTATIN CALCIUM 10 MG: 10 TABLET, FILM COATED ORAL at 20:36

## 2024-11-13 RX ADMIN — PROPOFOL 40 MCG/KG/MIN: 10 INJECTION, EMULSION INTRAVENOUS at 21:50

## 2024-11-13 RX ADMIN — CEFEPIME 2000 MG: 2 INJECTION, POWDER, FOR SOLUTION INTRAVENOUS at 14:27

## 2024-11-13 RX ADMIN — PROPOFOL 50 MCG/KG/MIN: 10 INJECTION, EMULSION INTRAVENOUS at 15:06

## 2024-11-13 RX ADMIN — ACETAMINOPHEN 650 MG: 325 TABLET ORAL at 18:58

## 2024-11-13 RX ADMIN — Medication 100 MG: at 15:02

## 2024-11-13 RX ADMIN — MIDAZOLAM HYDROCHLORIDE 2 MG: 1 INJECTION, SOLUTION INTRAMUSCULAR; INTRAVENOUS at 15:39

## 2024-11-13 RX ADMIN — AMPICILLIN SODIUM 2000 MG: 2 INJECTION, POWDER, FOR SOLUTION INTRAMUSCULAR; INTRAVENOUS at 20:24

## 2024-11-13 RX ADMIN — SODIUM CHLORIDE, PRESERVATIVE FREE 40 MG: 5 INJECTION INTRAVENOUS at 21:57

## 2024-11-13 RX ADMIN — AMPICILLIN SODIUM 2000 MG: 2 INJECTION, POWDER, FOR SOLUTION INTRAMUSCULAR; INTRAVENOUS at 15:42

## 2024-11-13 RX ADMIN — ENOXAPARIN SODIUM 40 MG: 100 INJECTION SUBCUTANEOUS at 20:35

## 2024-11-13 RX ADMIN — THIAMINE HYDROCHLORIDE 500 MG: 100 INJECTION, SOLUTION INTRAMUSCULAR; INTRAVENOUS at 16:40

## 2024-11-13 RX ADMIN — ACYCLOVIR SODIUM 950 MG: 50 INJECTION, SOLUTION INTRAVENOUS at 15:23

## 2024-11-13 RX ADMIN — VANCOMYCIN HYDROCHLORIDE 2000 MG: 1 INJECTION, POWDER, LYOPHILIZED, FOR SOLUTION INTRAVENOUS at 17:11

## 2024-11-13 RX ADMIN — ETOMIDATE 30 MG: 2 INJECTION INTRAVENOUS at 15:02

## 2024-11-13 RX ADMIN — LEVETIRACETAM 500 MG: 100 INJECTION INTRAVENOUS at 20:20

## 2024-11-13 RX ADMIN — METOPROLOL TARTRATE 25 MG: 25 TABLET, FILM COATED ORAL at 20:36

## 2024-11-13 RX ADMIN — SODIUM CHLORIDE, POTASSIUM CHLORIDE, SODIUM LACTATE AND CALCIUM CHLORIDE: 600; 310; 30; 20 INJECTION, SOLUTION INTRAVENOUS at 19:05

## 2024-11-13 RX ADMIN — LEVETIRACETAM 2000 MG: 10 INJECTION, SOLUTION INTRAVENOUS at 14:28

## 2024-11-13 RX ADMIN — Medication 50 MCG/HR: at 16:59

## 2024-11-13 RX ADMIN — SODIUM CHLORIDE 1000 ML: 9 INJECTION, SOLUTION INTRAVENOUS at 14:07

## 2024-11-13 ASSESSMENT — PULMONARY FUNCTION TESTS
PIF_VALUE: 17
PIF_VALUE: 11
PIF_VALUE: 17
PIF_VALUE: 17
PIF_VALUE: 18

## 2024-11-13 NOTE — ED NOTES
Pt began to have tonic clonic seizure for approximately 1 minute. Dr. Hernandez & Dr. Caldwell at bedside

## 2024-11-13 NOTE — ED NOTES
Pt to ED via EMS for seizure. EMS was called by a friend that was concerned. Upon EMS arrival, patient was lying on the floor, minimally responsive before having tonic clonic seizure lasting for approximately 1 minute. No medications were given and patient came out of seizure on his own. Upon patient arrival to ED, patient was in postictal state, and unable to follow commands or answer questions. Pt is moving all extremities

## 2024-11-13 NOTE — ED NOTES
Pt pulling lines, behaving more aggressively and not following commands. Chest xray unable to be completed due to patient cooperation. Pt more cyanotic appearing in the face. Dr. Caldwell and Dr. Hernandez at bedside, both agree that intubation is most appropriate way for patient improvement, and diagnosis.

## 2024-11-13 NOTE — ED PROVIDER NOTES
[facility]  Emergency Department Encounter  Emergency Medicine Resident     Pt Name:Mahesh Brownlee  MRN: [id number]  Birthdate 1960  Date of evaluation: 11/13/24  PCP:  Ira Roberts, RIVER - CNP  Note Started: 2:38 PM EST      CHIEF COMPLAINT       Chief Complaint   Patient presents with    Seizures    Altered Mental Status       HISTORY OF PRESENT ILLNESS  (Location/Symptom, Timing/Onset, Context/Setting, Quality, Duration, Modifying Factors, Severity.)      Mahesh Brownlee is a 64 y.o. male who presents with past medical history of seizures A-fib on xarelto and has ACID, Alcohol dependence, CHF, CAD, COPD via EMS for altered mental status.  Son called neighbor to check on patient, and was found to be minimally responsive.  Per EMS patient was found laying on the floor, minimally responsive, had stool and urine in underwear.  Also had a tonic-clonic seizure lasting for approximately 1 minute in route.  Patient stopped seizing before any medications were given.  Does have a history of seizures on Keppra, documented noncompliance. On arrival patient confused, GCS of 11, unable to provide history.      PAST MEDICAL / SURGICAL / SOCIAL / FAMILY HISTORY      has a past medical history of Adjustment disorder, AF (atrial fibrillation) (HCC), AICD (automatic cardioverter/defibrillator) present, Alcohol dependence (HCC), CAD (coronary artery disease), Cardiomyopathy (HCC), CHF (congestive heart failure) ([facility]), COPD (chronic obstructive pulmonary disease) ([facility]), DDD (degenerative disc disease), lumbar, Herniated cervical disc, Hyperlipidemia, Hypertension, Major depression, Post laminectomy syndrome, Sciatica, Snores, Tobacco abuse, and Traumatic brain injury.       has a past surgical history that includes back surgery; Rotator cuff repair (Right); Carpal tunnel release (Right); Nerve Block (07/23/2013); Nerve Block (N/A, 03/21/2013); other surgical history (04/25/2016); Tonsillectomy; pacemaker placement 
patient appears confused, he is hypertensive, tachycardic, tachypneic, and febrile.  Differential diagnosis includes but is not limited to CVA, intracranial hemorrhage, postictal state, meningitis, encephalitis, electrolyte derangements, uremia, hyperammonemia, anemia, arrhythmia, alcohol intoxication, other volatile compound intoxication.  The patient was given broad-spectrum antibiotic coverage and antiviral coverage for meningitis/encephalitis.  The patient was also given Keppra 2 g IV.  The patient had a witnessed seizure while awaiting workup in the emergency department that spontaneously resolved.     All results, including labs (if ordered), imaging (if ordered), and EKGs (if ordered) were independently interpreted by me.  See radiologist report for additional details on imaging studies.      (Please note that portions of this note were completed with a voice recognition program.  Efforts were made to edit the dictations but occasionally words are mis-transcribed.)    Axel Hernandez DO   Attending Emergency Medicine Physician         Axel Hernandez DO  11/13/24 [id number]

## 2024-11-14 ENCOUNTER — APPOINTMENT (OUTPATIENT)
Dept: GENERAL RADIOLOGY | Age: 64
DRG: 101 | End: 2024-11-14
Payer: MEDICARE

## 2024-11-14 PROBLEM — I48.91 ATRIAL FIBRILLATION (HCC): Status: ACTIVE | Noted: 2024-11-14

## 2024-11-14 PROBLEM — G40.919 BREAKTHROUGH SEIZURE (HCC): Status: ACTIVE | Noted: 2024-11-14

## 2024-11-14 PROBLEM — F10.929 ACUTE ALCOHOLIC INTOXICATION WITH COMPLICATION (HCC): Status: ACTIVE | Noted: 2024-11-14

## 2024-11-14 PROBLEM — R56.9 SEIZURES (HCC): Status: ACTIVE | Noted: 2024-11-14

## 2024-11-14 PROBLEM — M62.82 NON-TRAUMATIC RHABDOMYOLYSIS: Status: ACTIVE | Noted: 2024-11-14

## 2024-11-14 PROBLEM — R11.12 PROJECTILE VOMITING WITH NAUSEA: Status: ACTIVE | Noted: 2024-11-14

## 2024-11-14 LAB
ACETONE BLOOD: NORMAL
ALBUMIN SERPL-MCNC: 3.7 G/DL (ref 3.5–5.2)
ALBUMIN/GLOB SERPL: 1.5 {RATIO} (ref 1–2.5)
ALP SERPL-CCNC: 81 U/L (ref 40–129)
ALT SERPL-CCNC: 56 U/L (ref 10–50)
ANION GAP SERPL CALCULATED.3IONS-SCNC: 12 MMOL/L (ref 9–16)
AST SERPL-CCNC: 138 U/L (ref 10–50)
BASOPHILS # BLD: 0.03 K/UL (ref 0–0.2)
BASOPHILS NFR BLD: 0 % (ref 0–2)
BILIRUB SERPL-MCNC: 0.5 MG/DL (ref 0–1.2)
BUN SERPL-MCNC: 10 MG/DL (ref 8–23)
CALCIUM SERPL-MCNC: 7.8 MG/DL (ref 8.6–10.4)
CHLORIDE SERPL-SCNC: 106 MMOL/L (ref 98–107)
CK SERPL-CCNC: 1022 U/L (ref 39–308)
CK SERPL-CCNC: 2140 U/L (ref 39–308)
CK SERPL-CCNC: 3041 U/L (ref 39–308)
CK SERPL-CCNC: 3633 U/L (ref 39–308)
CO2 SERPL-SCNC: 23 MMOL/L (ref 20–31)
CREAT SERPL-MCNC: 1.1 MG/DL (ref 0.7–1.2)
EKG ATRIAL RATE: 148 BPM
EKG P AXIS: 85 DEGREES
EKG P-R INTERVAL: 130 MS
EKG Q-T INTERVAL: 286 MS
EKG QRS DURATION: 98 MS
EKG QTC CALCULATION (BAZETT): 449 MS
EKG R AXIS: 76 DEGREES
EKG T AXIS: 75 DEGREES
EKG VENTRICULAR RATE: 148 BPM
EOSINOPHIL # BLD: 0.17 K/UL (ref 0–0.44)
EOSINOPHILS RELATIVE PERCENT: 2 % (ref 1–4)
ERYTHROCYTE [DISTWIDTH] IN BLOOD BY AUTOMATED COUNT: 13.9 % (ref 11.8–14.4)
ETHANOL PERCENT: <0.01 %
ETHANOL: NORMAL (ref 0–0.08)
ETHANOLAMINE SERPL-MCNC: <10 MG/DL (ref 0–0.08)
ETHYLENE GLYCOL: NORMAL
FOLATE SERPL-MCNC: 16.1 NG/ML (ref 4.8–24.2)
GFR, ESTIMATED: 74 ML/MIN/1.73M2
GLUCOSE BLD-MCNC: 134 MG/DL (ref 75–110)
GLUCOSE SERPL-MCNC: 95 MG/DL (ref 74–99)
HCT VFR BLD AUTO: 35.4 % (ref 40.7–50.3)
HGB BLD-MCNC: 11.5 G/DL (ref 13–17)
IMM GRANULOCYTES # BLD AUTO: 0.04 K/UL (ref 0–0.3)
IMM GRANULOCYTES NFR BLD: 1 %
ISOPROPANOL BLOOD: NORMAL
LEVETIRACETAM SERPL-MCNC: 14 UG/ML
LYMPHOCYTES NFR BLD: 1.4 K/UL (ref 1.1–3.7)
LYMPHOCYTES RELATIVE PERCENT: 19 % (ref 24–43)
MCH RBC QN AUTO: 31.6 PG (ref 25.2–33.5)
MCHC RBC AUTO-ENTMCNC: 32.5 G/DL (ref 28.4–34.8)
MCV RBC AUTO: 97.3 FL (ref 82.6–102.9)
METHANOL BLOOD: NORMAL
MICROORGANISM SPEC CULT: NO GROWTH
MONOCYTES NFR BLD: 0.89 K/UL (ref 0.1–1.2)
MONOCYTES NFR BLD: 12 % (ref 3–12)
NEUTROPHILS NFR BLD: 66 % (ref 36–65)
NEUTS SEG NFR BLD: 4.9 K/UL (ref 1.5–8.1)
NRBC BLD-RTO: 0 PER 100 WBC
PLATELET # BLD AUTO: ABNORMAL K/UL (ref 138–453)
PLATELET, FLUORESCENCE: ABNORMAL K/UL (ref 138–453)
POTASSIUM SERPL-SCNC: 3.8 MMOL/L (ref 3.7–5.3)
PROT SERPL-MCNC: 6.1 G/DL (ref 6.6–8.7)
RBC # BLD AUTO: 3.64 M/UL (ref 4.21–5.77)
SODIUM SERPL-SCNC: 141 MMOL/L (ref 136–145)
SPECIMEN DESCRIPTION: NORMAL
TROPONIN I SERPL HS-MCNC: 41 NG/L (ref 0–22)
VIT B12 SERPL-MCNC: 646 PG/ML (ref 232–1245)
WBC OTHER # BLD: 7.4 K/UL (ref 3.5–11.3)

## 2024-11-14 PROCEDURE — 85055 RETICULATED PLATELET ASSAY: CPT

## 2024-11-14 PROCEDURE — 2580000003 HC RX 258

## 2024-11-14 PROCEDURE — 6370000000 HC RX 637 (ALT 250 FOR IP)

## 2024-11-14 PROCEDURE — 95714 VEEG EA 12-26 HR UNMNTR: CPT

## 2024-11-14 PROCEDURE — 82550 ASSAY OF CK (CPK): CPT

## 2024-11-14 PROCEDURE — 6360000002 HC RX W HCPCS

## 2024-11-14 PROCEDURE — 80177 DRUG SCRN QUAN LEVETIRACETAM: CPT

## 2024-11-14 PROCEDURE — 93010 ELECTROCARDIOGRAM REPORT: CPT | Performed by: INTERNAL MEDICINE

## 2024-11-14 PROCEDURE — 71045 X-RAY EXAM CHEST 1 VIEW: CPT

## 2024-11-14 PROCEDURE — G0480 DRUG TEST DEF 1-7 CLASSES: HCPCS

## 2024-11-14 PROCEDURE — 99223 1ST HOSP IP/OBS HIGH 75: CPT | Performed by: PSYCHIATRY & NEUROLOGY

## 2024-11-14 PROCEDURE — 99222 1ST HOSP IP/OBS MODERATE 55: CPT | Performed by: NURSE PRACTITIONER

## 2024-11-14 PROCEDURE — 80053 COMPREHEN METABOLIC PANEL: CPT

## 2024-11-14 PROCEDURE — 99222 1ST HOSP IP/OBS MODERATE 55: CPT | Performed by: FAMILY MEDICINE

## 2024-11-14 PROCEDURE — 82746 ASSAY OF FOLIC ACID SERUM: CPT

## 2024-11-14 PROCEDURE — 2060000000 HC ICU INTERMEDIATE R&B

## 2024-11-14 PROCEDURE — 99291 CRITICAL CARE FIRST HOUR: CPT | Performed by: PSYCHIATRY & NEUROLOGY

## 2024-11-14 PROCEDURE — 82947 ASSAY GLUCOSE BLOOD QUANT: CPT

## 2024-11-14 PROCEDURE — 84425 ASSAY OF VITAMIN B-1: CPT

## 2024-11-14 PROCEDURE — 85025 COMPLETE CBC W/AUTO DIFF WBC: CPT

## 2024-11-14 PROCEDURE — 99223 1ST HOSP IP/OBS HIGH 75: CPT | Performed by: INTERNAL MEDICINE

## 2024-11-14 PROCEDURE — 95700 EEG CONT REC W/VID EEG TECH: CPT

## 2024-11-14 PROCEDURE — 36415 COLL VENOUS BLD VENIPUNCTURE: CPT

## 2024-11-14 PROCEDURE — 82607 VITAMIN B-12: CPT

## 2024-11-14 PROCEDURE — 6370000000 HC RX 637 (ALT 250 FOR IP): Performed by: PSYCHIATRY & NEUROLOGY

## 2024-11-14 PROCEDURE — 84484 ASSAY OF TROPONIN QUANT: CPT

## 2024-11-14 RX ORDER — OXYCODONE AND ACETAMINOPHEN 5; 325 MG/1; MG/1
1 TABLET ORAL
Status: COMPLETED | OUTPATIENT
Start: 2024-11-14 | End: 2024-11-14

## 2024-11-14 RX ORDER — PHENOBARBITAL 32.4 MG/1
32.4 TABLET ORAL 4 TIMES DAILY
Status: DISCONTINUED | OUTPATIENT
Start: 2024-11-14 | End: 2024-11-14

## 2024-11-14 RX ORDER — OXYCODONE AND ACETAMINOPHEN 5; 325 MG/1; MG/1
1 TABLET ORAL EVERY 4 HOURS PRN
Status: DISCONTINUED | OUTPATIENT
Start: 2024-11-14 | End: 2024-11-18 | Stop reason: HOSPADM

## 2024-11-14 RX ORDER — GABAPENTIN 100 MG/1
100 CAPSULE ORAL 2 TIMES DAILY
Status: ON HOLD | COMMUNITY
Start: 2024-10-24 | End: 2024-11-18 | Stop reason: HOSPADM

## 2024-11-14 RX ORDER — PHENOBARBITAL 16.2 MG/1
16.2 TABLET ORAL EVERY 6 HOURS PRN
Status: DISCONTINUED | OUTPATIENT
Start: 2024-11-16 | End: 2024-11-14

## 2024-11-14 RX ORDER — DULOXETIN HYDROCHLORIDE 60 MG/1
60 CAPSULE, DELAYED RELEASE ORAL DAILY
COMMUNITY
Start: 2024-10-23

## 2024-11-14 RX ORDER — METOPROLOL SUCCINATE 50 MG/1
50 TABLET, EXTENDED RELEASE ORAL DAILY
COMMUNITY
Start: 2024-10-23

## 2024-11-14 RX ORDER — FOLIC ACID 1 MG/1
1 TABLET ORAL DAILY
Status: DISCONTINUED | OUTPATIENT
Start: 2024-11-14 | End: 2024-11-18 | Stop reason: HOSPADM

## 2024-11-14 RX ORDER — SCOLOPAMINE TRANSDERMAL SYSTEM 1 MG/1
1 PATCH, EXTENDED RELEASE TRANSDERMAL
Status: DISCONTINUED | OUTPATIENT
Start: 2024-11-14 | End: 2024-11-18 | Stop reason: HOSPADM

## 2024-11-14 RX ORDER — METOCLOPRAMIDE HYDROCHLORIDE 5 MG/ML
10 INJECTION INTRAMUSCULAR; INTRAVENOUS EVERY 6 HOURS
Status: DISCONTINUED | OUTPATIENT
Start: 2024-11-15 | End: 2024-11-18 | Stop reason: HOSPADM

## 2024-11-14 RX ORDER — ESCITALOPRAM OXALATE 5 MG/1
5 TABLET ORAL DAILY
COMMUNITY
Start: 2024-10-25

## 2024-11-14 RX ORDER — LANOLIN ALCOHOL/MO/W.PET/CERES
100 CREAM (GRAM) TOPICAL DAILY
Status: DISCONTINUED | OUTPATIENT
Start: 2024-11-14 | End: 2024-11-14

## 2024-11-14 RX ORDER — DULOXETIN HYDROCHLORIDE 30 MG/1
60 CAPSULE, DELAYED RELEASE ORAL DAILY
Status: DISCONTINUED | OUTPATIENT
Start: 2024-11-14 | End: 2024-11-18 | Stop reason: HOSPADM

## 2024-11-14 RX ORDER — BUPRENORPHINE 7.5 UG/H
1 PATCH TRANSDERMAL WEEKLY
COMMUNITY
Start: 2024-10-18

## 2024-11-14 RX ORDER — SODIUM CHLORIDE 9 MG/ML
INJECTION, SOLUTION INTRAVENOUS PRN
Status: DISCONTINUED | OUTPATIENT
Start: 2024-11-14 | End: 2024-11-16

## 2024-11-14 RX ORDER — GABAPENTIN 100 MG/1
100 CAPSULE ORAL 2 TIMES DAILY
Status: DISCONTINUED | OUTPATIENT
Start: 2024-11-14 | End: 2024-11-18 | Stop reason: HOSPADM

## 2024-11-14 RX ORDER — PHENOBARBITAL 16.2 MG/1
16.2 TABLET ORAL 2 TIMES DAILY
Status: DISCONTINUED | OUTPATIENT
Start: 2024-11-16 | End: 2024-11-14

## 2024-11-14 RX ORDER — LANOLIN ALCOHOL/MO/W.PET/CERES
100 CREAM (GRAM) TOPICAL DAILY
Status: DISCONTINUED | OUTPATIENT
Start: 2024-11-16 | End: 2024-11-16

## 2024-11-14 RX ORDER — METOPROLOL TARTRATE 25 MG/1
25 TABLET, FILM COATED ORAL 2 TIMES DAILY
Status: DISCONTINUED | OUTPATIENT
Start: 2024-11-14 | End: 2024-11-18 | Stop reason: HOSPADM

## 2024-11-14 RX ORDER — PHENOBARBITAL 32.4 MG/1
32.4 TABLET ORAL EVERY 6 HOURS PRN
Status: DISCONTINUED | OUTPATIENT
Start: 2024-11-14 | End: 2024-11-14

## 2024-11-14 RX ORDER — SODIUM CHLORIDE 0.9 % (FLUSH) 0.9 %
5-40 SYRINGE (ML) INJECTION EVERY 12 HOURS SCHEDULED
Status: DISCONTINUED | OUTPATIENT
Start: 2024-11-14 | End: 2024-11-18 | Stop reason: HOSPADM

## 2024-11-14 RX ORDER — SPIRONOLACTONE 25 MG/1
25 TABLET ORAL DAILY
Status: DISCONTINUED | OUTPATIENT
Start: 2024-11-14 | End: 2024-11-18 | Stop reason: HOSPADM

## 2024-11-14 RX ORDER — SODIUM CHLORIDE 0.9 % (FLUSH) 0.9 %
5-40 SYRINGE (ML) INJECTION PRN
Status: DISCONTINUED | OUTPATIENT
Start: 2024-11-14 | End: 2024-11-18 | Stop reason: HOSPADM

## 2024-11-14 RX ORDER — BUTALBITAL, ACETAMINOPHEN AND CAFFEINE 50; 325; 40 MG/1; MG/1; MG/1
1 TABLET ORAL ONCE
Status: COMPLETED | OUTPATIENT
Start: 2024-11-14 | End: 2024-11-14

## 2024-11-14 RX ORDER — DONEPEZIL HYDROCHLORIDE 5 MG/1
5 TABLET, FILM COATED ORAL DAILY
Status: DISCONTINUED | OUTPATIENT
Start: 2024-11-14 | End: 2024-11-18 | Stop reason: HOSPADM

## 2024-11-14 RX ORDER — ATORVASTATIN CALCIUM 20 MG/1
10 TABLET, FILM COATED ORAL NIGHTLY
Status: DISCONTINUED | OUTPATIENT
Start: 2024-11-14 | End: 2024-11-18 | Stop reason: HOSPADM

## 2024-11-14 RX ORDER — SODIUM CHLORIDE, SODIUM LACTATE, POTASSIUM CHLORIDE, AND CALCIUM CHLORIDE .6; .31; .03; .02 G/100ML; G/100ML; G/100ML; G/100ML
1000 INJECTION, SOLUTION INTRAVENOUS ONCE
Status: COMPLETED | OUTPATIENT
Start: 2024-11-14 | End: 2024-11-14

## 2024-11-14 RX ORDER — PHENOBARBITAL 32.4 MG/1
32.4 TABLET ORAL 2 TIMES DAILY
Status: DISCONTINUED | OUTPATIENT
Start: 2024-11-15 | End: 2024-11-14

## 2024-11-14 RX ADMIN — METOPROLOL TARTRATE 25 MG: 25 TABLET, FILM COATED ORAL at 08:19

## 2024-11-14 RX ADMIN — SODIUM CHLORIDE, PRESERVATIVE FREE 40 MG: 5 INJECTION INTRAVENOUS at 08:27

## 2024-11-14 RX ADMIN — AMPICILLIN SODIUM 2000 MG: 2 INJECTION, POWDER, FOR SOLUTION INTRAMUSCULAR; INTRAVENOUS at 08:13

## 2024-11-14 RX ADMIN — LEVETIRACETAM 500 MG: 100 INJECTION INTRAVENOUS at 08:22

## 2024-11-14 RX ADMIN — SPIRONOLACTONE 25 MG: 25 TABLET, FILM COATED ORAL at 11:59

## 2024-11-14 RX ADMIN — THIAMINE HYDROCHLORIDE 500 MG: 100 INJECTION, SOLUTION INTRAMUSCULAR; INTRAVENOUS at 08:30

## 2024-11-14 RX ADMIN — FOLIC ACID 1 MG: 1 TABLET ORAL at 11:58

## 2024-11-14 RX ADMIN — LEVETIRACETAM 500 MG: 100 INJECTION INTRAVENOUS at 20:49

## 2024-11-14 RX ADMIN — AMPICILLIN SODIUM 2000 MG: 2 INJECTION, POWDER, FOR SOLUTION INTRAMUSCULAR; INTRAVENOUS at 12:44

## 2024-11-14 RX ADMIN — ATORVASTATIN CALCIUM 10 MG: 20 TABLET, FILM COATED ORAL at 21:11

## 2024-11-14 RX ADMIN — ACETAMINOPHEN 650 MG: 325 TABLET ORAL at 08:19

## 2024-11-14 RX ADMIN — ACYCLOVIR SODIUM 950 MG: 50 INJECTION, SOLUTION INTRAVENOUS at 08:01

## 2024-11-14 RX ADMIN — METOPROLOL TARTRATE 25 MG: 25 TABLET, FILM COATED ORAL at 21:11

## 2024-11-14 RX ADMIN — SODIUM CHLORIDE, PRESERVATIVE FREE 10 ML: 5 INJECTION INTRAVENOUS at 21:32

## 2024-11-14 RX ADMIN — OXYCODONE HYDROCHLORIDE AND ACETAMINOPHEN 1 TABLET: 5; 325 TABLET ORAL at 21:37

## 2024-11-14 RX ADMIN — ONDANSETRON 4 MG: 2 INJECTION INTRAMUSCULAR; INTRAVENOUS at 14:24

## 2024-11-14 RX ADMIN — SODIUM CHLORIDE, POTASSIUM CHLORIDE, SODIUM LACTATE AND CALCIUM CHLORIDE: 600; 310; 30; 20 INJECTION, SOLUTION INTRAVENOUS at 12:05

## 2024-11-14 RX ADMIN — SACUBITRIL AND VALSARTAN 1 TABLET: 49; 51 TABLET, FILM COATED ORAL at 11:59

## 2024-11-14 RX ADMIN — CEFTRIAXONE SODIUM 2000 MG: 2 INJECTION, POWDER, FOR SOLUTION INTRAMUSCULAR; INTRAVENOUS at 21:10

## 2024-11-14 RX ADMIN — SODIUM CHLORIDE, POTASSIUM CHLORIDE, SODIUM LACTATE AND CALCIUM CHLORIDE 1000 ML: 600; 310; 30; 20 INJECTION, SOLUTION INTRAVENOUS at 12:05

## 2024-11-14 RX ADMIN — GABAPENTIN 100 MG: 100 CAPSULE ORAL at 11:58

## 2024-11-14 RX ADMIN — AMPICILLIN SODIUM 2000 MG: 2 INJECTION, POWDER, FOR SOLUTION INTRAMUSCULAR; INTRAVENOUS at 18:34

## 2024-11-14 RX ADMIN — ACYCLOVIR SODIUM 950 MG: 50 INJECTION, SOLUTION INTRAVENOUS at 15:21

## 2024-11-14 RX ADMIN — OXYCODONE HYDROCHLORIDE AND ACETAMINOPHEN 1 TABLET: 5; 325 TABLET ORAL at 12:35

## 2024-11-14 RX ADMIN — ONDANSETRON 4 MG: 2 INJECTION INTRAMUSCULAR; INTRAVENOUS at 21:40

## 2024-11-14 RX ADMIN — ACETAMINOPHEN 650 MG: 325 TABLET ORAL at 20:48

## 2024-11-14 RX ADMIN — DULOXETINE HYDROCHLORIDE 60 MG: 30 CAPSULE, DELAYED RELEASE ORAL at 11:58

## 2024-11-14 RX ADMIN — BUTALBITAL, ACETAMINOPHEN, AND CAFFEINE 1 TABLET: 325; 50; 40 TABLET ORAL at 15:19

## 2024-11-14 RX ADMIN — DONEPEZIL HYDROCHLORIDE 5 MG: 5 TABLET, FILM COATED ORAL at 11:59

## 2024-11-14 RX ADMIN — AMPICILLIN SODIUM 2000 MG: 2 INJECTION, POWDER, FOR SOLUTION INTRAMUSCULAR; INTRAVENOUS at 04:53

## 2024-11-14 RX ADMIN — SACUBITRIL AND VALSARTAN 1 TABLET: 49; 51 TABLET, FILM COATED ORAL at 21:40

## 2024-11-14 RX ADMIN — SODIUM CHLORIDE, POTASSIUM CHLORIDE, SODIUM LACTATE AND CALCIUM CHLORIDE: 600; 310; 30; 20 INJECTION, SOLUTION INTRAVENOUS at 05:47

## 2024-11-14 RX ADMIN — OXYCODONE HYDROCHLORIDE AND ACETAMINOPHEN 1 TABLET: 5; 325 TABLET ORAL at 17:29

## 2024-11-14 RX ADMIN — AMPICILLIN SODIUM 2000 MG: 2 INJECTION, POWDER, FOR SOLUTION INTRAMUSCULAR; INTRAVENOUS at 01:04

## 2024-11-14 RX ADMIN — CEFTRIAXONE SODIUM 2000 MG: 2 INJECTION, POWDER, FOR SOLUTION INTRAMUSCULAR; INTRAVENOUS at 10:37

## 2024-11-14 RX ADMIN — SODIUM CHLORIDE, PRESERVATIVE FREE 5 ML: 5 INJECTION INTRAVENOUS at 09:42

## 2024-11-14 RX ADMIN — GABAPENTIN 100 MG: 100 CAPSULE ORAL at 21:11

## 2024-11-14 RX ADMIN — VANCOMYCIN HYDROCHLORIDE 1000 MG: 1 INJECTION, POWDER, LYOPHILIZED, FOR SOLUTION INTRAVENOUS at 03:28

## 2024-11-14 RX ADMIN — SODIUM CHLORIDE, PRESERVATIVE FREE 40 MG: 5 INJECTION INTRAVENOUS at 21:11

## 2024-11-14 ASSESSMENT — PAIN SCALES - GENERAL
PAINLEVEL_OUTOF10: 8
PAINLEVEL_OUTOF10: 10
PAINLEVEL_OUTOF10: 8
PAINLEVEL_OUTOF10: 10
PAINLEVEL_OUTOF10: 8
PAINLEVEL_OUTOF10: 10

## 2024-11-14 ASSESSMENT — PAIN DESCRIPTION - ORIENTATION
ORIENTATION: RIGHT;LEFT
ORIENTATION: ANTERIOR;POSTERIOR

## 2024-11-14 ASSESSMENT — PAIN DESCRIPTION - DESCRIPTORS
DESCRIPTORS: ACHING
DESCRIPTORS: ACHING
DESCRIPTORS: ACHING;STABBING;THROBBING
DESCRIPTORS: ACHING;STABBING;THROBBING

## 2024-11-14 ASSESSMENT — PAIN DESCRIPTION - LOCATION
LOCATION: BACK;HIP;SHOULDER;KNEE
LOCATION: GENERALIZED
LOCATION: GENERALIZED
LOCATION: HIP;BACK;KNEE
LOCATION: LEG;ARM
LOCATION: HIP;KNEE;BACK
LOCATION: GENERALIZED
LOCATION: GENERALIZED

## 2024-11-14 ASSESSMENT — PAIN DESCRIPTION - PAIN TYPE
TYPE: CHRONIC PAIN
TYPE: CHRONIC PAIN

## 2024-11-14 NOTE — CONSULTS
Clinical Ethics Consultation Note    History and Context:  Mr Brownlee is a 65 y/o male with an extensive medical history including:  Toxic metabolic encephalopathy, sepsis, seizure, history of cardiomyopathy s/p AICD, and hyperammonemia, alcohol abuse, depression, HLD, HTN, TBI, a-fib with RVR, CHF, severe COPD, CAD and history of CVA. Patient was transported to [place] ED 11/13 after a fall likely due to seizures. While in the ED, he continued to seize. Notes indicate the patient was cyanotic. It was decided to intubate to protect the patient’s airway and to help with diagnostic and treatment options since the patient could not cooperate for chest xray.    In the last 12 months, the patient has had 7 inpatient medical admissions. During a palliative consult in 2023, the patient (in presence of children) decided on DNR-CCA with intubation. In a note from the patient’s prior admission in September 2024, a DNR-CC order was placed.     Length of Stay: 1  Valid medical advanced directives?: No    Process:  Received [employer] from Larry Montalvo    Ethical Question(s) and Concern(s):  What are the patient's goals and wishes for Code Status?    Analysis:  The patient is currently capacitated to make decisions. He is desiring to be a DNR-CCA, per attending. Palliative consulted.    Recommendations:  In addition to the goals of care conversation, it may be helpful to instruct the patient to obtain a [facility] DNR-CCA necklace, bracelet, and/or wallet card to ensure his wishes are honored if any future emergent situations arise. Recommend thorough documentation of patient wishes regarding what he would want done (or not done) if he presents in a similar situation in the future.    Closing:  Thank you for your compassionate care of this patient. Please re-consult if needed.          
Systems   Constitutional:  Negative for activity change, appetite change, fatigue, fever and unexpected weight change.   HENT:  Negative for congestion, nosebleeds, rhinorrhea, sinus pressure, sneezing and voice change.    Respiratory:  Negative for cough, choking, chest tightness, shortness of breath and wheezing.    Cardiovascular:  Negative for chest pain, palpitations and leg swelling.   Gastrointestinal:  Positive for nausea. Negative for abdominal pain, constipation, diarrhea and vomiting.   Genitourinary:  Negative for difficulty urinating, dysuria, frequency, penile discharge and testicular pain.   Musculoskeletal:  Negative for back pain.   Skin:  Negative for rash.   Neurological:  Positive for seizures. Negative for dizziness, weakness, light-headedness and headaches.   Hematological:  Does not bruise/bleed easily.   Psychiatric/Behavioral:  Negative for agitation, behavioral problems, confusion, self-injury, sleep disturbance and suicidal ideas.        Objective:   /63   Pulse 70   Temp 99.3 °F (37.4 °C)   Resp 14   Wt 94.3 kg (207 lb 14.3 oz)   SpO2 97%   BMI 29.00 kg/m²     Intake/Output Summary (Last 24 hours) at 11/14/2024 1440  Last data filed at 11/14/2024 0536  Gross per 24 hour   Intake 4137.85 ml   Output 1350 ml   Net 2787.85 ml       Physical Exam  Vitals and nursing note reviewed.   Constitutional:       General: He is not in acute distress.     Appearance: He is not diaphoretic.   HENT:      Head: Normocephalic and atraumatic.      Nose:      Right Sinus: No maxillary sinus tenderness or frontal sinus tenderness.      Left Sinus: No maxillary sinus tenderness or frontal sinus tenderness.      Mouth/Throat:      Pharynx: No oropharyngeal exudate.   Eyes:      General: No scleral icterus.     Conjunctiva/sclera: Conjunctivae normal.      Pupils: Pupils are equal, round, and reactive to light.   Neck:      Thyroid: No thyromegaly.      Vascular: No JVD.   Cardiovascular:      Rate 
thickening of the ethmoid sinuses bilaterally.  The mastoid air cells demonstrate no acute abnormality.    BONES/SOFT TISSUES: The bone marrow signal intensity appears normal. The soft  tissues demonstrate no acute abnormality.    Impression  1. No acute intracranial abnormality.  No acute infarct.  2. Areas of chronic infarct within the cerebellar hemispheres bilaterally,  left larger than right.  3. Minimal global parenchymal volume loss with chronic microvascular ischemic  changes.      05/13/24    ECHO (TTE) COMPLETE (PRN CONTRAST/BUBBLE/STRAIN/3D) 05/17/2024  2:53 PM (Final)    Interpretation Summary  The left ventricle appears normal in size mildly increased LV wall thickness, no obvious wall motion abnormality noted  Normal LV systolic function, ejection fraction 50-55%  Right ventricle appears normal in size and function  ICD leads noted in the right heart chambers  The left and the right atrium appears normal in size  Aortic valve is calcified with reduced excursion, mild aortic stenosis, mean gradient 16 and peak gradient 27 mmHg, moderate aortic regurgitation  The mitral valve structure appears normal, mild mitral regurgitation no stenosis  The tricuspid valve structure is normal mild tricuspid regurgitation no stenosis  Right ventricle systolic pressure 35 mmHg  Pulmonary valve structure appears normal, mild regurgitation no stenosis  Normal aortic root dimensions  The IVC appears normal in size, normal respiratory variation  No significant pericardial effusion seen    Signed by: Hugh Dotson MD on 5/17/2024  2:53 PM       Encounter Date: 11/13/24   EKG 12 Lead (Chest Pain)   Result Value    Ventricular Rate 148    Atrial Rate 148    P-R Interval 130    QRS Duration 98    Q-T Interval 286    QTc Calculation (Bazett) 449    P Axis 85    R Axis 76    T Axis 75    Narrative    Sinus tachycardia with Premature supraventricular complexes and with occasional Premature ventricular complexes  Otherwise normal 
abuse  Remote history of left cerebellar stroke  Right-sided weakness  EtOH levels on admission 49  Consider CIWA protocol  Keppra 500 mg twice daily  Management per primary team    Rhabdomyolysis secondary to seizures  Continue to trend CK and myoglobin  Continue fluid therapy  Monitor renal function  Monitor urine output  Strict I's and O's    CAD s/p AICD  Chronic systolic heart failure with EF 50 to 55%  Hyperlipidemia  Paroxysmal atrial fibrillation on Xarelto 20 mg  S/p ablation procedure for A-fib  Follow-up echocardiogram  Last echo done 9/2024 showed EF of 50 to 55%  Consider repeat lipid profile  Consider cardiology consult  Patient is on Lipitor 10 mg, Entresto 49-51, Aldactone 25 mg  Lopressor 25 mg twice daily for Afib    COPD/chronic smoker  40-pack-year history of smoking  Continue oxygen supplementation  W/F respiratory status    H/o hypothyroidism  TSH 1.13 in July 2024  Synthroid discontinued last admission, likely due to continued sinus tachycardia      Diet: Adult regular  DVT prophylaxis: Lovenox  GI prophylaxis: Not indicated    Yoselyn Leblanc MD  Internal Medicine Resident, PGY-1  [place], [place]  11/14/2024,11:10 AM    Attending Physician Statement  I have discussed the case of Mahesh Brownlee, including pertinent history and exam findings with the resident/fellow/medical student/NP/PA. I have seen and examined the patient and the key elements of the encounter have been performed by me.  I agree with the assessment, plan and orders as documented by the resident/fellow/medical student/NP/PA  With changes made to the note as needed.     Pt was seen during rounds.  Review of Systems:   In addition to the pertinent positives and negatives as stated within HPI and the review of systems as documented in their notes, all other systems were reviewed when able to and are reported negative.  Patient admitted with seizures  Internal medicine consulted by neurology,

## 2024-11-14 NOTE — H&P
Neuro ICU History & Physical    Patient Name: Mahesh Brownlee  Patient : 1960  Room/Bed:   Code Status: Documented DNR CC, awaiting clarification from patient's adult children  Allergies:   Allergies   Allergen Reactions    Augmentin Es-600 [Amoxicillin-Pot Clavulanate]      Other reaction(s): Unknown    Sulfa Antibiotics Other (See Comments)       CHIEF COMPLAINT     Chief Complaint   Patient presents with    Seizures    Altered Mental Status       HPI    History Obtained From: Electronic medical record     The patient is a 64 y.o. male with a history of atrial fibrillation (on Xarelto), CAD, cardiomyopathy, s/p AICD placement, COPD, hypertension, hyperlipidemia, alcohol abuse, seizures who presented via EMS after being found unresponsive at home. Per documentation patient was found on the ground by his neighbor and was confused, EMS was called and on arrival patient was found to have witnessed seizure-like activity, aborted without intervention.  Patient does have a history of seizures, takes Keppra at home however has a documented history of noncompliance with medications.  On arrival to the emergency department patient was noted to have continued confusion, GCS 11, patient was reportedly moving all 4 extremities equally however while in the emergency department had 2 witnessed episodes of generalized tonic-clonic seizure activity lasting approximately 1 minute and aborted without intervention.  Decision was then made to intubate the patient for airway protection and agitation per ED staff.  While in the emergency department patient was given Ativan 2 mg x 1 and loaded with Keppra 2 g.  On examination patient is on 100 mcg/kg/min of propofol, opens eyes to voice, tracks appropriately, localizes to pain left upper extremity, minimal response to pain right upper extremity and lower extremity.    Of note patient was admitted to [facility] 2024 for similar presentation and concern for 
31 mmol/L    Anion Gap 12 9 - 16 mmol/L    Glucose 95 74 - 99 mg/dL    BUN 10 8 - 23 mg/dL    Creatinine 1.1 0.7 - 1.2 mg/dL    Est Glom Filt Rate 74 >60 mL/min/1.73m2    Calcium 7.8 (L) 8.6 - 10.4 mg/dL    Total Protein 6.1 (L) 6.6 - 8.7 g/dL    Albumin 3.7 3.5 - 5.2 g/dL    Albumin/Globulin Ratio 1.5 1.0 - 2.5    Total Bilirubin 0.5 0.0 - 1.2 mg/dL    Alkaline Phosphatase 81 40 - 129 U/L    ALT 56 (H) 10 - 50 U/L     (H) 10 - 50 U/L   CBC with Auto Differential    Collection Time: 11/14/24  2:50 AM   Result Value Ref Range    WBC 7.4 3.5 - 11.3 k/uL    RBC 3.64 (L) 4.21 - 5.77 m/uL    Hemoglobin 11.5 (L) 13.0 - 17.0 g/dL    Hematocrit 35.4 (L) 40.7 - 50.3 %    MCV 97.3 82.6 - 102.9 fL    MCH 31.6 25.2 - 33.5 pg    MCHC 32.5 28.4 - 34.8 g/dL    RDW 13.9 11.8 - 14.4 %    Platelets See Reflexed IPF Result 138 - 453 k/uL    Platelet, Fluorescence Platelet clumps present, count appears decreased. 138 - 453 k/uL    NRBC Automated 0.0 0.0 per 100 WBC    Neutrophils % 66 (H) 36 - 65 %    Lymphocytes % 19 (L) 24 - 43 %    Monocytes % 12 3 - 12 %    Eosinophils % 2 1 - 4 %    Basophils % 0 0 - 2 %    Immature Granulocytes % 1 (H) 0 %    Neutrophils Absolute 4.90 1.50 - 8.10 k/uL    Lymphocytes Absolute 1.40 1.10 - 3.70 k/uL    Monocytes Absolute 0.89 0.10 - 1.20 k/uL    Eosinophils Absolute 0.17 0.00 - 0.44 k/uL    Basophils Absolute 0.03 0.00 - 0.20 k/uL    Immature Granulocytes Absolute 0.04 0.00 - 0.30 k/uL   CK    Collection Time: 11/14/24  2:50 AM   Result Value Ref Range    Total CK 1,022 (H) 39 - 308 U/L   Troponin    Collection Time: 11/14/24  9:31 AM   Result Value Ref Range    Troponin, High Sensitivity 41 (H) 0 - 22 ng/L   CK    Collection Time: 11/14/24  9:31 AM   Result Value Ref Range    Total CK 2,140 (H) 39 - 308 U/L         Assessment :      Primary Problem  Seizure (HCC)    Active Hospital Problems    Diagnosis Date Noted    Seizures (HCC) [R56.9] 11/14/2024    Seizure (HCC) [R56.9] 09/21/2023

## 2024-11-14 NOTE — CARE COORDINATION
Case Management Assessment  Initial Evaluation    Date/Time of Evaluation: 11/14/2024 11:48 AM  Assessment Completed by: Rosina Hale RN    If patient is discharged prior to next notation, then this note serves as note for discharge by case management.    Patient Name: Mahesh Brownlee                   YOB: 1960  Diagnosis: Seizure (HCC) [R56.9]                   Date / Time: 11/13/2024  1:43 PM    Patient Admission Status: Inpatient   Readmission Risk (Low < 19, Mod (19-27), High > 27): Readmission Risk Score: 26.2    Current PCP: Ira Roberts, APRN - CNP  PCP verified by CM? Yes    Chart Reviewed: Yes      History Provided by: Patient  Patient Orientation: Alert and Oriented    Patient Cognition: Alert    Hospitalization in the last 30 days (Readmission):  No    If yes, Readmission Assessment in CM Navigator will be completed.    Advance Directives:      Code Status: DNR-CCA   Patient's Primary Decision Maker is: Legal Next of Kin    Primary Decision Maker: Brad Whitley  Child - [phone]    Primary Decision Maker: Michael Whitley  Child - [phone]    Primary Decision Maker: Removed,Wants    Discharge Planning:    Patient lives with: Other (Comment) (facility residents) Type of Home: Skilled Nursing Facility  Primary Care Giver: Other (Comment) (self and facility staff)  Patient Support Systems include: Children   Current Financial resources: Medicare  Current community resources: None  Current services prior to admission: None            Current DME:              Type of Home Care services:  None    ADLS  Prior functional level: Assistance with the following:, Housework, Cooking, Shopping  Current functional level: Independent in ADLs/IADLs, Cooking, Shopping, Housework    PT AM-PAC:   /24  OT AM-PAC:   /24    Family can provide assistance at DC: No  Would you like Case Management to discuss the discharge plan with any other family members/significant others, and if so, who? No  Plans

## 2024-11-14 NOTE — ACP (ADVANCE CARE PLANNING)
Discussed patient recent CODE STATUS changed to DNR CC during admission in September 2024 with patient's adult children, Brad, Michael, and Brianne.  Family endorses having a copy of the CODE STATUS change and patient's sons Brad and Michael would like to continue with DNR CC and extubate patient as soon as possible with understanding that if patient were to further decompensate following extubation there would be no further life-sustaining measures implemented and that treatment would not be focused on aggressive management of medical conditions however on purely comfort measures, for which they verbalized understanding and would like to proceed with CODE STATUS of DNR CC.  Patient son Michael endorsed that there was some disagreement within the family regarding further CODE STATUS changes and requested that we call patient's daughter Brianne to provide updates and answer questions.  I then discussed patient's current situation and interventions thus far with patient's daughter Brianne at length over the phone.  Gómez states that she was unaware that the patient changed his CODE STATUS and was confused as to why patient was intubated in the emergency department.  Expressed there were concerns for airway compromise given repeated episodes of seizures per documentation, Gómez endorsed that there was some disagreement between her brothers and herself however ultimately states that she would be agreeable to continue CODE STATUS of DNR CC and proceed with extubation once weaned off of sedation and would like to be notified if patient has decline in his condition overnight.  Discussed situation and current plan of action with ethics.

## 2024-11-15 LAB
ALBUMIN SERPL-MCNC: 3.5 G/DL (ref 3.5–5.2)
ALBUMIN/GLOB SERPL: 1.5 {RATIO} (ref 1–2.5)
ALP SERPL-CCNC: 82 U/L (ref 40–129)
ALT SERPL-CCNC: 228 U/L (ref 10–50)
ANION GAP SERPL CALCULATED.3IONS-SCNC: 11 MMOL/L (ref 9–16)
AST SERPL-CCNC: 582 U/L (ref 10–50)
BASOPHILS # BLD: <0.03 K/UL (ref 0–0.2)
BASOPHILS NFR BLD: 0 % (ref 0–2)
BILIRUB SERPL-MCNC: 0.5 MG/DL (ref 0–1.2)
BUN SERPL-MCNC: 3 MG/DL (ref 8–23)
CALCIUM SERPL-MCNC: 8 MG/DL (ref 8.6–10.4)
CHLORIDE SERPL-SCNC: 97 MMOL/L (ref 98–107)
CK SERPL-CCNC: 3065 U/L (ref 39–308)
CK SERPL-CCNC: 3366 U/L (ref 39–308)
CK SERPL-CCNC: 3751 U/L (ref 39–308)
CO2 SERPL-SCNC: 26 MMOL/L (ref 20–31)
CREAT SERPL-MCNC: 0.7 MG/DL (ref 0.7–1.2)
EOSINOPHIL # BLD: 0.12 K/UL (ref 0–0.44)
EOSINOPHILS RELATIVE PERCENT: 2 % (ref 1–4)
ERYTHROCYTE [DISTWIDTH] IN BLOOD BY AUTOMATED COUNT: 13.2 % (ref 11.8–14.4)
GFR, ESTIMATED: >90 ML/MIN/1.73M2
GLUCOSE SERPL-MCNC: 125 MG/DL (ref 74–99)
HCT VFR BLD AUTO: 35 % (ref 40.7–50.3)
HCT VFR BLD AUTO: 35.5 % (ref 40.7–50.3)
HCT VFR BLD AUTO: 37.4 % (ref 40.7–50.3)
HCT VFR BLD AUTO: 38.5 % (ref 40.7–50.3)
HGB BLD-MCNC: 11.6 G/DL (ref 13–17)
HGB BLD-MCNC: 11.8 G/DL (ref 13–17)
HGB BLD-MCNC: 12.4 G/DL (ref 13–17)
HGB BLD-MCNC: 12.5 G/DL (ref 13–17)
IMM GRANULOCYTES # BLD AUTO: <0.03 K/UL (ref 0–0.3)
IMM GRANULOCYTES NFR BLD: 0 %
LYMPHOCYTES NFR BLD: 0.98 K/UL (ref 1.1–3.7)
LYMPHOCYTES RELATIVE PERCENT: 19 % (ref 24–43)
MCH RBC QN AUTO: 30.9 PG (ref 25.2–33.5)
MCHC RBC AUTO-ENTMCNC: 33.2 G/DL (ref 28.4–34.8)
MCV RBC AUTO: 92.9 FL (ref 82.6–102.9)
MONOCYTES NFR BLD: 0.39 K/UL (ref 0.1–1.2)
MONOCYTES NFR BLD: 7 % (ref 3–12)
NEUTROPHILS NFR BLD: 71 % (ref 36–65)
NEUTS SEG NFR BLD: 3.73 K/UL (ref 1.5–8.1)
NRBC BLD-RTO: 0 PER 100 WBC
PLATELET # BLD AUTO: ABNORMAL K/UL (ref 138–453)
PLATELET, FLUORESCENCE: 70 K/UL (ref 138–453)
PLATELETS.RETICULATED NFR BLD AUTO: 6.5 % (ref 1.1–10.3)
POTASSIUM SERPL-SCNC: 2.6 MMOL/L (ref 3.7–5.3)
POTASSIUM SERPL-SCNC: 3 MMOL/L (ref 3.7–5.3)
PROT SERPL-MCNC: 5.9 G/DL (ref 6.6–8.7)
RBC # BLD AUTO: 3.82 M/UL (ref 4.21–5.77)
SODIUM SERPL-SCNC: 134 MMOL/L (ref 136–145)
VANCOMYCIN SERPL-MCNC: 5.3 UG/ML (ref 5–40)
WBC OTHER # BLD: 5.3 K/UL (ref 3.5–11.3)

## 2024-11-15 PROCEDURE — 36415 COLL VENOUS BLD VENIPUNCTURE: CPT

## 2024-11-15 PROCEDURE — 99232 SBSQ HOSP IP/OBS MODERATE 35: CPT | Performed by: PSYCHIATRY & NEUROLOGY

## 2024-11-15 PROCEDURE — 6370000000 HC RX 637 (ALT 250 FOR IP)

## 2024-11-15 PROCEDURE — 2580000003 HC RX 258

## 2024-11-15 PROCEDURE — 99233 SBSQ HOSP IP/OBS HIGH 50: CPT | Performed by: NURSE PRACTITIONER

## 2024-11-15 PROCEDURE — 95716 VEEG EA 12-26HR CONT MNTR: CPT

## 2024-11-15 PROCEDURE — 97535 SELF CARE MNGMENT TRAINING: CPT

## 2024-11-15 PROCEDURE — 93005 ELECTROCARDIOGRAM TRACING: CPT | Performed by: PSYCHIATRY & NEUROLOGY

## 2024-11-15 PROCEDURE — 85014 HEMATOCRIT: CPT

## 2024-11-15 PROCEDURE — 6360000002 HC RX W HCPCS

## 2024-11-15 PROCEDURE — 2060000000 HC ICU INTERMEDIATE R&B

## 2024-11-15 PROCEDURE — 97166 OT EVAL MOD COMPLEX 45 MIN: CPT

## 2024-11-15 PROCEDURE — 85055 RETICULATED PLATELET ASSAY: CPT

## 2024-11-15 PROCEDURE — 97530 THERAPEUTIC ACTIVITIES: CPT

## 2024-11-15 PROCEDURE — 84132 ASSAY OF SERUM POTASSIUM: CPT

## 2024-11-15 PROCEDURE — 95720 EEG PHY/QHP EA INCR W/VEEG: CPT | Performed by: PSYCHIATRY & NEUROLOGY

## 2024-11-15 PROCEDURE — 85018 HEMOGLOBIN: CPT

## 2024-11-15 PROCEDURE — 82550 ASSAY OF CK (CPK): CPT

## 2024-11-15 PROCEDURE — 97162 PT EVAL MOD COMPLEX 30 MIN: CPT

## 2024-11-15 PROCEDURE — 80202 ASSAY OF VANCOMYCIN: CPT

## 2024-11-15 PROCEDURE — 99233 SBSQ HOSP IP/OBS HIGH 50: CPT | Performed by: INTERNAL MEDICINE

## 2024-11-15 PROCEDURE — 85025 COMPLETE CBC W/AUTO DIFF WBC: CPT

## 2024-11-15 PROCEDURE — 80053 COMPREHEN METABOLIC PANEL: CPT

## 2024-11-15 RX ORDER — VANCOMYCIN 1 G/200ML
1000 INJECTION, SOLUTION INTRAVENOUS EVERY 8 HOURS
Status: DISCONTINUED | OUTPATIENT
Start: 2024-11-15 | End: 2024-11-15

## 2024-11-15 RX ORDER — LORAZEPAM 2 MG/ML
2 INJECTION INTRAMUSCULAR ONCE
Status: COMPLETED | OUTPATIENT
Start: 2024-11-15 | End: 2024-11-16

## 2024-11-15 RX ORDER — LEVETIRACETAM 500 MG/1
500 TABLET ORAL 2 TIMES DAILY
Status: DISCONTINUED | OUTPATIENT
Start: 2024-11-15 | End: 2024-11-18 | Stop reason: HOSPADM

## 2024-11-15 RX ORDER — MECLIZINE HCL 12.5 MG 12.5 MG/1
25 TABLET ORAL 3 TIMES DAILY PRN
Status: DISCONTINUED | OUTPATIENT
Start: 2024-11-15 | End: 2024-11-18 | Stop reason: HOSPADM

## 2024-11-15 RX ADMIN — SPIRONOLACTONE 25 MG: 25 TABLET, FILM COATED ORAL at 09:10

## 2024-11-15 RX ADMIN — ACYCLOVIR SODIUM 950 MG: 50 INJECTION, SOLUTION INTRAVENOUS at 15:18

## 2024-11-15 RX ADMIN — METOCLOPRAMIDE 10 MG: 5 INJECTION, SOLUTION INTRAMUSCULAR; INTRAVENOUS at 13:00

## 2024-11-15 RX ADMIN — OXYCODONE HYDROCHLORIDE AND ACETAMINOPHEN 1 TABLET: 5; 325 TABLET ORAL at 01:32

## 2024-11-15 RX ADMIN — AMPICILLIN SODIUM 2000 MG: 2 INJECTION, POWDER, FOR SOLUTION INTRAMUSCULAR; INTRAVENOUS at 11:57

## 2024-11-15 RX ADMIN — POTASSIUM BICARBONATE 40 MEQ: 782 TABLET, EFFERVESCENT ORAL at 16:07

## 2024-11-15 RX ADMIN — Medication 3 MG: at 23:16

## 2024-11-15 RX ADMIN — OXYCODONE HYDROCHLORIDE AND ACETAMINOPHEN 1 TABLET: 5; 325 TABLET ORAL at 06:14

## 2024-11-15 RX ADMIN — METOPROLOL TARTRATE 25 MG: 25 TABLET, FILM COATED ORAL at 09:06

## 2024-11-15 RX ADMIN — ACYCLOVIR SODIUM 950 MG: 50 INJECTION, SOLUTION INTRAVENOUS at 00:29

## 2024-11-15 RX ADMIN — ACETAMINOPHEN 650 MG: 325 TABLET ORAL at 03:22

## 2024-11-15 RX ADMIN — METOCLOPRAMIDE 10 MG: 5 INJECTION, SOLUTION INTRAMUSCULAR; INTRAVENOUS at 01:06

## 2024-11-15 RX ADMIN — METOCLOPRAMIDE 10 MG: 5 INJECTION, SOLUTION INTRAMUSCULAR; INTRAVENOUS at 05:27

## 2024-11-15 RX ADMIN — SODIUM CHLORIDE, POTASSIUM CHLORIDE, SODIUM LACTATE AND CALCIUM CHLORIDE: 600; 310; 30; 20 INJECTION, SOLUTION INTRAVENOUS at 16:11

## 2024-11-15 RX ADMIN — OXYCODONE HYDROCHLORIDE AND ACETAMINOPHEN 1 TABLET: 5; 325 TABLET ORAL at 18:26

## 2024-11-15 RX ADMIN — LEVETIRACETAM 500 MG: 500 TABLET, FILM COATED ORAL at 20:37

## 2024-11-15 RX ADMIN — ACETAMINOPHEN 650 MG: 325 TABLET ORAL at 23:14

## 2024-11-15 RX ADMIN — SODIUM CHLORIDE, PRESERVATIVE FREE 40 MG: 5 INJECTION INTRAVENOUS at 09:02

## 2024-11-15 RX ADMIN — SODIUM CHLORIDE, PRESERVATIVE FREE 40 MG: 5 INJECTION INTRAVENOUS at 20:37

## 2024-11-15 RX ADMIN — DONEPEZIL HYDROCHLORIDE 5 MG: 5 TABLET, FILM COATED ORAL at 09:14

## 2024-11-15 RX ADMIN — AMPICILLIN SODIUM 2000 MG: 2 INJECTION, POWDER, FOR SOLUTION INTRAMUSCULAR; INTRAVENOUS at 02:40

## 2024-11-15 RX ADMIN — AMPICILLIN SODIUM 2000 MG: 2 INJECTION, POWDER, FOR SOLUTION INTRAMUSCULAR; INTRAVENOUS at 06:18

## 2024-11-15 RX ADMIN — SODIUM CHLORIDE, PRESERVATIVE FREE 20 ML: 5 INJECTION INTRAVENOUS at 20:39

## 2024-11-15 RX ADMIN — OXYCODONE HYDROCHLORIDE AND ACETAMINOPHEN 1 TABLET: 5; 325 TABLET ORAL at 23:16

## 2024-11-15 RX ADMIN — POTASSIUM BICARBONATE 40 MEQ: 782 TABLET, EFFERVESCENT ORAL at 20:36

## 2024-11-15 RX ADMIN — METOCLOPRAMIDE 10 MG: 5 INJECTION, SOLUTION INTRAMUSCULAR; INTRAVENOUS at 18:27

## 2024-11-15 RX ADMIN — LEVETIRACETAM 500 MG: 100 INJECTION INTRAVENOUS at 08:58

## 2024-11-15 RX ADMIN — METOPROLOL TARTRATE 25 MG: 25 TABLET, FILM COATED ORAL at 20:47

## 2024-11-15 RX ADMIN — OXYCODONE HYDROCHLORIDE AND ACETAMINOPHEN 1 TABLET: 5; 325 TABLET ORAL at 14:18

## 2024-11-15 RX ADMIN — FOLIC ACID 1 MG: 1 TABLET ORAL at 09:06

## 2024-11-15 RX ADMIN — SODIUM CHLORIDE, POTASSIUM CHLORIDE, SODIUM LACTATE AND CALCIUM CHLORIDE: 600; 310; 30; 20 INJECTION, SOLUTION INTRAVENOUS at 01:48

## 2024-11-15 RX ADMIN — VANCOMYCIN HYDROCHLORIDE 1000 MG: 1 INJECTION, POWDER, LYOPHILIZED, FOR SOLUTION INTRAVENOUS at 03:22

## 2024-11-15 RX ADMIN — SODIUM CHLORIDE, PRESERVATIVE FREE 10 ML: 5 INJECTION INTRAVENOUS at 20:39

## 2024-11-15 RX ADMIN — GABAPENTIN 100 MG: 100 CAPSULE ORAL at 09:06

## 2024-11-15 RX ADMIN — SACUBITRIL AND VALSARTAN 1 TABLET: 49; 51 TABLET, FILM COATED ORAL at 09:14

## 2024-11-15 RX ADMIN — ATORVASTATIN CALCIUM 10 MG: 20 TABLET, FILM COATED ORAL at 20:37

## 2024-11-15 RX ADMIN — GABAPENTIN 100 MG: 100 CAPSULE ORAL at 20:37

## 2024-11-15 RX ADMIN — OXYCODONE HYDROCHLORIDE AND ACETAMINOPHEN 1 TABLET: 5; 325 TABLET ORAL at 10:06

## 2024-11-15 RX ADMIN — DULOXETINE HYDROCHLORIDE 60 MG: 30 CAPSULE, DELAYED RELEASE ORAL at 09:06

## 2024-11-15 RX ADMIN — AMPICILLIN SODIUM 2000 MG: 2 INJECTION, POWDER, FOR SOLUTION INTRAMUSCULAR; INTRAVENOUS at 14:31

## 2024-11-15 RX ADMIN — VANCOMYCIN HYDROCHLORIDE 1000 MG: 1 INJECTION, POWDER, LYOPHILIZED, FOR SOLUTION INTRAVENOUS at 15:09

## 2024-11-15 RX ADMIN — CEFTRIAXONE SODIUM 2000 MG: 2 INJECTION, POWDER, FOR SOLUTION INTRAMUSCULAR; INTRAVENOUS at 08:53

## 2024-11-15 RX ADMIN — ACYCLOVIR SODIUM 950 MG: 50 INJECTION, SOLUTION INTRAVENOUS at 09:19

## 2024-11-15 RX ADMIN — SACUBITRIL AND VALSARTAN 1 TABLET: 49; 51 TABLET, FILM COATED ORAL at 23:18

## 2024-11-15 RX ADMIN — THIAMINE HYDROCHLORIDE 500 MG: 100 INJECTION, SOLUTION INTRAMUSCULAR; INTRAVENOUS at 09:19

## 2024-11-15 RX ADMIN — RIVAROXABAN 20 MG: 20 TABLET, FILM COATED ORAL at 20:36

## 2024-11-15 ASSESSMENT — PAIN DESCRIPTION - PAIN TYPE
TYPE: CHRONIC PAIN
TYPE: CHRONIC PAIN

## 2024-11-15 ASSESSMENT — PAIN DESCRIPTION - LOCATION
LOCATION: KNEE;HIP;SHOULDER
LOCATION: HIP;KNEE
LOCATION: GENERALIZED
LOCATION: GENERALIZED
LOCATION: HIP;KNEE;BACK;SHOULDER
LOCATION: HIP;KNEE;SHOULDER
LOCATION: GENERALIZED
LOCATION: HIP;KNEE;BACK;SHOULDER

## 2024-11-15 ASSESSMENT — PAIN SCALES - GENERAL
PAINLEVEL_OUTOF10: 7
PAINLEVEL_OUTOF10: 8
PAINLEVEL_OUTOF10: 6
PAINLEVEL_OUTOF10: 8
PAINLEVEL_OUTOF10: 5
PAINLEVEL_OUTOF10: 8

## 2024-11-15 ASSESSMENT — PAIN - FUNCTIONAL ASSESSMENT
PAIN_FUNCTIONAL_ASSESSMENT: ACTIVITIES ARE NOT PREVENTED

## 2024-11-15 ASSESSMENT — PAIN DESCRIPTION - DESCRIPTORS
DESCRIPTORS: ACHING;NAGGING
DESCRIPTORS: ACHING;THROBBING
DESCRIPTORS: ACHING;THROBBING
DESCRIPTORS: ACHING;THROBBING;STABBING
DESCRIPTORS: ACHING;NAGGING
DESCRIPTORS: ACHING;THROBBING

## 2024-11-15 ASSESSMENT — PAIN DESCRIPTION - ONSET: ONSET: ON-GOING

## 2024-11-15 ASSESSMENT — PAIN DESCRIPTION - FREQUENCY: FREQUENCY: CONTINUOUS

## 2024-11-15 NOTE — ACP (ADVANCE CARE PLANNING)
Advance Care Planning     Advance Care Planning Inpatient Note  [facility] Department    Today's Date: 11/15/2024  Unit: STV[facility] 1C STEPDOWN    Received request from HealthCare Provider.  Upon review of chart and communication with care team, patient's decision making abilities are not in question.. Patient was present in the room during visit.    Goals of ACP Conversation:  Discuss advance care planning documents  Facilitate a discussion related to patient's goals of care as they align with the patient's values and beliefs.    Health Care Decision Makers:      Primary Decision Maker: Brianne Chakraborty - [phone]    Secondary Decision Maker: [place] - [phone]    Supplemental (Other) Decision Maker: Michael Chakraborty - [phone]  Summary:  Completed New Documents  Updated Healthcare Decision Maker    Advance Care Planning Documents (Patient Wishes):  Healthcare Power of /Advance Directive Appointment of Health Care Agent  Living Will/Advance Directive     Assessment:  Received request for advance directives. Pt in bed resting comfortably; LTME in place; virtual 1:1 sitter in place. Pt conversing appropriately this morning.     I introduced myself, my role, and the purpose of this visit. Pt agreeable to this visit.    Pt expressed that he would like his daughter, Brianne Chakraborty (phone number: [phone]), as his primary health care agent. Pt also expressed that he would like his son, Brad Chakraborty (phone number: [phone]) as his first alternate health care agent, while his other son, Michael Chakraborty (phone number: [phone]), as his second alternate health care agent.    Pt completed both Healthcare POA and Living Will documents.    Pt grateful for this visit. RN aware of outcome.         Interventions:  Provided education on documents for clarity and greater understanding  Discussed and provided education on state decision maker hierarchy  Assisted in the

## 2024-11-16 LAB
ALBUMIN SERPL-MCNC: 3.3 G/DL (ref 3.5–5.2)
ALBUMIN/GLOB SERPL: 1.5 {RATIO} (ref 1–2.5)
ALP SERPL-CCNC: 83 U/L (ref 40–129)
ALT SERPL-CCNC: 290 U/L (ref 10–50)
ANION GAP SERPL CALCULATED.3IONS-SCNC: 10 MMOL/L (ref 9–16)
AST SERPL-CCNC: 579 U/L (ref 10–50)
BASOPHILS # BLD: <0.03 K/UL (ref 0–0.2)
BASOPHILS NFR BLD: 0 % (ref 0–2)
BILIRUB SERPL-MCNC: 0.5 MG/DL (ref 0–1.2)
BUN SERPL-MCNC: 3 MG/DL (ref 8–23)
CALCIUM SERPL-MCNC: 8.3 MG/DL (ref 8.6–10.4)
CHLORIDE SERPL-SCNC: 97 MMOL/L (ref 98–107)
CK SERPL-CCNC: 1491 U/L (ref 39–308)
CK SERPL-CCNC: 1913 U/L (ref 39–308)
CK SERPL-CCNC: 2834 U/L (ref 39–308)
CO2 SERPL-SCNC: 26 MMOL/L (ref 20–31)
CREAT SERPL-MCNC: 0.6 MG/DL (ref 0.7–1.2)
EKG ATRIAL RATE: 65 BPM
EKG P AXIS: 85 DEGREES
EKG P-R INTERVAL: 142 MS
EKG Q-T INTERVAL: 474 MS
EKG QRS DURATION: 122 MS
EKG QTC CALCULATION (BAZETT): 492 MS
EKG R AXIS: 26 DEGREES
EKG T AXIS: 42 DEGREES
EKG VENTRICULAR RATE: 65 BPM
EOSINOPHIL # BLD: 0.15 K/UL (ref 0–0.44)
EOSINOPHILS RELATIVE PERCENT: 3 % (ref 1–4)
ERYTHROCYTE [DISTWIDTH] IN BLOOD BY AUTOMATED COUNT: 13.2 % (ref 11.8–14.4)
GFR, ESTIMATED: >90 ML/MIN/1.73M2
GLUCOSE SERPL-MCNC: 89 MG/DL (ref 74–99)
HCT VFR BLD AUTO: 36.6 % (ref 40.7–50.3)
HCT VFR BLD AUTO: 37.1 % (ref 40.7–50.3)
HGB BLD-MCNC: 12 G/DL (ref 13–17)
HGB BLD-MCNC: 12.3 G/DL (ref 13–17)
IMM GRANULOCYTES # BLD AUTO: <0.03 K/UL (ref 0–0.3)
IMM GRANULOCYTES NFR BLD: 0 %
LYMPHOCYTES NFR BLD: 0.87 K/UL (ref 1.1–3.7)
LYMPHOCYTES RELATIVE PERCENT: 18 % (ref 24–43)
MAGNESIUM SERPL-MCNC: 1.9 MG/DL (ref 1.6–2.4)
MCH RBC QN AUTO: 31 PG (ref 25.2–33.5)
MCHC RBC AUTO-ENTMCNC: 32.3 G/DL (ref 28.4–34.8)
MCV RBC AUTO: 95.9 FL (ref 82.6–102.9)
MONOCYTES NFR BLD: 0.52 K/UL (ref 0.1–1.2)
MONOCYTES NFR BLD: 11 % (ref 3–12)
NEUTROPHILS NFR BLD: 67 % (ref 36–65)
NEUTS SEG NFR BLD: 3.15 K/UL (ref 1.5–8.1)
NRBC BLD-RTO: 0 PER 100 WBC
PLATELET # BLD AUTO: ABNORMAL K/UL (ref 138–453)
PLATELET, FLUORESCENCE: 77 K/UL (ref 138–453)
PLATELETS.RETICULATED NFR BLD AUTO: 6.3 % (ref 1.1–10.3)
POTASSIUM SERPL-SCNC: 3.2 MMOL/L (ref 3.7–5.3)
PROT SERPL-MCNC: 5.5 G/DL (ref 6.6–8.7)
RBC # BLD AUTO: 3.87 M/UL (ref 4.21–5.77)
SODIUM SERPL-SCNC: 133 MMOL/L (ref 136–145)
WBC OTHER # BLD: 4.7 K/UL (ref 3.5–11.3)

## 2024-11-16 PROCEDURE — 6370000000 HC RX 637 (ALT 250 FOR IP)

## 2024-11-16 PROCEDURE — 6360000002 HC RX W HCPCS

## 2024-11-16 PROCEDURE — 99232 SBSQ HOSP IP/OBS MODERATE 35: CPT | Performed by: PSYCHIATRY & NEUROLOGY

## 2024-11-16 PROCEDURE — 2580000003 HC RX 258

## 2024-11-16 PROCEDURE — 80053 COMPREHEN METABOLIC PANEL: CPT

## 2024-11-16 PROCEDURE — 2060000000 HC ICU INTERMEDIATE R&B

## 2024-11-16 PROCEDURE — 95720 EEG PHY/QHP EA INCR W/VEEG: CPT | Performed by: PSYCHIATRY & NEUROLOGY

## 2024-11-16 PROCEDURE — 85014 HEMATOCRIT: CPT

## 2024-11-16 PROCEDURE — 93010 ELECTROCARDIOGRAM REPORT: CPT | Performed by: INTERNAL MEDICINE

## 2024-11-16 PROCEDURE — 82550 ASSAY OF CK (CPK): CPT

## 2024-11-16 PROCEDURE — 85025 COMPLETE CBC W/AUTO DIFF WBC: CPT

## 2024-11-16 PROCEDURE — 85055 RETICULATED PLATELET ASSAY: CPT

## 2024-11-16 PROCEDURE — 85018 HEMOGLOBIN: CPT

## 2024-11-16 PROCEDURE — 99233 SBSQ HOSP IP/OBS HIGH 50: CPT | Performed by: INTERNAL MEDICINE

## 2024-11-16 PROCEDURE — 36415 COLL VENOUS BLD VENIPUNCTURE: CPT

## 2024-11-16 PROCEDURE — 83735 ASSAY OF MAGNESIUM: CPT

## 2024-11-16 RX ORDER — LORAZEPAM 2 MG/ML
1 INJECTION INTRAMUSCULAR
Status: DISCONTINUED | OUTPATIENT
Start: 2024-11-16 | End: 2024-11-18 | Stop reason: HOSPADM

## 2024-11-16 RX ORDER — LORAZEPAM 2 MG/ML
4 INJECTION INTRAMUSCULAR
Status: DISCONTINUED | OUTPATIENT
Start: 2024-11-16 | End: 2024-11-18 | Stop reason: HOSPADM

## 2024-11-16 RX ORDER — SODIUM CHLORIDE 0.9 % (FLUSH) 0.9 %
5-40 SYRINGE (ML) INJECTION PRN
Status: DISCONTINUED | OUTPATIENT
Start: 2024-11-16 | End: 2024-11-18 | Stop reason: HOSPADM

## 2024-11-16 RX ORDER — LORAZEPAM 2 MG/ML
3 INJECTION INTRAMUSCULAR
Status: DISCONTINUED | OUTPATIENT
Start: 2024-11-16 | End: 2024-11-18 | Stop reason: HOSPADM

## 2024-11-16 RX ORDER — SODIUM CHLORIDE 0.9 % (FLUSH) 0.9 %
5-40 SYRINGE (ML) INJECTION EVERY 12 HOURS SCHEDULED
Status: DISCONTINUED | OUTPATIENT
Start: 2024-11-16 | End: 2024-11-18 | Stop reason: HOSPADM

## 2024-11-16 RX ORDER — PANTOPRAZOLE SODIUM 40 MG/1
40 TABLET, DELAYED RELEASE ORAL
Status: DISCONTINUED | OUTPATIENT
Start: 2024-11-16 | End: 2024-11-18 | Stop reason: HOSPADM

## 2024-11-16 RX ORDER — LORAZEPAM 2 MG/ML
2 INJECTION INTRAMUSCULAR
Status: DISCONTINUED | OUTPATIENT
Start: 2024-11-16 | End: 2024-11-18 | Stop reason: HOSPADM

## 2024-11-16 RX ORDER — LORAZEPAM 2 MG/1
2 TABLET ORAL
Status: DISCONTINUED | OUTPATIENT
Start: 2024-11-16 | End: 2024-11-18 | Stop reason: HOSPADM

## 2024-11-16 RX ORDER — SODIUM CHLORIDE 9 MG/ML
INJECTION, SOLUTION INTRAVENOUS PRN
Status: DISCONTINUED | OUTPATIENT
Start: 2024-11-16 | End: 2024-11-18 | Stop reason: HOSPADM

## 2024-11-16 RX ORDER — LORAZEPAM 2 MG/1
4 TABLET ORAL
Status: DISCONTINUED | OUTPATIENT
Start: 2024-11-16 | End: 2024-11-18 | Stop reason: HOSPADM

## 2024-11-16 RX ORDER — LORAZEPAM 1 MG/1
1 TABLET ORAL
Status: DISCONTINUED | OUTPATIENT
Start: 2024-11-16 | End: 2024-11-18 | Stop reason: HOSPADM

## 2024-11-16 RX ORDER — LANOLIN ALCOHOL/MO/W.PET/CERES
100 CREAM (GRAM) TOPICAL DAILY
Status: DISCONTINUED | OUTPATIENT
Start: 2024-11-17 | End: 2024-11-18 | Stop reason: HOSPADM

## 2024-11-16 RX ADMIN — ACETAMINOPHEN 650 MG: 325 TABLET ORAL at 21:21

## 2024-11-16 RX ADMIN — SODIUM CHLORIDE, PRESERVATIVE FREE 10 ML: 5 INJECTION INTRAVENOUS at 21:14

## 2024-11-16 RX ADMIN — LEVETIRACETAM 500 MG: 500 TABLET, FILM COATED ORAL at 21:14

## 2024-11-16 RX ADMIN — METOCLOPRAMIDE 10 MG: 5 INJECTION, SOLUTION INTRAMUSCULAR; INTRAVENOUS at 06:46

## 2024-11-16 RX ADMIN — SODIUM CHLORIDE, POTASSIUM CHLORIDE, SODIUM LACTATE AND CALCIUM CHLORIDE: 600; 310; 30; 20 INJECTION, SOLUTION INTRAVENOUS at 15:28

## 2024-11-16 RX ADMIN — METOPROLOL TARTRATE 25 MG: 25 TABLET, FILM COATED ORAL at 21:15

## 2024-11-16 RX ADMIN — SACUBITRIL AND VALSARTAN 1 TABLET: 49; 51 TABLET, FILM COATED ORAL at 09:06

## 2024-11-16 RX ADMIN — POTASSIUM BICARBONATE 40 MEQ: 782 TABLET, EFFERVESCENT ORAL at 09:39

## 2024-11-16 RX ADMIN — LEVETIRACETAM 500 MG: 500 TABLET, FILM COATED ORAL at 09:05

## 2024-11-16 RX ADMIN — METOCLOPRAMIDE 10 MG: 5 INJECTION, SOLUTION INTRAMUSCULAR; INTRAVENOUS at 16:34

## 2024-11-16 RX ADMIN — DULOXETINE HYDROCHLORIDE 60 MG: 30 CAPSULE, DELAYED RELEASE ORAL at 09:05

## 2024-11-16 RX ADMIN — METOCLOPRAMIDE 10 MG: 5 INJECTION, SOLUTION INTRAMUSCULAR; INTRAVENOUS at 12:52

## 2024-11-16 RX ADMIN — RIVAROXABAN 20 MG: 20 TABLET, FILM COATED ORAL at 16:34

## 2024-11-16 RX ADMIN — LORAZEPAM 1 MG: 2 INJECTION INTRAMUSCULAR; INTRAVENOUS at 22:24

## 2024-11-16 RX ADMIN — Medication 100 MG: at 09:05

## 2024-11-16 RX ADMIN — OXYCODONE HYDROCHLORIDE AND ACETAMINOPHEN 1 TABLET: 5; 325 TABLET ORAL at 18:31

## 2024-11-16 RX ADMIN — OXYCODONE HYDROCHLORIDE AND ACETAMINOPHEN 1 TABLET: 5; 325 TABLET ORAL at 22:23

## 2024-11-16 RX ADMIN — GABAPENTIN 100 MG: 100 CAPSULE ORAL at 21:15

## 2024-11-16 RX ADMIN — LORAZEPAM 2 MG: 2 INJECTION INTRAMUSCULAR; INTRAVENOUS at 01:24

## 2024-11-16 RX ADMIN — METOPROLOL TARTRATE 25 MG: 25 TABLET, FILM COATED ORAL at 09:05

## 2024-11-16 RX ADMIN — SACUBITRIL AND VALSARTAN 1 TABLET: 49; 51 TABLET, FILM COATED ORAL at 21:21

## 2024-11-16 RX ADMIN — PANTOPRAZOLE SODIUM 40 MG: 40 TABLET, DELAYED RELEASE ORAL at 16:34

## 2024-11-16 RX ADMIN — OXYCODONE HYDROCHLORIDE AND ACETAMINOPHEN 1 TABLET: 5; 325 TABLET ORAL at 14:04

## 2024-11-16 RX ADMIN — DONEPEZIL HYDROCHLORIDE 5 MG: 5 TABLET, FILM COATED ORAL at 09:07

## 2024-11-16 RX ADMIN — FOLIC ACID 1 MG: 1 TABLET ORAL at 09:05

## 2024-11-16 RX ADMIN — ATORVASTATIN CALCIUM 10 MG: 20 TABLET, FILM COATED ORAL at 21:15

## 2024-11-16 RX ADMIN — SODIUM CHLORIDE, POTASSIUM CHLORIDE, SODIUM LACTATE AND CALCIUM CHLORIDE: 600; 310; 30; 20 INJECTION, SOLUTION INTRAVENOUS at 22:28

## 2024-11-16 RX ADMIN — SODIUM CHLORIDE, PRESERVATIVE FREE 10 ML: 5 INJECTION INTRAVENOUS at 09:05

## 2024-11-16 RX ADMIN — SODIUM CHLORIDE, PRESERVATIVE FREE 10 ML: 5 INJECTION INTRAVENOUS at 12:52

## 2024-11-16 RX ADMIN — OXYCODONE HYDROCHLORIDE AND ACETAMINOPHEN 1 TABLET: 5; 325 TABLET ORAL at 09:26

## 2024-11-16 RX ADMIN — SODIUM CHLORIDE, POTASSIUM CHLORIDE, SODIUM LACTATE AND CALCIUM CHLORIDE: 600; 310; 30; 20 INJECTION, SOLUTION INTRAVENOUS at 03:25

## 2024-11-16 RX ADMIN — POTASSIUM BICARBONATE 40 MEQ: 782 TABLET, EFFERVESCENT ORAL at 00:38

## 2024-11-16 RX ADMIN — SPIRONOLACTONE 25 MG: 25 TABLET, FILM COATED ORAL at 09:05

## 2024-11-16 RX ADMIN — GABAPENTIN 100 MG: 100 CAPSULE ORAL at 09:05

## 2024-11-16 RX ADMIN — METOCLOPRAMIDE 10 MG: 5 INJECTION, SOLUTION INTRAMUSCULAR; INTRAVENOUS at 00:38

## 2024-11-16 ASSESSMENT — PAIN DESCRIPTION - DESCRIPTORS
DESCRIPTORS: ACHING

## 2024-11-16 ASSESSMENT — PAIN SCALES - GENERAL
PAINLEVEL_OUTOF10: 8

## 2024-11-16 ASSESSMENT — PAIN DESCRIPTION - LOCATION
LOCATION: BACK;HIP;KNEE;SHOULDER
LOCATION: GENERALIZED
LOCATION: GENERALIZED
LOCATION: OTHER (COMMENT)
LOCATION: OTHER (COMMENT)
LOCATION: BACK;KNEE;HIP

## 2024-11-16 ASSESSMENT — PAIN DESCRIPTION - ORIENTATION: ORIENTATION: RIGHT;LEFT

## 2024-11-16 NOTE — PROCEDURES
PROCEDURE NOTE  Date: 11/16/2024   Name: Mahesh Brownlee  YOB: 1960    Procedures    LONG-TERM EEG-VIDEO MONITORING   CLINICAL NEUROPHYSIOLOGY LABORATORY  DEPARTMENT OF NEUROLOGY  [facility]     Patient: Mahesh Brownlee  Age: 64 y.o.  MRN: [id number]    Referring Physician: No ref. provider found  History: The patient is a 64 y.o. male who presented breakthrough seizure/encephalopathy. This long-term video-EEG monitoring study was performed to determine the nature of the patient's clinical events. The patient is on neuroactive medications.   Mahesh Brownlee   Current Facility-Administered Medications   Medication Dose Route Frequency Provider Last Rate Last Admin    levETIRAcetam (KEPPRA) tablet 500 mg  500 mg Oral BID Viviane Reyna MD   500 mg at 11/15/24 2037    meclizine (ANTIVERT) tablet 25 mg  25 mg Oral TID PRN Princess Root MD        melatonin tablet 3 mg  3 mg Oral Nightly PRN Valdemar Khalil MD   3 mg at 11/15/24 2316    rivaroxaban (XARELTO) tablet 20 mg  20 mg Oral Dinner Ozzie Lerma MD   20 mg at 11/15/24 2036    atorvastatin (LIPITOR) tablet 10 mg  10 mg Oral Nightly Aric Jaeger MD   10 mg at 11/15/24 2037    metoprolol tartrate (LOPRESSOR) tablet 25 mg  25 mg Oral BID Aric Jaeger MD   25 mg at 11/15/24 2047    donepezil (ARICEPT) tablet 5 mg  5 mg Oral Daily Rex Mcgregor DO   5 mg at 11/15/24 0914    DULoxetine (CYMBALTA) extended release capsule 60 mg  60 mg Oral Daily Rex Mcgregor DO   60 mg at 11/15/24 0906    gabapentin (NEURONTIN) capsule 100 mg  100 mg Oral BID Rex Mcgregor DO   100 mg at 11/15/24 2037    sacubitril-valsartan (ENTRESTO) 49-51 MG per tablet 1 tablet  1 tablet Oral BID Rex Mcgregor DO   1 tablet at 11/15/24 2318    spironolactone (ALDACTONE) tablet 25 mg  25 mg Oral Daily Rex Mcgregor DO   25 mg at 11/15/24 0910    folic acid (FOLVITE) tablet 1 mg  1 mg Oral Daily Hayden James MD   1 mg at 
IntraVENous Q6H PRN Larry Paniagua APRN - CNP   4 mg at 11/14/24 2140    polyethylene glycol (GLYCOLAX) packet 17 g  17 g Oral Daily PRN Larry Paniagua APRN - CNP        acetaminophen (TYLENOL) tablet 650 mg  650 mg Oral Q6H PRN Larry Paniagua, APRN - CNP   650 mg at 11/15/24 2314    Or    acetaminophen (TYLENOL) suppository 650 mg  650 mg Rectal Q6H PRN Larry Paniagua APRN - CNP        pantoprazole (PROTONIX) 40 mg in sodium chloride (PF) 0.9 % 10 mL injection  40 mg IntraVENous BID rAic Jaeger MD   40 mg at 11/15/24 2037     Technical Description: This is a 21-channel digital EEG recording with time-locked video. Electrodes were placed in accordance with the 10-20 International System of Electrode Placement. Single lead EKG monitoring was included.    Baseline EEG Recording:  A formal baseline EEG recording was not obtained.     Day - 11/14/24, starting at 12:52 and reviewed upto 7:30 on 11/15/24    Interictal EEG Samples:  In the alert state, the posterior background rhythm was a symmetric, well-modulated, 11 - 13 Hz, 20-40 uV rhythm which reacted symmetrically to eye opening and had a normal frequency-amplitude gradient with an age-appropriate mixture of frequencies. During drowsiness, there were bursts of diffuse slowing and waxing and waning of the posterior dominant rhythm. During stage II sleep symmetric V waves, K complexes, and sleep spindles were seen.  The appearance of diffuse delta activity was observed in slow wave sleep. Muscle atonia and frequent eye movement artifact was seen during periods of REM sleep.  No abnormalities were activated by sleep. The EKG channel revealed no abnormalities.  There was diffuse sweat artifact.    Ictal EEG Recording / Patient Events: During this period the patient had no events or seizures.    Summary: During this day of recording no events were recorded. The interictal EEG was abnormal due to diffuse beta frequencies in the background likely due to medication use.

## 2024-11-17 LAB
ALBUMIN SERPL-MCNC: 3.1 G/DL (ref 3.5–5.2)
ALBUMIN/GLOB SERPL: 1.3 {RATIO} (ref 1–2.5)
ALP SERPL-CCNC: 84 U/L (ref 40–129)
ALT SERPL-CCNC: 219 U/L (ref 10–50)
ANION GAP SERPL CALCULATED.3IONS-SCNC: 9 MMOL/L (ref 9–16)
AST SERPL-CCNC: 274 U/L (ref 10–50)
BASOPHILS # BLD: <0.03 K/UL (ref 0–0.2)
BASOPHILS NFR BLD: 1 % (ref 0–2)
BILIRUB SERPL-MCNC: 0.5 MG/DL (ref 0–1.2)
BUN SERPL-MCNC: 5 MG/DL (ref 8–23)
CALCIUM SERPL-MCNC: 7.9 MG/DL (ref 8.6–10.4)
CHLORIDE SERPL-SCNC: 99 MMOL/L (ref 98–107)
CO2 SERPL-SCNC: 27 MMOL/L (ref 20–31)
CREAT SERPL-MCNC: 0.6 MG/DL (ref 0.7–1.2)
EOSINOPHIL # BLD: 0.13 K/UL (ref 0–0.44)
EOSINOPHILS RELATIVE PERCENT: 3 % (ref 1–4)
ERYTHROCYTE [DISTWIDTH] IN BLOOD BY AUTOMATED COUNT: 13.2 % (ref 11.8–14.4)
GFR, ESTIMATED: >90 ML/MIN/1.73M2
GLUCOSE SERPL-MCNC: 106 MG/DL (ref 74–99)
HCT VFR BLD AUTO: 37.4 % (ref 40.7–50.3)
HCT VFR BLD AUTO: 37.5 % (ref 40.7–50.3)
HCT VFR BLD AUTO: 37.5 % (ref 40.7–50.3)
HCT VFR BLD AUTO: 37.6 % (ref 40.7–50.3)
HGB BLD-MCNC: 12 G/DL (ref 13–17)
HGB BLD-MCNC: 12.1 G/DL (ref 13–17)
HGB BLD-MCNC: 12.2 G/DL (ref 13–17)
HGB BLD-MCNC: 12.2 G/DL (ref 13–17)
IMM GRANULOCYTES # BLD AUTO: <0.03 K/UL (ref 0–0.3)
IMM GRANULOCYTES NFR BLD: 1 %
LYMPHOCYTES NFR BLD: 1.07 K/UL (ref 1.1–3.7)
LYMPHOCYTES RELATIVE PERCENT: 26 % (ref 24–43)
MAGNESIUM SERPL-MCNC: 1.8 MG/DL (ref 1.6–2.4)
MCH RBC QN AUTO: 31.4 PG (ref 25.2–33.5)
MCHC RBC AUTO-ENTMCNC: 32 G/DL (ref 28.4–34.8)
MCV RBC AUTO: 98.2 FL (ref 82.6–102.9)
MONOCYTES NFR BLD: 0.69 K/UL (ref 0.1–1.2)
MONOCYTES NFR BLD: 17 % (ref 3–12)
NEUTROPHILS NFR BLD: 54 % (ref 36–65)
NEUTS SEG NFR BLD: 2.24 K/UL (ref 1.5–8.1)
NRBC BLD-RTO: 0 PER 100 WBC
PLATELET # BLD AUTO: ABNORMAL K/UL (ref 138–453)
PLATELET, FLUORESCENCE: 103 K/UL (ref 138–453)
PLATELETS.RETICULATED NFR BLD AUTO: 4.9 % (ref 1.1–10.3)
POTASSIUM SERPL-SCNC: 2.9 MMOL/L (ref 3.7–5.3)
POTASSIUM SERPL-SCNC: 3.4 MMOL/L (ref 3.7–5.3)
PROT SERPL-MCNC: 5.4 G/DL (ref 6.6–8.7)
RBC # BLD AUTO: 3.82 M/UL (ref 4.21–5.77)
SODIUM SERPL-SCNC: 135 MMOL/L (ref 136–145)
WBC OTHER # BLD: 4.2 K/UL (ref 3.5–11.3)

## 2024-11-17 PROCEDURE — 99233 SBSQ HOSP IP/OBS HIGH 50: CPT | Performed by: INTERNAL MEDICINE

## 2024-11-17 PROCEDURE — 84132 ASSAY OF SERUM POTASSIUM: CPT

## 2024-11-17 PROCEDURE — 6360000002 HC RX W HCPCS

## 2024-11-17 PROCEDURE — 6370000000 HC RX 637 (ALT 250 FOR IP)

## 2024-11-17 PROCEDURE — 85018 HEMOGLOBIN: CPT

## 2024-11-17 PROCEDURE — 85014 HEMATOCRIT: CPT

## 2024-11-17 PROCEDURE — 99232 SBSQ HOSP IP/OBS MODERATE 35: CPT | Performed by: PSYCHIATRY & NEUROLOGY

## 2024-11-17 PROCEDURE — 2580000003 HC RX 258

## 2024-11-17 PROCEDURE — 85055 RETICULATED PLATELET ASSAY: CPT

## 2024-11-17 PROCEDURE — 80053 COMPREHEN METABOLIC PANEL: CPT

## 2024-11-17 PROCEDURE — 83735 ASSAY OF MAGNESIUM: CPT

## 2024-11-17 PROCEDURE — 36415 COLL VENOUS BLD VENIPUNCTURE: CPT

## 2024-11-17 PROCEDURE — 85025 COMPLETE CBC W/AUTO DIFF WBC: CPT

## 2024-11-17 PROCEDURE — 2060000000 HC ICU INTERMEDIATE R&B

## 2024-11-17 RX ORDER — POTASSIUM CHLORIDE 7.45 MG/ML
10 INJECTION INTRAVENOUS PRN
Status: DISCONTINUED | OUTPATIENT
Start: 2024-11-17 | End: 2024-11-18 | Stop reason: HOSPADM

## 2024-11-17 RX ORDER — POTASSIUM CHLORIDE 29.8 MG/ML
20 INJECTION INTRAVENOUS PRN
Status: DISCONTINUED | OUTPATIENT
Start: 2024-11-17 | End: 2024-11-18 | Stop reason: HOSPADM

## 2024-11-17 RX ADMIN — OXYCODONE HYDROCHLORIDE AND ACETAMINOPHEN 1 TABLET: 5; 325 TABLET ORAL at 05:23

## 2024-11-17 RX ADMIN — POTASSIUM CHLORIDE 10 MEQ: 7.45 INJECTION INTRAVENOUS at 05:23

## 2024-11-17 RX ADMIN — POTASSIUM CHLORIDE 10 MEQ: 7.45 INJECTION INTRAVENOUS at 08:11

## 2024-11-17 RX ADMIN — SODIUM CHLORIDE, POTASSIUM CHLORIDE, SODIUM LACTATE AND CALCIUM CHLORIDE: 600; 310; 30; 20 INJECTION, SOLUTION INTRAVENOUS at 20:25

## 2024-11-17 RX ADMIN — METOCLOPRAMIDE 10 MG: 5 INJECTION, SOLUTION INTRAMUSCULAR; INTRAVENOUS at 05:32

## 2024-11-17 RX ADMIN — Medication 100 MG: at 09:01

## 2024-11-17 RX ADMIN — METOPROLOL TARTRATE 25 MG: 25 TABLET, FILM COATED ORAL at 20:16

## 2024-11-17 RX ADMIN — OXYCODONE HYDROCHLORIDE AND ACETAMINOPHEN 1 TABLET: 5; 325 TABLET ORAL at 20:16

## 2024-11-17 RX ADMIN — PANTOPRAZOLE SODIUM 40 MG: 40 TABLET, DELAYED RELEASE ORAL at 16:24

## 2024-11-17 RX ADMIN — OXYCODONE HYDROCHLORIDE AND ACETAMINOPHEN 1 TABLET: 5; 325 TABLET ORAL at 23:49

## 2024-11-17 RX ADMIN — ACETAMINOPHEN 650 MG: 325 TABLET ORAL at 11:11

## 2024-11-17 RX ADMIN — SACUBITRIL AND VALSARTAN 1 TABLET: 49; 51 TABLET, FILM COATED ORAL at 09:00

## 2024-11-17 RX ADMIN — ATORVASTATIN CALCIUM 10 MG: 20 TABLET, FILM COATED ORAL at 20:16

## 2024-11-17 RX ADMIN — LEVETIRACETAM 500 MG: 500 TABLET, FILM COATED ORAL at 20:15

## 2024-11-17 RX ADMIN — Medication 3 MG: at 00:09

## 2024-11-17 RX ADMIN — OXYCODONE HYDROCHLORIDE AND ACETAMINOPHEN 1 TABLET: 5; 325 TABLET ORAL at 08:54

## 2024-11-17 RX ADMIN — PANTOPRAZOLE SODIUM 40 MG: 40 TABLET, DELAYED RELEASE ORAL at 05:30

## 2024-11-17 RX ADMIN — DONEPEZIL HYDROCHLORIDE 5 MG: 5 TABLET, FILM COATED ORAL at 11:20

## 2024-11-17 RX ADMIN — OXYCODONE HYDROCHLORIDE AND ACETAMINOPHEN 1 TABLET: 5; 325 TABLET ORAL at 16:24

## 2024-11-17 RX ADMIN — SODIUM CHLORIDE, PRESERVATIVE FREE 10 ML: 5 INJECTION INTRAVENOUS at 20:18

## 2024-11-17 RX ADMIN — OXYCODONE HYDROCHLORIDE AND ACETAMINOPHEN 1 TABLET: 5; 325 TABLET ORAL at 12:33

## 2024-11-17 RX ADMIN — SPIRONOLACTONE 25 MG: 25 TABLET, FILM COATED ORAL at 09:00

## 2024-11-17 RX ADMIN — LEVETIRACETAM 500 MG: 500 TABLET, FILM COATED ORAL at 09:00

## 2024-11-17 RX ADMIN — METOPROLOL TARTRATE 25 MG: 25 TABLET, FILM COATED ORAL at 09:00

## 2024-11-17 RX ADMIN — GABAPENTIN 100 MG: 100 CAPSULE ORAL at 20:15

## 2024-11-17 RX ADMIN — DULOXETINE HYDROCHLORIDE 60 MG: 30 CAPSULE, DELAYED RELEASE ORAL at 09:02

## 2024-11-17 RX ADMIN — Medication 3 MG: at 20:16

## 2024-11-17 RX ADMIN — SACUBITRIL AND VALSARTAN 1 TABLET: 49; 51 TABLET, FILM COATED ORAL at 20:20

## 2024-11-17 RX ADMIN — METOCLOPRAMIDE 10 MG: 5 INJECTION, SOLUTION INTRAMUSCULAR; INTRAVENOUS at 23:49

## 2024-11-17 RX ADMIN — POTASSIUM CHLORIDE 40 MEQ: 1500 TABLET, EXTENDED RELEASE ORAL at 11:12

## 2024-11-17 RX ADMIN — METOCLOPRAMIDE 10 MG: 5 INJECTION, SOLUTION INTRAMUSCULAR; INTRAVENOUS at 00:01

## 2024-11-17 RX ADMIN — POTASSIUM CHLORIDE 10 MEQ: 7.45 INJECTION INTRAVENOUS at 06:52

## 2024-11-17 RX ADMIN — GABAPENTIN 100 MG: 100 CAPSULE ORAL at 09:05

## 2024-11-17 RX ADMIN — RIVAROXABAN 20 MG: 20 TABLET, FILM COATED ORAL at 16:25

## 2024-11-17 RX ADMIN — FOLIC ACID 1 MG: 1 TABLET ORAL at 09:01

## 2024-11-17 ASSESSMENT — PAIN DESCRIPTION - LOCATION
LOCATION: OTHER (COMMENT)
LOCATION: BACK
LOCATION: ABDOMEN;BACK
LOCATION: BACK
LOCATION: BACK

## 2024-11-17 ASSESSMENT — PAIN SCALES - GENERAL
PAINLEVEL_OUTOF10: 8
PAINLEVEL_OUTOF10: 9
PAINLEVEL_OUTOF10: 8
PAINLEVEL_OUTOF10: 9
PAINLEVEL_OUTOF10: 9
PAINLEVEL_OUTOF10: 8
PAINLEVEL_OUTOF10: 8

## 2024-11-17 ASSESSMENT — PAIN DESCRIPTION - PAIN TYPE: TYPE: CHRONIC PAIN

## 2024-11-17 ASSESSMENT — PAIN DESCRIPTION - DESCRIPTORS
DESCRIPTORS: ACHING
DESCRIPTORS: ACHING;THROBBING
DESCRIPTORS: ACHING;THROBBING
DESCRIPTORS: ACHING
DESCRIPTORS: ACHING;DISCOMFORT

## 2024-11-17 ASSESSMENT — PAIN DESCRIPTION - ONSET: ONSET: ON-GOING

## 2024-11-17 ASSESSMENT — PAIN DESCRIPTION - ORIENTATION
ORIENTATION: RIGHT
ORIENTATION: MID
ORIENTATION: OTHER (COMMENT)
ORIENTATION: RIGHT

## 2024-11-17 ASSESSMENT — PAIN DESCRIPTION - FREQUENCY: FREQUENCY: CONTINUOUS

## 2024-11-17 ASSESSMENT — PAIN SCALES - WONG BAKER: WONGBAKER_NUMERICALRESPONSE: NO HURT

## 2024-11-17 ASSESSMENT — PAIN - FUNCTIONAL ASSESSMENT: PAIN_FUNCTIONAL_ASSESSMENT: ACTIVITIES ARE NOT PREVENTED

## 2024-11-18 VITALS
WEIGHT: 207 LBS | BODY MASS INDEX: 28.98 KG/M2 | SYSTOLIC BLOOD PRESSURE: 131 MMHG | OXYGEN SATURATION: 96 % | TEMPERATURE: 98.1 F | DIASTOLIC BLOOD PRESSURE: 68 MMHG | RESPIRATION RATE: 14 BRPM | HEIGHT: 71 IN | HEART RATE: 79 BPM

## 2024-11-18 LAB
ALBUMIN SERPL-MCNC: 3.2 G/DL (ref 3.5–5.2)
ALBUMIN/GLOB SERPL: 1.4 {RATIO} (ref 1–2.5)
ALP SERPL-CCNC: 84 U/L (ref 40–129)
ALT SERPL-CCNC: 148 U/L (ref 10–50)
ANION GAP SERPL CALCULATED.3IONS-SCNC: 10 MMOL/L (ref 9–16)
AST SERPL-CCNC: 134 U/L (ref 10–50)
BASOPHILS # BLD: <0.03 K/UL (ref 0–0.2)
BASOPHILS NFR BLD: 1 % (ref 0–2)
BILIRUB SERPL-MCNC: 0.4 MG/DL (ref 0–1.2)
BUN SERPL-MCNC: 5 MG/DL (ref 8–23)
CALCIUM SERPL-MCNC: 8.5 MG/DL (ref 8.6–10.4)
CHLORIDE SERPL-SCNC: 100 MMOL/L (ref 98–107)
CO2 SERPL-SCNC: 26 MMOL/L (ref 20–31)
CREAT SERPL-MCNC: 0.6 MG/DL (ref 0.7–1.2)
EOSINOPHIL # BLD: 0.14 K/UL (ref 0–0.44)
EOSINOPHILS RELATIVE PERCENT: 3 % (ref 1–4)
ERYTHROCYTE [DISTWIDTH] IN BLOOD BY AUTOMATED COUNT: 13.2 % (ref 11.8–14.4)
GFR, ESTIMATED: >90 ML/MIN/1.73M2
GLUCOSE SERPL-MCNC: 107 MG/DL (ref 74–99)
HCT VFR BLD AUTO: 36.9 % (ref 40.7–50.3)
HGB BLD-MCNC: 11.8 G/DL (ref 13–17)
IMM GRANULOCYTES # BLD AUTO: 0.03 K/UL (ref 0–0.3)
IMM GRANULOCYTES NFR BLD: 1 %
LYMPHOCYTES NFR BLD: 1.04 K/UL (ref 1.1–3.7)
LYMPHOCYTES RELATIVE PERCENT: 25 % (ref 24–43)
MAGNESIUM SERPL-MCNC: 1.8 MG/DL (ref 1.6–2.4)
MCH RBC QN AUTO: 31.4 PG (ref 25.2–33.5)
MCHC RBC AUTO-ENTMCNC: 32 G/DL (ref 28.4–34.8)
MCV RBC AUTO: 98.1 FL (ref 82.6–102.9)
MICROORGANISM SPEC CULT: NORMAL
MICROORGANISM SPEC CULT: NORMAL
MONOCYTES NFR BLD: 0.91 K/UL (ref 0.1–1.2)
MONOCYTES NFR BLD: 22 % (ref 3–12)
NEUTROPHILS NFR BLD: 49 % (ref 36–65)
NEUTS SEG NFR BLD: 2.02 K/UL (ref 1.5–8.1)
NRBC BLD-RTO: 0 PER 100 WBC
PLATELET # BLD AUTO: ABNORMAL K/UL (ref 138–453)
PLATELET, FLUORESCENCE: 126 K/UL (ref 138–453)
PLATELETS.RETICULATED NFR BLD AUTO: 4.3 % (ref 1.1–10.3)
POTASSIUM SERPL-SCNC: 3.3 MMOL/L (ref 3.7–5.3)
PROT SERPL-MCNC: 5.5 G/DL (ref 6.6–8.7)
RBC # BLD AUTO: 3.76 M/UL (ref 4.21–5.77)
SERVICE CMNT-IMP: NORMAL
SERVICE CMNT-IMP: NORMAL
SODIUM SERPL-SCNC: 136 MMOL/L (ref 136–145)
SPECIMEN DESCRIPTION: NORMAL
SPECIMEN DESCRIPTION: NORMAL
VIT B1 PYROPHOSHATE BLD-SCNC: 193 NMOL/L (ref 70–180)
WBC OTHER # BLD: 4.2 K/UL (ref 3.5–11.3)

## 2024-11-18 PROCEDURE — 6370000000 HC RX 637 (ALT 250 FOR IP)

## 2024-11-18 PROCEDURE — 83735 ASSAY OF MAGNESIUM: CPT

## 2024-11-18 PROCEDURE — 99232 SBSQ HOSP IP/OBS MODERATE 35: CPT | Performed by: STUDENT IN AN ORGANIZED HEALTH CARE EDUCATION/TRAINING PROGRAM

## 2024-11-18 PROCEDURE — 97530 THERAPEUTIC ACTIVITIES: CPT

## 2024-11-18 PROCEDURE — 97535 SELF CARE MNGMENT TRAINING: CPT

## 2024-11-18 PROCEDURE — 36415 COLL VENOUS BLD VENIPUNCTURE: CPT

## 2024-11-18 PROCEDURE — 85055 RETICULATED PLATELET ASSAY: CPT

## 2024-11-18 PROCEDURE — 2580000003 HC RX 258

## 2024-11-18 PROCEDURE — 99232 SBSQ HOSP IP/OBS MODERATE 35: CPT | Performed by: INTERNAL MEDICINE

## 2024-11-18 PROCEDURE — 85025 COMPLETE CBC W/AUTO DIFF WBC: CPT

## 2024-11-18 PROCEDURE — 80053 COMPREHEN METABOLIC PANEL: CPT

## 2024-11-18 PROCEDURE — 6360000002 HC RX W HCPCS

## 2024-11-18 RX ORDER — LANOLIN ALCOHOL/MO/W.PET/CERES
100 CREAM (GRAM) TOPICAL DAILY
Qty: 30 TABLET | Refills: 3 | Status: SHIPPED | OUTPATIENT
Start: 2024-11-19

## 2024-11-18 RX ORDER — PANTOPRAZOLE SODIUM 40 MG/1
40 TABLET, DELAYED RELEASE ORAL
Qty: 30 TABLET | Refills: 3 | Status: SHIPPED | OUTPATIENT
Start: 2024-11-18

## 2024-11-18 RX ORDER — OXYCODONE AND ACETAMINOPHEN 5; 325 MG/1; MG/1
1 TABLET ORAL EVERY 6 HOURS PRN
Status: DISCONTINUED | OUTPATIENT
Start: 2024-11-18 | End: 2024-11-18 | Stop reason: HOSPADM

## 2024-11-18 RX ADMIN — Medication 100 MG: at 08:32

## 2024-11-18 RX ADMIN — PANTOPRAZOLE SODIUM 40 MG: 40 TABLET, DELAYED RELEASE ORAL at 06:14

## 2024-11-18 RX ADMIN — METOCLOPRAMIDE 10 MG: 5 INJECTION, SOLUTION INTRAMUSCULAR; INTRAVENOUS at 12:20

## 2024-11-18 RX ADMIN — GABAPENTIN 100 MG: 100 CAPSULE ORAL at 08:32

## 2024-11-18 RX ADMIN — SODIUM CHLORIDE, PRESERVATIVE FREE 10 ML: 5 INJECTION INTRAVENOUS at 08:41

## 2024-11-18 RX ADMIN — POTASSIUM BICARBONATE 40 MEQ: 782 TABLET, EFFERVESCENT ORAL at 06:13

## 2024-11-18 RX ADMIN — OXYCODONE HYDROCHLORIDE AND ACETAMINOPHEN 1 TABLET: 5; 325 TABLET ORAL at 12:22

## 2024-11-18 RX ADMIN — LEVETIRACETAM 500 MG: 500 TABLET, FILM COATED ORAL at 08:32

## 2024-11-18 RX ADMIN — OXYCODONE HYDROCHLORIDE AND ACETAMINOPHEN 1 TABLET: 5; 325 TABLET ORAL at 08:31

## 2024-11-18 RX ADMIN — METOCLOPRAMIDE 10 MG: 5 INJECTION, SOLUTION INTRAMUSCULAR; INTRAVENOUS at 05:10

## 2024-11-18 RX ADMIN — SPIRONOLACTONE 25 MG: 25 TABLET, FILM COATED ORAL at 08:32

## 2024-11-18 RX ADMIN — DULOXETINE HYDROCHLORIDE 60 MG: 30 CAPSULE, DELAYED RELEASE ORAL at 08:32

## 2024-11-18 RX ADMIN — OXYCODONE HYDROCHLORIDE AND ACETAMINOPHEN 1 TABLET: 5; 325 TABLET ORAL at 04:30

## 2024-11-18 RX ADMIN — METOPROLOL TARTRATE 25 MG: 25 TABLET, FILM COATED ORAL at 08:32

## 2024-11-18 RX ADMIN — SODIUM CHLORIDE, POTASSIUM CHLORIDE, SODIUM LACTATE AND CALCIUM CHLORIDE: 600; 310; 30; 20 INJECTION, SOLUTION INTRAVENOUS at 05:10

## 2024-11-18 RX ADMIN — FOLIC ACID 1 MG: 1 TABLET ORAL at 08:32

## 2024-11-18 RX ADMIN — POTASSIUM CHLORIDE 40 MEQ: 1500 TABLET, EXTENDED RELEASE ORAL at 08:30

## 2024-11-18 RX ADMIN — DONEPEZIL HYDROCHLORIDE 5 MG: 5 TABLET, FILM COATED ORAL at 08:35

## 2024-11-18 RX ADMIN — SACUBITRIL AND VALSARTAN 1 TABLET: 49; 51 TABLET, FILM COATED ORAL at 08:35

## 2024-11-18 ASSESSMENT — ENCOUNTER SYMPTOMS
RHINORRHEA: 0
VOICE CHANGE: 0
CHEST TIGHTNESS: 0
BACK PAIN: 0
ABDOMINAL DISTENTION: 0
EYE REDNESS: 0
EYE PAIN: 0
SHORTNESS OF BREATH: 0
ABDOMINAL PAIN: 0
CONSTIPATION: 0
EYE ITCHING: 0
EYE DISCHARGE: 0
WHEEZING: 0
CHOKING: 0
NAUSEA: 0
COUGH: 0
SORE THROAT: 0
DIARRHEA: 0
PHOTOPHOBIA: 0
TROUBLE SWALLOWING: 0

## 2024-11-18 ASSESSMENT — PAIN SCALES - GENERAL
PAINLEVEL_OUTOF10: 8

## 2024-11-18 ASSESSMENT — PAIN DESCRIPTION - ORIENTATION
ORIENTATION: LEFT;RIGHT
ORIENTATION: RIGHT;LEFT

## 2024-11-18 ASSESSMENT — PAIN DESCRIPTION - LOCATION
LOCATION: GENERALIZED;BACK;LEG
LOCATION: LEG

## 2024-11-18 ASSESSMENT — PAIN DESCRIPTION - PAIN TYPE
TYPE: CHRONIC PAIN
TYPE: CHRONIC PAIN

## 2024-11-18 ASSESSMENT — PAIN DESCRIPTION - DESCRIPTORS
DESCRIPTORS: ACHING
DESCRIPTORS: ACHING;DULL

## 2024-11-18 ASSESSMENT — PAIN - FUNCTIONAL ASSESSMENT: PAIN_FUNCTIONAL_ASSESSMENT: ACTIVITIES ARE NOT PREVENTED

## 2024-11-18 NOTE — DISCHARGE INSTR - COC
hours) at 11/18/2024 1136  Last data filed at 11/18/2024 0400  Gross per 24 hour   Intake --   Output 2900 ml   Net -2900 ml     I/O last 3 completed shifts:  In: -   Out: 5015 [Urine:5015]    Safety Concerns:     History of Falls (last 30 days), At Risk for Falls, and History of Seizures    Impairments/Disabilities:      None    Nutrition Therapy:  Current Nutrition Therapy:   - Oral Diet:  General    Routes of Feeding: Oral  Liquids: No Restrictions  Daily Fluid Restriction: no  Last Modified Barium Swallow with Video (Video Swallowing Test): not done    Treatments at the Time of Hospital Discharge:   Respiratory Treatments: NA  Oxygen Therapy:  is not on home oxygen therapy.  Ventilator:    - No ventilator support    Rehab Therapies: Physical Therapy and Occupational Therapy  Weight Bearing Status/Restrictions: No weight bearing restrictions  Other Medical Equipment (for information only, NOT a DME order):  cane and walker  Other Treatments: ***    Patient's personal belongings (please select all that are sent with patient):  [place]    RN SIGNATURE:  Electronically signed by LINDSEY SOUZA RN on 11/18/24 at 1:51 PM EST    CASE MANAGEMENT/SOCIAL WORK SECTION    Inpatient Status Date: 11/13/24    Readmission Risk Assessment Score:  Readmission Risk              Risk of Unplanned Readmission:  49           Discharging to Facility/ Agency   Name: [facility]  Address:   Phone: [phone]  Fax: [phone]    Dialysis Facility (if applicable)   Name:  Address:  Dialysis Schedule:  Phone:  Fax:    / signature: Electronically signed by Amelia Castro on 11/18/24 at 2:03 PM EST    PHYSICIAN SECTION    Prognosis: Good    Condition at Discharge: Stable    Rehab Potential (if transferring to Rehab): Good    Recommended Labs or Other Treatments After Discharge: none at time of DC     Physician Certification: I certify the above information and transfer of Mahesh Brownlee  is necessary

## 2024-11-18 NOTE — PROGRESS NOTES
Attending Physician Statement  I have discussed the case of Mahesh Brownlee including pertinent history and exam findings with the resident physician. I reviewed medications, clinical labs, x-rays and other diagnostic tests with the resident physician.    I have seen and examined the patient and the key elements of the encounter have been performed by me. I agree with the assessment, plan and orders as documented by the resident physician.          Briefly, this is a  64 y.o. male with hx of HTN, HLD, CAD, A-fib, ETOH and postlaminectomy syndrome was admitted on 11/13/2024 with altered mentation.  Patient's son was not able to get hold of him on the morning of 11/13 .,  Therefore his son called neighbor who found him on the ground.  According to EMS patient was found in front of wheelchair on the ground.  Patient was confused and patient had a witnessed seizure en route to ER.  Patient had a repeat seizure activity in ER for which he was loaded with 2 gr IV Keppra and continued on Keppra 500 mg twice daily.  Initially he was intubated for airway protection and he was extubated this morning.  Nursing staff stated that patient has not had any seizure activity since admitted to unit last night.  Patient is presently alert oriented to self, place and followed commands well.       Impression and Plan: Mr. Mahesh Brownlee is a 64 y.o. male with   Breakthrough witnessed seizures x 2; chronic EtOH; was loaded with IV Keppra 2 g in ER and to continue Keppra 500 mg twice daily; to continue seizure precautions; on continuous EEG monitoring and to follow the report.  Also to start him on CIWA protocol.  History of cerebellar CVA and gait difficulties; comorbid atrial fibrillation and was on xarelto and statin therapy; on schedule to get spinal tap and xarelto is on hold.   Other comorbid conditions include hypertension, hyperlipidemia, CAD.  Okay to transfer to stepdown unit.      This note was partially created using voice 
  [facility]  Internal Medicine Teaching Residency Program  Inpatient Daily Progress Note  ______________________________________________________________________________    Patient: Mahesh Brownlee  YOB: 1960   MRN:[id number]    Acct: [id number]     Room: 0139/0139-01  Admit date: 11/13/2024  Today's date: 11/17/24  Number of days in the hospital: 4    SUBJECTIVE   CC: Seizures and Altered Mental Status    -Pt examined at bedside. Chart & results reviewed.   -He remained afebrile and hemodynamically stable overnight.   -Patient has severe hypokalemia. Replacing K iv + oral. Repeat K 3.4. Denies any diarrhe but did have one episode of vomiting yesterday afternoon. Magnesium WNL  -Plan for recheck K at 5 pm.  -CK downtrending  -LFT's every other day, improving     BRIEF HISTORY     The patient is a pleasant 64 y.o. male with PMH AF, CAD, s/p AICD placement, COPD and chronic smoker, HTN, alcohol abuse and history of seizures presents with a chief complaint of witnessed seizures yesterday morning which was not aborted medically.  He was apparently found unresponsive and brought in by the EMS.  On admission the patient was confused and in post ictal confusion and had 2 more seizure-like activities within the ED which were aborted without medical therapy.  The patient had a GCS of 11 and was intubated for airway protection due to increased agitation.  Patient was admitted under neurology service.  IM was consulted for the management of possible infectious causes of breakthrough seizures.        Last echocardiogram 5/15 with EF 50 to 55%     Last cardiac cath: Per chart review, never underwent ischemic workup with cardiac cath.  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     Last lipid profile 7/2024, HDL 39 and      Last TSH 1.31 9/2024, patient Synthroid was discontinued on his last hospital visit.   
  [facility]  Internal Medicine Teaching Residency Program  Inpatient Daily Progress Note  ______________________________________________________________________________    Patient: Mahesh Brownlee  YOB: 1960   MRN:[id number]    Acct: [id number]     Room: 0139/0139-01  Admit date: 11/13/2024  Today's date: 11/15/24  Number of days in the hospital: 2    SUBJECTIVE   CC: Seizures and Altered Mental Status    Pt examined at bedside. Chart & results reviewed.   -VSS, pt is saturating well on room air. No acute overnight events    -Patient is under LTME for seizure surveillance  -Labs reviewed, total CK rising.  Troponin downtrending  -Patient started on multivitamin as well as folate and thiamine for improved nutrition due to alcohol abuse  -Follow-up infectious workup for possible meningitis/encephalitis  -Does not complain of any nausea or vomiting today.  Scopolamine patch applied  -Continue IV fluid    BRIEF HISTORY     The patient is a pleasant 64 y.o. male with PMH AF, CAD, s/p AICD placement, COPD and chronic smoker, HTN, alcohol abuse and history of seizures presents with a chief complaint of witnessed seizures yesterday morning which was not aborted medically.  He was apparently found unresponsive and brought in by the EMS.  On admission the patient was confused and in post ictal confusion and had 2 more seizure-like activities within the ED which were aborted without medical therapy.  The patient had a GCS of 11 and was intubated for airway protection due to increased agitation.  Patient was admitted under neurology service.  IM was consulted for the management of possible infectious causes of breakthrough seizures.        Last echocardiogram 5/15 with EF 50 to 55%     Last cardiac cath: Per chart review, never underwent ischemic workup with cardiac cath.  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   
  [facility]  Internal Medicine Teaching Residency Program  Inpatient Daily Progress Note  ______________________________________________________________________________    Patient: Mahesh Brownlee  YOB: 1960   MRN:[id number]    Acct: [id number]     Room: 0139/0139-01  Admit date: 11/13/2024  Today's date: 11/16/24  Number of days in the hospital: 3    SUBJECTIVE   CC: Seizures and Altered Mental Status    -Pt examined at bedside. Chart & results reviewed.   -He remained afebrile and hemodynamically stable overnight.  -LTM he has been unremarkable.  -Continues to be on thiamine and folic acid supplementation due to alcohol use disorder.  -His CK levels are down trending.  -Pending pre-CERT for rehab placement  -Discussed the patient with RN, no other acute issues noted overnight.    BRIEF HISTORY     The patient is a pleasant 64 y.o. male with PMH AF, CAD, s/p AICD placement, COPD and chronic smoker, HTN, alcohol abuse and history of seizures presents with a chief complaint of witnessed seizures yesterday morning which was not aborted medically.  He was apparently found unresponsive and brought in by the EMS.  On admission the patient was confused and in post ictal confusion and had 2 more seizure-like activities within the ED which were aborted without medical therapy.  The patient had a GCS of 11 and was intubated for airway protection due to increased agitation.  Patient was admitted under neurology service.  IM was consulted for the management of possible infectious causes of breakthrough seizures.        Last echocardiogram 5/15 with EF 50 to 55%     Last cardiac cath: Per chart review, never underwent ischemic workup with cardiac cath.  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     Last lipid profile 7/2024, HDL 39 and      Last TSH 1.31 9/2024, patient Synthroid was discontinued on his last 
  Pt able to lift head off pillow: Yes  Positive Cuff Leak: Yes    Order obtained for extubation.  SpO2 of 98 on 40% FiO2.   Patient extubated and placed on 2 liters/min via nasal cannula.   Post extubation SpO2 is 99% with HR  93 bpm and RR 18 breaths/min.    Patient had strong cough that was non-productive.  Extubation Well tolerated by patient..   Breath Sounds: clear/diminished    Ayah Tinoco RCP   2:34 AM  
[facility]   Pharmacy Pharmacokinetic Monitoring Service - Vancomycin     Mahesh Brownlee is a 64 y.o. male starting on vancomycin therapy for sepsis. Pharmacy consulted by Dr. Caldwell for monitoring and adjustment.    Target Concentration: Goal AUC/YENNY 400-600 mg*hr/L    Additional Antimicrobials: cefepime, ampicillin    Pertinent Laboratory Values:   Wt Readings from Last 1 Encounters:   11/13/24 94.3 kg (207 lb 14.3 oz)     Temp Readings from Last 1 Encounters:   11/13/24 100 °F (37.8 °C) (Axillary)     Estimated Creatinine Clearance: 72 mL/min (A) (based on SCr of 1.2 mg/dL (H)).  Recent Labs     11/13/24  1354   CREATININE 1.2*     Pertinent Cultures:  Culture Date Source Results   11/13 Blood x 2 Drawn   MRSA Nasal Swab: N/A. Non-respiratory infection.    Plan:  Dosing recommendations based on Bayesian software  Start vancomycin 2000 mg x 1, followed by 1000 mg every 12 hours   Anticipated AUC of 567 and trough concentration of 17.9 at steady state  Renal labs as indicated   Vancomycin concentration not yet ordered   Pharmacy will continue to monitor patient and adjust therapy as indicated    Thank you for the consult,  Jelly Low RPH  11/13/2024 2:30 PM   
[facility]   Pharmacy Pharmacokinetic Monitoring Service - Vancomycin    Consulting Provider: Dr. Alison Caldwell   Indication: r/o meningitis   Target Concentration: Goal AUC/YENNY 400-600 mg*hr/L  Day of Therapy: 3  Additional Antimicrobials: Acyclovir, Ampicillin, Ceftriaxone     Pertinent Laboratory Values:   Wt Readings from Last 1 Encounters:   11/14/24 93.9 kg (207 lb)     Temp Readings from Last 1 Encounters:   11/15/24 97.5 °F (36.4 °C) (Oral)     Estimated Creatinine Clearance: 123 mL/min (based on SCr of 0.7 mg/dL).  Recent Labs     11/14/24  0250 11/15/24  0730 11/15/24  1411   CREATININE 1.1  --  0.7   BUN 10  --  3*   WBC 7.4 5.3  --        Pertinent Cultures:  Culture Date Source Results   11/13 Blood x2 No growth   11/13 Urine No growth   11/15 CSF Ordered     Assessment:  Date/Time Current Dose Concentration Timing of Concentration (h)   11/15 1411 1000 mg IV q12h 5.3 11 h after last dose   Note: Serum concentrations collected for AUC dosing may appear elevated if collected in close proximity to the dose administered, this is not necessarily an indication of toxicity    Plan:  SCr back to baseline  Vancomycin random 5.3 mcg/ml, collected 11 h after last dose;  (although patient missed afternoon dose on 11/14)  Based on level assessment current regimen is subtherapeutic, will adjust dose to Vancomycin 1000 mg IV q8h with anticipated AUC of 450 and trough of 13   Pharmacy will continue to monitor patient and adjust therapy as indicated    Thank you for the consult,  Sivan Manriquez, PharmD, 11/15/2024 3:41 PM    
Date: 11/13/2024  Time: 1504  Patient identity confirmed:  Yes  Indications: airway protection  Preoxygenation: yes    Laryngoscope size and type Glidescope  Airway introducer used: No  Evac: No  ETT size:an 8.0 cuffed  Number of attempts:1   Cords visualized:  [x] Clearly  [] Poorly  Breath sounds present bilaterally: Yes   ETCO2   [x] Positive   ETT secured at  24 teeth    ETT secured with mira  Chest x-ray ordered: Yes     Difficult airway:    No       If yes, was red tape placed around ETT:   No    Was this a Code Situation:    No       If yes, was the rescue pod used:    No      BP: (!) 153/74        Procedure performed by: Dr.Patel Libra Villa RCP  3:12 PM                  
[facility] Neurology   IN-PATIENT SERVICE   [facility]    Progress Note             Date:   11/15/2024  Patient name:  Mahesh Brownlee  Date of admission:  11/13/2024  1:43 PM  MRN:   [id number]  Account:  [id number]  YOB: 1960  PCP:    Ira Roberts APRN - CNP  Room:   [address] [place] [address]  Code Status:    DNR-CCA    Chief Complaint:     Chief Complaint   Patient presents with    Seizures    Altered Mental Status       Interval hx:     Patient seen and examined at bedside this morning.  No acute events overnight.  Neurological exam unchanged from yesterday.  Vitals stable.      IR guided LP      Brief History of Present Illness:     The patient is a 64 y.o.  Non- / non  male with history of chronic alcohol abuse, A-fib on Xarelto, CAD, CM s/p AICD and reported seizure disorder on Keppra who was initially admitted to ICU 11/13 due to concern for seizures.  Was intubated on arrival to the ED due to reported agitation and concern for airway protection.  Transferred to neuro stepdown 11/14.  EMS was called after patient was found unresponsive at his home.  GCS 11 on arrival to the ED with confusion, moving all 4 extremities equally, per ED notes.  Reported witnessed episode of generalized tonic-clonic x 2 lasting approximately 1 minute and aborting without intervention.  CT head unremarkable for acute abnormalities. Per notes, was tracking and opening eyes to voice while on 100 mcg of prop.  Extubated 11/14, CODE STATUS changed to DNR CCA.  LP guided IR ordered to rule out meningitis, unclear compliance with Xarelto.   Last ED presentation 11/6 earlier this month, ethanol levels were 415. Multiple similar ED presentations with concerns for seizure activity, was last seen by our service 9/2024 after 2 episodes of generalized convulsions and no seizures on EEG and discharged on Keppra.  Last MRI 7/2024 w/oc showed areas of chronic infarct within the cerebellar hemispheres 
LTME set up complete MRI/CT compatible wires used   
Multiple attempts made to call report to [facility]. Unable to leave message, will re attempt.   
Nutrition Assessment     Type and Reason for Visit: Initial, Consult (Poor appetite)    Nutrition Recommendations/Plan:   Continue current diet and ONS as tolerated by pt  Encourage PO intake - ensure ambassador is taking pt meal orders  Monitor PO and ONS intake, wt, meds, labs      Malnutrition Assessment:  Malnutrition Status: No malnutrition    Nutrition Assessment:  Consulted for poor appetite. Pt tolerating regular diet just fine, reports he does not like the food. Labs: Na 134 mmol/L, K 3.2 mmol/L, Ca 8.3 mg/dL. Per wt hx, wt has been stable. High kcal, high pro ONS ordered at snack times, writer modified to be delivered with meals so pt receives them. Per CM note, auth pending for d/c.    Nutrition Related Findings:   Meds/labs reviewed Wound Type: None    Current Nutrition Therapies:    ADULT DIET; Regular  ADULT ORAL NUTRITION SUPPLEMENT; Breakfast, Lunch, Dinner; Standard High Calorie/High Protein Oral Supplement    Anthropometric Measures:  Height: 180.3 cm (5' 10.98\")  Current Body Wt: 93.9 kg (207 lb)   BMI: 28.9        Nutrition Diagnosis:   No nutrition diagnosis at this time     Nutrition Interventions:   Food and/or Nutrient Delivery: Continue Current Diet, Continue Oral Nutrition Supplement  Nutrition Education/Counseling: No recommendation at this time  Coordination of Nutrition Care: No recommendation at this time       Goals:  Goals: PO intake 50% or greater, prior to discharge  Type of Goal: New goal  Previous Goal Met: New Goal    Nutrition Monitoring and Evaluation:   Behavioral-Environmental Outcomes: None Identified  Food/Nutrient Intake Outcomes: Food and Nutrient Intake, Supplement Intake  Physical Signs/Symptoms Outcomes: Biochemical Data, Weight    Discharge Planning:    No discharge needs at this time     Freida Hawkins MS, RDN, LDN  Weekend Contact: [phone]    
Occupational Therapy  Occupational Therapy Daily Treatment Note  Facility/Department: [address] STEPDOWN   Patient Name: Mahesh Brownlee        MRN: [id number]    : 1960    Date of Service: 2024    Discharge Recommendations  Discharge Recommendations: Patient would benefit from continued therapy after discharge       Chief Complaint   Patient presents with    Seizures    Altered Mental Status     Past Medical History:  has a past medical history of Adjustment disorder, AF (atrial fibrillation) ([facility]), AICD (automatic cardioverter/defibrillator) present, Alcohol dependence (HCC), CAD (coronary artery disease), Cardiomyopathy (HCC), CHF (congestive heart failure) (HCC), COPD (chronic obstructive pulmonary disease) (HCC), DDD (degenerative disc disease), lumbar, Herniated cervical disc, Hyperlipidemia, Hypertension, Major depression, Post laminectomy syndrome, Sciatica, Snores, Tobacco abuse, and Traumatic brain injury.  Past Surgical History:  has a past surgical history that includes back surgery; Rotator cuff repair (Right); Carpal tunnel release (Right); Nerve Block (2013); Nerve Block (N/A, 2013); other surgical history (2016); Tonsillectomy; pacemaker placement (2015); back surgery; other surgical history (2018); joint replacement (Right, 2018); and Cardiac electrophysiology study and ablation.    Assessment  Performance deficits / Impairments: Decreased functional mobility ;Decreased ADL status;Decreased safe awareness;Decreased cognition;Decreased endurance;Decreased balance;Decreased high-level IADLs  Prognosis: Good  Activity Tolerance  Activity Tolerance: Patient Tolerated treatment well;Patient limited by pain  Activity Tolerance Comments: seated rest breaks as needed with increased lower back pain, pt was ed for use of RW for UE support for EC/WS along with other AE and DME needs to ease ADL completion  Safety Devices  Type of Devices: Call light within reach;Gait 
Problem: OXYGENATION/RESPIRATORY FUNCTION  Goal: Patient will maintain patent airway  Outcome: Ongoing  Goal: Patient will achieve/maintain normal respiratory rate/effort  Respiratory rate and effort will be within normal limits for the patient  Outcome: Ongoing    Problem: MECHANICAL VENTILATION  Goal: Patient will maintain patent airway  Outcome: Ongoing  Goal: Oral health is maintained or improved  Outcome: Ongoing  Goal: ET tube will be managed safely  Outcome: Ongoing  Goal: Ability to express needs and understand communication  Outcome: Ongoing  Goal: Mobility/activity is maintained at optimum level for patient  Outcome: Ongoing    Problem: ASPIRATION PRECAUTIONS  Goal: Patient’s risk of aspiration is minimized  Outcome: Ongoing    Problem: SKIN INTEGRITY  Goal: Skin integrity is maintained or improved  Outcome: Ongoing                    
Spiritual Health History and Assessment/Progress Note  [facility]    Loneliness/Social Isolation,  ,  ,      Name: Mahesh Brownlee MRN: [id number]    Age: 64 y.o.     Sex: male   Language: English   Baptism: Protestant   Seizure (HCC)     Date: 11/15/2024            Total Time Calculated: 20 min              Spiritual Assessment began in [facility]        Referral/Consult From: Nurse   Encounter Overview/Reason: Loneliness/Social Isolation  Service Provided For: Patient    Janine, Belief, Meaning:   Patient has beliefs or practices that help with coping during difficult times  Family/Friends No family/friends present      Importance and Influence:  Patient has no beliefs influential to healthcare decision-making identified during this visit  Family/Friends No family/friends present    Community:  Patient Other: Has a children for support.    Family/Friends No family/friends present    Assessment and Plan of Care:     Patient Interventions include: Facilitated expression of thoughts and feelings, Explored spiritual coping/struggle/distress, Affirmed coping skills/support systems, and Facilitated life review and/ or legacy  Family/Friends Interventions include: No family/friends present    Patient Plan of Care: Spiritual Care available upon further referral  Family/Friends Plan of Care: Spiritual Care available upon further referral    Electronically signed by ELTON Hartman on 11/15/2024 at 2:32 PM     11/15/24 1423   Encounter Summary   Encounter Overview/Reason Loneliness/Social Isolation   Service Provided For Patient   Referral/Consult From Nurse   Support System Children   Last Encounter  11/15/24   Complexity of Encounter Moderate   Begin Time 1400   End Time  1420   Total Time Calculated 20 min   Spiritual/Emotional needs   Type Spiritual Support   Assessment/Intervention/Outcome   Assessment Calm;Coping;Sad   Intervention Active listening;Discussed illness injury and it’s 
Writer at bedside to evaluate patient. Patient noted to be tachypneic but has a low bicarb so no respiratory intervention needed at this time. Will continue to monitor.  
 Ht 1.803 m (5' 10.98\")   Wt 93.9 kg (207 lb)   SpO2 92%   BMI 28.88 kg/m²     GEN: NAD, pleasant, cooperative  CVS: Palpable pulses throughout, no JVD  CHEST: No signs of  distress, on room air  ABD: Soft, NTTP  NEURO:     MENTAL STATUS: AAOx3    LANG/SPEECH: Fluent, intact naming, repetition & comprehension    CRANIAL NERVES:    II: Pupils equal and reactive, intact visual fields    III, IV, VI: EOM intact, no gaze preference or deviation    V: normal    VII: no facial asymmetry    VIII: normal hearing to speech    MOTOR: 5/5 in both upper and lower extremities    REFLEXES: Normal reflexes throughout, bilateral flexor plantars    SENSORY: Normal to soft touch throughout    COORD: Normal finger to nose and heel to shin, no tremor, no dysmetria    Investigations   Laboratory Testing:  Recent Results (from the past 24 hour(s))   CK    Collection Time: 11/16/24  4:40 PM   Result Value Ref Range    Total CK 1,491 (H) 39 - 308 U/L   Hemoglobin and Hematocrit    Collection Time: 11/16/24  4:40 PM   Result Value Ref Range    Hemoglobin 12.3 (L) 13.0 - 17.0 g/dL    Hematocrit 36.6 (L) 40.7 - 50.3 %   Hemoglobin and Hematocrit    Collection Time: 11/17/24  1:23 AM   Result Value Ref Range    Hemoglobin 12.2 (L) 13.0 - 17.0 g/dL    Hematocrit 37.6 (L) 40.7 - 50.3 %   Comprehensive Metabolic Panel w/ Reflex to MG    Collection Time: 11/17/24  4:26 AM   Result Value Ref Range    Sodium 135 (L) 136 - 145 mmol/L    Potassium 2.9 (LL) 3.7 - 5.3 mmol/L    Chloride 99 98 - 107 mmol/L    CO2 27 20 - 31 mmol/L    Anion Gap 9 9 - 16 mmol/L    Glucose 106 (H) 74 - 99 mg/dL    BUN 5 (L) 8 - 23 mg/dL    Creatinine 0.6 (L) 0.7 - 1.2 mg/dL    Est, Glom Filt Rate >90 >60 mL/min/1.73m2    Calcium 7.9 (L) 8.6 - 10.4 mg/dL    Total Protein 5.4 (L) 6.6 - 8.7 g/dL    Albumin 3.1 (L) 3.5 - 5.2 g/dL    Albumin/Globulin Ratio 1.3 1.0 - 2.5    Total Bilirubin 0.5 0.0 - 1.2 mg/dL    Alkaline Phosphatase 84 40 - 129 U/L     (H) 10 - 50 U/L 
present    Electronically signed by Chaplain Roseanne on 11/14/2024 at 1:55 PM    
290* 219* 148*   BILITOT 0.5 0.5 0.4   ALKPHOS 83 84 84      MICROBIOLOGY:   Lab Results   Component Value Date/Time    CULTURE NO GROWTH 11/13/2024 04:34 PM       Imaging:    XR CHEST PORTABLE    Result Date: 11/14/2024  Pacemaker.  No acute pulmonary process. New endotracheal tube with the tip at the level of T4. An NG tube is present which courses below the level diaphragm.  The tip is not included on this film.     XR CHEST PORTABLE    Result Date: 11/14/2024  No radiographic evidence of acute cardiopulmonary disease.     CT Head W/O Contrast    Result Date: 11/13/2024  No acute intracranial abnormality.     CT CSpine W/O Contrast    Result Date: 11/13/2024  No acute abnormality of the cervical spine.     XR ABDOMEN FOR NG/OG/NE TUBE PLACEMENT    Result Date: 11/13/2024  Enteric tube in the stomach as above. No evidence of bowel obstruction.       ASSESSMENT & PLAN     Principal Problem:    Seizure (HCC)  Active Problems:    Paroxysmal atrial flutter (HCC)    Seizures (HCC)    Acute alcoholic intoxication with complication (HCC)    Atrial fibrillation (HCC)    Breakthrough seizure (HCC)    Non-traumatic rhabdomyolysis    Projectile vomiting with nausea  Resolved Problems:    * No resolved hospital problems. *    Concerns for meningitis/encephalitis- resolved  Workup as per Neurology team  F/u CSF studies  Currently off antibiotics     Seizures secondary to medication noncompliance/alcohol abuse  Remote history of left cerebellar stroke  Right-sided weakness  History of dementia  EtOH levels on admission 49  Keppra 500 mg twice daily  Management per primary team  On Aricept at home     Rhabdomyolysis secondary to seizures- Improving.  Continue to trend CK and myoglobin  Continue fluid therapy  Monitor renal function  Monitor urine output  Strict I's and O's     CAD s/p AICD  Chronic systolic heart failure with EF 50 to 55%  Hyperlipidemia  Paroxysmal atrial fibrillation on Xarelto 20 mg  S/p ablation procedure 
Exceptions  Arousal/Alertness: Delayed responses to stimuli  Following Commands: Follows one step commands with repetition;Follows one step commands with increased time  Attention Span: Attends with cues to redirect  Memory: Decreased recall of biographical Information;Decreased recall of recent events;Decreased short term memory  Safety Judgement: Decreased awareness of need for assistance;Decreased awareness of need for safety  Problem Solving: Assistance required to generate solutions;Impaired;Assistance required to implement solutions;Decreased awareness of errors;Assistance required to correct errors made;Assistance required to identify errors made  Insights: Decreased awareness of deficits  Initiation: Requires cues for some  Sequencing: Requires cues for some  Cognition Comment: pt impulsive, as soon as PT walked in the room and announced that I was from PT, pt immediately sat up in the EOB in spite of both bedrails being up; pt returned to supine per PT request    Objective  Temp: 98.1 °F (36.7 °C)  Pulse: 70  Heart Rate Source: Monitor  Respirations: 16  SpO2: 93 %  O2 Device: None (Room air) (pt on nasal canula, taken off for PT session, O2 sats 95% at end of PT session on room air; replaced nasal canula at end of PT session)  BP: (!) 162/70  MAP (Calculated): 101  BP Location: Right upper arm  BP Method: Automatic  Patient Position: High fowlers     Observation/Palpation  Posture: Fair  Observation: flexed posture in standing       PROM RLE (degrees)  RLE PROM: WFL  PROM LLE (degrees)  LLE PROM: WFL  PROM RUE (degrees)  RUE PROM: WFL  PROM LUE (degrees)  LUE PROM: WFL  Strength RLE  Strength RLE: WFL  Strength LLE  Strength LLE: WFL  Strength RUE  Strength RUE: WFL  Strength LUE  Strength LUE: WFL    Bed mobility  Rolling to Left: Modified independent  Rolling to Right: Modified independent  Supine to Sit: Modified independent  Sit to Supine: Modified independent  Scooting: Supervision  Bed Mobility 
on Xarelto, CAD, CM s/p AICD, and questionable seizure history who presents with alcohol-related seizures.    Impression:  Suspected provoked seizures in the setting of active alcohol use  History of bilateral cerebellar infarcts  Chronic left vertebral artery occlusion  Afib     Plan:  No need for CSF testing  Question need for Keppra given unremarkable prolonged EEG  Xarelto 20 mg qD   Interval medicine on board  Pending ARU placement      Patient was seen and discussed with attending physician.    Sunny Peters DO  Neurology Resident PGY-4  [facility]  11/18/2024    Copy sent to Dr. Roberts, Ira N, APRN - CNP    
previously in 2023 in presence of the children and patient DNRCC-A with intubation was selected.        OVERNIGHT EVENTS: CXR yesterday negative. LP being planned. Patient is on RA today s/p extubation yesterday. LTME continues.     MEDICATIONS  Current Medications    LORazepam  2 mg IntraVENous Once    atorvastatin  10 mg Oral Nightly    metoprolol tartrate  25 mg Oral BID    donepezil  5 mg Oral Daily    DULoxetine  60 mg Oral Daily    gabapentin  100 mg Oral BID    sacubitril-valsartan  1 tablet Oral BID    spironolactone  25 mg Oral Daily    folic acid  1 mg Oral Daily    sodium chloride flush  5-40 mL IntraVENous 2 times per day    [START ON 11/16/2024] thiamine  100 mg Oral Daily    scopolamine  1 patch TransDERmal Q72H    nicotine  1 patch TransDERmal Daily    metoclopramide  10 mg IntraVENous Q6H    vancomycin  1,000 mg IntraVENous Q12H    vancomycin (VANCOCIN) intermittent dosing (placeholder)   Other RX Placeholder    levETIRAcetam  500 mg IntraVENous Q12H    thiamine  500 mg IntraVENous Daily    cefTRIAXone (ROCEPHIN) IV  2,000 mg IntraVENous Q12H    acyclovir  10 mg/kg IntraVENous Q8H    sodium chloride flush  5-40 mL IntraVENous 2 times per day    [Held by provider] enoxaparin  40 mg SubCUTAneous Daily    ampicillin IV  2,000 mg IntraVENous 6 times per day    pantoprazole (PROTONIX) 40 mg in sodium chloride (PF) 0.9 % 10 mL injection  40 mg IntraVENous BID     oxyCODONE-acetaminophen, sodium chloride flush, sodium chloride, sodium chloride flush, sodium chloride, potassium chloride **OR** potassium alternative oral replacement **OR** potassium chloride, magnesium sulfate, ondansetron **OR** ondansetron, polyethylene glycol, acetaminophen **OR** acetaminophen  IV Drips/Infusions   sodium chloride      lactated ringers 100 mL/hr at 11/15/24 0230    sodium chloride       Home Medications  No current facility-administered medications on file prior to encounter.     Current Outpatient Medications on File 
(Static/dynamic sitting CGA w/ intermittent SBA, total time ~30 min. Contact w/ pt required majoirty of the time due to pt impulsivity w/ Max VC to remain seated. Pt demo. good overall seated balance w/ no noted LOB, pt capable of demo. unilateral/bilateral UE release)  Standing: With support (Static/dynamic standing CGA-Min A. Pt impulsive throughout requring Max VC and CGA for safety. Pt notably unsteady w/ 1x notable LOB due to knees buckling,requring Min A to correct. At sink-side pt requiring Mod VC due to hyperextension of BLE resulting in unsteadiness w/ Min A for overall balance. Pt utilizing RW requring VC for management and hand placement. Total time ~8 min.)    Transfers/Mobility  Bed mobility  Supine to Sit: Stand by assistance  Sit to Supine: Stand by assistance  Bed Mobility Comments: Pt initating supine to sit prior to OT instruction due to impulsivity. Pt completing bed mobility SBA.    Transfers  Sit to stand: Contact guard assistance  Stand to sit: Contact guard assistance  Transfer Comments: Pt completing sit <> stand x3, EOB and chair, CGA. Pt requring Max VC for hand placement utilizing RW, w/ poor return. Pt requring 1x rest break following functional activity sink-side in chair.         Functional Mobility: Minimal assistance;Increased time to complete  Functional Mobility Skilled Clinical Factors: Pt completing functional mobility utilizing RW w/ Min A required for safety due to moderate unsteadiness and impulsivity. Pt demo. distances from EOB to sink-side, ~5 steps to/from sink.          Patient Education  Patient Education  Education Given To: Patient  Education Provided Comments: Pt. ed on OT role, POC, transfer training, DME, safety awareness/line awareness, and importance of continued OT services w/ poor return.    Goals  Short Term Goals  Time Frame for Short Term Goals: Pt will: By Discharge  Short Term Goal 1: Demo IND w/ bed mobility in prep for ADL/IADLs  Short Term Goal 2: Demo 
protocol        HFpEF  - TTE 5/17/24: EF 55%  - I's and O's  - LR @100 mL/h          Consultations:   PHARMACY TO DOSE VANCOMYCIN  IP CONSULT TO NEUROCRITICAL CARE  IP CONSULT TO ETHICS  IP CONSULT TO PALLIATIVE CARE  IP CONSULT TO HOSPITALIST  IP CONSULT TO INTERNAL MEDICINE  IP CONSULT TO SPIRITUAL SERVICES  IP CONSULT TO SPIRITUAL SERVICES  IP CONSULT TO SOCIAL WORK  IP CONSULT TO VASCULAR ACCESS TEAM  IP CONSULT TO DIETITIAN      Viviane Reyna MD  PGY2 Neurology Resident  11/16/2024  11:49 AM    Copy sent to Ira Roberts, APRN - CNP   
vancomycin  - IR guided LP (Xarelto last 11/13 AM)  - Consider head and neck if right-sided weakness does not improve off sedation  - Goal SBP normotensive   - Neuro checks per protocol  EtOH abuse  - thiamine 500 mg daily x 3 days followed by 100 mg daily  - Folic acid 1 g daily    CARDIOVASCULAR:  History of hypertension   Sinus tachycardia, unclear cause  Elevated troponin, likely demand ischemia  Paroxysmal atrial fibrillation  - Goal SBP normotensive  - Hold home Xarelto given suspicion for CNS infection and possible need for lumbar puncture  - home metoprolol 25 mg twice daily  -Tachycardia resolved   - Continue telemetry    PULMONARY:  2L NC after extubation overnight   Oxygenating well, no respiratory distress   No O2 @ home, will discontinue     RENAL/FLUID/ELECTROLYTE:  Metabolic acidosis, unclear cause   - BUN 10/ Creatinine 1.1  - Monitor intake and output   -CK uptrend (318->880-> 1022)  -Repeat CK pending, patient tolerating PO intake, will increase IV fluid rate if CK continues to rise   - IVF: LR @ 100 ml/hr   - Trend lactic acid and CK   - Replace electrolytes PRN  - Daily BMP    GI/NUTRITION:  NUTRITION:  ADULT DIET; Regular  History of alcohol abuse and hyperammonemia  - Ammonia 41  - Bowel regimen: Glycloax as needed   - GI prophylaxis: PPI    ID:  Concern for possible CNS infection  -  Tmax 39.4 , improvement after tylenol overnight   - empiric CNS coverage with acyclovir, ampicillin, ceftriaxone, vancomycin  - LP with IR   - Follow-up infectious workup, respiratory panel, blood cultures today, and UA with reflex culture negative thus far.  - WBC 7.4  - Lactic has normalized   - Continue to monitor for fevers  - Daily CBC    HEME:   - H&H 11.5/35.4  - Platelets 158  - Daily CBC    ENDOCRINE:  - Continue to monitor blood glucose, goal <180    OTHER:  - PT/OT/ST     PROPHYLAXIS:  Stress ulcer: PPI    DVT PROPHYLAXIS:  - SCD sleeves - Thigh High    -Lovenox      DISPOSITION: Anticipate out of ICU

## 2024-11-18 NOTE — DISCHARGE SUMMARY
- CNP  [address]  [facility] [address]  [phone]    Follow up      Ira Roberts, RIVER - CNP  [address]  [facility] [address] [phone]          Viviane Reyna MD  [address] [address] [address]  [facility] [address]  [phone]    Follow up        Note that over 30 minutes was spent in preparing discharge papers, discussing discharge with patient, medication review, etc.      Sunny Peters DO  Neurology Resident PGY-4  Department of Neurology  [place], [place]  11/18/2024, 12:49 PM

## 2024-11-18 NOTE — CARE COORDINATION
Transitional planning: [facility] checked and patient authorization for [facility] approved for 11/17-11/20. Call to Rosangela at [facility] and left message that received approval and patient ready for discharge today.     Call back from Rosangela from @ [facility]. States is able to take patient today whenever can arrange for transport. Transport request faxed to [facility] for wheelchair transport. Patient made aware of approval and admission to facility today.

## 2024-11-18 NOTE — PLAN OF CARE
Problem: Chronic Conditions and Co-morbidities  Goal: Patient's chronic conditions and co-morbidity symptoms are monitored and maintained or improved  11/14/2024 1805 by Dixie Zavala RN  Outcome: Progressing  Flowsheets (Taken 11/14/2024 1700)  Care Plan - Patient's Chronic Conditions and Co-Morbidity Symptoms are Monitored and Maintained or Improved:   Monitor and assess patient's chronic conditions and comorbid symptoms for stability, deterioration, or improvement   Collaborate with multidisciplinary team to address chronic and comorbid conditions and prevent exacerbation or deterioration   Update acute care plan with appropriate goals if chronic or comorbid symptoms are exacerbated and prevent overall improvement and discharge  11/14/2024 0533 by Tiffany Torres RN  Outcome: Progressing     Problem: Discharge Planning  Goal: Discharge to home or other facility with appropriate resources  11/14/2024 1805 by Dixie Zavala RN  Outcome: Progressing  Flowsheets (Taken 11/14/2024 1700)  Discharge to home or other facility with appropriate resources:   Identify barriers to discharge with patient and caregiver   Arrange for needed discharge resources and transportation as appropriate   Identify discharge learning needs (meds, wound care, etc)   Refer to discharge planning if patient needs post-hospital services based on physician order or complex needs related to functional status, cognitive ability or social support system  11/14/2024 0533 by Tiffany Torres RN  Outcome: Progressing     Problem: Pain  Goal: Verbalizes/displays adequate comfort level or baseline comfort level  11/14/2024 1805 by Dixie Zavala RN  Outcome: Progressing  11/14/2024 0533 by Tiffany Torres RN  Outcome: Progressing     Problem: Safety - Adult  Goal: Free from fall injury  11/14/2024 1805 by Dixie Zavala RN  Outcome: Progressing  11/14/2024 0533 by Tiffany Torres RN  Outcome: Progressing     Problem: Respiratory - Adult  Goal: 
  Problem: Chronic Conditions and Co-morbidities  Goal: Patient's chronic conditions and co-morbidity symptoms are monitored and maintained or improved  11/15/2024 0605 by Liberty Acosta RN  Outcome: Progressing  11/14/2024 1805 by Dixie Zavala RN  Outcome: Progressing  Flowsheets (Taken 11/14/2024 1700)  Care Plan - Patient's Chronic Conditions and Co-Morbidity Symptoms are Monitored and Maintained or Improved:   Monitor and assess patient's chronic conditions and comorbid symptoms for stability, deterioration, or improvement   Collaborate with multidisciplinary team to address chronic and comorbid conditions and prevent exacerbation or deterioration   Update acute care plan with appropriate goals if chronic or comorbid symptoms are exacerbated and prevent overall improvement and discharge     Problem: Discharge Planning  Goal: Discharge to home or other facility with appropriate resources  11/15/2024 0605 by Liberty Acosta RN  Outcome: Progressing  11/14/2024 1805 by Dixie Zavala RN  Outcome: Progressing  Flowsheets (Taken 11/14/2024 1700)  Discharge to home or other facility with appropriate resources:   Identify barriers to discharge with patient and caregiver   Arrange for needed discharge resources and transportation as appropriate   Identify discharge learning needs (meds, wound care, etc)   Refer to discharge planning if patient needs post-hospital services based on physician order or complex needs related to functional status, cognitive ability or social support system     Problem: Pain  Goal: Verbalizes/displays adequate comfort level or baseline comfort level  11/15/2024 0605 by Liberty Acosta RN  Outcome: Progressing  11/14/2024 1805 by Dixie Zavala RN  Outcome: Progressing     Problem: Safety - Adult  Goal: Free from fall injury  11/15/2024 0605 by Liberty Acosta RN  Outcome: Progressing  11/14/2024 1805 by Dixie Zavala RN  Outcome: Progressing     Problem: Respiratory - Adult  Goal: Achieves 
  Problem: Chronic Conditions and Co-morbidities  Goal: Patient's chronic conditions and co-morbidity symptoms are monitored and maintained or improved  11/16/2024 1849 by Megha Naik RN  Outcome: Progressing  11/16/2024 0629 by Liberty Acosta RN  Outcome: Progressing     Problem: Discharge Planning  Goal: Discharge to home or other facility with appropriate resources  11/16/2024 1849 by Megha Naik, RN  Outcome: Progressing  11/16/2024 0629 by Liberty Acosta RN  Outcome: Progressing     Problem: Pain  Goal: Verbalizes/displays adequate comfort level or baseline comfort level  11/16/2024 1849 by Megha Naik RN  Outcome: Progressing  11/16/2024 0629 by Liberty Acosta RN  Outcome: Progressing     Problem: Safety - Adult  Goal: Free from fall injury  11/16/2024 1849 by Megha Naik, RN  Outcome: Progressing  11/16/2024 0629 by Liberty Acosta RN  Outcome: Progressing     Problem: Respiratory - Adult  Goal: Achieves optimal ventilation and oxygenation  11/16/2024 1849 by Megha Naik, RN  Outcome: Progressing  11/16/2024 0629 by Liberty Acosta RN  Outcome: Progressing     
  Problem: Chronic Conditions and Co-morbidities  Goal: Patient's chronic conditions and co-morbidity symptoms are monitored and maintained or improved  11/17/2024 0502 by Tamara Parr, RN  Outcome: Progressing  Flowsheets (Taken 11/16/2024 2000)  Care Plan - Patient's Chronic Conditions and Co-Morbidity Symptoms are Monitored and Maintained or Improved:   Collaborate with multidisciplinary team to address chronic and comorbid conditions and prevent exacerbation or deterioration   Monitor and assess patient's chronic conditions and comorbid symptoms for stability, deterioration, or improvement   Update acute care plan with appropriate goals if chronic or comorbid symptoms are exacerbated and prevent overall improvement and discharge  11/16/2024 1849 by Megha Naik, RN  Outcome: Progressing     Problem: Discharge Planning  Goal: Discharge to home or other facility with appropriate resources  11/17/2024 0502 by Tamara Parr, RN  Outcome: Progressing  Flowsheets (Taken 11/16/2024 2000)  Discharge to home or other facility with appropriate resources:   Identify barriers to discharge with patient and caregiver   Arrange for needed discharge resources and transportation as appropriate   Identify discharge learning needs (meds, wound care, etc)   Arrange for interpreters to assist at discharge as needed   Refer to discharge planning if patient needs post-hospital services based on physician order or complex needs related to functional status, cognitive ability or social support system  11/16/2024 1849 by Megha Naik, RN  Outcome: Progressing     Problem: Pain  Goal: Verbalizes/displays adequate comfort level or baseline comfort level  11/17/2024 0502 by Tamara Parr, RN  Outcome: Progressing  11/16/2024 1849 by Megha Naik, RN  Outcome: Progressing     Problem: Safety - Adult  Goal: Free from fall injury  11/17/2024 0502 by Tamara Parr, RN  Outcome: Progressing  Flowsheets (Taken 
  Problem: Chronic Conditions and Co-morbidities  Goal: Patient's chronic conditions and co-morbidity symptoms are monitored and maintained or improved  11/18/2024 0532 by Tamara Parr RN  Outcome: Progressing  Flowsheets (Taken 11/17/2024 2000)  Care Plan - Patient's Chronic Conditions and Co-Morbidity Symptoms are Monitored and Maintained or Improved:   Monitor and assess patient's chronic conditions and comorbid symptoms for stability, deterioration, or improvement   Collaborate with multidisciplinary team to address chronic and comorbid conditions and prevent exacerbation or deterioration   Update acute care plan with appropriate goals if chronic or comorbid symptoms are exacerbated and prevent overall improvement and discharge  11/17/2024 1552 by Berta Patrick, RN  Outcome: Progressing     Problem: Discharge Planning  Goal: Discharge to home or other facility with appropriate resources  11/18/2024 0532 by Tamara Parr RN  Outcome: Progressing  Flowsheets (Taken 11/17/2024 2000)  Discharge to home or other facility with appropriate resources:   Arrange for needed discharge resources and transportation as appropriate   Identify barriers to discharge with patient and caregiver   Identify discharge learning needs (meds, wound care, etc)   Arrange for interpreters to assist at discharge as needed   Refer to discharge planning if patient needs post-hospital services based on physician order or complex needs related to functional status, cognitive ability or social support system  11/17/2024 1552 by Berta Patrick RN  Outcome: Progressing     Problem: Pain  Goal: Verbalizes/displays adequate comfort level or baseline comfort level  11/18/2024 0532 by Tamara Parr, RN  Outcome: Progressing  11/17/2024 1552 by Berta Patrick, RN  Outcome: Progressing     Problem: Safety - Adult  Goal: Free from fall injury  11/18/2024 0532 by Tamara Parr, RN  Outcome: Progressing  Flowsheets (Taken 
  Problem: Chronic Conditions and Co-morbidities  Goal: Patient's chronic conditions and co-morbidity symptoms are monitored and maintained or improved  Outcome: Progressing     Problem: Discharge Planning  Goal: Discharge to home or other facility with appropriate resources  Outcome: Progressing     Problem: Pain  Goal: Verbalizes/displays adequate comfort level or baseline comfort level  Outcome: Progressing     Problem: Safety - Adult  Goal: Free from fall injury  11/16/2024 0629 by Liberty Acosta RN  Outcome: Progressing  11/15/2024 1649 by Renetta Soriano RN  Outcome: Progressing     Problem: Respiratory - Adult  Goal: Achieves optimal ventilation and oxygenation  Outcome: Progressing     Problem: Skin/Tissue Integrity  Goal: Absence of new skin breakdown  Description: 1.  Monitor for areas of redness and/or skin breakdown  2.  Assess vascular access sites hourly  3.  Every 4-6 hours minimum:  Change oxygen saturation probe site  4.  Every 4-6 hours:  If on nasal continuous positive airway pressure, respiratory therapy assess nares and determine need for appliance change or resting period.  Outcome: Progressing     Problem: ABCDS Injury Assessment  Goal: Absence of physical injury  Outcome: Progressing     
  Problem: Chronic Conditions and Co-morbidities  Goal: Patient's chronic conditions and co-morbidity symptoms are monitored and maintained or improved  Outcome: Progressing     Problem: Discharge Planning  Goal: Discharge to home or other facility with appropriate resources  Outcome: Progressing     Problem: Pain  Goal: Verbalizes/displays adequate comfort level or baseline comfort level  Outcome: Progressing     Problem: Safety - Adult  Goal: Free from fall injury  Outcome: Progressing     Problem: Respiratory - Adult  Goal: Achieves optimal ventilation and oxygenation  11/14/2024 0533 by Tiffany Torres RN  Outcome: Progressing  11/14/2024 0242 by Ayah Tinoco RCP  Outcome: Progressing     
  Problem: Respiratory - Adult  Goal: Achieves optimal ventilation and oxygenation  Outcome: Progressing     
  Problem: Safety - Adult  Goal: Free from fall injury  11/15/2024 1649 by Renetta Soriano, RN  Outcome: Progressing  11/15/2024 0605 by Liberty Acosta RN  Outcome: Progressing     
  Problem: Safety - Medical Restraint  Goal: Remains free of injury from restraints (Restraint for Interference with Medical Device)  Description: INTERVENTIONS:  1. Determine that other, less restrictive measures have been tried or would not be effective before applying the restraint  2. Evaluate the patient's condition at the time of restraint application  3. Inform patient/family regarding the reason for restraint  4. Q2H: Monitor safety, psychosocial status, comfort, nutrition and hydration  Outcome: Completed  Flowsheets  Taken 11/14/2024 0200  Remains free of injury from restraints (restraint for interference with medical device):   Determine that other, less restrictive measures have been tried or would not be effective before applying the restraint   Every 2 hours: Monitor safety, psychosocial status, comfort, nutrition and hydration  Taken 11/14/2024 0000  Remains free of injury from restraints (restraint for interference with medical device):   Determine that other, less restrictive measures have been tried or would not be effective before applying the restraint   Every 2 hours: Monitor safety, psychosocial status, comfort, nutrition and hydration  Taken 11/13/2024 2200  Remains free of injury from restraints (restraint for interference with medical device):   Determine that other, less restrictive measures have been tried or would not be effective before applying the restraint   Every 2 hours: Monitor safety, psychosocial status, comfort, nutrition and hydration  Taken 11/13/2024 2000  Remains free of injury from restraints (restraint for interference with medical device):   Determine that other, less restrictive measures have been tried or would not be effective before applying the restraint   Every 2 hours: Monitor safety, psychosocial status, comfort, nutrition and hydration     
Discussion had with patient this morning as patient is awake alert and oriented.  Discussed CODE STATUS.  Explained the difference between DNR comfort care and DNR comfort care arrest.  Patient wishes to be DNR Comfort Care arrest and would like everything done medically up until the point of CPR and intubation.  Given multiple recent changes in CODE STATUS will have palliative care see the patient to further discuss goals of care and CODE STATUS.  In the meantime DNR Comfort Care arrest paperwork filled out and placed in patient's chart.  
Keshav, Tamara, RN  Outcome: Progressing  Flowsheets (Taken 11/16/2024 2000)  Free From Fall Injury:   Instruct family/caregiver on patient safety   Based on caregiver fall risk screen, instruct family/caregiver to ask for assistance with transferring infant if caregiver noted to have fall risk factors     Problem: Respiratory - Adult  Goal: Achieves optimal ventilation and oxygenation  11/17/2024 1552 by Berta Patrick RN  Outcome: Progressing  11/17/2024 0502 by Tamara Parr RN  Outcome: Progressing  Flowsheets (Taken 11/16/2024 2000)  Achieves optimal ventilation and oxygenation:   Assess for changes in respiratory status   Assess for changes in mentation and behavior   Position to facilitate oxygenation and minimize respiratory effort   Oxygen supplementation based on oxygen saturation or arterial blood gases   Initiate smoking cessation protocol as indicated   Encourage broncho-pulmonary hygiene including cough, deep breathe, incentive spirometry   Assess the need for suctioning and aspirate as needed   Assess and instruct to report shortness of breath or any respiratory difficulty   Respiratory therapy support as indicated     Problem: Skin/Tissue Integrity  Goal: Absence of new skin breakdown  Description: 1.  Monitor for areas of redness and/or skin breakdown  2.  Assess vascular access sites hourly  3.  Every 4-6 hours minimum:  Change oxygen saturation probe site  4.  Every 4-6 hours:  If on nasal continuous positive airway pressure, respiratory therapy assess nares and determine need for appliance change or resting period.  11/17/2024 1552 by Berta Patrick RN  Outcome: Progressing  11/17/2024 0502 by Tamara Parr RN  Outcome: Progressing     Problem: ABCDS Injury Assessment  Goal: Absence of physical injury  11/17/2024 1552 by Berta Patrick RN  Outcome: Progressing  11/17/2024 0502 by Tamara Parr RN  Outcome: Progressing  Flowsheets (Taken 11/16/2024 2000)  Absence of 
RN  Outcome: Progressing  Flowsheets (Taken 11/17/2024 2000)  Free From Fall Injury:   Instruct family/caregiver on patient safety   Based on caregiver fall risk screen, instruct family/caregiver to ask for assistance with transferring infant if caregiver noted to have fall risk factors     Problem: Respiratory - Adult  Goal: Achieves optimal ventilation and oxygenation  11/18/2024 1401 by Nash Lewis RN  Outcome: Completed  11/18/2024 0532 by Tamara Parr RN  Outcome: Progressing  Flowsheets (Taken 11/17/2024 2000)  Achieves optimal ventilation and oxygenation:   Assess for changes in respiratory status   Assess for changes in mentation and behavior   Position to facilitate oxygenation and minimize respiratory effort   Oxygen supplementation based on oxygen saturation or arterial blood gases   Initiate smoking cessation protocol as indicated   Encourage broncho-pulmonary hygiene including cough, deep breathe, incentive spirometry     Problem: Skin/Tissue Integrity  Goal: Absence of new skin breakdown  Description: 1.  Monitor for areas of redness and/or skin breakdown  2.  Assess vascular access sites hourly  3.  Every 4-6 hours minimum:  Change oxygen saturation probe site  4.  Every 4-6 hours:  If on nasal continuous positive airway pressure, respiratory therapy assess nares and determine need for appliance change or resting period.  11/18/2024 1401 by Nash Lewis RN  Outcome: Completed  11/18/2024 0532 by Tamara aPrr RN  Outcome: Progressing     Problem: ABCDS Injury Assessment  Goal: Absence of physical injury  11/18/2024 1401 by Nash Lewis RN  Outcome: Completed  11/18/2024 0532 by Tamara Parr, RN  Outcome: Progressing  Flowsheets (Taken 11/17/2024 2000)  Absence of Physical Injury: Implement safety measures based on patient assessment

## 2024-11-18 NOTE — CARE COORDINATION
Met with pt after receiving a social work consult for \"consideration of rehab\".  Pt declined resources and stated that he doesn't have a problem with alcohol.

## 2024-12-12 ENCOUNTER — OFFICE VISIT (OUTPATIENT)
Dept: NEUROLOGY | Age: 64
End: 2024-12-12
Payer: MEDICARE

## 2024-12-12 VITALS
DIASTOLIC BLOOD PRESSURE: 68 MMHG | HEART RATE: 97 BPM | WEIGHT: 207 LBS | BODY MASS INDEX: 29.63 KG/M2 | SYSTOLIC BLOOD PRESSURE: 137 MMHG | HEIGHT: 70 IN

## 2024-12-12 DIAGNOSIS — I65.02 OCCLUSION AND STENOSIS OF LEFT VERTEBRAL ARTERY: Primary | ICD-10-CM

## 2024-12-12 DIAGNOSIS — Z86.73 HISTORY OF CVA (CEREBROVASCULAR ACCIDENT): ICD-10-CM

## 2024-12-12 PROCEDURE — G8484 FLU IMMUNIZE NO ADMIN: HCPCS | Performed by: PSYCHIATRY & NEUROLOGY

## 2024-12-12 PROCEDURE — 1111F DSCHRG MED/CURRENT MED MERGE: CPT | Performed by: PSYCHIATRY & NEUROLOGY

## 2024-12-12 PROCEDURE — G8417 CALC BMI ABV UP PARAM F/U: HCPCS | Performed by: PSYCHIATRY & NEUROLOGY

## 2024-12-12 PROCEDURE — 1123F ACP DISCUSS/DSCN MKR DOCD: CPT | Performed by: PSYCHIATRY & NEUROLOGY

## 2024-12-12 PROCEDURE — 4004F PT TOBACCO SCREEN RCVD TLK: CPT | Performed by: PSYCHIATRY & NEUROLOGY

## 2024-12-12 PROCEDURE — G8427 DOCREV CUR MEDS BY ELIG CLIN: HCPCS | Performed by: PSYCHIATRY & NEUROLOGY

## 2024-12-12 PROCEDURE — 3078F DIAST BP <80 MM HG: CPT | Performed by: PSYCHIATRY & NEUROLOGY

## 2024-12-12 PROCEDURE — 3017F COLORECTAL CA SCREEN DOC REV: CPT | Performed by: PSYCHIATRY & NEUROLOGY

## 2024-12-12 PROCEDURE — 3075F SYST BP GE 130 - 139MM HG: CPT | Performed by: PSYCHIATRY & NEUROLOGY

## 2024-12-12 PROCEDURE — 99215 OFFICE O/P EST HI 40 MIN: CPT | Performed by: PSYCHIATRY & NEUROLOGY

## 2024-12-12 ASSESSMENT — ENCOUNTER SYMPTOMS
COLOR CHANGE: 0
PHOTOPHOBIA: 0
VOICE CHANGE: 0
SHORTNESS OF BREATH: 0
VOMITING: 0
NAUSEA: 0
BACK PAIN: 1
COUGH: 0

## 2024-12-12 NOTE — PROGRESS NOTES
Endovascular Neuro[facility] - [place] [id number]  [place] [address]  P: [phone]  F: [phone]      Dear CAIN Eddy    HPI:     History of Present Illness  Mahesh Brownlee is a 64-year-old gentleman who last saw Dr. Scotty Benson in 08/2024. He has a history of atrial fibrillation, on Xarelto, CAD, COPD, hypertension, hyperlipidemia, and is a current smoker. 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 The MRI brain from 07/17/2024 did not show any new strokes. 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 He can ambulate independently for short distances but relies on a wheelchair for longer distances.    He experienced a seizure episode 2 weeks ago, necessitating a 5-day stay in the ICU/hospital. (Discharged 11-) This was not his first seizure episode (possibly etoh related). He continues to take Keppra for seizure management.    SOCIAL HISTORY  The patient is a current smoker.    MEDICATIONS  Xarelto, Keppra    .    Allergies   Allergen Reactions    Augmentin Es-600 [Amoxicillin-Pot Clavulanate]      Other reaction(s): Unknown    Sulfa Antibiotics Other (See Comments)     Current Outpatient Medications   Medication Sig Dispense Refill    nicotine (NICODERM CQ) 7 MG/24HR Place 1 patch onto the skin daily 30 patch 3    pantoprazole (PROTONIX) 40 MG tablet Take 1 tablet by mouth 2 times daily (before meals) 30 tablet 3    thiamine 100 MG tablet Take 1 tablet by mouth daily 30 tablet 3    escitalopram (LEXAPRO) 5 MG tablet Take 1 tablet by mouth daily      metoprolol succinate (TOPROL XL) 50 MG extended release tablet Take 1 tablet by mouth

## 2024-12-19 ENCOUNTER — APPOINTMENT (OUTPATIENT)
Dept: CT IMAGING | Age: 64
End: 2024-12-19
Payer: MEDICARE

## 2024-12-19 ENCOUNTER — APPOINTMENT (OUTPATIENT)
Dept: GENERAL RADIOLOGY | Age: 64
End: 2024-12-19
Payer: MEDICARE

## 2024-12-19 ENCOUNTER — HOSPITAL ENCOUNTER (INPATIENT)
Age: 64
LOS: 6 days | Discharge: HOME | End: 2024-12-26
Attending: EMERGENCY MEDICINE | Admitting: STUDENT IN AN ORGANIZED HEALTH CARE EDUCATION/TRAINING PROGRAM
Payer: MEDICARE

## 2024-12-19 DIAGNOSIS — J90 PLEURAL EFFUSION ON LEFT: ICD-10-CM

## 2024-12-19 DIAGNOSIS — G40.919 BREAKTHROUGH SEIZURE (HCC): ICD-10-CM

## 2024-12-19 DIAGNOSIS — J18.9 PNEUMONIA DUE TO INFECTIOUS ORGANISM, UNSPECIFIED LATERALITY, UNSPECIFIED PART OF LUNG: Primary | ICD-10-CM

## 2024-12-19 DIAGNOSIS — R94.31 PROLONGED Q-T INTERVAL ON ECG: ICD-10-CM

## 2024-12-19 DIAGNOSIS — R41.82 ALTERED MENTAL STATUS, UNSPECIFIED ALTERED MENTAL STATUS TYPE: ICD-10-CM

## 2024-12-19 LAB
ALBUMIN SERPL-MCNC: 4.3 G/DL (ref 3.5–5.2)
ALBUMIN/GLOB SERPL: 1.2 {RATIO} (ref 1–2.5)
ALP SERPL-CCNC: 78 U/L (ref 40–129)
ALT SERPL-CCNC: 27 U/L (ref 10–50)
AMMONIA PLAS-SCNC: 25 UMOL/L (ref 16–60)
ANION GAP SERPL CALCULATED.3IONS-SCNC: 18 MMOL/L (ref 9–16)
APAP SERPL-MCNC: <5 UG/ML (ref 10–30)
AST SERPL-CCNC: 123 U/L (ref 10–50)
BASOPHILS # BLD: <0.03 K/UL (ref 0–0.2)
BASOPHILS NFR BLD: 0 % (ref 0–2)
BILIRUB SERPL-MCNC: 1.1 MG/DL (ref 0–1.2)
BUN SERPL-MCNC: 15 MG/DL (ref 8–23)
CA-I BLD-SCNC: 1.13 MMOL/L (ref 1.13–1.33)
CALCIUM SERPL-MCNC: 9.8 MG/DL (ref 8.6–10.4)
CHLORIDE SERPL-SCNC: 97 MMOL/L (ref 98–107)
CO2 SERPL-SCNC: 20 MMOL/L (ref 20–31)
CREAT SERPL-MCNC: 1.1 MG/DL (ref 0.7–1.2)
EOSINOPHIL # BLD: <0.03 K/UL (ref 0–0.44)
EOSINOPHILS RELATIVE PERCENT: 0 % (ref 1–4)
ERYTHROCYTE [DISTWIDTH] IN BLOOD BY AUTOMATED COUNT: 13.4 % (ref 11.8–14.4)
ETHANOL PERCENT: <0.01 %
ETHANOLAMINE SERPL-MCNC: <10 MG/DL (ref 0–0.08)
GFR, ESTIMATED: 74 ML/MIN/1.73M2
GLUCOSE SERPL-MCNC: 102 MG/DL (ref 74–99)
HCT VFR BLD AUTO: 41.3 % (ref 40.7–50.3)
HGB BLD-MCNC: 13.7 G/DL (ref 13–17)
IMM GRANULOCYTES # BLD AUTO: 0.1 K/UL (ref 0–0.3)
IMM GRANULOCYTES NFR BLD: 1 %
INR PPP: 1
LEVETIRACETAM SERPL-MCNC: <2 UG/ML
LYMPHOCYTES NFR BLD: 1.82 K/UL (ref 1.1–3.7)
LYMPHOCYTES RELATIVE PERCENT: 10 % (ref 24–43)
MCH RBC QN AUTO: 30.9 PG (ref 25.2–33.5)
MCHC RBC AUTO-ENTMCNC: 33.2 G/DL (ref 28.4–34.8)
MCV RBC AUTO: 93 FL (ref 82.6–102.9)
MONOCYTES NFR BLD: 1.15 K/UL (ref 0.1–1.2)
MONOCYTES NFR BLD: 6 % (ref 3–12)
NEUTROPHILS NFR BLD: 83 % (ref 36–65)
NEUTS SEG NFR BLD: 15.48 K/UL (ref 1.5–8.1)
NRBC BLD-RTO: 0 PER 100 WBC
PARTIAL THROMBOPLASTIN TIME: 26.5 SEC (ref 23–36.5)
PLATELET # BLD AUTO: 231 K/UL (ref 138–453)
PMV BLD AUTO: 9.8 FL (ref 8.1–13.5)
POTASSIUM SERPL-SCNC: 3.5 MMOL/L (ref 3.7–5.3)
PROT SERPL-MCNC: 7.9 G/DL (ref 6.6–8.7)
PROTHROMBIN TIME: 13 SEC (ref 11.7–14.9)
RBC # BLD AUTO: 4.44 M/UL (ref 4.21–5.77)
SALICYLATES SERPL-MCNC: <0.5 MG/DL (ref 0–10)
SODIUM SERPL-SCNC: 135 MMOL/L (ref 136–145)
TSH SERPL DL<=0.05 MIU/L-ACNC: 2.83 UIU/ML (ref 0.27–4.2)
WBC OTHER # BLD: 18.6 K/UL (ref 3.5–11.3)

## 2024-12-19 PROCEDURE — 80143 DRUG ASSAY ACETAMINOPHEN: CPT

## 2024-12-19 PROCEDURE — 85025 COMPLETE CBC W/AUTO DIFF WBC: CPT

## 2024-12-19 PROCEDURE — 6360000004 HC RX CONTRAST MEDICATION

## 2024-12-19 PROCEDURE — 0202U NFCT DS 22 TRGT SARS-COV-2: CPT

## 2024-12-19 PROCEDURE — 96367 TX/PROPH/DG ADDL SEQ IV INF: CPT

## 2024-12-19 PROCEDURE — 70450 CT HEAD/BRAIN W/O DYE: CPT

## 2024-12-19 PROCEDURE — 80179 DRUG ASSAY SALICYLATE: CPT

## 2024-12-19 PROCEDURE — 2500000003 HC RX 250 WO HCPCS

## 2024-12-19 PROCEDURE — 80177 DRUG SCRN QUAN LEVETIRACETAM: CPT

## 2024-12-19 PROCEDURE — 6360000002 HC RX W HCPCS

## 2024-12-19 PROCEDURE — 87040 BLOOD CULTURE FOR BACTERIA: CPT

## 2024-12-19 PROCEDURE — 96375 TX/PRO/DX INJ NEW DRUG ADDON: CPT

## 2024-12-19 PROCEDURE — 85610 PROTHROMBIN TIME: CPT

## 2024-12-19 PROCEDURE — 82330 ASSAY OF CALCIUM: CPT

## 2024-12-19 PROCEDURE — 82140 ASSAY OF AMMONIA: CPT

## 2024-12-19 PROCEDURE — 96365 THER/PROPH/DIAG IV INF INIT: CPT

## 2024-12-19 PROCEDURE — 99285 EMERGENCY DEPT VISIT HI MDM: CPT

## 2024-12-19 PROCEDURE — 70496 CT ANGIOGRAPHY HEAD: CPT

## 2024-12-19 PROCEDURE — 80053 COMPREHEN METABOLIC PANEL: CPT

## 2024-12-19 PROCEDURE — 85730 THROMBOPLASTIN TIME PARTIAL: CPT

## 2024-12-19 PROCEDURE — 71046 X-RAY EXAM CHEST 2 VIEWS: CPT

## 2024-12-19 PROCEDURE — G0480 DRUG TEST DEF 1-7 CLASSES: HCPCS

## 2024-12-19 PROCEDURE — 84443 ASSAY THYROID STIM HORMONE: CPT

## 2024-12-19 PROCEDURE — 93005 ELECTROCARDIOGRAM TRACING: CPT | Performed by: INTERNAL MEDICINE

## 2024-12-19 PROCEDURE — 2580000003 HC RX 258

## 2024-12-19 PROCEDURE — 96368 THER/DIAG CONCURRENT INF: CPT

## 2024-12-19 RX ORDER — 0.9 % SODIUM CHLORIDE 0.9 %
1000 INTRAVENOUS SOLUTION INTRAVENOUS ONCE
Status: COMPLETED | OUTPATIENT
Start: 2024-12-19 | End: 2024-12-20

## 2024-12-19 RX ORDER — LORAZEPAM 2 MG/ML
1 INJECTION INTRAMUSCULAR ONCE
Status: COMPLETED | OUTPATIENT
Start: 2024-12-19 | End: 2024-12-19

## 2024-12-19 RX ORDER — MAGNESIUM SULFATE IN WATER 40 MG/ML
2000 INJECTION, SOLUTION INTRAVENOUS ONCE
Status: COMPLETED | OUTPATIENT
Start: 2024-12-19 | End: 2024-12-19

## 2024-12-19 RX ORDER — IOPAMIDOL 755 MG/ML
75 INJECTION, SOLUTION INTRAVASCULAR
Status: COMPLETED | OUTPATIENT
Start: 2024-12-19 | End: 2024-12-19

## 2024-12-19 RX ADMIN — SODIUM CHLORIDE 1000 ML: 9 INJECTION, SOLUTION INTRAVENOUS at 22:51

## 2024-12-19 RX ADMIN — THIAMINE HYDROCHLORIDE: 100 INJECTION, SOLUTION INTRAMUSCULAR; INTRAVENOUS at 19:58

## 2024-12-19 RX ADMIN — LORAZEPAM 1 MG: 2 INJECTION INTRAMUSCULAR; INTRAVENOUS at 21:16

## 2024-12-19 RX ADMIN — CEFTRIAXONE SODIUM 2000 MG: 2 INJECTION, POWDER, FOR SOLUTION INTRAMUSCULAR; INTRAVENOUS at 22:47

## 2024-12-19 RX ADMIN — MAGNESIUM SULFATE HEPTAHYDRATE 2000 MG: 40 INJECTION, SOLUTION INTRAVENOUS at 19:28

## 2024-12-19 RX ADMIN — AZITHROMYCIN MONOHYDRATE 500 MG: 500 INJECTION, POWDER, LYOPHILIZED, FOR SOLUTION INTRAVENOUS at 22:51

## 2024-12-19 RX ADMIN — IOPAMIDOL 75 ML: 755 INJECTION, SOLUTION INTRAVENOUS at 20:54

## 2024-12-19 ASSESSMENT — PAIN - FUNCTIONAL ASSESSMENT: PAIN_FUNCTIONAL_ASSESSMENT: 0-10

## 2024-12-19 ASSESSMENT — PAIN SCALES - GENERAL: PAINLEVEL_OUTOF10: 8

## 2024-12-19 NOTE — ED NOTES
Patient came to ed via triage with complaint of seizures. Visitor states that he possibly had a seizure, she is unsure. States she is a friend of his and she noticed he hasn't been acting right. She is unaware of his medical history. Pt is currently Aox2, unaware of his medical hx. Pt unsure if he has a seizure history. Unsure if he fell, or had LOC. + Back pain but states he has hx of back surgeries.     Visitor states that patient is a daily drinker and hasn't had a drink since Tuesday.     VSS, NAD, AOx2. Patient resting in bed, respirations even and unlabored. Call light in reach.  Pt connected to telemetry, all alarms on and audible. Seizure precautions maintained.

## 2024-12-19 NOTE — ED PROVIDER NOTES
[facility] EMERGENCY DEPARTMENT  Emergency Department Encounter  Emergency Medicine Resident     Pt Name:Mahesh Brownlee  MRN: [id number]  Birthdate 1960  Date of evaluation: 12/19/24  PCP:  Ira Roberts, RIVER - CNP  Note Started: 6:57 PM EST      CHIEF COMPLAINT       Chief Complaint   Patient presents with    Seizures     Possibly post ictal/unwitnessed. Daily drinker, hasn't drank since tuesday       HISTORY OF PRESENT ILLNESS  (Location/Symptom, Timing/Onset, Context/Setting, Quality, Duration, Modifying Factors, Severity.)      Mahesh Brownlee is a 64 y.o. male with significant pertinent history of previous seizure, on Keppra who presents with altered mental status and potential concerns for no alcohol drinking for 24 to 48 hours after patient apparently drinks 18 beers per day.    Patient presented altered, poor historian, however according to family member that spoke to EMS the patient has concerns for alcohol withdrawal and has not drank for 2 days.  Also apparent concern for seizure, however no seizure activity seen on arrival or while in the emergency department.    Review of systems unable to be obtained as the patient is confused and does not feel any signs or symptoms at this time.  Concerns for infectious process due to SIRS criteria, CT and CTA for altered mental status, will continue to assess.    PAST MEDICAL / SURGICAL / SOCIAL / FAMILY HISTORY      has a past medical history of Adjustment disorder, AF (atrial fibrillation) ([facility]), AICD (automatic cardioverter/defibrillator) present, Alcohol dependence ([facility]), CAD (coronary artery disease), Cardiomyopathy (HCC), CHF (congestive heart failure) ([facility]), COPD (chronic obstructive pulmonary disease) ([facility]), DDD (degenerative disc disease), lumbar, Herniated cervical disc, Hyperlipidemia, Hypertension, Major depression, Post laminectomy syndrome, Sciatica, Snores, Tobacco abuse, and Traumatic brain injury.       has a past surgical  thisnote were completed with a voice recognition program.  Efforts were made to edit the dictations but occasionally words are mis-transcribed.)

## 2024-12-19 NOTE — ED PROVIDER NOTES
[facility]   Emergency Department  Faculty Attestation       I performed a history and physical examination of the patient and discussed management with the resident. I reviewed the resident’s note and agree with the documented findings including all diagnostic interpretations and plan of care. Any areas of disagreement are noted on the chart. I was personally present for the key portions of any procedures. I have documented in the chart those procedures where I was not present during the key portions. I have reviewed the emergency nurses triage note. I agree with the chief complaint, past medical history, past surgical history, allergies, medications, social and family history as documented unless otherwise noted below.  For Physician Assistant/ Nurse Practitioner cases/documentation I have personally evaluated this patient and have completed at least one if not all key elements of the E/M (history, physical exam, and MDM). Additional findings are as noted.    Patient Name: Mahesh Brownlee  MRN: [id number]  : 1960  Primary Care Physician: Ira Roberts, RIVER - CNP    Date of evaluationa: 2024   Note Started: 6:50 PM EST    Pertinent Comments     Chief Complaint:   Chief Complaint   Patient presents with    Seizures     Possibly post ictal/unwitnessed. Daily drinker, hasn't drank since tuesday        Initial vitals: (If not listed, please see nursing documentation)  ED Triage Vitals [24 1842]   BP Systolic BP Percentile Diastolic BP Percentile Temp Temp Source Pulse Respirations SpO2   (!) 134/92 -- -- 97.6 °F (36.4 °C) Oral (!) 104 18 99 %      Height Weight - Scale         1.778 m (5' 10\") 93.9 kg (207 lb)              HPI/PE/Impression:  This is a 64 y.o. male who presents to the Emergency Department drinks about 18 beers a day, but has not drank since Tuesday.  Patient is oriented to his name, but does not know the year does not know where he is at.  Unable to write any  management. Critical care time 15 minutes, excluding any documented procedures.    Patt Melton MD  Attending Emergency Physician         Patt Melton MD  12/19/24 7333

## 2024-12-20 PROBLEM — J18.9 COMMUNITY ACQUIRED PNEUMONIA OF RIGHT LUNG, UNSPECIFIED PART OF LUNG: Status: ACTIVE | Noted: 2024-12-20

## 2024-12-20 LAB
AMPHET UR QL SCN: NEGATIVE
B PARAP IS1001 DNA NPH QL NAA+NON-PROBE: NOT DETECTED
B PERT DNA SPEC QL NAA+PROBE: NOT DETECTED
BARBITURATES UR QL SCN: NEGATIVE
BENZODIAZ UR QL: NEGATIVE
C PNEUM DNA NPH QL NAA+NON-PROBE: NOT DETECTED
CANNABINOIDS UR QL SCN: POSITIVE
CK SERPL-CCNC: 4372 U/L (ref 39–308)
COCAINE UR QL SCN: NEGATIVE
EKG ATRIAL RATE: 87 BPM
EKG P AXIS: 81 DEGREES
EKG P-R INTERVAL: 144 MS
EKG Q-T INTERVAL: 416 MS
EKG QRS DURATION: 102 MS
EKG QTC CALCULATION (BAZETT): 500 MS
EKG R AXIS: 49 DEGREES
EKG T AXIS: 64 DEGREES
EKG VENTRICULAR RATE: 87 BPM
FENTANYL UR QL: NEGATIVE
FLUAV RNA NPH QL NAA+NON-PROBE: NOT DETECTED
FLUBV RNA NPH QL NAA+NON-PROBE: NOT DETECTED
FOLATE SERPL-MCNC: 18.9 NG/ML (ref 4.8–24.2)
HADV DNA NPH QL NAA+NON-PROBE: NOT DETECTED
HCOV 229E RNA NPH QL NAA+NON-PROBE: NOT DETECTED
HCOV HKU1 RNA NPH QL NAA+NON-PROBE: NOT DETECTED
HCOV NL63 RNA NPH QL NAA+NON-PROBE: NOT DETECTED
HCOV OC43 RNA NPH QL NAA+NON-PROBE: NOT DETECTED
HMPV RNA NPH QL NAA+NON-PROBE: NOT DETECTED
HPIV1 RNA NPH QL NAA+NON-PROBE: NOT DETECTED
HPIV2 RNA NPH QL NAA+NON-PROBE: NOT DETECTED
HPIV3 RNA NPH QL NAA+NON-PROBE: NOT DETECTED
HPIV4 RNA NPH QL NAA+NON-PROBE: NOT DETECTED
M PNEUMO DNA NPH QL NAA+NON-PROBE: NOT DETECTED
METHADONE UR QL: NEGATIVE
MYOGLOBIN SERPL-MCNC: 474 NG/ML (ref 28–72)
OPIATES UR QL SCN: NEGATIVE
OXYCODONE UR QL SCN: NEGATIVE
PCP UR QL SCN: NEGATIVE
RSV RNA NPH QL NAA+NON-PROBE: NOT DETECTED
RV+EV RNA NPH QL NAA+NON-PROBE: NOT DETECTED
SARS-COV-2 RNA NPH QL NAA+NON-PROBE: NOT DETECTED
SPECIMEN DESCRIPTION: NORMAL
TEST INFORMATION: ABNORMAL
TROPONIN I SERPL HS-MCNC: 34 NG/L (ref 0–22)
VIT B12 SERPL-MCNC: 366 PG/ML (ref 232–1245)

## 2024-12-20 PROCEDURE — 6360000002 HC RX W HCPCS: Performed by: STUDENT IN AN ORGANIZED HEALTH CARE EDUCATION/TRAINING PROGRAM

## 2024-12-20 PROCEDURE — 99223 1ST HOSP IP/OBS HIGH 75: CPT | Performed by: PSYCHIATRY & NEUROLOGY

## 2024-12-20 PROCEDURE — 94640 AIRWAY INHALATION TREATMENT: CPT

## 2024-12-20 PROCEDURE — 82607 VITAMIN B-12: CPT

## 2024-12-20 PROCEDURE — 93005 ELECTROCARDIOGRAM TRACING: CPT | Performed by: STUDENT IN AN ORGANIZED HEALTH CARE EDUCATION/TRAINING PROGRAM

## 2024-12-20 PROCEDURE — 83874 ASSAY OF MYOGLOBIN: CPT

## 2024-12-20 PROCEDURE — 2060000000 HC ICU INTERMEDIATE R&B

## 2024-12-20 PROCEDURE — 6370000000 HC RX 637 (ALT 250 FOR IP): Performed by: PSYCHIATRY & NEUROLOGY

## 2024-12-20 PROCEDURE — 2580000003 HC RX 258: Performed by: STUDENT IN AN ORGANIZED HEALTH CARE EDUCATION/TRAINING PROGRAM

## 2024-12-20 PROCEDURE — 81001 URINALYSIS AUTO W/SCOPE: CPT

## 2024-12-20 PROCEDURE — 6360000002 HC RX W HCPCS

## 2024-12-20 PROCEDURE — 94761 N-INVAS EAR/PLS OXIMETRY MLT: CPT

## 2024-12-20 PROCEDURE — 99223 1ST HOSP IP/OBS HIGH 75: CPT | Performed by: STUDENT IN AN ORGANIZED HEALTH CARE EDUCATION/TRAINING PROGRAM

## 2024-12-20 PROCEDURE — 84484 ASSAY OF TROPONIN QUANT: CPT

## 2024-12-20 PROCEDURE — 93010 ELECTROCARDIOGRAM REPORT: CPT | Performed by: INTERNAL MEDICINE

## 2024-12-20 PROCEDURE — 80307 DRUG TEST PRSMV CHEM ANLYZR: CPT

## 2024-12-20 PROCEDURE — 6370000000 HC RX 637 (ALT 250 FOR IP)

## 2024-12-20 PROCEDURE — 82746 ASSAY OF FOLIC ACID SERUM: CPT

## 2024-12-20 PROCEDURE — 82550 ASSAY OF CK (CPK): CPT

## 2024-12-20 PROCEDURE — 99222 1ST HOSP IP/OBS MODERATE 55: CPT | Performed by: NURSE PRACTITIONER

## 2024-12-20 PROCEDURE — 36415 COLL VENOUS BLD VENIPUNCTURE: CPT

## 2024-12-20 PROCEDURE — 2500000003 HC RX 250 WO HCPCS

## 2024-12-20 RX ORDER — CHLORDIAZEPOXIDE HYDROCHLORIDE 5 MG/1
5 CAPSULE, GELATIN COATED ORAL 4 TIMES DAILY
Status: DISCONTINUED | OUTPATIENT
Start: 2024-12-20 | End: 2024-12-20

## 2024-12-20 RX ORDER — LEVETIRACETAM 500 MG/5ML
1000 INJECTION, SOLUTION, CONCENTRATE INTRAVENOUS ONCE
Status: DISCONTINUED | OUTPATIENT
Start: 2024-12-20 | End: 2024-12-20

## 2024-12-20 RX ORDER — HYDRALAZINE HYDROCHLORIDE 20 MG/ML
10 INJECTION INTRAMUSCULAR; INTRAVENOUS EVERY 6 HOURS PRN
Status: DISCONTINUED | OUTPATIENT
Start: 2024-12-20 | End: 2024-12-26 | Stop reason: HOSPADM

## 2024-12-20 RX ORDER — ONDANSETRON 4 MG/1
4 TABLET, ORALLY DISINTEGRATING ORAL EVERY 8 HOURS PRN
Status: DISCONTINUED | OUTPATIENT
Start: 2024-12-20 | End: 2024-12-26 | Stop reason: HOSPADM

## 2024-12-20 RX ORDER — ALBUTEROL SULFATE 0.83 MG/ML
2.5 SOLUTION RESPIRATORY (INHALATION)
Status: DISCONTINUED | OUTPATIENT
Start: 2024-12-20 | End: 2024-12-20

## 2024-12-20 RX ORDER — GUAIFENESIN/DEXTROMETHORPHAN 100-10MG/5
5 SYRUP ORAL EVERY 4 HOURS PRN
Status: DISCONTINUED | OUTPATIENT
Start: 2024-12-20 | End: 2024-12-26 | Stop reason: HOSPADM

## 2024-12-20 RX ORDER — LANOLIN ALCOHOL/MO/W.PET/CERES
100 CREAM (GRAM) TOPICAL DAILY
Status: DISCONTINUED | OUTPATIENT
Start: 2024-12-27 | End: 2024-12-23

## 2024-12-20 RX ORDER — ALBUTEROL SULFATE 0.83 MG/ML
2.5 SOLUTION RESPIRATORY (INHALATION)
Status: DISCONTINUED | OUTPATIENT
Start: 2024-12-20 | End: 2024-12-26 | Stop reason: HOSPADM

## 2024-12-20 RX ORDER — THIAMINE HYDROCHLORIDE 100 MG/ML
500 INJECTION, SOLUTION INTRAMUSCULAR; INTRAVENOUS EVERY 8 HOURS
Status: COMPLETED | OUTPATIENT
Start: 2024-12-20 | End: 2024-12-21

## 2024-12-20 RX ORDER — LEVETIRACETAM 500 MG/1
500 TABLET ORAL 2 TIMES DAILY
Status: DISCONTINUED | OUTPATIENT
Start: 2024-12-20 | End: 2024-12-20

## 2024-12-20 RX ORDER — LORAZEPAM 2 MG/1
2 TABLET ORAL
Status: DISCONTINUED | OUTPATIENT
Start: 2024-12-20 | End: 2024-12-26 | Stop reason: HOSPADM

## 2024-12-20 RX ORDER — MULTIVITAMIN WITH IRON
1000 TABLET ORAL DAILY
Status: DISCONTINUED | OUTPATIENT
Start: 2024-12-20 | End: 2024-12-23

## 2024-12-20 RX ORDER — SODIUM CHLORIDE 0.9 % (FLUSH) 0.9 %
5-40 SYRINGE (ML) INJECTION PRN
Status: DISCONTINUED | OUTPATIENT
Start: 2024-12-20 | End: 2024-12-26 | Stop reason: HOSPADM

## 2024-12-20 RX ORDER — LORAZEPAM 2 MG/ML
2 INJECTION INTRAMUSCULAR
Status: DISCONTINUED | OUTPATIENT
Start: 2024-12-20 | End: 2024-12-26 | Stop reason: HOSPADM

## 2024-12-20 RX ORDER — DONEPEZIL HYDROCHLORIDE 5 MG/1
5 TABLET, FILM COATED ORAL DAILY
Status: DISCONTINUED | OUTPATIENT
Start: 2024-12-20 | End: 2024-12-26 | Stop reason: HOSPADM

## 2024-12-20 RX ORDER — POTASSIUM CHLORIDE 1500 MG/1
40 TABLET, EXTENDED RELEASE ORAL PRN
Status: DISCONTINUED | OUTPATIENT
Start: 2024-12-20 | End: 2024-12-26 | Stop reason: HOSPADM

## 2024-12-20 RX ORDER — METOPROLOL TARTRATE 1 MG/ML
5 INJECTION, SOLUTION INTRAVENOUS EVERY 6 HOURS PRN
Status: DISCONTINUED | OUTPATIENT
Start: 2024-12-20 | End: 2024-12-26 | Stop reason: HOSPADM

## 2024-12-20 RX ORDER — MAGNESIUM SULFATE IN WATER 40 MG/ML
2000 INJECTION, SOLUTION INTRAVENOUS PRN
Status: DISCONTINUED | OUTPATIENT
Start: 2024-12-20 | End: 2024-12-26 | Stop reason: HOSPADM

## 2024-12-20 RX ORDER — BUPRENORPHINE 7.5 UG/H
1 PATCH TRANSDERMAL WEEKLY
Status: DISCONTINUED | OUTPATIENT
Start: 2024-12-27 | End: 2024-12-26 | Stop reason: HOSPADM

## 2024-12-20 RX ORDER — IPRATROPIUM BROMIDE AND ALBUTEROL SULFATE 2.5; .5 MG/3ML; MG/3ML
1 SOLUTION RESPIRATORY (INHALATION)
Status: DISCONTINUED | OUTPATIENT
Start: 2024-12-20 | End: 2024-12-26 | Stop reason: HOSPADM

## 2024-12-20 RX ORDER — METOPROLOL SUCCINATE 50 MG/1
50 TABLET, EXTENDED RELEASE ORAL DAILY
Status: DISCONTINUED | OUTPATIENT
Start: 2024-12-20 | End: 2024-12-24

## 2024-12-20 RX ORDER — SODIUM CHLORIDE 0.9 % (FLUSH) 0.9 %
5-40 SYRINGE (ML) INJECTION EVERY 12 HOURS SCHEDULED
Status: DISCONTINUED | OUTPATIENT
Start: 2024-12-20 | End: 2024-12-26 | Stop reason: HOSPADM

## 2024-12-20 RX ORDER — GUAIFENESIN 600 MG/1
600 TABLET, EXTENDED RELEASE ORAL 2 TIMES DAILY
Status: DISCONTINUED | OUTPATIENT
Start: 2024-12-20 | End: 2024-12-26 | Stop reason: HOSPADM

## 2024-12-20 RX ORDER — NICOTINE 21 MG/24HR
1 PATCH, TRANSDERMAL 24 HOURS TRANSDERMAL DAILY
Status: DISCONTINUED | OUTPATIENT
Start: 2024-12-20 | End: 2024-12-26 | Stop reason: HOSPADM

## 2024-12-20 RX ORDER — AZITHROMYCIN 250 MG/1
500 TABLET, FILM COATED ORAL EVERY 24 HOURS
Status: COMPLETED | OUTPATIENT
Start: 2024-12-20 | End: 2024-12-22

## 2024-12-20 RX ORDER — DULOXETIN HYDROCHLORIDE 30 MG/1
60 CAPSULE, DELAYED RELEASE ORAL DAILY
Status: DISCONTINUED | OUTPATIENT
Start: 2024-12-20 | End: 2024-12-26 | Stop reason: HOSPADM

## 2024-12-20 RX ORDER — FAMOTIDINE 20 MG/1
20 TABLET, FILM COATED ORAL 2 TIMES DAILY
Status: DISCONTINUED | OUTPATIENT
Start: 2024-12-20 | End: 2024-12-26 | Stop reason: HOSPADM

## 2024-12-20 RX ORDER — POTASSIUM CHLORIDE 7.45 MG/ML
10 INJECTION INTRAVENOUS PRN
Status: DISCONTINUED | OUTPATIENT
Start: 2024-12-20 | End: 2024-12-26 | Stop reason: HOSPADM

## 2024-12-20 RX ORDER — ACETAMINOPHEN 650 MG/1
650 SUPPOSITORY RECTAL EVERY 6 HOURS PRN
Status: DISCONTINUED | OUTPATIENT
Start: 2024-12-20 | End: 2024-12-26 | Stop reason: HOSPADM

## 2024-12-20 RX ORDER — LORAZEPAM 2 MG/ML
1 INJECTION INTRAMUSCULAR
Status: DISCONTINUED | OUTPATIENT
Start: 2024-12-20 | End: 2024-12-26 | Stop reason: HOSPADM

## 2024-12-20 RX ORDER — ONDANSETRON 2 MG/ML
4 INJECTION INTRAMUSCULAR; INTRAVENOUS EVERY 6 HOURS PRN
Status: DISCONTINUED | OUTPATIENT
Start: 2024-12-20 | End: 2024-12-26 | Stop reason: HOSPADM

## 2024-12-20 RX ORDER — LORAZEPAM 1 MG/1
1 TABLET ORAL
Status: DISCONTINUED | OUTPATIENT
Start: 2024-12-20 | End: 2024-12-26 | Stop reason: HOSPADM

## 2024-12-20 RX ORDER — THIAMINE HYDROCHLORIDE 100 MG/ML
250 INJECTION, SOLUTION INTRAMUSCULAR; INTRAVENOUS EVERY 24 HOURS
Status: DISCONTINUED | OUTPATIENT
Start: 2024-12-22 | End: 2024-12-23

## 2024-12-20 RX ORDER — ATORVASTATIN CALCIUM 20 MG/1
10 TABLET, FILM COATED ORAL NIGHTLY
Status: DISCONTINUED | OUTPATIENT
Start: 2024-12-20 | End: 2024-12-26 | Stop reason: HOSPADM

## 2024-12-20 RX ORDER — SODIUM CHLORIDE, SODIUM LACTATE, POTASSIUM CHLORIDE, CALCIUM CHLORIDE 600; 310; 30; 20 MG/100ML; MG/100ML; MG/100ML; MG/100ML
INJECTION, SOLUTION INTRAVENOUS CONTINUOUS
Status: ACTIVE | OUTPATIENT
Start: 2024-12-20 | End: 2024-12-21

## 2024-12-20 RX ORDER — LACOSAMIDE 100 MG/1
100 TABLET ORAL 2 TIMES DAILY
Status: DISCONTINUED | OUTPATIENT
Start: 2024-12-20 | End: 2024-12-26 | Stop reason: HOSPADM

## 2024-12-20 RX ORDER — MULTIVITAMIN WITH IRON
1 TABLET ORAL DAILY
Status: DISCONTINUED | OUTPATIENT
Start: 2024-12-20 | End: 2024-12-26 | Stop reason: HOSPADM

## 2024-12-20 RX ORDER — FOLIC ACID 1 MG/1
1 TABLET ORAL DAILY
Status: DISCONTINUED | OUTPATIENT
Start: 2024-12-20 | End: 2024-12-23

## 2024-12-20 RX ORDER — SODIUM CHLORIDE 9 MG/ML
INJECTION, SOLUTION INTRAVENOUS PRN
Status: DISCONTINUED | OUTPATIENT
Start: 2024-12-20 | End: 2024-12-26 | Stop reason: HOSPADM

## 2024-12-20 RX ORDER — SPIRONOLACTONE 25 MG/1
25 TABLET ORAL DAILY
Status: DISCONTINUED | OUTPATIENT
Start: 2024-12-20 | End: 2024-12-26 | Stop reason: HOSPADM

## 2024-12-20 RX ORDER — ACETAMINOPHEN 325 MG/1
650 TABLET ORAL EVERY 6 HOURS PRN
Status: DISCONTINUED | OUTPATIENT
Start: 2024-12-20 | End: 2024-12-26 | Stop reason: HOSPADM

## 2024-12-20 RX ORDER — POLYETHYLENE GLYCOL 3350 17 G/17G
17 POWDER, FOR SOLUTION ORAL DAILY PRN
Status: DISCONTINUED | OUTPATIENT
Start: 2024-12-20 | End: 2024-12-26 | Stop reason: HOSPADM

## 2024-12-20 RX ORDER — LEVETIRACETAM 10 MG/ML
1000 INJECTION INTRAVASCULAR ONCE
Status: DISCONTINUED | OUTPATIENT
Start: 2024-12-20 | End: 2024-12-20 | Stop reason: CLARIF

## 2024-12-20 RX ORDER — ESCITALOPRAM OXALATE 10 MG/1
5 TABLET ORAL DAILY
Status: DISCONTINUED | OUTPATIENT
Start: 2024-12-20 | End: 2024-12-21

## 2024-12-20 RX ADMIN — GUAIFENESIN 600 MG: 600 TABLET, MULTILAYER, EXTENDED RELEASE ORAL at 22:28

## 2024-12-20 RX ADMIN — THIAMINE HYDROCHLORIDE 500 MG: 100 INJECTION, SOLUTION INTRAMUSCULAR; INTRAVENOUS at 22:33

## 2024-12-20 RX ADMIN — RIVAROXABAN 20 MG: 20 TABLET, FILM COATED ORAL at 08:13

## 2024-12-20 RX ADMIN — SODIUM CHLORIDE, POTASSIUM CHLORIDE, SODIUM LACTATE AND CALCIUM CHLORIDE: 600; 310; 30; 20 INJECTION, SOLUTION INTRAVENOUS at 14:21

## 2024-12-20 RX ADMIN — SODIUM CHLORIDE, POTASSIUM CHLORIDE, SODIUM LACTATE AND CALCIUM CHLORIDE: 600; 310; 30; 20 INJECTION, SOLUTION INTRAVENOUS at 04:50

## 2024-12-20 RX ADMIN — IPRATROPIUM BROMIDE AND ALBUTEROL SULFATE 1 DOSE: .5; 3 SOLUTION RESPIRATORY (INHALATION) at 08:44

## 2024-12-20 RX ADMIN — SODIUM CHLORIDE, PRESERVATIVE FREE 10 ML: 5 INJECTION INTRAVENOUS at 22:46

## 2024-12-20 RX ADMIN — DULOXETINE HYDROCHLORIDE 60 MG: 30 CAPSULE, DELAYED RELEASE ORAL at 08:05

## 2024-12-20 RX ADMIN — SODIUM CHLORIDE, PRESERVATIVE FREE 10 ML: 5 INJECTION INTRAVENOUS at 08:06

## 2024-12-20 RX ADMIN — ESCITALOPRAM OXALATE 5 MG: 10 TABLET ORAL at 08:14

## 2024-12-20 RX ADMIN — FAMOTIDINE 20 MG: 20 TABLET, FILM COATED ORAL at 08:06

## 2024-12-20 RX ADMIN — HYDROMORPHONE HYDROCHLORIDE 0.5 MG: 1 INJECTION, SOLUTION INTRAMUSCULAR; INTRAVENOUS; SUBCUTANEOUS at 22:28

## 2024-12-20 RX ADMIN — THIAMINE HYDROCHLORIDE 500 MG: 100 INJECTION, SOLUTION INTRAMUSCULAR; INTRAVENOUS at 13:10

## 2024-12-20 RX ADMIN — LACOSAMIDE 100 MG: 100 TABLET, FILM COATED ORAL at 22:28

## 2024-12-20 RX ADMIN — PIPERACILLIN AND TAZOBACTAM 3375 MG: 3; .375 INJECTION, POWDER, LYOPHILIZED, FOR SOLUTION INTRAVENOUS at 13:05

## 2024-12-20 RX ADMIN — CYANOCOBALAMIN TAB 500 MCG 1000 MCG: 500 TAB at 08:16

## 2024-12-20 RX ADMIN — THIAMINE HYDROCHLORIDE 500 MG: 100 INJECTION, SOLUTION INTRAMUSCULAR; INTRAVENOUS at 04:55

## 2024-12-20 RX ADMIN — METOPROLOL SUCCINATE 50 MG: 50 TABLET, EXTENDED RELEASE ORAL at 08:05

## 2024-12-20 RX ADMIN — AZITHROMYCIN DIHYDRATE 500 MG: 250 TABLET ORAL at 22:28

## 2024-12-20 RX ADMIN — GUAIFENESIN 600 MG: 600 TABLET, MULTILAYER, EXTENDED RELEASE ORAL at 08:06

## 2024-12-20 RX ADMIN — FAMOTIDINE 20 MG: 20 TABLET, FILM COATED ORAL at 22:28

## 2024-12-20 RX ADMIN — SACUBITRIL AND VALSARTAN 1 TABLET: 49; 51 TABLET, FILM COATED ORAL at 22:27

## 2024-12-20 RX ADMIN — ATORVASTATIN CALCIUM 10 MG: 20 TABLET, FILM COATED ORAL at 22:28

## 2024-12-20 RX ADMIN — PIPERACILLIN AND TAZOBACTAM 3375 MG: 3; .375 INJECTION, POWDER, LYOPHILIZED, FOR SOLUTION INTRAVENOUS at 22:37

## 2024-12-20 RX ADMIN — SACUBITRIL AND VALSARTAN 1 TABLET: 49; 51 TABLET, FILM COATED ORAL at 08:14

## 2024-12-20 RX ADMIN — FOLIC ACID 1 MG: 1 TABLET ORAL at 08:06

## 2024-12-20 RX ADMIN — LEVETIRACETAM 500 MG: 500 TABLET, FILM COATED ORAL at 08:06

## 2024-12-20 RX ADMIN — DONEPEZIL HYDROCHLORIDE 5 MG: 5 TABLET, FILM COATED ORAL at 08:15

## 2024-12-20 RX ADMIN — THERA TABS 1 TABLET: TAB at 08:05

## 2024-12-20 RX ADMIN — IPRATROPIUM BROMIDE AND ALBUTEROL SULFATE 1 DOSE: .5; 3 SOLUTION RESPIRATORY (INHALATION) at 20:11

## 2024-12-20 ASSESSMENT — PAIN DESCRIPTION - LOCATION
LOCATION: MOUTH
LOCATION: GENERALIZED
LOCATION: GENERALIZED

## 2024-12-20 ASSESSMENT — PAIN DESCRIPTION - PAIN TYPE
TYPE: CHRONIC PAIN
TYPE: CHRONIC PAIN

## 2024-12-20 ASSESSMENT — PAIN DESCRIPTION - DESCRIPTORS
DESCRIPTORS: ACHING;BURNING;STABBING
DESCRIPTORS: ACHING

## 2024-12-20 ASSESSMENT — PAIN SCALES - GENERAL
PAINLEVEL_OUTOF10: 10
PAINLEVEL_OUTOF10: 10
PAINLEVEL_OUTOF10: 9
PAINLEVEL_OUTOF10: 10

## 2024-12-20 ASSESSMENT — PAIN DESCRIPTION - ORIENTATION: ORIENTATION: MID

## 2024-12-20 ASSESSMENT — PAIN DESCRIPTION - ONSET: ONSET: ON-GOING

## 2024-12-20 ASSESSMENT — PAIN DESCRIPTION - FREQUENCY: FREQUENCY: CONTINUOUS

## 2024-12-20 NOTE — ED PROVIDER NOTES
[facility]  FACULTY HANDOFF       Handoff taken on the following patient from prior Attending Physician:  Pt Name: Mahesh Brownlee  PCP:  Ira Roberts, RIVER - DYLAN    Attestation  I was available and discussed any additional care issues that arose and coordinated the management plans with the resident(s) caring for the patient during my duty period. Any areas of disagreement with resident's documentation of care or procedures are noted on the chart. I was personally present for the key portions of any/all procedures during my duty period. I have documented in the chart those procedures where I was not present during the key portions.         CHIEF COMPLAINT       Chief Complaint   Patient presents with    Seizures     Possibly post ictal/unwitnessed. Daily drinker, hasn't drank since tuesday         CURRENT MEDICATIONS     Previous Medications  Previous Medications    ATORVASTATIN (LIPITOR) 10 MG TABLET    Take 1 tablet by mouth nightly    BUPRENORPHINE (BUPRENEX) 7.5 MCG/HR PTWK    Place 1 patch onto the skin once a week.    DONEPEZIL (ARICEPT) 5 MG TABLET    Take 1 tablet by mouth daily    DULOXETINE (CYMBALTA) 60 MG EXTENDED RELEASE CAPSULE    Take 1 capsule by mouth daily    ESCITALOPRAM (LEXAPRO) 5 MG TABLET    Take 1 tablet by mouth daily    FOLIC ACID (FOLVITE) 1 MG TABLET    Take 1 tablet by mouth daily    LEVETIRACETAM (KEPPRA) 500 MG TABLET    Take 1 tablet by mouth 2 times daily    MELATONIN 3 MG TABS TABLET    Take 1 tablet by mouth nightly    METOPROLOL SUCCINATE (TOPROL XL) 50 MG EXTENDED RELEASE TABLET    Take 1 tablet by mouth daily    NICOTINE (NICODERM CQ) 7 MG/24HR    Place 1 patch onto the skin daily    OXYCODONE-ACETAMINOPHEN (PERCOCET) 5-325 MG PER TABLET    Take 1 tablet by mouth every 4 hours as needed for Pain.    PANTOPRAZOLE (PROTONIX) 40 MG TABLET    Take 1 tablet by mouth 2 times daily (before meals)    RIVAROXABAN (XARELTO) 20 MG TABS TABLET    Take 1 tablet by

## 2024-12-20 NOTE — H&P
[facility]  Office: [phone]  Louie Rossi DO, Noah Mayers DO, Sergio Ordaz DO, Saul March DO, Willy Mc MD, Jaci Berger MD, Sivakumar Freeman MD, Mile Johnston MD,  Aric Granda MD, Garry Shepherd MD, Clay Avila DO, Kelsy Wahl MD,  Evelyn Petit DO, Terry Kern MD, Addison Sol MD, Jaspreet Rossi DO, Kassy Pedro MD, Avi Mcneil MD, Emeka Leon DO, Susan Manjarrez MD, Krystyna Howell MD, [place] MD, Keeley Santa MD,  Carlton Cardona DO, Kayla Moran MD,  Shirley Waterhouse, CNP,  Magdalena Oviedo, CNP, Asuncion Hollins, CNP, Gómez Gomez, CNP,  Fely Garcia, SCHUYLER, Lorena Garcia, CNP, Mariana Jordan, CNP, Brianne Arnett, CNP, Dixie Garcia, CNP, Shea Chirinos, CNP, Alejandro Hoover PA-C, Penny Lopez, CNS, Alda Orozco, CNP, Miriam Martins, CNP         [facility]   IN-PATIENT SERVICE   [facility]    HISTORY AND PHYSICAL EXAMINATION            Date:   12/20/2024  Patient name:  Mahesh Brownlee  Date of admission:  12/19/2024  6:36 PM  MRN:   [id number]  Account:  [id number]  YOB: 1960  PCP:    Ira Roberts APRN - DYLAN  Room:   [address] [place] [address]-  Code Status:    Full Code    Chief Complaint:     Chief Complaint   Patient presents with    Seizures     Possibly post ictal/unwitnessed. Daily drinker, hasn't drank since tuesday       History Obtained From:     patient    History of Present Illness:     Mahesh Brownlee is a 64 y.o. Non- / non  male who presents with Seizures (Possibly post ictal/unwitnessed. Daily drinker, hasn't drank since tuesday)   and is admitted to the hospital for the management of Community acquired pneumonia of right lung, unspecified part of lung.    54-year-old male with past medical history of cerebellar stroke and gait difficulties(uses wheelchair for long distances), atrial fibrillation on Xarelto, hypertension/hyperlipidemia, CAD, chronic alcohol dependence, CAD  with cardiomyopathy status post AICD, COPD, seizure disorder on Keppra, major depression, history of TBI, recent admission in November with 5-day hospital/ICU stay for seizure required intubation for airway protection, later stepdown to neuro, for confusion LP was done to rule out meningitis, chronic vertebral artery occlusion(recently visited neuroendovascular no intervention needed), multiple previous admissions for possible seizure activity and elevated ethanol levels presented to ED with complaint of seizure/altered mental status with possibility of alcohol withdrawal since patient last drink was 24 to 48 hours ago.  Patient normally drinks 18 beers per day.    Patient is oriented to himself and place.  Patient is a very poor historian.  Does not remember why he is in the hospital and does not member the incident.  Does not remember when was his last alcohol drink.  Per chart review patient family member spoke to EMS that patient likely is in alcohol withdrawal because he drink 2 days ago.      EKG done in ED showed sinus rhythm with tachycardia.  Prolonged QTc 506.    Vitals afebrile, respiration 18, pulse 104, blood pressure 134/92 saturating well on room air.  Lab workup sodium 135, potassium 3.5, chloride 97, creatinine of 1.1 slightly elevated from baseline, anion gap of 18.  LFTs showed elevated AST level of 123 otherwise unremarkable.  Ethanol level less than 10.  Keppra level less than 2, acetaminophen level less than 5 and salicylic acid level less than 0.5.  CBC showed leukocytosis WBC of 18.6.  Blood cultures obtained.  Viral respiratory panel is negative.  CT head no acute intracranial abnormality.  CTA head and neck showed left vertebral artery occlusion otherwise no significant stenosis.  This occlusion is chronic.  Neuroendovascular was reached out in ED who recommended no intervention since it is chronic.        Chest x-ray showed mild central pulmonary congestion and bibasilar atelectasis and

## 2024-12-20 NOTE — CONSULTS
Department of Psychiatry  Consult Service   Psychiatric Assessment        REASON FOR CONSULT: Depression, suicidal ideation with plan    CONSULTING PHYSICIAN: Evelyn Petit    History obtained from: Patient, EHR    HISTORY OF PRESENT ILLNESS:          The patient is a 64 y.o. male with significant psychiatric history of depression and alcohol use disorder who is admitted medically for acute encephalopathy and sepsis.  Patient presented to the emergency department with altered mental status, there were concerns for alcohol withdrawal symptoms including seizure activity.  According to a family member that spoke to EMS the patient had not had any alcohol for the last 2 days.  He has a significant history of alcohol use disorder, he has also been previously admitted to [facility].  Upon review of documentation he has a long history of chronic suicidal ideation with no plan or intent to engage in self-harm.  He has made statements multiple times about \"drinking himself to death\".  He has been linked with outpatient services on numerous occasions with failed follow-through.  He has been prescribed psychotropic medication however has history of noncompliance.    Mahesh is agreeable to engaging in today's interview, he states \"I am nut\".  He became tearful and states \"I am crazy and I have ruined my family\".  He acknowledges a long history of alcohol use disorder, reports that he started drinking around age 9.  He reports that by the time he was 17 he was drinking daily.  He reports that he has been able to maintain sobriety over the years for longer periods of time however most recently he has struggled to do so.  He reports that he fears \"I have ruined my brain\".  He feels that he would be better off dead as he feels he is a burden to his family.  He feels guilty about his alcohol use and the impact that it has had on his loved ones.  He denies any active plan or intent to harm himself however feels that if he naturally   he would be okay with it.  He states \"I need psych meds\".  He endorses anhedonia, hopelessness, poor sleep, decreased appetite, poor energy, poor focus and concentration, memory loss and passive suicidal ideation.  He reports plans to transition to a SNF and acknowledges that when he is not in a structured environment he relapses and stops taking his medication.  He denies that he is experiencing any active symptoms of psychosis, he denies any auditory, visual or tactile hallucinations.  He denies any paranoia.  He does report anxiety and irritability.  He denies any panic.    Mahesh is agreeable to transitioning to skilled rehab or some type of inpatient rehab program that is structured and will help him maintain medication compliance and not allow access to alcohol.  It is felt that this would be more beneficial than inpatient psychiatric hospitalization given he has no active plan or intent to harm himself.  His suicidal ideation is chronic in nature, he is able to contract for safety.  He would benefit from psychotropic medications, given his current QTc will avoid SSRIs.  We will recommend discontinuing Lexapro.  Will recommend continuing duloxetine and adding mirtazapine to help with insomnia.    The patient is not currently receiving care for the above psychiatric illness.  He has previously been linked with [facility], he has failed to follow up.    Past Psychiatric History:  Prior Diagnosis: Alcohol use disorder, depression  Outpatient psychiatric provider: None  Psychiatric Hospitalization: yes, last [facility] admission 7/2024  Hx of Suicidal Attempts: no  Hx of violence:  no    Past psychiatric medications includes:   Risperdal, propranolol, Cymbalta, BuSpar, Lexapro, Ativan    Adverse reactions from psychotropic medications:  None    Substance Abuse History:  ETOH: Endorses significant history of alcohol abuse  Marijuana: Denies  Opiates: Denies  Other Drugs: Denies    Social History:     RESIDENCE: Has been

## 2024-12-20 NOTE — ED NOTES
-- DO NOT REPLY / DO NOT REPLY ALL --  -- Message is from the [facility]--    Patient is requesting a medication refill - medication is on active medication list    Patient is currently OUT of the requested medication.    Was Medication Pended?  Yes.    Rx Name and Dose:  atorvastatin (LIPITOR) 40 MG tablet ()    carvedilol (COREG) 6.25 MG tablet ()    hydrochlorothiazide (HYDRODIURIL) 12.5 MG tablet ()    Pt scheduled apt 22 (first available)     Duration: 90 days    Pharmacy  [facility] [address] [address]    Patient confirmed the above pharmacy as correct?  Yes    Does this request need an existing or new prescription at a pharmacy to be sent to a new pharmacy location?   No    Caller Information       Type Contact Phone    04/15/2022 10:11 AM CDT Phone (Incoming) He Larson (Self) [phone] (H)          Alternative phone number: n/a    Turnaround time given to caller:   \"This message will be sent to [state Provider's name]. The clinical team will fulfill your request as soon as they review your message.\"   ED to inpatient nurses report      Chief Complaint:  Chief Complaint   Patient presents with    Seizures     Possibly post ictal/unwitnessed. Daily drinker, hasn't drank since tuesday     Present to ED from: triage     MOA:     LOC: alert to only name  Mobility: Requires assistance * 1  Oxygen Baseline: room air     Current needs required: none    Pending ED orders: urine  Present condition: resting in bed resp even and unlabored     Why did the patient come to the ED? Patient came to ed via triage with complaint of seizures. Visitor states that he possibly had a seizure, she is unsure. States she is a friend of his and she noticed he hasn't been acting right. She is unaware of his medical history. Pt is currently Aox2, unaware of his medical hx. Pt unsure if he has a seizure history. Unsure if he fell, or had LOC. + Back pain but states he has hx of back surgeries.     Visitor states that patient is a daily drinker and hasn't had a drink since Tuesday.     VSS, NAD, AOx2. Patient resting in bed, respirations even and unlabored. Call light in reach.  Pt connected to telemetry, all alarms on and audible. Seizure precautions maintained.     What is the plan? Admit   Any procedures or intervention occur? Ed workup  Any safety concerns??    Mental Status:  Level of Consciousness: Alert (0)    Psych Assessment:   Psychosocial  Psychosocial (WDL): Within Defined Limits  Vital signs   Vitals:    12/19/24 2136 12/19/24 2211 12/19/24 2345 12/20/24 0000   BP:   115/68 122/69   Pulse: (!) 103 96 94 92   Resp: 23 25 18 18   Temp:       TempSrc:       SpO2: 95% 95% 96% 96%   Weight:       Height:            Vitals:  Patient Vitals for the past 24 hrs:   BP Temp Temp src Pulse Resp SpO2 Height Weight   12/20/24 0000 122/69 -- -- 92 18 96 % -- --   12/19/24 2345 115/68 -- -- 94 18 96 % -- --   12/19/24 2211 -- -- -- 96 25 95 % -- --   12/19/24 2136 -- -- -- (!) 103 23 95 % -- --   12/19/24 2046 -- -- -- 93 23 96 % -- --   12/19/24  limits   CULTURE, BLOOD 1   CULTURE, BLOOD 1   RESPIRATORY PANEL, MOLECULAR, WITH COVID-19   PROTIME-INR   APTT   AMMONIA   TSH REFLEX TO FT4   CALCIUM, IONIZED   LEVETIRACETAM LEVEL   URINALYSIS WITH REFLEX TO CULTURE       Electronically signed by Dixie Stewart RN on 12/20/2024 at 1:22 AM

## 2024-12-20 NOTE — CONSULTS
.NASOPHARYNGEAL SWAB     Adenovirus PCR Not Detected Not Detected    Coronavirus 229E PCR Not Detected Not Detected    Coronavirus HKU1 PCR Not Detected Not Detected    Coronavirus NL63 PCR Not Detected Not Detected    Coronavirus OC43 PCR Not Detected Not Detected    SARS-CoV-2, PCR Not Detected Not Detected    Human Metapneumovirus PCR Not Detected Not Detected    Rhino/Enterovirus PCR Not Detected Not Detected    Influenza A by PCR Not Detected Not Detected    Influenza B by PCR Not Detected Not Detected    Parainfluenza 1 PCR Not Detected Not Detected    Parainfluenza 2 PCR Not Detected Not Detected    Parainfluenza 3 PCR Not Detected Not Detected    Parainfluenza 4 PCR Not Detected Not Detected    Resp Syncytial Virus PCR Not Detected Not Detected    Bordetella parapertussis by PCR Not Detected Not Detected    B Pertussis by PCR Not Detected Not Detected    Chlamydia pneumoniae By PCR Not Detected Not Detected    Mycoplasma pneumo by PCR Not Detected Not Detected         Assessment :      Primary Problem  Community acquired pneumonia of right lung, unspecified part of lung    Active Hospital Problems    Diagnosis Date Noted    Paroxysmal atrial flutter (HCC) [I48.92] 07/15/2024     Priority: High    Heart failure with improved ejection fraction (HFimpEF) ([facility]) [I50.32] 07/15/2024     Priority: High    Community acquired pneumonia of right lung, unspecified part of lung [J18.9] 12/20/2024    Seizures (HCC) [R56.9] 11/14/2024    Leukocytosis [D72.829] 09/27/2024    Severe dementia associated with alcoholism, with mood disturbance (HCC) [F10.27] 07/19/2024    Metabolic encephalopathy [G93.41] 07/15/2024    History of alcohol use disorder [Z87.898] 07/15/2024    Alcohol withdrawal syndrome with complication (HCC) [F10.939] 05/13/2024    ICD (implantable cardioverter-defibrillator) in place [Z95.810] 10/27/2023    Cognitive impairment [R41.89] 10/27/2023    Toxic metabolic encephalopathy [G92.8] 09/21/2023     case with attending  The plan was discussed with the patient, patient's family and the medical staff.     Patient is admitted as inpatient status because of co-morbidities listed above, severity of signs and symptoms as outlined, requirement for current medical therapies and most importantly because of direct risk to patient if care not provided in a hospital setting.    Blayne Read MD  Neurology Resident PGY-2  12/20/2024  4:24 AM    Copy sent to Dr. Roberts, Iar TILLEY, APRN - CNP

## 2024-12-20 NOTE — PROGRESS NOTES
Spiritual Health History and Assessment/Progress Note  [facility]    Initial Encounter,  ,  ,      Name: Mahesh Brownlee MRN: [id number]    Age: 64 y.o.     Sex: male   Language: English   Rastafarian: Adventist   Community acquired pneumonia of right lung, unspecified part of lung     Date: 2024            Total Time Calculated: 33 min              Spiritual Assessment began in [facility] 5[facility] STEPDOWN        Referral/Consult From: Patient, Nurse   Encounter Overview/Reason: Initial Encounter  Service Provided For: Patient    Janine, Belief, Meaning:   Patient is connected with a janine tradition or spiritual practice and has beliefs or practices that help with coping during difficult times  Family/Friends No family/friends present      Importance and Influence:  Patient has spiritual/personal beliefs that influence decisions regarding their health  Family/Friends No family/friends present    Community:  Patient feels well-supported. Support system includes: Children  Family/Friends No family/friends present    Assessment and Plan of Care:   Patient was reclining in his chair when  entered his room. Family was not present at the time. Patient looked sad, anxious and worried. When asked how he was feeling, patient responded, \"not good. I don't even know what is going on around me. I don't remember anything. I think I have  before. I don't know. I have no teeth in my mouth.\" When asked about family, patient responded, \"everybody left me and went on their own.\" Patient said he was raised Adventist but not affiliated with a [place]. Patient received sacrament of anointing of the sick.  provided ministry of presence, offered support, prayed with patient and reassured him that he was in good hands. Patient expressed appreciation for the anointing and blessing he received.     Patient Interventions include: Facilitated expression of thoughts and feelings, Explored spiritual

## 2024-12-20 NOTE — CARE COORDINATION
Transitional planning-attempted to talk with patient-very confused-knew his name, doesn't know the month or year-only what the TV tells him. Doesn't know where he is-read the white board. Doesn't know that he is in the hospital. Read his address to him-doesn't know his address. Called daughter Brianne-listed as DPOA for Healthcare.    1382 PS Dr. Petit for PT/OT orders.

## 2024-12-20 NOTE — CARE COORDINATION
Case Management Assessment  Initial Evaluation    Date/Time of Evaluation: 12/20/2024 8:45AM  Assessment Completed by: Felicita Gamboa RN    If patient is discharged prior to next notation, then this note serves as note for discharge by case management.    Patient Name: Mahesh Brownlee                   YOB: 1960  Diagnosis: Prolonged Q-T interval on ECG [R94.31]  Pleural effusion on left [J90]  Altered mental status, unspecified altered mental status type [R41.82]  Pneumonia due to infectious organism, unspecified laterality, unspecified part of lung [J18.9]  Community acquired pneumonia of right lung, unspecified part of lung [J18.9]                   Date / Time: 12/19/2024  6:36 PM    Patient Admission Status: Inpatient   Readmission Risk (Low < 19, Mod (19-27), High > 27): Readmission Risk Score: 28.2    Current PCP: No primary care provider on file.  PCP verified by CM? Yes (no PCP)    Chart Reviewed: Yes      History Provided by: Child/Family  Patient Orientation: Person (alert to name only at this time)    Patient Cognition: Other (see comment) (very confused)    Hospitalization in the last 30 days (Readmission):  No    If yes, Readmission Assessment in CM Navigator will be completed.    Advance Directives:      Code Status: DNR-CCA   Patient's Primary Decision Maker is: Named in Scanned ACP Document    Primary Decision Maker: Brianne Whitley - Child - [phone]    Secondary Decision Maker: Brad Whitley - Child - [phone]    Supplemental (Other) Decision Maker: Michael Whitley - Child - [phone]    Discharge Planning:    Patient lives with: (P) Alone Type of Home: (P) Apartment  Primary Care Giver: Self  Patient Support Systems include: Children   Current Financial resources: (P) Medicare  Current community resources:    Current services prior to admission: (P) Durable Medical Equipment            Current DME: (P) Wheelchair            Type of Home Care services:  (P)

## 2024-12-20 NOTE — RT PROTOCOL NOTE
RT Inhaler-Nebulizer Bronchodilator Protocol Note    There is a bronchodilator order in the chart from a provider indicating to follow the RT Bronchodilator Protocol and there is an “Initiate RT Inhaler-Nebulizer Bronchodilator Protocol” order as well (see protocol at bottom of note).    CXR Findings:  No results found.    The findings from the last RT Protocol Assessment were as follows:   History Pulmonary Disease: Chronic pulmonary disease  Respiratory Pattern: Regular pattern and RR 12-20 bpm  Breath Sounds: Slightly diminished and/or crackles  Cough: Strong, productive  Indication for Bronchodilator Therapy:    Bronchodilator Assessment Score: 5    Aerosolized bronchodilator medication orders have been revised according to the RT Inhaler-Nebulizer Bronchodilator Protocol below.    Respiratory Therapist to perform RT Therapy Protocol Assessment initially then follow the protocol.  Repeat RT Therapy Protocol Assessment PRN for score 0-3 or on second treatment, BID, and PRN for scores above 3.    No Indications - adjust the frequency to every 6 hours PRN wheezing or bronchospasm, if no treatments needed after 48 hours then discontinue using Per Protocol order mode.     If indication present, adjust the RT bronchodilator orders based on the Bronchodilator Assessment Score as indicated below.  Use Inhaler orders unless patient has one or more of the following: on home nebulizer, not able to hold breath for 10 seconds, is not alert and oriented, cannot activate and use MDI correctly, or respiratory rate 25 breaths per minute or more, then use the equivalent nebulizer order(s) with same Frequency and PRN reasons based on the score.  If a patient is on this medication at home then do not decrease Frequency below that used at home.    0-3 - enter or revise RT bronchodilator order(s) to equivalent RT Bronchodilator order with Frequency of every 4 hours PRN for wheezing or increased work of breathing using Per Protocol

## 2024-12-21 PROBLEM — Z91.148 NON COMPLIANCE W MEDICATION REGIMEN: Status: ACTIVE | Noted: 2024-12-21

## 2024-12-21 LAB
ALBUMIN SERPL-MCNC: 3.4 G/DL (ref 3.5–5.2)
ALBUMIN/GLOB SERPL: 1.3 {RATIO} (ref 1–2.5)
ALP SERPL-CCNC: 59 U/L (ref 40–129)
ALT SERPL-CCNC: 32 U/L (ref 10–50)
ANION GAP SERPL CALCULATED.3IONS-SCNC: 13 MMOL/L (ref 9–16)
AST SERPL-CCNC: 164 U/L (ref 10–50)
BACTERIA URNS QL MICRO: NORMAL
BASOPHILS # BLD: 0.04 K/UL (ref 0–0.2)
BASOPHILS NFR BLD: 1 % (ref 0–2)
BILIRUB SERPL-MCNC: 0.9 MG/DL (ref 0–1.2)
BILIRUB UR QL STRIP: NEGATIVE
BUN SERPL-MCNC: 9 MG/DL (ref 8–23)
CALCIUM SERPL-MCNC: 8.8 MG/DL (ref 8.6–10.4)
CASTS #/AREA URNS LPF: NORMAL /LPF (ref 0–8)
CHLORIDE SERPL-SCNC: 103 MMOL/L (ref 98–107)
CLARITY UR: CLEAR
CO2 SERPL-SCNC: 17 MMOL/L (ref 20–31)
COLOR UR: YELLOW
CREAT SERPL-MCNC: 0.6 MG/DL (ref 0.7–1.2)
EOSINOPHIL # BLD: 0.07 K/UL (ref 0–0.44)
EOSINOPHILS RELATIVE PERCENT: 1 % (ref 1–4)
EPI CELLS #/AREA URNS HPF: NORMAL /HPF (ref 0–5)
ERYTHROCYTE [DISTWIDTH] IN BLOOD BY AUTOMATED COUNT: 12.9 % (ref 11.8–14.4)
GFR, ESTIMATED: >90 ML/MIN/1.73M2
GLUCOSE SERPL-MCNC: 85 MG/DL (ref 74–99)
GLUCOSE UR STRIP-MCNC: NEGATIVE MG/DL
HCT VFR BLD AUTO: 39.8 % (ref 40.7–50.3)
HGB BLD-MCNC: 12 G/DL (ref 13–17)
HGB UR QL STRIP.AUTO: ABNORMAL
IMM GRANULOCYTES # BLD AUTO: <0.03 K/UL (ref 0–0.3)
IMM GRANULOCYTES NFR BLD: 0 %
INR PPP: 1.1
KETONES UR STRIP-MCNC: ABNORMAL MG/DL
LEUKOCYTE ESTERASE UR QL STRIP: NEGATIVE
LYMPHOCYTES NFR BLD: 1.52 K/UL (ref 1.1–3.7)
LYMPHOCYTES RELATIVE PERCENT: 20 % (ref 24–43)
MCH RBC QN AUTO: 31 PG (ref 25.2–33.5)
MCHC RBC AUTO-ENTMCNC: 30.2 G/DL (ref 28.4–34.8)
MCV RBC AUTO: 102.8 FL (ref 82.6–102.9)
MONOCYTES NFR BLD: 0.68 K/UL (ref 0.1–1.2)
MONOCYTES NFR BLD: 9 % (ref 3–12)
NEUTROPHILS NFR BLD: 69 % (ref 36–65)
NEUTS SEG NFR BLD: 5.21 K/UL (ref 1.5–8.1)
NITRITE UR QL STRIP: NEGATIVE
NRBC BLD-RTO: 0 PER 100 WBC
PH UR STRIP: 7.5 [PH] (ref 5–8)
PHOSPHATE SERPL-MCNC: 3.5 MG/DL (ref 2.5–4.5)
PLATELET # BLD AUTO: 153 K/UL (ref 138–453)
PMV BLD AUTO: 9.6 FL (ref 8.1–13.5)
POTASSIUM SERPL-SCNC: 3.1 MMOL/L (ref 3.7–5.3)
PROT SERPL-MCNC: 6.1 G/DL (ref 6.6–8.7)
PROT UR STRIP-MCNC: NEGATIVE MG/DL
PROTHROMBIN TIME: 13.9 SEC (ref 11.7–14.9)
RBC # BLD AUTO: 3.87 M/UL (ref 4.21–5.77)
RBC #/AREA URNS HPF: NORMAL /HPF (ref 0–4)
SODIUM SERPL-SCNC: 133 MMOL/L (ref 136–145)
SP GR UR STRIP: 1.01 (ref 1–1.03)
UROBILINOGEN UR STRIP-ACNC: NORMAL EU/DL (ref 0–1)
WBC #/AREA URNS HPF: NORMAL /HPF (ref 0–5)
WBC OTHER # BLD: 7.5 K/UL (ref 3.5–11.3)

## 2024-12-21 PROCEDURE — 2580000003 HC RX 258: Performed by: STUDENT IN AN ORGANIZED HEALTH CARE EDUCATION/TRAINING PROGRAM

## 2024-12-21 PROCEDURE — 2500000003 HC RX 250 WO HCPCS

## 2024-12-21 PROCEDURE — 6360000002 HC RX W HCPCS

## 2024-12-21 PROCEDURE — 6360000002 HC RX W HCPCS: Performed by: STUDENT IN AN ORGANIZED HEALTH CARE EDUCATION/TRAINING PROGRAM

## 2024-12-21 PROCEDURE — 85610 PROTHROMBIN TIME: CPT

## 2024-12-21 PROCEDURE — 94760 N-INVAS EAR/PLS OXIMETRY 1: CPT

## 2024-12-21 PROCEDURE — 84100 ASSAY OF PHOSPHORUS: CPT

## 2024-12-21 PROCEDURE — 2060000000 HC ICU INTERMEDIATE R&B

## 2024-12-21 PROCEDURE — 6370000000 HC RX 637 (ALT 250 FOR IP)

## 2024-12-21 PROCEDURE — 6370000000 HC RX 637 (ALT 250 FOR IP): Performed by: INTERNAL MEDICINE

## 2024-12-21 PROCEDURE — 99232 SBSQ HOSP IP/OBS MODERATE 35: CPT | Performed by: PSYCHIATRY & NEUROLOGY

## 2024-12-21 PROCEDURE — 80053 COMPREHEN METABOLIC PANEL: CPT

## 2024-12-21 PROCEDURE — 36415 COLL VENOUS BLD VENIPUNCTURE: CPT

## 2024-12-21 PROCEDURE — 94640 AIRWAY INHALATION TREATMENT: CPT

## 2024-12-21 PROCEDURE — 6370000000 HC RX 637 (ALT 250 FOR IP): Performed by: PSYCHIATRY & NEUROLOGY

## 2024-12-21 PROCEDURE — 85025 COMPLETE CBC W/AUTO DIFF WBC: CPT

## 2024-12-21 PROCEDURE — 99233 SBSQ HOSP IP/OBS HIGH 50: CPT | Performed by: INTERNAL MEDICINE

## 2024-12-21 RX ORDER — MIRTAZAPINE 15 MG/1
7.5 TABLET, ORALLY DISINTEGRATING ORAL NIGHTLY
Status: DISCONTINUED | OUTPATIENT
Start: 2024-12-21 | End: 2024-12-26 | Stop reason: HOSPADM

## 2024-12-21 RX ADMIN — IPRATROPIUM BROMIDE AND ALBUTEROL SULFATE 1 DOSE: .5; 3 SOLUTION RESPIRATORY (INHALATION) at 20:25

## 2024-12-21 RX ADMIN — GUAIFENESIN 600 MG: 600 TABLET, MULTILAYER, EXTENDED RELEASE ORAL at 20:56

## 2024-12-21 RX ADMIN — THIAMINE HYDROCHLORIDE 500 MG: 100 INJECTION, SOLUTION INTRAMUSCULAR; INTRAVENOUS at 05:55

## 2024-12-21 RX ADMIN — FAMOTIDINE 20 MG: 20 TABLET, FILM COATED ORAL at 09:46

## 2024-12-21 RX ADMIN — SACUBITRIL AND VALSARTAN 1 TABLET: 49; 51 TABLET, FILM COATED ORAL at 09:45

## 2024-12-21 RX ADMIN — LORAZEPAM 2 MG: 2 INJECTION INTRAMUSCULAR; INTRAVENOUS at 02:27

## 2024-12-21 RX ADMIN — THIAMINE HYDROCHLORIDE 500 MG: 100 INJECTION, SOLUTION INTRAMUSCULAR; INTRAVENOUS at 14:58

## 2024-12-21 RX ADMIN — LACOSAMIDE 100 MG: 100 TABLET, FILM COATED ORAL at 09:46

## 2024-12-21 RX ADMIN — GUAIFENESIN 600 MG: 600 TABLET, MULTILAYER, EXTENDED RELEASE ORAL at 09:45

## 2024-12-21 RX ADMIN — HYDROMORPHONE HYDROCHLORIDE 0.5 MG: 1 INJECTION, SOLUTION INTRAMUSCULAR; INTRAVENOUS; SUBCUTANEOUS at 20:57

## 2024-12-21 RX ADMIN — FOLIC ACID 1 MG: 1 TABLET ORAL at 09:45

## 2024-12-21 RX ADMIN — POTASSIUM CHLORIDE 40 MEQ: 1500 TABLET, EXTENDED RELEASE ORAL at 10:59

## 2024-12-21 RX ADMIN — AZITHROMYCIN DIHYDRATE 500 MG: 250 TABLET ORAL at 20:57

## 2024-12-21 RX ADMIN — PIPERACILLIN AND TAZOBACTAM 3375 MG: 3; .375 INJECTION, POWDER, LYOPHILIZED, FOR SOLUTION INTRAVENOUS at 22:13

## 2024-12-21 RX ADMIN — SACUBITRIL AND VALSARTAN 1 TABLET: 49; 51 TABLET, FILM COATED ORAL at 20:55

## 2024-12-21 RX ADMIN — IPRATROPIUM BROMIDE AND ALBUTEROL SULFATE 1 DOSE: .5; 3 SOLUTION RESPIRATORY (INHALATION) at 09:25

## 2024-12-21 RX ADMIN — SODIUM CHLORIDE, PRESERVATIVE FREE 10 ML: 5 INJECTION INTRAVENOUS at 09:47

## 2024-12-21 RX ADMIN — CYANOCOBALAMIN TAB 500 MCG 1000 MCG: 500 TAB at 09:45

## 2024-12-21 RX ADMIN — ESCITALOPRAM OXALATE 5 MG: 10 TABLET ORAL at 09:44

## 2024-12-21 RX ADMIN — RIVAROXABAN 20 MG: 20 TABLET, FILM COATED ORAL at 09:45

## 2024-12-21 RX ADMIN — MIRTAZAPINE 7.5 MG: 15 TABLET, ORALLY DISINTEGRATING ORAL at 20:56

## 2024-12-21 RX ADMIN — METOPROLOL SUCCINATE 50 MG: 50 TABLET, EXTENDED RELEASE ORAL at 09:46

## 2024-12-21 RX ADMIN — SODIUM CHLORIDE, PRESERVATIVE FREE 10 ML: 5 INJECTION INTRAVENOUS at 20:58

## 2024-12-21 RX ADMIN — THIAMINE HYDROCHLORIDE 500 MG: 100 INJECTION, SOLUTION INTRAMUSCULAR; INTRAVENOUS at 22:16

## 2024-12-21 RX ADMIN — DULOXETINE HYDROCHLORIDE 60 MG: 30 CAPSULE, DELAYED RELEASE ORAL at 09:46

## 2024-12-21 RX ADMIN — DONEPEZIL HYDROCHLORIDE 5 MG: 5 TABLET, FILM COATED ORAL at 09:45

## 2024-12-21 RX ADMIN — ATORVASTATIN CALCIUM 10 MG: 20 TABLET, FILM COATED ORAL at 20:56

## 2024-12-21 RX ADMIN — THERA TABS 1 TABLET: TAB at 09:46

## 2024-12-21 RX ADMIN — LACOSAMIDE 100 MG: 100 TABLET, FILM COATED ORAL at 20:56

## 2024-12-21 RX ADMIN — PIPERACILLIN AND TAZOBACTAM 3375 MG: 3; .375 INJECTION, POWDER, LYOPHILIZED, FOR SOLUTION INTRAVENOUS at 14:56

## 2024-12-21 RX ADMIN — PIPERACILLIN AND TAZOBACTAM 3375 MG: 3; .375 INJECTION, POWDER, LYOPHILIZED, FOR SOLUTION INTRAVENOUS at 05:52

## 2024-12-21 RX ADMIN — FAMOTIDINE 20 MG: 20 TABLET, FILM COATED ORAL at 20:56

## 2024-12-21 ASSESSMENT — PAIN DESCRIPTION - LOCATION: LOCATION: MOUTH

## 2024-12-21 ASSESSMENT — PAIN SCALES - GENERAL
PAINLEVEL_OUTOF10: 9
PAINLEVEL_OUTOF10: 10

## 2024-12-21 ASSESSMENT — PAIN DESCRIPTION - ORIENTATION: ORIENTATION: MID

## 2024-12-21 ASSESSMENT — PAIN - FUNCTIONAL ASSESSMENT: PAIN_FUNCTIONAL_ASSESSMENT: ACTIVITIES ARE NOT PREVENTED

## 2024-12-21 ASSESSMENT — PAIN DESCRIPTION - DESCRIPTORS: DESCRIPTORS: ACHING;BURNING;STABBING

## 2024-12-21 NOTE — PROGRESS NOTES
[facility] Neurology   IN-PATIENT SERVICE      NEUROLOGY PROGRESS  NOTE            Date:   12/21/2024  Patient name:  Mahesh Brownlee  Date of admission:  12/19/2024  YOB: 1960      Interval History:     No acute events.  No seizures or seizure-like activity.  He feels better.  Is more alert and oriented, essentially at his baseline.  Was able to speak with patient's daughter over the phone with him in the room.    Patient admitted that he continues to drink.  Daughter was under the impression that he had stopped drinking.  He also says that he takes marijuana Gummies.  Daughter concerned about marijuana use as well.  He states that he often forgets to take medications on time.  Has history of cognitive impairment.  Daughter concerned if he is able to live independently.    History of Present Illness:     Patient is well-known to our service.  He is a poor historian.     He states he has not been taking any of his medications for the last few days.  He lives at home.  Has a longstanding history of alcohol abuse.  He has had prior withdrawal seizures.  Was initially started on Vimpat last year due to left frontal sharp waves at OSH EEG.     Most recently, he has been maintained on Keppra.  During exam, he is tearful.  Stated that he has had suicidal ideation.  He has had previous similar thoughts and had an admission at [facility].  Discussed with primary team.  Psychiatry consulted.  In the meantime, Keppra is not ideal given mood disorder.  Will dc and restart Vimpat.  Does not appear he had any adverse effects to it.    Past Medical History:     Past Medical History:   Diagnosis Date    Adjustment disorder     AF (atrial fibrillation) ([facility])     AICD (automatic cardioverter/defibrillator) present 09/2015    PM    Alcohol dependence ([facility])     CAD (coronary artery disease)     Dr. Bolton cardiologist    Cardiomyopathy ([facility])     CHF (congestive heart failure) ([facility])     COPD (chronic obstructive pulmonary

## 2024-12-21 NOTE — PLAN OF CARE
Problem: Safety - Adult  Goal: Free from fall injury  12/21/2024 0326 by Mp Vogt RN  Outcome: Progressing  Flowsheets (Taken 12/21/2024 0124)  Free From Fall Injury:   Instruct family/caregiver on patient safety   Based on caregiver fall risk screen, instruct family/caregiver to ask for assistance with transferring infant if caregiver noted to have fall risk factors  12/20/2024 1704 by Iveth Stacy RN  Outcome: Progressing  Flowsheets (Taken 12/20/2024 0330 by Yamilka Maurice RN)  Free From Fall Injury: Instruct family/caregiver on patient safety     Problem: Chronic Conditions and Co-morbidities  Goal: Patient's chronic conditions and co-morbidity symptoms are monitored and maintained or improved  12/21/2024 0326 by Mp Vogt RN  Outcome: Progressing  Flowsheets (Taken 12/20/2024 2000)  Care Plan - Patient's Chronic Conditions and Co-Morbidity Symptoms are Monitored and Maintained or Improved:   Monitor and assess patient's chronic conditions and comorbid symptoms for stability, deterioration, or improvement   Collaborate with multidisciplinary team to address chronic and comorbid conditions and prevent exacerbation or deterioration   Update acute care plan with appropriate goals if chronic or comorbid symptoms are exacerbated and prevent overall improvement and discharge  12/20/2024 1704 by Iveth Stacy RN  Outcome: Progressing  Flowsheets (Taken 12/20/2024 0330 by Yamilka Maurice RN)  Care Plan - Patient's Chronic Conditions and Co-Morbidity Symptoms are Monitored and Maintained or Improved: Monitor and assess patient's chronic conditions and comorbid symptoms for stability, deterioration, or improvement     Problem: Discharge Planning  Goal: Discharge to home or other facility with appropriate resources  12/21/2024 0326 by Mp Vogt RN  Outcome: Progressing  Flowsheets (Taken 12/20/2024 2000)  Discharge to home or other facility with appropriate resources:   Identify barriers
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
## 2024-12-22 LAB
ALBUMIN SERPL-MCNC: 3.8 G/DL (ref 3.5–5.2)
ALBUMIN/GLOB SERPL: 1.4 {RATIO} (ref 1–2.5)
ALP SERPL-CCNC: 64 U/L (ref 40–129)
ALT SERPL-CCNC: 37 U/L (ref 10–50)
ANION GAP SERPL CALCULATED.3IONS-SCNC: 12 MMOL/L (ref 9–16)
AST SERPL-CCNC: 140 U/L (ref 10–50)
BASOPHILS # BLD: 0.05 K/UL (ref 0–0.2)
BASOPHILS NFR BLD: 1 % (ref 0–2)
BILIRUB SERPL-MCNC: 0.7 MG/DL (ref 0–1.2)
BUN SERPL-MCNC: 7 MG/DL (ref 8–23)
CALCIUM SERPL-MCNC: 9.1 MG/DL (ref 8.6–10.4)
CHLORIDE SERPL-SCNC: 104 MMOL/L (ref 98–107)
CO2 SERPL-SCNC: 21 MMOL/L (ref 20–31)
CREAT SERPL-MCNC: 0.7 MG/DL (ref 0.7–1.2)
EOSINOPHIL # BLD: 0.18 K/UL (ref 0–0.44)
EOSINOPHILS RELATIVE PERCENT: 2 % (ref 1–4)
ERYTHROCYTE [DISTWIDTH] IN BLOOD BY AUTOMATED COUNT: 13.1 % (ref 11.8–14.4)
GFR, ESTIMATED: >90 ML/MIN/1.73M2
GLUCOSE SERPL-MCNC: 93 MG/DL (ref 74–99)
HCT VFR BLD AUTO: 43.1 % (ref 40.7–50.3)
HGB BLD-MCNC: 13.9 G/DL (ref 13–17)
IMM GRANULOCYTES # BLD AUTO: 0.06 K/UL (ref 0–0.3)
IMM GRANULOCYTES NFR BLD: 1 %
LYMPHOCYTES NFR BLD: 1.59 K/UL (ref 1.1–3.7)
LYMPHOCYTES RELATIVE PERCENT: 21 % (ref 24–43)
MCH RBC QN AUTO: 30.3 PG (ref 25.2–33.5)
MCHC RBC AUTO-ENTMCNC: 32.3 G/DL (ref 28.4–34.8)
MCV RBC AUTO: 93.9 FL (ref 82.6–102.9)
MONOCYTES NFR BLD: 0.94 K/UL (ref 0.1–1.2)
MONOCYTES NFR BLD: 13 % (ref 3–12)
NEUTROPHILS NFR BLD: 62 % (ref 36–65)
NEUTS SEG NFR BLD: 4.61 K/UL (ref 1.5–8.1)
NRBC BLD-RTO: 0 PER 100 WBC
PLATELET # BLD AUTO: 182 K/UL (ref 138–453)
PMV BLD AUTO: 9.7 FL (ref 8.1–13.5)
POTASSIUM SERPL-SCNC: 2.9 MMOL/L (ref 3.7–5.3)
PROT SERPL-MCNC: 6.6 G/DL (ref 6.6–8.7)
RBC # BLD AUTO: 4.59 M/UL (ref 4.21–5.77)
SODIUM SERPL-SCNC: 137 MMOL/L (ref 136–145)
WBC OTHER # BLD: 7.4 K/UL (ref 3.5–11.3)

## 2024-12-22 PROCEDURE — 6370000000 HC RX 637 (ALT 250 FOR IP)

## 2024-12-22 PROCEDURE — 6360000002 HC RX W HCPCS

## 2024-12-22 PROCEDURE — 85025 COMPLETE CBC W/AUTO DIFF WBC: CPT

## 2024-12-22 PROCEDURE — 6370000000 HC RX 637 (ALT 250 FOR IP): Performed by: PSYCHIATRY & NEUROLOGY

## 2024-12-22 PROCEDURE — 6360000002 HC RX W HCPCS: Performed by: STUDENT IN AN ORGANIZED HEALTH CARE EDUCATION/TRAINING PROGRAM

## 2024-12-22 PROCEDURE — 2580000003 HC RX 258: Performed by: STUDENT IN AN ORGANIZED HEALTH CARE EDUCATION/TRAINING PROGRAM

## 2024-12-22 PROCEDURE — 94761 N-INVAS EAR/PLS OXIMETRY MLT: CPT

## 2024-12-22 PROCEDURE — 97530 THERAPEUTIC ACTIVITIES: CPT

## 2024-12-22 PROCEDURE — 2500000003 HC RX 250 WO HCPCS

## 2024-12-22 PROCEDURE — 97535 SELF CARE MNGMENT TRAINING: CPT

## 2024-12-22 PROCEDURE — 80053 COMPREHEN METABOLIC PANEL: CPT

## 2024-12-22 PROCEDURE — 94640 AIRWAY INHALATION TREATMENT: CPT

## 2024-12-22 PROCEDURE — 99232 SBSQ HOSP IP/OBS MODERATE 35: CPT | Performed by: INTERNAL MEDICINE

## 2024-12-22 PROCEDURE — 36415 COLL VENOUS BLD VENIPUNCTURE: CPT

## 2024-12-22 PROCEDURE — 6370000000 HC RX 637 (ALT 250 FOR IP): Performed by: INTERNAL MEDICINE

## 2024-12-22 PROCEDURE — 2060000000 HC ICU INTERMEDIATE R&B

## 2024-12-22 PROCEDURE — 97166 OT EVAL MOD COMPLEX 45 MIN: CPT

## 2024-12-22 RX ADMIN — LACOSAMIDE 100 MG: 100 TABLET, FILM COATED ORAL at 20:53

## 2024-12-22 RX ADMIN — THERA TABS 1 TABLET: TAB at 08:52

## 2024-12-22 RX ADMIN — FOLIC ACID 1 MG: 1 TABLET ORAL at 08:53

## 2024-12-22 RX ADMIN — IPRATROPIUM BROMIDE AND ALBUTEROL SULFATE 1 DOSE: .5; 3 SOLUTION RESPIRATORY (INHALATION) at 21:03

## 2024-12-22 RX ADMIN — PIPERACILLIN AND TAZOBACTAM 3375 MG: 3; .375 INJECTION, POWDER, LYOPHILIZED, FOR SOLUTION INTRAVENOUS at 22:54

## 2024-12-22 RX ADMIN — LACOSAMIDE 100 MG: 100 TABLET, FILM COATED ORAL at 08:53

## 2024-12-22 RX ADMIN — IPRATROPIUM BROMIDE AND ALBUTEROL SULFATE 1 DOSE: .5; 3 SOLUTION RESPIRATORY (INHALATION) at 09:06

## 2024-12-22 RX ADMIN — DULOXETINE HYDROCHLORIDE 60 MG: 30 CAPSULE, DELAYED RELEASE ORAL at 08:52

## 2024-12-22 RX ADMIN — HYDROMORPHONE HYDROCHLORIDE 0.5 MG: 1 INJECTION, SOLUTION INTRAMUSCULAR; INTRAVENOUS; SUBCUTANEOUS at 15:51

## 2024-12-22 RX ADMIN — AZITHROMYCIN DIHYDRATE 500 MG: 250 TABLET ORAL at 20:53

## 2024-12-22 RX ADMIN — FAMOTIDINE 20 MG: 20 TABLET, FILM COATED ORAL at 20:53

## 2024-12-22 RX ADMIN — PIPERACILLIN AND TAZOBACTAM 3375 MG: 3; .375 INJECTION, POWDER, LYOPHILIZED, FOR SOLUTION INTRAVENOUS at 06:28

## 2024-12-22 RX ADMIN — SODIUM CHLORIDE, PRESERVATIVE FREE 10 ML: 5 INJECTION INTRAVENOUS at 20:53

## 2024-12-22 RX ADMIN — SODIUM CHLORIDE, PRESERVATIVE FREE 10 ML: 5 INJECTION INTRAVENOUS at 08:55

## 2024-12-22 RX ADMIN — LORAZEPAM 1 MG: 1 TABLET ORAL at 23:05

## 2024-12-22 RX ADMIN — METOPROLOL SUCCINATE 50 MG: 50 TABLET, EXTENDED RELEASE ORAL at 08:53

## 2024-12-22 RX ADMIN — FAMOTIDINE 20 MG: 20 TABLET, FILM COATED ORAL at 08:52

## 2024-12-22 RX ADMIN — ATORVASTATIN CALCIUM 10 MG: 20 TABLET, FILM COATED ORAL at 20:53

## 2024-12-22 RX ADMIN — MIRTAZAPINE 7.5 MG: 15 TABLET, ORALLY DISINTEGRATING ORAL at 20:53

## 2024-12-22 RX ADMIN — PIPERACILLIN AND TAZOBACTAM 3375 MG: 3; .375 INJECTION, POWDER, LYOPHILIZED, FOR SOLUTION INTRAVENOUS at 14:37

## 2024-12-22 RX ADMIN — HYDROMORPHONE HYDROCHLORIDE 0.5 MG: 1 INJECTION, SOLUTION INTRAMUSCULAR; INTRAVENOUS; SUBCUTANEOUS at 09:04

## 2024-12-22 RX ADMIN — LORAZEPAM 1 MG: 1 TABLET ORAL at 10:34

## 2024-12-22 RX ADMIN — DONEPEZIL HYDROCHLORIDE 5 MG: 5 TABLET, FILM COATED ORAL at 08:54

## 2024-12-22 RX ADMIN — GUAIFENESIN 600 MG: 600 TABLET, MULTILAYER, EXTENDED RELEASE ORAL at 20:53

## 2024-12-22 RX ADMIN — GUAIFENESIN 600 MG: 600 TABLET, MULTILAYER, EXTENDED RELEASE ORAL at 08:53

## 2024-12-22 RX ADMIN — SACUBITRIL AND VALSARTAN 1 TABLET: 49; 51 TABLET, FILM COATED ORAL at 08:54

## 2024-12-22 RX ADMIN — HYDROMORPHONE HYDROCHLORIDE 0.5 MG: 1 INJECTION, SOLUTION INTRAMUSCULAR; INTRAVENOUS; SUBCUTANEOUS at 20:51

## 2024-12-22 RX ADMIN — SACUBITRIL AND VALSARTAN 1 TABLET: 49; 51 TABLET, FILM COATED ORAL at 20:51

## 2024-12-22 RX ADMIN — CYANOCOBALAMIN TAB 500 MCG 1000 MCG: 500 TAB at 08:54

## 2024-12-22 RX ADMIN — POTASSIUM CHLORIDE 40 MEQ: 1500 TABLET, EXTENDED RELEASE ORAL at 09:04

## 2024-12-22 RX ADMIN — THIAMINE HYDROCHLORIDE 250 MG: 100 INJECTION, SOLUTION INTRAMUSCULAR; INTRAVENOUS at 06:30

## 2024-12-22 RX ADMIN — RIVAROXABAN 20 MG: 20 TABLET, FILM COATED ORAL at 08:54

## 2024-12-22 RX ADMIN — HYDROMORPHONE HYDROCHLORIDE 0.5 MG: 1 INJECTION, SOLUTION INTRAMUSCULAR; INTRAVENOUS; SUBCUTANEOUS at 01:45

## 2024-12-22 ASSESSMENT — PAIN SCALES - GENERAL
PAINLEVEL_OUTOF10: 10
PAINLEVEL_OUTOF10: 10
PAINLEVEL_OUTOF10: 9
PAINLEVEL_OUTOF10: 10
PAINLEVEL_OUTOF10: 10
PAINLEVEL_OUTOF10: 6

## 2024-12-22 ASSESSMENT — PAIN DESCRIPTION - LOCATION
LOCATION: MOUTH
LOCATION: MOUTH

## 2024-12-22 ASSESSMENT — PAIN DESCRIPTION - DESCRIPTORS
DESCRIPTORS: ACHING;BURNING;STABBING
DESCRIPTORS: ACHING;DISCOMFORT;GNAWING
DESCRIPTORS: ACHING;BURNING;STABBING

## 2024-12-22 ASSESSMENT — PAIN DESCRIPTION - ORIENTATION
ORIENTATION: MID
ORIENTATION: MID

## 2024-12-22 NOTE — PLAN OF CARE
Problem: Respiratory - Adult  Goal: Achieves optimal ventilation and oxygenation  12/21/2024 2029 by Irma Nicolas RCP  Outcome: Progressing

## 2024-12-22 NOTE — PROGRESS NOTES
Occupational Therapy  Occupational Therapy Initial Evaluation  Facility/Department: [facility] 5C STEPDOWN   Patient Name: Mahesh Brownlee        MRN: [id number]    : 1960    Date of Service: 2024    Chief Complaint   Patient presents with    Seizures     Possibly post ictal/unwitnessed. Daily drinker, hasn't drank since tuesday     Past Medical History:  has a past medical history of Adjustment disorder, AF (atrial fibrillation) (HCC), AICD (automatic cardioverter/defibrillator) present, Alcohol dependence (HCC), CAD (coronary artery disease), Cardiomyopathy (HCC), CHF (congestive heart failure) (HCC), COPD (chronic obstructive pulmonary disease) (HCC), DDD (degenerative disc disease), lumbar, Herniated cervical disc, Hyperlipidemia, Hypertension, Major depression, Post laminectomy syndrome, Sciatica, Snores, Tobacco abuse, and Traumatic brain injury.  Past Surgical History:  has a past surgical history that includes back surgery; Rotator cuff repair (Right); Carpal tunnel release (Right); Nerve Block (2013); Nerve Block (N/A, 2013); other surgical history (2016); Tonsillectomy; pacemaker placement (2015); back surgery; other surgical history (2018); joint replacement (Right, 2018); and Cardiac electrophysiology study and ablation.    Discharge Recommendations: Pt unsafe to return to OF at this time, poor ADL performance and safety concerns, no support systems  Discharge Recommendations: Patient would benefit from continued therapy after discharge  OT Equipment Recommendations  Other: CTA    Assessment  Performance deficits / Impairments: Decreased endurance;Decreased functional mobility ;Decreased ADL status;Decreased balance;Decreased high-level IADLs;Decreased safe awareness;Decreased strength;Decreased posture  Prognosis: Good  Decision Making: Medium Complexity  REQUIRES OT FOLLOW-UP: Yes  Activity Tolerance  Activity Tolerance: Patient limited by pain;Treatment limited

## 2024-12-22 NOTE — PLAN OF CARE
Problem: Safety - Adult  Goal: Free from fall injury  Outcome: Progressing  Flowsheets (Taken 12/21/2024 2254)  Free From Fall Injury:   Instruct family/caregiver on patient safety   Based on caregiver fall risk screen, instruct family/caregiver to ask for assistance with transferring infant if caregiver noted to have fall risk factors     Problem: Chronic Conditions and Co-morbidities  Goal: Patient's chronic conditions and co-morbidity symptoms are monitored and maintained or improved  Outcome: Progressing  Flowsheets (Taken 12/21/2024 2000)  Care Plan - Patient's Chronic Conditions and Co-Morbidity Symptoms are Monitored and Maintained or Improved:   Monitor and assess patient's chronic conditions and comorbid symptoms for stability, deterioration, or improvement   Collaborate with multidisciplinary team to address chronic and comorbid conditions and prevent exacerbation or deterioration   Update acute care plan with appropriate goals if chronic or comorbid symptoms are exacerbated and prevent overall improvement and discharge     Problem: Discharge Planning  Goal: Discharge to home or other facility with appropriate resources  Outcome: Progressing  Flowsheets (Taken 12/21/2024 2000)  Discharge to home or other facility with appropriate resources:   Identify barriers to discharge with patient and caregiver   Arrange for needed discharge resources and transportation as appropriate   Identify discharge learning needs (meds, wound care, etc)   Refer to discharge planning if patient needs post-hospital services based on physician order or complex needs related to functional status, cognitive ability or social support system     Problem: Pain  Goal: Verbalizes/displays adequate comfort level or baseline comfort level  Outcome: Progressing     Problem: Skin/Tissue Integrity  Goal: Absence of new skin breakdown  Description: 1.  Monitor for areas of redness and/or skin breakdown  2.  Assess vascular access sites

## 2024-12-22 NOTE — PROGRESS NOTES
@[facility]@    [facility]   IN-PATIENT SERVICE   [facility]    Progress Note    12/22/2024    12:00 PM    Name:   Mahesh Brownlee  MRN:     [id number]     Acct:      [id number]   Room:   0547/0547-01   Day:  2  Admit Date:  12/19/2024  6:36 PM    PCP:   No primary care provider on file.  Code Status:  DNR-CCA    Subjective:     C/C:   Chief Complaint   Patient presents with    Seizures     Possibly post ictal/unwitnessed. Daily drinker, hasn't drank since tuesday     Interval History Status: improved.     Patient is awake, alert and orientedx3, but did request to be seen by psych as something \"is wrong in my head\". CIWA score of 5 required 5mg of ativan. Overall, calm but does have moments of confusion/dysarthria. No acute events overnight. Neuro signed off    Brief History:     55 yo m PMH of CVA and gait changes there after, Afib on Xarelto, HTN/HLD, Chronic alcohol use, CAD with AICD, seizure disorder on Keppra, hx of TBI, chronic Vertebral artery occlusion (no intervention needed) admitted for confusion and possibly break thorough seizure. Admits to drinking excessively. Mentioned SI on admission, psych was consulted.     Review of Systems:     Pertinent positive as above otherwise all other systems negative    Medications:     Allergies:    Allergies   Allergen Reactions    Augmentin Es-600 [Amoxicillin-Pot Clavulanate]      Other reaction(s): Unknown    Sulfa Antibiotics Other (See Comments)       Current Meds:   Scheduled Meds:    mirtazapine  7.5 mg Oral Nightly    atorvastatin  10 mg Oral Nightly    [START ON 12/27/2024] buprenorphine  1 patch TransDERmal Weekly    donepezil  5 mg Oral Daily    DULoxetine  60 mg Oral Daily    metoprolol succinate  50 mg Oral Daily    sacubitril-valsartan  1 tablet Oral BID    rivaroxaban  20 mg Oral Daily    [Held by provider] spironolactone  25 mg Oral Daily    vitamin B-12  1,000 mcg Oral Daily    sodium chloride flush  5-40 mL  PM       Radiology:  CTA HEAD NECK W CONTRAST    Result Date: 12/20/2024  1. 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 2. Otherwise, no significant stenosis or occlusion of the major arterial vessels of the head and neck. 3. Mild emphysema.     CT HEAD WO CONTRAST    Result Date: 12/19/2024  No acute intracranial findings.     XR CHEST (2 VW)    Result Date: 12/19/2024  Mild cardiomegaly and mild central pulmonary congestion which is more prominent. Mild bibasilar atelectasis or early infiltrates and small left pleural effusion posteriorly.       Physical Examination:        General appearance:  alert, cooperative and no distress  Mental Status:  oriented to person, place and time and normal affect  Lungs:  clear to auscultation bilaterally, normal effort  Heart:  regular rate and rhythm, no murmur  Abdomen:  soft, nontender, nondistended, normal bowel sounds, no masses, hepatomegaly, splenomegaly  Extremities:  no edema, redness, tenderness in the calves  Skin:  no gross lesions, rashes, induration    Assessment:        Hospital Problems             Last Modified POA    * (Principal) Community acquired pneumonia of right lung, unspecified part of lung 12/20/2024 Yes    Heart failure with improved ejection fraction (HFimpEF) ([facility]) 12/20/2024 Yes    Paroxysmal atrial flutter ([facility]) 12/20/2024 Yes    Hyperlipidemia 12/20/2024 Yes    Essential hypertension 12/20/2024 Yes    Major depression 12/20/2024 Yes    Alcohol use disorder, severe, dependence ([facility]) 12/20/2024 Yes    Traumatic brain injury 12/20/2024 Yes    Tobacco abuse 12/20/2024 Yes    COPD without exacerbation ([facility]) 12/20/2024 Yes    Hypothyroidism 12/20/2024 Yes    Chronic lumbar radiculopathy 12/20/2024 Yes    Sepsis ([facility]) 12/20/2024 Yes    Toxic metabolic encephalopathy 12/20/2024 Yes    High anion gap metabolic acidosis 12/20/2024 Yes    History of CVA (cerebrovascular accident)

## 2024-12-23 PROBLEM — J18.9 PNEUMONIA DUE TO INFECTIOUS ORGANISM: Status: ACTIVE | Noted: 2024-12-23

## 2024-12-23 LAB
ALBUMIN SERPL-MCNC: 3.4 G/DL (ref 3.5–5.2)
ALBUMIN/GLOB SERPL: 1.3 {RATIO} (ref 1–2.5)
ALP SERPL-CCNC: 59 U/L (ref 40–129)
ALT SERPL-CCNC: 32 U/L (ref 10–50)
ANION GAP SERPL CALCULATED.3IONS-SCNC: 10 MMOL/L (ref 9–16)
AST SERPL-CCNC: 105 U/L (ref 10–50)
BASOPHILS # BLD: 0.05 K/UL (ref 0–0.2)
BASOPHILS NFR BLD: 1 % (ref 0–2)
BILIRUB SERPL-MCNC: 0.5 MG/DL (ref 0–1.2)
BUN SERPL-MCNC: 8 MG/DL (ref 8–23)
CALCIUM SERPL-MCNC: 8.8 MG/DL (ref 8.6–10.4)
CHLORIDE SERPL-SCNC: 107 MMOL/L (ref 98–107)
CK SERPL-CCNC: 1703 U/L (ref 39–308)
CO2 SERPL-SCNC: 21 MMOL/L (ref 20–31)
CREAT SERPL-MCNC: 0.8 MG/DL (ref 0.7–1.2)
EKG ATRIAL RATE: 109 BPM
EKG P AXIS: 89 DEGREES
EKG P-R INTERVAL: 136 MS
EKG Q-T INTERVAL: 376 MS
EKG QRS DURATION: 100 MS
EKG QTC CALCULATION (BAZETT): 506 MS
EKG R AXIS: -11 DEGREES
EKG T AXIS: 52 DEGREES
EKG VENTRICULAR RATE: 109 BPM
EOSINOPHIL # BLD: 0.3 K/UL (ref 0–0.44)
EOSINOPHILS RELATIVE PERCENT: 4 % (ref 1–4)
ERYTHROCYTE [DISTWIDTH] IN BLOOD BY AUTOMATED COUNT: 13.2 % (ref 11.8–14.4)
GFR, ESTIMATED: >90 ML/MIN/1.73M2
GLUCOSE SERPL-MCNC: 97 MG/DL (ref 74–99)
HCT VFR BLD AUTO: 40.5 % (ref 40.7–50.3)
HGB BLD-MCNC: 12.8 G/DL (ref 13–17)
IMM GRANULOCYTES # BLD AUTO: 0.04 K/UL (ref 0–0.3)
IMM GRANULOCYTES NFR BLD: 1 %
LYMPHOCYTES NFR BLD: 2.05 K/UL (ref 1.1–3.7)
LYMPHOCYTES RELATIVE PERCENT: 24 % (ref 24–43)
MAGNESIUM SERPL-MCNC: 1.9 MG/DL (ref 1.6–2.4)
MCH RBC QN AUTO: 30.5 PG (ref 25.2–33.5)
MCHC RBC AUTO-ENTMCNC: 31.6 G/DL (ref 28.4–34.8)
MCV RBC AUTO: 96.7 FL (ref 82.6–102.9)
MONOCYTES NFR BLD: 1.15 K/UL (ref 0.1–1.2)
MONOCYTES NFR BLD: 14 % (ref 3–12)
NEUTROPHILS NFR BLD: 56 % (ref 36–65)
NEUTS SEG NFR BLD: 4.87 K/UL (ref 1.5–8.1)
NRBC BLD-RTO: 0 PER 100 WBC
PLATELET # BLD AUTO: 185 K/UL (ref 138–453)
PMV BLD AUTO: 9.8 FL (ref 8.1–13.5)
POTASSIUM SERPL-SCNC: 3.2 MMOL/L (ref 3.7–5.3)
POTASSIUM SERPL-SCNC: 3.5 MMOL/L (ref 3.7–5.3)
PROT SERPL-MCNC: 6 G/DL (ref 6.6–8.7)
RBC # BLD AUTO: 4.19 M/UL (ref 4.21–5.77)
SODIUM SERPL-SCNC: 138 MMOL/L (ref 136–145)
WBC OTHER # BLD: 8.5 K/UL (ref 3.5–11.3)

## 2024-12-23 PROCEDURE — 6360000002 HC RX W HCPCS

## 2024-12-23 PROCEDURE — 94640 AIRWAY INHALATION TREATMENT: CPT

## 2024-12-23 PROCEDURE — 97162 PT EVAL MOD COMPLEX 30 MIN: CPT

## 2024-12-23 PROCEDURE — 36415 COLL VENOUS BLD VENIPUNCTURE: CPT

## 2024-12-23 PROCEDURE — 94760 N-INVAS EAR/PLS OXIMETRY 1: CPT

## 2024-12-23 PROCEDURE — 6370000000 HC RX 637 (ALT 250 FOR IP)

## 2024-12-23 PROCEDURE — 82550 ASSAY OF CK (CPK): CPT

## 2024-12-23 PROCEDURE — 6360000002 HC RX W HCPCS: Performed by: STUDENT IN AN ORGANIZED HEALTH CARE EDUCATION/TRAINING PROGRAM

## 2024-12-23 PROCEDURE — 93005 ELECTROCARDIOGRAM TRACING: CPT | Performed by: STUDENT IN AN ORGANIZED HEALTH CARE EDUCATION/TRAINING PROGRAM

## 2024-12-23 PROCEDURE — 80053 COMPREHEN METABOLIC PANEL: CPT

## 2024-12-23 PROCEDURE — 83735 ASSAY OF MAGNESIUM: CPT

## 2024-12-23 PROCEDURE — 85025 COMPLETE CBC W/AUTO DIFF WBC: CPT

## 2024-12-23 PROCEDURE — 97530 THERAPEUTIC ACTIVITIES: CPT

## 2024-12-23 PROCEDURE — 93010 ELECTROCARDIOGRAM REPORT: CPT | Performed by: INTERNAL MEDICINE

## 2024-12-23 PROCEDURE — 99233 SBSQ HOSP IP/OBS HIGH 50: CPT | Performed by: NURSE PRACTITIONER

## 2024-12-23 PROCEDURE — 6370000000 HC RX 637 (ALT 250 FOR IP): Performed by: INTERNAL MEDICINE

## 2024-12-23 PROCEDURE — 84132 ASSAY OF SERUM POTASSIUM: CPT

## 2024-12-23 PROCEDURE — 2580000003 HC RX 258: Performed by: STUDENT IN AN ORGANIZED HEALTH CARE EDUCATION/TRAINING PROGRAM

## 2024-12-23 PROCEDURE — 99232 SBSQ HOSP IP/OBS MODERATE 35: CPT | Performed by: STUDENT IN AN ORGANIZED HEALTH CARE EDUCATION/TRAINING PROGRAM

## 2024-12-23 PROCEDURE — 6370000000 HC RX 637 (ALT 250 FOR IP): Performed by: PSYCHIATRY & NEUROLOGY

## 2024-12-23 PROCEDURE — 2060000000 HC ICU INTERMEDIATE R&B

## 2024-12-23 PROCEDURE — 6370000000 HC RX 637 (ALT 250 FOR IP): Performed by: STUDENT IN AN ORGANIZED HEALTH CARE EDUCATION/TRAINING PROGRAM

## 2024-12-23 PROCEDURE — 2500000003 HC RX 250 WO HCPCS

## 2024-12-23 RX ORDER — MORPHINE SULFATE 2 MG/ML
2 INJECTION, SOLUTION INTRAMUSCULAR; INTRAVENOUS EVERY 6 HOURS PRN
Status: DISCONTINUED | OUTPATIENT
Start: 2024-12-23 | End: 2024-12-26 | Stop reason: HOSPADM

## 2024-12-23 RX ORDER — LANOLIN ALCOHOL/MO/W.PET/CERES
100 CREAM (GRAM) TOPICAL DAILY
Status: DISCONTINUED | OUTPATIENT
Start: 2024-12-24 | End: 2024-12-26 | Stop reason: HOSPADM

## 2024-12-23 RX ORDER — MAGNESIUM SULFATE IN WATER 40 MG/ML
2000 INJECTION, SOLUTION INTRAVENOUS ONCE
Status: COMPLETED | OUTPATIENT
Start: 2024-12-23 | End: 2024-12-23

## 2024-12-23 RX ORDER — SODIUM CHLORIDE 9 MG/ML
INJECTION, SOLUTION INTRAVENOUS CONTINUOUS
Status: DISCONTINUED | OUTPATIENT
Start: 2024-12-23 | End: 2024-12-26 | Stop reason: HOSPADM

## 2024-12-23 RX ORDER — POTASSIUM CHLORIDE 1500 MG/1
40 TABLET, EXTENDED RELEASE ORAL ONCE
Status: COMPLETED | OUTPATIENT
Start: 2024-12-23 | End: 2024-12-23

## 2024-12-23 RX ORDER — HYDROCODONE BITARTRATE AND ACETAMINOPHEN 5; 325 MG/1; MG/1
1 TABLET ORAL EVERY 6 HOURS PRN
Status: DISCONTINUED | OUTPATIENT
Start: 2024-12-23 | End: 2024-12-26 | Stop reason: HOSPADM

## 2024-12-23 RX ADMIN — PIPERACILLIN AND TAZOBACTAM 3375 MG: 3; .375 INJECTION, POWDER, LYOPHILIZED, FOR SOLUTION INTRAVENOUS at 22:03

## 2024-12-23 RX ADMIN — METOPROLOL SUCCINATE 50 MG: 50 TABLET, EXTENDED RELEASE ORAL at 09:02

## 2024-12-23 RX ADMIN — SACUBITRIL AND VALSARTAN 1 TABLET: 49; 51 TABLET, FILM COATED ORAL at 09:03

## 2024-12-23 RX ADMIN — IPRATROPIUM BROMIDE AND ALBUTEROL SULFATE 1 DOSE: .5; 3 SOLUTION RESPIRATORY (INHALATION) at 21:25

## 2024-12-23 RX ADMIN — POTASSIUM CHLORIDE 40 MEQ: 1500 TABLET, EXTENDED RELEASE ORAL at 12:50

## 2024-12-23 RX ADMIN — LACOSAMIDE 100 MG: 100 TABLET, FILM COATED ORAL at 09:02

## 2024-12-23 RX ADMIN — GUAIFENESIN 600 MG: 600 TABLET, MULTILAYER, EXTENDED RELEASE ORAL at 21:57

## 2024-12-23 RX ADMIN — THIAMINE HYDROCHLORIDE 250 MG: 100 INJECTION, SOLUTION INTRAMUSCULAR; INTRAVENOUS at 05:59

## 2024-12-23 RX ADMIN — MORPHINE SULFATE 2 MG: 2 INJECTION, SOLUTION INTRAMUSCULAR; INTRAVENOUS at 21:57

## 2024-12-23 RX ADMIN — FAMOTIDINE 20 MG: 20 TABLET, FILM COATED ORAL at 21:56

## 2024-12-23 RX ADMIN — MIRTAZAPINE 7.5 MG: 15 TABLET, ORALLY DISINTEGRATING ORAL at 21:57

## 2024-12-23 RX ADMIN — THERA TABS 1 TABLET: TAB at 09:02

## 2024-12-23 RX ADMIN — RIVAROXABAN 20 MG: 20 TABLET, FILM COATED ORAL at 09:03

## 2024-12-23 RX ADMIN — HYDROCODONE BITARTRATE AND ACETAMINOPHEN 1 TABLET: 5; 325 TABLET ORAL at 18:07

## 2024-12-23 RX ADMIN — SODIUM CHLORIDE, PRESERVATIVE FREE 10 ML: 5 INJECTION INTRAVENOUS at 09:03

## 2024-12-23 RX ADMIN — CYANOCOBALAMIN TAB 500 MCG 1000 MCG: 500 TAB at 09:04

## 2024-12-23 RX ADMIN — HYDROMORPHONE HYDROCHLORIDE 0.5 MG: 1 INJECTION, SOLUTION INTRAMUSCULAR; INTRAVENOUS; SUBCUTANEOUS at 09:10

## 2024-12-23 RX ADMIN — SACUBITRIL AND VALSARTAN 1 TABLET: 49; 51 TABLET, FILM COATED ORAL at 21:56

## 2024-12-23 RX ADMIN — DULOXETINE HYDROCHLORIDE 60 MG: 30 CAPSULE, DELAYED RELEASE ORAL at 09:02

## 2024-12-23 RX ADMIN — IPRATROPIUM BROMIDE AND ALBUTEROL SULFATE 1 DOSE: .5; 3 SOLUTION RESPIRATORY (INHALATION) at 10:43

## 2024-12-23 RX ADMIN — SODIUM CHLORIDE, PRESERVATIVE FREE 10 ML: 5 INJECTION INTRAVENOUS at 21:58

## 2024-12-23 RX ADMIN — MAGNESIUM SULFATE HEPTAHYDRATE 2000 MG: 40 INJECTION, SOLUTION INTRAVENOUS at 12:55

## 2024-12-23 RX ADMIN — GUAIFENESIN 600 MG: 600 TABLET, MULTILAYER, EXTENDED RELEASE ORAL at 09:02

## 2024-12-23 RX ADMIN — LACOSAMIDE 100 MG: 100 TABLET, FILM COATED ORAL at 21:57

## 2024-12-23 RX ADMIN — DONEPEZIL HYDROCHLORIDE 5 MG: 5 TABLET, FILM COATED ORAL at 09:04

## 2024-12-23 RX ADMIN — PIPERACILLIN AND TAZOBACTAM 3375 MG: 3; .375 INJECTION, POWDER, LYOPHILIZED, FOR SOLUTION INTRAVENOUS at 05:59

## 2024-12-23 RX ADMIN — FAMOTIDINE 20 MG: 20 TABLET, FILM COATED ORAL at 09:02

## 2024-12-23 RX ADMIN — PIPERACILLIN AND TAZOBACTAM 3375 MG: 3; .375 INJECTION, POWDER, LYOPHILIZED, FOR SOLUTION INTRAVENOUS at 14:52

## 2024-12-23 RX ADMIN — FOLIC ACID 1 MG: 1 TABLET ORAL at 09:02

## 2024-12-23 ASSESSMENT — PAIN DESCRIPTION - DESCRIPTORS
DESCRIPTORS: DISCOMFORT;ACHING
DESCRIPTORS: ACHING
DESCRIPTORS: ACHING;BURNING;STABBING

## 2024-12-23 ASSESSMENT — PAIN DESCRIPTION - LOCATION
LOCATION: GENERALIZED
LOCATION: GENERALIZED
LOCATION: MOUTH

## 2024-12-23 ASSESSMENT — PAIN SCALES - GENERAL
PAINLEVEL_OUTOF10: 10
PAINLEVEL_OUTOF10: 9

## 2024-12-23 ASSESSMENT — PAIN - FUNCTIONAL ASSESSMENT: PAIN_FUNCTIONAL_ASSESSMENT: ACTIVITIES ARE NOT PREVENTED

## 2024-12-23 ASSESSMENT — PAIN DESCRIPTION - ORIENTATION: ORIENTATION: MID

## 2024-12-23 NOTE — CARE COORDINATION
Transitional planning-talked with patient. Patient A&OX4. Answered all questions appropriately. Wanting to go home with OP rehab. Will call own cab.

## 2024-12-23 NOTE — PLAN OF CARE
Problem: Safety - Adult  Goal: Free from fall injury  Outcome: Progressing     Problem: Chronic Conditions and Co-morbidities  Goal: Patient's chronic conditions and co-morbidity symptoms are monitored and maintained or improved  Outcome: Progressing  Flowsheets (Taken 12/23/2024 0800)  Care Plan - Patient's Chronic Conditions and Co-Morbidity Symptoms are Monitored and Maintained or Improved: Monitor and assess patient's chronic conditions and comorbid symptoms for stability, deterioration, or improvement     Problem: Discharge Planning  Goal: Discharge to home or other facility with appropriate resources  Outcome: Progressing  Flowsheets (Taken 12/23/2024 0800)  Discharge to home or other facility with appropriate resources: Identify barriers to discharge with patient and caregiver     Problem: Pain  Goal: Verbalizes/displays adequate comfort level or baseline comfort level  Outcome: Progressing     Problem: Skin/Tissue Integrity  Goal: Absence of new skin breakdown  Description: 1.  Monitor for areas of redness and/or skin breakdown  2.  Assess vascular access sites hourly  3.  Every 4-6 hours minimum:  Change oxygen saturation probe site  4.  Every 4-6 hours:  If on nasal continuous positive airway pressure, respiratory therapy assess nares and determine need for appliance change or resting period.  Outcome: Progressing     Problem: ABCDS Injury Assessment  Goal: Absence of physical injury  Outcome: Progressing     Problem: Respiratory - Adult  Goal: Achieves optimal ventilation and oxygenation  Outcome: Progressing  Flowsheets  Taken 12/23/2024 1043 by Anthony Santos RCP  Achieves optimal ventilation and oxygenation:   Assess for changes in respiratory status   Oxygen supplementation based on oxygen saturation or arterial blood gases   Assess and instruct to report shortness of breath or any respiratory difficulty  Taken 12/23/2024 0800 by Luci Dunn, RN  Achieves optimal ventilation and oxygenation:

## 2024-12-23 NOTE — CARE COORDINATION
Met with pt after receiving a social work consult for \"consideration of rehab\".  Pt states that he wants to go for outpatient counseling.  He states that he lives at the [place] and it needs to be close to there.  Explained that there is [facility] in [facility].  Asked pt if he was going to a rehab before discharge and he first said yes then said no, he wants to get home to take care of things.  Asked pt if he has received treatment for his drinking and he stated yes but could not remember where.  Provided him a list of [facility] in [facility].  Pt asked that social work set up an appointment for him.  Called [facility] and they stated that they do not schedule appt's as they have a walk in clinic.  Will relay this information to pt.

## 2024-12-23 NOTE — PROGRESS NOTES
unknown cause of AMS     Decision Support Exception - unselect if not a suspected or confirmed  emergency medical condition->Emergency Medical Condition (MA)  Reason for Exam: altered mental status for apparently over a day no known  last known well, hx alcohol use, unknown cause of AMS     FINDINGS:     CTA NECK:     AORTIC ARCH/ARCH VESSELS: No dissection or arterial injury.  No significant  stenosis of the brachiocephalic or subclavian arteries.     CAROTID ARTERIES: No dissection, arterial injury, or hemodynamically  significant stenosis by NASCET criteria.     VERTEBRAL ARTERIES: The left vertebral artery is occluded at its origin with  reconstitution at the C4-5 level.  Otherwise, no dissection, arterial injury,  or significant stenosis.     SOFT TISSUES: The lung apices are clear with a background of mild emphysema.  No cervical or superior mediastinal lymphadenopathy.  The larynx and pharynx  are unremarkable.  No acute abnormality of the salivary and thyroid glands.     BONES: No acute osseous abnormality.        CTA HEAD:     ANTERIOR CIRCULATION: No significant stenosis of the intracranial internal  carotid, anterior cerebral, or middle cerebral arteries. No aneurysm.     POSTERIOR CIRCULATION: No significant stenosis of the basilar or posterior  cerebral arteries. No aneurysm.     OTHER: No dural venous sinus thrombosis on this non-dedicated study.     IMPRESSION:  1. The left vertebral artery is occluded at its origin with reconstitution at  the C4-5 level with no further abnormality.  2. Otherwise, no significant stenosis or occlusion of the major arterial  vessels of the head and neck.  3. Mild emphysema.    Mental Status Examination:  Level of consciousness:  Awake and alert  Appearance: hospital attire, seated upright in recliner, fair grooming  Behavior/Motor: Engages with interviewer, fairly reliable historian, has some insight into cognitive impairment   Attitude toward examiner:  cooperative,

## 2024-12-23 NOTE — PLAN OF CARE
Problem: Safety - Adult  Goal: Free from fall injury  Outcome: Progressing  Flowsheets (Taken 12/23/2024 0336)  Free From Fall Injury:   Instruct family/caregiver on patient safety   Based on caregiver fall risk screen, instruct family/caregiver to ask for assistance with transferring infant if caregiver noted to have fall risk factors     Problem: Chronic Conditions and Co-morbidities  Goal: Patient's chronic conditions and co-morbidity symptoms are monitored and maintained or improved  Outcome: Progressing     Problem: Discharge Planning  Goal: Discharge to home or other facility with appropriate resources  Outcome: Progressing     Problem: Pain  Goal: Verbalizes/displays adequate comfort level or baseline comfort level  Outcome: Progressing     Problem: Skin/Tissue Integrity  Goal: Absence of new skin breakdown  Description: 1.  Monitor for areas of redness and/or skin breakdown  2.  Assess vascular access sites hourly  3.  Every 4-6 hours minimum:  Change oxygen saturation probe site  4.  Every 4-6 hours:  If on nasal continuous positive airway pressure, respiratory therapy assess nares and determine need for appliance change or resting period.  Outcome: Progressing     Problem: ABCDS Injury Assessment  Goal: Absence of physical injury  Outcome: Progressing     Problem: Respiratory - Adult  Goal: Achieves optimal ventilation and oxygenation  Outcome: Progressing  Flowsheets (Taken 12/22/2024 2000)  Achieves optimal ventilation and oxygenation:   Assess for changes in respiratory status   Assess for changes in mentation and behavior   Position to facilitate oxygenation and minimize respiratory effort

## 2024-12-23 NOTE — PROGRESS NOTES
Physical Therapy  Facility/Department: [address] STEPDOWN  Physical Therapy Initial Assessment    Name: Mahesh Brownlee  : 1960  MRN: [id number]  Date of Service: 2024    Discharge Recommendations: Further therapy recommended at discharge.  Chief Complaint   Patient presents with    Seizures     Possibly post ictal/unwitnessed. Daily drinker, hasn't drank since tuesday     Mahesh Brownlee is a 64 y.o. Non- / non  male who presents with Seizures (Possibly post ictal/unwitnessed. Daily drinker, hasn't drank since tuesday)   and is admitted to the hospital for the management of Community acquired pneumonia of right lung, unspecified part of lung.      PT Equipment Recommendations  Equipment Needed: No (pt owns RW)      Patient Diagnosis(es): The primary encounter diagnosis was Pneumonia due to infectious organism, unspecified laterality, unspecified part of lung. Diagnoses of Pleural effusion on left, Altered mental status, unspecified altered mental status type, and Prolonged Q-T interval on ECG were also pertinent to this visit.  Past Medical History:  has a past medical history of Adjustment disorder, AF (atrial fibrillation) ([facility]), AICD (automatic cardioverter/defibrillator) present, Alcohol dependence ([facility]), CAD (coronary artery disease), Cardiomyopathy ([facility]), CHF (congestive heart failure) ([facility]), COPD (chronic obstructive pulmonary disease) ([facility]), DDD (degenerative disc disease), lumbar, Herniated cervical disc, Hyperlipidemia, Hypertension, Major depression, Post laminectomy syndrome, Sciatica, Snores, Tobacco abuse, and Traumatic brain injury.  Past Surgical History:  has a past surgical history that includes back surgery; Rotator cuff repair (Right); Carpal tunnel release (Right); Nerve Block (2013); Nerve Block (N/A, 2013); other surgical history (2016); Tonsillectomy; pacemaker placement (2015); back surgery; other surgical history (2018); joint replacement

## 2024-12-24 LAB
ANION GAP SERPL CALCULATED.3IONS-SCNC: 10 MMOL/L (ref 9–16)
BASOPHILS # BLD: 0.05 K/UL (ref 0–0.2)
BASOPHILS NFR BLD: 1 % (ref 0–2)
BUN SERPL-MCNC: 8 MG/DL (ref 8–23)
CALCIUM SERPL-MCNC: 8.9 MG/DL (ref 8.6–10.4)
CHLORIDE SERPL-SCNC: 110 MMOL/L (ref 98–107)
CK SERPL-CCNC: 1257 U/L (ref 39–308)
CO2 SERPL-SCNC: 20 MMOL/L (ref 20–31)
CREAT SERPL-MCNC: 0.8 MG/DL (ref 0.7–1.2)
EKG ATRIAL RATE: 89 BPM
EKG P AXIS: 73 DEGREES
EKG P-R INTERVAL: 150 MS
EKG Q-T INTERVAL: 382 MS
EKG QRS DURATION: 96 MS
EKG QTC CALCULATION (BAZETT): 464 MS
EKG R AXIS: 27 DEGREES
EKG T AXIS: 50 DEGREES
EKG VENTRICULAR RATE: 89 BPM
EOSINOPHIL # BLD: 0.39 K/UL (ref 0–0.44)
EOSINOPHILS RELATIVE PERCENT: 5 % (ref 1–4)
ERYTHROCYTE [DISTWIDTH] IN BLOOD BY AUTOMATED COUNT: 13.5 % (ref 11.8–14.4)
GFR, ESTIMATED: >90 ML/MIN/1.73M2
GLUCOSE SERPL-MCNC: 96 MG/DL (ref 74–99)
HCT VFR BLD AUTO: 40.5 % (ref 40.7–50.3)
HGB BLD-MCNC: 12.6 G/DL (ref 13–17)
IMM GRANULOCYTES # BLD AUTO: 0.05 K/UL (ref 0–0.3)
IMM GRANULOCYTES NFR BLD: 1 %
LYMPHOCYTES NFR BLD: 1.78 K/UL (ref 1.1–3.7)
LYMPHOCYTES RELATIVE PERCENT: 22 % (ref 24–43)
MCH RBC QN AUTO: 30.3 PG (ref 25.2–33.5)
MCHC RBC AUTO-ENTMCNC: 31.1 G/DL (ref 28.4–34.8)
MCV RBC AUTO: 97.4 FL (ref 82.6–102.9)
MICROORGANISM SPEC CULT: NORMAL
MICROORGANISM SPEC CULT: NORMAL
MONOCYTES NFR BLD: 0.93 K/UL (ref 0.1–1.2)
MONOCYTES NFR BLD: 11 % (ref 3–12)
NEUTROPHILS NFR BLD: 60 % (ref 36–65)
NEUTS SEG NFR BLD: 4.95 K/UL (ref 1.5–8.1)
NRBC BLD-RTO: 0 PER 100 WBC
PLATELET # BLD AUTO: 219 K/UL (ref 138–453)
PMV BLD AUTO: 9.9 FL (ref 8.1–13.5)
POTASSIUM SERPL-SCNC: 3.4 MMOL/L (ref 3.7–5.3)
RBC # BLD AUTO: 4.16 M/UL (ref 4.21–5.77)
SERVICE CMNT-IMP: NORMAL
SERVICE CMNT-IMP: NORMAL
SODIUM SERPL-SCNC: 140 MMOL/L (ref 136–145)
SPECIMEN DESCRIPTION: NORMAL
SPECIMEN DESCRIPTION: NORMAL
WBC OTHER # BLD: 8.2 K/UL (ref 3.5–11.3)

## 2024-12-24 PROCEDURE — 82550 ASSAY OF CK (CPK): CPT

## 2024-12-24 PROCEDURE — 6360000002 HC RX W HCPCS: Performed by: STUDENT IN AN ORGANIZED HEALTH CARE EDUCATION/TRAINING PROGRAM

## 2024-12-24 PROCEDURE — 94640 AIRWAY INHALATION TREATMENT: CPT

## 2024-12-24 PROCEDURE — 97116 GAIT TRAINING THERAPY: CPT

## 2024-12-24 PROCEDURE — 97535 SELF CARE MNGMENT TRAINING: CPT

## 2024-12-24 PROCEDURE — 2580000003 HC RX 258: Performed by: STUDENT IN AN ORGANIZED HEALTH CARE EDUCATION/TRAINING PROGRAM

## 2024-12-24 PROCEDURE — 2500000003 HC RX 250 WO HCPCS

## 2024-12-24 PROCEDURE — 36415 COLL VENOUS BLD VENIPUNCTURE: CPT

## 2024-12-24 PROCEDURE — 2580000003 HC RX 258

## 2024-12-24 PROCEDURE — 6370000000 HC RX 637 (ALT 250 FOR IP)

## 2024-12-24 PROCEDURE — 6370000000 HC RX 637 (ALT 250 FOR IP): Performed by: INTERNAL MEDICINE

## 2024-12-24 PROCEDURE — 85025 COMPLETE CBC W/AUTO DIFF WBC: CPT

## 2024-12-24 PROCEDURE — 6370000000 HC RX 637 (ALT 250 FOR IP): Performed by: PSYCHIATRY & NEUROLOGY

## 2024-12-24 PROCEDURE — 99232 SBSQ HOSP IP/OBS MODERATE 35: CPT | Performed by: STUDENT IN AN ORGANIZED HEALTH CARE EDUCATION/TRAINING PROGRAM

## 2024-12-24 PROCEDURE — 2060000000 HC ICU INTERMEDIATE R&B

## 2024-12-24 PROCEDURE — 93010 ELECTROCARDIOGRAM REPORT: CPT | Performed by: INTERNAL MEDICINE

## 2024-12-24 PROCEDURE — 80048 BASIC METABOLIC PNL TOTAL CA: CPT

## 2024-12-24 PROCEDURE — 6370000000 HC RX 637 (ALT 250 FOR IP): Performed by: STUDENT IN AN ORGANIZED HEALTH CARE EDUCATION/TRAINING PROGRAM

## 2024-12-24 PROCEDURE — 97530 THERAPEUTIC ACTIVITIES: CPT

## 2024-12-24 RX ORDER — METOPROLOL SUCCINATE 25 MG/1
25 TABLET, EXTENDED RELEASE ORAL DAILY
Status: DISCONTINUED | OUTPATIENT
Start: 2024-12-25 | End: 2024-12-26 | Stop reason: HOSPADM

## 2024-12-24 RX ADMIN — DULOXETINE HYDROCHLORIDE 60 MG: 30 CAPSULE, DELAYED RELEASE ORAL at 09:06

## 2024-12-24 RX ADMIN — FAMOTIDINE 20 MG: 20 TABLET, FILM COATED ORAL at 09:02

## 2024-12-24 RX ADMIN — GUAIFENESIN 600 MG: 600 TABLET, MULTILAYER, EXTENDED RELEASE ORAL at 09:02

## 2024-12-24 RX ADMIN — SODIUM CHLORIDE, PRESERVATIVE FREE 10 ML: 5 INJECTION INTRAVENOUS at 21:16

## 2024-12-24 RX ADMIN — Medication 100 MG: at 09:06

## 2024-12-24 RX ADMIN — FAMOTIDINE 20 MG: 20 TABLET, FILM COATED ORAL at 21:15

## 2024-12-24 RX ADMIN — SODIUM CHLORIDE: 0.9 INJECTION, SOLUTION INTRAVENOUS at 14:55

## 2024-12-24 RX ADMIN — LORAZEPAM 2 MG: 2 TABLET ORAL at 16:40

## 2024-12-24 RX ADMIN — DONEPEZIL HYDROCHLORIDE 5 MG: 5 TABLET, FILM COATED ORAL at 09:06

## 2024-12-24 RX ADMIN — SODIUM CHLORIDE, PRESERVATIVE FREE 10 ML: 5 INJECTION INTRAVENOUS at 10:07

## 2024-12-24 RX ADMIN — SACUBITRIL AND VALSARTAN 1 TABLET: 49; 51 TABLET, FILM COATED ORAL at 09:02

## 2024-12-24 RX ADMIN — PIPERACILLIN AND TAZOBACTAM 3375 MG: 3; .375 INJECTION, POWDER, LYOPHILIZED, FOR SOLUTION INTRAVENOUS at 06:38

## 2024-12-24 RX ADMIN — POTASSIUM CHLORIDE 40 MEQ: 1500 TABLET, EXTENDED RELEASE ORAL at 10:36

## 2024-12-24 RX ADMIN — LACOSAMIDE 100 MG: 100 TABLET, FILM COATED ORAL at 09:05

## 2024-12-24 RX ADMIN — IPRATROPIUM BROMIDE AND ALBUTEROL SULFATE 1 DOSE: .5; 3 SOLUTION RESPIRATORY (INHALATION) at 19:30

## 2024-12-24 RX ADMIN — SODIUM CHLORIDE: 9 INJECTION, SOLUTION INTRAVENOUS at 17:10

## 2024-12-24 RX ADMIN — LACOSAMIDE 100 MG: 100 TABLET, FILM COATED ORAL at 21:15

## 2024-12-24 RX ADMIN — METOPROLOL SUCCINATE 50 MG: 50 TABLET, EXTENDED RELEASE ORAL at 09:06

## 2024-12-24 RX ADMIN — GUAIFENESIN 600 MG: 600 TABLET, MULTILAYER, EXTENDED RELEASE ORAL at 21:15

## 2024-12-24 RX ADMIN — MORPHINE SULFATE 2 MG: 2 INJECTION, SOLUTION INTRAMUSCULAR; INTRAVENOUS at 21:15

## 2024-12-24 RX ADMIN — MORPHINE SULFATE 2 MG: 2 INJECTION, SOLUTION INTRAMUSCULAR; INTRAVENOUS at 10:10

## 2024-12-24 RX ADMIN — PIPERACILLIN AND TAZOBACTAM 3375 MG: 3; .375 INJECTION, POWDER, LYOPHILIZED, FOR SOLUTION INTRAVENOUS at 22:57

## 2024-12-24 RX ADMIN — MORPHINE SULFATE 2 MG: 2 INJECTION, SOLUTION INTRAMUSCULAR; INTRAVENOUS at 14:58

## 2024-12-24 RX ADMIN — SODIUM CHLORIDE: 9 INJECTION, SOLUTION INTRAVENOUS at 06:45

## 2024-12-24 RX ADMIN — MIRTAZAPINE 7.5 MG: 15 TABLET, ORALLY DISINTEGRATING ORAL at 21:15

## 2024-12-24 RX ADMIN — SACUBITRIL AND VALSARTAN 1 TABLET: 49; 51 TABLET, FILM COATED ORAL at 21:14

## 2024-12-24 RX ADMIN — RIVAROXABAN 20 MG: 20 TABLET, FILM COATED ORAL at 09:02

## 2024-12-24 RX ADMIN — THERA TABS 1 TABLET: TAB at 09:07

## 2024-12-24 RX ADMIN — PIPERACILLIN AND TAZOBACTAM 3375 MG: 3; .375 INJECTION, POWDER, LYOPHILIZED, FOR SOLUTION INTRAVENOUS at 14:56

## 2024-12-24 ASSESSMENT — PAIN SCALES - GENERAL
PAINLEVEL_OUTOF10: 9
PAINLEVEL_OUTOF10: 7
PAINLEVEL_OUTOF10: 10
PAINLEVEL_OUTOF10: 9
PAINLEVEL_OUTOF10: 5

## 2024-12-24 ASSESSMENT — PAIN DESCRIPTION - DESCRIPTORS
DESCRIPTORS: ACHING;DISCOMFORT
DESCRIPTORS: ACHING;BURNING;STABBING
DESCRIPTORS: ACHING;THROBBING

## 2024-12-24 ASSESSMENT — PAIN - FUNCTIONAL ASSESSMENT
PAIN_FUNCTIONAL_ASSESSMENT: ACTIVITIES ARE NOT PREVENTED

## 2024-12-24 ASSESSMENT — PAIN DESCRIPTION - LOCATION
LOCATION: GENERALIZED
LOCATION: BACK;GENERALIZED
LOCATION: OTHER (COMMENT);BACK

## 2024-12-24 ASSESSMENT — PAIN DESCRIPTION - ORIENTATION
ORIENTATION: OTHER (COMMENT)
ORIENTATION: RIGHT;LEFT;MID;LOWER

## 2024-12-24 NOTE — PROGRESS NOTES
request. Call light and all needs in place. Bed alarm on. RN notified, Telesitter in use.  Subjective  Subjective: RN and pt agreeable to therapy session. Pt sitting in the recliner with TREVIÑO present upon arrival. Pt needed to use the bathroom and then was agreeable to ambulate for his therapy session. Pt reporting of 10/10 chronic pain \"all over\" at rest and with mobility. Pt provided with emotional support, mobility and positioning during session.     Cognition   Orientation  Overall Orientation Status: Within Functional Limits  Orientation Level: Oriented X4  Cognition  Overall Cognitive Status: Exceptions  Arousal/Alertness: Appears intact  Following Commands: Appears intact  Attention Span: Appears intact  Memory: Decreased recall of recent events;Decreased short term memory  Safety Judgement: Decreased awareness of need for assistance;Decreased awareness of need for safety  Problem Solving: Decreased awareness of errors;Assistance required to identify errors made;Assistance required to correct errors made  Insights: Decreased awareness of deficits  Initiation: Appears intact  Sequencing: Appears intact  Cognition Comment: Decreased safety awareness    Objective  Bed mobility  Supine to Sit: Unable to assess (sitting in recliner upon arrival.)  Sit to Supine: Supervision  Scooting: Supervision  Bed Mobility Comments: Pt SUP for sit to supine back to bed after session. SUP for scooting EOB.  Transfers  Sit to Stand: Contact guard assistance  Stand to Sit: Contact guard assistance  Comment: Pt utilized cane at first for sit to stand however deemed unsafe and obtained walker for pt. Pt completed sit to stands from recliner with CGA and Rw, improved balance and safety noted compared to cane.  Ambulation  Surface: Level tile  Device: Rolling Walker  Assistance: Contact guard assistance;Minimal assistance  Quality of Gait: decreased weight bearing on LLE, heavy UE support, non-reciprocal pattern.  Gait Deviations:  Time   Individual Concurrent Group Co-treatment   Time In 1404         Time Out 1424         Minutes 20         Timed Code Treatment Minutes: 15 Minutes       Oscar Robins, PTA

## 2024-12-24 NOTE — PROGRESS NOTES
Occupational Therapy  Occupational Therapy Daily Treatment Note  Facility/Department: [address] STEPDOWN       Patient Name: Mahesh Brownlee        MRN: [id number]    : 1960    Date of Service: 2024      Past Medical History:  has a past medical history of Adjustment disorder, AF (atrial fibrillation) ([facility]), AICD (automatic cardioverter/defibrillator) present, Alcohol dependence ([facility]), CAD (coronary artery disease), Cardiomyopathy ([facility]), CHF (congestive heart failure) ([facility]), COPD (chronic obstructive pulmonary disease) ([facility]), DDD (degenerative disc disease), lumbar, Herniated cervical disc, Hyperlipidemia, Hypertension, Major depression, Post laminectomy syndrome, Sciatica, Snores, Tobacco abuse, and Traumatic brain injury.  Past Surgical History:  has a past surgical history that includes back surgery; Rotator cuff repair (Right); Carpal tunnel release (Right); Nerve Block (2013); Nerve Block (N/A, 2013); other surgical history (2016); Tonsillectomy; pacemaker placement (2015); back surgery; other surgical history (2018); joint replacement (Right, 2018); and Cardiac electrophysiology study and ablation.    Discharge Recommendations  Discharge Recommendations: Patient would benefit from continued therapy after discharge       Assessment  Performance deficits / Impairments: Decreased endurance;Decreased functional mobility ;Decreased ADL status;Decreased balance;Decreased high-level IADLs;Decreased safe awareness;Decreased strength;Decreased posture  Assessment: Pt making steady progress towards goals this session with min verbal cues. Pt will continue to benefit from further OT services following discharge from acute care hospital  Prognosis: Good  Activity Tolerance  Activity Tolerance: Patient Tolerated treatment well  Safety Devices  Type of Devices: Gait belt;Nurse notified;Call light within reach;All fall risk precautions in place;Left in chair;Chair alarm in  errors made  Insights: Decreased awareness of deficits  Initiation: Appears intact  Sequencing: Appears intact  Cognition Comment: Decreased safety awareness    Activities of Daily Living  Grooming: Modified independent   Grooming Skilled Clinical Factors: Pt able to complete oral care, hair care, wsh face, neck and hands while seated upright in recliner. Pt also stood at sink to wash hands following toileting task which he completed with SBA for safety only.  UE Bathing: Modified independent   UE Bathing Skilled Clinical Factors: Pt completed seated upright in recliner  LE Bathing: Stand by assistance;Contact guard assistance;Setup;Verbal cueing  LE Bathing Skilled Clinical Factors: Pt was able to wsh BLE's and front calixto area while seated MOD I but requires SBA>CGA during stand for washing buttocks due to decreased balance/unsteadiness present  UE Dressing: Stand by assistance  LE Dressing: Stand by assistance  LE Dressing Skilled Clinical Factors: Don/doff slipper socks  Toileting: Stand by assistance;Contact guard assistance  Toileting Skilled Clinical Factors: Pt completed calixto hygiene while in standing for thoroughness and use of washcloth with cue for balance and safety. Pt with one UE support on sinktop tfor this task  Additional Comments: Pt completed above as stated with increased time and cues for safety to complete. Pt cooperative and follows cues throughout. Recommend walker use vs straight cane to prevent falls    Balance  Balance  Sitting: Without support (Pt sat EOB 2 minutes, upright in recliner ~20 minutes, toilet ~4-5 minutes for ADl routines with SBA>Supervision during trunk flexion for reach to foot level with no LOB demonstrated)  Standing: With support (Pt stood 3x with first 2 straight cane and CGA due to unsteadiness. 3rd stand with RW CGA and stood total of 5-6 minutes during ADL tasks)    Transfers/Mobility  Bed mobility  Supine to Sit: Unable to assess (sitting in recliner upon

## 2024-12-24 NOTE — PROGRESS NOTES
intracranial findings.     XR CHEST (2 VW)    Result Date: 12/19/2024  Mild cardiomegaly and mild central pulmonary congestion which is more prominent. Mild bibasilar atelectasis or early infiltrates and small left pleural effusion posteriorly.       Physical Examination:        General appearance:  alert, cooperative, in mild to moderate distress  Mental Status:  oriented to person, place and time,  Lungs:  clear to auscultation bilaterally, normal effort  Heart:  regular rate and rhythm, no murmur  Abdomen:  soft, nontender, nondistended, normal bowel sounds, no masses, hepatomegaly, splenomegaly  Extremities:  no edema, redness, tenderness in the calves  Skin:  no gross lesions, rashes, induration    Assessment:        Hospital Problems             Last Modified POA    * (Principal) Community acquired pneumonia of right lung, unspecified part of lung 12/20/2024 Yes    Heart failure with improved ejection fraction (HFimpEF) ([facility]) 12/20/2024 Yes    Paroxysmal atrial flutter ([facility]) 12/20/2024 Yes    Hyperlipidemia 12/20/2024 Yes    Essential hypertension 12/20/2024 Yes    Major depression 12/20/2024 Yes    Alcohol use disorder, severe, dependence ([facility]) 12/20/2024 Yes    Traumatic brain injury 12/20/2024 Yes    Tobacco abuse 12/20/2024 Yes    COPD without exacerbation ([facility]) 12/20/2024 Yes    Hypothyroidism 12/20/2024 Yes    Chronic lumbar radiculopathy 12/20/2024 Yes    Sepsis ([facility]) 12/20/2024 Yes    Toxic metabolic encephalopathy 12/20/2024 Yes    High anion gap metabolic acidosis 12/20/2024 Yes    History of CVA (cerebrovascular accident) 12/20/2024 Yes    Confusion 12/20/2024 Yes    Cognitive impairment 12/20/2024 Yes    ICD (implantable cardioverter-defibrillator) in place 12/20/2024 Yes    Cardiomyopathy ([facility]) 12/20/2024 Yes    Chronic combined systolic and diastolic heart failure ([facility]) 12/20/2024 Yes    Alcohol withdrawal syndrome with complication ([facility]) 12/20/2024 Yes    History of alcohol use disorder  12/20/2024 Yes    Metabolic encephalopathy 12/20/2024 Yes    Severe dementia associated with alcoholism, with mood disturbance ([facility]) 12/20/2024 Yes    Leukocytosis 12/20/2024 Yes    Seizures ([facility]) 12/20/2024 Yes    Non compliance w medication regimen 12/21/2024 Yes       Plan:        Acute encephalopathy-concern for drug/alcohol overdose versus withdrawal seizure versus stroke    -Seen by neurology, subtherapeutic Keppra levels, recommended compliance with seizure medications signed off, recommended outpatient follow-up with neurology in 2 to 3 weeks, neuropsych workup due to possible underlying cognitive impairment, continue home dose Aricept  -Continue Vimpat, seizure precautions discussed, counseled on compliance with medications  -Has known history of stroke in the past, has chronic vertebral artery occlusion, neuroendovascular surgery was resolved by ER, recommended no intervention and continue anticoagulation  -Continue Xarelto  --Continues to be on CIWA protocol,  consulted, has been counseled against alcohol/drug abuse, continue thiamine     3.  Concern for aspiration pneumonia complete course of antibiotics     4.  Cardiomyopathy s/p AICD   - Improved EF most recent is 50 to 55%,   - previously considered to be nonischemic in the setting of alcohol induced.  - continue BB//entresto/aldactone, further optimization medications as outpatient     5.  Paroxysmal atrial fibrillation   -Continue beta-blocker, Xarelto    6.  History of COPD   -Bronchodilators as needed    8. Mild Rhabdo   -Improved with IV fluids, hold statin, TSH reviewed  Hypokalemia-replace as needed, check mag    10. Suicidal ideation with underlying depression/mood disorder   - psych service was consulted not recommending BHI, medications adjusted, will need follow-up as outpatient  Prolonged QTc, magnesium given, currently on mirtazapine and Cymbalta    Synthroid    Harmandeep Simran Brown MD  12/23/2024  7:37 PM

## 2024-12-24 NOTE — PROGRESS NOTES
[email or web address]@    [facility]   IN-PATIENT SERVICE   [facility]    Progress Note    12/24/2024    1:42 PM    Name:   Mahesh Brownlee  MRN:     [id number]     Acct:      [id number]   Room:   0547/0547-01   Day:  4  Admit Date:  12/19/2024  6:36 PM    PCP:   No primary care provider on file.  Code Status:  DNR-CCA    Subjective:     C/C:   Chief Complaint   Patient presents with    Seizures     Possibly post ictal/unwitnessed. Daily drinker, hasn't drank since tuesday     Interval History Status: improved.  Seen at bedside, hemodynamically stable  No acute events overnight, no new complaints  Pain improved from before, CK levels improving, continues to be on IV fluids, increase to 125 mL/h  Potassium replaced      Brief History:     Per my colleague   \"55 yo m PMH of CVA and gait changes there after, Afib on Xarelto, HTN/HLD, Chronic alcohol use, CAD with AICD, seizure disorder on Keppra, hx of TBI, chronic Vertebral artery occlusion (no intervention needed) admitted for confusion and possibly break thorough seizure. Admits to drinking excessively. Mentioned SI on admission, psych was consulted. \"      Medications:     Allergies:    Allergies   Allergen Reactions    Augmentin Es-600 [Amoxicillin-Pot Clavulanate]      Other reaction(s): Unknown    Sulfa Antibiotics Other (See Comments)       Current Meds:   Scheduled Meds:    thiamine  100 mg Oral Daily    mirtazapine  7.5 mg Oral Nightly    [Held by provider] atorvastatin  10 mg Oral Nightly    [START ON 12/27/2024] buprenorphine  1 patch TransDERmal Weekly    donepezil  5 mg Oral Daily    DULoxetine  60 mg Oral Daily    metoprolol succinate  50 mg Oral Daily    sacubitril-valsartan  1 tablet Oral BID    rivaroxaban  20 mg Oral Daily    [Held by provider] spironolactone  25 mg Oral Daily    sodium chloride flush  5-40 mL IntraVENous 2 times per day    multivitamin  1 tablet Oral Daily    famotidine  20 mg Oral BID    nicotine  1

## 2024-12-24 NOTE — PLAN OF CARE
Problem: Safety - Adult  Goal: Free from fall injury  12/24/2024 0847 by Mp Vogt RN  Outcome: Progressing  Flowsheets (Taken 12/24/2024 0846)  Free From Fall Injury:   Instruct family/caregiver on patient safety   Based on caregiver fall risk screen, instruct family/caregiver to ask for assistance with transferring infant if caregiver noted to have fall risk factors  12/23/2024 1850 by Luci Dunn RN  Outcome: Progressing     Problem: Chronic Conditions and Co-morbidities  Goal: Patient's chronic conditions and co-morbidity symptoms are monitored and maintained or improved  12/24/2024 0847 by Mp Vogt RN  Outcome: Progressing  Flowsheets (Taken 12/23/2024 2000)  Care Plan - Patient's Chronic Conditions and Co-Morbidity Symptoms are Monitored and Maintained or Improved:   Monitor and assess patient's chronic conditions and comorbid symptoms for stability, deterioration, or improvement   Collaborate with multidisciplinary team to address chronic and comorbid conditions and prevent exacerbation or deterioration   Update acute care plan with appropriate goals if chronic or comorbid symptoms are exacerbated and prevent overall improvement and discharge  12/23/2024 1850 by Luci Dunn RN  Outcome: Progressing  Flowsheets (Taken 12/23/2024 0800)  Care Plan - Patient's Chronic Conditions and Co-Morbidity Symptoms are Monitored and Maintained or Improved: Monitor and assess patient's chronic conditions and comorbid symptoms for stability, deterioration, or improvement     Problem: Discharge Planning  Goal: Discharge to home or other facility with appropriate resources  12/24/2024 0847 by Mp Vogt RN  Outcome: Progressing  Flowsheets (Taken 12/23/2024 2000)  Discharge to home or other facility with appropriate resources:   Identify barriers to discharge with patient and caregiver   Arrange for needed discharge resources and transportation as appropriate   Identify discharge learning needs (meds,  wound care, etc)   Refer to discharge planning if patient needs post-hospital services based on physician order or complex needs related to functional status, cognitive ability or social support system  12/23/2024 1850 by Luci Dunn RN  Outcome: Progressing  Flowsheets (Taken 12/23/2024 0800)  Discharge to home or other facility with appropriate resources: Identify barriers to discharge with patient and caregiver     Problem: Pain  Goal: Verbalizes/displays adequate comfort level or baseline comfort level  12/24/2024 0847 by Mp Vogt RN  Outcome: Progressing  12/23/2024 1850 by Luci Dunn RN  Outcome: Progressing     Problem: Skin/Tissue Integrity  Goal: Absence of new skin breakdown  Description: 1.  Monitor for areas of redness and/or skin breakdown  2.  Assess vascular access sites hourly  3.  Every 4-6 hours minimum:  Change oxygen saturation probe site  4.  Every 4-6 hours:  If on nasal continuous positive airway pressure, respiratory therapy assess nares and determine need for appliance change or resting period.  12/24/2024 0847 by Mp Vogt RN  Outcome: Progressing  12/23/2024 1850 by Luci Dunn RN  Outcome: Progressing     Problem: ABCDS Injury Assessment  Goal: Absence of physical injury  12/24/2024 0847 by Mp Vogt RN  Outcome: Progressing  12/23/2024 1850 by Luci Dunn RN  Outcome: Progressing     Problem: Respiratory - Adult  Goal: Achieves optimal ventilation and oxygenation  12/24/2024 0847 by Mp Vogt RN  Outcome: Progressing  Flowsheets (Taken 12/23/2024 2000)  Achieves optimal ventilation and oxygenation:   Assess for changes in respiratory status   Assess for changes in mentation and behavior   Position to facilitate oxygenation and minimize respiratory effort  12/23/2024 1850 by Luci Dunn RN  Outcome: Progressing  Flowsheets  Taken 12/23/2024 1043 by Anthony Santos RCP  Achieves optimal ventilation and oxygenation:   Assess for changes in

## 2024-12-24 NOTE — CARE COORDINATION
Met with pt and provided him a list of the [facility] with the walk in clinic information.  Pt very appreciative.

## 2024-12-24 NOTE — PLAN OF CARE
Problem: Safety - Adult  Goal: Free from fall injury  12/24/2024 1821 by Chantell Mortensen RN  Outcome: Progressing  12/24/2024 0847 by Mp Vogt RN  Outcome: Progressing  Flowsheets (Taken 12/24/2024 0846)  Free From Fall Injury:   Instruct family/caregiver on patient safety   Based on caregiver fall risk screen, instruct family/caregiver to ask for assistance with transferring infant if caregiver noted to have fall risk factors     Problem: Discharge Planning  Goal: Discharge to home or other facility with appropriate resources  12/24/2024 1821 by Chantell Mortensen RN  Outcome: Progressing  12/24/2024 0847 by Mp Vogt RN  Outcome: Progressing  Flowsheets (Taken 12/23/2024 2000)  Discharge to home or other facility with appropriate resources:   Identify barriers to discharge with patient and caregiver   Arrange for needed discharge resources and transportation as appropriate   Identify discharge learning needs (meds, wound care, etc)   Refer to discharge planning if patient needs post-hospital services based on physician order or complex needs related to functional status, cognitive ability or social support system     Problem: Pain  Goal: Verbalizes/displays adequate comfort level or baseline comfort level  12/24/2024 1821 by Chantell Mortensen RN  Outcome: Progressing  12/24/2024 0847 by Mp Vogt RN  Outcome: Progressing     Problem: Skin/Tissue Integrity  Goal: Absence of new skin breakdown  Description: 1.  Monitor for areas of redness and/or skin breakdown  2.  Assess vascular access sites hourly  3.  Every 4-6 hours minimum:  Change oxygen saturation probe site  4.  Every 4-6 hours:  If on nasal continuous positive airway pressure, respiratory therapy assess nares and determine need for appliance change or resting period.  12/24/2024 1821 by Chantell Mortensen RN  Outcome: Progressing  12/24/2024 0847 by Mp Vogt RN  Outcome: Progressing     Problem: ABCDS Injury Assessment  Goal: Absence of physical

## 2024-12-25 LAB
ANION GAP SERPL CALCULATED.3IONS-SCNC: 10 MMOL/L (ref 9–16)
BASOPHILS # BLD: 0.06 K/UL (ref 0–0.2)
BASOPHILS NFR BLD: 1 % (ref 0–2)
BUN SERPL-MCNC: 6 MG/DL (ref 8–23)
CALCIUM SERPL-MCNC: 8.4 MG/DL (ref 8.6–10.4)
CHLORIDE SERPL-SCNC: 110 MMOL/L (ref 98–107)
CK SERPL-CCNC: 1024 U/L (ref 39–308)
CO2 SERPL-SCNC: 18 MMOL/L (ref 20–31)
CREAT SERPL-MCNC: 0.7 MG/DL (ref 0.7–1.2)
EOSINOPHIL # BLD: 0.44 K/UL (ref 0–0.44)
EOSINOPHILS RELATIVE PERCENT: 7 % (ref 1–4)
ERYTHROCYTE [DISTWIDTH] IN BLOOD BY AUTOMATED COUNT: 13.5 % (ref 11.8–14.4)
GFR, ESTIMATED: >90 ML/MIN/1.73M2
GLUCOSE SERPL-MCNC: 89 MG/DL (ref 74–99)
HCT VFR BLD AUTO: 32.8 % (ref 40.7–50.3)
HGB BLD-MCNC: 10.9 G/DL (ref 13–17)
IMM GRANULOCYTES # BLD AUTO: 0.11 K/UL (ref 0–0.3)
IMM GRANULOCYTES NFR BLD: 2 %
LYMPHOCYTES NFR BLD: 2.05 K/UL (ref 1.1–3.7)
LYMPHOCYTES RELATIVE PERCENT: 31 % (ref 24–43)
MAGNESIUM SERPL-MCNC: 1.8 MG/DL (ref 1.6–2.4)
MCH RBC QN AUTO: 31.1 PG (ref 25.2–33.5)
MCHC RBC AUTO-ENTMCNC: 33.2 G/DL (ref 28.4–34.8)
MCV RBC AUTO: 93.4 FL (ref 82.6–102.9)
MONOCYTES NFR BLD: 0.92 K/UL (ref 0.1–1.2)
MONOCYTES NFR BLD: 14 % (ref 3–12)
NEUTROPHILS NFR BLD: 45 % (ref 36–65)
NEUTS SEG NFR BLD: 3.12 K/UL (ref 1.5–8.1)
NRBC BLD-RTO: 0 PER 100 WBC
PLATELET # BLD AUTO: 196 K/UL (ref 138–453)
PMV BLD AUTO: 10.2 FL (ref 8.1–13.5)
POTASSIUM SERPL-SCNC: 3.6 MMOL/L (ref 3.7–5.3)
RBC # BLD AUTO: 3.51 M/UL (ref 4.21–5.77)
SODIUM SERPL-SCNC: 138 MMOL/L (ref 136–145)
WBC OTHER # BLD: 6.7 K/UL (ref 3.5–11.3)

## 2024-12-25 PROCEDURE — 6370000000 HC RX 637 (ALT 250 FOR IP): Performed by: PSYCHIATRY & NEUROLOGY

## 2024-12-25 PROCEDURE — 85025 COMPLETE CBC W/AUTO DIFF WBC: CPT

## 2024-12-25 PROCEDURE — 6370000000 HC RX 637 (ALT 250 FOR IP)

## 2024-12-25 PROCEDURE — 83735 ASSAY OF MAGNESIUM: CPT

## 2024-12-25 PROCEDURE — 6360000002 HC RX W HCPCS: Performed by: STUDENT IN AN ORGANIZED HEALTH CARE EDUCATION/TRAINING PROGRAM

## 2024-12-25 PROCEDURE — 82550 ASSAY OF CK (CPK): CPT

## 2024-12-25 PROCEDURE — 99232 SBSQ HOSP IP/OBS MODERATE 35: CPT | Performed by: STUDENT IN AN ORGANIZED HEALTH CARE EDUCATION/TRAINING PROGRAM

## 2024-12-25 PROCEDURE — 2060000000 HC ICU INTERMEDIATE R&B

## 2024-12-25 PROCEDURE — 2580000003 HC RX 258: Performed by: STUDENT IN AN ORGANIZED HEALTH CARE EDUCATION/TRAINING PROGRAM

## 2024-12-25 PROCEDURE — 36415 COLL VENOUS BLD VENIPUNCTURE: CPT

## 2024-12-25 PROCEDURE — 80048 BASIC METABOLIC PNL TOTAL CA: CPT

## 2024-12-25 PROCEDURE — 6370000000 HC RX 637 (ALT 250 FOR IP): Performed by: STUDENT IN AN ORGANIZED HEALTH CARE EDUCATION/TRAINING PROGRAM

## 2024-12-25 PROCEDURE — 6370000000 HC RX 637 (ALT 250 FOR IP): Performed by: INTERNAL MEDICINE

## 2024-12-25 PROCEDURE — 94761 N-INVAS EAR/PLS OXIMETRY MLT: CPT

## 2024-12-25 PROCEDURE — 94640 AIRWAY INHALATION TREATMENT: CPT

## 2024-12-25 RX ORDER — MAGNESIUM SULFATE IN WATER 40 MG/ML
2000 INJECTION, SOLUTION INTRAVENOUS ONCE
Status: COMPLETED | OUTPATIENT
Start: 2024-12-25 | End: 2024-12-25

## 2024-12-25 RX ORDER — POTASSIUM CHLORIDE 1500 MG/1
40 TABLET, EXTENDED RELEASE ORAL ONCE
Status: COMPLETED | OUTPATIENT
Start: 2024-12-25 | End: 2024-12-25

## 2024-12-25 RX ADMIN — MORPHINE SULFATE 2 MG: 2 INJECTION, SOLUTION INTRAMUSCULAR; INTRAVENOUS at 09:20

## 2024-12-25 RX ADMIN — METOPROLOL SUCCINATE 25 MG: 25 TABLET, EXTENDED RELEASE ORAL at 08:34

## 2024-12-25 RX ADMIN — DULOXETINE HYDROCHLORIDE 60 MG: 30 CAPSULE, DELAYED RELEASE ORAL at 08:33

## 2024-12-25 RX ADMIN — MORPHINE SULFATE 2 MG: 2 INJECTION, SOLUTION INTRAMUSCULAR; INTRAVENOUS at 15:32

## 2024-12-25 RX ADMIN — FAMOTIDINE 20 MG: 20 TABLET, FILM COATED ORAL at 20:19

## 2024-12-25 RX ADMIN — IPRATROPIUM BROMIDE AND ALBUTEROL SULFATE 1 DOSE: .5; 3 SOLUTION RESPIRATORY (INHALATION) at 20:53

## 2024-12-25 RX ADMIN — MORPHINE SULFATE 2 MG: 2 INJECTION, SOLUTION INTRAMUSCULAR; INTRAVENOUS at 03:32

## 2024-12-25 RX ADMIN — LORAZEPAM 2 MG: 2 TABLET ORAL at 11:40

## 2024-12-25 RX ADMIN — LORAZEPAM 1 MG: 1 TABLET ORAL at 02:17

## 2024-12-25 RX ADMIN — MIRTAZAPINE 7.5 MG: 15 TABLET, ORALLY DISINTEGRATING ORAL at 20:23

## 2024-12-25 RX ADMIN — LACOSAMIDE 100 MG: 100 TABLET, FILM COATED ORAL at 20:19

## 2024-12-25 RX ADMIN — SPIRONOLACTONE 25 MG: 25 TABLET, FILM COATED ORAL at 08:33

## 2024-12-25 RX ADMIN — LORAZEPAM 2 MG: 2 TABLET ORAL at 08:33

## 2024-12-25 RX ADMIN — LORAZEPAM 2 MG: 2 TABLET ORAL at 20:08

## 2024-12-25 RX ADMIN — LORAZEPAM 1 MG: 1 TABLET ORAL at 16:55

## 2024-12-25 RX ADMIN — SODIUM CHLORIDE 125 ML/HR: 9 INJECTION, SOLUTION INTRAVENOUS at 17:39

## 2024-12-25 RX ADMIN — RIVAROXABAN 20 MG: 20 TABLET, FILM COATED ORAL at 08:38

## 2024-12-25 RX ADMIN — GUAIFENESIN 600 MG: 600 TABLET, MULTILAYER, EXTENDED RELEASE ORAL at 20:19

## 2024-12-25 RX ADMIN — GUAIFENESIN 600 MG: 600 TABLET, MULTILAYER, EXTENDED RELEASE ORAL at 08:33

## 2024-12-25 RX ADMIN — LACOSAMIDE 100 MG: 100 TABLET, FILM COATED ORAL at 08:33

## 2024-12-25 RX ADMIN — SACUBITRIL AND VALSARTAN 1 TABLET: 49; 51 TABLET, FILM COATED ORAL at 20:23

## 2024-12-25 RX ADMIN — IPRATROPIUM BROMIDE AND ALBUTEROL SULFATE 1 DOSE: .5; 3 SOLUTION RESPIRATORY (INHALATION) at 08:56

## 2024-12-25 RX ADMIN — SODIUM CHLORIDE 125 ML/HR: 9 INJECTION, SOLUTION INTRAVENOUS at 09:58

## 2024-12-25 RX ADMIN — POTASSIUM CHLORIDE 40 MEQ: 1500 TABLET, EXTENDED RELEASE ORAL at 09:20

## 2024-12-25 RX ADMIN — MAGNESIUM SULFATE HEPTAHYDRATE 2000 MG: 40 INJECTION, SOLUTION INTRAVENOUS at 11:36

## 2024-12-25 RX ADMIN — DONEPEZIL HYDROCHLORIDE 5 MG: 5 TABLET, FILM COATED ORAL at 08:40

## 2024-12-25 RX ADMIN — Medication 100 MG: at 08:34

## 2024-12-25 RX ADMIN — FAMOTIDINE 20 MG: 20 TABLET, FILM COATED ORAL at 08:33

## 2024-12-25 RX ADMIN — MORPHINE SULFATE 2 MG: 2 INJECTION, SOLUTION INTRAMUSCULAR; INTRAVENOUS at 21:06

## 2024-12-25 RX ADMIN — THERA TABS 1 TABLET: TAB at 08:34

## 2024-12-25 RX ADMIN — PIPERACILLIN AND TAZOBACTAM 3375 MG: 3; .375 INJECTION, POWDER, LYOPHILIZED, FOR SOLUTION INTRAVENOUS at 06:19

## 2024-12-25 RX ADMIN — SACUBITRIL AND VALSARTAN 1 TABLET: 49; 51 TABLET, FILM COATED ORAL at 08:38

## 2024-12-25 ASSESSMENT — PAIN DESCRIPTION - LOCATION
LOCATION: BUTTOCKS
LOCATION: BUTTOCKS;BACK
LOCATION: BUTTOCKS;BACK
LOCATION: OTHER (COMMENT)

## 2024-12-25 ASSESSMENT — PAIN DESCRIPTION - DESCRIPTORS
DESCRIPTORS: ACHING;BURNING;STABBING
DESCRIPTORS: ACHING

## 2024-12-25 ASSESSMENT — PAIN SCALES - GENERAL
PAINLEVEL_OUTOF10: 10
PAINLEVEL_OUTOF10: 6
PAINLEVEL_OUTOF10: 10
PAINLEVEL_OUTOF10: 8
PAINLEVEL_OUTOF10: 7
PAINLEVEL_OUTOF10: 8

## 2024-12-25 ASSESSMENT — PAIN DESCRIPTION - PAIN TYPE: TYPE: CHRONIC PAIN

## 2024-12-25 ASSESSMENT — PAIN DESCRIPTION - FREQUENCY: FREQUENCY: CONTINUOUS

## 2024-12-25 ASSESSMENT — PAIN DESCRIPTION - ONSET: ONSET: ON-GOING

## 2024-12-25 NOTE — PLAN OF CARE
Problem: Safety - Adult  Goal: Free from fall injury  12/25/2024 1716 by Kirstie Jimenez RN  Outcome: Progressing  Flowsheets (Taken 12/25/2024 0751 by Mp Vogt RN)  Free From Fall Injury:   Instruct family/caregiver on patient safety   Based on caregiver fall risk screen, instruct family/caregiver to ask for assistance with transferring infant if caregiver noted to have fall risk factors     Problem: Chronic Conditions and Co-morbidities  Goal: Patient's chronic conditions and co-morbidity symptoms are monitored and maintained or improved  12/25/2024 1716 by Kirstie Jimenez RN  Outcome: Progressing     Problem: Discharge Planning  Goal: Discharge to home or other facility with appropriate resources  12/25/2024 1716 by Kirstie Jimenez RN  Outcome: Progressing     Problem: Pain  Goal: Verbalizes/displays adequate comfort level or baseline comfort level  12/25/2024 1716 by Kirstie Jimenez RN  Outcome: Progressing     Problem: Skin/Tissue Integrity  Goal: Absence of new skin breakdown  Description: 1.  Monitor for areas of redness and/or skin breakdown  2.  Assess vascular access sites hourly  3.  Every 4-6 hours minimum:  Change oxygen saturation probe site  4.  Every 4-6 hours:  If on nasal continuous positive airway pressure, respiratory therapy assess nares and determine need for appliance change or resting period.  12/25/2024 1716 by Kirstie Jimenez RN  Outcome: Progressing     Problem: ABCDS Injury Assessment  Goal: Absence of physical injury  12/25/2024 1716 by Kirstie Jimenez RN  Outcome: Progressing     Problem: Respiratory - Adult  Goal: Achieves optimal ventilation and oxygenation  12/25/2024 1716 by Kirstie Jimenez RN  Outcome: Progressing  Flowsheets (Taken 12/25/2024 0856 by Libra Chris RCP)  Achieves optimal ventilation and oxygenation:   Assess for changes in respiratory status   Respiratory therapy support as indicated

## 2024-12-25 NOTE — PLAN OF CARE
Problem: Safety - Adult  Goal: Free from fall injury  12/25/2024 0700 by Mp Vogt RN  Outcome: Progressing  12/24/2024 1821 by Chantell Mortensne RN  Outcome: Progressing     Problem: Chronic Conditions and Co-morbidities  Goal: Patient's chronic conditions and co-morbidity symptoms are monitored and maintained or improved  12/25/2024 0700 by Mp Vogt RN  Outcome: Progressing  Flowsheets (Taken 12/24/2024 2000)  Care Plan - Patient's Chronic Conditions and Co-Morbidity Symptoms are Monitored and Maintained or Improved:   Monitor and assess patient's chronic conditions and comorbid symptoms for stability, deterioration, or improvement   Collaborate with multidisciplinary team to address chronic and comorbid conditions and prevent exacerbation or deterioration   Update acute care plan with appropriate goals if chronic or comorbid symptoms are exacerbated and prevent overall improvement and discharge  12/24/2024 1821 by Chantell Mortensen RN  Outcome: Progressing     Problem: Discharge Planning  Goal: Discharge to home or other facility with appropriate resources  12/25/2024 0700 by Mp Vogt RN  Outcome: Progressing  Flowsheets (Taken 12/24/2024 2000)  Discharge to home or other facility with appropriate resources:   Identify barriers to discharge with patient and caregiver   Arrange for needed discharge resources and transportation as appropriate   Identify discharge learning needs (meds, wound care, etc)   Refer to discharge planning if patient needs post-hospital services based on physician order or complex needs related to functional status, cognitive ability or social support system  12/24/2024 1821 by Chantell Mortensen RN  Outcome: Progressing     Problem: Pain  Goal: Verbalizes/displays adequate comfort level or baseline comfort level  12/25/2024 0700 by Mp Vogt RN  Outcome: Progressing  12/24/2024 1821 by Chantell Mortensen RN  Outcome: Progressing     Problem: Skin/Tissue Integrity  Goal: Absence of  new skin breakdown  Description: 1.  Monitor for areas of redness and/or skin breakdown  2.  Assess vascular access sites hourly  3.  Every 4-6 hours minimum:  Change oxygen saturation probe site  4.  Every 4-6 hours:  If on nasal continuous positive airway pressure, respiratory therapy assess nares and determine need for appliance change or resting period.  12/25/2024 0700 by Mp Vogt RN  Outcome: Progressing  12/24/2024 1821 by Chantell Mortensen RN  Outcome: Progressing     Problem: ABCDS Injury Assessment  Goal: Absence of physical injury  12/25/2024 0700 by Mp Vogt RN  Outcome: Progressing  12/24/2024 1821 by Chantell Mortensen RN  Outcome: Progressing     Problem: Respiratory - Adult  Goal: Achieves optimal ventilation and oxygenation  12/25/2024 0700 by Mp Vogt RN  Outcome: Progressing  12/24/2024 1932 by Delvin Ferguson RCP  Outcome: Progressing  12/24/2024 1821 by Chantell Mortensen RN  Outcome: Progressing

## 2024-12-25 NOTE — PROGRESS NOTES
[email or web address]    [facility]   IN-PATIENT SERVICE   [facility]    Progress Note    12/25/2024    1:27 PM    Name:   Mahesh Brownlee  MRN:     [id number]     Acct:      [id number]   Room:   0547/0547-01   Day:  5  Admit Date:  12/19/2024  6:36 PM    PCP:   No primary care provider on file.  Code Status:  DNR-CCA    Subjective:     C/C:   Chief Complaint   Patient presents with    Seizures     Possibly post ictal/unwitnessed. Daily drinker, hasn't drank since tuesday     Interval History Status: improved.  Seen at bedside, hemodynamically stable  No acute events overnight, no new complaints  Pain improved from before, CK levels improving, continues to be on IV fluids.  Potassium replaced      Brief History:     Per my colleague   \"53 yo m PMH of CVA and gait changes there after, Afib on Xarelto, HTN/HLD, Chronic alcohol use, CAD with AICD, seizure disorder on Keppra, hx of TBI, chronic Vertebral artery occlusion (no intervention needed) admitted for confusion and possibly break thorough seizure. Admits to drinking excessively. Mentioned SI on admission, psych was consulted. \"      Medications:     Allergies:    Allergies   Allergen Reactions    Augmentin Es-600 [Amoxicillin-Pot Clavulanate]      Other reaction(s): Unknown    Sulfa Antibiotics Other (See Comments)       Current Meds:   Scheduled Meds:    magnesium sulfate  2,000 mg IntraVENous Once    metoprolol succinate  25 mg Oral Daily    thiamine  100 mg Oral Daily    mirtazapine  7.5 mg Oral Nightly    [Held by provider] atorvastatin  10 mg Oral Nightly    [START ON 12/27/2024] buprenorphine  1 patch TransDERmal Weekly    donepezil  5 mg Oral Daily    DULoxetine  60 mg Oral Daily    sacubitril-valsartan  1 tablet Oral BID    rivaroxaban  20 mg Oral Daily    spironolactone  25 mg Oral Daily    sodium chloride flush  5-40 mL IntraVENous 2 times per day    multivitamin  1 tablet Oral Daily    famotidine  20 mg Oral BID     12/19/2024  Mild cardiomegaly and mild central pulmonary congestion which is more prominent. Mild bibasilar atelectasis or early infiltrates and small left pleural effusion posteriorly.       Physical Examination:        General appearance:  alert, cooperative, in no distress  Mental Status:  oriented to person, place and time,  Lungs:  clear to auscultation bilaterally, normal effort  Heart:  regular rate and rhythm, no murmur  Abdomen:  soft, nontender, nondistended, normal bowel sounds, no masses, hepatomegaly, splenomegaly  Extremities:  no edema, redness, tenderness in the calves  Skin:  no gross lesions, rashes, induration    Assessment:        Hospital Problems             Last Modified POA    * (Principal) Community acquired pneumonia of right lung, unspecified part of lung 12/20/2024 Yes    Heart failure with improved ejection fraction (HFimpEF) ([facility]) 12/20/2024 Yes    Paroxysmal atrial flutter ([facility]) 12/20/2024 Yes    Hyperlipidemia 12/20/2024 Yes    Essential hypertension 12/20/2024 Yes    Major depression 12/20/2024 Yes    Alcohol use disorder, severe, dependence ([facility]) 12/20/2024 Yes    Traumatic brain injury 12/20/2024 Yes    Tobacco abuse 12/20/2024 Yes    COPD without exacerbation ([facility]) 12/20/2024 Yes    Hypothyroidism 12/20/2024 Yes    Chronic lumbar radiculopathy 12/20/2024 Yes    Sepsis ([facility]) 12/20/2024 Yes    Toxic metabolic encephalopathy 12/20/2024 Yes    High anion gap metabolic acidosis 12/20/2024 Yes    History of CVA (cerebrovascular accident) 12/20/2024 Yes    Confusion 12/20/2024 Yes    Cognitive impairment 12/20/2024 Yes    ICD (implantable cardioverter-defibrillator) in place 12/20/2024 Yes    Cardiomyopathy ([facility]) 12/20/2024 Yes    Chronic combined systolic and diastolic heart failure ([facility]) 12/20/2024 Yes    Alcohol withdrawal syndrome with complication ([facility]) 12/20/2024 Yes    History of alcohol use disorder 12/20/2024 Yes    Metabolic encephalopathy 12/20/2024 Yes    Severe dementia

## 2024-12-25 NOTE — PLAN OF CARE
Problem: Respiratory - Adult  Goal: Achieves optimal ventilation and oxygenation  12/24/2024 1932 by Delvin Ferguson RCP  Outcome: Progressing   BRONCHOSPASM/BRONCHOCONSTRICTION     [x]         IMPROVE AERATION/BREATH SOUNDS  [x]   ADMINISTER BRONCHODILATOR THERAPY AS APPROPRIATE  [x]   ASSESS BREATH SOUNDS  []   IMPLEMENT AEROSOL/MDI PROTOCOL  [x]   PATIENT EDUCATION AS NEEDED

## 2024-12-26 VITALS
OXYGEN SATURATION: 96 % | WEIGHT: 207 LBS | HEART RATE: 91 BPM | HEIGHT: 70 IN | SYSTOLIC BLOOD PRESSURE: 133 MMHG | DIASTOLIC BLOOD PRESSURE: 69 MMHG | TEMPERATURE: 97.5 F | RESPIRATION RATE: 17 BRPM | BODY MASS INDEX: 29.63 KG/M2

## 2024-12-26 LAB
ANION GAP SERPL CALCULATED.3IONS-SCNC: 8 MMOL/L (ref 9–16)
BASOPHILS # BLD: 0.04 K/UL (ref 0–0.2)
BASOPHILS NFR BLD: 1 % (ref 0–2)
BUN SERPL-MCNC: 4 MG/DL (ref 8–23)
CALCIUM SERPL-MCNC: 8.2 MG/DL (ref 8.6–10.4)
CHLORIDE SERPL-SCNC: 112 MMOL/L (ref 98–107)
CK SERPL-CCNC: 570 U/L (ref 39–308)
CO2 SERPL-SCNC: 19 MMOL/L (ref 20–31)
CREAT SERPL-MCNC: 0.5 MG/DL (ref 0.7–1.2)
EOSINOPHIL # BLD: 0.39 K/UL (ref 0–0.44)
EOSINOPHILS RELATIVE PERCENT: 7 % (ref 1–4)
ERYTHROCYTE [DISTWIDTH] IN BLOOD BY AUTOMATED COUNT: 13.2 % (ref 11.8–14.4)
GFR, ESTIMATED: >90 ML/MIN/1.73M2
GLUCOSE SERPL-MCNC: 88 MG/DL (ref 74–99)
HCT VFR BLD AUTO: 35.9 % (ref 40.7–50.3)
HGB BLD-MCNC: 11 G/DL (ref 13–17)
IMM GRANULOCYTES # BLD AUTO: <0.03 K/UL (ref 0–0.3)
IMM GRANULOCYTES NFR BLD: 0 %
LYMPHOCYTES NFR BLD: 1.64 K/UL (ref 1.1–3.7)
LYMPHOCYTES RELATIVE PERCENT: 30 % (ref 24–43)
MCH RBC QN AUTO: 30.3 PG (ref 25.2–33.5)
MCHC RBC AUTO-ENTMCNC: 30.6 G/DL (ref 28.4–34.8)
MCV RBC AUTO: 98.9 FL (ref 82.6–102.9)
MONOCYTES NFR BLD: 1.07 K/UL (ref 0.1–1.2)
MONOCYTES NFR BLD: 20 % (ref 3–12)
NEUTROPHILS NFR BLD: 42 % (ref 36–65)
NEUTS SEG NFR BLD: 2.33 K/UL (ref 1.5–8.1)
NRBC BLD-RTO: 0 PER 100 WBC
PLATELET # BLD AUTO: 241 K/UL (ref 138–453)
PMV BLD AUTO: 9.6 FL (ref 8.1–13.5)
POTASSIUM SERPL-SCNC: 3.5 MMOL/L (ref 3.7–5.3)
RBC # BLD AUTO: 3.63 M/UL (ref 4.21–5.77)
SODIUM SERPL-SCNC: 139 MMOL/L (ref 136–145)
WBC OTHER # BLD: 5.5 K/UL (ref 3.5–11.3)

## 2024-12-26 PROCEDURE — 6370000000 HC RX 637 (ALT 250 FOR IP): Performed by: STUDENT IN AN ORGANIZED HEALTH CARE EDUCATION/TRAINING PROGRAM

## 2024-12-26 PROCEDURE — 2500000003 HC RX 250 WO HCPCS

## 2024-12-26 PROCEDURE — 6370000000 HC RX 637 (ALT 250 FOR IP)

## 2024-12-26 PROCEDURE — 97535 SELF CARE MNGMENT TRAINING: CPT

## 2024-12-26 PROCEDURE — 99239 HOSP IP/OBS DSCHRG MGMT >30: CPT | Performed by: STUDENT IN AN ORGANIZED HEALTH CARE EDUCATION/TRAINING PROGRAM

## 2024-12-26 PROCEDURE — 2580000003 HC RX 258: Performed by: STUDENT IN AN ORGANIZED HEALTH CARE EDUCATION/TRAINING PROGRAM

## 2024-12-26 PROCEDURE — 94640 AIRWAY INHALATION TREATMENT: CPT

## 2024-12-26 PROCEDURE — 36415 COLL VENOUS BLD VENIPUNCTURE: CPT

## 2024-12-26 PROCEDURE — 85025 COMPLETE CBC W/AUTO DIFF WBC: CPT

## 2024-12-26 PROCEDURE — 97530 THERAPEUTIC ACTIVITIES: CPT

## 2024-12-26 PROCEDURE — 94761 N-INVAS EAR/PLS OXIMETRY MLT: CPT

## 2024-12-26 PROCEDURE — 80048 BASIC METABOLIC PNL TOTAL CA: CPT

## 2024-12-26 PROCEDURE — 82550 ASSAY OF CK (CPK): CPT

## 2024-12-26 PROCEDURE — 6360000002 HC RX W HCPCS: Performed by: STUDENT IN AN ORGANIZED HEALTH CARE EDUCATION/TRAINING PROGRAM

## 2024-12-26 PROCEDURE — 6370000000 HC RX 637 (ALT 250 FOR IP): Performed by: PSYCHIATRY & NEUROLOGY

## 2024-12-26 RX ORDER — MIRTAZAPINE 15 MG/1
7.5 TABLET, ORALLY DISINTEGRATING ORAL NIGHTLY
Qty: 30 TABLET | Refills: 3 | Status: SHIPPED | OUTPATIENT
Start: 2024-12-26

## 2024-12-26 RX ORDER — POTASSIUM CHLORIDE 1500 MG/1
40 TABLET, EXTENDED RELEASE ORAL ONCE
Status: DISCONTINUED | OUTPATIENT
Start: 2024-12-26 | End: 2024-12-26 | Stop reason: HOSPADM

## 2024-12-26 RX ORDER — METOPROLOL SUCCINATE 25 MG/1
25 TABLET, EXTENDED RELEASE ORAL DAILY
Qty: 30 TABLET | Refills: 3 | Status: SHIPPED | OUTPATIENT
Start: 2024-12-27

## 2024-12-26 RX ORDER — LACOSAMIDE 100 MG/1
100 TABLET ORAL 2 TIMES DAILY
Qty: 60 TABLET | Refills: 0 | Status: SHIPPED | OUTPATIENT
Start: 2024-12-26 | End: 2025-01-25

## 2024-12-26 RX ORDER — GUAIFENESIN/DEXTROMETHORPHAN 100-10MG/5
5 SYRUP ORAL EVERY 4 HOURS PRN
Qty: 120 ML | Refills: 0 | Status: SHIPPED | OUTPATIENT
Start: 2024-12-26 | End: 2025-01-05

## 2024-12-26 RX ADMIN — LORAZEPAM 2 MG: 2 TABLET ORAL at 12:37

## 2024-12-26 RX ADMIN — POTASSIUM CHLORIDE 40 MEQ: 1500 TABLET, EXTENDED RELEASE ORAL at 08:24

## 2024-12-26 RX ADMIN — THERA TABS 1 TABLET: TAB at 08:19

## 2024-12-26 RX ADMIN — LORAZEPAM 2 MG: 2 TABLET ORAL at 00:49

## 2024-12-26 RX ADMIN — LORAZEPAM 1 MG: 1 TABLET ORAL at 16:16

## 2024-12-26 RX ADMIN — DULOXETINE HYDROCHLORIDE 60 MG: 30 CAPSULE, DELAYED RELEASE ORAL at 08:19

## 2024-12-26 RX ADMIN — Medication 100 MG: at 08:19

## 2024-12-26 RX ADMIN — SACUBITRIL AND VALSARTAN 1 TABLET: 49; 51 TABLET, FILM COATED ORAL at 08:20

## 2024-12-26 RX ADMIN — SPIRONOLACTONE 25 MG: 25 TABLET, FILM COATED ORAL at 08:19

## 2024-12-26 RX ADMIN — LORAZEPAM 1 MG: 1 TABLET ORAL at 08:19

## 2024-12-26 RX ADMIN — MORPHINE SULFATE 2 MG: 2 INJECTION, SOLUTION INTRAMUSCULAR; INTRAVENOUS at 15:39

## 2024-12-26 RX ADMIN — SODIUM CHLORIDE 125 ML/HR: 9 INJECTION, SOLUTION INTRAVENOUS at 08:11

## 2024-12-26 RX ADMIN — DONEPEZIL HYDROCHLORIDE 5 MG: 5 TABLET, FILM COATED ORAL at 08:20

## 2024-12-26 RX ADMIN — FAMOTIDINE 20 MG: 20 TABLET, FILM COATED ORAL at 08:19

## 2024-12-26 RX ADMIN — MORPHINE SULFATE 2 MG: 2 INJECTION, SOLUTION INTRAMUSCULAR; INTRAVENOUS at 09:48

## 2024-12-26 RX ADMIN — LACOSAMIDE 100 MG: 100 TABLET, FILM COATED ORAL at 08:19

## 2024-12-26 RX ADMIN — IPRATROPIUM BROMIDE AND ALBUTEROL SULFATE 1 DOSE: .5; 3 SOLUTION RESPIRATORY (INHALATION) at 08:51

## 2024-12-26 RX ADMIN — RIVAROXABAN 20 MG: 20 TABLET, FILM COATED ORAL at 08:20

## 2024-12-26 RX ADMIN — SODIUM CHLORIDE, PRESERVATIVE FREE 10 ML: 5 INJECTION INTRAVENOUS at 08:19

## 2024-12-26 RX ADMIN — METOPROLOL SUCCINATE 25 MG: 25 TABLET, EXTENDED RELEASE ORAL at 08:19

## 2024-12-26 RX ADMIN — GUAIFENESIN 600 MG: 600 TABLET, MULTILAYER, EXTENDED RELEASE ORAL at 08:19

## 2024-12-26 RX ADMIN — MORPHINE SULFATE 2 MG: 2 INJECTION, SOLUTION INTRAMUSCULAR; INTRAVENOUS at 02:23

## 2024-12-26 ASSESSMENT — PAIN SCALES - GENERAL
PAINLEVEL_OUTOF10: 10
PAINLEVEL_OUTOF10: 9
PAINLEVEL_OUTOF10: 8
PAINLEVEL_OUTOF10: 8

## 2024-12-26 ASSESSMENT — PAIN DESCRIPTION - LOCATION
LOCATION: BACK
LOCATION: BACK

## 2024-12-26 ASSESSMENT — PAIN DESCRIPTION - DESCRIPTORS
DESCRIPTORS: ACHING;DISCOMFORT
DESCRIPTORS: ACHING;DISCOMFORT

## 2024-12-26 NOTE — PLAN OF CARE
Problem: Respiratory - Adult  Goal: Achieves optimal ventilation and oxygenation  12/26/2024 0854 by Linda Valerio RCP  Outcome: Progressing  Flowsheets (Taken 12/26/2024 0851)  Achieves optimal ventilation and oxygenation:   Respiratory therapy support as indicated   Assess and instruct to report shortness of breath or any respiratory difficulty   Assess the need for suctioning and aspirate as needed   Encourage broncho-pulmonary hygiene including cough, deep breathe, incentive spirometry   Initiate smoking cessation protocol as indicated   Oxygen supplementation based on oxygen saturation or arterial blood gases   Position to facilitate oxygenation and minimize respiratory effort   Assess for changes in mentation and behavior   Assess for changes in respiratory status      Detail Level: Detailed Detail Level: Generalized Detail Level: Zone

## 2024-12-26 NOTE — PROGRESS NOTES
CLINICAL PHARMACY NOTE: MEDS TO BEDS    Total # of Prescriptions Filled: 3   The following medications were delivered to the patient:  Lacosamide  Mirtazapine  metoprolol    Additional Documentation:   Robitussin was ordered but not covered- pt did not have any $$ - returned to stock-pt can buy over the counter

## 2024-12-26 NOTE — PLAN OF CARE
Problem: Safety - Adult  Goal: Free from fall injury  12/26/2024 1627 by Kirstie Jimenez RN  Outcome: Completed     Problem: Chronic Conditions and Co-morbidities  Goal: Patient's chronic conditions and co-morbidity symptoms are monitored and maintained or improved  12/26/2024 1627 by Kirstie Jimenez RN  Outcome: Completed     Problem: Discharge Planning  Goal: Discharge to home or other facility with appropriate resources  12/26/2024 1627 by Kirstie Jimenez RN  Outcome: Completed     Problem: Pain  Goal: Verbalizes/displays adequate comfort level or baseline comfort level  12/26/2024 1627 by Kirstie Jimenez RN  Outcome: Completed     Problem: Skin/Tissue Integrity  Goal: Absence of new skin breakdown  Description: 1.  Monitor for areas of redness and/or skin breakdown  2.  Assess vascular access sites hourly  3.  Every 4-6 hours minimum:  Change oxygen saturation probe site  4.  Every 4-6 hours:  If on nasal continuous positive airway pressure, respiratory therapy assess nares and determine need for appliance change or resting period.  12/26/2024 1627 by Kirstie Jimenez RN  Outcome: Completed     Problem: ABCDS Injury Assessment  Goal: Absence of physical injury  12/26/2024 1627 by Kirstie Jimenez RN  Outcome: Completed     Problem: Respiratory - Adult  Goal: Achieves optimal ventilation and oxygenation  12/26/2024 1627 by Kirstie Jimenez RN  Outcome: Completed

## 2024-12-26 NOTE — DISCHARGE INSTRUCTIONS
Continue taking medications as prescribed, follow with Pcp and neurology as outpatient.  No driving for at least 6 months.  No heavy lifting for at least 6 months  No bathing or swimming unsupervised  Avoid locking doors, it prevents someone from helping you if needed  Avoid stairs unsupervised  Avoid cooking unsupervised

## 2024-12-26 NOTE — PROGRESS NOTES
Discharge instructions were given to the patient accordingly. All questions answered, patient was wheeled down in good condition.

## 2024-12-26 NOTE — PLAN OF CARE
Problem: Safety - Adult  Goal: Free from fall injury  12/26/2024 0520 by Sawyer Clinton RN  Outcome: Progressing  12/25/2024 1716 by Kirstie Jimenez RN  Outcome: Progressing  Flowsheets (Taken 12/25/2024 0751 by Mp Vogt RN)  Free From Fall Injury:   Instruct family/caregiver on patient safety   Based on caregiver fall risk screen, instruct family/caregiver to ask for assistance with transferring infant if caregiver noted to have fall risk factors     Problem: Chronic Conditions and Co-morbidities  Goal: Patient's chronic conditions and co-morbidity symptoms are monitored and maintained or improved  12/26/2024 0520 by Sawyer Clinton RN  Outcome: Progressing  Flowsheets (Taken 12/25/2024 2000)  Care Plan - Patient's Chronic Conditions and Co-Morbidity Symptoms are Monitored and Maintained or Improved: Monitor and assess patient's chronic conditions and comorbid symptoms for stability, deterioration, or improvement  12/25/2024 1716 by Kirstie Jimenez RN  Outcome: Progressing     Problem: Discharge Planning  Goal: Discharge to home or other facility with appropriate resources  12/26/2024 0520 by Sawyer Clinton RN  Outcome: Progressing  Flowsheets (Taken 12/25/2024 2000)  Discharge to home or other facility with appropriate resources: Identify barriers to discharge with patient and caregiver  12/25/2024 1716 by Kirstie Jimenez RN  Outcome: Progressing     Problem: Pain  Goal: Verbalizes/displays adequate comfort level or baseline comfort level  12/26/2024 0520 by Sawyer Clinton RN  Outcome: Progressing  Flowsheets (Taken 12/25/2024 2004)  Verbalizes/displays adequate comfort level or baseline comfort level: Encourage patient to monitor pain and request assistance  12/25/2024 1716 by Kirstie Jimenez RN  Outcome: Progressing     Problem: Skin/Tissue Integrity  Goal: Absence of new skin breakdown  Description: 1.  Monitor for areas of redness and/or skin breakdown  2.  Assess vascular access

## 2024-12-26 NOTE — DISCHARGE SUMMARY
[facility]  Office: [phone]  Louie Rossi DO, Noah Mayers DO, Sergio Ordaz DO, Saul March DO, Willy Mc MD, Jaci Berger MD, Sivakumar Freeman MD, Mile Johnston MD,  Aric Granda MD, Garry Shepherd MD, Kelsy Wahl MD,  Evelyn Petit DO, Terry Kern MD, Addison Sol MD, Jaspreet Rossi DO, Kassy Pedro MD,  Emeka Leon DO, Susan Manjarrez MD, Krystyna Howell MD, [place] MD, Keeley Santa MD,  Chris Ashley MD, Rick Del Rio MD, Howard Brown MD, Gemma Ordaz MD, Mateo Dey MD, Pardeep Owen MD, Gómez Boykin DO, Avi Mcneil MD, Shirley Waterhouse, CNP,  Magdalena Oviedo CNP, Gómez Gomez, CNP,  Fely Garcia, SCHUYLER, Lorena Garcia, CNP, Mariana Jordan, CNP, Dixie Garcia, CNP, Shea Chirinos, CNP, Suzi Tavares PA-C, Carmencita Wilson PA-C, Daija Mejia, CNP, Vincent Armendariz, CNP,  Jie Leos, CNP, Felicita Nobles, CNP, Asuncion Hollins, CNP,  Miriam Martins, CNP, Zuly Centeno, CNP         [facility]   IN-PATIENT SERVICE   [facility]    Discharge Summary     Patient ID: Mahesh Brownlee  :  1960   MRN: [id number]     ACCOUNT:  [id number]   Patient's PCP: No primary care provider on file.  Admit Date: 2024   Discharge Date: 2024   Length of Stay: 6  Code Status:  Prior  Admitting Physician: No admitting provider for patient encounter.  Discharge Physician: Howard Khalil Sra, MD     Active Discharge Diagnoses:     Hospital Problem Lists:  Principal Problem:    Community acquired pneumonia of right lung, unspecified part of lung  Active Problems:    Heart failure with improved ejection fraction (HFimpEF) (HCC)    Paroxysmal atrial flutter (HCC)    Hyperlipidemia    Essential hypertension    Major depression    Alcohol use disorder, severe, dependence (HCC)    Traumatic brain injury    Tobacco abuse    COPD without exacerbation (HCC)    Hypothyroidism    Chronic lumbar radiculopathy    Sepsis  alcohol use, unknown cause of AMS Decision Support Exception - unselect if not a suspected or confirmed emergency medical condition->Emergency Medical Condition (MA) FINDINGS: BRAIN/VENTRICLES: There is no acute intracranial hemorrhage, mass effect or midline shift.  No abnormal extra-axial fluid collection.  No evidence of an acute infarct.  There is no evidence of hydrocephalus.   Chronic encephalomalacia in the left cerebellum. ORBITS: The visualized portion of the orbits demonstrate no acute abnormality. SINUSES: The visualized paranasal sinuses and mastoid air cells demonstrate no acute abnormality. SOFT TISSUES/SKULL:  No acute abnormality of the visualized skull or soft tissues.     No acute intracranial findings.     XR CHEST (2 VW)    Result Date: 12/19/2024  EXAMINATION: TWO XRAY VIEWS OF THE CHEST 12/19/2024 7:09 pm COMPARISON: 11/14/2024 HISTORY: ORDERING SYSTEM PROVIDED HISTORY: eval for aspiration pneumonia TECHNOLOGIST PROVIDED HISTORY: eval for aspiration pneumonia FINDINGS: The heart is borderline enlarged and more prominent.  The pulmonary vessels are engorged centrally.  There are hazy bibasilar opacities which is more prominent.  There is small left pleural effusion.  There is a left pacemaker in place which is unchanged.     Mild cardiomegaly and mild central pulmonary congestion which is more prominent. Mild bibasilar atelectasis or early infiltrates and small left pleural effusion posteriorly.         Consultations:    Consults:     Final Specialist Recommendations/Findings:   IP CONSULT TO HOSPITALIST  IP CONSULT TO SOCIAL WORK  IP CONSULT TO NEUROLOGY  IP CONSULT TO PSYCHIATRY      The patient was seen and examined on day of discharge and this discharge summary is in conjunction with any daily progress note from day of discharge.    Discharge plan:     Disposition: Home    Physician Follow Up:     Ira Roberts, APRN - CNP  [id number]  [facility]

## 2024-12-26 NOTE — PLAN OF CARE
Problem: Respiratory - Adult  Goal: Achieves optimal ventilation and oxygenation  12/25/2024 2101 by Ayah Tinoco RCP  Outcome: Progressing

## 2024-12-26 NOTE — CARE COORDINATION
Discharge Report    [facility]  Clinical Case Management Department  Written by: Felicita Gamboa RN    Patient Name: Mahesh GUALLPA Torrie  Attending Provider: No att. providers found  Admit Date: 2024  6:36 PM  MRN: [id number]  Account: [id number]                     : 1960  Discharge Date: 2024      Disposition: home    Felicita Gamboa RN

## 2024-12-26 NOTE — PROGRESS NOTES
Occupational Therapy    Occupational Therapy Daily Treatment Note  Facility/Department: [facility] 5C STEPDOWN   Patient Name: Mahesh Brownlee        MRN: [id number]    : 1960    Date of Service: 2024    Chief Complaint   Patient presents with    Seizures     Possibly post ictal/unwitnessed. Daily drinker, hasn't drank since tuesday     Past Medical History:  has a past medical history of Adjustment disorder, AF (atrial fibrillation) ([facility]), AICD (automatic cardioverter/defibrillator) present, Alcohol dependence (HCC), CAD (coronary artery disease), Cardiomyopathy (HCC), CHF (congestive heart failure) (HCC), COPD (chronic obstructive pulmonary disease) (HCC), DDD (degenerative disc disease), lumbar, Herniated cervical disc, Hyperlipidemia, Hypertension, Major depression, Post laminectomy syndrome, Sciatica, Snores, Tobacco abuse, and Traumatic brain injury.  Past Surgical History:  has a past surgical history that includes back surgery; Rotator cuff repair (Right); Carpal tunnel release (Right); Nerve Block (2013); Nerve Block (N/A, 2013); other surgical history (2016); Tonsillectomy; pacemaker placement (2015); back surgery; other surgical history (2018); joint replacement (Right, 2018); and Cardiac electrophysiology study and ablation.    Discharge Recommendations  Discharge Recommendations: Patient would benefit from continued therapy after discharge       Assessment  Performance deficits / Impairments: Decreased endurance;Decreased functional mobility ;Decreased ADL status;Decreased balance;Decreased high-level IADLs;Decreased safe awareness;Decreased strength;Decreased posture  Assessment: Pt making steady progress towards goals this session with min verbal cues. Pt will continue to benefit from further OT services following discharge from acute care hospital  Prognosis: Good  REQUIRES OT FOLLOW-UP: Yes  Activity Tolerance  Activity Tolerance: Patient Tolerated treatment  made;Assistance required to correct errors made  Insights: Decreased awareness of deficits  Initiation: Appears intact  Sequencing: Appears intact  Cognition Comment: Decreased safety awareness    Activities of Daily Living  LE Dressing: Stand by assistance  LE Dressing Skilled Clinical Factors: Training to dress self while seated to reduce risk of falls    Balance  Balance  Sitting: Without support;Intact  Standing: With support    Transfers/Mobility  Bed mobility  Supine to Sit: Stand by assistance  Sit to Supine: Stand by assistance  Scooting: Stand by assistance    Transfers  Sit to stand: Contact guard assistance  Stand to sit: Contact guard assistance  Transfer Comments: Using cane, this is patients baseline devices, reports this is his normal ambulation pattern when dicussing safer ambulation pt states \"if I fall, I fall\"    Toilet Transfers  Toilet - Technique: Ambulating  Equipment Used: Standard toilet  Toilet Transfer: Contact guard assistance      Patient Education  Patient Education  Education Provided: Role of Therapy;Transfer Training;Fall Prevention Strategies;ADL Adaptive Strategies;Mobility Training;Equipment  Education Provided Comments: safety, body mechanics  Education Method: Verbal  Barriers to Learning: Cognition  Education Outcome: Verbalized understanding;Demonstrated understanding;Continued education needed    Goals  Short Term Goals  Time Frame for Short Term Goals: Pt will by d/c  Short Term Goal 1: demo good safety awareness during func mob around room using LRAD PRN at mod I  Short Term Goal 2: demo ADL UB bathing/dressing activity at mod I  Short Term Goal 3: demo ADL LB bathing/dressing activity at SUP, while sitting/standing as activity requires  Short Term Goal 4: identify 3 ways to reduce ETOH consumption with 1 cue only  Short Term Goal 5: identify 2 non-pharmacological pain-relieving techniques with 1 cue    Plan  Occupational Therapy Plan  Times Per Week: 4x/wk  Current

## 2024-12-27 ENCOUNTER — CARE COORDINATION (OUTPATIENT)
Dept: CARE COORDINATION | Age: 64
End: 2024-12-27

## 2024-12-27 DIAGNOSIS — J18.9 COMMUNITY ACQUIRED PNEUMONIA OF RIGHT LUNG, UNSPECIFIED PART OF LUNG: Primary | ICD-10-CM

## 2024-12-27 PROCEDURE — 1111F DSCHRG MED/CURRENT MED MERGE: CPT | Performed by: NURSE PRACTITIONER

## 2024-12-27 RX ORDER — DULOXETIN HYDROCHLORIDE 60 MG/1
60 CAPSULE, DELAYED RELEASE ORAL DAILY
Qty: 30 CAPSULE | Refills: 0 | Status: SHIPPED | OUTPATIENT
Start: 2024-12-27

## 2024-12-27 RX ORDER — PANTOPRAZOLE SODIUM 40 MG/1
40 TABLET, DELAYED RELEASE ORAL
Qty: 30 TABLET | Refills: 3 | Status: SHIPPED | OUTPATIENT
Start: 2024-12-27

## 2024-12-27 RX ORDER — ATORVASTATIN CALCIUM 10 MG/1
10 TABLET, FILM COATED ORAL NIGHTLY
Qty: 30 TABLET | Refills: 0 | Status: SHIPPED | OUTPATIENT
Start: 2024-12-27

## 2024-12-27 RX ORDER — SPIRONOLACTONE 25 MG/1
25 TABLET ORAL DAILY
Qty: 30 TABLET | Refills: 0 | Status: SHIPPED | OUTPATIENT
Start: 2024-12-27

## 2024-12-27 NOTE — CARE COORDINATION
Care Transitions Note    Initial Call - Call within 2 business days of discharge: Yes    Patient Current Location:  Home: [address]  [place] [address]    Care Transition Nurse contacted the patient by telephone to perform post hospital discharge assessment, verified name and  as identifiers. Provided introduction to self, and explanation of the Care Transition Nurse role.     Patient: Mahesh Brownlee    Patient : 1960   MRN: [id number]    Reason for Admission: Community acquired PNE of right lung.   Discharge Date: 24  RURS: Readmission Risk Score: 32      Last Discharge Facility       Date Complaint Diagnosis Description Type Department Provider    24 Seizures Pneumonia due to infectious organism, unspecified laterality, unspecified part of lung ... ED to Hosp-Admission (Discharged) (ADMITTED) [address] [place], Howard Khalil MD; TIFFANIE Melton.            Was this an external facility discharge? No    Additional needs identified to be addressed with provider   No needs identified             Method of communication with provider: none.    Patients top risk factors for readmission: medical condition-PNE    Interventions to address risk factors:   Medications as ordered, follow up with neurology and PCP.  Monitor for shortness of breath or worsening s/s of infection.     Care Summary Note: Spoke with patient this am for initial 24 hour follow up call. Patient came to the ED post unwitnessed seizure, notes indicate he was post ictal.  Patient is a heavy drinker, drinks 18 beers a day and had not had any drink for 2 days.  He was confused with altered mentation when first coming in.  Testing showed he had PNE, possibly aspiration PNE.  He was treated during his stay for PNE, he was seen by psychiatry for SI and they recommended outpatient follow up.  Patient discharged to home, his AED were changed from Keppra to Vimpat d/t issues with his mood.  Patient was going to go to SNF but ultimately

## 2024-12-29 LAB
EKG ATRIAL RATE: 88 BPM
EKG P AXIS: 76 DEGREES
EKG P-R INTERVAL: 146 MS
EKG Q-T INTERVAL: 410 MS
EKG QRS DURATION: 108 MS
EKG QTC CALCULATION (BAZETT): 496 MS
EKG R AXIS: 21 DEGREES
EKG T AXIS: 55 DEGREES
EKG VENTRICULAR RATE: 88 BPM

## 2025-01-03 ENCOUNTER — CARE COORDINATION (OUTPATIENT)
Dept: CARE COORDINATION | Age: 65
End: 2025-01-03

## 2025-01-03 NOTE — CARE COORDINATION
Care Transitions Note    Initial Call - Call within 2 business days of discharge: Yes    Patient Current Location:  Home: [address]  [place] [address]    Care Transition Nurse contacted the patient by telephone to perform post hospital discharge assessment, verified name and  as identifiers. Provided introduction to self, and explanation of the Care Transition Nurse role.     Patient: Mahesh Brownlee    Patient : 1960   MRN: [id number]    Reason for Admission: CAP, right lung  Discharge Date: 24  RURS: Readmission Risk Score: 32      Last Discharge Facility       Date Complaint Diagnosis Description Type Department Provider    24 Seizures Pneumonia due to infectious organism, unspecified laterality, unspecified part of lung ... ED to Hosp-Admission (Discharged) (ADMITTED) [address] [place], Howard Khalil MD; TIFFANIE Melton.            Was this an external facility discharge? No    Additional needs identified to be addressed with provider   No needs identified             Method of communication with provider: none.    Patients top risk factors for readmission: medical condition-PNE    Interventions to address risk factors:   Education: Remind of follow up with PCP on Monday, medications as ordered    Care Summary Note: Spoke with patient for transitional follow up call.  Patient said he is doing ok, still has an occasional cough but is improved from last week.  Last week he was still coughing up some green phlegm.  Last week he had also said he had no routine medications at home, he had been kicked out of his home and did not know where any of his medications were.  He has since gotten his medications and when asked if he was out of anything he had said his pain medication and Ativan.  Ativan is not on profile, this was not ordered at discharge, he was given this in the hospital when going through withdrawal. Pain medication is Percocet, this was listed as med that patient had told he was

## 2025-01-03 NOTE — CARE COORDINATION
Care Transitions Note    Follow Up Call     Patient Current Location:  Home: [address]  [facility] [address]    Care Transition Nurse contacted the patient by telephone. Verified name and  as identifiers.    Additional needs identified to be addressed with provider   No needs identified                 Method of communication with provider: none.    Care Summary Note: Spoke with patient for transitional follow up call.  Patient said he is doing ok, still has an occasional cough but is improved from last week.  Last week he was still coughing up some green phlegm.  Last week he had also said he had no routine medications at home, he had been kicked out of his home and did not know where any of his medications were.  He has since gotten his medications and when asked if he was out of anything he had said his pain medication and Ativan.  Ativan is not on profile, this was not ordered at discharge, he was given this in the hospital when going through withdrawal. Pain medication is Percocet, this was listed as med that patient had told he was on, I do not see where it was actually ordered recently for him.  Inquired if he has checked into any resources for his drinking, when asked  how he was doing with not drinking he just states that he can't drive so can't go to the store to get. I questioned if he has called [employer] he was not specific, said he has a couple of messages on his phone from them.  He states he is eating and drinking ok, reminded him of his follow up with PCP on Monday, he was not aware one was scheduled.  Advised that it is Monday @ 11 with PCP, he is asking for reminder call in the am on Monday so he can call for a cab. I did tell him I would send text with appointment date and time today for him so he has in writing.  Inquired if he is set for the weekend with medications, food, etc, he voiced no concerns at this time. Advised if he has any issues before next outreach to feel free to

## 2025-01-06 ENCOUNTER — TELEPHONE (OUTPATIENT)
Dept: ONCOLOGY | Age: 65
End: 2025-01-06

## 2025-01-06 ENCOUNTER — OFFICE VISIT (OUTPATIENT)
Dept: FAMILY MEDICINE CLINIC | Age: 65
End: 2025-01-06

## 2025-01-06 VITALS
WEIGHT: 210.2 LBS | OXYGEN SATURATION: 98 % | BODY MASS INDEX: 30.09 KG/M2 | SYSTOLIC BLOOD PRESSURE: 162 MMHG | DIASTOLIC BLOOD PRESSURE: 88 MMHG | HEIGHT: 70 IN | TEMPERATURE: 96.9 F | HEART RATE: 117 BPM

## 2025-01-06 DIAGNOSIS — I50.32 HEART FAILURE WITH IMPROVED EJECTION FRACTION (HFIMPEF) (HCC): ICD-10-CM

## 2025-01-06 DIAGNOSIS — F10.27: ICD-10-CM

## 2025-01-06 DIAGNOSIS — Z09 HOSPITAL DISCHARGE FOLLOW-UP: ICD-10-CM

## 2025-01-06 DIAGNOSIS — Z00.00 MEDICARE ANNUAL WELLNESS VISIT, SUBSEQUENT: Primary | ICD-10-CM

## 2025-01-06 DIAGNOSIS — F33.9 EPISODE OF RECURRENT MAJOR DEPRESSIVE DISORDER, UNSPECIFIED DEPRESSION EPISODE SEVERITY (HCC): ICD-10-CM

## 2025-01-06 DIAGNOSIS — F41.9 ANXIETY AND DEPRESSION: ICD-10-CM

## 2025-01-06 DIAGNOSIS — Z71.89 DNR (DO NOT RESUSCITATE) DISCUSSION: ICD-10-CM

## 2025-01-06 DIAGNOSIS — J42 CHRONIC BRONCHITIS, UNSPECIFIED CHRONIC BRONCHITIS TYPE (HCC): ICD-10-CM

## 2025-01-06 DIAGNOSIS — I48.92 PAROXYSMAL ATRIAL FLUTTER (HCC): ICD-10-CM

## 2025-01-06 DIAGNOSIS — Z71.89 ACP (ADVANCE CARE PLANNING): ICD-10-CM

## 2025-01-06 DIAGNOSIS — F23 BRIEF PSYCHOTIC DISORDER (HCC): ICD-10-CM

## 2025-01-06 DIAGNOSIS — S06.9XAS TRAUMATIC BRAIN INJURY, WITH UNKNOWN LOSS OF CONSCIOUSNESS STATUS, SEQUELA: ICD-10-CM

## 2025-01-06 DIAGNOSIS — R56.9 CONVULSIONS, UNSPECIFIED CONVULSION TYPE (HCC): ICD-10-CM

## 2025-01-06 DIAGNOSIS — I50.42 CHRONIC COMBINED SYSTOLIC AND DIASTOLIC HEART FAILURE (HCC): ICD-10-CM

## 2025-01-06 DIAGNOSIS — J96.21 ACUTE AND CHRONIC RESPIRATORY FAILURE WITH HYPOXIA: ICD-10-CM

## 2025-01-06 DIAGNOSIS — G93.41 METABOLIC ENCEPHALOPATHY: Primary | ICD-10-CM

## 2025-01-06 DIAGNOSIS — R45.86 MOOD SWINGS: ICD-10-CM

## 2025-01-06 DIAGNOSIS — Z87.891 PERSONAL HISTORY OF TOBACCO USE: ICD-10-CM

## 2025-01-06 DIAGNOSIS — F32.A ANXIETY AND DEPRESSION: ICD-10-CM

## 2025-01-06 RX ORDER — DULOXETIN HYDROCHLORIDE 60 MG/1
60 CAPSULE, DELAYED RELEASE ORAL DAILY
Qty: 30 CAPSULE | Refills: 3 | Status: SHIPPED | OUTPATIENT
Start: 2025-01-06

## 2025-01-06 ASSESSMENT — COLUMBIA-SUICIDE SEVERITY RATING SCALE - C-SSRS
1. WITHIN THE PAST MONTH, HAVE YOU WISHED YOU WERE DEAD OR WISHED YOU COULD GO TO SLEEP AND NOT WAKE UP?: NO
2. HAVE YOU ACTUALLY HAD ANY THOUGHTS OF KILLING YOURSELF?: NO
6. HAVE YOU EVER DONE ANYTHING, STARTED TO DO ANYTHING, OR PREPARED TO DO ANYTHING TO END YOUR LIFE?: NO

## 2025-01-06 ASSESSMENT — PATIENT HEALTH QUESTIONNAIRE - PHQ9
8. MOVING OR SPEAKING SO SLOWLY THAT OTHER PEOPLE COULD HAVE NOTICED. OR THE OPPOSITE, BEING SO FIGETY OR RESTLESS THAT YOU HAVE BEEN MOVING AROUND A LOT MORE THAN USUAL: NOT AT ALL
SUM OF ALL RESPONSES TO PHQ QUESTIONS 1-9: 11
SUM OF ALL RESPONSES TO PHQ9 QUESTIONS 1 & 2: 6
3. TROUBLE FALLING OR STAYING ASLEEP: SEVERAL DAYS
6. FEELING BAD ABOUT YOURSELF - OR THAT YOU ARE A FAILURE OR HAVE LET YOURSELF OR YOUR FAMILY DOWN: SEVERAL DAYS
2. FEELING DOWN, DEPRESSED OR HOPELESS: NEARLY EVERY DAY
1. LITTLE INTEREST OR PLEASURE IN DOING THINGS: NEARLY EVERY DAY
DEPRESSION UNABLE TO ASSESS: FUNCTIONAL CAPACITY MOTIVATION LIMITS ACCURACY
SUM OF ALL RESPONSES TO PHQ QUESTIONS 1-9: 11
SUM OF ALL RESPONSES TO PHQ QUESTIONS 1-9: 11
10. IF YOU CHECKED OFF ANY PROBLEMS, HOW DIFFICULT HAVE THESE PROBLEMS MADE IT FOR YOU TO DO YOUR WORK, TAKE CARE OF THINGS AT HOME, OR GET ALONG WITH OTHER PEOPLE: VERY DIFFICULT
SUM OF ALL RESPONSES TO PHQ QUESTIONS 1-9: 10
7. TROUBLE CONCENTRATING ON THINGS, SUCH AS READING THE NEWSPAPER OR WATCHING TELEVISION: SEVERAL DAYS
4. FEELING TIRED OR HAVING LITTLE ENERGY: SEVERAL DAYS
5. POOR APPETITE OR OVEREATING: NOT AT ALL
9. THOUGHTS THAT YOU WOULD BE BETTER OFF DEAD, OR OF HURTING YOURSELF: SEVERAL DAYS

## 2025-01-06 ASSESSMENT — LIFESTYLE VARIABLES
HOW MANY STANDARD DRINKS CONTAINING ALCOHOL DO YOU HAVE ON A TYPICAL DAY: PATIENT DECLINED
HOW OFTEN DO YOU HAVE A DRINK CONTAINING ALCOHOL: 4 OR MORE TIMES A WEEK

## 2025-01-06 NOTE — TELEPHONE ENCOUNTER
Ms. Roberts we received the referral for Mahesh to be seen in the [facility].  At this time our clinic is only able to follow with [facility] patient's that have Palliative Care needs.  We are sorry for any inconvenience this causes.    There are several Palliative Care Agencies that would be able to follow with this patient.   Here is a list for some of the palliative care agencies in the area    [facility]: [phone] fax [phone]  [facility]: [phone] fax [phone]  [facility]: [phone] fax [phone]  [facility]: [phone] fax [phone]  [facility]: [phone] fax [phone]   [facility]: [phone] fax [phone]    We will be closing referral at this time.      [facility] Care Coordinator  Lyn MINA, RN  [facility] [phone]/ [facility] [phone]/ [facility] [phone]

## 2025-01-06 NOTE — PROGRESS NOTES
Visit Information    Have you changed or started any medications since your last visit including any over-the-counter medicines, vitamins, or herbal medicines? no   Have you stopped taking any of your medications? Is so, why? -  no  Are you having any side effects from any of your medications? - no    Have you seen any other physician or provider since your last visit?  no   Have you had any other diagnostic tests since your last visit?  no   Have you been seen in the emergency room and/or had an admission in a hospital since we last saw you?  no   Have you had your routine dental cleaning in the past 6 months?  no     Do you have an active MyChart account? If no, what is the barrier?  No: na    Patient Care Team:  Ira Roberts APRN - CNP as PCP - Empaneled Provider  Kunal Conteh MD as Consulting Physician (Pain Management)  Amauri Fischer MD as Consulting Physician (Pulmonary Disease)  Shiloh Johansen [facility] as Pharmacist (Pharmacist)  Schomaker, Shruti, RN as Care Transitions Nurse    Medical History Review  Past Medical, Family, and Social History reviewed and  contribute to the patient presenting condition    Health Maintenance   Topic Date Due    HIV screen  Never done    DTaP/Tdap/Td vaccine (1 - Tdap) Never done    Colorectal Cancer Screen  Never done    Shingles vaccine (1 of 2) Never done    Lung Cancer Screening &/or Counseling  Never done    Pneumococcal 65+ years Vaccine (2 of 2 - PCV) 08/16/2018    Respiratory Syncytial Virus (RSV) Pregnant or age 60 yrs+ (1 - Risk 60-74 years 1-dose series) Never done    Flu vaccine (1) 08/01/2024    COVID-19 Vaccine (2 - 2023-24 season) 09/01/2024    Annual Wellness Visit (Medicare Advantage)  01/01/2025    Depression Monitoring  07/18/2025    Lipids  07/19/2025    AAA screen  Completed    Hepatitis C screen  Completed    Hepatitis A vaccine  Aged Out    Hepatitis B vaccine  Aged Out    Hib vaccine  Aged Out    Polio vaccine  Aged Out    Meningococcal (ACWY) 
[place]  Traumatic brain injury, with unknown loss of consciousness status, sequela  -     [facility] - Iva Thomson, NP, Palliative Care, [place]  ACP (advance care planning)    See other note documentation on all above problems and f/u/tx plan    Medical Decision Making: moderate complexity  Return in 3 months (on 4/6/2025).           Subjective:   HPI:  Follow up of Hospital problems/diagnosis(es):     Hospital Stay:      Hospital Course: 64-year-old male with history of stroke, A-fib on Xarelto, essential hypertension, chronic alcohol use, coronary artery disease, status post AICD, seizure disorder, chronic vertebral artery occlusion was admitted for possible breakthrough seizure, admits drinking excessively , concern for noncompliance with medications, seen by neurology service started on Vimpat, Keppra was discontinued, recommended follow-up with neurology in 2 to 3 weeks also will need possible workup for cognitive impairment, continues to be on Aricept, apparently neuroendovascular was also consulted by ER due to chronic vertebral artery occlusion recommended no intervention and continuing anticoagulation,  was consulted due to alcohol/drug abuse , patient mentioned suicidal thoughts on admission, psych was consulted, did not recommend BHI, recommended continuing Cymbalta, started on mirtazapine, patient also had elevated CK levels which have improved with IV fluids, was started on antibiotics for aspiration pneumonia, currently stable for discharge    [facility] He states he awoke at the hospital with no idea why he was there and or felt he was \"trying to be killed by someone\". He has lost all contact with friends/family and not taking any of his medications. He does not feel he will be alive by his birthday but does not want to harm himself but feels someone else is trying too hurt him, but does not know who or why or how? He states he awoke and almost

## 2025-01-07 ENCOUNTER — CLINICAL DOCUMENTATION (OUTPATIENT)
Dept: SPIRITUAL SERVICES | Age: 65
End: 2025-01-07

## 2025-01-07 NOTE — ACP (ADVANCE CARE PLANNING)
has become estranged from his two sons. Per pt, his sons will not speak to him, but they will speak to his medical professionals.     This worker discussed code status with this pt. Pt states that he does not want any medical interventions to be implemented to help sustain or prolong his life. This worker reviewed the pt's current code status that he has listed on his chart of a DNRCC-A. Pt states that he is agreeable with this code status and does not wish to change it at this time. Pt declines having any thought of self harm or SI at this time and states that he feels safe in his home.     The pt states that he does not recall having copies of these AD documents and his DNR form within his home. This worker asked if he would like these documents mailed to his home & pt responded yes. This worker asked the pt where he was currently residing & he did confirm that he is still residing at the address listed on file [address] A mailed copy of these documents will be sent to his home.    At this time, no further assistance is needed from ACP. This referral can be closed.       ACP discussion completed and Existing advance directive reviewed with patient; no changes to patient's previously recorded wishes    FABIAN Jolley

## 2025-01-07 NOTE — TELEPHONE ENCOUNTER
Referral, demographic sheet, Ms. Castillo progress note and discharge summary faxed to [facility] [phone] for continuation of care. Per Ms. Castillo request.     [facility] Palliative Care Coordinator  Lyn ALEMANN, RN  [facility] [phone]/ [facility] [phone]/ [facility] [phone]

## 2025-01-09 ENCOUNTER — CARE COORDINATION (OUTPATIENT)
Dept: CARE COORDINATION | Age: 65
End: 2025-01-09

## 2025-01-09 NOTE — CARE COORDINATION
Care Transitions Note    Follow Up Call     Patient Current Location:  Home: [address] [place], [place] [id number]    [facility] Care Coordinator contacted the patient by telephone. Verified name and  as identifiers.    Additional needs identified to be addressed with provider   No needs identified                 Method of communication with provider: none.    Care Summary Note: Writer spoke with patient for a follow up care transitions call. He states he is not coughing or having SOB but \"still has pneumonia\" he denies having any SOB or coughing or fever but states he has the sweats. Patient is not currently taking any of his medications. It is difficult to get straight answers form patient, writer asked if he has scheduled with [facility] yet. Patient was unable to tell writer if he is scheduled. He states he saw his PCP and doesn't remember ever meeting her before even though he knows he has been her patient for several years. [facility]-advised that they will be calling him to schedule. He reports he is still smoking/drinking. Discussed s/s that would warrant returning to ED. He then tells writer that he doesn't remember what happened the last time he was hospitalized. He states he woke up in the hospital with most of his teeth missing. He thanked writer for calling.      Plan of care updates since last contact:  Review of patient management of conditions/medications:         Advance Care Planning:   Does patient have an Advance Directive: reviewed and current.    Medication Review:  No changes since last call. Patient is not taking meds currently-PCP aware.     Remote Patient Monitoring:  Offered patient enrollment in the Remote Patient Monitoring (RPM) program for in-home monitoring: Patient is not eligible for RPM program because: patient does not have qualifying diagnosis.    Assessments:  Care Transitions Subsequent and Final Call    Subsequent and Final Calls  Do you have any

## 2025-01-10 ENCOUNTER — CARE COORDINATION (OUTPATIENT)
Dept: CARE COORDINATION | Age: 65
End: 2025-01-10

## 2025-01-10 NOTE — TELEPHONE ENCOUNTER
Thanks for the update. He has not qualified for hospice in the past and I do not think he would know. He has severe dementia/memory loss from hx of drug/alcohol abuse and is VERY forgetful. I have advised him repeatedly that I am not comfortable managing his chronic pain issues with him admitting he is actively drinking alcohol daily and using THC products and high risk for sedation, injury, fall or resp. Depression. He declines referral to pain mgmt and unlikely to follow through with any non pill options for pain control.   He unfortunately does not have any family or friend support so thank you for assisting with his care.

## 2025-01-10 NOTE — CARE COORDINATION
Writer received call from patient. He is inquiring why \"no one will give him pain meds\".  He reports he is in agony and no one wants to treat his pain, they just want to give him meds to \"prolong his life\". Advised patient that there is an active referral to [facility] palliative care. Writer placed call to [facility] and spoke with Shruti who states they have been unable to get in contact with patient. Writer called patient back and informed him to answer his phone when they call/return their voicemail. Also provided with number to call them directly to get an appt scheduled. Patient then states that he has an appointment with hospice on Thursday but did not know which hospice it was with. Discussed that he wont be able to be on hospice an Palliative care at the same time and recommended scheduling his palliative are appt after his hospice appt that way if he signs on with hospice he can cancel his Palliative care appt. Patient also states he will not be going back to [facility] because they make him \"self medicate\". Will follow up again next week.

## 2025-01-15 ENCOUNTER — TELEPHONE (OUTPATIENT)
Dept: PALLATIVE CARE | Age: 65
End: 2025-01-15

## 2025-01-15 ENCOUNTER — APPOINTMENT (OUTPATIENT)
Dept: GENERAL RADIOLOGY | Age: 65
End: 2025-01-15
Payer: MEDICARE

## 2025-01-15 ENCOUNTER — HOSPITAL ENCOUNTER (EMERGENCY)
Age: 65
Discharge: HOME | End: 2025-01-16
Attending: EMERGENCY MEDICINE
Payer: MEDICARE

## 2025-01-15 ENCOUNTER — TELEPHONE (OUTPATIENT)
Dept: BEHAVIORAL/MENTAL HEALTH CLINIC | Age: 65
End: 2025-01-15

## 2025-01-15 VITALS
DIASTOLIC BLOOD PRESSURE: 112 MMHG | TEMPERATURE: 97.8 F | SYSTOLIC BLOOD PRESSURE: 148 MMHG | HEART RATE: 103 BPM | OXYGEN SATURATION: 100 % | RESPIRATION RATE: 19 BRPM

## 2025-01-15 DIAGNOSIS — F39 MOOD DISORDER (HCC): Primary | ICD-10-CM

## 2025-01-15 DIAGNOSIS — F10.220 ALCOHOL DEPENDENCE WITH UNCOMPLICATED INTOXICATION (HCC): ICD-10-CM

## 2025-01-15 DIAGNOSIS — R45.851 SUICIDAL THOUGHTS: ICD-10-CM

## 2025-01-15 LAB
ALBUMIN SERPL-MCNC: 4.2 G/DL (ref 3.5–5.2)
ALBUMIN/GLOB SERPL: 1.4 {RATIO} (ref 1–2.5)
ALP SERPL-CCNC: 104 U/L (ref 40–129)
ALT SERPL-CCNC: 22 U/L (ref 10–50)
AMPHET UR QL SCN: NEGATIVE
ANION GAP SERPL CALCULATED.3IONS-SCNC: 15 MMOL/L (ref 9–16)
AST SERPL-CCNC: 49 U/L (ref 10–50)
BARBITURATES UR QL SCN: NEGATIVE
BASOPHILS # BLD: 0.07 K/UL (ref 0–0.2)
BASOPHILS NFR BLD: 1 % (ref 0–2)
BENZODIAZ UR QL: NEGATIVE
BILIRUB SERPL-MCNC: 0.2 MG/DL (ref 0–1.2)
BILIRUB UR QL STRIP: NEGATIVE
BUN SERPL-MCNC: 11 MG/DL (ref 8–23)
CALCIUM SERPL-MCNC: 8.7 MG/DL (ref 8.6–10.4)
CANNABINOIDS UR QL SCN: POSITIVE
CHLORIDE SERPL-SCNC: 108 MMOL/L (ref 98–107)
CLARITY UR: CLEAR
CO2 SERPL-SCNC: 21 MMOL/L (ref 20–31)
COCAINE UR QL SCN: NEGATIVE
COLOR UR: YELLOW
COMMENT: NORMAL
CREAT SERPL-MCNC: 1 MG/DL (ref 0.7–1.2)
EOSINOPHIL # BLD: 0.26 K/UL (ref 0–0.44)
EOSINOPHILS RELATIVE PERCENT: 4 % (ref 1–4)
ERYTHROCYTE [DISTWIDTH] IN BLOOD BY AUTOMATED COUNT: 14.2 % (ref 11.8–14.4)
ETHANOL PERCENT: 0.27 %
ETHANOLAMINE SERPL-MCNC: 271 MG/DL (ref 0–0.08)
FENTANYL UR QL: NEGATIVE
GFR, ESTIMATED: 84 ML/MIN/1.73M2
GLUCOSE SERPL-MCNC: 102 MG/DL (ref 74–99)
GLUCOSE UR STRIP-MCNC: NEGATIVE MG/DL
HCT VFR BLD AUTO: 41.1 % (ref 40.7–50.3)
HGB BLD-MCNC: 13.3 G/DL (ref 13–17)
HGB UR QL STRIP.AUTO: NEGATIVE
IMM GRANULOCYTES # BLD AUTO: 0.03 K/UL (ref 0–0.3)
IMM GRANULOCYTES NFR BLD: 0 %
KETONES UR STRIP-MCNC: NEGATIVE MG/DL
LEUKOCYTE ESTERASE UR QL STRIP: NEGATIVE
LIPASE SERPL-CCNC: 75 U/L (ref 13–60)
LYMPHOCYTES NFR BLD: 2.93 K/UL (ref 1.1–3.7)
LYMPHOCYTES RELATIVE PERCENT: 40 % (ref 24–43)
MAGNESIUM SERPL-MCNC: 2.4 MG/DL (ref 1.6–2.4)
MCH RBC QN AUTO: 31 PG (ref 25.2–33.5)
MCHC RBC AUTO-ENTMCNC: 32.4 G/DL (ref 28.4–34.8)
MCV RBC AUTO: 95.8 FL (ref 82.6–102.9)
METHADONE UR QL: NEGATIVE
MONOCYTES NFR BLD: 0.65 K/UL (ref 0.1–1.2)
MONOCYTES NFR BLD: 9 % (ref 3–12)
NEUTROPHILS NFR BLD: 46 % (ref 36–65)
NEUTS SEG NFR BLD: 3.45 K/UL (ref 1.5–8.1)
NITRITE UR QL STRIP: NEGATIVE
NRBC BLD-RTO: 0 PER 100 WBC
OPIATES UR QL SCN: NEGATIVE
OXYCODONE UR QL SCN: NEGATIVE
PCP UR QL SCN: NEGATIVE
PH UR STRIP: 5.5 [PH] (ref 5–8)
PLATELET # BLD AUTO: 286 K/UL (ref 138–453)
PMV BLD AUTO: 9 FL (ref 8.1–13.5)
POTASSIUM SERPL-SCNC: 3.8 MMOL/L (ref 3.7–5.3)
PROT SERPL-MCNC: 7.3 G/DL (ref 6.6–8.7)
PROT UR STRIP-MCNC: NEGATIVE MG/DL
RBC # BLD AUTO: 4.29 M/UL (ref 4.21–5.77)
SODIUM SERPL-SCNC: 144 MMOL/L (ref 136–145)
SP GR UR STRIP: 1.01 (ref 1–1.03)
TEST INFORMATION: ABNORMAL
UROBILINOGEN UR STRIP-ACNC: NORMAL EU/DL (ref 0–1)
WBC OTHER # BLD: 7.4 K/UL (ref 3.5–11.3)

## 2025-01-15 PROCEDURE — 83735 ASSAY OF MAGNESIUM: CPT

## 2025-01-15 PROCEDURE — 80053 COMPREHEN METABOLIC PANEL: CPT

## 2025-01-15 PROCEDURE — 83690 ASSAY OF LIPASE: CPT

## 2025-01-15 PROCEDURE — 85025 COMPLETE CBC W/AUTO DIFF WBC: CPT

## 2025-01-15 PROCEDURE — 80307 DRUG TEST PRSMV CHEM ANLYZR: CPT

## 2025-01-15 PROCEDURE — 71045 X-RAY EXAM CHEST 1 VIEW: CPT

## 2025-01-15 PROCEDURE — 93005 ELECTROCARDIOGRAM TRACING: CPT | Performed by: EMERGENCY MEDICINE

## 2025-01-15 PROCEDURE — 81003 URINALYSIS AUTO W/O SCOPE: CPT

## 2025-01-15 PROCEDURE — 6370000000 HC RX 637 (ALT 250 FOR IP): Performed by: STUDENT IN AN ORGANIZED HEALTH CARE EDUCATION/TRAINING PROGRAM

## 2025-01-15 PROCEDURE — 99285 EMERGENCY DEPT VISIT HI MDM: CPT | Performed by: EMERGENCY MEDICINE

## 2025-01-15 PROCEDURE — G0480 DRUG TEST DEF 1-7 CLASSES: HCPCS

## 2025-01-15 PROCEDURE — 6360000002 HC RX W HCPCS: Performed by: STUDENT IN AN ORGANIZED HEALTH CARE EDUCATION/TRAINING PROGRAM

## 2025-01-15 PROCEDURE — 96372 THER/PROPH/DIAG INJ SC/IM: CPT | Performed by: EMERGENCY MEDICINE

## 2025-01-15 RX ORDER — ACETAMINOPHEN 500 MG
1000 TABLET ORAL ONCE
Status: DISCONTINUED | OUTPATIENT
Start: 2025-01-16 | End: 2025-01-16 | Stop reason: HOSPADM

## 2025-01-15 RX ORDER — CYCLOBENZAPRINE HCL 10 MG
10 TABLET ORAL ONCE
Status: DISCONTINUED | OUTPATIENT
Start: 2025-01-16 | End: 2025-01-16 | Stop reason: HOSPADM

## 2025-01-15 RX ORDER — NICOTINE 21 MG/24HR
1 PATCH, TRANSDERMAL 24 HOURS TRANSDERMAL DAILY
Status: DISCONTINUED | OUTPATIENT
Start: 2025-01-15 | End: 2025-01-16 | Stop reason: HOSPADM

## 2025-01-15 RX ORDER — KETOROLAC TROMETHAMINE 30 MG/ML
30 INJECTION, SOLUTION INTRAMUSCULAR; INTRAVENOUS ONCE
Status: COMPLETED | OUTPATIENT
Start: 2025-01-15 | End: 2025-01-15

## 2025-01-15 RX ADMIN — KETOROLAC TROMETHAMINE 30 MG: 30 INJECTION, SOLUTION INTRAMUSCULAR; INTRAVENOUS at 19:44

## 2025-01-15 ASSESSMENT — PAIN SCALES - GENERAL
PAINLEVEL_OUTOF10: 10
PAINLEVEL_OUTOF10: 10

## 2025-01-15 ASSESSMENT — PAIN DESCRIPTION - LOCATION
LOCATION: GENERALIZED
LOCATION: GENERALIZED

## 2025-01-15 ASSESSMENT — PAIN - FUNCTIONAL ASSESSMENT: PAIN_FUNCTIONAL_ASSESSMENT: 0-10

## 2025-01-15 NOTE — TELEPHONE ENCOUNTER
Called patient to discuss the incoming mental health referral. When speaking to the patient he seemed very confused and told me that we could not help him. He told me he needed to be \"put in a corner\" and be \"neutralized\" and seemed very confused. Consulted with Jolene Laguerre who heard part of the conversation and she advised to call 911 due to the patient sounding off, and that it could be either mental or physical crisis. Jolene and ISHAN stayed on the phone until medics arrived on scene.

## 2025-01-15 NOTE — TELEPHONE ENCOUNTER
Spoke with Ramona at [facility] palliative care and confirmed that they have received referral for this patient.  Pt is scheduled 1/16/25 for visit.     [facility] Palliative Care Coordinator  Lyn ALEMANN, RN  [facility] [phone]/ [facility] [phone]/ [facility] [phone]

## 2025-01-16 NOTE — ED NOTES
[employer] cab scheduled. Confirmation #[id number].    Pt unable to get in cab due to not having his keys. Pt verbalizing to staff that he had a blue flannel with his keys in it. However, [facility] [employer] unlocked pt belongings and this is not in pt belongings.     Pt spoke with his maintenance department and they can let him in to his apartment. New confirmation # [id number].  
Patient's not a confidential chart. Patient's chart notes significant confusion & dx of ETOH-induced dementia. Patient's chart indicates calls from patient today which registered significant confusion causing 911 to be called on patient's behalf. Due to noted confusion, writer contacted patient's healthcare POA/daughter Brianne. Writer notified Brianne of patient's presence in ED & noted patient's documented confusion to Brianne. Brianne stated she's been trying to convince patient to pay someone to help in the home with medications, meals, etc but patient doesn't want to part with his money. Brianne stated sometimes patient's lucid & sometimes he's confused. Brianne stated patient called palliative care himself because he thinks he's dying. Brianne stated patient has a hx of addiction which is the primary reason for his dementia. Brianne stated she'd love to get patient into a memory care facility but patient's a smoker of 50 years & they haven't been able to find one that allows smoking. Brianne stated patient has episodes where he can't remember things. Brianne stated patient has his own apartment. Brianne stated she doesn't have access to patient's financial or insurance information so she's unable to tell what services he's eligible for & would like to know how to help him. Brianne stated she's very concerned over the standard of care patient would receive in a Medicaid facility & doesn't want him to go into a Medicaid facility. Brianne stated patient doesn't want to pay for things when he's lucid. Brianne stated patient's been very good for the past couple weeks. Brianne stated patient's very lonely & this plays into his episodes. Brianne stated patient's often very confused after a seizure.   
Physician requesting reassessment since pt is now sober. SW met with pt. He denies suicidal and homicidal ideations. He verbalizes feeling safe at home and denies needing any resources at this time. He does want a cab ride home.   
Pt presented to ED via ems   Pt presents with C/O suicidal ideations .  Pt refuses to answer questions and stated he has a plan to kill himself .  Pt denies any other C/O and stated he has chronic generalized pain all the time ..  Pt is Alert and oriented. Pt is resting comfortably on stretcher with call light in reach.  No acute distress noted. Respirations are even and unlabored.  White board updated. Will continue to follow plan of care.    
Sitter at bedside   
Writer left HIPAA compliant voicemail for patient's daughter requesting return call.   
ideation/behavior  [] Depression  [] Suicide attempt      [] Low self-esteem  [] Hallucinations      [] Feeling of Hopelessness  [] Substance abuse or withdrawal    [] Dysfunctional family  [] Major traumatic event, eg., divorce, etc   [] Excessive stress/anxiety    1/15/25    Expected Outcomes    Patient will:   [x] Patient will remain safe for the duration of their stay   [x] Patient's environment will be safe, eg. Free of potential suicide weapons   [] Verbalize Recovery from suicidal episode and improvement in self-worth   [x] Discuss feeling that precipitated suicide attempt/thoughts/behavior   [] Will describe available resources for crisis prevention and management   [] Will verbalize positive coping skills     Nursing Intervention   [x] Assessment and Observations hourly   [x] Suicide Precautions implemented with patient, should be 1:1 observation   [x] Document observation [id number] and RN assessment hourly   [] Consult physician for:    [] Psychiatric consult    [] Pharmacological therapy    [] Other:    [x] Patient search completed by security   [x] Initiated appropriate safety protocols by removing from the patient's environment anything that could be used to inflict self injury, eg. Order safe tray, snap gown, etc   [x] Maintain open, warm, caring, non-judgmental attitude/manner towards patient   [] Discuss advantages and disadvantages of existing coping methods/skills   [x] Assist and educate patient with identifying present strengths and coping skills   [x] Keep patient informed regarding plan of care and provide clear concise explanations.  Provide the patient/family education information as well as telephone numbers and other information about crisis centers, hot lines, and counselors.    Discharge Planning:   [] Referral  [] Groups [] Health agencies  [] Other:

## 2025-01-16 NOTE — TELEPHONE ENCOUNTER
was notified by Laverne Casey that there was a potential crisis situation in one of her referral calls.  consulted with Laverne to gather information on patient's well-being. Patient was making statements about wanting someone to \"neutralize\" and \"annihilate\" him and that he needed to be \"put in a corner\". Patient expressed having physical pain and that he was sick but was not going to take the medication because he did not want to be alive. Patient denied thoughts of wanting to hurt himself or anyone else but was presenting as very confused as evidenced by disorganized and at times incoherent speech. Writer advised Laverne to call 911 to have a welfare check done for his physical and mental health.  and Laverne stayed on the phone with patient and continued to assess him until emergency services arrived.

## 2025-01-16 NOTE — DISCHARGE INSTRUCTIONS
-You were seen and evaluated by emergency medicine physicians at [facility].    -Please follow-up with your primary care physician and/or with the referrals to specialist.    -You were diagnosed with: We disorder, alcohol dependence, suicidal thoughts without plan     -You were monitored until you reached your clinical sober time.  You denied any suicidal ideation.  You state you are self-medicating with alcohol for your chronic pain.  You declined any additional resources or updating your family.  -Please follow-up with your primary care physician.  Please seek help for your chronic pain.    -Please return to the Emergency Department if you are experiencing the following symptoms acutely:  Headache, fever, chills, nausea, vomiting, chest pain, shortness of breath, abdominal pain, change with urination, change with bowel movements, change in your skin/hair/nail, weakness, fatigue, altered mental status and/or any change from baseline health.    -Thank you for coming to [facility].

## 2025-01-16 NOTE — ED PROVIDER NOTES
[facility]     Emergency Department     Faculty Attestation    I performed a history and physical examination of the patient and discussed management with the resident. I have reviewed and agree with the resident’s findings including all diagnostic interpretations, and treatment plans as written at the time of my review. Any areas of disagreement are noted on the chart. I was personally present for the key portions of any procedures. I have documented in the chart those procedures where I was not present during the key portions. For Physician Assistant/ Nurse Practitioner cases/documentation I have personally evaluated this patient and have completed at least one if not all key elements of the E/M (history, physical exam, and MDM). Additional findings are as noted.    PtName: Mahesh Brownlee  MRN: [id number]  Birthdate 1960  Date of evaluation: 1/15/25  Note Started: 5:24 PM EST    Primary Care Physician: Ira Roberts, APRN - CNP        History: This is a 64 y.o. male who presents to the Emergency Department with complaint of suicidal thoughts.  Patient brought in by EMS.  Patient states he is not sure why he was brought in.  When asked he said if there was a knife or gun he would hurt himself.  He does complain of \"pain all over \".  The patient states he has chronic hip and knee pain and needs replacement.  Patient also states he drinks every day.  When asked he says \"a lot\".  The patient states that he just wants to be taken out in the field and \"Nuked like uncle Dooky\".    Physical:   oral temperature is 98.4 °F (36.9 °C). His blood pressure is 137/71 and his pulse is 117 (abnormal). His respiration is 19 and oxygen saturation is 100%.  Patient is tachycardic, no respiratory distress.  Mild abdominal tenderness throughout    Impression: Suicidal thoughts, chronic pain    Plan: Reviewed the patient's chart noted that he was recently admitted to the 
     [facility] EMERGENCY DEPARTMENT  Emergency Department Encounter  Emergency Medicine Resident     Pt Name:Mahesh Brownlee  MRN: [id number]  Birthdate 1960  Date of evaluation: 1/15/25  PCP:  Ira Roberts APRN - CNP  Note Started: 4:44 PM EST      CHIEF COMPLAINT       Chief Complaint   Patient presents with    Suicidal       HISTORY OF PRESENT ILLNESS  (Location/Symptom, Timing/Onset, Context/Setting, Quality, Duration, Modifying Factors, Severity.)      Mahesh Brownlee is a 64 y.o. male who presents for unknown reasons    PAST MEDICAL / SURGICAL / SOCIAL / FAMILY HISTORY      has a past medical history of Adjustment disorder, AF (atrial fibrillation) (HCC), AICD (automatic cardioverter/defibrillator) present, Alcohol dependence (HCC), CAD (coronary artery disease), Cardiomyopathy (HCC), CHF (congestive heart failure) (HCC), COPD (chronic obstructive pulmonary disease) (HCC), DDD (degenerative disc disease), lumbar, Herniated cervical disc, Hyperlipidemia, Hypertension, Major depression, Post laminectomy syndrome, Sciatica, Snores, Tobacco abuse, and Traumatic brain injury.       has a past surgical history that includes back surgery; Rotator cuff repair (Right); Carpal tunnel release (Right); Nerve Block (07/23/2013); Nerve Block (N/A, 03/21/2013); other surgical history (04/25/2016); Tonsillectomy; pacemaker placement (09/2015); back surgery; other surgical history (02/09/2018); joint replacement (Right, 06/2018); and Cardiac electrophysiology study and ablation.      Social History     Socioeconomic History    Marital status:      Spouse name: Not on file    Number of children: Not on file    Years of education: Not on file    Highest education level: Not on file   Occupational History     Employer: [employer]   Tobacco Use    Smoking status: Every Day     Current packs/day: 1.00     Average packs/day: 1 pack/day for 40.0 years (40.0 ttl pk-yrs)     Types: Cigarettes    Smokeless tobacco: 
 Faculty Sign-Out Attestation  Handoff taken on the following patient from prior Attending Physician: Cristhian  Note Started: 12:04 AM EST    I was available and discussed any additional care issues that arose and coordinated the management plans with the resident(s) caring for the patient during my duty period. Any areas of disagreement with resident’s documentation of care or procedures are noted on the chart. I was personally present for the key portions of any/all procedures during my duty period. I have documented in the chart those procedures where I was not present during the key portions.    Suicidal / etoh, needing recheck, social work assessed,   May need memory care,   Needing social work to see & re check,     ---pt now sober, re evaluation by social work,   Cleared for discharge home, out pt follow up in place    Serjio Villarreal DO  Attending Physician      eSrjio Villarreal DO  01/16/25 0007       Serjio Villarreal DO  01/16/25 0036    
abnormality. CTA HEAD: ANTERIOR CIRCULATION: No significant stenosis of the intracranial internal carotid, anterior cerebral, or middle cerebral arteries. No aneurysm. POSTERIOR CIRCULATION: No significant stenosis of the basilar or posterior cerebral arteries. No aneurysm. OTHER: No dural venous sinus thrombosis on this non-dedicated study.     1. 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 2. Otherwise, no significant stenosis or occlusion of the major arterial vessels of the head and neck. 3. Mild emphysema.     CT HEAD WO CONTRAST    Result Date: 12/19/2024  EXAMINATION: CT OF THE HEAD WITHOUT CONTRAST  12/19/2024 6:42 pm TECHNIQUE: CT of the head was performed without the administration of intravenous contrast. Automated exposure control, iterative reconstruction, and/or weight based adjustment of the mA/kV was utilized to reduce the radiation dose to as low as reasonably achievable. COMPARISON: CT head 11/13/2024. HISTORY: ORDERING SYSTEM PROVIDED HISTORY: altered mental status for apparenty over a day no known last known well hx alcohol use, unknown cause of AMS TECHNOLOGIST PROVIDED HISTORY: altered mental status for apparenty over a day no known last known well hx alcohol use, unknown cause of AMS Decision Support Exception - unselect if not a suspected or confirmed emergency medical condition->Emergency Medical Condition (MA) FINDINGS: BRAIN/VENTRICLES: There is no acute intracranial hemorrhage, mass effect or midline shift.  No abnormal extra-axial fluid collection.  No evidence of an acute infarct.  There is no evidence of hydrocephalus.   Chronic encephalomalacia in the left cerebellum. ORBITS: The visualized portion of the orbits demonstrate no acute abnormality. SINUSES: The visualized paranasal sinuses and mastoid air cells demonstrate no acute abnormality. SOFT TISSUES/SKULL:  No acute abnormality of the visualized skull or

## 2025-01-17 ENCOUNTER — CLINICAL DOCUMENTATION (OUTPATIENT)
Dept: SPIRITUAL SERVICES | Age: 65
End: 2025-01-17

## 2025-01-17 ENCOUNTER — CARE COORDINATION (OUTPATIENT)
Dept: CARE COORDINATION | Age: 65
End: 2025-01-17

## 2025-01-17 DIAGNOSIS — S06.9X9S TRAUMATIC BRAIN INJURY WITH LOSS OF CONSCIOUSNESS, SEQUELA: ICD-10-CM

## 2025-01-17 DIAGNOSIS — F10.20 ALCOHOL USE DISORDER, SEVERE, DEPENDENCE (HCC): Primary | ICD-10-CM

## 2025-01-17 DIAGNOSIS — F33.3 SEVERE EPISODE OF RECURRENT MAJOR DEPRESSIVE DISORDER, WITH PSYCHOTIC FEATURES (HCC): ICD-10-CM

## 2025-01-17 LAB
EKG ATRIAL RATE: 113 BPM
EKG P AXIS: 76 DEGREES
EKG P-R INTERVAL: 148 MS
EKG Q-T INTERVAL: 334 MS
EKG QRS DURATION: 100 MS
EKG QTC CALCULATION (BAZETT): 458 MS
EKG R AXIS: 38 DEGREES
EKG T AXIS: 58 DEGREES
EKG VENTRICULAR RATE: 113 BPM

## 2025-01-17 PROCEDURE — 93010 ELECTROCARDIOGRAM REPORT: CPT | Performed by: INTERNAL MEDICINE

## 2025-01-17 NOTE — ACP (ADVANCE CARE PLANNING)
Advance Care Planning   Ambulatory ACP Specialist Patient Outreach    Date:  1/17/2025    ACP Specialist:  FABIAN Jolley    Outreach call to patient in follow-up to ACP Specialist referral from:Ira Roberts APRN - CNP    [x] PCP  [] Provider   [] Ambulatory Care Management [] Other     For:                  [] Advance Directive Assistance              [] Complete Portable DNR order              [] Complete POST/POLST/MOST              [] Code Status Discussion             [] Discuss Goals of Care             [] Early ACP Decision-Making              [] Other (Specify)    Date Referral Received: 01/06/2025    Next Step:   [] ACP scheduled conversation  [] Outreach again in one week               [] Email / Mail ACP Info Sheets  [] Email / Mail Advance Directive   [] Closing referral.  Routing closure to referring provider/staff and to ACP Specialist .    [] Closure letter mailed to patient with invitation to contact ACP Specialist if / when ready.   [] Other (Specify here):       [x] At this time, Healthcare Decision Maker Is:         [x] Primary agent named in scanned advance directive.    [] Legal Next of Kin.     [] Unable to determine legal decision maker at this time.    Outreaches:       [x] 1st -  Date:   01/17/2025               Intervention:  [x] Spoke with Patient   [] Left Voice mail [] Email / Mail    [] [employer]  [] Other (Specify) :     Outcomes: ACP specialist made a f/u call to this pt on his listed mobile/home number reflecting [phone] to see if he rcvd the documents that were mailed to his confirmed home address. Pt answered this call & appeared to be irritated that this worker was calling. Pt states \"well you better get on that, fast\". This worker explained to the pt that his documents were already completed & in his medical record, however this call was to inquire if he rcvd this paperwork. Pt was unable to answer this question & continued to appear upset and quickly

## 2025-01-20 DIAGNOSIS — R45.86 MOOD SWINGS: ICD-10-CM

## 2025-01-20 DIAGNOSIS — F41.9 ANXIETY AND DEPRESSION: Primary | ICD-10-CM

## 2025-01-20 DIAGNOSIS — F32.A ANXIETY AND DEPRESSION: Primary | ICD-10-CM

## 2025-01-21 ENCOUNTER — CARE COORDINATION (OUTPATIENT)
Dept: CARE COORDINATION | Age: 65
End: 2025-01-21

## 2025-01-21 NOTE — CARE COORDINATION
When speaking to patient on Friday after his ED visit for mood disorder, he was argumentative and had difficult time redirecting patient over the phone.  He has an issue with ETOH abuse and admittedly drinks a case of beer daily.  Patient seemed altered over the phone when speaking to him on Friday.  He had told me multiple times that he needs a shrink but yet says that [facility] just makes him \"self medicate\".  He is consistently telling me that he needs something for pain as he has pain everywhere and when they give him Tylenol it is an insult to his intelligence.  He also said he is not going to take his medication any longer which I tried to advise that this was a poor choice. I did ask him if we could find another Psychiatrist if he would go, he said he has no transportation.  I expressed I would look into transportation for the appointment and he was agreeable.    Monday I had called to schedule an appointment as his PCP did put in referral.  I had to leave message requesting return call. They called back today and informed me that we have no [facility] psychiatrists that treat patient on an outpatient basis.  He did have behavioral reach out to him last week but this is when he was expressing suicidal thoughts.  They actually had called 911 and stayed on the phone until squad arrived.  He was released 8 hours later to home. The behavioral nurse that called 911 is not going to pick him back up again as he is too intense for their program.    I can't seem to reach patient at a time when he is sober or is willing to discuss his care and using the resources that have been provided to him. He has been non compliant, is stating he is not going to take his medications.  I have attempted to work with him with no success, he is not engaged with care transition nurse at all.  Will route to PCP that patient is not willing to work with CTN, will see what she feels next step should be with this patient.

## 2025-01-23 ENCOUNTER — TELEPHONE (OUTPATIENT)
Dept: FAMILY MEDICINE CLINIC | Age: 65
End: 2025-01-23

## 2025-01-23 NOTE — TELEPHONE ENCOUNTER
I called and spoke to the patient yesterday regarding a referral to [facility].   Patient was agreeable but concerned he was not going to remember the appointment. I called [place] and scheduled the patient at the [facility] location.     Patient is schedule for his first evaluation on 2/24/25 at 12:00pm.     I called the patient back and repeated this too him, I advised him to write it down and he stated he did. I did also advise the patient of [facility] urgent care hours. They are open M-F 8a-7p. They do accept walk ins.patient voiced understanding.   Patient will need transportation for this appointment. I will connect with the care coordinator Shruti to have her help coordinate the patients transportation.

## 2025-01-24 ENCOUNTER — CARE COORDINATION (OUTPATIENT)
Dept: CARE COORDINATION | Age: 65
End: 2025-01-24

## 2025-01-24 NOTE — CARE COORDINATION
Patient was referred to Social Work by Shruti Mccall RN/CTN,  who stated that this patient is in need of transportation to his medical  appointments.  HC attempted to contact this patient regarding transportation.  There was no answer and the mailbox was full.    Plan of Care  HC will attempt to contact patient again on 01/27/25 for  assistance with transportation.

## 2025-01-27 ENCOUNTER — CARE COORDINATION (OUTPATIENT)
Dept: CARE COORDINATION | Age: 65
End: 2025-01-27

## 2025-01-28 ENCOUNTER — CARE COORDINATION (OUTPATIENT)
Dept: CARE COORDINATION | Age: 65
End: 2025-01-28

## 2025-01-28 NOTE — CARE COORDINATION
HC attempted to contact the patient, there was no answer.  HC found that the patient's voicemail was full.    Plan of Care  HC will attempt to reach out to patient again,  If no return call within 3-5 days.

## 2025-01-28 NOTE — CARE COORDINATION
HC had spoken to patient on yesterday, and then to several   representatives within the [facility] Medicare system.    After speaking with the representatives, HC was told that the  patient had a note on his chart that he wasn't approved for   transportation. Shortly  thereafter, HC was told that the patient  wasn't under [facility].  HC attempted to contact the patient and   there was no answer.  HC left a message for the patient and   he returned the call.  HC presented to the patient that she   can assist him with getting [employer] set up,  and then the HC will assist the patient with exploring the system  to determine what insurance he actually has.  HC called the patient back to complete an application  for [employer] patient to get to some of his   appointments until things can be worked out, through his  insurance.  Patient became very agitated and intense in  telling that HC that he doesn't understand why people keep calling   him instead of helping him, and asking him questions.  Patient  continued to say \"didn't anyone tell you that I'm on severe pain,  and have dementia, don't you know that\"?  HC continued inputting  Information in the [employer] application and attempting  to tell the patient that she only needed a little information from him.  Patient was more and more agitated again stating  \"that no one   helps him\", HC attempted to tell the patient that she's trying to   help him.  HC asked if patient would give permission at his residence  to speak with the HC and share information.  Patient stated that \"there  was no one to help him\".  HC inquired if she could contact his daughter,  patient was annoyed and stated \" that she lives far away and he doesn't  want her called\".  HC  informed the patient that she would contact the   nurse do to his severe pain, and would advised of the next step to  take with the patient.  HC spoke with MAILE Ko, who  has since signed off of

## 2025-01-29 ENCOUNTER — CARE COORDINATION (OUTPATIENT)
Dept: CARE COORDINATION | Age: 65
End: 2025-01-29

## 2025-01-29 NOTE — CARE COORDINATION
HC was advised by Manager Panda Spencer to send documentation for 01/29/25, to patient's provider.  HC is signing off of patient as he wasn't willing to accept assistance from , HC.

## 2025-02-21 ENCOUNTER — HOSPITAL ENCOUNTER (OUTPATIENT)
Age: 65
Setting detail: OBSERVATION
Discharge: HOME | End: 2025-02-21
Attending: EMERGENCY MEDICINE | Admitting: INTERNAL MEDICINE
Payer: MEDICARE

## 2025-02-21 ENCOUNTER — APPOINTMENT (OUTPATIENT)
Dept: GENERAL RADIOLOGY | Age: 65
End: 2025-02-21
Payer: MEDICARE

## 2025-02-21 ENCOUNTER — APPOINTMENT (OUTPATIENT)
Dept: CT IMAGING | Age: 65
End: 2025-02-21
Payer: MEDICARE

## 2025-02-21 VITALS
SYSTOLIC BLOOD PRESSURE: 149 MMHG | HEART RATE: 90 BPM | RESPIRATION RATE: 19 BRPM | BODY MASS INDEX: 30.13 KG/M2 | OXYGEN SATURATION: 93 % | WEIGHT: 210 LBS | TEMPERATURE: 97.9 F | DIASTOLIC BLOOD PRESSURE: 76 MMHG

## 2025-02-21 DIAGNOSIS — F10.10 CHRONIC ALCOHOL ABUSE: ICD-10-CM

## 2025-02-21 DIAGNOSIS — M62.82 NON-TRAUMATIC RHABDOMYOLYSIS: ICD-10-CM

## 2025-02-21 DIAGNOSIS — I48.0 PAROXYSMAL ATRIAL FIBRILLATION (HCC): Primary | ICD-10-CM

## 2025-02-21 PROBLEM — F10.121 ALCOHOL INTOXICATION DELIRIUM: Status: ACTIVE | Noted: 2025-02-21

## 2025-02-21 LAB
ALBUMIN SERPL-MCNC: 3.7 G/DL (ref 3.5–5.2)
ALBUMIN/GLOB SERPL: 1.1 {RATIO} (ref 1–2.5)
ALP SERPL-CCNC: 126 U/L (ref 40–129)
ALT SERPL-CCNC: 85 U/L (ref 10–50)
AMPHET UR QL SCN: NEGATIVE
ANION GAP SERPL CALCULATED.3IONS-SCNC: 19 MMOL/L (ref 9–16)
AST SERPL-CCNC: 209 U/L (ref 10–50)
BACTERIA URNS QL MICRO: NORMAL
BARBITURATES UR QL SCN: NEGATIVE
BASOPHILS # BLD: 0.06 K/UL (ref 0–0.2)
BASOPHILS NFR BLD: 1 % (ref 0–2)
BENZODIAZ UR QL: NEGATIVE
BILIRUB SERPL-MCNC: 0.3 MG/DL (ref 0–1.2)
BILIRUB UR QL STRIP: NEGATIVE
BODY TEMPERATURE: 37
BUN SERPL-MCNC: 4 MG/DL (ref 8–23)
CALCIUM SERPL-MCNC: 8.4 MG/DL (ref 8.6–10.4)
CANNABINOIDS UR QL SCN: NEGATIVE
CASTS #/AREA URNS LPF: NORMAL /LPF (ref 0–8)
CHLORIDE SERPL-SCNC: 103 MMOL/L (ref 98–107)
CK SERPL-CCNC: 1519 U/L (ref 39–308)
CK SERPL-CCNC: 886 U/L (ref 39–308)
CLARITY UR: CLEAR
CO2 SERPL-SCNC: 20 MMOL/L (ref 20–31)
COCAINE UR QL SCN: NEGATIVE
COHGB MFR BLD: 5.3 % (ref 0–5)
COLOR UR: YELLOW
CREAT SERPL-MCNC: 0.7 MG/DL (ref 0.7–1.2)
EKG ATRIAL RATE: 118 BPM
EKG P AXIS: 78 DEGREES
EKG P-R INTERVAL: 162 MS
EKG Q-T INTERVAL: 322 MS
EKG QRS DURATION: 106 MS
EKG QTC CALCULATION (BAZETT): 451 MS
EKG R AXIS: 11 DEGREES
EKG T AXIS: 46 DEGREES
EKG VENTRICULAR RATE: 118 BPM
EOSINOPHIL # BLD: <0.03 K/UL (ref 0–0.44)
EOSINOPHILS RELATIVE PERCENT: 0 % (ref 1–4)
EPI CELLS #/AREA URNS HPF: NORMAL /HPF (ref 0–5)
ERYTHROCYTE [DISTWIDTH] IN BLOOD BY AUTOMATED COUNT: 13.1 % (ref 11.8–14.4)
ETHANOL PERCENT: 0.4 %
ETHANOLAMINE SERPL-MCNC: 395 MG/DL (ref 0–0.08)
FENTANYL UR QL: NEGATIVE
FIO2 ON VENT: ABNORMAL %
GFR, ESTIMATED: >90 ML/MIN/1.73M2
GLUCOSE BLD-MCNC: 88 MG/DL
GLUCOSE BLD-MCNC: 88 MG/DL (ref 75–110)
GLUCOSE SERPL-MCNC: 86 MG/DL (ref 74–99)
GLUCOSE UR STRIP-MCNC: NEGATIVE MG/DL
HCO3 VENOUS: 19.6 MMOL/L (ref 24–30)
HCT VFR BLD AUTO: 43.4 % (ref 40.7–50.3)
HGB BLD-MCNC: 14.3 G/DL (ref 13–17)
HGB UR QL STRIP.AUTO: ABNORMAL
IMM GRANULOCYTES # BLD AUTO: 0.03 K/UL (ref 0–0.3)
IMM GRANULOCYTES NFR BLD: 1 %
KETONES UR STRIP-MCNC: NEGATIVE MG/DL
LEUKOCYTE ESTERASE UR QL STRIP: NEGATIVE
LYMPHOCYTES NFR BLD: 1.1 K/UL (ref 1.1–3.7)
LYMPHOCYTES RELATIVE PERCENT: 21 % (ref 24–43)
MAGNESIUM SERPL-MCNC: 2.2 MG/DL (ref 1.6–2.4)
MCH RBC QN AUTO: 30.4 PG (ref 25.2–33.5)
MCHC RBC AUTO-ENTMCNC: 32.9 G/DL (ref 28.4–34.8)
MCV RBC AUTO: 92.3 FL (ref 82.6–102.9)
METHADONE UR QL: NEGATIVE
MONOCYTES NFR BLD: 0.66 K/UL (ref 0.1–1.2)
MONOCYTES NFR BLD: 12 % (ref 3–12)
MYOGLOBIN SERPL-MCNC: 1335 NG/ML (ref 28–72)
MYOGLOBIN SERPL-MCNC: 399 NG/ML (ref 28–72)
NEGATIVE BASE EXCESS, VEN: 6 MMOL/L (ref 0–2)
NEUTROPHILS NFR BLD: 65 % (ref 36–65)
NEUTS SEG NFR BLD: 3.48 K/UL (ref 1.5–8.1)
NITRITE UR QL STRIP: NEGATIVE
NRBC BLD-RTO: 0 PER 100 WBC
O2 SAT, VEN: 76.6 % (ref 60–85)
OPIATES UR QL SCN: NEGATIVE
OXYCODONE UR QL SCN: NEGATIVE
PCO2 VENOUS: 39.4 MM HG (ref 39–55)
PCP UR QL SCN: NEGATIVE
PH UR STRIP: 6 [PH] (ref 5–8)
PH VENOUS: 7.32 (ref 7.32–7.42)
PHOSPHATE SERPL-MCNC: 3.3 MG/DL (ref 2.5–4.5)
PLATELET # BLD AUTO: ABNORMAL K/UL (ref 138–453)
PLATELET, FLUORESCENCE: 128 K/UL (ref 138–453)
PLATELETS.RETICULATED NFR BLD AUTO: 2.7 % (ref 1.1–10.3)
PO2 VENOUS: 44.4 MM HG (ref 30–50)
POTASSIUM SERPL-SCNC: 3.4 MMOL/L (ref 3.7–5.3)
PROT SERPL-MCNC: 7 G/DL (ref 6.6–8.7)
PROT UR STRIP-MCNC: NEGATIVE MG/DL
RBC # BLD AUTO: 4.7 M/UL (ref 4.21–5.77)
RBC #/AREA URNS HPF: NORMAL /HPF (ref 0–4)
SODIUM SERPL-SCNC: 142 MMOL/L (ref 136–145)
SP GR UR STRIP: 1.01 (ref 1–1.03)
TEST INFORMATION: NORMAL
TROPONIN I SERPL HS-MCNC: 49 NG/L (ref 0–22)
TROPONIN I SERPL HS-MCNC: 54 NG/L (ref 0–22)
UROBILINOGEN UR STRIP-ACNC: NORMAL EU/DL (ref 0–1)
WBC #/AREA URNS HPF: NORMAL /HPF (ref 0–5)
WBC OTHER # BLD: 5.4 K/UL (ref 3.5–11.3)

## 2025-02-21 PROCEDURE — 82550 ASSAY OF CK (CPK): CPT

## 2025-02-21 PROCEDURE — 82947 ASSAY GLUCOSE BLOOD QUANT: CPT

## 2025-02-21 PROCEDURE — 93005 ELECTROCARDIOGRAM TRACING: CPT

## 2025-02-21 PROCEDURE — 85025 COMPLETE CBC W/AUTO DIFF WBC: CPT

## 2025-02-21 PROCEDURE — 6370000000 HC RX 637 (ALT 250 FOR IP)

## 2025-02-21 PROCEDURE — 84100 ASSAY OF PHOSPHORUS: CPT

## 2025-02-21 PROCEDURE — 96374 THER/PROPH/DIAG INJ IV PUSH: CPT

## 2025-02-21 PROCEDURE — 6360000002 HC RX W HCPCS: Performed by: INTERNAL MEDICINE

## 2025-02-21 PROCEDURE — 70450 CT HEAD/BRAIN W/O DYE: CPT

## 2025-02-21 PROCEDURE — 94761 N-INVAS EAR/PLS OXIMETRY MLT: CPT

## 2025-02-21 PROCEDURE — 2580000003 HC RX 258

## 2025-02-21 PROCEDURE — 96372 THER/PROPH/DIAG INJ SC/IM: CPT

## 2025-02-21 PROCEDURE — 2700000000 HC OXYGEN THERAPY PER DAY

## 2025-02-21 PROCEDURE — 6360000004 HC RX CONTRAST MEDICATION

## 2025-02-21 PROCEDURE — 2580000003 HC RX 258: Performed by: INTERNAL MEDICINE

## 2025-02-21 PROCEDURE — 6360000002 HC RX W HCPCS

## 2025-02-21 PROCEDURE — 2580000003 HC RX 258: Performed by: NURSE PRACTITIONER

## 2025-02-21 PROCEDURE — 71260 CT THORAX DX C+: CPT

## 2025-02-21 PROCEDURE — 80053 COMPREHEN METABOLIC PANEL: CPT

## 2025-02-21 PROCEDURE — 71045 X-RAY EXAM CHEST 1 VIEW: CPT

## 2025-02-21 PROCEDURE — 96361 HYDRATE IV INFUSION ADD-ON: CPT

## 2025-02-21 PROCEDURE — 99285 EMERGENCY DEPT VISIT HI MDM: CPT

## 2025-02-21 PROCEDURE — 36415 COLL VENOUS BLD VENIPUNCTURE: CPT

## 2025-02-21 PROCEDURE — 72125 CT NECK SPINE W/O DYE: CPT

## 2025-02-21 PROCEDURE — G0378 HOSPITAL OBSERVATION PER HR: HCPCS

## 2025-02-21 PROCEDURE — 6370000000 HC RX 637 (ALT 250 FOR IP): Performed by: NURSE PRACTITIONER

## 2025-02-21 PROCEDURE — 94664 DEMO&/EVAL PT USE INHALER: CPT

## 2025-02-21 PROCEDURE — 83874 ASSAY OF MYOGLOBIN: CPT

## 2025-02-21 PROCEDURE — 82805 BLOOD GASES W/O2 SATURATION: CPT

## 2025-02-21 PROCEDURE — 85055 RETICULATED PLATELET ASSAY: CPT

## 2025-02-21 PROCEDURE — 94640 AIRWAY INHALATION TREATMENT: CPT

## 2025-02-21 PROCEDURE — 6370000000 HC RX 637 (ALT 250 FOR IP): Performed by: INTERNAL MEDICINE

## 2025-02-21 PROCEDURE — 99222 1ST HOSP IP/OBS MODERATE 55: CPT | Performed by: NURSE PRACTITIONER

## 2025-02-21 PROCEDURE — 81001 URINALYSIS AUTO W/SCOPE: CPT

## 2025-02-21 PROCEDURE — G0480 DRUG TEST DEF 1-7 CLASSES: HCPCS

## 2025-02-21 PROCEDURE — 93010 ELECTROCARDIOGRAM REPORT: CPT | Performed by: STUDENT IN AN ORGANIZED HEALTH CARE EDUCATION/TRAINING PROGRAM

## 2025-02-21 PROCEDURE — 83735 ASSAY OF MAGNESIUM: CPT

## 2025-02-21 PROCEDURE — 80307 DRUG TEST PRSMV CHEM ANLYZR: CPT

## 2025-02-21 PROCEDURE — 84484 ASSAY OF TROPONIN QUANT: CPT

## 2025-02-21 RX ORDER — ENOXAPARIN SODIUM 100 MG/ML
1 INJECTION SUBCUTANEOUS 2 TIMES DAILY
Status: DISCONTINUED | OUTPATIENT
Start: 2025-02-21 | End: 2025-02-21 | Stop reason: HOSPADM

## 2025-02-21 RX ORDER — PHENOBARBITAL SODIUM 65 MG/ML
16.2 INJECTION, SOLUTION INTRAMUSCULAR; INTRAVENOUS EVERY 12 HOURS
Status: DISCONTINUED | OUTPATIENT
Start: 2025-02-24 | End: 2025-02-21

## 2025-02-21 RX ORDER — POTASSIUM CHLORIDE 1500 MG/1
40 TABLET, EXTENDED RELEASE ORAL PRN
Status: DISCONTINUED | OUTPATIENT
Start: 2025-02-21 | End: 2025-02-21 | Stop reason: HOSPADM

## 2025-02-21 RX ORDER — SODIUM CHLORIDE 0.9 % (FLUSH) 0.9 %
10 SYRINGE (ML) INJECTION PRN
Status: DISCONTINUED | OUTPATIENT
Start: 2025-02-21 | End: 2025-02-21 | Stop reason: HOSPADM

## 2025-02-21 RX ORDER — SODIUM CHLORIDE 0.9 % (FLUSH) 0.9 %
5-40 SYRINGE (ML) INJECTION EVERY 12 HOURS SCHEDULED
Status: DISCONTINUED | OUTPATIENT
Start: 2025-02-21 | End: 2025-02-21 | Stop reason: HOSPADM

## 2025-02-21 RX ORDER — CARVEDILOL 12.5 MG/1
6.25 TABLET ORAL 2 TIMES DAILY WITH MEALS
Status: DISCONTINUED | OUTPATIENT
Start: 2025-02-21 | End: 2025-02-21 | Stop reason: HOSPADM

## 2025-02-21 RX ORDER — PHENOBARBITAL SODIUM 65 MG/ML
16.2 INJECTION, SOLUTION INTRAMUSCULAR; INTRAVENOUS EVERY 6 HOURS PRN
Status: DISCONTINUED | OUTPATIENT
Start: 2025-02-24 | End: 2025-02-21

## 2025-02-21 RX ORDER — ONDANSETRON 4 MG/1
4 TABLET, ORALLY DISINTEGRATING ORAL EVERY 8 HOURS PRN
Status: DISCONTINUED | OUTPATIENT
Start: 2025-02-21 | End: 2025-02-21 | Stop reason: HOSPADM

## 2025-02-21 RX ORDER — ONDANSETRON 2 MG/ML
4 INJECTION INTRAMUSCULAR; INTRAVENOUS EVERY 6 HOURS PRN
Status: DISCONTINUED | OUTPATIENT
Start: 2025-02-21 | End: 2025-02-21 | Stop reason: HOSPADM

## 2025-02-21 RX ORDER — LORAZEPAM 2 MG/ML
1 INJECTION INTRAMUSCULAR ONCE
Status: COMPLETED | OUTPATIENT
Start: 2025-02-21 | End: 2025-02-21

## 2025-02-21 RX ORDER — ENOXAPARIN SODIUM 100 MG/ML
40 INJECTION SUBCUTANEOUS DAILY
Status: DISCONTINUED | OUTPATIENT
Start: 2025-02-21 | End: 2025-02-21

## 2025-02-21 RX ORDER — ACETAMINOPHEN 325 MG/1
650 TABLET ORAL EVERY 6 HOURS PRN
Status: DISCONTINUED | OUTPATIENT
Start: 2025-02-21 | End: 2025-02-21 | Stop reason: HOSPADM

## 2025-02-21 RX ORDER — METOPROLOL SUCCINATE 25 MG/1
25 TABLET, EXTENDED RELEASE ORAL DAILY
Status: DISCONTINUED | OUTPATIENT
Start: 2025-02-21 | End: 2025-02-21

## 2025-02-21 RX ORDER — HYDROCODONE BITARTRATE AND ACETAMINOPHEN 5; 325 MG/1; MG/1
2 TABLET ORAL EVERY 6 HOURS PRN
Status: DISCONTINUED | OUTPATIENT
Start: 2025-02-21 | End: 2025-02-21 | Stop reason: HOSPADM

## 2025-02-21 RX ORDER — SODIUM CHLORIDE 9 MG/ML
INJECTION, SOLUTION INTRAVENOUS CONTINUOUS
Status: DISCONTINUED | OUTPATIENT
Start: 2025-02-21 | End: 2025-02-21 | Stop reason: HOSPADM

## 2025-02-21 RX ORDER — METOPROLOL TARTRATE 50 MG
25 TABLET ORAL 2 TIMES DAILY
Status: DISCONTINUED | OUTPATIENT
Start: 2025-02-21 | End: 2025-02-21

## 2025-02-21 RX ORDER — NICOTINE 21 MG/24HR
1 PATCH, TRANSDERMAL 24 HOURS TRANSDERMAL DAILY
Status: DISCONTINUED | OUTPATIENT
Start: 2025-02-21 | End: 2025-02-21 | Stop reason: HOSPADM

## 2025-02-21 RX ORDER — PHENOBARBITAL SODIUM 65 MG/ML
65 INJECTION, SOLUTION INTRAMUSCULAR; INTRAVENOUS EVERY 6 HOURS
Status: DISCONTINUED | OUTPATIENT
Start: 2025-02-21 | End: 2025-02-21

## 2025-02-21 RX ORDER — POTASSIUM CHLORIDE 7.45 MG/ML
10 INJECTION INTRAVENOUS PRN
Status: DISCONTINUED | OUTPATIENT
Start: 2025-02-21 | End: 2025-02-21 | Stop reason: HOSPADM

## 2025-02-21 RX ORDER — PHENOBARBITAL SODIUM 65 MG/ML
32.5 INJECTION, SOLUTION INTRAMUSCULAR; INTRAVENOUS EVERY 6 HOURS PRN
Status: DISCONTINUED | OUTPATIENT
Start: 2025-02-22 | End: 2025-02-21

## 2025-02-21 RX ORDER — POLYETHYLENE GLYCOL 3350 17 G/17G
17 POWDER, FOR SOLUTION ORAL DAILY PRN
Status: DISCONTINUED | OUTPATIENT
Start: 2025-02-21 | End: 2025-02-21 | Stop reason: HOSPADM

## 2025-02-21 RX ORDER — IOPAMIDOL 755 MG/ML
75 INJECTION, SOLUTION INTRAVASCULAR
Status: COMPLETED | OUTPATIENT
Start: 2025-02-21 | End: 2025-02-21

## 2025-02-21 RX ORDER — IPRATROPIUM BROMIDE AND ALBUTEROL SULFATE 2.5; .5 MG/3ML; MG/3ML
1 SOLUTION RESPIRATORY (INHALATION)
Status: DISCONTINUED | OUTPATIENT
Start: 2025-02-21 | End: 2025-02-21 | Stop reason: HOSPADM

## 2025-02-21 RX ORDER — ACETAMINOPHEN 650 MG/1
650 SUPPOSITORY RECTAL EVERY 6 HOURS PRN
Status: DISCONTINUED | OUTPATIENT
Start: 2025-02-21 | End: 2025-02-21 | Stop reason: HOSPADM

## 2025-02-21 RX ORDER — PHENOBARBITAL SODIUM 65 MG/ML
32.5 INJECTION, SOLUTION INTRAMUSCULAR; INTRAVENOUS EVERY 6 HOURS
Status: DISCONTINUED | OUTPATIENT
Start: 2025-02-22 | End: 2025-02-21

## 2025-02-21 RX ORDER — PHENOBARBITAL SODIUM 65 MG/ML
65 INJECTION, SOLUTION INTRAMUSCULAR; INTRAVENOUS EVERY 6 HOURS PRN
Status: DISCONTINUED | OUTPATIENT
Start: 2025-02-21 | End: 2025-02-21

## 2025-02-21 RX ORDER — SPIRONOLACTONE 25 MG/1
25 TABLET ORAL DAILY
Status: DISCONTINUED | OUTPATIENT
Start: 2025-02-21 | End: 2025-02-21 | Stop reason: HOSPADM

## 2025-02-21 RX ORDER — PHENOBARBITAL SODIUM 65 MG/ML
32.5 INJECTION, SOLUTION INTRAMUSCULAR; INTRAVENOUS EVERY 12 HOURS
Status: DISCONTINUED | OUTPATIENT
Start: 2025-02-23 | End: 2025-02-21

## 2025-02-21 RX ORDER — 0.9 % SODIUM CHLORIDE 0.9 %
1000 INTRAVENOUS SOLUTION INTRAVENOUS ONCE
Status: COMPLETED | OUTPATIENT
Start: 2025-02-21 | End: 2025-02-21

## 2025-02-21 RX ORDER — 0.9 % SODIUM CHLORIDE 0.9 %
250 INTRAVENOUS SOLUTION INTRAVENOUS ONCE
Status: DISCONTINUED | OUTPATIENT
Start: 2025-02-21 | End: 2025-02-21

## 2025-02-21 RX ORDER — LANOLIN ALCOHOL/MO/W.PET/CERES
50 CREAM (GRAM) TOPICAL ONCE
Status: COMPLETED | OUTPATIENT
Start: 2025-02-21 | End: 2025-02-21

## 2025-02-21 RX ORDER — SODIUM CHLORIDE 9 MG/ML
INJECTION, SOLUTION INTRAVENOUS PRN
Status: DISCONTINUED | OUTPATIENT
Start: 2025-02-21 | End: 2025-02-21 | Stop reason: HOSPADM

## 2025-02-21 RX ORDER — MAGNESIUM SULFATE 1 G/100ML
1000 INJECTION INTRAVENOUS PRN
Status: DISCONTINUED | OUTPATIENT
Start: 2025-02-21 | End: 2025-02-21 | Stop reason: HOSPADM

## 2025-02-21 RX ADMIN — METOPROLOL TARTRATE 25 MG: 50 TABLET, FILM COATED ORAL at 11:08

## 2025-02-21 RX ADMIN — IPRATROPIUM BROMIDE AND ALBUTEROL SULFATE 1 DOSE: .5; 2.5 SOLUTION RESPIRATORY (INHALATION) at 05:44

## 2025-02-21 RX ADMIN — SODIUM CHLORIDE: 0.9 INJECTION, SOLUTION INTRAVENOUS at 08:44

## 2025-02-21 RX ADMIN — Medication 50 MG: at 04:41

## 2025-02-21 RX ADMIN — Medication 1 MG: at 06:27

## 2025-02-21 RX ADMIN — HYDROCODONE BITARTRATE AND ACETAMINOPHEN 2 TABLET: 5; 325 TABLET ORAL at 11:08

## 2025-02-21 RX ADMIN — SODIUM CHLORIDE 250 ML: 0.9 INJECTION, SOLUTION INTRAVENOUS at 05:08

## 2025-02-21 RX ADMIN — SODIUM CHLORIDE: 0.9 INJECTION, SOLUTION INTRAVENOUS at 11:49

## 2025-02-21 RX ADMIN — ENOXAPARIN SODIUM 100 MG: 100 INJECTION SUBCUTANEOUS at 12:37

## 2025-02-21 RX ADMIN — POTASSIUM CHLORIDE 40 MEQ: 1500 TABLET, EXTENDED RELEASE ORAL at 13:08

## 2025-02-21 RX ADMIN — IOPAMIDOL 75 ML: 755 INJECTION, SOLUTION INTRAVENOUS at 06:08

## 2025-02-21 RX ADMIN — SPIRONOLACTONE 25 MG: 25 TABLET, FILM COATED ORAL at 11:08

## 2025-02-21 RX ADMIN — SODIUM CHLORIDE 1000 ML: 9 INJECTION, SOLUTION INTRAVENOUS at 07:51

## 2025-02-21 ASSESSMENT — ENCOUNTER SYMPTOMS
SHORTNESS OF BREATH: 1
RHINORRHEA: 0
ABDOMINAL PAIN: 1
COUGH: 0
EYES NEGATIVE: 1
SHORTNESS OF BREATH: 0
COUGH: 1
CONSTIPATION: 0
DIARRHEA: 0
PHOTOPHOBIA: 0
BACK PAIN: 1

## 2025-02-21 ASSESSMENT — PAIN DESCRIPTION - LOCATION: LOCATION: GENERALIZED

## 2025-02-21 ASSESSMENT — PAIN - FUNCTIONAL ASSESSMENT: PAIN_FUNCTIONAL_ASSESSMENT: NONE - DENIES PAIN

## 2025-02-21 ASSESSMENT — PAIN SCALES - GENERAL
PAINLEVEL_OUTOF10: 6
PAINLEVEL_OUTOF10: 10

## 2025-02-21 ASSESSMENT — PAIN DESCRIPTION - DESCRIPTORS: DESCRIPTORS: DISCOMFORT
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
## 2025-02-21 NOTE — PROGRESS NOTES
Discharge instructions provided to patient. All education discussed. Pt states he understand information given. IV removed. Pt assisted in dressing. Cab called for patient. Patient assisted to the lobby to wait for cab via wheelchair.
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
## 2025-02-21 NOTE — ED NOTES
RN and tech at bedside to provide calixto care and linen change.   sheets, blankets and gown changed    NAD, pt resting comfortably

## 2025-02-21 NOTE — CARE COORDINATION
Discharge Report    [facility]  Clinical Case Management Department  Written by: WHIT YOON RN    Patient Name: Mahesh Brownlee  Attending Provider: Aric Granda MD  Admit Date: 2025  4:08 AM  MRN: [id number]  Account: [id number]                     : 1960  Discharge Date: 2025  Was asked by RN to set up cab was told patient is going home  Blk and white cab arrangements made [phone]     Disposition: home    WHIT YOON RN
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
## 2025-02-21 NOTE — PROGRESS NOTES
RN discussed discharge plans with patient. RN expressed that pt is not steady on his feet and that pt should see pt/ot and consider going to a facility on discharge. Pt states he does not want to go to a facility or have any other assistance. He states he just wants pain medication and ativan or to drink. Pt expressed that he does not want to quit drinking and just wants to go home.  Mccord with Cardiology at bedside and notified of pt's BP and leg swelling.  states he discussed with the patient his options and patient states he doesn't take any medications at home won't take any medication if he prescribes it. Admitting team notified of above. Ok to continue with discharge.
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
## 2025-02-21 NOTE — ED NOTES
Etiology unclear. No history of hypoxia.  Cultures and viral studies are pending.   O2 to 4L by nasal cannula

## 2025-02-24 ENCOUNTER — CARE COORDINATION (OUTPATIENT)
Dept: CARE COORDINATION | Age: 65
End: 2025-02-24

## 2025-02-24 ENCOUNTER — TELEPHONE (OUTPATIENT)
Dept: FAMILY MEDICINE CLINIC | Age: 65
End: 2025-02-24

## 2025-02-24 NOTE — TELEPHONE ENCOUNTER
Rachel transition nurse called to speak with Ira briefly , regarding pt. Advised her she was in room with a patient and I'd give message.

## 2025-02-25 ENCOUNTER — CARE COORDINATION (OUTPATIENT)
Dept: CARE COORDINATION | Age: 65
End: 2025-02-25

## 2025-02-25 DIAGNOSIS — I50.20 HFREF (HEART FAILURE WITH REDUCED EJECTION FRACTION) (HCC): ICD-10-CM

## 2025-02-25 DIAGNOSIS — J44.9 COPD WITHOUT EXACERBATION (HCC): ICD-10-CM

## 2025-02-25 DIAGNOSIS — R29.898 LEG WEAKNESS, BILATERAL: Primary | ICD-10-CM

## 2025-02-25 NOTE — CARE COORDINATION
Care Transitions Note    Follow Up Call     Patient Current Location:  Home: [address]  [place] [facility] [address]    Care Transition Nurse contacted the patient by telephone. Verified name and  as identifiers.    Additional needs identified to be addressed with provider   High priority: patient agreed to going somewhere today when I spoke to him , stated he needs to go somewhere that wont let him drink or smoke but will help him with pain management, he stated he has pain all over and that he can't walk, that is why he keeps face planting because his legs are weak and  his legs, feet and hands are swollen.     I spoke to our LSW Ira Rogers , she stated we need orders for Pt/OT, VN to start a precert, the place he wants to go has open beds  can you send an order to [facility] for PT/OT , ROLANDO nurse/VN,   thank you//JU     UPDATE--spoke to Micky at [facility], they cannot take patient d/t insurance,  [facility] does take his insurance, fax # [phone]                 Method of communication with provider: chart routing.    Care Summary Note: Writer spoke to patient today, he is agreeable to go somewhere inpatient for some kind of therapy as long as they done let him drink or smoke but will manage his pain and mental status, stated his legs are so weak , that is why he keeps falling, but no one every helps so why can't he just be neutralized and numb, that is all he wants, how he feels is no way to live but is not willing to participate with the situation to work on getting his health and quality of life better.   writer contacted SW spoke to Ira,  Writer sent request to pcp office for orders for pt/ot, BH/VN,  the place patient wants to go has open beds but will need to start pre cert, Ira is also trying to help him with possibly getting with [facility] to detox but he isnt as agreeable to that, just wants the them to give him pain medication and keep him numb, writer also sent referral to [facility]

## 2025-02-25 NOTE — CARE COORDINATION
received referral from MAILE Matos.   spoke to MAILE Matos who reports that Mahesh is interested in go to a skilled facility.  Shawna is planning to arrange HHC.   noted that Mahesh will need PT/OT notes to start pre-cert.     called and introduced herself to Mahesh.   inquired if Mahesh is willing to go to a SNF to get short term rehab.  Mahesh replied \"probably\".   inquired if Mahesh knew what facility he wanted to go to?  Mahesh reports that he wants to go to [facility] in [place].   informed Mahesh that he will need to be evaluated by home care and PT/OT notes sent in to start a pre-cert.       inquired if Mahesh is currently drinking and if detox will be needed?  Mahesh replied he doesn't matter if he has been drinking, they facility will need to medicate him to make him \"numb and dumb\".       called [facility] who confirmed they have open beds.      spoke with Blake  and discussed process.  Blake encouraged Mahesh to complete Detox, have an accepting HHC and once discharged home have PT/OT completed to start pre-cert.  If pre-cert is done before detox it will  by the time Mahesh is discharged from detox.     called Mahesh and informed him that [facility] has open beds.  Mahesh asked if they will \"medicate him\".   attempted to provide Mahesh with [facility]'s phone number.  Mahesh reports that he has nothing to write with or write it down with.   encouraged him to put number in his phone.  Mahesh replied, \"I will just forget to call them\".   informed Mahesh he can call  back when he is ready for [facility] phone number Mahesh replied, \" I will probably forget\".    Plan:    will route note to MAILE Matos.   HHC will be ordered and find an accepting HHC.      called Mahesh

## 2025-02-25 NOTE — TELEPHONE ENCOUNTER
Is pt. Actually willing to go to inpt. Rehab? He has been very hesitant in the past  and does not feel he has a problem. Unless he is threat to himself or others I am not sure we can force him anywhere?

## 2025-02-26 ENCOUNTER — CARE COORDINATION (OUTPATIENT)
Dept: CARE COORDINATION | Age: 65
End: 2025-02-26

## 2025-02-26 NOTE — TELEPHONE ENCOUNTER
Please let me know the name of the facility you mentioned in point place for him. If I recall though you said this was skilled rehab for PT? Not alcohol...

## 2025-02-26 NOTE — TELEPHONE ENCOUNTER
Ok I placed orders for [facility] at your request in other message , do I need to do anything with this place now?

## 2025-02-26 NOTE — CARE COORDINATION
called and spoke with Mahesh.   Mahesh reports that he is doing \"ok\".      inquired if Mahesh heard from [facility].  Mahesh reports that he has not heard from anyone.     Mahesh noted that his phone is no longer charging and only  has 50% left.     3 way called [facility].  Mahesh was encouraged to come to [address] today before 1pm.   inquired if they assist with transportation to intake?   and Mahesh were informed they do not assist with transportation for the initial assessment but can assist with transportation once connected with services.      inquired if Mahesh has the funds for a taxi and if she can assist arrange a taxi.  Mahesh declined and stated that he was going to get ready and go to [facility].       did note the importance of detox in order to go to SNF.      Plan:    will contact Mahesh tomorrow 2/27 to offer assistance with getting to detox.        called [facility] regarding transportation.   was informed that Mahesh has 24 [address] trips.  Mahesh can call [phone] to arrange transportation.      Plan:    will follow up with Mahesh tomorrow 2/27 regarding detox.

## 2025-02-27 ENCOUNTER — CARE COORDINATION (OUTPATIENT)
Dept: CARE COORDINATION | Age: 65
End: 2025-02-27

## 2025-02-27 NOTE — CARE COORDINATION
attempted to contact Mahesh.  No answer.  Unable to leave VM due to VM full.    Plan:     will attempt to contact Mahesh in 1 week.

## 2025-03-07 ENCOUNTER — CARE COORDINATION (OUTPATIENT)
Dept: CARE COORDINATION | Age: 65
End: 2025-03-07

## 2025-03-07 NOTE — CARE COORDINATION
called Mahesh.  Mahesh informed  that he completed detox at the [facility] and is not at [facility].  Mahesh reports that he is happy there and will stay long term.  Mahesh reports that does not plan to go home.   Mahesh reports that he is supposed to see psychiatry next week.     Mahesh denied questions or concerns.       attempted to contact admission at [facility].   left message to confirm Mahesh was staying LTC at [facility].     Plan:    will speak with admissions at [facility] to confirm Mahesh is staying long term     [facility] called back and confirmed Mahesh is at [facility].  Currently under rehab however he has talked about staying long term.     Plan:    will sign off and update PCP.

## 2025-03-27 ENCOUNTER — APPOINTMENT (OUTPATIENT)
Dept: CT IMAGING | Age: 65
DRG: 193 | End: 2025-03-27
Payer: MEDICARE

## 2025-03-27 ENCOUNTER — APPOINTMENT (OUTPATIENT)
Dept: MRI IMAGING | Age: 65
DRG: 193 | End: 2025-03-27
Payer: MEDICARE

## 2025-03-27 ENCOUNTER — HOSPITAL ENCOUNTER (INPATIENT)
Age: 65
LOS: 7 days | Discharge: NURSING, REHAB OR LONG-TERM CARE | DRG: 193 | End: 2025-04-03
Attending: EMERGENCY MEDICINE | Admitting: STUDENT IN AN ORGANIZED HEALTH CARE EDUCATION/TRAINING PROGRAM
Payer: MEDICARE

## 2025-03-27 ENCOUNTER — APPOINTMENT (OUTPATIENT)
Dept: GENERAL RADIOLOGY | Age: 65
DRG: 193 | End: 2025-03-27
Payer: MEDICARE

## 2025-03-27 DIAGNOSIS — J18.9 ATYPICAL PNEUMONIA: ICD-10-CM

## 2025-03-27 DIAGNOSIS — R41.82 ALTERED MENTAL STATUS, UNSPECIFIED ALTERED MENTAL STATUS TYPE: Primary | ICD-10-CM

## 2025-03-27 DIAGNOSIS — I50.20 HFREF (HEART FAILURE WITH REDUCED EJECTION FRACTION) (HCC): ICD-10-CM

## 2025-03-27 DIAGNOSIS — R56.9 SEIZURES (HCC): ICD-10-CM

## 2025-03-27 LAB
ALBUMIN SERPL-MCNC: 3.6 G/DL (ref 3.5–5.2)
ALBUMIN/GLOB SERPL: 1.1 {RATIO} (ref 1–2.5)
ALP SERPL-CCNC: 80 U/L (ref 40–129)
ALT SERPL-CCNC: 18 U/L (ref 10–50)
AMMONIA PLAS-SCNC: 25 UMOL/L (ref 16–60)
AMPHET UR QL SCN: NEGATIVE
ANION GAP SERPL CALCULATED.3IONS-SCNC: 13 MMOL/L (ref 9–16)
APAP SERPL-MCNC: <5 UG/ML (ref 10–30)
AST SERPL-CCNC: 28 U/L (ref 10–50)
B PARAP IS1001 DNA NPH QL NAA+NON-PROBE: NOT DETECTED
B PERT DNA SPEC QL NAA+PROBE: NOT DETECTED
BARBITURATES UR QL SCN: NEGATIVE
BASOPHILS # BLD: 0.06 K/UL (ref 0–0.2)
BASOPHILS NFR BLD: 1 % (ref 0–2)
BENZODIAZ UR QL: POSITIVE
BILIRUB SERPL-MCNC: 0.3 MG/DL (ref 0–1.2)
BILIRUB UR QL STRIP: NEGATIVE
BNP SERPL-MCNC: 1922 PG/ML (ref 0–125)
BUN SERPL-MCNC: 10 MG/DL (ref 8–23)
C PNEUM DNA NPH QL NAA+NON-PROBE: NOT DETECTED
CALCIUM SERPL-MCNC: 9.3 MG/DL (ref 8.6–10.4)
CANNABINOIDS UR QL SCN: NEGATIVE
CHLORIDE SERPL-SCNC: 104 MMOL/L (ref 98–107)
CLARITY UR: CLEAR
CO2 SERPL-SCNC: 22 MMOL/L (ref 20–31)
COCAINE UR QL SCN: NEGATIVE
COLOR UR: YELLOW
COMMENT: NORMAL
CREAT SERPL-MCNC: 0.8 MG/DL (ref 0.7–1.2)
D DIMER PPP FEU-MCNC: 2.23 UG/ML FEU (ref 0–0.57)
EOSINOPHIL # BLD: 0.03 K/UL (ref 0–0.44)
EOSINOPHILS RELATIVE PERCENT: 0 % (ref 1–4)
ERYTHROCYTE [DISTWIDTH] IN BLOOD BY AUTOMATED COUNT: 12.6 % (ref 11.8–14.4)
ETHANOL PERCENT: <0.01 %
ETHANOLAMINE SERPL-MCNC: <10 MG/DL (ref 0–0.08)
FENTANYL UR QL: NEGATIVE
FLUAV RNA NPH QL NAA+NON-PROBE: NOT DETECTED
FLUBV RNA NPH QL NAA+NON-PROBE: NOT DETECTED
FOLATE SERPL-MCNC: 12.7 NG/ML (ref 4.8–24.2)
GFR, ESTIMATED: >90 ML/MIN/1.73M2
GLUCOSE SERPL-MCNC: 95 MG/DL (ref 74–99)
GLUCOSE UR STRIP-MCNC: NEGATIVE MG/DL
HADV DNA NPH QL NAA+NON-PROBE: NOT DETECTED
HCOV 229E RNA NPH QL NAA+NON-PROBE: NOT DETECTED
HCOV HKU1 RNA NPH QL NAA+NON-PROBE: NOT DETECTED
HCOV NL63 RNA NPH QL NAA+NON-PROBE: NOT DETECTED
HCOV OC43 RNA NPH QL NAA+NON-PROBE: NOT DETECTED
HCT VFR BLD AUTO: 34.4 % (ref 40.7–50.3)
HGB BLD-MCNC: 11.2 G/DL (ref 13–17)
HGB UR QL STRIP.AUTO: NEGATIVE
HMPV RNA NPH QL NAA+NON-PROBE: NOT DETECTED
HPIV1 RNA NPH QL NAA+NON-PROBE: NOT DETECTED
HPIV2 RNA NPH QL NAA+NON-PROBE: NOT DETECTED
HPIV3 RNA NPH QL NAA+NON-PROBE: NOT DETECTED
HPIV4 RNA NPH QL NAA+NON-PROBE: NOT DETECTED
IMM GRANULOCYTES # BLD AUTO: 0.04 K/UL (ref 0–0.3)
IMM GRANULOCYTES NFR BLD: 0 %
KETONES UR STRIP-MCNC: NEGATIVE MG/DL
LACTIC ACID, SEPSIS WHOLE BLOOD: 1.9 MMOL/L (ref 0.5–1.9)
LEUKOCYTE ESTERASE UR QL STRIP: NEGATIVE
LYMPHOCYTES NFR BLD: 1.1 K/UL (ref 1.1–3.7)
LYMPHOCYTES RELATIVE PERCENT: 11 % (ref 24–43)
M PNEUMO DNA NPH QL NAA+NON-PROBE: NOT DETECTED
MCH RBC QN AUTO: 30.7 PG (ref 25.2–33.5)
MCHC RBC AUTO-ENTMCNC: 32.6 G/DL (ref 28.4–34.8)
MCV RBC AUTO: 94.2 FL (ref 82.6–102.9)
METHADONE UR QL: NEGATIVE
MONOCYTES NFR BLD: 0.72 K/UL (ref 0.1–1.2)
MONOCYTES NFR BLD: 7 % (ref 3–12)
NEUTROPHILS NFR BLD: 81 % (ref 36–65)
NEUTS SEG NFR BLD: 8.12 K/UL (ref 1.5–8.1)
NITRITE UR QL STRIP: NEGATIVE
NRBC BLD-RTO: 0 PER 100 WBC
OPIATES UR QL SCN: NEGATIVE
OXYCODONE UR QL SCN: POSITIVE
PCP UR QL SCN: NEGATIVE
PH UR STRIP: 6.5 [PH] (ref 5–8)
PLATELET # BLD AUTO: 268 K/UL (ref 138–453)
PMV BLD AUTO: 10.1 FL (ref 8.1–13.5)
POTASSIUM SERPL-SCNC: 3.6 MMOL/L (ref 3.7–5.3)
PROCALCITONIN SERPL-MCNC: 0.04 NG/ML (ref 0–0.09)
PROT SERPL-MCNC: 6.8 G/DL (ref 6.6–8.7)
PROT UR STRIP-MCNC: NEGATIVE MG/DL
RBC # BLD AUTO: 3.65 M/UL (ref 4.21–5.77)
RSV RNA NPH QL NAA+NON-PROBE: NOT DETECTED
RV+EV RNA NPH QL NAA+NON-PROBE: NOT DETECTED
SALICYLATES SERPL-MCNC: <0.5 MG/DL (ref 0–10)
SARS-COV-2 RNA NPH QL NAA+NON-PROBE: NOT DETECTED
SODIUM SERPL-SCNC: 139 MMOL/L (ref 136–145)
SP GR UR STRIP: 1.01 (ref 1–1.03)
SPECIMEN DESCRIPTION: NORMAL
TEST INFORMATION: ABNORMAL
TROPONIN I SERPL HS-MCNC: 48 NG/L (ref 0–22)
TROPONIN I SERPL HS-MCNC: 50 NG/L (ref 0–22)
TSH SERPL DL<=0.05 MIU/L-ACNC: 1.56 UIU/ML (ref 0.27–4.2)
UROBILINOGEN UR STRIP-ACNC: NORMAL EU/DL (ref 0–1)
VIT B12 SERPL-MCNC: 350 PG/ML (ref 232–1245)
WBC OTHER # BLD: 10.1 K/UL (ref 3.5–11.3)

## 2025-03-27 PROCEDURE — 93005 ELECTROCARDIOGRAM TRACING: CPT

## 2025-03-27 PROCEDURE — 6370000000 HC RX 637 (ALT 250 FOR IP): Performed by: INTERNAL MEDICINE

## 2025-03-27 PROCEDURE — 85379 FIBRIN DEGRADATION QUANT: CPT

## 2025-03-27 PROCEDURE — 84443 ASSAY THYROID STIM HORMONE: CPT

## 2025-03-27 PROCEDURE — 4A10X4Z MONITORING OF CENTRAL NERVOUS ELECTRICAL ACTIVITY, EXTERNAL APPROACH: ICD-10-PCS | Performed by: PSYCHIATRY & NEUROLOGY

## 2025-03-27 PROCEDURE — 81003 URINALYSIS AUTO W/O SCOPE: CPT

## 2025-03-27 PROCEDURE — 80307 DRUG TEST PRSMV CHEM ANLYZR: CPT

## 2025-03-27 PROCEDURE — 6360000004 HC RX CONTRAST MEDICATION

## 2025-03-27 PROCEDURE — 80053 COMPREHEN METABOLIC PANEL: CPT

## 2025-03-27 PROCEDURE — 70450 CT HEAD/BRAIN W/O DYE: CPT

## 2025-03-27 PROCEDURE — 96375 TX/PRO/DX INJ NEW DRUG ADDON: CPT

## 2025-03-27 PROCEDURE — 6360000002 HC RX W HCPCS: Performed by: NURSE PRACTITIONER

## 2025-03-27 PROCEDURE — 0202U NFCT DS 22 TRGT SARS-COV-2: CPT

## 2025-03-27 PROCEDURE — 83880 ASSAY OF NATRIURETIC PEPTIDE: CPT

## 2025-03-27 PROCEDURE — 6370000000 HC RX 637 (ALT 250 FOR IP): Performed by: NURSE PRACTITIONER

## 2025-03-27 PROCEDURE — 84484 ASSAY OF TROPONIN QUANT: CPT

## 2025-03-27 PROCEDURE — 6370000000 HC RX 637 (ALT 250 FOR IP): Performed by: PSYCHIATRY & NEUROLOGY

## 2025-03-27 PROCEDURE — 2060000000 HC ICU INTERMEDIATE R&B

## 2025-03-27 PROCEDURE — 99222 1ST HOSP IP/OBS MODERATE 55: CPT | Performed by: PSYCHIATRY & NEUROLOGY

## 2025-03-27 PROCEDURE — 80179 DRUG ASSAY SALICYLATE: CPT

## 2025-03-27 PROCEDURE — 85025 COMPLETE CBC W/AUTO DIFF WBC: CPT

## 2025-03-27 PROCEDURE — 2580000003 HC RX 258

## 2025-03-27 PROCEDURE — 87040 BLOOD CULTURE FOR BACTERIA: CPT

## 2025-03-27 PROCEDURE — 95816 EEG AWAKE AND DROWSY: CPT

## 2025-03-27 PROCEDURE — 70551 MRI BRAIN STEM W/O DYE: CPT

## 2025-03-27 PROCEDURE — G0480 DRUG TEST DEF 1-7 CLASSES: HCPCS

## 2025-03-27 PROCEDURE — 94640 AIRWAY INHALATION TREATMENT: CPT

## 2025-03-27 PROCEDURE — 82140 ASSAY OF AMMONIA: CPT

## 2025-03-27 PROCEDURE — 72125 CT NECK SPINE W/O DYE: CPT

## 2025-03-27 PROCEDURE — 6370000000 HC RX 637 (ALT 250 FOR IP)

## 2025-03-27 PROCEDURE — 99285 EMERGENCY DEPT VISIT HI MDM: CPT

## 2025-03-27 PROCEDURE — 84145 PROCALCITONIN (PCT): CPT

## 2025-03-27 PROCEDURE — 6360000002 HC RX W HCPCS

## 2025-03-27 PROCEDURE — 83605 ASSAY OF LACTIC ACID: CPT

## 2025-03-27 PROCEDURE — 80143 DRUG ASSAY ACETAMINOPHEN: CPT

## 2025-03-27 PROCEDURE — 96374 THER/PROPH/DIAG INJ IV PUSH: CPT

## 2025-03-27 PROCEDURE — 71260 CT THORAX DX C+: CPT

## 2025-03-27 PROCEDURE — 2500000003 HC RX 250 WO HCPCS: Performed by: NURSE PRACTITIONER

## 2025-03-27 PROCEDURE — 94761 N-INVAS EAR/PLS OXIMETRY MLT: CPT

## 2025-03-27 PROCEDURE — 82746 ASSAY OF FOLIC ACID SERUM: CPT

## 2025-03-27 PROCEDURE — 82607 VITAMIN B-12: CPT

## 2025-03-27 PROCEDURE — 99223 1ST HOSP IP/OBS HIGH 75: CPT | Performed by: INTERNAL MEDICINE

## 2025-03-27 PROCEDURE — 71045 X-RAY EXAM CHEST 1 VIEW: CPT

## 2025-03-27 PROCEDURE — 2500000003 HC RX 250 WO HCPCS

## 2025-03-27 RX ORDER — SODIUM CHLORIDE 0.9 % (FLUSH) 0.9 %
10 SYRINGE (ML) INJECTION PRN
Status: DISCONTINUED | OUTPATIENT
Start: 2025-03-27 | End: 2025-04-03 | Stop reason: HOSPADM

## 2025-03-27 RX ORDER — 0.9 % SODIUM CHLORIDE 0.9 %
1000 INTRAVENOUS SOLUTION INTRAVENOUS ONCE
Status: COMPLETED | OUTPATIENT
Start: 2025-03-27 | End: 2025-03-27

## 2025-03-27 RX ORDER — ALBUTEROL SULFATE 0.83 MG/ML
2.5 SOLUTION RESPIRATORY (INHALATION)
Status: DISCONTINUED | OUTPATIENT
Start: 2025-03-27 | End: 2025-04-03 | Stop reason: HOSPADM

## 2025-03-27 RX ORDER — TRAMADOL HYDROCHLORIDE 50 MG/1
50 TABLET ORAL EVERY 4 HOURS PRN
Status: DISCONTINUED | OUTPATIENT
Start: 2025-03-27 | End: 2025-03-31

## 2025-03-27 RX ORDER — ACETAMINOPHEN 650 MG/1
650 SUPPOSITORY RECTAL EVERY 6 HOURS PRN
Status: DISCONTINUED | OUTPATIENT
Start: 2025-03-27 | End: 2025-04-03 | Stop reason: HOSPADM

## 2025-03-27 RX ORDER — ONDANSETRON 4 MG/1
4 TABLET, ORALLY DISINTEGRATING ORAL EVERY 8 HOURS PRN
Status: DISCONTINUED | OUTPATIENT
Start: 2025-03-27 | End: 2025-04-03 | Stop reason: HOSPADM

## 2025-03-27 RX ORDER — SODIUM CHLORIDE 0.9 % (FLUSH) 0.9 %
5-40 SYRINGE (ML) INJECTION EVERY 12 HOURS SCHEDULED
Status: DISCONTINUED | OUTPATIENT
Start: 2025-03-27 | End: 2025-04-03 | Stop reason: HOSPADM

## 2025-03-27 RX ORDER — IOPAMIDOL 755 MG/ML
75 INJECTION, SOLUTION INTRAVASCULAR
Status: COMPLETED | OUTPATIENT
Start: 2025-03-27 | End: 2025-03-27

## 2025-03-27 RX ORDER — ONDANSETRON 2 MG/ML
4 INJECTION INTRAMUSCULAR; INTRAVENOUS EVERY 6 HOURS PRN
Status: DISCONTINUED | OUTPATIENT
Start: 2025-03-27 | End: 2025-04-03 | Stop reason: HOSPADM

## 2025-03-27 RX ORDER — LACOSAMIDE 100 MG/1
100 TABLET ORAL 2 TIMES DAILY
Status: DISCONTINUED | OUTPATIENT
Start: 2025-03-27 | End: 2025-04-01 | Stop reason: SDUPTHER

## 2025-03-27 RX ORDER — SODIUM CHLORIDE 9 MG/ML
INJECTION, SOLUTION INTRAVENOUS PRN
Status: DISCONTINUED | OUTPATIENT
Start: 2025-03-27 | End: 2025-04-03 | Stop reason: HOSPADM

## 2025-03-27 RX ORDER — LANOLIN ALCOHOL/MO/W.PET/CERES
100 CREAM (GRAM) TOPICAL DAILY
Status: DISCONTINUED | OUTPATIENT
Start: 2025-03-27 | End: 2025-04-03 | Stop reason: HOSPADM

## 2025-03-27 RX ORDER — ACETAMINOPHEN 500 MG
1000 TABLET ORAL ONCE
Status: COMPLETED | OUTPATIENT
Start: 2025-03-27 | End: 2025-03-27

## 2025-03-27 RX ORDER — IPRATROPIUM BROMIDE AND ALBUTEROL SULFATE 2.5; .5 MG/3ML; MG/3ML
1 SOLUTION RESPIRATORY (INHALATION)
Status: DISCONTINUED | OUTPATIENT
Start: 2025-03-27 | End: 2025-04-03 | Stop reason: HOSPADM

## 2025-03-27 RX ORDER — ACETAMINOPHEN 325 MG/1
650 TABLET ORAL EVERY 6 HOURS PRN
Status: DISCONTINUED | OUTPATIENT
Start: 2025-03-27 | End: 2025-04-03 | Stop reason: HOSPADM

## 2025-03-27 RX ORDER — IPRATROPIUM BROMIDE AND ALBUTEROL SULFATE 2.5; .5 MG/3ML; MG/3ML
1 SOLUTION RESPIRATORY (INHALATION)
Status: DISCONTINUED | OUTPATIENT
Start: 2025-03-27 | End: 2025-03-27

## 2025-03-27 RX ORDER — ENOXAPARIN SODIUM 100 MG/ML
40 INJECTION SUBCUTANEOUS DAILY
Status: DISCONTINUED | OUTPATIENT
Start: 2025-03-27 | End: 2025-03-28

## 2025-03-27 RX ADMIN — AZITHROMYCIN MONOHYDRATE 500 MG: 500 INJECTION, POWDER, LYOPHILIZED, FOR SOLUTION INTRAVENOUS at 06:06

## 2025-03-27 RX ADMIN — LACOSAMIDE 100 MG: 100 TABLET, FILM COATED ORAL at 21:39

## 2025-03-27 RX ADMIN — IPRATROPIUM BROMIDE AND ALBUTEROL SULFATE 1 DOSE: .5; 2.5 SOLUTION RESPIRATORY (INHALATION) at 19:53

## 2025-03-27 RX ADMIN — Medication 100 MG: at 10:17

## 2025-03-27 RX ADMIN — WATER 1000 MG: 1 INJECTION INTRAMUSCULAR; INTRAVENOUS; SUBCUTANEOUS at 06:02

## 2025-03-27 RX ADMIN — IPRATROPIUM BROMIDE AND ALBUTEROL SULFATE 1 DOSE: .5; 2.5 SOLUTION RESPIRATORY (INHALATION) at 10:45

## 2025-03-27 RX ADMIN — ACETAMINOPHEN 1000 MG: 500 TABLET ORAL at 04:43

## 2025-03-27 RX ADMIN — SODIUM CHLORIDE, PRESERVATIVE FREE 10 ML: 5 INJECTION INTRAVENOUS at 21:39

## 2025-03-27 RX ADMIN — TRAMADOL HYDROCHLORIDE 50 MG: 50 TABLET, COATED ORAL at 22:13

## 2025-03-27 RX ADMIN — SODIUM CHLORIDE 1000 ML: 9 INJECTION, SOLUTION INTRAVENOUS at 04:00

## 2025-03-27 RX ADMIN — SODIUM CHLORIDE, PRESERVATIVE FREE 10 ML: 5 INJECTION INTRAVENOUS at 09:56

## 2025-03-27 RX ADMIN — ENOXAPARIN SODIUM 40 MG: 100 INJECTION SUBCUTANEOUS at 09:56

## 2025-03-27 RX ADMIN — IOPAMIDOL 75 ML: 755 INJECTION, SOLUTION INTRAVENOUS at 04:57

## 2025-03-27 ASSESSMENT — PAIN SCALES - WONG BAKER
WONGBAKER_NUMERICALRESPONSE: NO HURT
WONGBAKER_NUMERICALRESPONSE: HURTS A LITTLE BIT

## 2025-03-27 ASSESSMENT — PAIN SCALES - GENERAL
PAINLEVEL_OUTOF10: 8
PAINLEVEL_OUTOF10: 10

## 2025-03-27 ASSESSMENT — PAIN DESCRIPTION - DESCRIPTORS: DESCRIPTORS: ACHING;DISCOMFORT

## 2025-03-27 ASSESSMENT — PAIN DESCRIPTION - LOCATION: LOCATION: GENERALIZED

## 2025-03-27 ASSESSMENT — PAIN DESCRIPTION - ORIENTATION: ORIENTATION: MID;RIGHT;LEFT

## 2025-03-27 ASSESSMENT — LIFESTYLE VARIABLES
HOW OFTEN DO YOU HAVE A DRINK CONTAINING ALCOHOL: NEVER
HOW MANY STANDARD DRINKS CONTAINING ALCOHOL DO YOU HAVE ON A TYPICAL DAY: PATIENT DOES NOT DRINK

## 2025-03-27 ASSESSMENT — PAIN - FUNCTIONAL ASSESSMENT: PAIN_FUNCTIONAL_ASSESSMENT: 0-10

## 2025-03-27 NOTE — ED NOTES
ED to inpatient nurses report      Chief Complaint:  Chief Complaint   Patient presents with    Altered Mental Status     Present to ED from: hot via EMS    MOA:     LOC: alert and orientated to name and place  Mobility: Independent  Oxygen Baseline: RA    Current needs required: n/a   Pending ED orders: n/a  Present condition: resting on cot, stable     Why did the patient come to the ED? Found wandering at [facility], AMS  Pt is being followed by [facility] hospice care. LPN showed up in ED stating they have been following with patient trying to find him a place to stay. Pt had recent admission at [facility] for alcohol detox. Pt states he does not remember being there or being at the hot. Pt only remember the month, president, and the hospital he is at because he was told.   What is the plan? Admit, ams/pneumonia   Any procedures or intervention occur? EEG, brain MRI, EKG, labs   Any safety concerns??    Mental Status:  Level of Consciousness: Alert (0)    Psych Assessment:   Psychosocial  Psychosocial (WDL): Within Defined Limits  Vital signs   Vitals:    03/27/25 1556 03/27/25 1700 03/27/25 1715 03/27/25 1724   BP:  (!) 160/56 (!) 158/86    Pulse: (!) 101 (!) 114 100 (!) 107   Resp: 24 16 18 12   Temp:       TempSrc:       SpO2: 94% 94% 95% 98%   Weight:            Vitals:  Patient Vitals for the past 24 hrs:   BP Temp Temp src Pulse Resp SpO2 Weight   03/27/25 1724 -- -- -- (!) 107 12 98 % --   03/27/25 1715 (!) 158/86 -- -- 100 18 95 % --   03/27/25 1700 (!) 160/56 -- -- (!) 114 16 94 % --   03/27/25 1556 -- -- -- (!) 101 24 94 % --   03/27/25 1427 -- -- -- 98 14 94 % --   03/27/25 1045 -- -- -- 98 15 100 % --   03/27/25 0953 -- -- -- 98 22 96 % --   03/27/25 0930 131/63 -- -- 96 18 97 % --   03/27/25 0740 -- 98.8 °F (37.1 °C) Oral -- -- -- --   03/27/25 0728 -- -- -- (!) 106 14 97 % --   03/27/25 0715 134/63 -- -- (!) 106 21 98 % --   03/27/25 0615 (!) 157/74 -- -- (!) 108 20 98 % --   03/27/25 0610 -- 100.4 °F (38  albuterol (PROVENTIL) (2.5 MG/3ML) 0.083% nebulizer solution 2.5 mg     Initiate RT Bronchodilator Protocol:   Yes - Inpatient Protocol    DISCONTD: ipratropium 0.5 mg-albuterol 2.5 mg (DUONEB) nebulizer solution 1 Dose     Initiate RT Bronchodilator Protocol:   Yes - Inpatient Protocol    AND Linked Order Group     cefTRIAXone (ROCEPHIN) 1,000 mg in sterile water 10 mL IV syringe      Antimicrobial Indications:   Pneumonia (CAP)      CAP duration of therapy:   7 days     azithromycin (ZITHROMAX) 500 mg in sodium chloride 0.9 % 250 mL IVPB ([id number])      Antimicrobial Indications:   Pneumonia (CAP)      CAP duration of therapy:   Other      Other CAP Duration:   3 days    thiamine tablet 100 mg    ipratropium 0.5 mg-albuterol 2.5 mg (DUONEB) nebulizer solution 1 Dose     Initiate RT Bronchodilator Protocol:   Yes - Inpatient Protocol    lacosamide (VIMPAT) tablet 100 mg       SURGICAL HISTORY       Past Surgical History:   Procedure Laterality Date    BACK SURGERY      x2, Dr. Ángel Regalado     BACK SURGERY      L3-4 decompression    CARDIAC ELECTROPHYSIOLOGY STUDY AND ABLATION      CARPAL TUNNEL RELEASE Right     JOINT REPLACEMENT Right 06/2018    NERVE BLOCK  07/23/2013    dduramorph  celestone 9mg    NERVE BLOCK N/A 03/21/2013    lumbar RASHMI    OTHER SURGICAL HISTORY  04/25/2016    Lumbar RASHMI    OTHER SURGICAL HISTORY  02/09/2018    lumbosacral myelogram    PACEMAKER PLACEMENT  09/2015    with defib    ROTATOR CUFF REPAIR Right     TONSILLECTOMY      HAS NO TONSILS, NEVER HAD SURGERY       PAST MEDICAL HISTORY       Past Medical History:   Diagnosis Date    Adjustment disorder     AF (atrial fibrillation) ([facility])     AICD (automatic cardioverter/defibrillator) present 09/2015    PM    Alcohol dependence ([facility])     CAD (coronary artery disease)     Dr. Bolton cardiologist    Cardiomyopathy ([facility])     CHF (congestive heart failure) ([facility])     COPD (chronic obstructive pulmonary disease) ([facility])     DDD (degenerative disc

## 2025-03-27 NOTE — CARE COORDINATION
Case Management Assessment  Initial Evaluation    Date/Time of Evaluation: 3/27/2025 10:03 AM  Assessment Completed by: YADI YOUNG RN    If patient is discharged prior to next notation, then this note serves as note for discharge by case management.    Patient Name: Mahesh Brownlee                   YOB: 1960  Diagnosis: Pneumonia due to infectious organism [J18.9]                   Date / Time: 3/27/2025  3:37 AM    Patient Admission Status: Inpatient   Readmission Risk (Low < 19, Mod (19-27), High > 27): Readmission Risk Score: 24    Current PCP: Ira Roberts, APRN - CNP  PCP verified by CM? No    Chart Reviewed: Yes      History Provided by: Patient, Medical Record  Patient Orientation: Alert and Oriented (states some confusion)    Patient Cognition: Other (see comment) (states some confusion)    Hospitalization in the last 30 days (Readmission):  No    If yes, Readmission Assessment in CM Navigator will be completed.    Advance Directives:      Code Status: Full Code   Patient's Primary Decision Maker is:      Primary Decision Maker: Brianne Whitley  Child - [phone]    Secondary Decision Maker: rBad Whitley - Child - [phone]    Supplemental (Other) Decision Maker: Michael Whitley  Child - [phone]    Discharge Planning:    Patient lives with:   Type of Home:    Primary Care Giver: Self  Patient Support Systems include: Family Members   Current Financial resources:    Current community resources:    Current services prior to admission:              Current DME:              Type of Home Care services:       ADLS  Prior functional level: Independent in ADLs/IADLs  Current functional level: Independent in ADLs/IADLs    PT AM-PAC:   /24  OT AM-PAC:   /24    Family can provide assistance at DC: No  Would you like Case Management to discuss the discharge plan with any other family members/significant others, and if so, who? No  Plans to Return to Present Housing: Unknown at

## 2025-03-27 NOTE — ED PROVIDER NOTES
[facility]     Emergency Department     Faculty Attestation    I performed a history and physical examination of the patient and discussed management with the resident. I have reviewed and agree with the resident’s findings including all diagnostic interpretations, and treatment plans as written. Any areas of disagreement are noted on the chart. I was personally present for the key portions of any procedures. I have documented in the chart those procedures where I was not present during the key portions. I have reviewed the emergency nurses triage note. I agree with the chief complaint, past medical history, past surgical history, allergies, medications, social and family history as documented unless otherwise noted below. Documentation of the HPI, Physical Exam and Medical Decision Making performed by stephanibjames is based on my personal performance of the HPI, PE and MDM. For Physician Assistant/ Nurse Practitioner cases/documentation I have personally evaluated this patient and have completed at least one if not all key elements of the E/M (history, physical exam, and MDM). Additional findings are as noted.    Note Started: 3:48 AM EDT     63 yo M altered mentation, found wandering at hotel,   Poor historian,   PE tachy, febrile, confused, oriented to person, SPENSER, no cervical tenderness, crepitus, or deformity, radial pulse=, d pedal pulse=,   3+ ankle edema -disheveled  -abx. Pneumonia, no pe, ct head -, admit,   EKG Interpretation    Interpreted by me  Sinus tachy, hr 115, no ischemia, normal axis, qtc 475    CRITICAL CARE: There was a high probability of clinically significant/life threatening deterioration in this patient's condition which required my urgent intervention.  Total critical care time was 5 minutes.  This excludes any time for separately reportable procedures.       Serjio Atkins DO  03/27/25 0352       Phil
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
## 2025-03-27 NOTE — H&P
[facility]  Office: [phone]  Louie Rossi DO, Noah Mayers DO, Sergio Ordaz DO, Saul March DO, Willy Mc MD, Jaci Berger MD, Sivakumar Freeman MD, Mile Johnston MD,  Aric Granda MD, Garry Shepherd MD, Kelsy Wahl MD,  Evelyn Petit DO, Terry Kern MD, Addison Sol MD, Jaspreet Rossi DO, Kassy Pedro MD,  Emeka Leon DO, Krystyna Howell MD, Kitty Hawk MD, Keeley Santa MD,  Chris Ashley MD, Rick Del Rio MD, Howard Brown MD, Gemma Ordaz MD, Mateo Dey MD, Pardeep Owen MD, Gómez Boykin DO, Avi Mcneil MD, Evelyn Parker MD, Mohsin Reza, MD, Shirley Waterhouse, CNP,  Magdalena Oviedo, CNP, Gómez Gomez, CNP,  Fely Garcia, DNP, Lorena Garcia, CNP, Mariana Jordan, CNP, Dixie Garcia, CNP, Shea Chirinos, CNP, Suzi Tavares PA-C, Daija Mejia, CNP, Vincent Armendariz, CNP,  Jie Leos, CNP, Felicita Nobles, CNP, Asuncion oHllins, CNP,  Penny Lopez, CNS, Brianne Arnett, CNP, Miriam Martins, CNP,   Zuly Centeno, CNP         [facility]   IN-PATIENT SERVICE   [facility]    HISTORY AND PHYSICAL EXAMINATION            Date:   3/27/2025  Patient name:  Mahesh Brownlee  Date of admission:  3/27/2025  3:37 AM  MRN:   [id number]  Account:  [id number]  YOB: 1960  PCP:    Ira Roberts APRN - CNP  Room:   10/10  Code Status:    Full Code    Chief Complaint:     Chief Complaint   Patient presents with    Altered Mental Status       History Obtained From:     family member - son, electronic medical record, reason patient could not give history:  altered mental status    History of Present Illness:     Mahesh Brownlee is a 64 y.o. Non- / non  male who presents with Altered Mental Status   and is admitted to the hospital for the management of Pneumonia of left upper lobe due to infectious organism.    This 64 yom presents to the hospital with ams.  He has no recollection of how he got here,  PACEMAKER PLACEMENT  09/2015    with defib    ROTATOR CUFF REPAIR Right     TONSILLECTOMY      HAS NO TONSILS, NEVER HAD SURGERY        Medications Prior to Admission:     Prior to Admission medications    Medication Sig Start Date End Date Taking? Authorizing Provider   DULoxetine (CYMBALTA) 60 MG extended release capsule Take 1 capsule by mouth daily  Patient not taking: Reported on 2/24/2025 1/6/25   Ira Roberts, APRN - CNP   spironolactone (ALDACTONE) 25 MG tablet Take 1 tablet by mouth daily  Patient not taking: Reported on 2/24/2025 12/27/24   Ira Roberts, APRN - CNP   sacubitril-valsartan (ENTRESTO) 49-51 MG per tablet Take 1 tablet by mouth 2 times daily  Patient not taking: Reported on 2/24/2025 12/27/24   Ira Roberts, APRN - CNP   rivaroxaban (XARELTO) 20 MG TABS tablet Take 1 tablet by mouth daily  Patient not taking: Reported on 2/24/2025 12/27/24   Ira Roberts, APRN - CNP   pantoprazole (PROTONIX) 40 MG tablet Take 1 tablet by mouth 2 times daily (before meals)  Patient not taking: Reported on 2/24/2025 12/27/24   Ira Roberts, APRN - CNP   atorvastatin (LIPITOR) 10 MG tablet Take 1 tablet by mouth nightly  Patient not taking: Reported on 2/24/2025 12/27/24   Ira Roberts, APRN - CNP   lacosamide (VIMPAT) 100 MG TABS tablet Take 1 tablet by mouth 2 times daily for 30 days. Max Daily Amount: 200 mg  Patient not taking: Reported on 1/6/2025 12/26/24 1/25/25  Howard Brown MD   metoprolol succinate (TOPROL XL) 25 MG extended release tablet Take 1 tablet by mouth daily  Patient not taking: Reported on 2/24/2025 12/27/24   Howard Brown MD   nicotine (NICODERM CQ) 7 MG/24HR Place 1 patch onto the skin daily  Patient not taking: Reported on 2/24/2025 11/19/24   Sunny Peters, DO   thiamine 100 MG tablet Take 1 tablet by mouth daily  Patient not taking: Reported on 2/24/2025 11/19/24   Sunny Peters DO   donepezil (ARICEPT) 5 MG tablet Take 1 tablet by mouth

## 2025-03-27 NOTE — RT PROTOCOL NOTE
RT Nebulizer Bronchodilator Protocol Note    There is a bronchodilator order in the chart from a provider indicating to follow the RT Bronchodilator Protocol and there is an “Initiate RT Bronchodilator Protocol” order as well (see protocol at bottom of note).    CXR Findings:  XR CHEST PORTABLE  Result Date: 3/27/2025  Vascular congestion and ground-glass opacities, left greater than right, appear new. Findings are nonspecific and could be due to pulmonary edema or developing infiltrates.       The findings from the last RT Protocol Assessment were as follows:  Smoking: Chronic pulmonary disease  Respiratory Pattern: Regular pattern and RR 12-20 bpm  Breath Sounds: Inspiratory and expiratory or bilateral wheezing and/or rhonchi  Cough: Strong, productive  Indication for Bronchodilator Therapy: Decreased or absent breath sounds  Bronchodilator Assessment Score: 9    Aerosolized bronchodilator medication orders have been revised according to the RT Nebulizer Bronchodilator Protocol below.    Respiratory Therapist to perform RT Therapy Protocol Assessment initially then follow the protocol.  Repeat RT Therapy Protocol Assessment PRN for score 0-3 or on second treatment, BID, and PRN for scores above 3.    No Indications - adjust the frequency to every 6 hours PRN wheezing or bronchospasm, if no treatments needed after 48 hours then discontinue using Per Protocol order mode.     If indication present, adjust the RT bronchodilator orders based on the Bronchodilator Assessment Score as indicated below.  If a patient is on this medication at home then do not decrease Frequency below that used at home.    0-3 - enter or revise RT bronchodilator order(s) to equivalent RT Bronchodilator order with Frequency of every 4 hours PRN for wheezing or increased work of breathing using Per Protocol order mode.       4-6 - enter or revise RT Bronchodilator order(s) to two equivalent RT bronchodilator orders with one order with BID

## 2025-03-27 NOTE — ED NOTES
Pt resting on cot, RR even and NL, alert and oriented to place and self. No distress noted. Call light within reach. Will continue with plan of care

## 2025-03-27 NOTE — ED PROVIDER NOTES
[facility] EMERGENCY DEPARTMENT  Emergency Department  Emergency Medicine Sign-out   Care of Mahesh Brownlee was assumed from Dr. Mayfield and is being seen for Altered Mental Status  .  The patient's initial evaluation and plan have been discussed with the prior provider who initially evaluated the patient.     EMERGENCY DEPARTMENT COURSE / MEDICAL DECISION MAKING:       MEDICATIONS GIVEN:  Orders Placed This Encounter   Medications    sodium chloride 0.9 % bolus 1,000 mL    acetaminophen (TYLENOL) tablet 1,000 mg    iopamidol (ISOVUE-370) 76 % injection 75 mL    cefTRIAXone (ROCEPHIN) 1,000 mg in sterile water 10 mL IV syringe     Antimicrobial Indications:   Pneumonia (CAP)    azithromycin (ZITHROMAX) 500 mg in sodium chloride 0.9 % 250 mL IVPB ([id number])     Antimicrobial Indications:   Pneumonia (CAP)       LABS / RADIOLOGY:     Labs Reviewed   CBC WITH AUTO DIFFERENTIAL - Abnormal; Notable for the following components:       Result Value    RBC 3.65 (*)     Hemoglobin 11.2 (*)     Hematocrit 34.4 (*)     Neutrophils % 81 (*)     Lymphocytes % 11 (*)     Eosinophils % 0 (*)     Neutrophils Absolute 8.12 (*)     All other components within normal limits   COMPREHENSIVE METABOLIC PANEL - Abnormal; Notable for the following components:    Potassium 3.6 (*)     All other components within normal limits   D-DIMER, QUANTITATIVE - Abnormal; Notable for the following components:    D-Dimer, Quant 2.23 (*)     All other components within normal limits   URINE DRUG SCREEN - Abnormal; Notable for the following components:    Benzodiazepine Screen, Urine POSITIVE (*)     Oxycodone Screen, Ur POSITIVE (*)     All other components within normal limits   ACETAMINOPHEN LEVEL - Abnormal; Notable for the following components:    Acetaminophen Level <5 (*)     All other components within normal limits   TROPONIN - Abnormal; Notable for the following components:    Troponin, High Sensitivity 50 (*)     All other
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
## 2025-03-27 NOTE — ED NOTES
Order placed for ICD interrogation, chart shows pt has Abbott ICD, [place] ED does not have Abbott ICD interrogator. Per Dr. Martins, it is not necessary to call in [place] to interrogate ICD.

## 2025-03-28 ENCOUNTER — APPOINTMENT (OUTPATIENT)
Dept: GENERAL RADIOLOGY | Age: 65
DRG: 193 | End: 2025-03-28
Payer: MEDICARE

## 2025-03-28 LAB
ALBUMIN SERPL-MCNC: 3.4 G/DL (ref 3.5–5.2)
ALBUMIN/GLOB SERPL: 1 {RATIO} (ref 1–2.5)
ALP SERPL-CCNC: 72 U/L (ref 40–129)
ALT SERPL-CCNC: 15 U/L (ref 10–50)
ANION GAP SERPL CALCULATED.3IONS-SCNC: 14 MMOL/L (ref 9–16)
AST SERPL-CCNC: 28 U/L (ref 10–50)
BASOPHILS # BLD: 0.05 K/UL (ref 0–0.2)
BASOPHILS NFR BLD: 1 % (ref 0–2)
BILIRUB SERPL-MCNC: 0.3 MG/DL (ref 0–1.2)
BUN SERPL-MCNC: 6 MG/DL (ref 8–23)
CALCIUM SERPL-MCNC: 9.2 MG/DL (ref 8.6–10.4)
CHLORIDE SERPL-SCNC: 106 MMOL/L (ref 98–107)
CO2 SERPL-SCNC: 19 MMOL/L (ref 20–31)
CREAT SERPL-MCNC: 0.6 MG/DL (ref 0.7–1.2)
EKG ATRIAL RATE: 115 BPM
EKG P AXIS: 86 DEGREES
EKG P-R INTERVAL: 154 MS
EKG Q-T INTERVAL: 344 MS
EKG QRS DURATION: 96 MS
EKG QTC CALCULATION (BAZETT): 475 MS
EKG R AXIS: 42 DEGREES
EKG T AXIS: 44 DEGREES
EKG VENTRICULAR RATE: 115 BPM
EOSINOPHIL # BLD: 0.14 K/UL (ref 0–0.44)
EOSINOPHILS RELATIVE PERCENT: 2 % (ref 1–4)
ERYTHROCYTE [DISTWIDTH] IN BLOOD BY AUTOMATED COUNT: 12.5 % (ref 11.8–14.4)
GFR, ESTIMATED: >90 ML/MIN/1.73M2
GLUCOSE SERPL-MCNC: 92 MG/DL (ref 74–99)
HCT VFR BLD AUTO: 37.7 % (ref 40.7–50.3)
HGB BLD-MCNC: 11.6 G/DL (ref 13–17)
IMM GRANULOCYTES # BLD AUTO: 0.03 K/UL (ref 0–0.3)
IMM GRANULOCYTES NFR BLD: 0 %
INR PPP: 1
LYMPHOCYTES NFR BLD: 1.67 K/UL (ref 1.1–3.7)
LYMPHOCYTES RELATIVE PERCENT: 23 % (ref 24–43)
MCH RBC QN AUTO: 30.2 PG (ref 25.2–33.5)
MCHC RBC AUTO-ENTMCNC: 30.8 G/DL (ref 28.4–34.8)
MCV RBC AUTO: 98.2 FL (ref 82.6–102.9)
MONOCYTES NFR BLD: 0.8 K/UL (ref 0.1–1.2)
MONOCYTES NFR BLD: 11 % (ref 3–12)
NEUTROPHILS NFR BLD: 63 % (ref 36–65)
NEUTS SEG NFR BLD: 4.44 K/UL (ref 1.5–8.1)
NRBC BLD-RTO: 0 PER 100 WBC
PLATELET # BLD AUTO: 230 K/UL (ref 138–453)
PMV BLD AUTO: 9.9 FL (ref 8.1–13.5)
POTASSIUM SERPL-SCNC: 3 MMOL/L (ref 3.7–5.3)
PROT SERPL-MCNC: 6.7 G/DL (ref 6.6–8.7)
PROTHROMBIN TIME: 13.9 SEC (ref 11.7–14.9)
RBC # BLD AUTO: 3.84 M/UL (ref 4.21–5.77)
SODIUM SERPL-SCNC: 139 MMOL/L (ref 136–145)
WBC OTHER # BLD: 7.1 K/UL (ref 3.5–11.3)

## 2025-03-28 PROCEDURE — 2500000003 HC RX 250 WO HCPCS: Performed by: NURSE PRACTITIONER

## 2025-03-28 PROCEDURE — 97165 OT EVAL LOW COMPLEX 30 MIN: CPT

## 2025-03-28 PROCEDURE — 6360000002 HC RX W HCPCS: Performed by: NURSE PRACTITIONER

## 2025-03-28 PROCEDURE — 6370000000 HC RX 637 (ALT 250 FOR IP): Performed by: PSYCHIATRY & NEUROLOGY

## 2025-03-28 PROCEDURE — 99232 SBSQ HOSP IP/OBS MODERATE 35: CPT | Performed by: HOSPITALIST

## 2025-03-28 PROCEDURE — 99232 SBSQ HOSP IP/OBS MODERATE 35: CPT | Performed by: PSYCHIATRY & NEUROLOGY

## 2025-03-28 PROCEDURE — 85025 COMPLETE CBC W/AUTO DIFF WBC: CPT

## 2025-03-28 PROCEDURE — 6370000000 HC RX 637 (ALT 250 FOR IP): Performed by: NURSE PRACTITIONER

## 2025-03-28 PROCEDURE — 80053 COMPREHEN METABOLIC PANEL: CPT

## 2025-03-28 PROCEDURE — 2060000000 HC ICU INTERMEDIATE R&B

## 2025-03-28 PROCEDURE — 97163 PT EVAL HIGH COMPLEX 45 MIN: CPT

## 2025-03-28 PROCEDURE — 2580000003 HC RX 258: Performed by: NURSE PRACTITIONER

## 2025-03-28 PROCEDURE — 94761 N-INVAS EAR/PLS OXIMETRY MLT: CPT

## 2025-03-28 PROCEDURE — 6360000002 HC RX W HCPCS: Performed by: HOSPITALIST

## 2025-03-28 PROCEDURE — 85610 PROTHROMBIN TIME: CPT

## 2025-03-28 PROCEDURE — 6370000000 HC RX 637 (ALT 250 FOR IP): Performed by: INTERNAL MEDICINE

## 2025-03-28 PROCEDURE — 36415 COLL VENOUS BLD VENIPUNCTURE: CPT

## 2025-03-28 PROCEDURE — 97530 THERAPEUTIC ACTIVITIES: CPT

## 2025-03-28 PROCEDURE — 6370000000 HC RX 637 (ALT 250 FOR IP): Performed by: HOSPITALIST

## 2025-03-28 PROCEDURE — 97535 SELF CARE MNGMENT TRAINING: CPT

## 2025-03-28 PROCEDURE — 71046 X-RAY EXAM CHEST 2 VIEWS: CPT

## 2025-03-28 PROCEDURE — 95816 EEG AWAKE AND DROWSY: CPT | Performed by: PSYCHIATRY & NEUROLOGY

## 2025-03-28 PROCEDURE — 93010 ELECTROCARDIOGRAM REPORT: CPT | Performed by: INTERNAL MEDICINE

## 2025-03-28 PROCEDURE — 94640 AIRWAY INHALATION TREATMENT: CPT

## 2025-03-28 RX ORDER — NICOTINE 21 MG/24HR
1 PATCH, TRANSDERMAL 24 HOURS TRANSDERMAL DAILY
Status: DISCONTINUED | OUTPATIENT
Start: 2025-03-28 | End: 2025-04-03 | Stop reason: HOSPADM

## 2025-03-28 RX ORDER — ENOXAPARIN SODIUM 100 MG/ML
30 INJECTION SUBCUTANEOUS 2 TIMES DAILY
Status: DISCONTINUED | OUTPATIENT
Start: 2025-03-28 | End: 2025-03-29

## 2025-03-28 RX ADMIN — LACOSAMIDE 100 MG: 100 TABLET, FILM COATED ORAL at 08:45

## 2025-03-28 RX ADMIN — AZITHROMYCIN MONOHYDRATE 500 MG: 500 INJECTION, POWDER, LYOPHILIZED, FOR SOLUTION INTRAVENOUS at 05:20

## 2025-03-28 RX ADMIN — WATER 1000 MG: 1 INJECTION INTRAMUSCULAR; INTRAVENOUS; SUBCUTANEOUS at 05:21

## 2025-03-28 RX ADMIN — LACOSAMIDE 100 MG: 100 TABLET, FILM COATED ORAL at 20:54

## 2025-03-28 RX ADMIN — ACETAMINOPHEN 650 MG: 325 TABLET ORAL at 16:16

## 2025-03-28 RX ADMIN — SODIUM CHLORIDE: 0.9 INJECTION, SOLUTION INTRAVENOUS at 05:17

## 2025-03-28 RX ADMIN — SODIUM CHLORIDE, PRESERVATIVE FREE 10 ML: 5 INJECTION INTRAVENOUS at 20:54

## 2025-03-28 RX ADMIN — IPRATROPIUM BROMIDE AND ALBUTEROL SULFATE 1 DOSE: .5; 2.5 SOLUTION RESPIRATORY (INHALATION) at 14:37

## 2025-03-28 RX ADMIN — IPRATROPIUM BROMIDE AND ALBUTEROL SULFATE 1 DOSE: .5; 2.5 SOLUTION RESPIRATORY (INHALATION) at 19:27

## 2025-03-28 RX ADMIN — IPRATROPIUM BROMIDE AND ALBUTEROL SULFATE 1 DOSE: .5; 2.5 SOLUTION RESPIRATORY (INHALATION) at 07:04

## 2025-03-28 RX ADMIN — ENOXAPARIN SODIUM 30 MG: 100 INJECTION SUBCUTANEOUS at 20:54

## 2025-03-28 RX ADMIN — ENOXAPARIN SODIUM 40 MG: 100 INJECTION SUBCUTANEOUS at 08:44

## 2025-03-28 RX ADMIN — Medication 100 MG: at 08:45

## 2025-03-28 ASSESSMENT — PAIN DESCRIPTION - ORIENTATION: ORIENTATION: MID;RIGHT;LEFT

## 2025-03-28 ASSESSMENT — PAIN DESCRIPTION - DESCRIPTORS
DESCRIPTORS: ACHING;DISCOMFORT
DESCRIPTORS: ACHING;DISCOMFORT

## 2025-03-28 ASSESSMENT — PAIN SCALES - WONG BAKER: WONGBAKER_NUMERICALRESPONSE: NO HURT

## 2025-03-28 ASSESSMENT — PAIN SCALES - GENERAL
PAINLEVEL_OUTOF10: 9
PAINLEVEL_OUTOF10: 8

## 2025-03-28 ASSESSMENT — PAIN DESCRIPTION - LOCATION
LOCATION: ABDOMEN;BUTTOCKS
LOCATION: GENERALIZED

## 2025-03-28 NOTE — PROGRESS NOTES
Spiritual Health History and Assessment/Progress Note  [facility]    Initial Encounter,  ,  ,      Name: Mahesh Brownlee MRN: [id number]    Age: 64 y.o.     Sex: male   Language: English   Evangelical: Anglican   Pneumonia of left upper lobe due to infectious organism     Date: 3/28/2025            Total Time Calculated: 21 min              Spiritual Assessment began in STV 5C STEPDOWN        Referral/Consult From: Nurse, Rounding   Encounter Overview/Reason: Initial Encounter  Service Provided For: Patient    Janine, Belief, Meaning:   Patient identifies as spiritual, is connected with a janine tradition or spiritual practice, and has beliefs or practices that help with coping during difficult times  Family/Friends No family/friends present      Importance and Influence:  Patient has spiritual/personal beliefs that influence decisions regarding their health  Family/Friends No family/friends present    Community:  Patient feels well-supported. Support system includes: Children and Extended family  Family/Friends No family/friends present    Assessment and Plan of Care:   Patient was awake and alert when writer entered his room. Family was not present at the time. However, patient made mention of his children. Patient looked a bit worried and when asked how he was feeling, patient responded, \"not good enough.\" Patient said he was raised Anglican. Patient received sacrament of anointing of the sick. Writer introduced self as  and prayed with patient. Patient expressed gratitude for the anointing and blessing he received. Follow up visits recommended for ongoing assessment of patient's condition and for more spiritual and emotional support.     Patient Interventions include: Facilitated expression of thoughts and feelings, Explored spiritual coping/struggle/distress, Affirmed coping skills/support systems, and Provided sacramental/Hoahaoism ritual  Family/Friends Interventions include: No
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
## 2025-03-28 NOTE — PLAN OF CARE
Problem: Chronic Conditions and Co-morbidities  Goal: Patient's chronic conditions and co-morbidity symptoms are monitored and maintained or improved  Outcome: Progressing  Flowsheets (Taken 3/27/2025 2130)  Care Plan - Patient's Chronic Conditions and Co-Morbidity Symptoms are Monitored and Maintained or Improved:   Monitor and assess patient's chronic conditions and comorbid symptoms for stability, deterioration, or improvement   Collaborate with multidisciplinary team to address chronic and comorbid conditions and prevent exacerbation or deterioration     Problem: Discharge Planning  Goal: Discharge to home or other facility with appropriate resources  Outcome: Progressing  Flowsheets (Taken 3/27/2025 2130)  Discharge to home or other facility with appropriate resources:   Identify barriers to discharge with patient and caregiver   Arrange for needed discharge resources and transportation as appropriate   Identify discharge learning needs (meds, wound care, etc)     Problem: Pain  Goal: Verbalizes/displays adequate comfort level or baseline comfort level  Outcome: Progressing     Problem: ABCDS Injury Assessment  Goal: Absence of physical injury  Outcome: Progressing     Problem: Safety - Adult  Goal: Free from fall injury  Outcome: Progressing

## 2025-03-28 NOTE — PROGRESS NOTES
Pharmacy Note     Enoxaparin Dose Adjustment    Mahesh Brownlee is a 64 y.o. male. Pharmacist assessment of enoxaparin dose for VTE prophylaxis.    Recent Labs     03/27/25  0354 03/28/25  0846   BUN 10 6*       Recent Labs     03/27/25  0354 03/28/25  0846   CREATININE 0.8 0.6*       Estimated Creatinine Clearance: 151 mL/min (A) (based on SCr of 0.6 mg/dL (L)).  Estimated CrCl using Ideal Body Weight: 151 mL/min (based on IBW  kg)    Height:   Ht Readings from Last 1 Encounters:   03/27/25 1.803 m (5' 11\")     Weight:  Wt Readings from Last 1 Encounters:   03/28/25 104.2 kg (229 lb 11.5 oz)       The following enoxaparin dose has been adjusted based upon renal function and/or patient weight per P&T Guidelines:             Lovenox dose changed to 30 mg bid for weight > 100 kg

## 2025-03-28 NOTE — PROGRESS NOTES
03/28/25 1927   Care Plan - Respiratory Goals   Achieves optimal ventilation and oxygenation Assess for changes in respiratory status;Assess for changes in mentation and behavior;Encourage broncho-pulmonary hygiene including cough, deep breathe, incentive spirometry;Assess and instruct to report shortness of breath or any respiratory difficulty;Respiratory therapy support as indicated

## 2025-03-28 NOTE — PROGRESS NOTES
Physical Therapy        Physical Therapy Cancel Note      DATE: 3/28/2025    NAME: Mahesh Brownlee  MRN: [id number]   : 1960      Patient not seen this date for Physical Therapy due to:    Other: pt off unit at xray. PT will check back as time allows this PM.       Electronically signed by Libra Bernstein PT on 3/28/2025 at 11:03 AM

## 2025-03-28 NOTE — PROCEDURES
EEG REPORT       Patient: Mahesh Brownlee Age: 64 y.o.  MRN: [id number]      Referring Provider: No ref. provider found    History: This routine 30 minute scalp EEG was recorded with video- monitoring for a 64 y.o.. male who presented with encephalopathy. This EEG was performed to evaluate for focal and epileptiform abnormalities.     Mahesh Brownlee   Current Facility-Administered Medications   Medication Dose Route Frequency Provider Last Rate Last Admin    sodium chloride flush 0.9 % injection 5-40 mL  5-40 mL IntraVENous 2 times per day Miriam Martins APRN - CNP   10 mL at 03/27/25 2139    sodium chloride flush 0.9 % injection 10 mL  10 mL IntraVENous PRN Miriam Martins APRN - CNP        0.9 % sodium chloride infusion   IntraVENous PRN Miriam Martins APRN - CNP 25 mL/hr at 03/28/25 0517 New Bag at 03/28/25 0517    enoxaparin (LOVENOX) injection 40 mg  40 mg SubCUTAneous Daily Miriam Martins APRN - CNP   40 mg at 03/28/25 0844    ondansetron (ZOFRAN-ODT) disintegrating tablet 4 mg  4 mg Oral Q8H PRN Miriam Martins APRN - CNP        Or    ondansetron (ZOFRAN) injection 4 mg  4 mg IntraVENous Q6H PRN Miriam Martins APRN - CNP        magnesium hydroxide (MILK OF MAGNESIA) 400 MG/5ML suspension 30 mL  30 mL Oral Daily PRN Miriam Martins APRN - CNP        acetaminophen (TYLENOL) tablet 650 mg  650 mg Oral Q6H PRN Miriam Martins APRN - CNP        Or    acetaminophen (TYLENOL) suppository 650 mg  650 mg Rectal Q6H PRN Miriam Martins APRN - CNP        albuterol (PROVENTIL) (2.5 MG/3ML) 0.083% nebulizer solution 2.5 mg  2.5 mg Nebulization Q2H PRN Miriam Martins APRN - CNP        cefTRIAXone (ROCEPHIN) 1,000 mg in sterile water 10 mL IV syringe  1,000 mg IntraVENous Q24H Miriam Martins APRN - CNP   1,000 mg at 03/28/25 0521    And    azithromycin (ZITHROMAX) 500 mg in sodium chloride 0.9 % 250 mL IVPB ([id number])  500 mg IntraVENous Q24H [place],
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
## 2025-03-28 NOTE — PROGRESS NOTES
[facility]   IN-PATIENT SERVICE   [facility]    Progress Note    3/28/2025    1:40 PM    Name:   Mahesh Brownlee  MRN:     [id number]     Acct:      [id number]   Room:   [facility]/0537-01   Day:  1  Admit Date:  3/27/2025  3:37 AM    PCP:   Ira Roberts APRN - CNP  Code Status:  Full Code    Subjective:     Interval History Status: improved.     Patient is awake alert this morning oriented to self.  Denies any pain or discomfort.  States that he slept well last night no witnessed seizure during his hospitalization.    Per H&P: Mahesh Brownlee is a 64 y.o. Non- / non  male who presents with Altered Mental Status   and is admitted to the hospital for the management of Pneumonia of left upper lobe due to infectious organism.     This 64 yom presents to the hospital with ams.  He has no recollection of how he got here, nor why he is here.  He admits to sob and cough with cp from coughing but does not know anything else.  He might have had fevers and chills but is unsure.      Medications:     Allergies:    Allergies   Allergen Reactions    Augmentin Es-600 [Amoxicillin-Pot Clavulanate]      Other reaction(s): Unknown    Sulfa Antibiotics Other (See Comments)       Current Meds:   Scheduled Meds:    enoxaparin  30 mg SubCUTAneous BID    sodium chloride flush  5-40 mL IntraVENous 2 times per day    cefTRIAXone (ROCEPHIN) IV  1,000 mg IntraVENous Q24H    And    azithromycin  500 mg IntraVENous Q24H    thiamine  100 mg Oral Daily    ipratropium 0.5 mg-albuterol 2.5 mg  1 Dose Inhalation Q6H WA RT    lacosamide  100 mg Oral BID     Continuous Infusions:    sodium chloride 25 mL/hr at 03/28/25 0517     PRN Meds: sodium chloride flush, sodium chloride, ondansetron **OR** ondansetron, magnesium hydroxide, acetaminophen **OR** acetaminophen, albuterol, traMADol    Data:     Past Medical History:   has a past medical history of Adjustment disorder, AF (atrial fibrillation) (HCC),  (implantable cardioverter-defibrillator) in place 3/27/2025 Yes    History of seizures 3/27/2025 Yes    Non compliance w medication regimen 3/27/2025 Yes       Plan:        Acute toxic/metabolic encephalopathy: Appears to be multifactorial related to pneumonia and being positive for narcotics and benzodiazepine  Pneumonia presumed bacterial continue Rocephin and azithromycin  History of seizure in the setting of alcohol withdrawal: Currently does not show any signs of alcohol withdrawal continue to monitor.  Appreciate neurology input continue antiseizure medications.  History of Multiple comorbidities including paroxysmal atrial fibrillation, COPD and coronary artery disease status post ICD: Medically appears to be stable.  It was reported that patient was under hospice and not clear what medication he has been taking.  Awaiting confirmation of home medications to reconcile.  PT OT and discharge planning.    Junior Gonsalez MD  3/28/2025  1:40 PM

## 2025-03-28 NOTE — PROGRESS NOTES
Physical Therapy  Facility/Department: [address] STEPDOWN   Physical Therapy Initial Evaluation    Patient Name: Mahesh Brownlee        MRN: [id number]    : 1960    Date of Service: 3/28/2025    Chief Complaint   Patient presents with    Altered Mental Status     Past Medical History:  has a past medical history of Adjustment disorder, AF (atrial fibrillation) (HCC), AICD (automatic cardioverter/defibrillator) present, Alcohol dependence (HCC), CAD (coronary artery disease), Cardiomyopathy (HCC), CHF (congestive heart failure) (HCC), COPD (chronic obstructive pulmonary disease) (HCC), DDD (degenerative disc disease), lumbar, Herniated cervical disc, Hyperlipidemia, Hypertension, Major depression, Post laminectomy syndrome, Sciatica, Snores, Tobacco abuse, and Traumatic brain injury (HCC).  Past Surgical History:  has a past surgical history that includes back surgery; Rotator cuff repair (Right); Carpal tunnel release (Right); Nerve Block (2013); Nerve Block (N/A, 2013); other surgical history (2016); Tonsillectomy; pacemaker placement (2015); back surgery; other surgical history (2018); joint replacement (Right, 2018); and Cardiac electrophysiology study and ablation.    Discharge Recommendations  Discharge Recommendations: Patient would benefit from continued therapy after discharge  PT Equipment Recommendations  Equipment Needed: No    Assessment  Body Structures, Functions, Activity Limitations Requiring Skilled Therapeutic Intervention: Decreased functional mobility , Decreased strength, Increased pain, Decreased posture, Decreased ROM, Decreased endurance, Decreased balance, Decreased safe awareness  Assessment: Pt ambulated 3ft w/ RW Dewey, ambulation distance limited due to pain. Pt requiring Dewey to perform functional transfers. Pt is significantly limited due to pain and cognition deficits. Pt is currently unsafe to live independently at this time. Recommending continued  demo.    Ambulation  Surface: Level tile  Device: Rolling Walker  Assistance: Minimal assistance  Quality of Gait: High reliance on RW.  Gait Deviations: Slow Lashae  Distance: 3ft  Comments: Pt maintains RLE in NWB, reports significant increased pain with inability to weightbear for ambulation. RN informed. Ambulation distance limited due to fatigue.  More Ambulation?: No    Stairs/Curb  Stairs?: No    Balance   Balance  Posture: Fair  Sitting - Static: Good  Sitting - Dynamic: Good  Standing - Static: Good;-  Standing - Dynamic: Fair;-  Comments: standing balance assessed w/ RW; pt able to sit EOB CGA  Patient Education  Patient Education  Education Given To: Patient  Education Provided: Role of Therapy;Transfer Training;Plan of Care  Education Method: Verbal  Barriers to Learning: Cognition  Education Outcome: Continued education needed    Plan  Physical Therapy Plan  General Plan:  (5-6x/week)  Current Treatment Recommendations: Strengthening, ROM, Balance training, Endurance training, Safety education & training, Patient/Caregiver education & training, Therapeutic activities, Functional mobility training, Transfer training, Gait training, Stair training, Equipment evaluation, education, & procurement, Home exercise program    Goals  Patient Goals   Patient Goals : Did not state  Short Term Goals  Time Frame for Short Term Goals: 14 visits  Short Term Goal 1: Pt to perform bed mobility from flat surface independently  Short Term Goal 2: Pt to demonstrate functional transfers independently  Short Term Goal 3: Ambulate 300ft w/ least restrictive AD independently  Short Term Goal 4: Ascend/descend 2 stairs with no rail to simulate community distances independently    Minutes  PT Individual Minutes  Time In: 1110  Time Out: 1139  Minutes: 29  Time Code Minutes  Timed Code Treatment Minutes: 23 Minutes    Electronically signed by Libra Bernstein PT on 3/28/25 at 12:40 PM EDT

## 2025-03-28 NOTE — PLAN OF CARE
Problem: Chronic Conditions and Co-morbidities  Goal: Patient's chronic conditions and co-morbidity symptoms are monitored and maintained or improved  Outcome: Progressing     Problem: Discharge Planning  Goal: Discharge to home or other facility with appropriate resources  Outcome: Progressing     Problem: Pain  Goal: Verbalizes/displays adequate comfort level or baseline comfort level  Outcome: Progressing     Problem: ABCDS Injury Assessment  Goal: Absence of physical injury  Outcome: Progressing  Flowsheets (Taken 3/28/2025 0900)  Absence of Physical Injury: Implement safety measures based on patient assessment     Problem: Safety - Adult  Goal: Free from fall injury  Outcome: Progressing  Flowsheets (Taken 3/28/2025 0900)  Free From Fall Injury:   Instruct family/caregiver on patient safety   Based on caregiver fall risk screen, instruct family/caregiver to ask for assistance with transferring infant if caregiver noted to have fall risk factors

## 2025-03-28 NOTE — PROGRESS NOTES
Occupational Therapy    Occupational Therapy Initial Evaluation  Facility/Department: [facility] 5C STEPDOWN   Patient Name: Mahesh Brownlee        MRN: [id number]    : 1960    Date of Service: 3/28/2025    Chief Complaint   Patient presents with    Altered Mental Status     Past Medical History:  has a past medical history of Adjustment disorder, AF (atrial fibrillation) (HCC), AICD (automatic cardioverter/defibrillator) present, Alcohol dependence (HCC), CAD (coronary artery disease), Cardiomyopathy (HCC), CHF (congestive heart failure) (HCC), COPD (chronic obstructive pulmonary disease) (HCC), DDD (degenerative disc disease), lumbar, Herniated cervical disc, Hyperlipidemia, Hypertension, Major depression, Post laminectomy syndrome, Sciatica, Snores, Tobacco abuse, and Traumatic brain injury (HCC).  Past Surgical History:  has a past surgical history that includes back surgery; Rotator cuff repair (Right); Carpal tunnel release (Right); Nerve Block (2013); Nerve Block (N/A, 2013); other surgical history (2016); Tonsillectomy; pacemaker placement (2015); back surgery; other surgical history (2018); joint replacement (Right, 2018); and Cardiac electrophysiology study and ablation.    Discharge Recommendations  Discharge Recommendations: Continue to assess pending progress, Patient would benefit from continued therapy after discharge       Assessment  Performance deficits / Impairments: Decreased functional mobility ;Decreased ADL status;Decreased strength;Decreased safe awareness;Decreased cognition;Decreased endurance;Decreased coordination;Decreased balance  Assessment: Patient is normaly independent with functional mobility, personal ADLs and household tasks. At this time patient requires up to min assist with functional mobility and up to   assist with UB ADLs, up to min LB ADLs. Patient would benefit from continued therapy during stay and after discharge from acute setting. Patient  bathroom    Balance  Balance  Sitting: Without support (Superviison seated EOB, static and dynamic sitting)  Standing: With support (Standing wth 1 hand support at sink, with CGA 2 hand spport for mbility CGA/MIn A)    Transfers/Mobility  Bed mobility  Supine to Sit: Contact guard assistance  Sit to Supine: Contact guard assistance  Bed Mobility Comments: HOB elevated to 20 degrees with use of bed rails          Transfers  Sit to stand: Contact guard assistance  Stand to sit: Contact guard assistance    Toilet Transfers  Toilet - Technique: Ambulating  Equipment Used: Grab bars  Toilet Transfer: Contact guard assistance  Toilet Transfers Comments: Verbal cues management of walker in small spaces    Functional Mobility: Minimal assistance  Functional Mobility Skilled Clinical Factors: Uing RW to mabulate to bathroom, verbal and tactile cues to nagate in small space, pt request return to bed although agreeable to tony dickerson       Patient Education  Patient Education  Education Given To: Patient  Education Provided: Role of Therapy  Education Method: Verbal  Barriers to Learning: Cognition  Education Outcome: Verbalized understanding    Goals  Short Term Goals  Time Frame for Short Term Goals: Prior to discharge  Short Term Goal 1: Patient to be oriented x4  Short Term Goal 2: Patient to demonstrate static/dynamic standing balance with SBA 0-1 hand support, 12+minutes  Short Term Goal 3: Patient to perform functional mobility household distances with LRAD with CGA  Short Term Goal 4: Patient to perform ther ex to improve strength 5/5  Short Term Goal 5: Patient to demonstarte unsupported sitting/standing Grooming/UB ADLs with supervision  Short Term Goal 6: Patient to demonstarte unsupported sitting/standing Toileting/LB ADLs with supervision    Plan  Occupational Therapy Plan  Times Per Week: 3-5x/wk  Current Treatment Recommendations: Strengthening, Balance training, Functional mobility training, Endurance

## 2025-03-29 LAB
ANION GAP SERPL CALCULATED.3IONS-SCNC: 15 MMOL/L (ref 9–16)
BUN SERPL-MCNC: 7 MG/DL (ref 8–23)
CALCIUM SERPL-MCNC: 9.4 MG/DL (ref 8.6–10.4)
CHLORIDE SERPL-SCNC: 104 MMOL/L (ref 98–107)
CO2 SERPL-SCNC: 20 MMOL/L (ref 20–31)
CREAT SERPL-MCNC: 0.6 MG/DL (ref 0.7–1.2)
GFR, ESTIMATED: >90 ML/MIN/1.73M2
GLUCOSE SERPL-MCNC: 92 MG/DL (ref 74–99)
POTASSIUM SERPL-SCNC: 3 MMOL/L (ref 3.7–5.3)
SODIUM SERPL-SCNC: 139 MMOL/L (ref 136–145)

## 2025-03-29 PROCEDURE — 36415 COLL VENOUS BLD VENIPUNCTURE: CPT

## 2025-03-29 PROCEDURE — 6370000000 HC RX 637 (ALT 250 FOR IP): Performed by: PSYCHIATRY & NEUROLOGY

## 2025-03-29 PROCEDURE — 99232 SBSQ HOSP IP/OBS MODERATE 35: CPT | Performed by: HOSPITALIST

## 2025-03-29 PROCEDURE — 2580000003 HC RX 258: Performed by: NURSE PRACTITIONER

## 2025-03-29 PROCEDURE — 6370000000 HC RX 637 (ALT 250 FOR IP): Performed by: NURSE PRACTITIONER

## 2025-03-29 PROCEDURE — 6370000000 HC RX 637 (ALT 250 FOR IP): Performed by: INTERNAL MEDICINE

## 2025-03-29 PROCEDURE — 94640 AIRWAY INHALATION TREATMENT: CPT

## 2025-03-29 PROCEDURE — 6360000002 HC RX W HCPCS: Performed by: NURSE PRACTITIONER

## 2025-03-29 PROCEDURE — 6370000000 HC RX 637 (ALT 250 FOR IP): Performed by: HOSPITALIST

## 2025-03-29 PROCEDURE — 94761 N-INVAS EAR/PLS OXIMETRY MLT: CPT

## 2025-03-29 PROCEDURE — 2500000003 HC RX 250 WO HCPCS: Performed by: NURSE PRACTITIONER

## 2025-03-29 PROCEDURE — 6360000002 HC RX W HCPCS: Performed by: HOSPITALIST

## 2025-03-29 PROCEDURE — 2060000000 HC ICU INTERMEDIATE R&B

## 2025-03-29 PROCEDURE — 99222 1ST HOSP IP/OBS MODERATE 55: CPT | Performed by: INTERNAL MEDICINE

## 2025-03-29 PROCEDURE — 80048 BASIC METABOLIC PNL TOTAL CA: CPT

## 2025-03-29 RX ORDER — AMLODIPINE BESYLATE 10 MG/1
10 TABLET ORAL DAILY
Status: DISCONTINUED | OUTPATIENT
Start: 2025-03-29 | End: 2025-03-31

## 2025-03-29 RX ORDER — MAGNESIUM SULFATE IN WATER 40 MG/ML
2000 INJECTION, SOLUTION INTRAVENOUS PRN
Status: DISCONTINUED | OUTPATIENT
Start: 2025-03-29 | End: 2025-04-03 | Stop reason: HOSPADM

## 2025-03-29 RX ORDER — CHLORDIAZEPOXIDE HYDROCHLORIDE 10 MG/1
10 CAPSULE, GELATIN COATED ORAL 3 TIMES DAILY
Status: DISCONTINUED | OUTPATIENT
Start: 2025-03-29 | End: 2025-04-03 | Stop reason: HOSPADM

## 2025-03-29 RX ORDER — HYDRALAZINE HYDROCHLORIDE 20 MG/ML
10 INJECTION INTRAMUSCULAR; INTRAVENOUS EVERY 6 HOURS PRN
Status: DISCONTINUED | OUTPATIENT
Start: 2025-03-29 | End: 2025-04-03 | Stop reason: HOSPADM

## 2025-03-29 RX ORDER — METOPROLOL SUCCINATE 25 MG/1
25 TABLET, EXTENDED RELEASE ORAL DAILY
Status: DISCONTINUED | OUTPATIENT
Start: 2025-03-29 | End: 2025-03-30

## 2025-03-29 RX ORDER — POTASSIUM CHLORIDE 1500 MG/1
40 TABLET, EXTENDED RELEASE ORAL PRN
Status: DISCONTINUED | OUTPATIENT
Start: 2025-03-29 | End: 2025-04-03 | Stop reason: HOSPADM

## 2025-03-29 RX ORDER — LISINOPRIL 20 MG/1
20 TABLET ORAL DAILY
Status: DISCONTINUED | OUTPATIENT
Start: 2025-03-29 | End: 2025-03-29

## 2025-03-29 RX ORDER — POTASSIUM CHLORIDE 7.45 MG/ML
10 INJECTION INTRAVENOUS PRN
Status: DISCONTINUED | OUTPATIENT
Start: 2025-03-29 | End: 2025-04-03 | Stop reason: HOSPADM

## 2025-03-29 RX ORDER — DONEPEZIL HYDROCHLORIDE 5 MG/1
5 TABLET, FILM COATED ORAL DAILY
Status: DISCONTINUED | OUTPATIENT
Start: 2025-03-29 | End: 2025-04-03 | Stop reason: HOSPADM

## 2025-03-29 RX ADMIN — WATER 1000 MG: 1 INJECTION INTRAMUSCULAR; INTRAVENOUS; SUBCUTANEOUS at 05:24

## 2025-03-29 RX ADMIN — SACUBITRIL AND VALSARTAN 1 TABLET: 24; 26 TABLET, FILM COATED ORAL at 08:30

## 2025-03-29 RX ADMIN — LACOSAMIDE 100 MG: 100 TABLET, FILM COATED ORAL at 20:45

## 2025-03-29 RX ADMIN — IPRATROPIUM BROMIDE AND ALBUTEROL SULFATE 1 DOSE: .5; 2.5 SOLUTION RESPIRATORY (INHALATION) at 09:49

## 2025-03-29 RX ADMIN — HYDRALAZINE HYDROCHLORIDE 10 MG: 20 INJECTION INTRAMUSCULAR; INTRAVENOUS at 17:50

## 2025-03-29 RX ADMIN — AZITHROMYCIN MONOHYDRATE 500 MG: 500 INJECTION, POWDER, LYOPHILIZED, FOR SOLUTION INTRAVENOUS at 05:33

## 2025-03-29 RX ADMIN — CHLORDIAZEPOXIDE HYDROCHLORIDE 10 MG: 10 CAPSULE ORAL at 13:04

## 2025-03-29 RX ADMIN — SODIUM CHLORIDE, PRESERVATIVE FREE 10 ML: 5 INJECTION INTRAVENOUS at 08:19

## 2025-03-29 RX ADMIN — ENOXAPARIN SODIUM 30 MG: 100 INJECTION SUBCUTANEOUS at 08:29

## 2025-03-29 RX ADMIN — DONEPEZIL HYDROCHLORIDE 5 MG: 5 TABLET ORAL at 13:04

## 2025-03-29 RX ADMIN — AMLODIPINE BESYLATE 10 MG: 10 TABLET ORAL at 17:44

## 2025-03-29 RX ADMIN — POTASSIUM CHLORIDE 40 MEQ: 1500 TABLET, EXTENDED RELEASE ORAL at 13:10

## 2025-03-29 RX ADMIN — TRAMADOL HYDROCHLORIDE 50 MG: 50 TABLET, COATED ORAL at 13:10

## 2025-03-29 RX ADMIN — RIVAROXABAN 20 MG: 20 TABLET, FILM COATED ORAL at 22:01

## 2025-03-29 RX ADMIN — CHLORDIAZEPOXIDE HYDROCHLORIDE 10 MG: 10 CAPSULE ORAL at 20:44

## 2025-03-29 RX ADMIN — SACUBITRIL AND VALSARTAN 1 TABLET: 24; 26 TABLET, FILM COATED ORAL at 20:44

## 2025-03-29 RX ADMIN — ACETAMINOPHEN 650 MG: 325 TABLET ORAL at 09:50

## 2025-03-29 RX ADMIN — SODIUM CHLORIDE, PRESERVATIVE FREE 10 ML: 5 INJECTION INTRAVENOUS at 22:01

## 2025-03-29 RX ADMIN — LACOSAMIDE 100 MG: 100 TABLET, FILM COATED ORAL at 08:30

## 2025-03-29 RX ADMIN — IPRATROPIUM BROMIDE AND ALBUTEROL SULFATE 1 DOSE: .5; 2.5 SOLUTION RESPIRATORY (INHALATION) at 20:09

## 2025-03-29 RX ADMIN — IPRATROPIUM BROMIDE AND ALBUTEROL SULFATE 1 DOSE: .5; 2.5 SOLUTION RESPIRATORY (INHALATION) at 13:13

## 2025-03-29 RX ADMIN — METOPROLOL SUCCINATE 25 MG: 25 TABLET, EXTENDED RELEASE ORAL at 08:29

## 2025-03-29 RX ADMIN — Medication 100 MG: at 08:29

## 2025-03-29 ASSESSMENT — PAIN SCALES - WONG BAKER
WONGBAKER_NUMERICALRESPONSE: NO HURT
WONGBAKER_NUMERICALRESPONSE: NO HURT

## 2025-03-29 ASSESSMENT — PAIN SCALES - GENERAL
PAINLEVEL_OUTOF10: 0
PAINLEVEL_OUTOF10: 8
PAINLEVEL_OUTOF10: 9
PAINLEVEL_OUTOF10: 0
PAINLEVEL_OUTOF10: 10
PAINLEVEL_OUTOF10: 8

## 2025-03-29 ASSESSMENT — PAIN DESCRIPTION - LOCATION
LOCATION: HIP;LEG
LOCATION: BACK;HIP
LOCATION: HIP;LEG
LOCATION: BACK;HIP

## 2025-03-29 ASSESSMENT — PAIN DESCRIPTION - DESCRIPTORS
DESCRIPTORS: ACHING
DESCRIPTORS: OTHER (COMMENT)

## 2025-03-29 ASSESSMENT — PAIN DESCRIPTION - ORIENTATION
ORIENTATION: MID;RIGHT;LEFT
ORIENTATION: OTHER (COMMENT)

## 2025-03-29 ASSESSMENT — PAIN DESCRIPTION - PAIN TYPE: TYPE: CHRONIC PAIN

## 2025-03-29 NOTE — PROGRESS NOTES
[facility]   IN-PATIENT SERVICE   [facility]    Progress Note    3/29/2025    11:05 AM    Name:   Mahesh Brownlee  MRN:     [id number]     Acct:      [id number]   Room:   0537/0537-01  IP Day:  2  Admit Date:  3/27/2025  3:37 AM    PCP:   Ira Roberts APRN - CNP  Code Status:  Full Code    Subjective:     Interval History Status: improved.     Patient is alert oriented to place and was able to tell me the year and the president's name.  Continues to say that he does not remember his underlying issues and how they were treated.  Denies knowledge of any family member that is involved in his care.    Per H&P: Mahesh Brownlee is a 64 y.o. Non- / non  male who presents with Altered Mental Status   and is admitted to the hospital for the management of Pneumonia of left upper lobe due to infectious organism.     This 64 yom presents to the hospital with ams.  He has no recollection of how he got here, nor why he is here.  He admits to sob and cough with cp from coughing but does not know anything else.  He might have had fevers and chills but is unsure.      Medications:     Allergies:    Allergies   Allergen Reactions    Augmentin Es-600 [Amoxicillin-Pot Clavulanate]      Other reaction(s): Unknown    Sulfa Antibiotics Other (See Comments)       Current Meds:   Scheduled Meds:    metoprolol succinate  25 mg Oral Daily    sacubitril-valsartan  1 tablet Oral BID    rivaroxaban  20 mg Oral Daily    donepezil  5 mg Oral Daily    chlordiazePOXIDE  10 mg Oral TID    nicotine  1 patch TransDERmal Daily    sodium chloride flush  5-40 mL IntraVENous 2 times per day    cefTRIAXone (ROCEPHIN) IV  1,000 mg IntraVENous Q24H    And    azithromycin  500 mg IntraVENous Q24H    thiamine  100 mg Oral Daily    ipratropium 0.5 mg-albuterol 2.5 mg  1 Dose Inhalation Q6H WA RT    lacosamide  100 mg Oral BID     Continuous Infusions:    sodium chloride 25 mL/hr at 03/28/25 0517     PRN Meds:

## 2025-03-29 NOTE — PROGRESS NOTES
03/29/25 0949   Care Plan - Respiratory Goals   Achieves optimal ventilation and oxygenation Assess for changes in respiratory status;Position to facilitate oxygenation and minimize respiratory effort;Assess for changes in mentation and behavior;Oxygen supplementation based on oxygen saturation or arterial blood gases;Encourage broncho-pulmonary hygiene including cough, deep breathe, incentive spirometry;Assess the need for suctioning and aspirate as needed;Assess and instruct to report shortness of breath or any respiratory difficulty;Respiratory therapy support as indicated        No

## 2025-03-29 NOTE — PLAN OF CARE
Problem: Chronic Conditions and Co-morbidities  Goal: Patient's chronic conditions and co-morbidity symptoms are monitored and maintained or improved  3/29/2025 0037 by Yamilka Maurice RN  Outcome: Progressing  Flowsheets (Taken 3/28/2025 2000)  Care Plan - Patient's Chronic Conditions and Co-Morbidity Symptoms are Monitored and Maintained or Improved: Monitor and assess patient's chronic conditions and comorbid symptoms for stability, deterioration, or improvement  3/28/2025 1319 by Mari Fitch RN  Outcome: Progressing     Problem: Discharge Planning  Goal: Discharge to home or other facility with appropriate resources  3/29/2025 0037 by Yamilka Maurice RN  Outcome: Progressing  Flowsheets (Taken 3/28/2025 2000)  Discharge to home or other facility with appropriate resources: Identify barriers to discharge with patient and caregiver  3/28/2025 1319 by Mari Fitch RN  Outcome: Progressing     Problem: Pain  Goal: Verbalizes/displays adequate comfort level or baseline comfort level  3/29/2025 0037 by Yamilka Maurice RN  Outcome: Progressing  Flowsheets (Taken 3/28/2025 2000)  Verbalizes/displays adequate comfort level or baseline comfort level: Encourage patient to monitor pain and request assistance  3/28/2025 1319 by Mari Fitch RN  Outcome: Progressing     Problem: ABCDS Injury Assessment  Goal: Absence of physical injury  3/29/2025 0037 by Yamilka Maurice RN  Outcome: Progressing  Flowsheets (Taken 3/28/2025 2000)  Absence of Physical Injury: Implement safety measures based on patient assessment  3/28/2025 1319 by Mari Fitch RN  Outcome: Progressing  Flowsheets (Taken 3/28/2025 0900)  Absence of Physical Injury: Implement safety measures based on patient assessment     Problem: Safety - Adult  Goal: Free from fall injury  3/29/2025 0037 by Yamilka Maurice RN  Outcome: Progressing  Flowsheets (Taken 3/28/2025 2000)  Free From Fall Injury: Instruct family/caregiver

## 2025-03-29 NOTE — PLAN OF CARE
Problem: Chronic Conditions and Co-morbidities  Goal: Patient's chronic conditions and co-morbidity symptoms are monitored and maintained or improved  3/29/2025 1156 by Mari Fitch RN  Outcome: Progressing  3/29/2025 0037 by Yamilka Maurice RN  Outcome: Progressing  Flowsheets (Taken 3/28/2025 2000)  Care Plan - Patient's Chronic Conditions and Co-Morbidity Symptoms are Monitored and Maintained or Improved: Monitor and assess patient's chronic conditions and comorbid symptoms for stability, deterioration, or improvement     Problem: Discharge Planning  Goal: Discharge to home or other facility with appropriate resources  3/29/2025 1156 by Mari Fitch RN  Outcome: Progressing  3/29/2025 0037 by Yamilka Maurice RN  Outcome: Progressing  Flowsheets (Taken 3/28/2025 2000)  Discharge to home or other facility with appropriate resources: Identify barriers to discharge with patient and caregiver     Problem: Pain  Goal: Verbalizes/displays adequate comfort level or baseline comfort level  3/29/2025 1156 by Mari Fitch RN  Outcome: Progressing  Flowsheets (Taken 3/29/2025 0340 by Yamilka Maurice RN)  Verbalizes/displays adequate comfort level or baseline comfort level: Encourage patient to monitor pain and request assistance  3/29/2025 0037 by Yamilka Maurice RN  Outcome: Progressing  Flowsheets (Taken 3/28/2025 2000)  Verbalizes/displays adequate comfort level or baseline comfort level: Encourage patient to monitor pain and request assistance     Problem: ABCDS Injury Assessment  Goal: Absence of physical injury  3/29/2025 1156 by Mari Fitch RN  Outcome: Progressing  3/29/2025 0037 by Yamilka Maurice RN  Outcome: Progressing  Flowsheets (Taken 3/28/2025 2000)  Absence of Physical Injury: Implement safety measures based on patient assessment     Problem: Safety - Adult  Goal: Free from fall injury  3/29/2025 1156 by Mari Fitch RN  Outcome: Progressing  3/29/2025 0037 by  Relatores, Yamilka Miles RN  Outcome: Progressing  Flowsheets (Taken 3/28/2025 2000)  Free From Fall Injury: Instruct family/caregiver on patient safety

## 2025-03-29 NOTE — CONSULTS
CARDIOLOGY CONSULTATION                                                 Yasmine Mccord MD [facility]                                                           Nubia Encinas MD [facility]                                                                    Mila Orozco, CNP                                                         Date:   3/29/2025  Patient name: Mahesh Brownlee  Date of admission:  3/27/2025  3:37 AM  MRN:   [id number]  YOB: 1960    Reason for Admission: encephalopathy     Chief Complaint: altered sensorium     History of present illness:     64-year-old male with known underlying chronic heart failure with recovered ejection fraction, history of paroxysmal atrial fibrillation/flutter status post ablation in the past, BiV ICD in situ, recent admission at [facility], now admitted with acute encephalopathy, patient was found by the police roaming the hallways of her local hotel stated that he needed to take a driving test.  Evaluated by neurology.  Also has history of alcohol abuse.  He is currently enrolled in hospice.  This morning he appears slightly anxious.  Denies any active chest pain, resting dyspnea, lower extremity edema or palpitations.  Currently on room air.  Tachycardic and hypotensive.        Past Medical History:   has a past medical history of Adjustment disorder, AF (atrial fibrillation) ([facility]), AICD (automatic cardioverter/defibrillator) present, Alcohol dependence ([facility]), CAD (coronary artery disease), Cardiomyopathy ([facility]), CHF (congestive heart failure) ([facility]), COPD (chronic obstructive pulmonary disease) ([facility]), DDD (degenerative disc disease), lumbar, Herniated cervical disc, Hyperlipidemia, Hypertension, Major depression, Post laminectomy syndrome, Sciatica, Snores, Tobacco abuse, and Traumatic brain injury ([facility]).    Past Surgical History:   has a past surgical history that

## 2025-03-30 LAB — POTASSIUM SERPL-SCNC: 3.4 MMOL/L (ref 3.7–5.3)

## 2025-03-30 PROCEDURE — 6360000002 HC RX W HCPCS: Performed by: NURSE PRACTITIONER

## 2025-03-30 PROCEDURE — 84132 ASSAY OF SERUM POTASSIUM: CPT

## 2025-03-30 PROCEDURE — 6370000000 HC RX 637 (ALT 250 FOR IP): Performed by: HOSPITALIST

## 2025-03-30 PROCEDURE — 6370000000 HC RX 637 (ALT 250 FOR IP): Performed by: NURSE PRACTITIONER

## 2025-03-30 PROCEDURE — 94761 N-INVAS EAR/PLS OXIMETRY MLT: CPT

## 2025-03-30 PROCEDURE — 2580000003 HC RX 258: Performed by: NURSE PRACTITIONER

## 2025-03-30 PROCEDURE — 99232 SBSQ HOSP IP/OBS MODERATE 35: CPT | Performed by: HOSPITALIST

## 2025-03-30 PROCEDURE — 97110 THERAPEUTIC EXERCISES: CPT

## 2025-03-30 PROCEDURE — 36415 COLL VENOUS BLD VENIPUNCTURE: CPT

## 2025-03-30 PROCEDURE — 97535 SELF CARE MNGMENT TRAINING: CPT

## 2025-03-30 PROCEDURE — 94640 AIRWAY INHALATION TREATMENT: CPT

## 2025-03-30 PROCEDURE — 6370000000 HC RX 637 (ALT 250 FOR IP): Performed by: INTERNAL MEDICINE

## 2025-03-30 PROCEDURE — 2060000000 HC ICU INTERMEDIATE R&B

## 2025-03-30 PROCEDURE — 6370000000 HC RX 637 (ALT 250 FOR IP): Performed by: PSYCHIATRY & NEUROLOGY

## 2025-03-30 PROCEDURE — 2500000003 HC RX 250 WO HCPCS: Performed by: NURSE PRACTITIONER

## 2025-03-30 RX ORDER — METOPROLOL SUCCINATE 50 MG/1
50 TABLET, EXTENDED RELEASE ORAL DAILY
Status: DISCONTINUED | OUTPATIENT
Start: 2025-03-31 | End: 2025-04-03 | Stop reason: HOSPADM

## 2025-03-30 RX ADMIN — AZITHROMYCIN MONOHYDRATE 500 MG: 500 INJECTION, POWDER, LYOPHILIZED, FOR SOLUTION INTRAVENOUS at 05:08

## 2025-03-30 RX ADMIN — IPRATROPIUM BROMIDE AND ALBUTEROL SULFATE 1 DOSE: .5; 2.5 SOLUTION RESPIRATORY (INHALATION) at 14:04

## 2025-03-30 RX ADMIN — AMLODIPINE BESYLATE 10 MG: 10 TABLET ORAL at 08:40

## 2025-03-30 RX ADMIN — SODIUM CHLORIDE, PRESERVATIVE FREE 10 ML: 5 INJECTION INTRAVENOUS at 20:47

## 2025-03-30 RX ADMIN — WATER 1000 MG: 1 INJECTION INTRAMUSCULAR; INTRAVENOUS; SUBCUTANEOUS at 05:05

## 2025-03-30 RX ADMIN — CHLORDIAZEPOXIDE HYDROCHLORIDE 10 MG: 10 CAPSULE ORAL at 20:47

## 2025-03-30 RX ADMIN — RIVAROXABAN 20 MG: 20 TABLET, FILM COATED ORAL at 20:46

## 2025-03-30 RX ADMIN — CHLORDIAZEPOXIDE HYDROCHLORIDE 10 MG: 10 CAPSULE ORAL at 14:43

## 2025-03-30 RX ADMIN — SACUBITRIL AND VALSARTAN 1 TABLET: 24; 26 TABLET, FILM COATED ORAL at 08:37

## 2025-03-30 RX ADMIN — IPRATROPIUM BROMIDE AND ALBUTEROL SULFATE 1 DOSE: .5; 2.5 SOLUTION RESPIRATORY (INHALATION) at 21:12

## 2025-03-30 RX ADMIN — SACUBITRIL AND VALSARTAN 1 TABLET: 24; 26 TABLET, FILM COATED ORAL at 20:46

## 2025-03-30 RX ADMIN — SODIUM CHLORIDE, PRESERVATIVE FREE 5 ML: 5 INJECTION INTRAVENOUS at 10:27

## 2025-03-30 RX ADMIN — CHLORDIAZEPOXIDE HYDROCHLORIDE 10 MG: 10 CAPSULE ORAL at 08:36

## 2025-03-30 RX ADMIN — METOPROLOL SUCCINATE 25 MG: 25 TABLET, EXTENDED RELEASE ORAL at 08:34

## 2025-03-30 RX ADMIN — LACOSAMIDE 100 MG: 100 TABLET, FILM COATED ORAL at 20:46

## 2025-03-30 RX ADMIN — Medication 100 MG: at 08:35

## 2025-03-30 RX ADMIN — DONEPEZIL HYDROCHLORIDE 5 MG: 5 TABLET ORAL at 09:16

## 2025-03-30 RX ADMIN — POTASSIUM CHLORIDE 40 MEQ: 1500 TABLET, EXTENDED RELEASE ORAL at 20:46

## 2025-03-30 RX ADMIN — IPRATROPIUM BROMIDE AND ALBUTEROL SULFATE 1 DOSE: .5; 2.5 SOLUTION RESPIRATORY (INHALATION) at 08:04

## 2025-03-30 RX ADMIN — LACOSAMIDE 100 MG: 100 TABLET, FILM COATED ORAL at 08:40

## 2025-03-30 RX ADMIN — ACETAMINOPHEN 650 MG: 325 TABLET ORAL at 14:43

## 2025-03-30 ASSESSMENT — PAIN DESCRIPTION - ORIENTATION: ORIENTATION: MID;LEFT;RIGHT

## 2025-03-30 ASSESSMENT — PAIN DESCRIPTION - LOCATION
LOCATION: HIP;LEG;BACK
LOCATION: BACK;LEG;HIP

## 2025-03-30 ASSESSMENT — PAIN SCALES - GENERAL
PAINLEVEL_OUTOF10: 10
PAINLEVEL_OUTOF10: 8

## 2025-03-30 ASSESSMENT — PAIN DESCRIPTION - DESCRIPTORS
DESCRIPTORS: ACHING
DESCRIPTORS: ACHING

## 2025-03-30 ASSESSMENT — PAIN DESCRIPTION - PAIN TYPE: TYPE: CHRONIC PAIN

## 2025-03-30 NOTE — PLAN OF CARE
Problem: Chronic Conditions and Co-morbidities  Goal: Patient's chronic conditions and co-morbidity symptoms are monitored and maintained or improved  3/29/2025 2222 by Yamilka Maurice RN  Outcome: Progressing  Flowsheets (Taken 3/29/2025 2000)  Care Plan - Patient's Chronic Conditions and Co-Morbidity Symptoms are Monitored and Maintained or Improved: Monitor and assess patient's chronic conditions and comorbid symptoms for stability, deterioration, or improvement  3/29/2025 1156 by Mari Fitch RN  Outcome: Progressing     Problem: Discharge Planning  Goal: Discharge to home or other facility with appropriate resources  3/29/2025 2222 by Yamilka Maurice RN  Outcome: Progressing  Flowsheets (Taken 3/29/2025 2000)  Discharge to home or other facility with appropriate resources: Identify barriers to discharge with patient and caregiver  3/29/2025 1156 by Mari Fitch RN  Outcome: Progressing     Problem: Pain  Goal: Verbalizes/displays adequate comfort level or baseline comfort level  3/29/2025 2223 by Yamilka Maurice RN  Outcome: Progressing  3/29/2025 2222 by Yamilka Maurice RN  Outcome: Progressing  3/29/2025 1156 by Mari Fitch RN  Outcome: Progressing  Flowsheets (Taken 3/29/2025 0340 by Yamilka Maurice RN)  Verbalizes/displays adequate comfort level or baseline comfort level: Encourage patient to monitor pain and request assistance     Problem: ABCDS Injury Assessment  Goal: Absence of physical injury  3/29/2025 2223 by Yamilka Maurice RN  Outcome: Progressing  3/29/2025 2222 by Yamilka Maurice RN  Outcome: Progressing  Flowsheets  Taken 3/29/2025 2000 by Yamilka Maurice RN  Absence of Physical Injury: Implement safety measures based on patient assessment  Taken 3/29/2025 1600 by Mari Fitch RN  Absence of Physical Injury: Implement safety measures based on patient assessment  3/29/2025 1156 by Mari Fitch RN  Outcome: Progressing     Problem:

## 2025-03-30 NOTE — PROGRESS NOTES
Moderate assistance;Increased time to complete;Setup;Verbal cueing (demo ability to reach to tops of knees w/assist needed to reach lower legs and feet)  UE Dressing: Stand by assistance;Setup;Verbal cueing;Increased time to complete (demo assist needed to doff/don gown)  LE Dressing: Maximum assistance;Setup;Verbal cueing;Increased time to complete (demo assist needed to doff/don footies and pj bottoms)  Toileting: Unable to assess (using urinal at chair side when needed)  Additional Comments: Pt in bed upon arrival. Per pt showered earlier this am. Pt transferred to EOB w/SBA. STS and func mob from EOB to recliner w/min assist using straight cane. Pt is unsteady on feet, taking short choppy steps. Pt retired to recliner and was able to demo ability to reach U/LB for self care (see above for LOF). STS at chsir side w/min assist to complete functional reaching and dyamic standing balance activity ~5 min. Pt retired to recliner w/BLE elevated. Pt needs increased time and assist d/t fatigue, pain, physical limitations and overall weakness.    Balance  Balance  Sitting: Without support;With support (seated at EOB and in recliner ~35 min w/SBA)  Standing: With support (stood ~8 min total for transfers, func mob and functional reaching at tray table w//Min A, unsteady on feet w/unilateral support)    Transfers/Mobility  Bed mobility  Supine to Sit: Stand by assistance (w/HOB elevated ~30 degrees)  Sit to Supine: Unable to assess (left up in chair)  Transfers  Sit to stand: Minimal assistance  Stand to sit: Minimal assistance  Functional Mobility: Minimal assistance;Setup;Increased time to complete;Adaptive equipment (w/straight cane, unsteady on feet)     Patient Education  Patient Education  Education Given To: Patient  Education Provided: Role of Therapy;Plan of Care;Precautions;ADL Adaptive Strategies;Transfer Training;Energy Conservation;IADL Safety;Equipment;Fall Prevention Strategies;Orientation  Education Method:

## 2025-03-30 NOTE — PLAN OF CARE
Problem: Respiratory - Adult  Goal: Achieves optimal ventilation and oxygenation  Outcome: Progressing

## 2025-03-30 NOTE — PLAN OF CARE
Problem: Chronic Conditions and Co-morbidities  Goal: Patient's chronic conditions and co-morbidity symptoms are monitored and maintained or improved  3/30/2025 0716 by Mari Fitch RN  Outcome: Progressing  3/29/2025 2222 by Yamilka Maurice RN  Outcome: Progressing  Flowsheets (Taken 3/29/2025 2000)  Care Plan - Patient's Chronic Conditions and Co-Morbidity Symptoms are Monitored and Maintained or Improved: Monitor and assess patient's chronic conditions and comorbid symptoms for stability, deterioration, or improvement     Problem: Discharge Planning  Goal: Discharge to home or other facility with appropriate resources  3/30/2025 0716 by Mari Fitch RN  Outcome: Progressing  3/29/2025 2222 by Yamilka Maurice RN  Outcome: Progressing  Flowsheets (Taken 3/29/2025 2000)  Discharge to home or other facility with appropriate resources: Identify barriers to discharge with patient and caregiver     Problem: Pain  Goal: Verbalizes/displays adequate comfort level or baseline comfort level  3/30/2025 0716 by Mari Fitch RN  Outcome: Progressing  Flowsheets  Taken 3/30/2025 0415 by Yamilka Maurice RN  Verbalizes/displays adequate comfort level or baseline comfort level: Encourage patient to monitor pain and request assistance  Taken 3/29/2025 2320 by Yamilka Maurice RN  Verbalizes/displays adequate comfort level or baseline comfort level: Encourage patient to monitor pain and request assistance  3/29/2025 2223 by Yamilka Maurice RN  Outcome: Progressing  3/29/2025 2222 by Yamilka Maurice RN  Outcome: Progressing  Flowsheets (Taken 3/29/2025 1940)  Verbalizes/displays adequate comfort level or baseline comfort level: Encourage patient to monitor pain and request assistance     Problem: ABCDS Injury Assessment  Goal: Absence of physical injury  3/30/2025 0716 by Mari Fitch RN  Outcome: Progressing  3/29/2025 2223 by Yamilka Maurice RN  Outcome: Progressing  3/29/2025

## 2025-03-30 NOTE — PROGRESS NOTES
03/30/25 0804   Care Plan - Respiratory Goals   Achieves optimal ventilation and oxygenation Assess for changes in respiratory status;Assess for changes in mentation and behavior;Position to facilitate oxygenation and minimize respiratory effort;Oxygen supplementation based on oxygen saturation or arterial blood gases;Encourage broncho-pulmonary hygiene including cough, deep breathe, incentive spirometry;Assess the need for suctioning and aspirate as needed;Respiratory therapy support as indicated;Assess and instruct to report shortness of breath or any respiratory difficulty

## 2025-03-31 ENCOUNTER — APPOINTMENT (OUTPATIENT)
Age: 65
DRG: 193 | End: 2025-03-31
Attending: INTERNAL MEDICINE
Payer: MEDICARE

## 2025-03-31 LAB
ECHO AO ROOT DIAM: 3.6 CM
ECHO AO ROOT INDEX: 1.61 CM/M2
ECHO AV AREA PEAK VELOCITY: 1 CM2
ECHO AV AREA VTI: 1 CM2
ECHO AV AREA/BSA PEAK VELOCITY: 0.4 CM2/M2
ECHO AV AREA/BSA VTI: 0.4 CM2/M2
ECHO AV MEAN GRADIENT: 23 MMHG
ECHO AV MEAN VELOCITY: 2.3 M/S
ECHO AV PEAK GRADIENT: 41 MMHG
ECHO AV PEAK VELOCITY: 3.2 M/S
ECHO AV VELOCITY RATIO: 0.31
ECHO AV VTI: 53.6 CM
ECHO BSA: 2.28 M2
ECHO LA AREA 2C: 15.2 CM2
ECHO LA AREA 4C: 19.9 CM2
ECHO LA DIAMETER INDEX: 1.57 CM/M2
ECHO LA DIAMETER: 3.5 CM
ECHO LA MAJOR AXIS: 6.3 CM
ECHO LA MINOR AXIS: 4.6 CM
ECHO LA TO AORTIC ROOT RATIO: 0.97
ECHO LA VOL MOD A2C: 40 ML (ref 18–58)
ECHO LA VOL MOD A4C: 50 ML (ref 18–58)
ECHO LA VOLUME INDEX MOD A2C: 18 ML/M2 (ref 16–34)
ECHO LA VOLUME INDEX MOD A4C: 22 ML/M2 (ref 16–34)
ECHO LV E' LATERAL VELOCITY: 7.72 CM/S
ECHO LV E' SEPTAL VELOCITY: 8.16 CM/S
ECHO LV EDV A2C: 60 ML
ECHO LV EDV A4C: 86 ML
ECHO LV EDV INDEX A4C: 39 ML/M2
ECHO LV EDV NDEX A2C: 27 ML/M2
ECHO LV EF PHYSICIAN: 53 %
ECHO LV EJECTION FRACTION A2C: 53 %
ECHO LV EJECTION FRACTION A4C: 53 %
ECHO LV EJECTION FRACTION BIPLANE: 53 % (ref 55–100)
ECHO LV ESV A2C: 28 ML
ECHO LV ESV A4C: 41 ML
ECHO LV ESV INDEX A2C: 13 ML/M2
ECHO LV ESV INDEX A4C: 18 ML/M2
ECHO LV FRACTIONAL SHORTENING: 38 % (ref 28–44)
ECHO LV INTERNAL DIMENSION DIASTOLE INDEX: 2.24 CM/M2
ECHO LV INTERNAL DIMENSION DIASTOLIC: 5 CM (ref 4.2–5.9)
ECHO LV INTERNAL DIMENSION SYSTOLIC INDEX: 1.39 CM/M2
ECHO LV INTERNAL DIMENSION SYSTOLIC: 3.1 CM
ECHO LV IVSD: 1.5 CM (ref 0.6–1)
ECHO LV MASS 2D: 247.6 G (ref 88–224)
ECHO LV MASS INDEX 2D: 111 G/M2 (ref 49–115)
ECHO LV POSTERIOR WALL DIASTOLIC: 1 CM (ref 0.6–1)
ECHO LV RELATIVE WALL THICKNESS RATIO: 0.4
ECHO LVOT AREA: 3.1 CM2
ECHO LVOT AV VTI INDEX: 0.31
ECHO LVOT DIAM: 2 CM
ECHO LVOT MEAN GRADIENT: 2 MMHG
ECHO LVOT PEAK GRADIENT: 4 MMHG
ECHO LVOT PEAK VELOCITY: 1 M/S
ECHO LVOT STROKE VOLUME INDEX: 23.4 ML/M2
ECHO LVOT SV: 52.1 ML
ECHO LVOT VTI: 16.6 CM
ECHO MV A VELOCITY: 0.74 M/S
ECHO MV AREA VTI: 3.6 CM2
ECHO MV E DECELERATION TIME (DT): 135 MS
ECHO MV E VELOCITY: 0.68 M/S
ECHO MV E/A RATIO: 0.92
ECHO MV E/E' LATERAL: 8.81
ECHO MV E/E' RATIO (AVERAGED): 8.57
ECHO MV E/E' SEPTAL: 8.33
ECHO MV LVOT VTI INDEX: 0.86
ECHO MV MAX VELOCITY: 0.8 M/S
ECHO MV MEAN GRADIENT: 1 MMHG
ECHO MV MEAN VELOCITY: 0.6 M/S
ECHO MV PEAK GRADIENT: 3 MMHG
ECHO MV VTI: 14.3 CM
ECHO PV MAX VELOCITY: 1.2 M/S
ECHO PV PEAK GRADIENT: 6 MMHG
ECHO RV BASAL DIMENSION: 3.6 CM
ECHO RV FREE WALL PEAK S': 18 CM/S
ECHO RV TAPSE: 2.4 CM (ref 1.7–?)

## 2025-03-31 PROCEDURE — 6370000000 HC RX 637 (ALT 250 FOR IP): Performed by: INTERNAL MEDICINE

## 2025-03-31 PROCEDURE — 6360000002 HC RX W HCPCS: Performed by: NURSE PRACTITIONER

## 2025-03-31 PROCEDURE — 6370000000 HC RX 637 (ALT 250 FOR IP): Performed by: NURSE PRACTITIONER

## 2025-03-31 PROCEDURE — 97116 GAIT TRAINING THERAPY: CPT

## 2025-03-31 PROCEDURE — 6370000000 HC RX 637 (ALT 250 FOR IP): Performed by: HOSPITALIST

## 2025-03-31 PROCEDURE — 94640 AIRWAY INHALATION TREATMENT: CPT

## 2025-03-31 PROCEDURE — 2060000000 HC ICU INTERMEDIATE R&B

## 2025-03-31 PROCEDURE — 2500000003 HC RX 250 WO HCPCS: Performed by: NURSE PRACTITIONER

## 2025-03-31 PROCEDURE — 99222 1ST HOSP IP/OBS MODERATE 55: CPT

## 2025-03-31 PROCEDURE — 99232 SBSQ HOSP IP/OBS MODERATE 35: CPT | Performed by: HOSPITALIST

## 2025-03-31 PROCEDURE — 93306 TTE W/DOPPLER COMPLETE: CPT

## 2025-03-31 PROCEDURE — 6370000000 HC RX 637 (ALT 250 FOR IP): Performed by: PSYCHIATRY & NEUROLOGY

## 2025-03-31 PROCEDURE — 94761 N-INVAS EAR/PLS OXIMETRY MLT: CPT

## 2025-03-31 PROCEDURE — 6370000000 HC RX 637 (ALT 250 FOR IP)

## 2025-03-31 PROCEDURE — 93306 TTE W/DOPPLER COMPLETE: CPT | Performed by: INTERNAL MEDICINE

## 2025-03-31 PROCEDURE — 99232 SBSQ HOSP IP/OBS MODERATE 35: CPT

## 2025-03-31 PROCEDURE — 97110 THERAPEUTIC EXERCISES: CPT

## 2025-03-31 RX ORDER — HYDROCODONE BITARTRATE AND ACETAMINOPHEN 5; 325 MG/1; MG/1
1 TABLET ORAL EVERY 6 HOURS PRN
Status: DISCONTINUED | OUTPATIENT
Start: 2025-03-31 | End: 2025-04-03 | Stop reason: HOSPADM

## 2025-03-31 RX ORDER — SPIRONOLACTONE 25 MG/1
25 TABLET ORAL DAILY
Status: DISCONTINUED | OUTPATIENT
Start: 2025-03-31 | End: 2025-04-03 | Stop reason: HOSPADM

## 2025-03-31 RX ORDER — GUAIFENESIN 600 MG/1
600 TABLET, EXTENDED RELEASE ORAL 2 TIMES DAILY
Status: DISCONTINUED | OUTPATIENT
Start: 2025-03-31 | End: 2025-04-03 | Stop reason: HOSPADM

## 2025-03-31 RX ADMIN — WATER 1000 MG: 1 INJECTION INTRAMUSCULAR; INTRAVENOUS; SUBCUTANEOUS at 05:03

## 2025-03-31 RX ADMIN — ACETAMINOPHEN 650 MG: 325 TABLET ORAL at 09:26

## 2025-03-31 RX ADMIN — SODIUM CHLORIDE, PRESERVATIVE FREE 10 ML: 5 INJECTION INTRAVENOUS at 09:27

## 2025-03-31 RX ADMIN — LACOSAMIDE 100 MG: 100 TABLET, FILM COATED ORAL at 20:45

## 2025-03-31 RX ADMIN — IPRATROPIUM BROMIDE AND ALBUTEROL SULFATE 1 DOSE: .5; 2.5 SOLUTION RESPIRATORY (INHALATION) at 07:55

## 2025-03-31 RX ADMIN — SACUBITRIL AND VALSARTAN 1 TABLET: 24; 26 TABLET, FILM COATED ORAL at 09:19

## 2025-03-31 RX ADMIN — CHLORDIAZEPOXIDE HYDROCHLORIDE 10 MG: 10 CAPSULE ORAL at 09:20

## 2025-03-31 RX ADMIN — AMLODIPINE BESYLATE 10 MG: 10 TABLET ORAL at 09:20

## 2025-03-31 RX ADMIN — CHLORDIAZEPOXIDE HYDROCHLORIDE 10 MG: 10 CAPSULE ORAL at 12:53

## 2025-03-31 RX ADMIN — SODIUM CHLORIDE, PRESERVATIVE FREE 10 ML: 5 INJECTION INTRAVENOUS at 21:01

## 2025-03-31 RX ADMIN — METOPROLOL SUCCINATE 50 MG: 50 TABLET, EXTENDED RELEASE ORAL at 09:27

## 2025-03-31 RX ADMIN — RIVAROXABAN 20 MG: 20 TABLET, FILM COATED ORAL at 20:47

## 2025-03-31 RX ADMIN — Medication 100 MG: at 09:20

## 2025-03-31 RX ADMIN — GUAIFENESIN 600 MG: 600 TABLET, EXTENDED RELEASE ORAL at 12:56

## 2025-03-31 RX ADMIN — IPRATROPIUM BROMIDE AND ALBUTEROL SULFATE 1 DOSE: .5; 2.5 SOLUTION RESPIRATORY (INHALATION) at 14:05

## 2025-03-31 RX ADMIN — IPRATROPIUM BROMIDE AND ALBUTEROL SULFATE 1 DOSE: .5; 2.5 SOLUTION RESPIRATORY (INHALATION) at 20:50

## 2025-03-31 RX ADMIN — LACOSAMIDE 100 MG: 100 TABLET, FILM COATED ORAL at 09:19

## 2025-03-31 RX ADMIN — SPIRONOLACTONE 25 MG: 25 TABLET, FILM COATED ORAL at 13:10

## 2025-03-31 RX ADMIN — SACUBITRIL AND VALSARTAN 1 TABLET: 24; 26 TABLET, FILM COATED ORAL at 20:45

## 2025-03-31 RX ADMIN — DONEPEZIL HYDROCHLORIDE 5 MG: 5 TABLET ORAL at 12:19

## 2025-03-31 RX ADMIN — GUAIFENESIN 600 MG: 600 TABLET, EXTENDED RELEASE ORAL at 20:45

## 2025-03-31 RX ADMIN — CHLORDIAZEPOXIDE HYDROCHLORIDE 10 MG: 10 CAPSULE ORAL at 20:45

## 2025-03-31 RX ADMIN — HYDROCODONE BITARTRATE AND ACETAMINOPHEN 1 TABLET: 5; 325 TABLET ORAL at 12:53

## 2025-03-31 RX ADMIN — HYDROCODONE BITARTRATE AND ACETAMINOPHEN 1 TABLET: 5; 325 TABLET ORAL at 20:45

## 2025-03-31 ASSESSMENT — PAIN DESCRIPTION - PAIN TYPE
TYPE: CHRONIC PAIN
TYPE: CHRONIC PAIN

## 2025-03-31 ASSESSMENT — PAIN SCALES - GENERAL
PAINLEVEL_OUTOF10: 10
PAINLEVEL_OUTOF10: 4
PAINLEVEL_OUTOF10: 10
PAINLEVEL_OUTOF10: 8
PAINLEVEL_OUTOF10: 8
PAINLEVEL_OUTOF10: 7

## 2025-03-31 ASSESSMENT — PAIN DESCRIPTION - ORIENTATION
ORIENTATION: RIGHT;LEFT;UPPER
ORIENTATION: RIGHT

## 2025-03-31 ASSESSMENT — PAIN DESCRIPTION - LOCATION
LOCATION: HIP;LEG
LOCATION: HIP

## 2025-03-31 ASSESSMENT — PAIN DESCRIPTION - DESCRIPTORS
DESCRIPTORS: ACHING

## 2025-03-31 NOTE — PROGRESS NOTES
Cardiology Progress Note                     Date:   3/31/2025  Patient name: Mahesh Brownlee  Date of admission:  3/27/2025  3:37 AM  MRN:   [id number]  YOB: 1960  PCP: Ira Roberts, APRN - CNP    Reason for Admission:  encephalopathy     Subjective:       No acute events overnight, remained hemodynamically stable, denies chest pain, or palpitations. Complains of PARKER. Remains encephalopathic. ST on telemetry, BP stable, on RA.       Scheduled Meds:   spironolactone  25 mg Oral Daily    metoprolol succinate  50 mg Oral Daily    sacubitril-valsartan  1 tablet Oral BID    rivaroxaban  20 mg Oral Daily    donepezil  5 mg Oral Daily    chlordiazePOXIDE  10 mg Oral TID    nicotine  1 patch TransDERmal Daily    sodium chloride flush  5-40 mL IntraVENous 2 times per day    cefTRIAXone (ROCEPHIN) IV  1,000 mg IntraVENous Q24H    thiamine  100 mg Oral Daily    ipratropium 0.5 mg-albuterol 2.5 mg  1 Dose Inhalation Q6H WA RT    lacosamide  100 mg Oral BID       Continuous Infusions:   sodium chloride 25 mL/hr at 03/28/25 0517       Labs:     CBC: No results for input(s): \"WBC\", \"HGB\", \"PLT\" in the last 72 hours.  BMP:    Recent Labs     03/29/25  0815 03/30/25  1919     --    K 3.0* 3.4*     --    CO2 20  --    BUN 7*  --    CREATININE 0.6*  --    GLUCOSE 92  --      Hepatic: No results for input(s): \"AST\", \"ALT\", \"BILITOT\", \"ALKPHOS\" in the last 72 hours.    Invalid input(s): \"ALB\"  Troponin: No results for input(s): \"TROPONINI\" in the last 72 hours.  BNP: No results for input(s): \"BNP\" in the last 72 hours.  Lipids: No results for input(s): \"CHOL\", \"HDL\" in the last 72 hours.    Invalid input(s): \"LDLCALCU\"  INR: No results for input(s): \"INR\" in the last 72 hours.      Objective:     Vitals: /67   Pulse 97   Temp 98.6 °F (37 °C) (Oral)   Resp 13   Ht 1.803 m (5' 11\")   Wt 104.2 kg (229 lb 11.5 oz)   SpO2 98%   BMI 32.04 kg/m²     General appearance: awake, alert, in

## 2025-03-31 NOTE — PROGRESS NOTES
[facility]   IN-PATIENT SERVICE   [facility]    Progress Note    3/31/2025    10:27 AM    Name:   Mahesh Brownlee  MRN:     [id number]     Acct:      [id number]   Room:   0537/0537-01   Day:  4  Admit Date:  3/27/2025  3:37 AM    PCP:   Ira Roberts APRN - CNP  Code Status:  Full Code    Subjective:     Interval History Status: improved.     Patient is alert oriented to place and was able to tell me the year and the president's name.  Continues to say that he does not remember his underlying issues and how they were treated.  Denies knowledge of any family member that is involved in his care.  No change since yesterday    Per H&P: Mahesh Brownlee is a 64 y.o. Non- / non  male who presents with Altered Mental Status   and is admitted to the hospital for the management of Pneumonia of left upper lobe due to infectious organism.     This 64 yom presents to the hospital with ams.  He has no recollection of how he got here, nor why he is here.  He admits to sob and cough with cp from coughing but does not know anything else.  He might have had fevers and chills but is unsure.      Medications:     Allergies:    Allergies   Allergen Reactions    Augmentin Es-600 [Amoxicillin-Pot Clavulanate]      Other reaction(s): Unknown    Sulfa Antibiotics Other (See Comments)       Current Meds:   Scheduled Meds:    spironolactone  25 mg Oral Daily    metoprolol succinate  50 mg Oral Daily    sacubitril-valsartan  1 tablet Oral BID    rivaroxaban  20 mg Oral Daily    donepezil  5 mg Oral Daily    chlordiazePOXIDE  10 mg Oral TID    nicotine  1 patch TransDERmal Daily    sodium chloride flush  5-40 mL IntraVENous 2 times per day    cefTRIAXone (ROCEPHIN) IV  1,000 mg IntraVENous Q24H    thiamine  100 mg Oral Daily    ipratropium 0.5 mg-albuterol 2.5 mg  1 Dose Inhalation Q6H WA RT    lacosamide  100 mg Oral BID     Continuous Infusions:    sodium chloride 25 mL/hr at 03/28/25  Yes    Alcohol use disorder, severe, dependence (HCC) 3/27/2025 Yes    Tobacco abuse 3/27/2025 Yes    Paroxysmal atrial fibrillation (HCC) 3/27/2025 Yes    COPD without exacerbation (HCC) 3/27/2025 Yes    CAD (coronary artery disease) 3/27/2025 Yes    Hypothyroidism 3/27/2025 Yes    Seizure (HCC) 3/27/2025 Yes    Toxic metabolic encephalopathy 3/27/2025 Yes    History of CVA (cerebrovascular accident) 3/27/2025 Yes    Amnesia 3/27/2025 Yes    Substance use disorder 3/27/2025 Yes    ICD (implantable cardioverter-defibrillator) in place 3/27/2025 Yes    History of seizures 3/27/2025 Yes    Non compliance w medication regimen 3/27/2025 Yes       Plan:        Acute toxic/metabolic encephalopathy: Appears to be multifactorial related to pneumonia and being positive for narcotics and benzodiazepine, patient continues to have amnesia and does not remember recent events or on medications.  He does have a history of adjustment disorder, will try to obtain psych consult if available (pending)  Pneumonia presumed bacterial continue Rocephin and azithromycin  History of seizure in the setting of alcohol withdrawal: Currently does not show any signs of alcohol withdrawal continue to monitor.  Appreciate neurology input continue antiseizure medications.  History of Multiple comorbidities including paroxysmal atrial fibrillation, COPD and coronary artery disease status post ICD: Medically appears to be stable.  It was reported that patient was under hospice and not clear what medication he has been taking.  Awaiting confirmation of home medications to reconcile.  Hypertension: Norvasc has been added on 3/29/2025, will increase metoprolol to 50 p.o. twice daily and continue Entresto will continue to monitor and adjust treatment.  PT OT and discharge planning.  Start small dose Librium as patient is high risk of going into alcohol withdrawal.  Mental status is improving.  Continue current treatment.  Consult  for

## 2025-03-31 NOTE — PROGRESS NOTES
Physical Therapy  Facility/Department: [address] STEPDOWN   Physical Therapy Daily Treatment Note    Patient Name: Mahesh Brownlee        MRN: [id number]    : 1960    Date of Service: 3/31/2025    Chief Complaint   Patient presents with    Altered Mental Status     Past Medical History:  has a past medical history of Adjustment disorder, AF (atrial fibrillation) (HCC), AICD (automatic cardioverter/defibrillator) present, Alcohol dependence (HCC), CAD (coronary artery disease), Cardiomyopathy (HCC), CHF (congestive heart failure) (HCC), COPD (chronic obstructive pulmonary disease) (HCC), DDD (degenerative disc disease), lumbar, Herniated cervical disc, Hyperlipidemia, Hypertension, Major depression, Post laminectomy syndrome, Sciatica, Snores, Tobacco abuse, and Traumatic brain injury (HCC).  Past Surgical History:  has a past surgical history that includes back surgery; Rotator cuff repair (Right); Carpal tunnel release (Right); Nerve Block (2013); Nerve Block (N/A, 2013); other surgical history (2016); Tonsillectomy; pacemaker placement (2015); back surgery; other surgical history (2018); joint replacement (Right, 2018); and Cardiac electrophysiology study and ablation.    Discharge Recommendations  Discharge Recommendations: Patient would benefit from continued therapy after discharge  PT Equipment Recommendations  Equipment Needed: Yes  Mobility Devices: Walker  Walker: Rolling  Other: Pt currently unsafe to attempt any aspect of mobility without skilled assistance; has SPC but amb safer with RW    Assessment  Body Structures, Functions, Activity Limitations Requiring Skilled Therapeutic Intervention: Decreased functional mobility ;Decreased strength;Increased pain;Decreased posture;Decreased ROM;Decreased endurance;Decreased balance;Decreased safe awareness  Assessment: Pt ambulated 15' RW CGA and 5' SPC [place]; ambulation distance limited due to pain. Pt CGA to perform functional

## 2025-03-31 NOTE — CARE COORDINATION
Case Management   Daily Progress Note       Patient Name: Mahesh Brownlee                   YOB: 1960  Diagnosis: Atypical pneumonia [J18.9]  HFrEF (heart failure with reduced ejection fraction) ([facility]) [I50.20]  Altered mental status, unspecified altered mental status type [R41.82]  Pneumonia due to infectious organism [J18.9]                       GMLOS: 4 days  Length of Stay: 4  days    Anticipated Discharge Date: Two or more days before discharge    Readmission Risk (Low < 19, Mod (19-27), High > 27): Readmission Risk Score: 24.5        Current Transitional Plan    [] Home Independently    [] Home with HC    [] Skilled Nursing Facility    [] Acute Rehabilitation    [] Long Term Acute Care (LTAC)    [x] Other:     Plan for the Stay (Medical Management) :          Workflow Continuation (Additional Notes) : Psych to see AMS, SNF VS Long Term Placement d/t decreased mobility, homeless, AMS, no family support                       Post Acute Facility/Agency List     Provided patient with the following list, the list includes the overall star ratings obtained from CMS per the Medicare Web site (www.Medicare.gov):     [] Long Term Acute Care Facilities  [] Acute Inpatient Rehabilitation Facilities  [x] Skilled Nursing Facilities  [] Hospice Facilities  [] Home Care                      PALMA GUERRA RN  March 31, 2025
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
## 2025-03-31 NOTE — PLAN OF CARE
Problem: Chronic Conditions and Co-morbidities  Goal: Patient's chronic conditions and co-morbidity symptoms are monitored and maintained or improved  Outcome: Progressing  Flowsheets (Taken 3/30/2025 2000)  Care Plan - Patient's Chronic Conditions and Co-Morbidity Symptoms are Monitored and Maintained or Improved: Monitor and assess patient's chronic conditions and comorbid symptoms for stability, deterioration, or improvement     Problem: Discharge Planning  Goal: Discharge to home or other facility with appropriate resources  Outcome: Progressing  Flowsheets (Taken 3/30/2025 2000)  Discharge to home or other facility with appropriate resources: Identify barriers to discharge with patient and caregiver     Problem: Pain  Goal: Verbalizes/displays adequate comfort level or baseline comfort level  Outcome: Progressing  Flowsheets (Taken 3/30/2025 1940)  Verbalizes/displays adequate comfort level or baseline comfort level: Encourage patient to monitor pain and request assistance     Problem: ABCDS Injury Assessment  Goal: Absence of physical injury  Outcome: Progressing  Flowsheets (Taken 3/30/2025 2000)  Absence of Physical Injury: Implement safety measures based on patient assessment     Problem: Safety - Adult  Goal: Free from fall injury  Outcome: Progressing  Flowsheets (Taken 3/30/2025 2000)  Free From Fall Injury: Instruct family/caregiver on patient safety     Problem: Respiratory - Adult  Goal: Achieves optimal ventilation and oxygenation  3/30/2025 2312 by Yamilka Maurice RN  Outcome: Progressing  3/30/2025 2114 by Rosangela Barkley RCP  Outcome: Progressing  Flowsheets  Taken 3/30/2025 2000 by Yamilka Maurice RN  Achieves optimal ventilation and oxygenation: Assess for changes in respiratory status  Taken 3/30/2025 0804 by Mari Fitch RN  Achieves optimal ventilation and oxygenation:   Assess for changes in respiratory status   Assess for changes in mentation and behavior   Position to  facilitate oxygenation and minimize respiratory effort   Oxygen supplementation based on oxygen saturation or arterial blood gases   Encourage broncho-pulmonary hygiene including cough, deep breathe, incentive spirometry   Assess the need for suctioning and aspirate as needed   Respiratory therapy support as indicated   Assess and instruct to report shortness of breath or any respiratory difficulty   Initiate smoking cessation protocol as indicated  Taken 3/30/2025 0800 by Mari Fitch RN  Achieves optimal ventilation and oxygenation:   Assess for changes in respiratory status   Position to facilitate oxygenation and minimize respiratory effort   Oxygen supplementation based on oxygen saturation or arterial blood gases   Assess for changes in mentation and behavior

## 2025-03-31 NOTE — PLAN OF CARE
Problem: Respiratory - Adult  Goal: Achieves optimal ventilation and oxygenation  3/31/2025 0756 by Johanny Ruano RCP  Outcome: Progressing  Flowsheets (Taken 3/31/2025 0756)  Achieves optimal ventilation and oxygenation:   Assess for changes in respiratory status   Position to facilitate oxygenation and minimize respiratory effort   Respiratory therapy support as indicated   Assess and instruct to report shortness of breath or any respiratory difficulty  3/30/2025 2312 by Yamilka Maurice RN  Outcome: Progressing  3/30/2025 2114 by Rosangela Barkley RCP  Outcome: Progressing  Flowsheets  Taken 3/30/2025 2000 by Yamilka Maurice RN  Achieves optimal ventilation and oxygenation: Assess for changes in respiratory status  Taken 3/30/2025 0804 by Mari Fitch RN  Achieves optimal ventilation and oxygenation:   Assess for changes in respiratory status   Assess for changes in mentation and behavior   Position to facilitate oxygenation and minimize respiratory effort   Oxygen supplementation based on oxygen saturation or arterial blood gases   Encourage broncho-pulmonary hygiene including cough, deep breathe, incentive spirometry   Assess the need for suctioning and aspirate as needed   Respiratory therapy support as indicated   Assess and instruct to report shortness of breath or any respiratory difficulty   Initiate smoking cessation protocol as indicated  Taken 3/30/2025 0800 by Mari Fitch RN  Achieves optimal ventilation and oxygenation:   Assess for changes in respiratory status   Position to facilitate oxygenation and minimize respiratory effort   Oxygen supplementation based on oxygen saturation or arterial blood gases   Assess for changes in mentation and behavior

## 2025-03-31 NOTE — PLAN OF CARE
Problem: Respiratory - Adult  Goal: Achieves optimal ventilation and oxygenation  3/30/2025 2114 by Rosangela Barkley RCP  Outcome: Progressing

## 2025-04-01 PROBLEM — J18.9 PNEUMONIA OF LEFT UPPER LOBE DUE TO INFECTIOUS ORGANISM: Status: RESOLVED | Noted: 2024-12-23 | Resolved: 2025-04-01

## 2025-04-01 PROCEDURE — 2060000000 HC ICU INTERMEDIATE R&B

## 2025-04-01 PROCEDURE — 97116 GAIT TRAINING THERAPY: CPT

## 2025-04-01 PROCEDURE — 97535 SELF CARE MNGMENT TRAINING: CPT

## 2025-04-01 PROCEDURE — 6360000002 HC RX W HCPCS: Performed by: NURSE PRACTITIONER

## 2025-04-01 PROCEDURE — 99232 SBSQ HOSP IP/OBS MODERATE 35: CPT | Performed by: STUDENT IN AN ORGANIZED HEALTH CARE EDUCATION/TRAINING PROGRAM

## 2025-04-01 PROCEDURE — 6370000000 HC RX 637 (ALT 250 FOR IP): Performed by: PSYCHIATRY & NEUROLOGY

## 2025-04-01 PROCEDURE — 94640 AIRWAY INHALATION TREATMENT: CPT

## 2025-04-01 PROCEDURE — 6370000000 HC RX 637 (ALT 250 FOR IP)

## 2025-04-01 PROCEDURE — 97110 THERAPEUTIC EXERCISES: CPT

## 2025-04-01 PROCEDURE — 6370000000 HC RX 637 (ALT 250 FOR IP): Performed by: STUDENT IN AN ORGANIZED HEALTH CARE EDUCATION/TRAINING PROGRAM

## 2025-04-01 PROCEDURE — 6370000000 HC RX 637 (ALT 250 FOR IP): Performed by: INTERNAL MEDICINE

## 2025-04-01 PROCEDURE — 94761 N-INVAS EAR/PLS OXIMETRY MLT: CPT

## 2025-04-01 PROCEDURE — 2500000003 HC RX 250 WO HCPCS: Performed by: NURSE PRACTITIONER

## 2025-04-01 PROCEDURE — 6370000000 HC RX 637 (ALT 250 FOR IP): Performed by: HOSPITALIST

## 2025-04-01 PROCEDURE — 99232 SBSQ HOSP IP/OBS MODERATE 35: CPT | Performed by: INTERNAL MEDICINE

## 2025-04-01 RX ORDER — ALBUTEROL SULFATE 0.83 MG/ML
2.5 SOLUTION RESPIRATORY (INHALATION)
Qty: 120 EACH | Refills: 3 | Status: SHIPPED | OUTPATIENT
Start: 2025-04-01

## 2025-04-01 RX ORDER — METOPROLOL SUCCINATE 50 MG/1
50 TABLET, EXTENDED RELEASE ORAL DAILY
Qty: 30 TABLET | Refills: 3 | Status: SHIPPED | OUTPATIENT
Start: 2025-04-02

## 2025-04-01 RX ORDER — LACOSAMIDE 100 MG/1
100 TABLET ORAL 2 TIMES DAILY
COMMUNITY

## 2025-04-01 RX ORDER — LACOSAMIDE 100 MG/1
100 TABLET ORAL 2 TIMES DAILY
Status: DISCONTINUED | OUTPATIENT
Start: 2025-04-01 | End: 2025-04-01

## 2025-04-01 RX ORDER — LACOSAMIDE 100 MG/1
100 TABLET ORAL 2 TIMES DAILY
Qty: 60 TABLET | Refills: 0 | OUTPATIENT
Start: 2025-04-01 | End: 2025-05-01

## 2025-04-01 RX ORDER — IPRATROPIUM BROMIDE AND ALBUTEROL SULFATE 2.5; .5 MG/3ML; MG/3ML
3 SOLUTION RESPIRATORY (INHALATION)
Qty: 360 ML | Refills: 0 | Status: SHIPPED | OUTPATIENT
Start: 2025-04-01

## 2025-04-01 RX ORDER — CEPHALEXIN 500 MG/1
500 CAPSULE ORAL 4 TIMES DAILY
Qty: 12 CAPSULE | Refills: 0 | Status: SHIPPED | OUTPATIENT
Start: 2025-04-01 | End: 2025-04-04

## 2025-04-01 RX ORDER — GUAIFENESIN 600 MG/1
600 TABLET, EXTENDED RELEASE ORAL 2 TIMES DAILY
Qty: 20 TABLET | Refills: 0 | Status: SHIPPED | OUTPATIENT
Start: 2025-04-01 | End: 2025-04-11

## 2025-04-01 RX ORDER — LACOSAMIDE 100 MG/1
100 TABLET ORAL 2 TIMES DAILY
Status: DISCONTINUED | OUTPATIENT
Start: 2025-04-01 | End: 2025-04-03 | Stop reason: HOSPADM

## 2025-04-01 RX ADMIN — GUAIFENESIN 600 MG: 600 TABLET, EXTENDED RELEASE ORAL at 20:47

## 2025-04-01 RX ADMIN — CHLORDIAZEPOXIDE HYDROCHLORIDE 10 MG: 10 CAPSULE ORAL at 09:04

## 2025-04-01 RX ADMIN — CHLORDIAZEPOXIDE HYDROCHLORIDE 10 MG: 10 CAPSULE ORAL at 20:47

## 2025-04-01 RX ADMIN — SODIUM CHLORIDE, PRESERVATIVE FREE 10 ML: 5 INJECTION INTRAVENOUS at 20:47

## 2025-04-01 RX ADMIN — SACUBITRIL AND VALSARTAN 1 TABLET: 24; 26 TABLET, FILM COATED ORAL at 20:46

## 2025-04-01 RX ADMIN — GUAIFENESIN 600 MG: 600 TABLET, EXTENDED RELEASE ORAL at 09:04

## 2025-04-01 RX ADMIN — CHLORDIAZEPOXIDE HYDROCHLORIDE 10 MG: 10 CAPSULE ORAL at 14:17

## 2025-04-01 RX ADMIN — LACOSAMIDE 100 MG: 100 TABLET, FILM COATED ORAL at 20:47

## 2025-04-01 RX ADMIN — HYDROCODONE BITARTRATE AND ACETAMINOPHEN 1 TABLET: 5; 325 TABLET ORAL at 14:17

## 2025-04-01 RX ADMIN — SODIUM CHLORIDE, PRESERVATIVE FREE 10 ML: 5 INJECTION INTRAVENOUS at 05:20

## 2025-04-01 RX ADMIN — IPRATROPIUM BROMIDE AND ALBUTEROL SULFATE 1 DOSE: .5; 2.5 SOLUTION RESPIRATORY (INHALATION) at 14:31

## 2025-04-01 RX ADMIN — SODIUM CHLORIDE, PRESERVATIVE FREE 10 ML: 5 INJECTION INTRAVENOUS at 09:06

## 2025-04-01 RX ADMIN — WATER 1000 MG: 1 INJECTION INTRAMUSCULAR; INTRAVENOUS; SUBCUTANEOUS at 05:20

## 2025-04-01 RX ADMIN — SACUBITRIL AND VALSARTAN 1 TABLET: 24; 26 TABLET, FILM COATED ORAL at 09:04

## 2025-04-01 RX ADMIN — METOPROLOL SUCCINATE 50 MG: 50 TABLET, EXTENDED RELEASE ORAL at 09:04

## 2025-04-01 RX ADMIN — Medication 100 MG: at 09:04

## 2025-04-01 RX ADMIN — DONEPEZIL HYDROCHLORIDE 5 MG: 5 TABLET ORAL at 09:04

## 2025-04-01 RX ADMIN — IPRATROPIUM BROMIDE AND ALBUTEROL SULFATE 1 DOSE: .5; 2.5 SOLUTION RESPIRATORY (INHALATION) at 21:10

## 2025-04-01 RX ADMIN — HYDROCODONE BITARTRATE AND ACETAMINOPHEN 1 TABLET: 5; 325 TABLET ORAL at 05:20

## 2025-04-01 RX ADMIN — LACOSAMIDE 100 MG: 100 TABLET, FILM COATED ORAL at 09:04

## 2025-04-01 RX ADMIN — IPRATROPIUM BROMIDE AND ALBUTEROL SULFATE 1 DOSE: .5; 2.5 SOLUTION RESPIRATORY (INHALATION) at 08:48

## 2025-04-01 RX ADMIN — RIVAROXABAN 20 MG: 20 TABLET, FILM COATED ORAL at 20:47

## 2025-04-01 RX ADMIN — SPIRONOLACTONE 25 MG: 25 TABLET, FILM COATED ORAL at 09:04

## 2025-04-01 RX ADMIN — HYDROCODONE BITARTRATE AND ACETAMINOPHEN 1 TABLET: 5; 325 TABLET ORAL at 20:12

## 2025-04-01 ASSESSMENT — PAIN SCALES - WONG BAKER: WONGBAKER_NUMERICALRESPONSE: NO HURT

## 2025-04-01 ASSESSMENT — PAIN DESCRIPTION - ORIENTATION
ORIENTATION: RIGHT
ORIENTATION: RIGHT

## 2025-04-01 ASSESSMENT — PAIN DESCRIPTION - PAIN TYPE: TYPE: CHRONIC PAIN

## 2025-04-01 ASSESSMENT — PAIN SCALES - GENERAL
PAINLEVEL_OUTOF10: 9
PAINLEVEL_OUTOF10: 5
PAINLEVEL_OUTOF10: 8

## 2025-04-01 ASSESSMENT — PAIN - FUNCTIONAL ASSESSMENT: PAIN_FUNCTIONAL_ASSESSMENT: ACTIVITIES ARE NOT PREVENTED

## 2025-04-01 ASSESSMENT — PAIN DESCRIPTION - DESCRIPTORS
DESCRIPTORS: ACHING;THROBBING
DESCRIPTORS: ACHING

## 2025-04-01 ASSESSMENT — PAIN DESCRIPTION - LOCATION
LOCATION: HIP
LOCATION: HIP

## 2025-04-01 NOTE — PROGRESS NOTES
Physical Therapy  Facility/Department: [address] STEPDOWN   Physical Therapy Daily Treatment Note    Patient Name: Mahesh Brownlee        MRN: [id number]    : 1960    Date of Service: 2025    Chief Complaint   Patient presents with    Altered Mental Status     Past Medical History:  has a past medical history of Adjustment disorder, AF (atrial fibrillation) (HCC), AICD (automatic cardioverter/defibrillator) present, Alcohol dependence (HCC), CAD (coronary artery disease), Cardiomyopathy (HCC), CHF (congestive heart failure) (HCC), COPD (chronic obstructive pulmonary disease) (HCC), DDD (degenerative disc disease), lumbar, Herniated cervical disc, Hyperlipidemia, Hypertension, Major depression, Post laminectomy syndrome, Sciatica, Snores, Tobacco abuse, and Traumatic brain injury (HCC).  Past Surgical History:  has a past surgical history that includes back surgery; Rotator cuff repair (Right); Carpal tunnel release (Right); Nerve Block (2013); Nerve Block (N/A, 2013); other surgical history (2016); Tonsillectomy; pacemaker placement (2015); back surgery; other surgical history (2018); joint replacement (Right, 2018); and Cardiac electrophysiology study and ablation.    Discharge Recommendations  Discharge Recommendations: Patient would benefit from continued therapy after discharge  PT Equipment Recommendations  Equipment Needed: Yes  Mobility Devices: Walker  Walker: Rolling  Other: Pt currently unsafe to attempt any aspect of mobility without skilled assistance; has SPC but amb safer with RW    Assessment  Body Structures, Functions, Activity Limitations Requiring Skilled Therapeutic Intervention: Decreased functional mobility ;Decreased strength;Increased pain;Decreased posture;Decreased ROM;Decreased endurance;Decreased balance;Decreased safe awareness  Assessment: Pt ambulated 15' +15' SPC Dewey; ambulation distance limited due to pain. Pt CGA-Dewey to perform functional

## 2025-04-01 NOTE — PROGRESS NOTES
Cardiology Progress Note                     Date:   4/1/2025  Patient name: Mahesh Brownlee  Date of admission:  3/27/2025  3:37 AM  MRN:   [id number]  YOB: 1960  PCP: Ira Roberts, APRN - CNP    Reason for Admission:  encephalopathy     Subjective:       No acute events overnight, remained hemodynamically stable, denies chest pain, or palpitations. Complains of PARKER, at baseline. Mentation improved. SR on telemetry, BP stable, on RA.       Scheduled Meds:   spironolactone  25 mg Oral Daily    guaiFENesin  600 mg Oral BID    metoprolol succinate  50 mg Oral Daily    sacubitril-valsartan  1 tablet Oral BID    rivaroxaban  20 mg Oral Daily    donepezil  5 mg Oral Daily    chlordiazePOXIDE  10 mg Oral TID    nicotine  1 patch TransDERmal Daily    sodium chloride flush  5-40 mL IntraVENous 2 times per day    cefTRIAXone (ROCEPHIN) IV  1,000 mg IntraVENous Q24H    thiamine  100 mg Oral Daily    ipratropium 0.5 mg-albuterol 2.5 mg  1 Dose Inhalation Q6H WA RT    lacosamide  100 mg Oral BID       Continuous Infusions:   sodium chloride 25 mL/hr at 03/28/25 0517       Labs:     CBC: No results for input(s): \"WBC\", \"HGB\", \"PLT\" in the last 72 hours.  BMP:    Recent Labs     03/30/25  1919   K 3.4*     Hepatic: No results for input(s): \"AST\", \"ALT\", \"BILITOT\", \"ALKPHOS\" in the last 72 hours.    Invalid input(s): \"ALB\"  Troponin: No results for input(s): \"TROPONINI\" in the last 72 hours.  BNP: No results for input(s): \"BNP\" in the last 72 hours.  Lipids: No results for input(s): \"CHOL\", \"HDL\" in the last 72 hours.    Invalid input(s): \"LDLCALCU\"  INR: No results for input(s): \"INR\" in the last 72 hours.      Objective:     Vitals: /66   Pulse 73   Temp 98.6 °F (37 °C) (Oral)   Resp 15   Ht 1.803 m (5' 11\")   Wt 106.1 kg (233 lb 14.5 oz)   SpO2 93%   BMI 32.62 kg/m²     General appearance: awake, alert, in no apparent respiratory distress   HEENT: Head: Normocephalic, no lesions,

## 2025-04-01 NOTE — DISCHARGE INSTRUCTIONS
Outpatient with PCP, cardiology and psychiatry.  Continue medications as prescribed.  Continue PT/OT after discharge

## 2025-04-01 NOTE — PROGRESS NOTES
Interventions: Nurse notified;Repositioning;Rest (Pain medication provided during session;)     Objective  Orientation  Overall Orientation Status: Impaired  Orientation Level: Oriented to person;Disoriented to time;Disoriented to situation;Oriented to place  Cognition  Overall Cognitive Status: Exceptions  Arousal/Alertness: Appears intact  Following Commands: Follows multistep commands with increased time;Follows one step commands with repetition  Memory: Decreased short term memory;Decreased long term memory;Decreased recall of recent events  Safety Judgement: Impaired  Problem Solving: Assistance required to identify errors made;Assistance required to correct errors made;Decreased awareness of errors    Activities of Daily Living  Grooming: Modified independent   Grooming Skilled Clinical Factors: Pt completed combing hair seated EOB with increased time to complete;  LE Dressing: Minimal assistance;Increased time to complete  LE Dressing Skilled Clinical Factors: Pt completed donning B socks seated EOB, able to doff socks with increased time and rest break between sock; Pt required Mod A for donning socks,assistance to thread over B feet,able to reach to pull over heel and adjust.    Balance  Balance  Sitting: Without support (SBA grossly for static tasks and EOB grooming tasks; intermittent CGA for dynamic reaching to distal extremities with x1 LOB, able to self-correct during LB dressing task ~14-15 min grossly)  Standing: With support (within room, EOB and pauses with functional mobility steps ~3-4 minutes at CGA grossly, heavy UE support in RW throughout)    Transfers/Mobility  Bed mobility  Supine to Sit: Stand by assistance  Sit to Supine: Stand by assistance  Scooting: Stand by assistance  Bed Mobility Comments: HOB elevated ~35 degrees with use of bed rails; limited d/t pain     Transfers  Sit to stand: Contact guard assistance  Stand to sit: Contact guard assistance  Transfer Comments: verbal cues for  appropriate hand placement     Functional Mobility: Contact guard assistance;Adaptive equipment  Functional Mobility Skilled Clinical Factors: using RW to complete, completed mobility from EOB > bathroom, rest break d/t pain; EOB > hallway > EOB for LB tasks; limited throughout d/t pain and heavy WB through B UEs with each step;     Patient Education  Patient Education  Education Given To: Patient  Education Provided: Role of Therapy;Plan of Care;Transfer Training;Equipment;Energy Conservation;ADL Adaptive Strategies;Mobility Training;Fall Prevention Strategies  Education Method: Verbal;Demonstration  Barriers to Learning: None  Education Outcome: Verbalized understanding;Continued education needed    Goals  Short Term Goals  Time Frame for Short Term Goals: Prior to discharge  Short Term Goal 1: Patient to be oriented x4  Short Term Goal 2: Patient to demonstrate static/dynamic standing balance with SBA 0-1 hand support, 12+minutes  Short Term Goal 3: Patient to perform functional mobility household distances with LRAD with CGA  Short Term Goal 4: Patient to perform ther ex to improve strength 5/5  Short Term Goal 5: Patient to demonstarte unsupported sitting/standing Grooming/UB ADLs with supervision  Short Term Goal 6: Patient to demonstarte unsupported sitting/standing Toileting/LB ADLs with supervision    Plan  Occupational Therapy Plan  Times Per Week: 3-5x/wk  Current Treatment Recommendations: Strengthening, Balance training, Functional mobility training, Endurance training, Neuromuscular re-education, Cognitive reorientation, Safety education & training, Patient/Caregiver education & training, Positioning, Self-Care / ADL, Home management training, Pain management, Cognitive/Perceptual training, Coordination training    Minutes  OT Individual Minutes  Time In: 1410  Time Out: 1436  Minutes: 26  Time Code Minutes   Timed Code Treatment Minutes: 24 Minutes    Electronically signed by Na Lamas OT

## 2025-04-01 NOTE — PLAN OF CARE
Problem: Chronic Conditions and Co-morbidities  Goal: Patient's chronic conditions and co-morbidity symptoms are monitored and maintained or improved  Outcome: Progressing  Flowsheets (Taken 4/1/2025 0800)  Care Plan - Patient's Chronic Conditions and Co-Morbidity Symptoms are Monitored and Maintained or Improved: Monitor and assess patient's chronic conditions and comorbid symptoms for stability, deterioration, or improvement     Problem: Discharge Planning  Goal: Discharge to home or other facility with appropriate resources  Outcome: Progressing  Flowsheets (Taken 4/1/2025 0800)  Discharge to home or other facility with appropriate resources: Identify barriers to discharge with patient and caregiver     Problem: Pain  Goal: Verbalizes/displays adequate comfort level or baseline comfort level  Outcome: Progressing     Problem: ABCDS Injury Assessment  Goal: Absence of physical injury  Outcome: Progressing     Problem: Safety - Adult  Goal: Free from fall injury  Outcome: Progressing     Problem: Respiratory - Adult  Goal: Achieves optimal ventilation and oxygenation  Outcome: Progressing

## 2025-04-01 NOTE — CARE COORDINATION
Case Management   Daily Progress Note       Patient Name: Mahesh Brownlee                   YOB: 1960  Diagnosis: Atypical pneumonia [J18.9]  HFrEF (heart failure with reduced ejection fraction) ([facility]) [I50.20]  Altered mental status, unspecified altered mental status type [R41.82]  Pneumonia due to infectious organism [J18.9]                       GMLOS: 4 days  Length of Stay: 5  days    Anticipated Discharge Date: Two or more days before discharge    Readmission Risk (Low < 19, Mod (19-27), High > 27): Readmission Risk Score: 22.6        Current Transitional Plan    [] Home Independently    [] Home with HC    [x] Skilled Nursing Facility    [] Acute Rehabilitation    [] Long Term Acute Care (LTAC)    [] Other:     Plan for the Stay (Medical Management) :          Workflow Continuation (Additional Notes) : SNF referrals sent to the following per pt request    [facility]        PALMA GUERRA RN  April 1, 2025

## 2025-04-01 NOTE — PLAN OF CARE
Problem: Chronic Conditions and Co-morbidities  Goal: Patient's chronic conditions and co-morbidity symptoms are monitored and maintained or improved  Recent Flowsheet Documentation  Taken 3/31/2025 2000 by Stephanie Jiménez RN  Care Plan - Patient's Chronic Conditions and Co-Morbidity Symptoms are Monitored and Maintained or Improved:   Monitor and assess patient's chronic conditions and comorbid symptoms for stability, deterioration, or improvement   Collaborate with multidisciplinary team to address chronic and comorbid conditions and prevent exacerbation or deterioration   Update acute care plan with appropriate goals if chronic or comorbid symptoms are exacerbated and prevent overall improvement and discharge     Problem: Discharge Planning  Goal: Discharge to home or other facility with appropriate resources  Recent Flowsheet Documentation  Taken 3/31/2025 2000 by Stephanie Jiménez RN  Discharge to home or other facility with appropriate resources:   Identify barriers to discharge with patient and caregiver   Arrange for needed discharge resources and transportation as appropriate   Identify discharge learning needs (meds, wound care, etc)   Arrange for interpreters to assist at discharge as needed   Refer to discharge planning if patient needs post-hospital services based on physician order or complex needs related to functional status, cognitive ability or social support system     Problem: Respiratory - Adult  Goal: Achieves optimal ventilation and oxygenation  Recent Flowsheet Documentation  Taken 3/31/2025 2000 by Stephanie Jiménez RN  Achieves optimal ventilation and oxygenation:   Assess for changes in respiratory status   Assess for changes in mentation and behavior   Position to facilitate oxygenation and minimize respiratory effort   Oxygen supplementation based on oxygen saturation or arterial blood gases   Initiate smoking cessation protocol as indicated   Encourage broncho-pulmonary hygiene

## 2025-04-01 NOTE — DISCHARGE INSTR - COC
Continuity of Care Form    Patient Name: Mahesh Brownlee   :  1960  MRN:  [id number]    Admit date:  3/27/2025  Discharge date:  4/3/2025    Code Status Order: Full Code   Advance Directives:     Admitting Physician:  No admitting provider for patient encounter.  PCP: Ira Roberts, APRN - CNP    Discharging Nurse: GIOVANNY  Discharging Hospital Unit/Room#: 0537/0537-01  Discharging Unit Phone Number: [phone]    Emergency Contact:   Extended Emergency Contact Information  Primary Emergency Contact: Brianne Whitley  Home Phone: [phone]  Mobile Phone: [phone]  Relation: Child  Secondary Emergency Contact: Brad Whitley  Home Phone: [phone]  Mobile Phone: [phone]  Relation: Child    Past Surgical History:  Past Surgical History:   Procedure Laterality Date    BACK SURGERY      x2, Dr. Ángel Regalado     BACK SURGERY      L3-4 decompression    CARDIAC ELECTROPHYSIOLOGY STUDY AND ABLATION      CARPAL TUNNEL RELEASE Right     JOINT REPLACEMENT Right 2018    NERVE BLOCK  2013    dduramorph  celestone 9mg    NERVE BLOCK N/A 2013    lumbar RASHMI    OTHER SURGICAL HISTORY  2016    Lumbar RASHMI    OTHER SURGICAL HISTORY  2018    lumbosacral myelogram    PACEMAKER PLACEMENT  2015    with defib    ROTATOR CUFF REPAIR Right     TONSILLECTOMY      HAS NO TONSILS, NEVER HAD SURGERY       Immunization History:   Immunization History   Administered Date(s) Administered    COVID-19, J&J, (age 18y+), IM, 0.5 mL 2021    Influenza, FLUARIX, FLULAVAL, FLUZONE (age 6 mo+) and AFLURIA, (age 3 y+), Quadv PF, 0.5mL 2020    Pneumococcal, PPSV23, PNEUMOVAX 23, (age 2y+), SC/IM, 0.5mL 2017       Active Problems:  Patient Active Problem List   Diagnosis Code    Postlaminectomy syndrome, lumbar region M96.1    Cervical spondylosis with myelopathy M47.12    Arthropathy, unspecified, other specified sites M12.9    Cerebral contusion (HCC) S06.33AA    DDD (degenerative disc disease),

## 2025-04-01 NOTE — PROGRESS NOTES
[facility]   IN-PATIENT SERVICE   [facility]    Progress Note    4/1/2025    2:28 PM    Name:   Mahesh Brownlee  MRN:     [id number]     Acct:      [id number]   Room:   [facility]05-01   Day:  5  Admit Date:  3/27/2025  3:37 AM    PCP:   Ira Roberts APRN - CNP  Code Status:  Full Code    Subjective:     Interval History Status: improved.     Patient seen and examined this morning, resting comfortably in the bed.  Seems appropriate to me, responded on the orientation questions correctly.  Vitals within normal range, not requiring supplemental oxygen.    Discussed with RN and  about discharge planning.  Plan to go to facility.    Brief history:    Per H&P: Mahesh Brownlee is a 64 y.o. Non- / non  male who presents with Altered Mental Status   and is admitted to the hospital for the management of Pneumonia of left upper lobe due to infectious organism.     This 64 yom presents to the hospital with ams.  He has no recollection of how he got here, nor why he is here.  He admits to sob and cough with cp from coughing but does not know anything else.  He might have had fevers and chills but is unsure.      Medications:     Allergies:    Allergies   Allergen Reactions    Augmentin Es-600 [Amoxicillin-Pot Clavulanate]      Other reaction(s): Unknown    Sulfa Antibiotics Other (See Comments)       Current Meds:   Scheduled Meds:    spironolactone  25 mg Oral Daily    guaiFENesin  600 mg Oral BID    metoprolol succinate  50 mg Oral Daily    sacubitril-valsartan  1 tablet Oral BID    rivaroxaban  20 mg Oral Daily    donepezil  5 mg Oral Daily    chlordiazePOXIDE  10 mg Oral TID    nicotine  1 patch TransDERmal Daily    sodium chloride flush  5-40 mL IntraVENous 2 times per day    cefTRIAXone (ROCEPHIN) IV  1,000 mg IntraVENous Q24H    thiamine  100 mg Oral Daily    ipratropium 0.5 mg-albuterol 2.5 mg  1 Dose Inhalation Q6H WA RT    lacosamide  100 mg Oral BID

## 2025-04-02 PROCEDURE — 94761 N-INVAS EAR/PLS OXIMETRY MLT: CPT

## 2025-04-02 PROCEDURE — 6370000000 HC RX 637 (ALT 250 FOR IP): Performed by: INTERNAL MEDICINE

## 2025-04-02 PROCEDURE — 6370000000 HC RX 637 (ALT 250 FOR IP): Performed by: STUDENT IN AN ORGANIZED HEALTH CARE EDUCATION/TRAINING PROGRAM

## 2025-04-02 PROCEDURE — 99232 SBSQ HOSP IP/OBS MODERATE 35: CPT | Performed by: STUDENT IN AN ORGANIZED HEALTH CARE EDUCATION/TRAINING PROGRAM

## 2025-04-02 PROCEDURE — 6370000000 HC RX 637 (ALT 250 FOR IP)

## 2025-04-02 PROCEDURE — 94640 AIRWAY INHALATION TREATMENT: CPT

## 2025-04-02 PROCEDURE — 6360000002 HC RX W HCPCS: Performed by: NURSE PRACTITIONER

## 2025-04-02 PROCEDURE — 2500000003 HC RX 250 WO HCPCS: Performed by: NURSE PRACTITIONER

## 2025-04-02 PROCEDURE — 6370000000 HC RX 637 (ALT 250 FOR IP): Performed by: HOSPITALIST

## 2025-04-02 PROCEDURE — 2060000000 HC ICU INTERMEDIATE R&B

## 2025-04-02 RX ADMIN — Medication 100 MG: at 09:13

## 2025-04-02 RX ADMIN — WATER 1000 MG: 1 INJECTION INTRAMUSCULAR; INTRAVENOUS; SUBCUTANEOUS at 05:30

## 2025-04-02 RX ADMIN — CHLORDIAZEPOXIDE HYDROCHLORIDE 10 MG: 10 CAPSULE ORAL at 13:34

## 2025-04-02 RX ADMIN — LACOSAMIDE 100 MG: 100 TABLET, FILM COATED ORAL at 09:13

## 2025-04-02 RX ADMIN — LACOSAMIDE 100 MG: 100 TABLET, FILM COATED ORAL at 21:41

## 2025-04-02 RX ADMIN — IPRATROPIUM BROMIDE AND ALBUTEROL SULFATE 1 DOSE: .5; 2.5 SOLUTION RESPIRATORY (INHALATION) at 15:57

## 2025-04-02 RX ADMIN — IPRATROPIUM BROMIDE AND ALBUTEROL SULFATE 1 DOSE: .5; 2.5 SOLUTION RESPIRATORY (INHALATION) at 21:33

## 2025-04-02 RX ADMIN — GUAIFENESIN 600 MG: 600 TABLET, EXTENDED RELEASE ORAL at 21:41

## 2025-04-02 RX ADMIN — METOPROLOL SUCCINATE 50 MG: 50 TABLET, EXTENDED RELEASE ORAL at 09:13

## 2025-04-02 RX ADMIN — CHLORDIAZEPOXIDE HYDROCHLORIDE 10 MG: 10 CAPSULE ORAL at 21:41

## 2025-04-02 RX ADMIN — GUAIFENESIN 600 MG: 600 TABLET, EXTENDED RELEASE ORAL at 09:12

## 2025-04-02 RX ADMIN — DONEPEZIL HYDROCHLORIDE 5 MG: 5 TABLET ORAL at 09:12

## 2025-04-02 RX ADMIN — CHLORDIAZEPOXIDE HYDROCHLORIDE 10 MG: 10 CAPSULE ORAL at 09:13

## 2025-04-02 RX ADMIN — SACUBITRIL AND VALSARTAN 1 TABLET: 24; 26 TABLET, FILM COATED ORAL at 21:41

## 2025-04-02 RX ADMIN — SODIUM CHLORIDE, PRESERVATIVE FREE 10 ML: 5 INJECTION INTRAVENOUS at 21:41

## 2025-04-02 RX ADMIN — IPRATROPIUM BROMIDE AND ALBUTEROL SULFATE 1 DOSE: .5; 2.5 SOLUTION RESPIRATORY (INHALATION) at 08:11

## 2025-04-02 RX ADMIN — SACUBITRIL AND VALSARTAN 1 TABLET: 24; 26 TABLET, FILM COATED ORAL at 09:12

## 2025-04-02 RX ADMIN — SODIUM CHLORIDE, PRESERVATIVE FREE 10 ML: 5 INJECTION INTRAVENOUS at 09:13

## 2025-04-02 RX ADMIN — RIVAROXABAN 20 MG: 20 TABLET, FILM COATED ORAL at 21:40

## 2025-04-02 RX ADMIN — SPIRONOLACTONE 25 MG: 25 TABLET, FILM COATED ORAL at 09:13

## 2025-04-02 NOTE — PROGRESS NOTES
[facility]   IN-PATIENT SERVICE   [facility]    Progress Note    4/2/2025    4:25 PM    Name:   Mahesh Brownlee  MRN:     [id number]     Acct:      [id number]   Room:   [facility]/0537-01   Day:  6  Admit Date:  3/27/2025  3:37 AM    PCP:   Ira Roberts APRN - CNP  Code Status:  Full Code    Subjective:     Interval History Status: improved.     Pt seen bedside this morning.  No acute events overnight.  Pt wants to know how he can get his 401 and get money to buy trailer.    Discussed with RN and  about discharge planning.  Plan to go to facility.    Brief history:    Per H&P: Mahesh Brownlee is a 64 y.o. Non- / non  male who presents with Altered Mental Status   and is admitted to the hospital for the management of Pneumonia of left upper lobe due to infectious organism.     This 64 yom presents to the hospital with ams.  He has no recollection of how he got here, nor why he is here.  He admits to sob and cough with cp from coughing but does not know anything else.  He might have had fevers and chills but is unsure.      Medications:     Allergies:    Allergies   Allergen Reactions    Augmentin Es-600 [Amoxicillin-Pot Clavulanate]      Other reaction(s): Unknown    Sulfa Antibiotics Other (See Comments)       Current Meds:   Scheduled Meds:    lacosamide  100 mg Oral BID    spironolactone  25 mg Oral Daily    guaiFENesin  600 mg Oral BID    metoprolol succinate  50 mg Oral Daily    sacubitril-valsartan  1 tablet Oral BID    rivaroxaban  20 mg Oral Daily    donepezil  5 mg Oral Daily    chlordiazePOXIDE  10 mg Oral TID    nicotine  1 patch TransDERmal Daily    sodium chloride flush  5-40 mL IntraVENous 2 times per day    cefTRIAXone (ROCEPHIN) IV  1,000 mg IntraVENous Q24H    thiamine  100 mg Oral Daily    ipratropium 0.5 mg-albuterol 2.5 mg  1 Dose Inhalation Q6H WA RT     Continuous Infusions:    sodium chloride 15 mL/hr at 03/30/25 0608     PRN Meds:  follow-up.  See below. 6. Stable borderline enlarged bilateral hilar lymph nodes, unchanged dating back to 10/26/2023, likely benign and reactive in etiology given their stability. RECOMMENDATIONS: Multiple pulmonary nodules. Most significant: Right solid pulmonary nodule measuring 5 mm. Per Fleischner Society Guidelines, no routine follow-up imaging is recommended. These guidelines do not apply to immunocompromised patients and patients with cancer. Follow up in patients with significant comorbidities as clinically warranted. For lung cancer screening, adhere to Lung-RADS guidelines. Reference: Radiology. 2017; 284(1):228-43.     CT HEAD WO CONTRAST  Result Date: 3/27/2025  No acute CT abnormality identified in the brain. No acute osseous abnormality identified in the cervical spine.     CT CERVICAL SPINE WO CONTRAST  Result Date: 3/27/2025  No acute CT abnormality identified in the brain. No acute osseous abnormality identified in the cervical spine.     XR CHEST PORTABLE  Result Date: 3/27/2025  Vascular congestion and ground-glass opacities, left greater than right, appear new. Findings are nonspecific and could be due to pulmonary edema or developing infiltrates.       Physical Examination:        General appearance:  alert, cooperative and no distress, oriented to self and place  Lungs:  clear to auscultation bilaterally, normal effort  Heart:  regular rate and rhythm, no murmur  Abdomen:  soft, nontender, nondistended, normal bowel sounds  Extremities:  no edema, redness, tenderness in the calves  Skin:  no gross lesions or rashes    Assessment:        Hospital Problems           Last Modified POA    Essential hypertension 3/27/2025 Yes    Sciatica 3/27/2025 Yes    Alcohol use disorder, severe, dependence (HCC) 3/27/2025 Yes    Tobacco abuse 3/27/2025 Yes    Paroxysmal atrial fibrillation (HCC) 3/27/2025 Yes    COPD without exacerbation (HCC) 3/27/2025 Yes    CAD (coronary artery disease) 3/27/2025 Yes

## 2025-04-02 NOTE — CARE COORDINATION
Case Management   Daily Progress Note       Patient Name: Mahesh Brownlee                   YOB: 1960  Diagnosis: Atypical pneumonia [J18.9]  HFrEF (heart failure with reduced ejection fraction) ([facility]) [I50.20]  Altered mental status, unspecified altered mental status type [R41.82]  Pneumonia due to infectious organism [J18.9]                       GMLOS: 4 days  Length of Stay: 6  days    Anticipated Discharge Date: Ready for discharge    Readmission Risk (Low < 19, Mod (19-27), High > 27): Readmission Risk Score: 22.8        Current Transitional Plan    [] Home Independently    [] Home with HC    [x] Skilled Nursing Facility    [] Acute Rehabilitation    [] Long Term Acute Care (LTAC)    [] Other:     Plan for the Stay (Medical Management) :          Workflow Continuation (Additional Notes) : [facility] accepted, we will start precert    1500 Alda with [facility] called with approved precert. State wont have male bed until tomorrow.  Requested p/u time of 1400 allowing time to get room ready.     Dc pkt started, faxed p/u time [id number]. WILL NEED MORA, EVY completed.           PALMA GUERRA RN  April 2, 2025

## 2025-04-02 NOTE — PLAN OF CARE
Problem: Chronic Conditions and Co-morbidities  Goal: Patient's chronic conditions and co-morbidity symptoms are monitored and maintained or improved  4/2/2025 0553 by Chelsea Lantigua RN  Outcome: Progressing     Problem: Discharge Planning  Goal: Discharge to home or other facility with appropriate resources  4/2/2025 0553 by Chelsea Lantigua RN  Outcome: Progressing     Problem: Pain  Goal: Verbalizes/displays adequate comfort level or baseline comfort level  4/2/2025 0553 by Chelsea Lantigua RN  Outcome: Progressing     Problem: ABCDS Injury Assessment  Goal: Absence of physical injury  4/2/2025 0553 by Chelsea Lantigua RN  Outcome: Progressing     Problem: Safety - Adult  Goal: Free from fall injury  4/2/2025 0553 by Chelsea Lantigua RN  Outcome: Progressing     Problem: Respiratory - Adult  Goal: Achieves optimal ventilation and oxygenation  4/2/2025 0553 by Chelsea Lantigua RN  Outcome: Progressing     Problem: Neurosensory - Adult  Goal: Achieves stable or improved neurological status  Outcome: Progressing  Goal: Absence of seizures  Outcome: Progressing  Goal: Remains free of injury related to seizures activity  Outcome: Progressing     Problem: Musculoskeletal - Adult  Goal: Return mobility to safest level of function  Outcome: Progressing  Goal: Return ADL status to a safe level of function  Outcome: Progressing     Problem: Skin/Tissue Integrity  Goal: Skin integrity remains intact  Description: 1.  Monitor for areas of redness and/or skin breakdown  2.  Assess vascular access sites hourly  3.  Every 4-6 hours minimum:  Change oxygen saturation probe site  4.  Every 4-6 hours:  If on nasal continuous positive airway pressure, respiratory therapy assess nares and determine need for appliance change or resting period  Outcome: Progressing

## 2025-04-02 NOTE — CARE COORDINATION
Pre-Cert initiated in [facility] for [facility] farhana [facility].  Auth ID: [id number]. Auth is Pending. CM notified.

## 2025-04-02 NOTE — PLAN OF CARE
Problem: Respiratory - Adult  Goal: Achieves optimal ventilation and oxygenation  4/2/2025 0811 by Cathy Rodríguez RCLEONA  Outcome: Progressing  Flowsheets (Taken 4/2/2025 0811)  Achieves optimal ventilation and oxygenation:   Assess for changes in respiratory status   Assess for changes in mentation and behavior   Oxygen supplementation based on oxygen saturation or arterial blood gases   Position to facilitate oxygenation and minimize respiratory effort   Encourage broncho-pulmonary hygiene including cough, deep breathe, incentive spirometry   Assess the need for suctioning and aspirate as needed   Assess and instruct to report shortness of breath or any respiratory difficulty   Respiratory therapy support as indicated

## 2025-04-02 NOTE — PLAN OF CARE
Problem: Chronic Conditions and Co-morbidities  Goal: Patient's chronic conditions and co-morbidity symptoms are monitored and maintained or improved  Recent Flowsheet Documentation  Taken 4/2/2025 0800 by Luci Dunn RN  Care Plan - Patient's Chronic Conditions and Co-Morbidity Symptoms are Monitored and Maintained or Improved: Monitor and assess patient's chronic conditions and comorbid symptoms for stability, deterioration, or improvement  4/2/2025 0553 by Chelsea Lantigua RN  Outcome: Progressing     Problem: Discharge Planning  Goal: Discharge to home or other facility with appropriate resources  Recent Flowsheet Documentation  Taken 4/2/2025 0800 by Luci Dunn RN  Discharge to home or other facility with appropriate resources: Identify barriers to discharge with patient and caregiver  4/2/2025 0553 by Chelsea Lantigua RN  Outcome: Progressing     Problem: Pain  Goal: Verbalizes/displays adequate comfort level or baseline comfort level  4/2/2025 0553 by Chelsea Lantigua RN  Outcome: Progressing     Problem: ABCDS Injury Assessment  Goal: Absence of physical injury  4/2/2025 0553 by Chelsea Lantigua RN  Outcome: Progressing     Problem: Safety - Adult  Goal: Free from fall injury  4/2/2025 0553 by Chelsea Lantigua RN  Outcome: Progressing     Problem: Respiratory - Adult  Goal: Achieves optimal ventilation and oxygenation  4/2/2025 0811 by Cathy Rodríguez RCP  Outcome: Progressing  Flowsheets  Taken 4/2/2025 0811 by Cathy Rodríguez RCP  Achieves optimal ventilation and oxygenation:   Assess for changes in respiratory status   Assess for changes in mentation and behavior   Oxygen supplementation based on oxygen saturation or arterial blood gases   Position to facilitate oxygenation and minimize respiratory effort   Encourage broncho-pulmonary hygiene including cough, deep breathe, incentive spirometry   Assess the need for suctioning and aspirate as needed   Assess and instruct to report

## 2025-04-03 VITALS
BODY MASS INDEX: 32.72 KG/M2 | OXYGEN SATURATION: 97 % | RESPIRATION RATE: 20 BRPM | TEMPERATURE: 98.2 F | SYSTOLIC BLOOD PRESSURE: 137 MMHG | HEIGHT: 71 IN | HEART RATE: 85 BPM | DIASTOLIC BLOOD PRESSURE: 71 MMHG | WEIGHT: 233.69 LBS

## 2025-04-03 PROCEDURE — 2500000003 HC RX 250 WO HCPCS: Performed by: NURSE PRACTITIONER

## 2025-04-03 PROCEDURE — 99239 HOSP IP/OBS DSCHRG MGMT >30: CPT | Performed by: STUDENT IN AN ORGANIZED HEALTH CARE EDUCATION/TRAINING PROGRAM

## 2025-04-03 PROCEDURE — 6370000000 HC RX 637 (ALT 250 FOR IP): Performed by: INTERNAL MEDICINE

## 2025-04-03 PROCEDURE — 94640 AIRWAY INHALATION TREATMENT: CPT

## 2025-04-03 PROCEDURE — 6370000000 HC RX 637 (ALT 250 FOR IP): Performed by: HOSPITALIST

## 2025-04-03 PROCEDURE — 6370000000 HC RX 637 (ALT 250 FOR IP)

## 2025-04-03 PROCEDURE — 99232 SBSQ HOSP IP/OBS MODERATE 35: CPT

## 2025-04-03 PROCEDURE — 6370000000 HC RX 637 (ALT 250 FOR IP): Performed by: STUDENT IN AN ORGANIZED HEALTH CARE EDUCATION/TRAINING PROGRAM

## 2025-04-03 PROCEDURE — 6360000002 HC RX W HCPCS: Performed by: NURSE PRACTITIONER

## 2025-04-03 RX ADMIN — SODIUM CHLORIDE, PRESERVATIVE FREE 10 ML: 5 INJECTION INTRAVENOUS at 09:28

## 2025-04-03 RX ADMIN — METOPROLOL SUCCINATE 50 MG: 50 TABLET, EXTENDED RELEASE ORAL at 09:27

## 2025-04-03 RX ADMIN — CHLORDIAZEPOXIDE HYDROCHLORIDE 10 MG: 10 CAPSULE ORAL at 09:27

## 2025-04-03 RX ADMIN — CHLORDIAZEPOXIDE HYDROCHLORIDE 10 MG: 10 CAPSULE ORAL at 13:20

## 2025-04-03 RX ADMIN — SPIRONOLACTONE 25 MG: 25 TABLET, FILM COATED ORAL at 09:27

## 2025-04-03 RX ADMIN — HYDROCODONE BITARTRATE AND ACETAMINOPHEN 1 TABLET: 5; 325 TABLET ORAL at 13:20

## 2025-04-03 RX ADMIN — WATER 1000 MG: 1 INJECTION INTRAMUSCULAR; INTRAVENOUS; SUBCUTANEOUS at 04:08

## 2025-04-03 RX ADMIN — DONEPEZIL HYDROCHLORIDE 5 MG: 5 TABLET ORAL at 09:27

## 2025-04-03 RX ADMIN — Medication 100 MG: at 09:27

## 2025-04-03 RX ADMIN — HYDROCODONE BITARTRATE AND ACETAMINOPHEN 1 TABLET: 5; 325 TABLET ORAL at 00:36

## 2025-04-03 RX ADMIN — GUAIFENESIN 600 MG: 600 TABLET, EXTENDED RELEASE ORAL at 09:26

## 2025-04-03 RX ADMIN — SACUBITRIL AND VALSARTAN 1 TABLET: 24; 26 TABLET, FILM COATED ORAL at 09:27

## 2025-04-03 RX ADMIN — IPRATROPIUM BROMIDE AND ALBUTEROL SULFATE 1 DOSE: .5; 2.5 SOLUTION RESPIRATORY (INHALATION) at 08:32

## 2025-04-03 RX ADMIN — LACOSAMIDE 100 MG: 100 TABLET, FILM COATED ORAL at 09:27

## 2025-04-03 ASSESSMENT — PAIN DESCRIPTION - LOCATION: LOCATION: HEAD

## 2025-04-03 ASSESSMENT — PAIN DESCRIPTION - DESCRIPTORS: DESCRIPTORS: ACHING

## 2025-04-03 ASSESSMENT — PAIN SCALES - GENERAL
PAINLEVEL_OUTOF10: 8
PAINLEVEL_OUTOF10: 4

## 2025-04-03 ASSESSMENT — PAIN - FUNCTIONAL ASSESSMENT: PAIN_FUNCTIONAL_ASSESSMENT: ACTIVITIES ARE NOT PREVENTED

## 2025-04-03 NOTE — CARE COORDINATION
Discharge Report    [facility]  Clinical Case Management Department  Written by: Felicita Gamboa RN    Patient Name: Mahesh GUALLPA Torrie  Attending Provider: No att. providers found  Admit Date: 3/27/2025  3:37 AM  MRN: [id number]  Account: [id number]                     : 1960  Discharge Date: 4/3/2025      Disposition: [facility]    Felicita Gamboa RN

## 2025-04-03 NOTE — CARE COORDINATION
Case Management   Daily Progress Note       Patient Name: Mahesh Brownlee                   YOB: 1960  Diagnosis: Atypical pneumonia [J18.9]  HFrEF (heart failure with reduced ejection fraction) ([facility]) [I50.20]  Altered mental status, unspecified altered mental status type [R41.82]  Pneumonia due to infectious organism [J18.9]                       GMLOS: 4 days  Length of Stay: 7  days    Anticipated Discharge Date: Ready for discharge    Readmission Risk (Low < 19, Mod (19-27), High > 27): Readmission Risk Score: 22.8        Current Transitional Plan    [] Home Independently    [] Home with HC    [x] Skilled Nursing Facility    [] Acute Rehabilitation    [] Long Term Acute Care (LTAC)    [] Other:     Plan for the Stay (Medical Management) :          Workflow Continuation (Additional Notes) :0835 Called Alda at [facility] with coming today after 2PM. Called [facility] and talked with Dara/JUAN DAVID 2PM. 0840 notified patient. 0845 Alda at [facility] notified of transport time. 0901 faxed ADALBERTO and MORA to [facility].      Felicita Gamboa RN  April 3, 2025

## 2025-04-03 NOTE — DISCHARGE SUMMARY
[facility]  Office: [phone]  Louie Rossi DO, Noah Mayers DO, Sergio Ordaz DO, Saul March DO, Willy Mc MD, Jaci Berger MD, Sivakumar Freeman MD, Mile Johnston MD,  Aric Granda MD, Garry Shepherd MD, Clay Avila DO, Kelsy Wahl MD,  Evelyn Petit DO, Terry Kern MD, Addison Sol MD, Jaspreet Rossi DO, Kassy Pedro MD,  Emeka Leon DO, Susan Manjarrez MD, Krystyna Howell MD, [place] MD, Keeley Santa MD,  Chris Ashley MD, Rick Del Rio MD, Howard Brown MD, Carlton Cardona DO, Kayla Moran MD,  Avi Mcneil MD, Shirley Waterhouse, CNP,  Magdalena Oviedo, CNP, Asuncion Hollins, CNP, Gómez Gomez, CNP,  Fely Garcia, DNP, Lorena Garcia, CNP, Mariana Jordan, CNP, Brianne Arnett, CNP, Dixie Garcia, CNP, Shea Chirinos, CNP, Alejandro Hoover PA-C, Penny Lopez, CNS, Alda Orozco, CNP, Miriam Martins, CNP         [facility]   IN-PATIENT SERVICE   [facility]    Discharge Summary     Patient ID: Mahesh Brownlee  :  1960   MRN: [id number]     ACCOUNT:  [id number]   Patient's PCP: Ira Roberts APRN - CNP  Admit Date: 3/27/2025   Discharge Date: 4/3/2025  Length of Stay: 7  Code Status:  Full Code  Admitting Physician: No admitting provider for patient encounter.  Discharge Physician: Mateo Dey MD     Active Discharge Diagnoses:     Hospital Problem Lists:  Principal Problem (Resolved):    Pneumonia of left upper lobe due to infectious organism  Active Problems:    Essential hypertension    Sciatica    Alcohol use disorder, severe, dependence (HCC)    Tobacco abuse    Paroxysmal atrial fibrillation (HCC)    COPD without exacerbation (HCC)    CAD (coronary artery disease)    Hypothyroidism    Seizure (HCC)    Toxic metabolic encephalopathy    History of CVA (cerebrovascular accident)    Amnesia    Substance use disorder    ICD (implantable cardioverter-defibrillator) in place    History of seizures

## 2025-04-03 NOTE — PLAN OF CARE
Problem: Chronic Conditions and Co-morbidities  Goal: Patient's chronic conditions and co-morbidity symptoms are monitored and maintained or improved  Outcome: Progressing     Problem: Discharge Planning  Goal: Discharge to home or other facility with appropriate resources  Outcome: Progressing     Problem: Pain  Goal: Verbalizes/displays adequate comfort level or baseline comfort level  Outcome: Progressing     Problem: ABCDS Injury Assessment  Goal: Absence of physical injury  Outcome: Progressing     Problem: Safety - Adult  Goal: Free from fall injury  Outcome: Progressing     Problem: Respiratory - Adult  Goal: Achieves optimal ventilation and oxygenation  Outcome: Progressing     Problem: Neurosensory - Adult  Goal: Achieves stable or improved neurological status  Outcome: Progressing  Goal: Absence of seizures  Outcome: Progressing  Goal: Remains free of injury related to seizures activity  Outcome: Progressing     Problem: Musculoskeletal - Adult  Goal: Return mobility to safest level of function  Outcome: Progressing  Goal: Return ADL status to a safe level of function  Outcome: Progressing     Problem: Skin/Tissue Integrity  Goal: Skin integrity remains intact  Description: 1.  Monitor for areas of redness and/or skin breakdown  2.  Assess vascular access sites hourly  3.  Every 4-6 hours minimum:  Change oxygen saturation probe site  4.  Every 4-6 hours:  If on nasal continuous positive airway pressure, respiratory therapy assess nares and determine need for appliance change or resting period  Outcome: Progressing

## 2025-04-03 NOTE — PROGRESS NOTES
Cardiology Progress Note                     Date:   4/3/2025  Patient name: Mahesh Brownlee  Date of admission:  3/27/2025  3:37 AM  MRN:   [id number]  YOB: 1960  PCP: Ira Roberts, APRN - CNP    Reason for Admission:  encephalopathy     Subjective:       Resting in bed. No acute events overnight, denies anginal chest pain, or palpitations. Complains of PARKER, at baseline. SR on telemetry, BP stable, on RA.       Scheduled Meds:   lacosamide  100 mg Oral BID    spironolactone  25 mg Oral Daily    guaiFENesin  600 mg Oral BID    metoprolol succinate  50 mg Oral Daily    sacubitril-valsartan  1 tablet Oral BID    rivaroxaban  20 mg Oral Daily    donepezil  5 mg Oral Daily    chlordiazePOXIDE  10 mg Oral TID    nicotine  1 patch TransDERmal Daily    sodium chloride flush  5-40 mL IntraVENous 2 times per day    thiamine  100 mg Oral Daily    ipratropium 0.5 mg-albuterol 2.5 mg  1 Dose Inhalation Q6H WA RT       Continuous Infusions:   sodium chloride 15 mL/hr at 03/30/25 0608       Labs:     CBC: No results for input(s): \"WBC\", \"HGB\", \"PLT\" in the last 72 hours.  BMP:    No results for input(s): \"NA\", \"K\", \"CL\", \"CO2\", \"BUN\", \"CREATININE\", \"GLUCOSE\" in the last 72 hours.    Hepatic: No results for input(s): \"AST\", \"ALT\", \"BILITOT\", \"ALKPHOS\" in the last 72 hours.    Invalid input(s): \"ALB\"  Troponin: No results for input(s): \"TROPONINI\" in the last 72 hours.  BNP: No results for input(s): \"BNP\" in the last 72 hours.  Lipids: No results for input(s): \"CHOL\", \"HDL\" in the last 72 hours.    Invalid input(s): \"LDLCALCU\"  INR: No results for input(s): \"INR\" in the last 72 hours.      Objective:     Vitals: BP (!) 146/79   Pulse 82   Temp 98.2 °F (36.8 °C) (Oral)   Resp 16   Ht 1.803 m (5' 11\")   Wt 106 kg (233 lb 11 oz)   SpO2 100%   BMI 32.59 kg/m²     General appearance: awake, alert, in no apparent respiratory distress   HEENT: Head: Normocephalic, no lesions, without obvious

## 2025-04-03 NOTE — PLAN OF CARE
Problem: Chronic Conditions and Co-morbidities  Goal: Patient's chronic conditions and co-morbidity symptoms are monitored and maintained or improved  4/3/2025 1409 by Luci Dunn RN  Outcome: Completed  Flowsheets (Taken 4/3/2025 0800)  Care Plan - Patient's Chronic Conditions and Co-Morbidity Symptoms are Monitored and Maintained or Improved: Monitor and assess patient's chronic conditions and comorbid symptoms for stability, deterioration, or improvement  4/3/2025 0755 by Luci Dunn RN  Outcome: Progressing  4/3/2025 0233 by Chelsea Lantigua RN  Outcome: Progressing     Problem: Discharge Planning  Goal: Discharge to home or other facility with appropriate resources  4/3/2025 1409 by Luci Dunn RN  Outcome: Completed  Flowsheets (Taken 4/3/2025 0800)  Discharge to home or other facility with appropriate resources: Identify barriers to discharge with patient and caregiver  4/3/2025 0755 by Luci Dunn RN  Outcome: Progressing  4/3/2025 0233 by Chelsea Lantigua RN  Outcome: Progressing     Problem: Pain  Goal: Verbalizes/displays adequate comfort level or baseline comfort level  4/3/2025 1409 by Luci Dunn RN  Outcome: Completed  4/3/2025 0755 by Luci Dunn RN  Outcome: Progressing  4/3/2025 0233 by Chelsea Lantigua RN  Outcome: Progressing     Problem: ABCDS Injury Assessment  Goal: Absence of physical injury  4/3/2025 1409 by Luci Dunn RN  Outcome: Completed  4/3/2025 0755 by Luci Dunn RN  Outcome: Progressing  4/3/2025 0233 by Chelsea Lantigua RN  Outcome: Progressing     Problem: Safety - Adult  Goal: Free from fall injury  4/3/2025 1409 by Luci Dunn RN  Outcome: Completed  4/3/2025 0755 by Luci Dunn RN  Outcome: Progressing  4/3/2025 0233 by Chelsea Lantigua RN  Outcome: Progressing     Problem: Respiratory - Adult  Goal: Achieves optimal ventilation and oxygenation  4/3/2025 1409 by Luci Dunn RN  Outcome: Completed  4/3/2025 0755 by Luci Dunn

## 2025-04-03 NOTE — CARE COORDINATION
Received call from APS  Desiree Cochran, [phone] inquiring about patient admission.  Message left confirming that pt was currently hospitalized with plan to discharge to [facility]. Will follow and notify APS of discharge date.

## 2025-04-03 NOTE — CARE COORDINATION
Message left for APS worker Desiree Cochran, notifying her of plan for discharge to [facility] later this afternoon.

## 2025-05-01 ENCOUNTER — APPOINTMENT (OUTPATIENT)
Dept: CT IMAGING | Age: 65
DRG: 896 | End: 2025-05-01
Payer: MEDICARE

## 2025-05-01 ENCOUNTER — HOSPITAL ENCOUNTER (INPATIENT)
Age: 65
LOS: 4 days | Discharge: NURSING, REHAB OR LONG-TERM CARE | DRG: 896 | End: 2025-05-05
Attending: EMERGENCY MEDICINE | Admitting: STUDENT IN AN ORGANIZED HEALTH CARE EDUCATION/TRAINING PROGRAM
Payer: MEDICARE

## 2025-05-01 ENCOUNTER — APPOINTMENT (OUTPATIENT)
Dept: GENERAL RADIOLOGY | Age: 65
DRG: 896 | End: 2025-05-01
Payer: MEDICARE

## 2025-05-01 DIAGNOSIS — G40.919 BREAKTHROUGH SEIZURE (HCC): ICD-10-CM

## 2025-05-01 DIAGNOSIS — R41.82 ALTERED MENTAL STATUS, UNSPECIFIED ALTERED MENTAL STATUS TYPE: Primary | ICD-10-CM

## 2025-05-01 DIAGNOSIS — R56.9 SEIZURES (HCC): ICD-10-CM

## 2025-05-01 PROBLEM — F10.10 ETOH ABUSE: Status: ACTIVE | Noted: 2025-05-01

## 2025-05-01 LAB
ALBUMIN SERPL-MCNC: 4.2 G/DL (ref 3.5–5.2)
ALBUMIN/GLOB SERPL: 1.3 {RATIO} (ref 1–2.5)
ALP SERPL-CCNC: 92 U/L (ref 40–129)
ALT SERPL-CCNC: 9 U/L (ref 10–50)
ANION GAP SERPL CALCULATED.3IONS-SCNC: 14 MMOL/L (ref 9–16)
AST SERPL-CCNC: 21 U/L (ref 10–50)
BASOPHILS # BLD: 0.03 K/UL (ref 0–0.2)
BASOPHILS NFR BLD: 0 % (ref 0–2)
BILIRUB SERPL-MCNC: 0.5 MG/DL (ref 0–1.2)
BUN SERPL-MCNC: 5 MG/DL (ref 8–23)
CALCIUM SERPL-MCNC: 9.6 MG/DL (ref 8.8–10.2)
CHLORIDE SERPL-SCNC: 102 MMOL/L (ref 98–107)
CO2 SERPL-SCNC: 23 MMOL/L (ref 20–31)
CREAT SERPL-MCNC: 0.9 MG/DL (ref 0.7–1.2)
EOSINOPHIL # BLD: 0.03 K/UL (ref 0–0.44)
EOSINOPHILS RELATIVE PERCENT: 0 % (ref 1–4)
ERYTHROCYTE [DISTWIDTH] IN BLOOD BY AUTOMATED COUNT: 12.3 % (ref 11.8–14.4)
ETHANOL PERCENT: 0 %
ETHANOLAMINE SERPL-MCNC: <10 MG/DL (ref 0–0.08)
FLUAV RNA RESP QL NAA+PROBE: NOT DETECTED
FLUBV RNA RESP QL NAA+PROBE: NOT DETECTED
GFR, ESTIMATED: >90 ML/MIN/1.73M2
GLUCOSE SERPL-MCNC: 129 MG/DL (ref 82–115)
HCT VFR BLD AUTO: 41.9 % (ref 40.7–50.3)
HGB BLD-MCNC: 14.7 G/DL (ref 13–17)
IMM GRANULOCYTES # BLD AUTO: 0.08 K/UL (ref 0–0.3)
IMM GRANULOCYTES NFR BLD: 0 %
LACTATE BLDV-SCNC: 1.4 MMOL/L (ref 0.5–1.9)
LACTATE BLDV-SCNC: 2.1 MMOL/L (ref 0.5–2.2)
LYMPHOCYTES NFR BLD: 1.12 K/UL (ref 1.1–3.7)
LYMPHOCYTES RELATIVE PERCENT: 6 % (ref 24–43)
MCH RBC QN AUTO: 31.6 PG (ref 25.2–33.5)
MCHC RBC AUTO-ENTMCNC: 35.1 G/DL (ref 28.4–34.8)
MCV RBC AUTO: 90.1 FL (ref 82.6–102.9)
MONOCYTES NFR BLD: 1.36 K/UL (ref 0.1–1.2)
MONOCYTES NFR BLD: 7 % (ref 3–12)
NEUTROPHILS NFR BLD: 87 % (ref 36–65)
NEUTS SEG NFR BLD: 16.82 K/UL (ref 1.5–8.1)
NRBC BLD-RTO: 0 PER 100 WBC
PLATELET # BLD AUTO: 241 K/UL (ref 138–453)
PMV BLD AUTO: 9.6 FL (ref 8.1–13.5)
POTASSIUM SERPL-SCNC: 3.2 MMOL/L (ref 3.7–5.3)
PROT SERPL-MCNC: 7.6 G/DL (ref 6.6–8.7)
RBC # BLD AUTO: 4.65 M/UL (ref 4.21–5.77)
SARS-COV-2 RNA RESP QL NAA+PROBE: NOT DETECTED
SODIUM SERPL-SCNC: 139 MMOL/L (ref 136–145)
SOURCE: NORMAL
SPECIMEN DESCRIPTION: NORMAL
TROPONIN I SERPL HS-MCNC: 34 NG/L (ref 0–22)
TSH SERPL DL<=0.05 MIU/L-ACNC: 0.58 UIU/ML (ref 0.27–4.2)
WBC OTHER # BLD: 19.4 K/UL (ref 3.5–11.3)

## 2025-05-01 PROCEDURE — 84484 ASSAY OF TROPONIN QUANT: CPT

## 2025-05-01 PROCEDURE — 83605 ASSAY OF LACTIC ACID: CPT

## 2025-05-01 PROCEDURE — 87040 BLOOD CULTURE FOR BACTERIA: CPT

## 2025-05-01 PROCEDURE — 2580000003 HC RX 258: Performed by: EMERGENCY MEDICINE

## 2025-05-01 PROCEDURE — 2060000000 HC ICU INTERMEDIATE R&B

## 2025-05-01 PROCEDURE — 87077 CULTURE AEROBIC IDENTIFY: CPT

## 2025-05-01 PROCEDURE — 99222 1ST HOSP IP/OBS MODERATE 55: CPT | Performed by: STUDENT IN AN ORGANIZED HEALTH CARE EDUCATION/TRAINING PROGRAM

## 2025-05-01 PROCEDURE — 2500000003 HC RX 250 WO HCPCS: Performed by: STUDENT IN AN ORGANIZED HEALTH CARE EDUCATION/TRAINING PROGRAM

## 2025-05-01 PROCEDURE — 2500000003 HC RX 250 WO HCPCS: Performed by: EMERGENCY MEDICINE

## 2025-05-01 PROCEDURE — 71260 CT THORAX DX C+: CPT

## 2025-05-01 PROCEDURE — 6360000002 HC RX W HCPCS: Performed by: STUDENT IN AN ORGANIZED HEALTH CARE EDUCATION/TRAINING PROGRAM

## 2025-05-01 PROCEDURE — 71250 CT THORAX DX C-: CPT

## 2025-05-01 PROCEDURE — C9254 INJECTION, LACOSAMIDE: HCPCS | Performed by: EMERGENCY MEDICINE

## 2025-05-01 PROCEDURE — 96365 THER/PROPH/DIAG IV INF INIT: CPT

## 2025-05-01 PROCEDURE — 84145 PROCALCITONIN (PCT): CPT

## 2025-05-01 PROCEDURE — 6360000002 HC RX W HCPCS: Performed by: EMERGENCY MEDICINE

## 2025-05-01 PROCEDURE — 96375 TX/PRO/DX INJ NEW DRUG ADDON: CPT

## 2025-05-01 PROCEDURE — 80235 DRUG ASSAY LACOSAMIDE: CPT

## 2025-05-01 PROCEDURE — 87636 SARSCOV2 & INF A&B AMP PRB: CPT

## 2025-05-01 PROCEDURE — 84443 ASSAY THYROID STIM HORMONE: CPT

## 2025-05-01 PROCEDURE — 93005 ELECTROCARDIOGRAM TRACING: CPT | Performed by: EMERGENCY MEDICINE

## 2025-05-01 PROCEDURE — 87205 SMEAR GRAM STAIN: CPT

## 2025-05-01 PROCEDURE — 99285 EMERGENCY DEPT VISIT HI MDM: CPT

## 2025-05-01 PROCEDURE — 96374 THER/PROPH/DIAG INJ IV PUSH: CPT

## 2025-05-01 PROCEDURE — G0480 DRUG TEST DEF 1-7 CLASSES: HCPCS

## 2025-05-01 PROCEDURE — 85025 COMPLETE CBC W/AUTO DIFF WBC: CPT

## 2025-05-01 PROCEDURE — 80053 COMPREHEN METABOLIC PANEL: CPT

## 2025-05-01 PROCEDURE — 36415 COLL VENOUS BLD VENIPUNCTURE: CPT

## 2025-05-01 PROCEDURE — 70450 CT HEAD/BRAIN W/O DYE: CPT

## 2025-05-01 RX ORDER — LEVETIRACETAM 500 MG/5ML
1000 INJECTION, SOLUTION, CONCENTRATE INTRAVENOUS ONCE
Status: COMPLETED | OUTPATIENT
Start: 2025-05-01 | End: 2025-05-01

## 2025-05-01 RX ORDER — ACETAMINOPHEN 650 MG/1
650 SUPPOSITORY RECTAL EVERY 6 HOURS PRN
Status: DISCONTINUED | OUTPATIENT
Start: 2025-05-01 | End: 2025-05-05 | Stop reason: HOSPADM

## 2025-05-01 RX ORDER — PHENOBARBITAL 32.4 MG/1
32.4 TABLET ORAL 2 TIMES DAILY
Status: COMPLETED | OUTPATIENT
Start: 2025-05-03 | End: 2025-05-03

## 2025-05-01 RX ORDER — SODIUM CHLORIDE 0.9 % (FLUSH) 0.9 %
10 SYRINGE (ML) INJECTION PRN
Status: DISCONTINUED | OUTPATIENT
Start: 2025-05-01 | End: 2025-05-05 | Stop reason: HOSPADM

## 2025-05-01 RX ORDER — PHENOBARBITAL 32.4 MG/1
16.2 TABLET ORAL EVERY 6 HOURS PRN
Status: DISPENSED | OUTPATIENT
Start: 2025-05-03 | End: 2025-05-04

## 2025-05-01 RX ORDER — PHENOBARBITAL 32.4 MG/1
16.2 TABLET ORAL 2 TIMES DAILY
Status: COMPLETED | OUTPATIENT
Start: 2025-05-04 | End: 2025-05-04

## 2025-05-01 RX ORDER — GAUZE BANDAGE 2" X 2"
100 BANDAGE TOPICAL DAILY
Status: DISCONTINUED | OUTPATIENT
Start: 2025-05-02 | End: 2025-05-02

## 2025-05-01 RX ORDER — ONDANSETRON 4 MG/1
4 TABLET, ORALLY DISINTEGRATING ORAL EVERY 8 HOURS PRN
Status: DISCONTINUED | OUTPATIENT
Start: 2025-05-01 | End: 2025-05-05 | Stop reason: HOSPADM

## 2025-05-01 RX ORDER — LABETALOL HYDROCHLORIDE 5 MG/ML
10 INJECTION, SOLUTION INTRAVENOUS ONCE
Status: COMPLETED | OUTPATIENT
Start: 2025-05-01 | End: 2025-05-01

## 2025-05-01 RX ORDER — ONDANSETRON 2 MG/ML
4 INJECTION INTRAMUSCULAR; INTRAVENOUS EVERY 6 HOURS PRN
Status: DISCONTINUED | OUTPATIENT
Start: 2025-05-01 | End: 2025-05-05 | Stop reason: HOSPADM

## 2025-05-01 RX ORDER — MAGNESIUM SULFATE IN WATER 40 MG/ML
2000 INJECTION, SOLUTION INTRAVENOUS PRN
Status: DISCONTINUED | OUTPATIENT
Start: 2025-05-01 | End: 2025-05-05 | Stop reason: HOSPADM

## 2025-05-01 RX ORDER — SODIUM CHLORIDE 9 MG/ML
INJECTION, SOLUTION INTRAVENOUS PRN
Status: DISCONTINUED | OUTPATIENT
Start: 2025-05-01 | End: 2025-05-05 | Stop reason: HOSPADM

## 2025-05-01 RX ORDER — IOPAMIDOL 755 MG/ML
75 INJECTION, SOLUTION INTRAVASCULAR
Status: DISCONTINUED | OUTPATIENT
Start: 2025-05-01 | End: 2025-05-05 | Stop reason: HOSPADM

## 2025-05-01 RX ORDER — SODIUM CHLORIDE 0.9 % (FLUSH) 0.9 %
5-40 SYRINGE (ML) INJECTION EVERY 12 HOURS SCHEDULED
Status: DISCONTINUED | OUTPATIENT
Start: 2025-05-01 | End: 2025-05-05 | Stop reason: HOSPADM

## 2025-05-01 RX ORDER — SODIUM CHLORIDE 0.9 % (FLUSH) 0.9 %
5-40 SYRINGE (ML) INJECTION PRN
Status: DISCONTINUED | OUTPATIENT
Start: 2025-05-01 | End: 2025-05-05 | Stop reason: HOSPADM

## 2025-05-01 RX ORDER — POTASSIUM CHLORIDE 1500 MG/1
40 TABLET, EXTENDED RELEASE ORAL PRN
Status: DISCONTINUED | OUTPATIENT
Start: 2025-05-01 | End: 2025-05-05 | Stop reason: HOSPADM

## 2025-05-01 RX ORDER — LEVOFLOXACIN 5 MG/ML
750 INJECTION, SOLUTION INTRAVENOUS EVERY 24 HOURS
Status: DISCONTINUED | OUTPATIENT
Start: 2025-05-01 | End: 2025-05-02

## 2025-05-01 RX ORDER — ACETAMINOPHEN 325 MG/1
650 TABLET ORAL EVERY 6 HOURS PRN
Status: DISCONTINUED | OUTPATIENT
Start: 2025-05-01 | End: 2025-05-05 | Stop reason: HOSPADM

## 2025-05-01 RX ORDER — LACOSAMIDE 10 MG/ML
200 INJECTION, SOLUTION INTRAVENOUS ONCE
Status: COMPLETED | OUTPATIENT
Start: 2025-05-01 | End: 2025-05-01

## 2025-05-01 RX ORDER — POLYETHYLENE GLYCOL 3350 17 G/17G
17 POWDER, FOR SOLUTION ORAL DAILY PRN
Status: DISCONTINUED | OUTPATIENT
Start: 2025-05-01 | End: 2025-05-05 | Stop reason: HOSPADM

## 2025-05-01 RX ORDER — PHENOBARBITAL 32.4 MG/1
32.4 TABLET ORAL 4 TIMES DAILY
Status: COMPLETED | OUTPATIENT
Start: 2025-05-02 | End: 2025-05-02

## 2025-05-01 RX ORDER — PHENOBARBITAL 32.4 MG/1
32.4 TABLET ORAL EVERY 6 HOURS PRN
Status: DISPENSED | OUTPATIENT
Start: 2025-05-01 | End: 2025-05-03

## 2025-05-01 RX ORDER — 0.9 % SODIUM CHLORIDE 0.9 %
30 INTRAVENOUS SOLUTION INTRAVENOUS ONCE
Status: COMPLETED | OUTPATIENT
Start: 2025-05-01 | End: 2025-05-02

## 2025-05-01 RX ORDER — 0.9 % SODIUM CHLORIDE 0.9 %
100 INTRAVENOUS SOLUTION INTRAVENOUS ONCE
Status: DISCONTINUED | OUTPATIENT
Start: 2025-05-01 | End: 2025-05-05 | Stop reason: HOSPADM

## 2025-05-01 RX ORDER — POTASSIUM CHLORIDE 7.45 MG/ML
10 INJECTION INTRAVENOUS PRN
Status: DISCONTINUED | OUTPATIENT
Start: 2025-05-01 | End: 2025-05-05 | Stop reason: HOSPADM

## 2025-05-01 RX ADMIN — SODIUM CHLORIDE, PRESERVATIVE FREE 10 ML: 5 INJECTION INTRAVENOUS at 20:20

## 2025-05-01 RX ADMIN — AZITHROMYCIN MONOHYDRATE 500 MG: 500 INJECTION, POWDER, LYOPHILIZED, FOR SOLUTION INTRAVENOUS at 21:47

## 2025-05-01 RX ADMIN — LABETALOL HYDROCHLORIDE 10 MG: 5 INJECTION, SOLUTION INTRAVENOUS at 21:12

## 2025-05-01 RX ADMIN — LEVETIRACETAM 1000 MG: 100 INJECTION INTRAVENOUS at 21:15

## 2025-05-01 RX ADMIN — LEVOFLOXACIN 750 MG: 5 INJECTION, SOLUTION INTRAVENOUS at 23:48

## 2025-05-01 RX ADMIN — LEVETIRACETAM 1000 MG: 100 INJECTION INTRAVENOUS at 21:46

## 2025-05-01 RX ADMIN — LACOSAMIDE 200 MG: 10 INJECTION INTRAVENOUS at 22:18

## 2025-05-01 RX ADMIN — CEFTRIAXONE SODIUM 1000 MG: 1 INJECTION, POWDER, FOR SOLUTION INTRAMUSCULAR; INTRAVENOUS at 19:30

## 2025-05-01 RX ADMIN — SODIUM CHLORIDE 2259 ML: 9 INJECTION, SOLUTION INTRAVENOUS at 19:45

## 2025-05-01 RX ADMIN — SODIUM CHLORIDE, PRESERVATIVE FREE 10 ML: 5 INJECTION INTRAVENOUS at 23:28

## 2025-05-01 ASSESSMENT — PAIN SCALES - WONG BAKER: WONGBAKER_NUMERICALRESPONSE: NO HURT

## 2025-05-01 ASSESSMENT — PAIN - FUNCTIONAL ASSESSMENT: PAIN_FUNCTIONAL_ASSESSMENT: WONG-BAKER FACES

## 2025-05-01 NOTE — ED NOTES
Pt noncompliant with any requests.  Resists repositioning for EKG.  Does not  answer any questions or speak.  Does open eyes slightly when talked to.
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
## 2025-05-02 PROBLEM — G96.9: Status: ACTIVE | Noted: 2025-05-02

## 2025-05-02 PROBLEM — F19.10 POLYSUBSTANCE ABUSE (HCC): Status: ACTIVE | Noted: 2025-05-02

## 2025-05-02 PROBLEM — F02.80: Status: ACTIVE | Noted: 2025-05-02

## 2025-05-02 LAB
ALBUMIN SERPL-MCNC: 3.7 G/DL (ref 3.5–5.2)
ALBUMIN/GLOB SERPL: 1.2 {RATIO} (ref 1–2.5)
ALP SERPL-CCNC: 85 U/L (ref 40–129)
ALT SERPL-CCNC: <5 U/L (ref 10–50)
AMPHET UR QL SCN: NEGATIVE
ANION GAP SERPL CALCULATED.3IONS-SCNC: 14 MMOL/L (ref 9–16)
AST SERPL-CCNC: 23 U/L (ref 10–50)
BACTERIA URNS QL MICRO: ABNORMAL
BARBITURATES UR QL SCN: NEGATIVE
BASOPHILS # BLD: 0 K/UL (ref 0–0.2)
BASOPHILS NFR BLD: 0 % (ref 0–2)
BENZODIAZ UR QL: NEGATIVE
BILIRUB SERPL-MCNC: 0.6 MG/DL (ref 0–1.2)
BILIRUB UR QL STRIP: NEGATIVE
BUN SERPL-MCNC: 5 MG/DL (ref 8–23)
CA-I BLD-SCNC: 1.13 MMOL/L (ref 1.13–1.33)
CALCIUM SERPL-MCNC: 9.2 MG/DL (ref 8.8–10.2)
CANNABINOIDS UR QL SCN: POSITIVE
CHLORIDE SERPL-SCNC: 105 MMOL/L (ref 98–107)
CLARITY UR: CLEAR
CO2 SERPL-SCNC: 19 MMOL/L (ref 20–31)
COCAINE UR QL SCN: NEGATIVE
COLOR UR: YELLOW
CREAT SERPL-MCNC: 0.7 MG/DL (ref 0.7–1.2)
EKG ATRIAL RATE: 101 BPM
EKG P AXIS: 83 DEGREES
EKG P-R INTERVAL: 162 MS
EKG Q-T INTERVAL: 372 MS
EKG QRS DURATION: 94 MS
EKG QTC CALCULATION (BAZETT): 482 MS
EKG R AXIS: 47 DEGREES
EKG T AXIS: 61 DEGREES
EKG VENTRICULAR RATE: 101 BPM
EOSINOPHIL # BLD: 0 K/UL (ref 0–0.44)
EOSINOPHILS RELATIVE PERCENT: 0 % (ref 1–4)
EPI CELLS #/AREA URNS HPF: ABNORMAL /HPF (ref 0–5)
ERYTHROCYTE [DISTWIDTH] IN BLOOD BY AUTOMATED COUNT: 12.3 % (ref 11.8–14.4)
FENTANYL UR QL: POSITIVE
GFR, ESTIMATED: >90 ML/MIN/1.73M2
GLUCOSE SERPL-MCNC: 124 MG/DL (ref 82–115)
GLUCOSE UR STRIP-MCNC: NEGATIVE MG/DL
HCT VFR BLD AUTO: 38.9 % (ref 40.7–50.3)
HGB BLD-MCNC: 13.3 G/DL (ref 13–17)
HGB UR QL STRIP.AUTO: ABNORMAL
IMM GRANULOCYTES # BLD AUTO: 0 K/UL (ref 0–0.3)
IMM GRANULOCYTES NFR BLD: 0 %
INR PPP: 1.1
KETONES UR STRIP-MCNC: ABNORMAL MG/DL
LACTATE BLDV-SCNC: 1 MMOL/L (ref 0.5–2.2)
LEUKOCYTE ESTERASE UR QL STRIP: NEGATIVE
LYMPHOCYTES NFR BLD: 1.83 K/UL (ref 1.1–3.7)
LYMPHOCYTES RELATIVE PERCENT: 14 % (ref 24–43)
MAGNESIUM SERPL-MCNC: 1.8 MG/DL (ref 1.6–2.4)
MCH RBC QN AUTO: 31.4 PG (ref 25.2–33.5)
MCHC RBC AUTO-ENTMCNC: 34.2 G/DL (ref 28.4–34.8)
MCV RBC AUTO: 92 FL (ref 82.6–102.9)
METHADONE UR QL: NEGATIVE
MONOCYTES NFR BLD: 1.57 K/UL (ref 0.1–1.2)
MONOCYTES NFR BLD: 12 % (ref 3–12)
NEUTROPHILS NFR BLD: 74 % (ref 36–65)
NEUTS SEG NFR BLD: 9.7 K/UL (ref 1.5–8.1)
NITRITE UR QL STRIP: NEGATIVE
NRBC BLD-RTO: 0 PER 100 WBC
OPIATES UR QL SCN: NEGATIVE
OXYCODONE UR QL SCN: NEGATIVE
PCP UR QL SCN: NEGATIVE
PH UR STRIP: 7.5 [PH] (ref 5–8)
PHOSPHATE SERPL-MCNC: 2.6 MG/DL (ref 2.5–4.5)
PLATELET # BLD AUTO: 239 K/UL (ref 138–453)
PMV BLD AUTO: 10.2 FL (ref 8.1–13.5)
POTASSIUM SERPL-SCNC: 2.9 MMOL/L (ref 3.7–5.3)
POTASSIUM SERPL-SCNC: 3.4 MMOL/L (ref 3.7–5.3)
PROCALCITONIN SERPL-MCNC: 0.07 NG/ML (ref 0–0.09)
PROT SERPL-MCNC: 6.9 G/DL (ref 6.6–8.7)
PROT UR STRIP-MCNC: NEGATIVE MG/DL
PROTHROMBIN TIME: 14.6 SEC (ref 11.5–14.2)
RBC # BLD AUTO: 4.23 M/UL (ref 4.21–5.77)
RBC #/AREA URNS HPF: ABNORMAL /HPF (ref 0–2)
SODIUM SERPL-SCNC: 138 MMOL/L (ref 136–145)
SP GR UR STRIP: 1.02 (ref 1–1.03)
TEST INFORMATION: ABNORMAL
TSH SERPL DL<=0.05 MIU/L-ACNC: 0.41 UIU/ML (ref 0.27–4.2)
UROBILINOGEN UR STRIP-ACNC: NORMAL EU/DL (ref 0–1)
WBC #/AREA URNS HPF: ABNORMAL /HPF (ref 0–5)
WBC OTHER # BLD: 13.1 K/UL (ref 3.5–11.3)

## 2025-05-02 PROCEDURE — 2500000003 HC RX 250 WO HCPCS: Performed by: STUDENT IN AN ORGANIZED HEALTH CARE EDUCATION/TRAINING PROGRAM

## 2025-05-02 PROCEDURE — 80053 COMPREHEN METABOLIC PANEL: CPT

## 2025-05-02 PROCEDURE — 6370000000 HC RX 637 (ALT 250 FOR IP): Performed by: STUDENT IN AN ORGANIZED HEALTH CARE EDUCATION/TRAINING PROGRAM

## 2025-05-02 PROCEDURE — 87086 URINE CULTURE/COLONY COUNT: CPT

## 2025-05-02 PROCEDURE — 99232 SBSQ HOSP IP/OBS MODERATE 35: CPT | Performed by: FAMILY MEDICINE

## 2025-05-02 PROCEDURE — 85025 COMPLETE CBC W/AUTO DIFF WBC: CPT

## 2025-05-02 PROCEDURE — 6360000002 HC RX W HCPCS: Performed by: FAMILY MEDICINE

## 2025-05-02 PROCEDURE — 85610 PROTHROMBIN TIME: CPT

## 2025-05-02 PROCEDURE — 2060000000 HC ICU INTERMEDIATE R&B

## 2025-05-02 PROCEDURE — 80307 DRUG TEST PRSMV CHEM ANLYZR: CPT

## 2025-05-02 PROCEDURE — 99223 1ST HOSP IP/OBS HIGH 75: CPT | Performed by: PSYCHIATRY & NEUROLOGY

## 2025-05-02 PROCEDURE — 93010 ELECTROCARDIOGRAM REPORT: CPT | Performed by: INTERNAL MEDICINE

## 2025-05-02 PROCEDURE — 36415 COLL VENOUS BLD VENIPUNCTURE: CPT

## 2025-05-02 PROCEDURE — 6370000000 HC RX 637 (ALT 250 FOR IP): Performed by: NURSE PRACTITIONER

## 2025-05-02 PROCEDURE — 84100 ASSAY OF PHOSPHORUS: CPT

## 2025-05-02 PROCEDURE — 83735 ASSAY OF MAGNESIUM: CPT

## 2025-05-02 PROCEDURE — 82330 ASSAY OF CALCIUM: CPT

## 2025-05-02 PROCEDURE — 84132 ASSAY OF SERUM POTASSIUM: CPT

## 2025-05-02 PROCEDURE — 81001 URINALYSIS AUTO W/SCOPE: CPT

## 2025-05-02 PROCEDURE — 99222 1ST HOSP IP/OBS MODERATE 55: CPT

## 2025-05-02 PROCEDURE — 83605 ASSAY OF LACTIC ACID: CPT

## 2025-05-02 PROCEDURE — 84443 ASSAY THYROID STIM HORMONE: CPT

## 2025-05-02 RX ORDER — METOPROLOL SUCCINATE 50 MG/1
50 TABLET, EXTENDED RELEASE ORAL DAILY
Status: DISCONTINUED | OUTPATIENT
Start: 2025-05-02 | End: 2025-05-05 | Stop reason: HOSPADM

## 2025-05-02 RX ORDER — ATORVASTATIN CALCIUM 10 MG/1
10 TABLET, FILM COATED ORAL NIGHTLY
Status: DISCONTINUED | OUTPATIENT
Start: 2025-05-02 | End: 2025-05-05 | Stop reason: HOSPADM

## 2025-05-02 RX ORDER — DULOXETIN HYDROCHLORIDE 60 MG/1
60 CAPSULE, DELAYED RELEASE ORAL DAILY
Status: DISCONTINUED | OUTPATIENT
Start: 2025-05-02 | End: 2025-05-05 | Stop reason: HOSPADM

## 2025-05-02 RX ORDER — HYDROCODONE BITARTRATE AND ACETAMINOPHEN 5; 325 MG/1; MG/1
1 TABLET ORAL ONCE
Refills: 0 | Status: COMPLETED | OUTPATIENT
Start: 2025-05-02 | End: 2025-05-02

## 2025-05-02 RX ORDER — FOLIC ACID 1 MG/1
1 TABLET ORAL DAILY
Status: DISCONTINUED | OUTPATIENT
Start: 2025-05-02 | End: 2025-05-05 | Stop reason: HOSPADM

## 2025-05-02 RX ORDER — SPIRONOLACTONE 25 MG/1
25 TABLET ORAL DAILY
Status: DISCONTINUED | OUTPATIENT
Start: 2025-05-02 | End: 2025-05-05 | Stop reason: HOSPADM

## 2025-05-02 RX ORDER — LACOSAMIDE 100 MG/1
100 TABLET ORAL 2 TIMES DAILY
Status: DISCONTINUED | OUTPATIENT
Start: 2025-05-02 | End: 2025-05-05 | Stop reason: HOSPADM

## 2025-05-02 RX ORDER — GAUZE BANDAGE 2" X 2"
100 BANDAGE TOPICAL DAILY
Status: DISCONTINUED | OUTPATIENT
Start: 2025-05-02 | End: 2025-05-05 | Stop reason: HOSPADM

## 2025-05-02 RX ORDER — IPRATROPIUM BROMIDE AND ALBUTEROL SULFATE 2.5; .5 MG/3ML; MG/3ML
1 SOLUTION RESPIRATORY (INHALATION)
Status: DISCONTINUED | OUTPATIENT
Start: 2025-05-02 | End: 2025-05-02

## 2025-05-02 RX ORDER — ALBUTEROL SULFATE 0.83 MG/ML
2.5 SOLUTION RESPIRATORY (INHALATION)
Status: DISCONTINUED | OUTPATIENT
Start: 2025-05-02 | End: 2025-05-05 | Stop reason: HOSPADM

## 2025-05-02 RX ORDER — DONEPEZIL HYDROCHLORIDE 5 MG/1
5 TABLET, FILM COATED ORAL DAILY
Status: DISCONTINUED | OUTPATIENT
Start: 2025-05-02 | End: 2025-05-05 | Stop reason: HOSPADM

## 2025-05-02 RX ORDER — POTASSIUM CHLORIDE 7.45 MG/ML
10 INJECTION INTRAVENOUS
Status: COMPLETED | OUTPATIENT
Start: 2025-05-02 | End: 2025-05-02

## 2025-05-02 RX ADMIN — FOLIC ACID 1 MG: 1 TABLET ORAL at 08:59

## 2025-05-02 RX ADMIN — SODIUM CHLORIDE, PRESERVATIVE FREE 10 ML: 5 INJECTION INTRAVENOUS at 19:59

## 2025-05-02 RX ADMIN — POTASSIUM CHLORIDE 10 MEQ: 7.46 INJECTION, SOLUTION INTRAVENOUS at 10:23

## 2025-05-02 RX ADMIN — POTASSIUM CHLORIDE 10 MEQ: 7.46 INJECTION, SOLUTION INTRAVENOUS at 08:34

## 2025-05-02 RX ADMIN — LACOSAMIDE 100 MG: 100 TABLET, FILM COATED ORAL at 19:59

## 2025-05-02 RX ADMIN — RIVAROXABAN 20 MG: 20 TABLET, FILM COATED ORAL at 17:09

## 2025-05-02 RX ADMIN — SACUBITRIL AND VALSARTAN 1 TABLET: 24; 26 TABLET, FILM COATED ORAL at 08:59

## 2025-05-02 RX ADMIN — SPIRONOLACTONE 25 MG: 25 TABLET ORAL at 08:59

## 2025-05-02 RX ADMIN — POTASSIUM BICARBONATE 40 MEQ: 782 TABLET, EFFERVESCENT ORAL at 16:23

## 2025-05-02 RX ADMIN — POTASSIUM BICARBONATE 40 MEQ: 782 TABLET, EFFERVESCENT ORAL at 05:55

## 2025-05-02 RX ADMIN — SACUBITRIL AND VALSARTAN 1 TABLET: 24; 26 TABLET, FILM COATED ORAL at 19:59

## 2025-05-02 RX ADMIN — DULOXETINE 60 MG: 60 CAPSULE, DELAYED RELEASE ORAL at 08:59

## 2025-05-02 RX ADMIN — HYDROCODONE BITARTRATE AND ACETAMINOPHEN 1 TABLET: 5; 325 TABLET ORAL at 22:02

## 2025-05-02 RX ADMIN — LACOSAMIDE 100 MG: 100 TABLET, FILM COATED ORAL at 08:59

## 2025-05-02 RX ADMIN — PHENOBARBITAL 32.4 MG: 32.4 TABLET ORAL at 17:09

## 2025-05-02 RX ADMIN — PHENOBARBITAL 32.4 MG: 32.4 TABLET ORAL at 09:14

## 2025-05-02 RX ADMIN — ACETAMINOPHEN 650 MG: 325 TABLET, FILM COATED ORAL at 19:58

## 2025-05-02 RX ADMIN — POTASSIUM CHLORIDE 10 MEQ: 7.46 INJECTION, SOLUTION INTRAVENOUS at 11:25

## 2025-05-02 RX ADMIN — PHENOBARBITAL 32.4 MG: 32.4 TABLET ORAL at 19:58

## 2025-05-02 RX ADMIN — DONEPEZIL HYDROCHLORIDE 5 MG: 5 TABLET, FILM COATED ORAL at 09:00

## 2025-05-02 RX ADMIN — METOPROLOL SUCCINATE 50 MG: 50 TABLET, EXTENDED RELEASE ORAL at 08:59

## 2025-05-02 RX ADMIN — POTASSIUM CHLORIDE 10 MEQ: 7.46 INJECTION, SOLUTION INTRAVENOUS at 07:33

## 2025-05-02 RX ADMIN — ATORVASTATIN CALCIUM 10 MG: 10 TABLET, FILM COATED ORAL at 19:59

## 2025-05-02 RX ADMIN — Medication 100 MG: at 08:59

## 2025-05-02 RX ADMIN — SODIUM CHLORIDE, PRESERVATIVE FREE 10 ML: 5 INJECTION INTRAVENOUS at 07:29

## 2025-05-02 RX ADMIN — PHENOBARBITAL 32.4 MG: 32.4 TABLET ORAL at 13:15

## 2025-05-02 ASSESSMENT — PAIN DESCRIPTION - LOCATION
LOCATION: BACK
LOCATION: BACK

## 2025-05-02 ASSESSMENT — PAIN DESCRIPTION - FREQUENCY
FREQUENCY: CONTINUOUS
FREQUENCY: CONTINUOUS

## 2025-05-02 ASSESSMENT — PAIN DESCRIPTION - PAIN TYPE
TYPE: CHRONIC PAIN
TYPE: CHRONIC PAIN

## 2025-05-02 ASSESSMENT — PAIN SCALES - GENERAL
PAINLEVEL_OUTOF10: 9
PAINLEVEL_OUTOF10: 0
PAINLEVEL_OUTOF10: 9
PAINLEVEL_OUTOF10: 6
PAINLEVEL_OUTOF10: 9

## 2025-05-02 ASSESSMENT — PAIN DESCRIPTION - ONSET
ONSET: ON-GOING
ONSET: ON-GOING

## 2025-05-02 ASSESSMENT — PAIN DESCRIPTION - DESCRIPTORS
DESCRIPTORS: ACHING
DESCRIPTORS: ACHING

## 2025-05-02 ASSESSMENT — PAIN SCALES - WONG BAKER: WONGBAKER_NUMERICALRESPONSE: NO HURT

## 2025-05-02 ASSESSMENT — PAIN DESCRIPTION - ORIENTATION
ORIENTATION: UPPER
ORIENTATION: UPPER

## 2025-05-02 NOTE — CONSULTS
Department of Psychiatry  Consult Service   Psychiatric Assessment        REASON FOR CONSULT: Competency, polysubstance abuse    CONSULTING PHYSICIAN:      History obtained from: Patient, treatment team, Brianne Whitley (daughter)    HISTORY OF PRESENT ILLNESS:          The patient is a 65 y.o. male with significant psychiatric history of TBI, encephalopathy, SIRS, seizures,  A-fib, AICD placement, CAD, cardiomyopathy, CHF, COPD, hypothyroidism, hyperlipidemia, CVA, metabolic encephalopathy, polysubstance abuse, who is admitted medically for alcohol abuse. He presented to the ED via EMS with altered mental status. He is reported as found altered by home health personnel. He presented to the ED confused, combative, and resisting medical treatment. He required soft restraints. He had a witnessed seizure in the ED. He was loaded with IV Keppra 2000 mg followed by Vimpat 100 mg twice daily. He has no recurrence of seizure activity.   He is consulted by neurology with no recommendations for EEG. He is discontinued from Keppra. He is started on phenobarbital taper for alcohol withdrawal. Per EMR, patient has history of receiving phenobarbital for alcohol withdrawal management. Home medications include Vimpat, Cymbalta, and Aricept, thiamine, and folic acid. Notable labs include Potassium of 2.9 on 5/2. Same day repeat potassium level is 3.4. He completed IV antibiotic for leukocytosis and pneumonia.    Staff nurse reports patient's confusion and behaviors are improving. He accepted morning scheduled medications. He has been without acute episodes of severe agitation on the medical floor. Per care coordinator, patient recently revoked care with hospice to receive treatment in the ED. He was discharged from the hospital to [facility] where he received skilled nursing services until 4/21 when his insurance was cut. Reportedly, he was discharged to an unknown hotel. He has 3 adult children Brianne, Brad, and

## 2025-05-02 NOTE — CARE COORDINATION
Social work:  Call from [facility], patient known from previous admissions, he was on service then revoked to go to hospital and subsequently went to [facility].  Patient discharged from [facility] on 4/21/2025(cut by insurance) home care not setup.  Patel will come to [facility] today to meet with patient.

## 2025-05-02 NOTE — ED NOTES
Pt. Had seizure lasting approximately 1 min, Dr. Goldberger at bedside with patient. Pt. O2 dipped to 84% on room air during seizure, placed on 4L/NC.

## 2025-05-02 NOTE — ED NOTES
Writer spoke with pt daughter, daughter states pt has extensive substance abuse history as well as dementia. Pt. Daughter states he has a \"mushy brain\" and when he acts like this it means he has had a seizure or is about to have one. Pt. Daughter states he can be confused after for anywhere from a day to months.

## 2025-05-02 NOTE — CARE COORDINATION
05/02/25 1059   Readmission Assessment   Number of Days since last admission? 8-30 days   Previous Disposition SNF   Who is being Interviewed Unable to Complete  (patient AMS. son unsure)   What was the patient's/caregiver's perception as to why they think they needed to return back to the hospital? Other (Comment)  (seizures)   Did you visit your Primary Care Physician after you left the hospital, before you returned this time? No   Why weren't you able to visit your PCP? Did not have an appointment   Did you see a specialist, such as Cardiac, Pulmonary, Orthopedic Physician, etc. after you left the hospital? No   Who advised the patient to return to the hospital? Self-referral   Does the patient report anything that got in the way of taking their medications? No   In our efforts to provide the best possible care to you and others like you, can you think of anything that we could have done to help you after you left the hospital the first time, so that you might not have needed to return so soon? Arrange for more help when leaving the hospital

## 2025-05-02 NOTE — PROGRESS NOTES
[] The Home Med List was verified for accuracy and marked COMPLETED. The following sources were used to assist with Medication Reconciliation:    [] Med Rec Pharmacy tech has already completed home med list in the ED and note reviewed   [] Patient med list was transcribed into the EHR and verified for accuracy.(Note method of verification)  [] Patient provided bottles of their medications for validation  [] Medications reviewed and confirmed with (Please list name and relationship to patient)  [] Contacted patient's pharmacy to confirm home medications (Please list the pharmacy)  [] Home medications reviewed using Dispense Report   [] Contacted physician office to confirm home medications  [] Medical Records received from another facility (list facility and place copy placed in chart)  []  was notified regarding changes to home med list via on 5/2/2025 at the following time:        [x] There are one or more home medications that need clarification before Medication Reconciliation can be marked completed. The Med List Status has been marked as In Progress.   To assist with Home Medication Reconciliation the following actions have been taken:    [] Family requested to bring medications in their original bottles to the hospital for verification  [] Family requested to call hospital with list of medications  [] Call to physicians off and left message (list physician and who they spoke to, date and time)  [] Request for medical records made to   [] MAR from facility requested to be faxed over  [x] Unable to complete due to patient condition: Pt refusing  [x] Oncoming nurse Jeffrey notified of incomplete med list for follow up  [] Other       *Effective communication is essential throughout this process. It is important for the nurse to document the progress of the med rec to keep team members informed. If any changes are made to the home medication list, the nurse is responsible for notifying the physician of the

## 2025-05-02 NOTE — PROGRESS NOTES
[facility]  Office: [phone]  Louie Rossi DO, Noah Mayers DO, Sergio Ordaz DO, Saul March, DO, Willy Mc MD, Jaci Berger MD, Sivakumar Freeman MD, Mile Johnston MD,  Aric Granda MD, Garry Shepherd MD, Kelsy Wahl MD,  Evelyn Petit DO, Terry Kern MD, Addison Sol MD, Jaspreet Rossi DO, Kassy Pedro MD,  Emeka Leon DO, Krystyna Howell MD, Kitty Hawk MD, Keeley Santa MD,  Chris Ashley MD, Rick Del Rio MD, Howard Brown MD, Gemma Ordaz MD, Mateo Dey MD, Pardeep Owen MD, Gómez Boykin DO, Tatiana Guillen MD, Avi Mcneil MD, Evelyn Parker MD, Mohsin Reza, MD, Junior Gonsalez MD, Shirley Waterhouse, CNP,  Magdalena Oviedo, CNP, Gómez Gomez, CNP,  Fely Garcia, DNP, Lorena Garcia, CNP, Mariana Jordan, CNP, Dixie Garcia, CNP, Shea Chirinos, CNP, Suzi Tavares, PA-C, Daija Mejia, CNP, Vincent Armendariz, CNP,  Jie Loes, CNP, Felicita Nobles, CNP, Raymon Fitch, PA-C, Asuncion Hollins, CNP,  Penny Lopez, CNS, Brianne Arnett, CNP, Miriam Martins, CNP,   Zuly Centeno, CNP       [facility]      Daily Progress Note     Admit Date: 5/1/2025  Bed/Room No.  1001/1001-02  Admitting Physician : Sivakumar Freeman MD  Code Status :Full Code  Hospital Day:  LOS: 1 day   Chief Complaint:     Chief Complaint   Patient presents with    Altered Mental Status     Pt won't respond for triage. Not following commands. Per EMS he's usually A/Ox4. Home care personnel called EMS around 5pm saying pt was acting different. Last known normal unknown     Principal Problem:    ETOH abuse  Resolved Problems:    * No resolved hospital problems. *    Subjective :        Interval History/Significant events :  05/02/25    Patient appears confused.  He has  at bedside.  Patient is disoriented.  He does not appear in acute distress.  No seizures noted.  Vitals -afebrile.  Temperature 99.3 °F.  Blood pressure elevated 150/83      Acute Metabolic Encephalopathy -unknown cause.  Differential diagnosis breakthrough seizure, metabolic encephalopathy, worsening dementia.  Neurology consultation.  MRI from 3/27/2025 reviewed.  Chronic microvascular ischemic damage with minimal global parenchymal volume loss.  Seizure disorder-noncompliance to medication.  Patient was started on phenobarbital.  Was on Vimpat prior to admission.  Continue medication.  Dementia disorder.  Patient on Aricept.  Hyperlipidemia -on statins  Aspiration Pneumonia -antibiotic  Alcohol Abuse -monitor for withdrawal symptoms.  PAF -poor candidate for long-term anticoagulation due to alcoholism, recurrent falls.  He is currently on Xarelto.  HFrEF - s/p ICD placement.  Continue Entresto, Toprol-XL, Aldactone      Continue to monitor vitals , Intake / output ,  Cell count , HGB , Kidney function, oxygenation  as indicated .   NO family at bedside.  Discussed with patient's son Michael over the phone.  He stated that patient's daughter Brianne is POA.  Plan discussed with Staff  RN     (This note is created with the assistance of a speech recognition program. While intending to generate a document that actually reflects the content of the visit, the document can still have some errors including those of syntax and sound a like substitutions which may escape proof reading. In such instances, actual meaning can be extrapolated by contextual diversion.)      Sivakumar Freeman MD  5/2/2025

## 2025-05-02 NOTE — CARE COORDINATION
Discharge planning    Patient confused. Able to tell me his name but not where he is at or where he came from. Asked if can call his children and permission granted.     Attempted to call daughter Brianne and VM left with requested call back.

## 2025-05-02 NOTE — CONSULTS
NEUROLOGY INPATIENT CONSULT NOTE    5/2/2025         Mahesh Brownlee is a  65 y.o. male admitted on 5/1/2025 with  Seizures (HCC) [R56.9]  ETOH abuse [F10.10]  Altered mental status, unspecified altered mental status type [R41.82]    Reason for consult: Seizure, AMS  History is obtained mostly from the patient and the medical record and from the caregivers. Chart is reviewed and patient is examined.   Briefly, this is a  65 y.o. male with hx of HTN, HLD, CAD, A-fib, EtOH, prior cerebellar cva, postlaminectomy syndrome and seizure disorder was admitted on 5/1/2025 with   Home health found him altered and they called EMS and patient was brought to ER.  Patient had a witnessed seizure activity on admit to ER.  Patient was noted to be extremely confused and combative requiring soft wrist restraints in ER.  He was on Keppra and Vimpat at home but with questionable noncompliance.  As patient was having generalized tonic-clonic seizure activity with extremely combative state; patient was loaded with IV Keppra 2000 mg followed by Keppra 1000 mg twice a day.  Patient was also loaded with IV Vimpat 200 mg followed by Vimpat 100 mg twice daily.  Since admitted to the unit, patient has not had any recurrence of seizure activity.  He was admitted to this unit early this morning and he has been lethargic could be related to medication loading doses and also could be postictal state.   So noted to be arousable and following commands upon waking up.          No current facility-administered medications on file prior to encounter.     Current Outpatient Medications on File Prior to Encounter   Medication Sig Dispense Refill    metoprolol succinate (TOPROL XL) 50 MG extended release tablet Take 1 tablet by mouth daily 30 tablet 3    sacubitril-valsartan (ENTRESTO) 24-26 MG per tablet Take 1 tablet by mouth 2 times daily 60 tablet 1    albuterol (PROVENTIL) (2.5 MG/3ML) 0.083% nebulizer solution Take 3 mLs by nebulization every 2

## 2025-05-02 NOTE — PROGRESS NOTES
Latest Reference Range & Units 05/02/25 05:41   Potassium 3.7 - 5.3 mmol/L 2.9 (LL)   (LL): Data is critically low

## 2025-05-02 NOTE — H&P
[facility]  Office: [phone]  Louie Rossi DO, Noah Mayers DO, Sergio Ordaz DO, Saul March DO, Willy Mc MD, Jaci Berger MD, Sivakumar Freeman MD, Mile Johnston MD,  Aric Granda MD, Garry Shepherd MD, Kelsy Wahl MD,  Evelyn Petit DO, Terry Kenr MD, Addison Sol MD, Jaspreet Rossi DO, Kassy Pedro MD,  Emeka Leon DO, Krystyna Howell MD, Kitty Hawk MD, Keeley Santa MD,  Chris Ashley MD, Rick Del Rio MD, Howard Brown MD, Gemma Ordaz MD, Mateo Dey MD, Pardeep Owen MD, Gómez Boykin DO, Tatiana Guillen MD, Avi Mcneil MD, Evelyn Parker MD, Mohsin Reza, MD, Junior Gonsalez MD, Shirley Waterhouse, CNP,  Magdalena Oviedo, CNP, Gómez Gomez, CNP,  Fely Garcia, SCHUYLER, Lorena Garcia, CNP, Mariana Jordan, CNP, Dixie Garcia, CNP, Shea Chirinos, CNP, Suzi Tavares, PA-C, Daija Mejia, CNP, Vincent Armendariz, CNP,  Jie Leos, CNP, Felicita Nobles, CNP, Raymon Fitch, PA-C, Asuncion Hollnis, CNP,  Penny Lopez, CNS, Brianne Arnett, CNP, Miriam Martins, CNP,   Zuly Centeno, CNP         [facility]   IN-PATIENT SERVICE   [facility]    HISTORY AND PHYSICAL EXAMINATION            Date:   5/1/2025  Patient name:  Mahesh Brownlee  Date of admission:  5/1/2025  5:35 PM  MRN:   [id number]  Account:  [id number]  YOB: 1960  PCP:    Ira Roberts APRN - CNP  Room:   [place] [address]  Code Status:    Full Code    Chief Complaint:     Chief Complaint   Patient presents with    Altered Mental Status     Pt won't respond for triage. Not following commands. Per EMS he's usually A/Ox4. Home care personnel called EMS around 5pm saying pt was acting different. Last known normal unknown       History Obtained From:     electronic medical record    History of Present Illness:     Mahesh RAMU Enamoradoke is a 65 y.o. Non- / non  male who presents with Altered Mental Status (Pt won't respond for triage. Not following  MD SPENCER   levothyroxine (SYNTHROID) 50 MCG tablet Take 1 tablet by mouth Daily  Patient not taking: Reported on 2020 3/25/16 7/21/22  Ira Roberts, APRN - CNP   diltiazem (CARDIZEM) 120 MG tablet Take 1 tablet by mouth 2 times daily  Patient not taking: No sig reported 12/24/15 7/21/22  Blayne Lorenz MD   levalbuterol (XOPENEX HFA) 45 MCG/ACT inhaler Inhale 1-2 puffs into the lungs every 6 hours as needed for Wheezing  16  Blayne Lorenz MD        Allergies:     Augmentin es-600 [amoxicillin-pot clavulanate] and Sulfa antibiotics    Social History:     Tobacco:    reports that he has been smoking cigarettes. He has a 40 pack-year smoking history. He has never used smokeless tobacco.  Alcohol:      reports current alcohol use.  Drug Use:  reports that he does not currently use drugs after having used the following drugs: Marijuana (Weed).    Family History:     Family History   Problem Relation Age of Onset    Other Mother         dementia    Other Father        Review of Systems:     Positive and Negative as described in HPI.    Combative and restrained  Confused and uncooperative    Physical Exam:   BP (!) 142/94   Pulse (!) 108   Temp 99.1 °F (37.3 °C) (Axillary)   Resp 21   Ht 1.803 m (5' 11\")   Wt 104.8 kg (231 lb)   SpO2 97%   BMI 32.22 kg/m²   Temp (24hrs), Av.1 °F (37.3 °C), Min:99.1 °F (37.3 °C), Max:99.1 °F (37.3 °C)    No results for input(s): \"POCGLU\" in the last 72 hours.  No intake or output data in the 24 hours ending 25 3425    General Appearance: Ill-appearing  Mental status: Confused and combative.  Not following commands.  Head:  normocephalic, atraumatic  Eye: no icterus, redness, pupils equal and reactive, extraocular eye movements intact, conjunctiva clear  Ear: normal external ear, no discharge, hearing intact  Nose:  no drainage noted  Mouth: mucous membranes moist  Neck: supple, no carotid bruits, thyroid not palpable  Lungs: Bilateral equal air entry,  Contrast? None  Result Date: 5/1/2025  1. No acute inflammatory or obstructive process seen in the  abdomen or pelvis. 2. Peribronchovascular tree-in-bud opacities in the right upper lobe suggesting small airways infection.     CT HEAD WO CONTRAST  Result Date: 5/1/2025  No acute intracranial abnormality.       Assessment :      Hospital Problems           Last Modified POA    * (Principal) ETOH abuse 5/1/2025 Yes       Plan:     Patient status inpatient in the Progressive Unit/Step down    Acute metabolic encephalopathy-possibly related to postictal state from seizure disorder versus alcohol withdrawal.  Continue phenobarb taper.  Continue Vimpat.  Patient received Keppra and Vimpat in ED.  Neurology consultation.  Head CT unremarkable.  Monitor labs daily.  Hyperlipidemia-continue statin  Leukocytosis-continue Levaquin.  Patient has a Augmentin allergy.  Pending procalcitonin.  Likely related to aspiration pneumonia.  Aspiration pneumonia?-Continue Levaquin.  Patient has a Augmentin allergy.  Chest CT shows tree-in-bud opacities in the right upper lobe.  Seizure disorder-patient with a witnessed seizure in Emergency Department.  Continue patient's home Vimpat.  Neurology consultation.  Patient received Keppra and Vimpat in the ED.  Alcohol abuse-continue phenobarb taper.  Continue thiamine and folic acid.  Monitor for withdrawal symptoms.  Anxiety/depression-continue Cymbalta.  History of E-dgp-crhhusmo Xarelto and metoprolol.  CHF-not in acute exacerbation.  History of AICD.  Continue Aldactone, statin, Entresto, and metoprolol.  Essential hypertension-continue Aldactone and metoprolol.    Greater than 35 minutes spent on physical exam, charting, assessment plan.    Consultations:   IP CONSULT TO NEUROLOGY  IP CONSULT TO INTERNAL MEDICINE  IP CONSULT TO SOCIAL WORK     Patient is admitted as inpatient status because of co-morbidities listed above, severity of signs and symptoms as outlined, requirement for

## 2025-05-02 NOTE — CARE COORDINATION
Discharge planning    Call to [facility] and left  for patient  Desiree Sammie with request to call CM back to discuss patient.     Perfect serve attending regarding possible need for psych to evaluate. He will review and order if necessary     Updated per SS that patient is going to be seen by [facility] hospice as he was under their care and revoked when went to [facility] [place].

## 2025-05-02 NOTE — PROGRESS NOTES
Pt refusing to answer any assessment or admission questions. Will open eyes and then turn head other way.

## 2025-05-02 NOTE — CARE COORDINATION
Case Management Assessment  Initial Evaluation    Date/Time of Evaluation: 5/2/2025 12:23 PM  Assessment Completed by: GILL GARCIA RN    If patient is discharged prior to next notation, then this note serves as note for discharge by case management.    Patient Name: Mahesh Brownlee                   YOB: 1960  Diagnosis: Seizures (HCC) [R56.9]  ETOH abuse [F10.10]  Altered mental status, unspecified altered mental status type [R41.82]                   Date / Time: 5/1/2025  5:35 PM    Patient Admission Status: Inpatient   Readmission Risk (Low < 19, Mod (19-27), High > 27): Readmission Risk Score: 30.1    Current PCP: Ira Roberts, RIVER - CNP  PCP verified by CM? No    Chart Reviewed: Yes      History Provided by: Child/Family, Medical Record (andie Salinas completed assessment over phone)  Patient Orientation: Alert and Oriented, Person    Patient Cognition: Other (see comment) (AMS)    Hospitalization in the last 30 days (Readmission):  Yes    If yes, Readmission Assessment in CM Navigator will be completed.    Advance Directives:      Code Status: Full Code   Patient's Primary Decision Maker is: Legal Next of Kin    Primary Decision Maker: Gill Whitley - Child - [phone]    Secondary Decision Maker: Brad Whitley - Child - [phone]    Supplemental (Other) Decision Maker: Michael Whitley - Child - [phone]    Discharge Planning:    Patient lives with: Alone Type of Home: Other (Comment) (unknown. think it is from a hotel)  Primary Care Giver: Self  Patient Support Systems include: Children   Current Financial resources: Medicare  Current community resources: ECF/Home Care  Current services prior to admission: None            Current DME:              Type of Home Care services:  Nursing Services, OT, PT    ADLS  Prior functional level: Assistance with the following:, Other (see comment) (independent at times)  Current functional level: Assistance with the following:, Other

## 2025-05-02 NOTE — PROGRESS NOTES
End Of Shift Note  [facility]  Summary of shift: Pt has not had any seizure activity during shift. Pt is more cooperative and follows commands compared to last night. Oriented to only place and person but not time nor circumstance. Writer continued to reorient him to what is going on. Pt rested most of the shift.    Vitals:    Vitals:    05/02/25 0734 05/02/25 0859 05/02/25 1056 05/02/25 1505   BP: (!) 150/83 (!) 150/83 (!) 154/78 (!) 158/76   Pulse: 87 95 89 78   Resp: 16  16 16   Temp: 98.2 °F (36.8 °C)  99 °F (37.2 °C) 98.1 °F (36.7 °C)   TempSrc: Axillary  Axillary Oral   SpO2: 94%  94% 94%   Weight:       Height:            I&O:   Intake/Output Summary (Last 24 hours) at 5/2/2025 1849  Last data filed at 5/2/2025 1505  Gross per 24 hour   Intake 240 ml   Output 1075 ml   Net -835 ml       Resp Status: Rm Air    Ventilator Settings:     / / /     Critical Care IV infusions:   sodium chloride          LDA:   Peripheral IV 05/01/25 Right Forearm (Active)   Number of days: 1       Peripheral IV 05/01/25 Left;Proximal Forearm (Active)   Number of days: 0

## 2025-05-02 NOTE — ED PROVIDER NOTES
EMERGENCY DEPARTMENT ENCOUNTER    Pt Name: Mahesh Brownlee  MRN: [id number]  Birthdate 1960  Date of evaluation: 5/1/25      Patient sign out from Dr. Angulo    Treatment and Disposition    Patient repeat assessment: The patient is hemodynamically stable.  High clinical index of suspicion for alcohol withdrawal seizure activity.    Case discussed with Hospitalist, Dr. Angulo, who accepts patient for admission to hospital    All questions answered.  Given strict return precautions.  No further work-up indicated at this time.    Social determinants of health impacting treatment or disposition:  None    Shared Decision Making completed with patient regarding risks and benefits of admission versus discharge.  Patient decides to be discharged home.    Code Status Discussion:  Not discussed    MIPS:  N/A    \"ED Course\" Notes From Epic Narrator:  ED Course as of 05/01/25 2304   Thu May 01, 2025   2103 Patient had a witnessed seizure here in the ED lasting about 60 seconds. [EG]      ED Course User Index  [EG] Goldberger, Erica B, MD       CRITICAL CARE:   N/A    PROCEDURES:  Procedures      DATA FOR LAB AND RADIOLOGY TESTS ORDERED BELOW ARE REVIEWED BY THE ED CLINICIAN:    RADIOLOGY: All x-rays, CT, MRI, and formal ultrasound images (except ED bedside ultrasound) are read by the radiologist, see reports below, unless otherwise noted in MDM or here.  Reports below are reviewed by myself.  CT HEAD WO CONTRAST   Final Result   No acute intracranial abnormality.         CT CHEST ABDOMEN PELVIS WO CONTRAST Additional Contrast? None   Final Result   1. No acute inflammatory or obstructive process seen in the  abdomen or   pelvis.   2. Peribronchovascular tree-in-bud opacities in the right upper lobe   suggesting small airways infection.             LABS: Lab orders shown below, the results are reviewed by myself, and all abnormals are listed below.  Labs Reviewed   CBC WITH AUTO DIFFERENTIAL - Abnormal; Notable for the following

## 2025-05-02 NOTE — PLAN OF CARE
Pt is currently resting in bed A&OX1.  Admitted for alcohol abuse.  CIWA scale in place.   Pt currently refusing to answer any assessment questions. Will follow commands but when asked a question will turn his head away.  Home med list unable to be verified at this time.  All scheduled medications given, see MAR.  Pt in soft bilateral restraints d/t pulling at medical devices and grabbing staff.  Condom catheter applied.  Pt now A&OX3 and calm, restraints removed.  Tele-sitter placed.   UA sent, resulted as positive for cannabis and fentanyl.  K+ of 3.2 replaced with 40 mEq. Awaiting redraw.     Seizure precaution in place. Standard safety measures in place. Call light within reach. Bed in locked and lowest position. Bed alarm on.         Problem: Safety - Medical Restraint  Goal: Remains free of injury from restraints (Restraint for Interference with Medical Device)  Description: INTERVENTIONS:1. Determine that other, less restrictive measures have been tried or would not be effective before applying the restraint2. Evaluate the patient's condition at the time of restraint application3. Inform patient/family regarding the reason for restraint4. Q2H: Monitor safety, psychosocial status, comfort, nutrition and hydration  Outcome: Progressing  Flowsheets  Taken 5/2/2025 0036 by Jeffery Herron RN  Remains free of injury from restraints (restraint for interference with medical device): Every 2 hours: Monitor safety, psychosocial status, comfort, nutrition and hydration  Taken 5/1/2025 6569 by Gayle Gutiérrez, RN  Remains free of injury from restraints (restraint for interference with medical device):   Determine that other, less restrictive measures have been tried or would not be effective before applying the restraint   Evaluate the patient's condition at the time of restraint application   Inform patient/family regarding the reason for restraint   Every 2 hours: Monitor safety, psychosocial status, comfort, nutrition

## 2025-05-02 NOTE — ED NOTES
ED TO INPATIENT SBAR HANDOFF    Patient Name: Mahesh Brownlee   :  1960  65 y.o.   MRN:  [id number]  Preferred Name    ED Room #:  [facility]08/08  Family/Caregiver Present no   Restraints yes   Sitter no   Sepsis Risk Score      Situation  Code Status: Full Code Limited Code details: Intubation/Re-intubation No Comment; Defibrillation/Cardioversion No Comment; Chest Compressions No Comment; Resuscitative Medications No Comment; Other No Comment.    Allergies: Augmentin es-600 [amoxicillin-pot clavulanate] and Sulfa antibiotics  Weight: Patient Vitals for the past 96 hrs (Last 3 readings):   Weight   25 1750 104.8 kg (231 lb)     Arrived from: home  Chief Complaint:   Chief Complaint   Patient presents with    Altered Mental Status     Pt won't respond for triage. Not following commands. Per EMS he's usually A/Ox4. Home care personnel called EMS around 5pm saying pt was acting different. Last known normal unknown     Hospital Problem/Diagnosis:  Principal Problem:    ETOH abuse  Resolved Problems:    * No resolved hospital problems. *    Imaging:   CT HEAD WO CONTRAST   Final Result   No acute intracranial abnormality.         CT CHEST ABDOMEN PELVIS WO CONTRAST Additional Contrast? None   Final Result   1. No acute inflammatory or obstructive process seen in the  abdomen or   pelvis.   2. Peribronchovascular tree-in-bud opacities in the right upper lobe   suggesting small airways infection.           Abnormal labs:   Abnormal Labs Reviewed   CBC WITH AUTO DIFFERENTIAL - Abnormal; Notable for the following components:       Result Value    WBC 19.4 (*)     MCHC 35.1 (*)     Neutrophils % 87 (*)     Lymphocytes % 6 (*)     Eosinophils % 0 (*)     Neutrophils Absolute 16.82 (*)     Monocytes Absolute 1.36 (*)     All other components within normal limits   COMPREHENSIVE METABOLIC PANEL - Abnormal; Notable for the following components:    Potassium 3.2 (*)     Glucose 129 (*)     BUN 5 (*)     ALT 9 (*)     All

## 2025-05-02 NOTE — PLAN OF CARE
Problem: Safety - Medical Restraint  Goal: Remains free of injury from restraints (Restraint for Interference with Medical Device)  Description: INTERVENTIONS:1. Determine that other, less restrictive measures have been tried or would not be effective before applying the restraint2. Evaluate the patient's condition at the time of restraint application3. Inform patient/family regarding the reason for restraint4. Q2H: Monitor safety, psychosocial status, comfort, nutrition and hydration  5/2/2025 1132 by Fani Lucas RN  Outcome: Progressing  5/2/2025 0420 by Jeffery Herron RN  Outcome: Progressing  Flowsheets  Taken 5/2/2025 0036 by Jeffery Herron RN  Remains free of injury from restraints (restraint for interference with medical device): Every 2 hours: Monitor safety, psychosocial status, comfort, nutrition and hydration  Taken 5/1/2025 2358 by Gayle Gutiérrez RN  Remains free of injury from restraints (restraint for interference with medical device):   Determine that other, less restrictive measures have been tried or would not be effective before applying the restraint   Evaluate the patient's condition at the time of restraint application   Inform patient/family regarding the reason for restraint   Every 2 hours: Monitor safety, psychosocial status, comfort, nutrition and hydration  Taken 5/1/2025 2302 by Gayle Gutiérrez RN  Remains free of injury from restraints (restraint for interference with medical device):   Determine that other, less restrictive measures have been tried or would not be effective before applying the restraint   Evaluate the patient's condition at the time of restraint application   Inform patient/family regarding the reason for restraint   Every 2 hours: Monitor safety, psychosocial status, comfort, nutrition and hydration  Taken 5/1/2025 2211 by Gayle Gutiérrez RN  Remains free of injury from restraints (restraint for interference with medical device):   Every 2 hours: Monitor safety,  including medications, impaired vision or hearing, underlying metabolic abnormalities, dehydration, psychiatric diagnoses, and notify attending LIP2. [place] high risk fall precautions, as indicated3. Provide frequent short contacts to provide reality reorientation, refocusing and direction4. Decrease environmental stimuli, including noise as appropriate5. Monitor and intervene to maintain adequate nutrition, hydration, elimination, sleep and activity6. If unable to ensure safety without constant attention obtain sitter and review sitter guidelines with assigned personnel7. Initiate Psychosocial CNS and Spiritual Care consult, as indicated  INTERVENTIONS:1. Assess for possible contributors to thought disturbance, including medications, impaired vision or hearing, underlying metabolic abnormalities, dehydration, psychiatric diagnoses, and notify attending LIP2. [place] high risk fall precautions, as indicated3. Provide frequent short contacts to provide reality reorientation, refocusing and direction4. Decrease environmental stimuli, including noise as appropriate5. Monitor and intervene to maintain adequate nutrition, hydration, elimination, sleep and activity6. If unable to ensure safety without constant attention obtain sitter and review sitter guidelines with assigned personnel7. Initiate Psychosocial CNS and Spiritual Care consult, as indicated  Outcome: Progressing  Flowsheets (Taken 5/2/2025 0800)  Effect of thought disturbance (confusion, delirium, dementia, or psychosis) are managed with adequate functional status:   Decrease environmental stimuli, including noise as appropriate   Monitor and intervene to maintain adequate nutrition, hydration, elimination, sleep and activity   If unable to ensure safety without constant attention obtain sitter and review sitter guidelines with assigned personnel   [place] high risk fall precautions, as indicated     Problem: ABCDS Injury Assessment  Goal: Absence of

## 2025-05-02 NOTE — PROGRESS NOTES
Pt admitted to room 1001 from ed. Admission documentation complete, vitals taken and telemetry placed. Pt oriented to room and unit routine, including hourly rounding. Call light in reach and safety maintained. Will continue to provide support and education.

## 2025-05-02 NOTE — PROGRESS NOTES
End Of Shift Note  [facility]  Summary of shift: pt had mostly uneventful day. Phychiatric consult placed and seen pt today. Pt still a bit confused but progressively more alert throughout day. VSS. Pt on Vimpat and phenobarbital. K replaced today and will recheck in AM    Vitals:    Vitals:    05/02/25 0734 05/02/25 0859 05/02/25 1056 05/02/25 1505   BP: (!) 150/83 (!) 150/83 (!) 154/78 (!) 158/76   Pulse: 87 95 89 78   Resp: 16  16 16   Temp: 98.2 °F (36.8 °C)  99 °F (37.2 °C) 98.1 °F (36.7 °C)   TempSrc: Axillary  Axillary Oral   SpO2: 94%  94% 94%   Weight:       Height:            I&O:   Intake/Output Summary (Last 24 hours) at 5/2/2025 1846  Last data filed at 5/2/2025 1505  Gross per 24 hour   Intake 240 ml   Output 1075 ml   Net -835 ml       Resp Status: RA    Ventilator Settings:     / / /     Critical Care IV infusions:   sodium chloride          LDA:   Peripheral IV 05/01/25 Right Forearm (Active)   Number of days: 1       Peripheral IV 05/01/25 Left;Proximal Forearm (Active)   Number of days: 0

## 2025-05-03 LAB
MICROORGANISM SPEC CULT: NO GROWTH
SERVICE CMNT-IMP: NORMAL
SPECIMEN DESCRIPTION: NORMAL

## 2025-05-03 PROCEDURE — 6370000000 HC RX 637 (ALT 250 FOR IP): Performed by: NURSE PRACTITIONER

## 2025-05-03 PROCEDURE — 97162 PT EVAL MOD COMPLEX 30 MIN: CPT

## 2025-05-03 PROCEDURE — 99232 SBSQ HOSP IP/OBS MODERATE 35: CPT | Performed by: PSYCHIATRY & NEUROLOGY

## 2025-05-03 PROCEDURE — 2060000000 HC ICU INTERMEDIATE R&B

## 2025-05-03 PROCEDURE — 6370000000 HC RX 637 (ALT 250 FOR IP): Performed by: STUDENT IN AN ORGANIZED HEALTH CARE EDUCATION/TRAINING PROGRAM

## 2025-05-03 PROCEDURE — 6360000002 HC RX W HCPCS: Performed by: NURSE PRACTITIONER

## 2025-05-03 PROCEDURE — 97167 OT EVAL HIGH COMPLEX 60 MIN: CPT

## 2025-05-03 PROCEDURE — 97530 THERAPEUTIC ACTIVITIES: CPT

## 2025-05-03 PROCEDURE — 94761 N-INVAS EAR/PLS OXIMETRY MLT: CPT

## 2025-05-03 PROCEDURE — 99232 SBSQ HOSP IP/OBS MODERATE 35: CPT | Performed by: FAMILY MEDICINE

## 2025-05-03 PROCEDURE — 2500000003 HC RX 250 WO HCPCS: Performed by: STUDENT IN AN ORGANIZED HEALTH CARE EDUCATION/TRAINING PROGRAM

## 2025-05-03 RX ORDER — KETOROLAC TROMETHAMINE 15 MG/ML
15 INJECTION, SOLUTION INTRAMUSCULAR; INTRAVENOUS EVERY 6 HOURS PRN
Status: DISPENSED | OUTPATIENT
Start: 2025-05-03 | End: 2025-05-04

## 2025-05-03 RX ORDER — LIDOCAINE 4 G/G
1 PATCH TOPICAL DAILY
Status: DISCONTINUED | OUTPATIENT
Start: 2025-05-03 | End: 2025-05-05 | Stop reason: HOSPADM

## 2025-05-03 RX ORDER — HYDROCODONE BITARTRATE AND ACETAMINOPHEN 5; 325 MG/1; MG/1
1 TABLET ORAL ONCE
Refills: 0 | Status: COMPLETED | OUTPATIENT
Start: 2025-05-03 | End: 2025-05-03

## 2025-05-03 RX ADMIN — DONEPEZIL HYDROCHLORIDE 5 MG: 5 TABLET, FILM COATED ORAL at 08:20

## 2025-05-03 RX ADMIN — SACUBITRIL AND VALSARTAN 1 TABLET: 24; 26 TABLET, FILM COATED ORAL at 08:20

## 2025-05-03 RX ADMIN — ATORVASTATIN CALCIUM 10 MG: 10 TABLET, FILM COATED ORAL at 20:10

## 2025-05-03 RX ADMIN — SODIUM CHLORIDE, PRESERVATIVE FREE 10 ML: 5 INJECTION INTRAVENOUS at 20:09

## 2025-05-03 RX ADMIN — SACUBITRIL AND VALSARTAN 1 TABLET: 24; 26 TABLET, FILM COATED ORAL at 20:09

## 2025-05-03 RX ADMIN — FOLIC ACID 1 MG: 1 TABLET ORAL at 08:20

## 2025-05-03 RX ADMIN — SPIRONOLACTONE 25 MG: 25 TABLET ORAL at 08:20

## 2025-05-03 RX ADMIN — PHENOBARBITAL 32.4 MG: 32.4 TABLET ORAL at 08:20

## 2025-05-03 RX ADMIN — KETOROLAC TROMETHAMINE 15 MG: 15 INJECTION, SOLUTION INTRAMUSCULAR; INTRAVENOUS at 20:08

## 2025-05-03 RX ADMIN — PHENOBARBITAL 32.4 MG: 32.4 TABLET ORAL at 20:09

## 2025-05-03 RX ADMIN — LACOSAMIDE 100 MG: 100 TABLET, FILM COATED ORAL at 08:20

## 2025-05-03 RX ADMIN — METOPROLOL SUCCINATE 50 MG: 50 TABLET, EXTENDED RELEASE ORAL at 08:20

## 2025-05-03 RX ADMIN — SODIUM CHLORIDE, PRESERVATIVE FREE 10 ML: 5 INJECTION INTRAVENOUS at 08:21

## 2025-05-03 RX ADMIN — ACETAMINOPHEN 650 MG: 325 TABLET, FILM COATED ORAL at 15:15

## 2025-05-03 RX ADMIN — POTASSIUM CHLORIDE 40 MEQ: 20 TABLET, EXTENDED RELEASE ORAL at 20:31

## 2025-05-03 RX ADMIN — HYDROCODONE BITARTRATE AND ACETAMINOPHEN 1 TABLET: 5; 325 TABLET ORAL at 06:03

## 2025-05-03 RX ADMIN — LACOSAMIDE 100 MG: 100 TABLET, FILM COATED ORAL at 20:09

## 2025-05-03 RX ADMIN — Medication 100 MG: at 08:20

## 2025-05-03 RX ADMIN — DULOXETINE 60 MG: 60 CAPSULE, DELAYED RELEASE ORAL at 08:20

## 2025-05-03 RX ADMIN — PHENOBARBITAL 32.4 MG: 32.4 TABLET ORAL at 03:08

## 2025-05-03 RX ADMIN — RIVAROXABAN 20 MG: 20 TABLET, FILM COATED ORAL at 16:56

## 2025-05-03 ASSESSMENT — PAIN DESCRIPTION - FREQUENCY
FREQUENCY: CONTINUOUS
FREQUENCY: CONTINUOUS

## 2025-05-03 ASSESSMENT — PAIN SCALES - GENERAL
PAINLEVEL_OUTOF10: 5
PAINLEVEL_OUTOF10: 7
PAINLEVEL_OUTOF10: 3
PAINLEVEL_OUTOF10: 9
PAINLEVEL_OUTOF10: 10
PAINLEVEL_OUTOF10: 1
PAINLEVEL_OUTOF10: 6

## 2025-05-03 ASSESSMENT — PAIN DESCRIPTION - PAIN TYPE
TYPE: CHRONIC PAIN
TYPE: CHRONIC PAIN

## 2025-05-03 ASSESSMENT — PAIN DESCRIPTION - DESCRIPTORS
DESCRIPTORS: ACHING;DISCOMFORT;JABBING
DESCRIPTORS: ACHING
DESCRIPTORS: ACHING

## 2025-05-03 ASSESSMENT — PAIN DESCRIPTION - ONSET
ONSET: ON-GOING
ONSET: ON-GOING

## 2025-05-03 ASSESSMENT — PAIN DESCRIPTION - LOCATION
LOCATION: GENERALIZED;BACK
LOCATION: BACK
LOCATION: BACK

## 2025-05-03 ASSESSMENT — PAIN DESCRIPTION - ORIENTATION
ORIENTATION: RIGHT;MID
ORIENTATION: RIGHT;LEFT
ORIENTATION: LOWER

## 2025-05-03 ASSESSMENT — PAIN - FUNCTIONAL ASSESSMENT
PAIN_FUNCTIONAL_ASSESSMENT: PREVENTS OR INTERFERES SOME ACTIVE ACTIVITIES AND ADLS
PAIN_FUNCTIONAL_ASSESSMENT: PREVENTS OR INTERFERES SOME ACTIVE ACTIVITIES AND ADLS
PAIN_FUNCTIONAL_ASSESSMENT: ACTIVITIES ARE NOT PREVENTED

## 2025-05-03 NOTE — PLAN OF CARE
Pt remains safe and free from falls thus far in shift  NSR on cardiac monitor  Tylenol given for pain   Avoiding opioids per attending d/t tox screen  Phenobarbital taper for alcohol withdraw.    Pt voiding  Pt on RA  Discharge planning in progress, will go SNF once medically stable  Call light in reach  Bed locked and lowest position  Bed alarm on for safety  Care ongoing    Patient having pain on their generalized and back and rates it a 6. Pain interventions includefrequent position changes, correct body alignment/body mechanics, relaxation techniques, medication, pillow support/positioning, diversional activities, regular rest periods, medicate per physician recommendation/order, medicate at the onset of pain before it interferes with regular functions , medicate before exercise/activity, and educate on the risk of over sedation vs. inadequate pain relief. Patients goal for pain relief is 2. The need for pain and symptom management will be considered in the discharge planning process to ensure patients comfort.    Problem: Safety - Medical Restraint  Goal: Remains free of injury from restraints (Restraint for Interference with Medical Device)  Description: INTERVENTIONS:1. Determine that other, less restrictive measures have been tried or would not be effective before applying the restraint2. Evaluate the patient's condition at the time of restraint application3. Inform patient/family regarding the reason for restraint4. Q2H: Monitor safety, psychosocial status, comfort, nutrition and hydration  5/3/2025 1609 by Carlton Harman RN  Outcome: Progressing     Problem: Chronic Conditions and Co-morbidities  Goal: Patient's chronic conditions and co-morbidity symptoms are monitored and maintained or improved  5/3/2025 1609 by Carlton Harman, RN  Outcome: Progressing     Problem: Discharge Planning  Goal: Discharge to home or other facility with appropriate resources  5/3/2025 1609 by Kimani

## 2025-05-03 NOTE — PROGRESS NOTES
NEUROLOGY INPATIENT PROGRESS NOTE    5/3/2025         Mahesh Brownlee is a  65 y.o. male admitted on 5/1/2025 with  Seizures (HCC) [R56.9]  ETOH abuse [F10.10]  Altered mental status, unspecified altered mental status type [R41.82]    Reason for consult: Seizure, AMS  History is obtained mostly from the patient and the medical record and from the caregivers. Chart is reviewed and patient is examined.   Briefly, this is a  65 y.o. male with hx of HTN, HLD, CAD, A-fib, EtOH, prior cerebellar cva, postlaminectomy syndrome and seizure disorder was admitted on 5/1/2025 with c/o \"Home health found him altered\" and they called EMS and patient was brought to ER.  Patient had a witnessed seizure activity on admit to ER.  Patient was noted to be extremely confused and combative requiring soft wrist restraints in ER.  He was on Keppra and Vimpat at home but with questionable noncompliance.  As patient was having generalized tonic-clonic seizure activity with extremely combative state; patient was loaded with IV Keppra 2000 mg followed by Keppra 1000 mg twice a day.  Patient was also loaded with IV Vimpat 200 mg followed by Vimpat 100 mg twice daily.  Since admitted to the unit, patient has not had any recurrence of seizure activity.  He was admitted to this unit early this morning and he has been lethargic could be related to medication loading doses and also could be postictal state.   So noted to be arousable and following commands upon waking up.   5/3/2025: chart reviewed and dicussed with caregivers. He also had comorbid sepsis on antibiotic therapy and also it is felt that breakthrough seizures could be related to comorbid septic state.  Yesterday it was also reviewed that patient had history of depression with suicidal ideations when he was on Keppra therefore at that point of time Keppra was discontinued and switched to Vimpat.  Therefore he was continued on Vimpat 100 mg twice daily.         No current  Mahesh Brownlee is a 65 y.o. male with   Breakthrough seizures with description suggestive of generalized tonic-clonic seizures; no seizure activity since admitted; to continue Vimpat 100 mg twice daily for seizure prevention.  Keppra: Was discontinued because of history of depression with suicidal ideations  Septic encephalopathy with abnormal CT chest and leukocytosis: On antibiotic therapy with IV Rocephin, Zithromax and Levaquin as per primary team.  Baseline dementing illness and was on donepezil prior to admit; to continue the same.  Prior cerebellar CVA with A-fib: Was on Xarelto and statin therapy  Comorbid conditions include hypertension, hyperlipidemia, CAD, A-fib, postlaminectomy syndrome  Regarding Phenobarbital: patient was on Phenobarbital in the past for ETOH withdrawal; not for seizures. So will continue phenobarbital at hospitalist's recommendation.   Care plan is discussed with patient's nurse at bedside.  Will follow with you.        Maria L Velez MD 5/3/2025 12:00 PM

## 2025-05-03 NOTE — PROGRESS NOTES
Occupational Therapy  Facility/Department: [facility], [place] PROGRESSIVE CARE  Occupational Therapy Initial Assessment    Name: Mahesh Brownlee  : 1960  MRN: [id number]  Date of Service: 5/3/2025    RN reports patient is medically stable for therapy treatment this date.    Chart reviewed prior to treatment and patient is agreeable for therapy.  All lines intact and patient positioned comfortably at end of treatment.  All patient needs addressed prior to ending therapy session.      Discharge Recommendations:  Patient would benefit from continued therapy after discharge  Pt currently functioning below baseline.  Recommend daily therapy at time of discharge to maximize long term outcomes and prevent re-admission. Please refer to AM-PAC score for current level of function.  OT Equipment Recommendations  Equipment Needed: Yes (CTA)     PER HPI: Mahesh Brownlee is a 65 y.o. Non- / non  male who presents with Altered Mental Status (Pt won't respond for triage. Not following commands. Per EMS he's usually A/Ox4. Home care personnel called EMS around 5pm saying pt was acting different. Last known normal unknown)   and is admitted to the hospital for the management of ETOH abuse.     65-year-old male with a past medical history of A-fib, essential hypertension, CAD, CHF, hyperlipidemia, and AICD presents to the emergency department for altered mental status and seizure disorder.  Patient was brought in by EMS.  Home care personnel called EMS per chart review.  Patient is confused and combative requiring soft restraints in the ED.  He does have a history of alcoholism.  He is on antiepileptic medication at baseline.  Unknown if patient has been compliant with his medications.  Patient received Keppra and Vimpat in the ED for witnessed seizure in the ED    Patient Diagnosis(es): The primary encounter diagnosis was Altered mental status, unspecified altered mental status type. A diagnosis of Seizures (HCC) was also

## 2025-05-03 NOTE — PROGRESS NOTES
Physical Therapy  Facility/Department: [facility] PROGRESSIVE CARE  Physical Therapy Initial Assessment    Name: Mahesh Brownlee  : 1960  MRN: [id number]  Date of Service: 5/3/2025    Per H&P:65 y.o. Non- / non  male who presents with Altered Mental Status (Pt won't respond for triage. Not following commands. Per EMS he's usually A/Ox4. Home care personnel called EMS around 5pm saying pt was acting different. Last known normal unknown)   and is admitted to the hospital for the management of ETOH abuse.  65-year-old male with a past medical history of A-fib, essential hypertension, CAD, CHF, hyperlipidemia, and AICD presents to the emergency department for altered mental status and seizure disorder.  Patient was brought in by EMS.  Home care personnel called EMS per chart review.  Patient is confused and combative requiring soft restraints in the ED.  He does have a history of alcoholism.  He is on antiepileptic medication at baseline.  Unknown if patient has been compliant with his medications.  Patient received Keppra and Vimpat in the ED for witnessed seizure in the ED      RN Colt reports patient is medically stable for therapy treatment this date.    Chart reviewed prior to treatment and patient is agreeable for therapy.  All lines intact and patient positioned comfortably at end of treatment.  All patient needs addressed prior to ending therapy session.     Discharge Recommendations:  Patient would benefit from continued therapy after discharge   Pt currently functioning below baseline.  Recommend daily therapy at time of discharge to maximize long term outcomes and prevent re-admission. Please refer to AM-PAC score for current level of function.          Patient Diagnosis(es): The primary encounter diagnosis was Altered mental status, unspecified altered mental status type. A diagnosis of Seizures (HCC) was also pertinent to this visit.  Past Medical History:  has a past medical history of

## 2025-05-03 NOTE — PROGRESS NOTES
[facility]  Office: [phone]  Louie Rossi DO, Noah Mayers DO, Sergio Ordaz DO, Saul March, DO, Willy Mc MD, Jaci Berger MD, Sivakumar Freeman MD, Mile Johnston MD,  Aric Granda MD, Garry Shepherd MD, Kelsy Wahl MD,  Evelyn Petit DO, Terry Kern MD, Addison Sol MD, Jaspreet Rossi DO, Kassy Pedro MD,  Emeka Leon DO, Krystyna Howell MD, [place] MD, Keeley Santa MD,  Chris Ashley MD, Rick Del Rio MD, Howard Brown MD, Gemma Ordaz MD, Mateo Dey MD, Pardeep Owen MD, Gómez Boykin DO, Tatiana Guillen MD, Avi Mcneil MD, Evelyn Parker MD, Mohsin Reza, MD, Junior Gonsalez MD, Shirley Waterhouse, CNP,  Magdalena Oviedo, CNP, Gómez Gomez, CNP,  Fely Garcia, DNP, Lorena Garcia, CNP, Mariana Jordan, CNP, Dixie Garcia, CNP, Shea Chirinos, CNP, Suzi Tavares, PA-C, Daija Mejia, CNP, Vincent Armendariz, CNP,  Jie Leos, CNP, Felicita Nobles, CNP, Raymon Fitch, PA-C, Asuncion Hollins, CNP,  Penny Lopez, CNS, Brianne Arnett, CNP, Miriam Martins, CNP,   Zuly Centeno, CNP       [facility]      Daily Progress Note     Admit Date: 5/1/2025  Bed/Room No.  1001/1001-02  Admitting Physician : Sivakumar Freeman MD  Code Status :Full Code  Hospital Day:  LOS: 2 days   Chief Complaint:     Chief Complaint   Patient presents with    Altered Mental Status     Pt won't respond for triage. Not following commands. Per EMS he's usually A/Ox4. Home care personnel called EMS around 5pm saying pt was acting different. 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 *    Subjective :        Interval History/Significant events :  05/03/25    Patient is oriented to self, place.  He         Behavior: Behavior is uncooperative. Behavior is not slowed or withdrawn.         Cognition and Memory: Cognition is impaired.         Judgment: Judgment is not inappropriate.           Laboratory findings:    Recent Labs     05/01/25 1825 05/02/25  0541   WBC 19.4* 13.1*   HGB 14.7 13.3   HCT 41.9 38.9*    239   INR  --  1.1     Recent Labs     05/01/25 1825 05/02/25  0541 05/02/25  1417    138  --    K 3.2* 2.9* 3.4*    105  --    CO2 23 19*  --    GLUCOSE 129* 124*  --    BUN 5* 5*  --    CREATININE 0.9 0.7  --    MG  --  1.8  --    CALCIUM 9.6 9.2  --    CAION  --  1.13  --    PHOS  --  2.6  --      Recent Labs     05/01/25 1825 05/02/25  0541   TSH 0.58 0.41   AST 21 23   ALT 9* <5*   ALKPHOS 92 85   BILITOT 0.5 0.6          Protein, UA   Date Value Ref Range Status   05/02/2025 NEGATIVE NEGATIVE mg/dL Final     RBC, UA   Date Value Ref Range Status   05/02/2025 0 TO 2 0 - 2 /HPF Final     Bacteria, UA   Date Value Ref Range Status   05/02/2025 RARE (A) None Final     Nitrite, Urine   Date Value Ref Range Status   05/02/2025 NEGATIVE NEGATIVE Final     WBC, UA   Date Value Ref Range Status   05/02/2025 0 TO 2 0 - 5 /HPF Final     Leukocyte Esterase, Urine   Date Value Ref Range Status   05/02/2025 NEGATIVE NEGATIVE Final       Imaging / Clinical Data :-   CT HEAD WO CONTRAST   Final Result   No acute intracranial abnormality.         CT CHEST ABDOMEN PELVIS WO CONTRAST Additional Contrast? None   Final Result   1. No acute inflammatory or obstructive process seen in the  abdomen or   pelvis.   2. Peribronchovascular tree-in-bud opacities in the right upper lobe   suggesting small airways infection.              Clinical Course : unchanged  Assessment and Plan  :        Acute Metabolic Encephalopathy -unknown cause.  Differential diagnosis breakthrough seizure, metabolic encephalopathy, worsening dementia.  Neurology consultation.  MRI from 3/27/2025 reviewed.  Chronic microvascular

## 2025-05-03 NOTE — PLAN OF CARE
Patient restless throughout the night, admitted for ETOH withdrawal. Receiving phenobarbital taper as well and Q6 PRN phenobarbital via CIWA. Seiaure precautions in place. He is oriented to person and place. Telesitter in place.  Neurology and psych following.   Received one time dose of Norco twice for chronic back pain, patient satisfied. See MAR  Free from falls at this time, all needs met.  Problem: Chronic Conditions and Co-morbidities  Goal: Patient's chronic conditions and co-morbidity symptoms are monitored and maintained or improved  Outcome: Progressing  Flowsheets (Taken 5/2/2025 2004)  Care Plan - Patient's Chronic Conditions and Co-Morbidity Symptoms are Monitored and Maintained or Improved: Monitor and assess patient's chronic conditions and comorbid symptoms for stability, deterioration, or improvement     Problem: Discharge Planning  Goal: Discharge to home or other facility with appropriate resources  Outcome: Progressing  Flowsheets (Taken 5/2/2025 2004)  Discharge to home or other facility with appropriate resources: Identify barriers to discharge with patient and caregiver     Problem: Pain  Goal: Verbalizes/displays adequate comfort level or baseline comfort level  Outcome: Progressing    Patient having pain on their back and rates it a 9. Pain interventions includemedication. Patients goal for pain relief is 4. The need for pain and symptom management will be considered in the discharge planning process to ensure patients comfort.       Problem: Musculoskeletal - Adult  Goal: Return mobility to safest level of function  Outcome: Progressing  Flowsheets (Taken 5/2/2025 2004)  Return Mobility to Safest Level of Function: Assess patient stability and activity tolerance for standing, transferring and ambulating with or without assistive devices     Problem: Metabolic/Fluid and Electrolytes - Adult  Goal: Electrolytes maintained within normal limits  Outcome: Progressing  Flowsheets (Taken 5/2/2025

## 2025-05-03 NOTE — CARE COORDINATION
Social work: spoke with pt he is agreeable to go to [facility] after discharge. Likely to need new precert and new hens. Will start hens.   Report [phone]  Fely hopkins

## 2025-05-04 PROBLEM — J18.9 PNEUMONIA OF RIGHT UPPER LOBE DUE TO INFECTIOUS ORGANISM: Status: ACTIVE | Noted: 2025-05-04

## 2025-05-04 PROBLEM — G93.41 SEPTIC ENCEPHALOPATHY: Status: ACTIVE | Noted: 2025-05-04

## 2025-05-04 PROCEDURE — 2060000000 HC ICU INTERMEDIATE R&B

## 2025-05-04 PROCEDURE — 6370000000 HC RX 637 (ALT 250 FOR IP): Performed by: STUDENT IN AN ORGANIZED HEALTH CARE EDUCATION/TRAINING PROGRAM

## 2025-05-04 PROCEDURE — 2500000003 HC RX 250 WO HCPCS: Performed by: STUDENT IN AN ORGANIZED HEALTH CARE EDUCATION/TRAINING PROGRAM

## 2025-05-04 PROCEDURE — 99232 SBSQ HOSP IP/OBS MODERATE 35: CPT | Performed by: PSYCHIATRY & NEUROLOGY

## 2025-05-04 PROCEDURE — 6370000000 HC RX 637 (ALT 250 FOR IP): Performed by: NURSE PRACTITIONER

## 2025-05-04 PROCEDURE — 99232 SBSQ HOSP IP/OBS MODERATE 35: CPT | Performed by: FAMILY MEDICINE

## 2025-05-04 RX ORDER — LIDOCAINE 4 G/G
1 PATCH TOPICAL DAILY
Qty: 30 EACH | Refills: 0 | Status: SHIPPED | OUTPATIENT
Start: 2025-05-04

## 2025-05-04 RX ORDER — PHENOBARBITAL 16.2 MG/1
16.2 TABLET ORAL 2 TIMES DAILY
Qty: 1 TABLET | Refills: 0 | Status: SHIPPED | OUTPATIENT
Start: 2025-05-04 | End: 2025-05-05 | Stop reason: HOSPADM

## 2025-05-04 RX ADMIN — PHENOBARBITAL 16.2 MG: 32.4 TABLET ORAL at 20:12

## 2025-05-04 RX ADMIN — SODIUM CHLORIDE, PRESERVATIVE FREE 10 ML: 5 INJECTION INTRAVENOUS at 19:58

## 2025-05-04 RX ADMIN — RIVAROXABAN 20 MG: 20 TABLET, FILM COATED ORAL at 16:28

## 2025-05-04 RX ADMIN — ATORVASTATIN CALCIUM 10 MG: 10 TABLET, FILM COATED ORAL at 19:58

## 2025-05-04 RX ADMIN — LACOSAMIDE 100 MG: 100 TABLET, FILM COATED ORAL at 19:58

## 2025-05-04 RX ADMIN — SACUBITRIL AND VALSARTAN 1 TABLET: 24; 26 TABLET, FILM COATED ORAL at 19:58

## 2025-05-04 RX ADMIN — DONEPEZIL HYDROCHLORIDE 5 MG: 5 TABLET, FILM COATED ORAL at 08:03

## 2025-05-04 RX ADMIN — ACETAMINOPHEN 650 MG: 325 TABLET, FILM COATED ORAL at 16:28

## 2025-05-04 RX ADMIN — SODIUM CHLORIDE, PRESERVATIVE FREE 10 ML: 5 INJECTION INTRAVENOUS at 08:04

## 2025-05-04 RX ADMIN — Medication 100 MG: at 08:03

## 2025-05-04 RX ADMIN — PHENOBARBITAL 16.2 MG: 32.4 TABLET ORAL at 08:03

## 2025-05-04 RX ADMIN — METOPROLOL SUCCINATE 50 MG: 50 TABLET, EXTENDED RELEASE ORAL at 08:03

## 2025-05-04 RX ADMIN — LACOSAMIDE 100 MG: 100 TABLET, FILM COATED ORAL at 08:03

## 2025-05-04 RX ADMIN — FOLIC ACID 1 MG: 1 TABLET ORAL at 08:03

## 2025-05-04 RX ADMIN — SACUBITRIL AND VALSARTAN 1 TABLET: 24; 26 TABLET, FILM COATED ORAL at 08:03

## 2025-05-04 RX ADMIN — SPIRONOLACTONE 25 MG: 25 TABLET ORAL at 08:03

## 2025-05-04 RX ADMIN — DULOXETINE 60 MG: 60 CAPSULE, DELAYED RELEASE ORAL at 08:03

## 2025-05-04 ASSESSMENT — PAIN DESCRIPTION - ORIENTATION
ORIENTATION: LOWER
ORIENTATION: LOWER

## 2025-05-04 ASSESSMENT — PAIN DESCRIPTION - LOCATION
LOCATION: BACK
LOCATION: BACK

## 2025-05-04 ASSESSMENT — PAIN DESCRIPTION - DESCRIPTORS
DESCRIPTORS: ACHING
DESCRIPTORS: ACHING

## 2025-05-04 ASSESSMENT — PAIN - FUNCTIONAL ASSESSMENT
PAIN_FUNCTIONAL_ASSESSMENT: ACTIVITIES ARE NOT PREVENTED
PAIN_FUNCTIONAL_ASSESSMENT: PREVENTS OR INTERFERES SOME ACTIVE ACTIVITIES AND ADLS

## 2025-05-04 ASSESSMENT — PAIN DESCRIPTION - PAIN TYPE: TYPE: CHRONIC PAIN

## 2025-05-04 ASSESSMENT — PAIN SCALES - GENERAL
PAINLEVEL_OUTOF10: 8
PAINLEVEL_OUTOF10: 5
PAINLEVEL_OUTOF10: 10

## 2025-05-04 NOTE — PLAN OF CARE
Patient resting throughout the night, vitals stable.   PRN Toradol and lidocaine patch for chronic back pain. Patient states there is not change in pain level.   Receiving Phenobarbital taper dose for ETOH withdrawal. Telesitter in place. Oriented to person and place  Seizure precautions in place, receiving PO Vimpat BID.  Free from falls at this time, all needs met.    Problem: Chronic Conditions and Co-morbidities  Goal: Patient's chronic conditions and co-morbidity symptoms are monitored and maintained or improved  5/4/2025 0212 by Bev Henry RN  Outcome: Progressing  Flowsheets (Taken 5/3/2025 2010)  Care Plan - Patient's Chronic Conditions and Co-Morbidity Symptoms are Monitored and Maintained or Improved: Monitor and assess patient's chronic conditions and comorbid symptoms for stability, deterioration, or improvement     Problem: Discharge Planning  Goal: Discharge to home or other facility with appropriate resources  5/4/2025 0212 by Bev Henry RN  Outcome: Progressing  Flowsheets (Taken 5/3/2025 2010)  Discharge to home or other facility with appropriate resources: Identify barriers to discharge with patient and caregiver     Problem: Pain  Goal: Verbalizes/displays adequate comfort level or baseline comfort level  5/4/2025 0212 by Bev Henry RN  Outcome: Progressing  Patient having pain on their back and rates it a 10. Pain interventions includemedication. Patients goal for pain relief is 3. The need for pain and symptom management will be considered in the discharge planning process to ensure patients comfort.       Problem: Metabolic/Fluid and Electrolytes - Adult  Goal: Electrolytes maintained within normal limits  5/4/2025 0212 by Bev Henry RN  Outcome: Progressing  Flowsheets (Taken 5/3/2025 2010)  Electrolytes maintained within normal limits: Monitor labs and assess patient for signs and symptoms of electrolyte imbalances     Problem: Safety - Adult  Goal: Free from fall injury  5/4/2025

## 2025-05-04 NOTE — DISCHARGE INSTR - COC
Continuity of Care Form    Patient Name: Mahesh Brownlee   :  1960  MRN:  [id number]    Admit date:  2025  Discharge date:  2025    Code Status Order: Full Code   Advance Directives:     Admitting Physician:  Cady Angulo MD  PCP: Ira Roberts, APRN - CNP    Discharging Nurse: [facility] Unit/Room#: 1001/1001-02  Discharging Unit Phone Number:     Emergency Contact:   Extended Emergency Contact Information  Primary Emergency Contact: Brianne Whitley  Home Phone: [phone]  Mobile Phone: [phone]  Relation: Child  Secondary Emergency Contact: Brad Whitley  Home Phone: [phone]  Mobile Phone: [phone]  Relation: Child    Past Surgical History:  Past Surgical History:   Procedure Laterality Date    BACK SURGERY      x2, Dr. Ángel Regalado     BACK SURGERY      L3-4 decompression    CARDIAC ELECTROPHYSIOLOGY STUDY AND ABLATION      CARPAL TUNNEL RELEASE Right     JOINT REPLACEMENT Right 2018    NERVE BLOCK  2013    dduramorph  celestone 9mg    NERVE BLOCK N/A 2013    lumbar RASHMI    OTHER SURGICAL HISTORY  2016    Lumbar RASHMI    OTHER SURGICAL HISTORY  2018    lumbosacral myelogram    PACEMAKER PLACEMENT  2015    with defib    ROTATOR CUFF REPAIR Right     TONSILLECTOMY      HAS NO TONSILS, NEVER HAD SURGERY       Immunization History:   Immunization History   Administered Date(s) Administered    COVID-19, J&J, (age 18y+), IM, 0.5 mL 2021    Influenza, FLUARIX, FLULAVAL, FLUZONE (age 6 mo+) and AFLURIA, (age 3 y+), Quadv PF, 0.5mL 2020    Pneumococcal, PPSV23, PNEUMOVAX 23, (age 2y+), SC/IM, 0.5mL 2017       Active Problems:  Patient Active Problem List   Diagnosis Code    Postlaminectomy syndrome, lumbar region M96.1    Cervical spondylosis with myelopathy M47.12    Arthropathy, unspecified, other specified sites M12.9    Cerebral contusion (HCC) S06.33AA    DDD (degenerative disc disease), lumbar M51.369

## 2025-05-04 NOTE — PROGRESS NOTES
NEUROLOGY INPATIENT PROGRESS NOTE    5/4/2025         Mahesh Brownlee is a  65 y.o. male admitted on 5/1/2025 with  Seizures (HCC) [R56.9]  ETOH abuse [F10.10]  Altered mental status, unspecified altered mental status type [R41.82]    Reason for consult: Seizure, AMS  History is obtained mostly from the patient and the medical record and from the caregivers. Chart is reviewed and patient is examined.   Briefly, this is a  65 y.o. male with hx of HTN, HLD, CAD, A-fib, EtOH, prior cerebellar cva, postlaminectomy syndrome and seizure disorder was admitted on 5/1/2025 with c/o \"Home health found him altered\" and they called EMS and patient was brought to ER.  Patient had a witnessed seizure activity on admit to ER.  Patient was noted to be extremely confused and combative requiring soft wrist restraints in ER.  He was on Keppra and Vimpat at home but with questionable noncompliance.  As patient was having generalized tonic-clonic seizure activity with extremely combative state; patient was loaded with IV Keppra 2000 mg followed by Keppra 1000 mg twice a day.  Patient was also loaded with IV Vimpat 200 mg followed by Vimpat 100 mg twice daily.  Since admitted to the unit, patient has not had any recurrence of seizure activity.  He was admitted to this unit early this morning and he has been lethargic could be related to medication loading doses and also could be postictal state.   So noted to be arousable and following commands upon waking up.   5/3/2025: chart reviewed and dicussed with caregivers. He also had comorbid sepsis on antibiotic therapy and also it is felt that breakthrough seizures could be related to comorbid septic state.  Yesterday it was also reviewed that patient had history of depression with suicidal ideations when he was on Keppra therefore at that point of time Keppra was discontinued and switched to Vimpat.  Therefore he was continued on Vimpat 100 mg twice daily.  5/4/2025: Chart reviewed and

## 2025-05-04 NOTE — PLAN OF CARE
Pt oriented x 3. Slight confusion on what brought him in here, otherwise great improvement per primary. Take pills whole. Ext cath in place, output charted. Tele-sitter discontinued 1130 am.     Problem: Chronic Conditions and Co-morbidities  Goal: Patient's chronic conditions and co-morbidity symptoms are monitored and maintained or improved  5/4/2025 1122 by Ramila Becerril RN  Outcome: Progressing     Problem: Discharge Planning  Goal: Discharge to home or other facility with appropriate resources  5/4/2025 1122 by Ramila Becerril RN  Outcome: Progressing     Problem: Pain  Goal: Verbalizes/displays adequate comfort level or baseline comfort level  5/4/2025 1122 by Ramila Becerril RN  Outcome: Progressing  Patient having pain on their back and rates it a 8. Pain interventions include frequent position changes, relaxation techniques, pillow support/positioning, diversional activities, and regular rest periods. Patients goal for pain relief is 3. The need for pain and symptom management will be considered in the discharge planning process to ensure patients comfort.      Problem: Respiratory - Adult  Goal: Achieves optimal ventilation and oxygenation  5/4/2025 1122 by Ramila Becerril RN  Outcome: Progressing  Flowsheets (Taken 5/4/2025 0830)  Achieves optimal ventilation and oxygenation: Assess for changes in respiratory status     Problem: Musculoskeletal - Adult  Goal: Return mobility to safest level of function  5/4/2025 1122 by Ramila Becerril RN  Outcome: Progressing  5/4/2025 0212 by Bev Henry RN  Outcome: Progressing  Flowsheets (Taken 5/3/2025 2010)  Return Mobility to Safest Level of Function: Assess patient stability and activity tolerance for standing, transferring and ambulating with or without assistive devices     Problem: Infection - Adult  Goal: Absence of infection at discharge  5/4/2025 1122 by Ramila Becerril RN  Outcome: Progressing     Problem: Metabolic/Fluid and Electrolytes -

## 2025-05-04 NOTE — PROGRESS NOTES
[facility]  Office: [phone]  Louie Rossi DO, Noah Mayers DO, Sergio Ordaz DO, Saul March, DO, Willy Mc MD, Jaci Berger MD, Sivakumar Freeman MD, Mile Johnston MD,  Aric Granda MD, Garry Shepherd MD, Kelsy Wahl MD,  Evelyn Petit DO, Terry Kern MD, Addison Sol MD, Jaspreet Rossi DO, Kassy Pedro MD,  Emeka Leon DO, Krystyna Howell MD, [place] MD, Keeley Santa MD,  Chris Ashley MD, Rick Del Rio MD, Howard Brown MD, Gemma Ordaz MD, Mateo Dey MD, Pardeep Owen MD, Gómez Boykin DO, Tatiana Guillen MD, Avi Mcneil MD, Evelyn Parker MD, Mohsin Reza, MD, Junior Gonsalez MD, Shirley Waterhouse, CNP,  Magdalena Oviedo, CNP, Gómez Gomez, CNP,  Fely Garcia, DNP, Lorena Garcia, CNP, Mariana Jordan, CNP, Dixie Garcia, CNP, Shea Chirinos, CNP, Suzi Tavares, PA-C, Daija Mejia, CNP, Vincent Armendariz, CNP,  Jie Leos, CNP, Felicita Nobles, CNP, Raymon Fitch, PA-C, Asuncion Hollins, CNP,  Penny Lopez, CNS, Brianne Arnett, CNP, Miriam Martins, CNP,   Zuly Centeno, CNP       [facility]      Daily Progress Note     Admit Date: 5/1/2025  Bed/Room No.  1001/1001-02  Admitting Physician : Sivakumar Fereman MD  Code Status :Full Code  Hospital Day:  LOS: 3 days   Chief Complaint:     Chief Complaint   Patient presents with    Altered Mental Status     Pt won't respond for triage. Not following commands. Per EMS he's usually A/Ox4. Home care personnel called EMS around 5pm saying pt was acting different. 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 *    Subjective :        Interval History/Significant events :  05/04/25    Patient has difficulty with ambulating.   General: Skin is warm.   Neurological:      Mental Status: He is alert.      Motor: No tremor.      Comments: Oriented to self, place   Psychiatric:         Attention and Perception: He is attentive.         Mood and Affect: Mood is not depressed.         Behavior: Behavior is uncooperative. Behavior is not slowed or withdrawn.         Cognition and Memory: Cognition is impaired.         Judgment: Judgment is not inappropriate.           Laboratory findings:    Recent Labs     05/01/25 1825 05/02/25  0541   WBC 19.4* 13.1*   HGB 14.7 13.3   HCT 41.9 38.9*    239   INR  --  1.1     Recent Labs     05/01/25 1825 05/02/25  0541 05/02/25  1417    138  --    K 3.2* 2.9* 3.4*    105  --    CO2 23 19*  --    GLUCOSE 129* 124*  --    BUN 5* 5*  --    CREATININE 0.9 0.7  --    MG  --  1.8  --    CALCIUM 9.6 9.2  --    CAION  --  1.13  --    PHOS  --  2.6  --      Recent Labs     05/01/25 1825 05/02/25  0541   TSH 0.58 0.41   AST 21 23   ALT 9* <5*   ALKPHOS 92 85   BILITOT 0.5 0.6          Protein, UA   Date Value Ref Range Status   05/02/2025 NEGATIVE NEGATIVE mg/dL Final     RBC, UA   Date Value Ref Range Status   05/02/2025 0 TO 2 0 - 2 /HPF Final     Bacteria, UA   Date Value Ref Range Status   05/02/2025 RARE (A) None Final     Nitrite, Urine   Date Value Ref Range Status   05/02/2025 NEGATIVE NEGATIVE Final     WBC, UA   Date Value Ref Range Status   05/02/2025 0 TO 2 0 - 5 /HPF Final     Leukocyte Esterase, Urine   Date Value Ref Range Status   05/02/2025 NEGATIVE NEGATIVE Final       Imaging / Clinical Data :-   CT HEAD WO CONTRAST   Final Result   No acute intracranial abnormality.         CT CHEST ABDOMEN PELVIS WO CONTRAST Additional Contrast? None   Final Result   1. No acute inflammatory or obstructive process seen in the  abdomen or   pelvis.   2. Peribronchovascular tree-in-bud opacities in the right upper lobe   suggesting small airways infection.              Clinical Course :  unchanged  Assessment and Plan  :        Acute Metabolic Encephalopathy -likely secondary to breakthrough seizure with postictal confusion. MRI from 3/27/2025 showed chronic microvascular ischemic damage with minimal global parenchymal volume loss.  Continue antiepileptic medication.  Seizure disorder-noncompliance to medication.  Patient was started on phenobarbital this hospitalization..  Was on Vimpat prior to admission however has been noncompliant.  Dementia disorder.  Patient on Aricept.  Hyperlipidemia -on statins  Aspiration Pneumonia -antibiotic  Alcohol Abuse -monitor for withdrawal symptoms.  PAF -poor candidate for long-term anticoagulation due to alcoholism, recurrent falls.  He is currently on Xarelto.  HFrEF - s/p ICD placement.  Continue Entresto, Toprol-XL, Aldactone    PT OT evaluation.  Discharge planning to skilled nursing facility when arranged.  .  Continue to monitor vitals , Intake / output ,  Cell count , HGB , Kidney function, oxygenation  as indicated .     Plan discussed with Staff Ramila MODI     (This note is created with the assistance of a speech recognition program. While intending to generate a document that actually reflects the content of the visit, the document can still have some errors including those of syntax and sound a like substitutions which may escape proof reading. In such instances, actual meaning can be extrapolated by contextual diversion.)      Sivakumar Freeman MD  5/4/2025

## 2025-05-05 VITALS
DIASTOLIC BLOOD PRESSURE: 79 MMHG | BODY MASS INDEX: 32.34 KG/M2 | WEIGHT: 231 LBS | HEIGHT: 71 IN | HEART RATE: 79 BPM | TEMPERATURE: 97.3 F | SYSTOLIC BLOOD PRESSURE: 143 MMHG | OXYGEN SATURATION: 100 % | RESPIRATION RATE: 18 BRPM

## 2025-05-05 LAB — LACOSAMIDE SERPL-MCNC: 5.4 UG/ML (ref 1–10)

## 2025-05-05 PROCEDURE — 6370000000 HC RX 637 (ALT 250 FOR IP): Performed by: STUDENT IN AN ORGANIZED HEALTH CARE EDUCATION/TRAINING PROGRAM

## 2025-05-05 PROCEDURE — 97116 GAIT TRAINING THERAPY: CPT

## 2025-05-05 PROCEDURE — 97535 SELF CARE MNGMENT TRAINING: CPT

## 2025-05-05 PROCEDURE — 99232 SBSQ HOSP IP/OBS MODERATE 35: CPT | Performed by: FAMILY MEDICINE

## 2025-05-05 PROCEDURE — 97530 THERAPEUTIC ACTIVITIES: CPT

## 2025-05-05 PROCEDURE — 99232 SBSQ HOSP IP/OBS MODERATE 35: CPT | Performed by: PSYCHIATRY & NEUROLOGY

## 2025-05-05 PROCEDURE — 2500000003 HC RX 250 WO HCPCS: Performed by: STUDENT IN AN ORGANIZED HEALTH CARE EDUCATION/TRAINING PROGRAM

## 2025-05-05 PROCEDURE — 97110 THERAPEUTIC EXERCISES: CPT

## 2025-05-05 RX ADMIN — LACOSAMIDE 100 MG: 100 TABLET, FILM COATED ORAL at 09:26

## 2025-05-05 RX ADMIN — METOPROLOL SUCCINATE 50 MG: 50 TABLET, EXTENDED RELEASE ORAL at 09:26

## 2025-05-05 RX ADMIN — DULOXETINE 60 MG: 60 CAPSULE, DELAYED RELEASE ORAL at 09:26

## 2025-05-05 RX ADMIN — SODIUM CHLORIDE, PRESERVATIVE FREE 10 ML: 5 INJECTION INTRAVENOUS at 09:28

## 2025-05-05 RX ADMIN — SPIRONOLACTONE 25 MG: 25 TABLET ORAL at 09:26

## 2025-05-05 RX ADMIN — SACUBITRIL AND VALSARTAN 1 TABLET: 24; 26 TABLET, FILM COATED ORAL at 09:26

## 2025-05-05 RX ADMIN — Medication 100 MG: at 09:26

## 2025-05-05 RX ADMIN — DONEPEZIL HYDROCHLORIDE 5 MG: 5 TABLET, FILM COATED ORAL at 09:26

## 2025-05-05 RX ADMIN — FOLIC ACID 1 MG: 1 TABLET ORAL at 09:26

## 2025-05-05 ASSESSMENT — ENCOUNTER SYMPTOMS
SHORTNESS OF BREATH: 0
BACK PAIN: 1
COUGH: 0
NAUSEA: 0
VOMITING: 0

## 2025-05-05 NOTE — PLAN OF CARE
Problem: Discharge Planning  Goal: Discharge to home or other facility with appropriate resources  Outcome: Progressing  Discharge to home or other facility with appropriate resources: Identify barriers to discharge with patient and caregiver  Note: Plan to discharge to [place] today at 1600.      Problem: Confusion  Goal: Confusion, delirium, dementia, or psychosis is improved or at baseline  Description: INTERVENTIONS:1. Assess for possible contributors to thought disturbance, including medications, impaired vision or hearing, underlying metabolic abnormalities, dehydration, psychiatric diagnoses, and notify attending LIP2. [place] high risk fall precautions, as indicated3. Provide frequent short contacts to provide reality reorientation, refocusing and direction4. Decrease environmental stimuli, including noise as appropriate5. Monitor and intervene to maintain adequate nutrition, hydration, elimination, sleep and activity6. If unable to ensure safety without constant attention obtain sitter and review sitter guidelines with assigned personnel7. Initiate Psychosocial CNS and Spiritual Care consult, as indicated  INTERVENTIONS:1. Assess for possible contributors to thought disturbance, including medications, impaired vision or hearing, underlying metabolic abnormalities, dehydration, psychiatric diagnoses, and notify attending LIP2. [place] high risk fall precautions, as indicated3. Provide frequent short contacts to provide reality reorientation, refocusing and direction4. Decrease environmental stimuli, including noise as appropriate5. Monitor and intervene to maintain adequate nutrition, hydration, elimination, sleep and activity6. If unable to ensure safety without constant attention obtain sitter and review sitter guidelines with assigned personnel7. Initiate Psychosocial CNS and Spiritual Care consult, as indicated  INTERVENTIONS:1. Assess for possible contributors to thought disturbance, including

## 2025-05-05 NOTE — CARE COORDINATION
Social Work-[facility] will transport patient to [facility] at 4PM. Orders faxed. Nurse to call report to [phone]. HENS completed. Patient is agreeable with dc plans. Linsey

## 2025-05-05 NOTE — PROGRESS NOTES
Physical Therapy  Facility/Department: [facility] PROGRESSIVE CARE  Rehabilitation Physical Therapy Treatment Note    NAME: Mahesh Brownlee  : 1960 (65 y.o.)  MRN: [id number]  CODE STATUS: Full Code    Date of Service: 25     Pt currently functioning below baseline.  Recommend daily therapy at time of discharge to maximize long term outcomes and prevent re-admission. Please refer to AM-PAC score for current level of function.     Restrictions:  Restrictions/Precautions: General Precautions, Fall Risk, Seizure  Position Activity Restriction  Other Position/Activity Restrictions: Telemetry, cognitive deficits; ALARMS     SUBJECTIVE  Subjective: Patient up supine in bed upon therapists arrival.  Patient agreeable to PT treatment RN Eliza states patient is appropriate for PT treatment.  Pain: Reports pain in R hip however states both legs are painful but that RLE is greater       OBJECTIVE  Cognition  Overall Cognitive Status: Exceptions  Arousal/Alertness: Appears intact  Following Commands: Follows one step commands with repetition;Follows one step commands with increased time  Attention Span: Attends with cues to redirect  Memory: Impaired  Safety Judgement: Decreased awareness of need for safety  Problem Solving: Assistance required to implement solutions;Assistance required to generate solutions;Assistance required to correct errors made;Assistance required to identify errors made  Insights: Decreased awareness of deficits  Initiation: Requires cues for some  Sequencing: Requires cues for some  Orientation  Overall Orientation Status: Impaired  Orientation Level: Oriented to place;Oriented to time;Disoriented to situation;Oriented to person    Functional Mobility  Bed Mobility  Overall Assistance Level: Stand By Assist  Additional Factors: Verbal cues;Set-up;Increased time to complete;Head of bed raised;With handrails  Roll Left  Assistance Level: Stand by assist  Roll Right  Assistance Level: Stand by

## 2025-05-05 NOTE — PLAN OF CARE
Problem: Safety - Medical Restraint  Goal: Remains free of injury from restraints (Restraint for Interference with Medical Device)  Description: INTERVENTIONS:1. Determine that other, less restrictive measures have been tried or would not be effective before applying the restraint2. Evaluate the patient's condition at the time of restraint application3. Inform patient/family regarding the reason for restraint4. Q2H: Monitor safety, psychosocial status, comfort, nutrition and hydration  5/4/2025 2123 by Carla Doan RN  Outcome: Progressing     Problem: Confusion  Goal: Confusion, delirium, dementia, or psychosis is improved or at baseline  Description: INTERVENTIONS:1. Assess for possible contributors to thought disturbance, including medications, impaired vision or hearing, underlying metabolic abnormalities, dehydration, psychiatric diagnoses, and notify attending LIP2. [place] high risk fall precautions, as indicated3. Provide frequent short contacts to provide reality reorientation, refocusing and direction4. Decrease environmental stimuli, including noise as appropriate5. Monitor and intervene to maintain adequate nutrition, hydration, elimination, sleep and activity6. If unable to ensure safety without constant attention obtain sitter and review sitter guidelines with assigned personnel7. Initiate Psychosocial CNS and Spiritual Care consult, as indicated  INTERVENTIONS:1. Assess for possible contributors to thought disturbance, including medications, impaired vision or hearing, underlying metabolic abnormalities, dehydration, psychiatric diagnoses, and notify attending LIP2. [place] high risk fall precautions, as indicated3. Provide frequent short contacts to provide reality reorientation, refocusing and direction4. Decrease environmental stimuli, including noise as appropriate5. Monitor and intervene to maintain adequate nutrition, hydration, elimination, sleep and activity6. If unable to ensure  safety without constant attention obtain sitter and review sitter guidelines with assigned personnel7. Initiate Psychosocial CNS and Spiritual Care consult, as indicated  INTERVENTIONS:1. Assess for possible contributors to thought disturbance, including medications, impaired vision or hearing, underlying metabolic abnormalities, dehydration, psychiatric diagnoses, and notify attending LIP2. [place] high risk fall precautions, as indicated3. Provide frequent short contacts to provide reality reorientation, refocusing and direction4. Decrease environmental stimuli, including noise as appropriate5. Monitor and intervene to maintain adequate nutrition, hydration, elimination, sleep and activity6. If unable to ensure safety without constant attention obtain sitter and review sitter guidelines with assigned personnel7. Initiate Psychosocial CNS and Spiritual Care consult, as indicated  INTERVENTIONS:1. Assess for possible contributors to thought disturbance, including medications, impaired vision or hearing, underlying metabolic abnormalities, dehydration, psychiatric diagnoses, and notify attending LIP2. [place] high risk fall precautions, as indicated3. Provide frequent short contacts to provide reality reorientation, refocusing and direction4. Decrease environmental stimuli, including noise as appropriate5. Monitor and intervene to maintain adequate nutrition, hydration, elimination, sleep and activity6. If unable to ensure safety without constant attention obtain sitter and review sitter guidelines with assigned personnel7. Initiate Psychosocial CNS and Spiritual Care consult, as indicated  INTERVENTIONS:1. Assess for possible contributors to thought disturbance, including medications, impaired vision or hearing, underlying metabolic abnormalities, dehydration, psychiatric diagnoses, and notify attending LIP2. [place] high risk fall precautions, as indicated3. Provide frequent short contacts to provide reality

## 2025-05-05 NOTE — DISCHARGE SUMMARY
[facility]  Office: [phone]  Louie Rossi DO, Noah Mayers DO, Sergio Ordaz DO, Saul March DO, Willy Mc MD, Jaci Berger MD, Sivakumar Freeman MD, Mile Johnston MD,  Aric Granda MD, Garry Shepherd MD, Kelsy Wahl MD,  Evelyn Petit DO, Terry Kern MD, Addison Sol MD, Jaspreet Rossi DO, Kassy Pedro MD,  Emeka Leon DO, Krystyna Howell MD, [place] MD, Keeley Santa MD,  Chris Ashley MD, Rick Del Rio MD, Howard Brown MD, Gemma Ordaz MD, Mateo Dey MD, Pardeep Owen MD, Gómez Boykin DO, Tatiana Guillen MD, Avi Mcneil MD, Evelyn Parker MD, Mohsin Reza, MD, Junior Gonsalez MD, Shirley Waterhouse, CNP,  Magdalena Oviedo, CNP, Gómez Gomez, CNP,  Fely Garcia, [place], Lorena Garcia, CNP, Mariana Jordan, CNP, Dixie Garcia, CNP, Shea Chirinos, CNP, Suzi Tavares, PA-C, Daija Mejia, CNP, Vincent Armendariz, CNP,  Jie Leos, CNP, Felicita Nobles, CNP, Raymon Fitch, PA-C, Asuncion Hollins, CNP,  Penny Lopez, CNS, Brianne Arnett, CNP, Miriam Martins, CNP,   Zuly Centeno, CNP                [facility]      Discharge Summary     Patient ID: Mahesh Brownlee  :  1960   MRN: [id number]     ACCOUNT:  [id number]   Patient Location : 1001/1001-02  Patient's PCP: Ira Roberts APRN - CNP  Admit Date: 2025   Discharge Date: 2025     Length of Stay: 4  Code Status:  Full Code  Admitting Physician: Cady Angulo MD  Discharge Physician: Sivakumar Freeman MD     Active Discharge Diagnosis :     Primary Problem  ETOH abuse      Hospital Problems  Active Hospital Problems    Diagnosis Date Noted    Pneumonia of right upper lobe due to infectious organism [J18.9] 2025    Septic encephalopathy [G93.41] 2025    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

## 2025-05-05 NOTE — PROGRESS NOTES
[facility]  Office: [phone]  Louie Rossi DO, Noah Mayers DO, Sergio Ordaz DO, Saul March, DO, Willy Mc MD, Jaci Berger MD, Sivakumar Freeman MD, Mile Johnston MD,  Aric Granda MD, Garry Shepherd MD, Kelsy Wahl MD,  Evelyn Petit DO, Terry Kern MD, Addison Sol MD, Jaspreet Rossi DO, Kassy Pedro MD,  Emeka Leon DO, Krystyna Howell MD, [place] MD, Keeley Santa MD,  Chris Ashley MD, Rick Del Rio MD, Howard Brown MD, Gemma Ordaz MD, Mateo Dey MD, Pardeep Owen MD, Gómez Boykin DO, Tatiana Guillen MD, Avi cMneil MD, Evelyn Parker MD, Mohsin Reza, MD, Junior Gonsalez MD, Shirley Waterhouse, CNP,  Magdalena Oviedo, CNP, Gómez Gomez, CNP,  Fely Garcia, DNP, Lorena Garcia, CNP, Mariana Jordan, CNP, Dixie Garcia, CNP, Shea Chirinos, CNP, Suzi Tavares, PA-C, Daija Mejia, CNP, Vincent Armendariz, CNP,  Jie Leos, CNP, Felicita Nobles, CNP, Raymon Fitch, PA-C, Asuncion Hollins, CNP,  Penny Lopez, CNS, Brianne Arnett, CNP, Miriam Martins, CNP,   Zuly Centeno, CNP       [facility]      Daily Progress Note     Admit Date: 5/1/2025  Bed/Room No.  1001/1001-02  Admitting Physician : Sivakumar Freeman MD  Code Status :Full Code  Hospital Day:  LOS: 4 days   Chief Complaint:     Chief Complaint   Patient presents with    Altered Mental Status     Pt won't respond for triage. Not following commands. Per EMS he's usually A/Ox4. Home care personnel called EMS around 5pm saying pt was acting different. 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 *    Subjective :             Behavior: Behavior is uncooperative. Behavior is not slowed or withdrawn.         Cognition and Memory: Cognition is impaired.         Judgment: Judgment is not inappropriate.           Lab Results   Component Value Date    WBC 13.1 (H) 05/02/2025    HGB 13.3 05/02/2025    HCT 38.9 (L) 05/02/2025     05/02/2025    CHOL 193 07/19/2024    TRIG 109 07/19/2024    HDL 39 (L) 07/19/2024    ALT <5 (L) 05/02/2025    AST 23 05/02/2025     05/02/2025    K 3.4 (L) 05/02/2025     05/02/2025    CREATININE 0.7 05/02/2025    BUN 5 (L) 05/02/2025    CO2 19 (L) 05/02/2025    TSH 0.41 05/02/2025    PSA 0.39 06/29/2023    INR 1.1 05/02/2025    LABA1C 5.2 07/19/2024           Protein, UA   Date Value Ref Range Status   05/02/2025 NEGATIVE NEGATIVE mg/dL Final     RBC, UA   Date Value Ref Range Status   05/02/2025 0 TO 2 0 - 2 /HPF Final     Bacteria, UA   Date Value Ref Range Status   05/02/2025 RARE (A) None Final     Nitrite, Urine   Date Value Ref Range Status   05/02/2025 NEGATIVE NEGATIVE Final     WBC, UA   Date Value Ref Range Status   05/02/2025 0 TO 2 0 - 5 /HPF Final     Leukocyte Esterase, Urine   Date Value Ref Range Status   05/02/2025 NEGATIVE NEGATIVE Final       Imaging / Clinical Data :-   CT HEAD WO CONTRAST   Final Result   No acute intracranial abnormality.         CT CHEST ABDOMEN PELVIS WO CONTRAST Additional Contrast? None   Final Result   1. No acute inflammatory or obstructive process seen in the  abdomen or   pelvis.   2. Peribronchovascular tree-in-bud opacities in the right upper lobe   suggesting small airways infection.              Clinical Course : unchanged  Assessment and Plan  :        Acute Metabolic Encephalopathy - likely secondary to breakthrough seizure with postictal confusion. MRI from 3/27/2025 showed chronic microvascular ischemic damage with minimal global parenchymal volume loss.  Continue antiepileptic medication.  Seizure disorder-noncompliance to medication.

## 2025-05-05 NOTE — PROGRESS NOTES
discussed with caregivers.  Since admitted to hospital; he has been on Vimpat and has not had any recurrence of seizure activity.  5/5/2025: Chart reviewed and discussed with caregivers.  No recurrence of seizure activity on Vimpat and tolerating it well without any anticipated adverse effects such as headache, nausea, dizziness and drowsiness.           No current facility-administered medications on file prior to encounter.     Current Outpatient Medications on File Prior to Encounter   Medication Sig Dispense Refill    metoprolol succinate (TOPROL XL) 50 MG extended release tablet Take 1 tablet by mouth daily 30 tablet 3    sacubitril-valsartan (ENTRESTO) 24-26 MG per tablet Take 1 tablet by mouth 2 times daily 60 tablet 1    albuterol (PROVENTIL) (2.5 MG/3ML) 0.083% nebulizer solution Take 3 mLs by nebulization every 2 hours as needed for Wheezing 120 each 3    ipratropium 0.5 mg-albuterol 2.5 mg (DUONEB) 0.5-2.5 (3) MG/3ML SOLN nebulizer solution Inhale 3 mLs into the lungs every 6 hours while awake 360 mL 0    lacosamide (VIMPAT) 100 MG TABS tablet Take 1 tablet by mouth 2 times daily.      DULoxetine (CYMBALTA) 60 MG extended release capsule Take 1 capsule by mouth daily 30 capsule 3    spironolactone (ALDACTONE) 25 MG tablet Take 1 tablet by mouth daily 30 tablet 0    rivaroxaban (XARELTO) 20 MG TABS tablet Take 1 tablet by mouth daily 30 tablet 0    atorvastatin (LIPITOR) 10 MG tablet Take 1 tablet by mouth nightly 30 tablet 0    nicotine (NICODERM CQ) 7 MG/24HR Place 1 patch onto the skin daily 30 patch 3    thiamine 100 MG tablet Take 1 tablet by mouth daily 30 tablet 3    donepezil (ARICEPT) 5 MG tablet Take 1 tablet by mouth daily 30 tablet 0    folic acid (FOLVITE) 1 MG tablet Take 1 tablet by mouth daily 30 tablet 0    vitamin B-12 (CYANOCOBALAMIN) 1000 MCG tablet Take 1 tablet by mouth daily 30 tablet 0    [DISCONTINUED] levothyroxine (SYNTHROID) 50 MCG tablet Take 1 tablet by mouth Daily (Patient  intracranial abnormality.  No acute infarct.  2. Areas of chronic infarct within the cerebellar hemispheres bilaterally,  left larger than right.  3. Minimal global parenchymal volume loss with chronic microvascular ischemic  changes.    Results for orders placed during the hospital encounter of 09/20/23    MRI BRAIN W WO CONTRAST    Narrative  EXAMINATION:  MRI OF THE BRAIN WITHOUT AND WITH CONTRAST  9/22/2023 4:18 pm    TECHNIQUE:  Multiplanar multisequence MRI of the head/brain was performed without and  with the administration of intravenous contrast.    COMPARISON:  None.    HISTORY:  ORDERING SYSTEM PROVIDED HISTORY: ams  TECHNOLOGIST PROVIDED HISTORY:  ams    FINDINGS:  INTRACRANIAL STRUCTURES/VENTRICLES:  No acute infarct or mass.  Left greater  than right cerebellar encephalomalacia.  Mild chronic white matter  microvascular ischemic changes.  No mass effect or midline shift. No evidence  of an acute intracranial hemorrhage.  The ventricles and sulci are normal in  size and configuration.  The sellar/suprasellar regions appear unremarkable.  The normal signal voids within the major intracranial vessels appear  maintained.  No abnormal focus of enhancement is seen within the brain.    ORBITS: The visualized portion of the orbits demonstrate no acute abnormality.    SINUSES: Minimal scattered ethmoid sinus mucoperiosteal thickening.  No  mastoid effusion.    BONES/SOFT TISSUES: The bone marrow signal intensity appears normal. The soft  tissues demonstrate no acute abnormality.    Impression  1.  No acute intracranial abnormality.  2. Bilateral cerebellar encephalomalacia.  3. Mild chronic white matter microvascular ischemic changes.    No results found for this or any previous visit.    Results for orders placed during the hospital encounter of 05/01/25    CT HEAD WO CONTRAST    Narrative  EXAMINATION:  CT OF THE HEAD WITHOUT CONTRAST  5/1/2025 8:05 pm    TECHNIQUE:  CT of the head was performed without the  Doppler.    EKG 5/1/2025: Sinus tachycardia with premature atrial complexes; otherwise normal EKG. CT interval 162 msec    UDS (5/1/2025):  +ve for cannabinoid and Fentanyl                Impression and Plan: Mr. Mahesh Brownlee is a 65 y.o. male with   Breakthrough seizures; suggestive of generalized tonic-clonic seizures; stable on Vimpat 100 mg twice daily; no seizure activity since admitted; to continue same dose for seizure prophylaxis.  Of note; phenobarb is not being given for reason of seizures. His multiple routine EEGs were showing only encephalopathy except one EEG with left frontal sharps at outside hospital.  Prolonged EEG in Nov 2024 with no epileptiform discharges.  Therefore I spoke to hospitalist, Dr. Cipriano Cortes with primary team.  He understood that there is no need to continue phenobarbital as patient has not been on phenobarbital for a long time.  Also his prolonged EEG was unremarkable as stated above.   Keppra: Was discontinued because of history of depression with suicidal ideations  Septic encephalopathy with abnormal CT chest and leukocytosis: On antibiotic therapy with IV Rocephin, Zithromax and Levaquin as per primary team.  Baseline dementing illness and was on donepezil 5mg prior to admit; will optimize the dose to 10 mg qpm with dinner.   Prior cerebellar CVA with A-fib: Was on Xarelto and statin therapy  Comorbid conditions include hypertension, hyperlipidemia, CAD, A-fib, postlaminectomy syndrome  Neurology service is signing off.  Please call us with questions or concerns.    This note was partially created using voice recognition software and is inherently subject to errors including those of syntax and \"sound alike\" substitutions which may escape proofreading.  In such instances, original meaning may be extrapolated by contextual derivation.  Maria L Velez MD 5/5/2025 8:49 AM

## 2025-05-05 NOTE — PROGRESS NOTES
Occupational Therapy  Facility/Department: [facility] PROGRESSIVE CARE  Rehabilitation Occupational Therapy Daily Treatment Note    Date: 25  Patient Name: Mahesh Brownlee       Room: 1001/1001-02  MRN: [id number]  Account: [id number]   : 1960  (65 y.o.) Gender: male      LY Kay reports patient is medically stable for therapy treatment this date. Chart reviewed prior to treatment and patient is agreeable for therapy.  All lines intact and patient positioned comfortably at end of treatment.  All patient needs addressed prior to ending therapy session.      Pt currently functioning below baseline.  Recommend daily therapy at time of discharge to maximize long term outcomes and prevent re-admission. Please refer to AM-PAC score for current level of function.               Past Medical History:  has a past medical history of Adjustment disorder, AF (atrial fibrillation) ([facility]), AICD (automatic cardioverter/defibrillator) present, Alcohol dependence ([facility]), CAD (coronary artery disease), Cardiomyopathy ([facility]), CHF (congestive heart failure) ([facility]), COPD (chronic obstructive pulmonary disease) ([facility]), DDD (degenerative disc disease), lumbar, Herniated cervical disc, Hyperlipidemia, Hypertension, Major depression, Post laminectomy syndrome, Sciatica, Snores, Tobacco abuse, and Traumatic brain injury ([facility]).  Past Surgical History:   has a past surgical history that includes back surgery; Rotator cuff repair (Right); Carpal tunnel release (Right); Nerve Block (2013); Nerve Block (N/A, 2013); other surgical history (2016); Tonsillectomy; pacemaker placement (2015); back surgery; other surgical history (2018); joint replacement (Right, 2018); and Cardiac electrophysiology study and ablation.    Restrictions  Restrictions/Precautions: General Precautions, Fall Risk, Seizure  Other Position/Activity Restrictions: Telemetry, cognitive deficits; ALARMS  Required Braces or Orthoses?:

## 2025-05-05 NOTE — PROGRESS NOTES
Patient discharged via [facility]. Report called to facility by Eliza with transfer to [facility]. Left message with return phone number.   EVY completed and signed per writer and physician for [place]   Discharge packet given to EMS to deliver to RN receiving patient       Telemetry monitor returned         Patient/Family Stroke paperwork including personal risk factors reviewed and given to patient

## 2025-05-06 ENCOUNTER — TELEPHONE (OUTPATIENT)
Dept: FAMILY MEDICINE CLINIC | Age: 65
End: 2025-05-06

## 2025-05-06 LAB
MICROORGANISM SPEC CULT: NORMAL
SERVICE CMNT-IMP: NORMAL
SPECIMEN DESCRIPTION: NORMAL

## 2025-05-06 NOTE — TELEPHONE ENCOUNTER
Care Transitions Initial Follow Up Call    Outreach made within 2 business days of discharge: Yes    Patient: Mahesh Brownlee Patient : 1960   MRN: [id number]  Reason for Admission: ETOH Abuse  Discharge Date: 25       Spoke with: Mahesh    Discharge department/facility: [facility] Interactive Patient Contact:  Was patient able to fill all prescriptions: Yes  Was patient instructed to bring all medications to the follow-up visit: Yes  Is patient taking all medications as directed in the discharge summary? Yes  Does patient understand their discharge instructions: Yes  Does patient have questions or concerns that need addressed prior to 7-14 day follow up office visit: no    Additional needs identified to be addressed with provider  Patient states he is in a nursing home right now and will call when he is discharged.              Scheduled appointment with PCP within 7-14 days    Follow Up  No future appointments.    Tracie Carmichael MA

## 2025-05-08 LAB
MICROORGANISM SPEC CULT: ABNORMAL
SERVICE CMNT-IMP: ABNORMAL
SPECIMEN DESCRIPTION: ABNORMAL

## 2025-05-15 NOTE — PROGRESS NOTES
Physician Progress Note      PATIENT:               EFREM FORREST  CSN #:                  [id number]  :                       1960  ADMIT DATE:       2025 5:35 PM  DISCH DATE:        2025 4:17 PM  RESPONDING  PROVIDER #:        Sivakumar Freeman MD          QUERY TEXT:    Sepsis was documented in the medical record in ED note and in the daily   progress notes by neurology. The diagnosis was not noted in subsequent   documentation.  Please clarify the status of this condition.    The clinical indicators include:  Per ED provider note, \"Patient meets sepsis criteria with possible sources   being pulmonary.   Patient started on sepsis protocol with IV Rocephin and   azithromycin.\"  --  Per  H&P, \"Leukocytosis-continue Levaquin. Patient has a Augmentin allergy.   Pending procalcitonin. Likely related to aspiration pneumonia.  Aspiration pneumonia?-Continue Levaquin.  ---  Per 5/3- neurology note, \"He also had comorbid sepsis on antibiotic therapy   and also it is felt that breakthrough seizures could be related to comorbid   septic state\"...\"Septic encephalopathy with abnormal CT chest and   leukocytosis: On antibiotic therapy with IV Rocephin, Zithromax and Levaquin   as per primary team.\"    Labs: wbc 19.4 on --- lactic acid 1.4 on -- procal 0.07 on     HR range : 101-136  RR range : 19-21  Temps: TMax throughout hospitalization: 99.3 on     CT Chest 25:  1.No acute inflammatory or obstructive process seen in the abdomen or  pelvis.  2.Peribronchovascular tree-in-bud opacities in the right upper lobe  suggesting small airways infection.    Risk factors: age 65, aspiration pneumonia, COPD  Treatment: labs, imaging, IV Rocephin & IV Azithromycin  Options provided:  -- Sepsis, POA, confirmed after study  -- Sepsis, POA, treated and resolved  -- Sepsis ruled out after study  -- Other - I will add my own diagnosis  -- Disagree - Not applicable / Not valid  -- Disagree - Clinically

## 2025-06-22 ENCOUNTER — APPOINTMENT (OUTPATIENT)
Dept: CT IMAGING | Age: 65
DRG: 194 | End: 2025-06-22
Payer: MEDICARE

## 2025-06-22 ENCOUNTER — HOSPITAL ENCOUNTER (INPATIENT)
Age: 65
LOS: 5 days | Discharge: NURSING, REHAB OR LONG-TERM CARE | DRG: 194 | End: 2025-06-27
Admitting: INTERNAL MEDICINE
Payer: MEDICARE

## 2025-06-22 ENCOUNTER — APPOINTMENT (OUTPATIENT)
Dept: GENERAL RADIOLOGY | Age: 65
DRG: 194 | End: 2025-06-22
Payer: MEDICARE

## 2025-06-22 DIAGNOSIS — F41.9 ANXIETY DISORDER, UNSPECIFIED TYPE: ICD-10-CM

## 2025-06-22 DIAGNOSIS — R41.82 ALTERED MENTAL STATUS, UNSPECIFIED ALTERED MENTAL STATUS TYPE: ICD-10-CM

## 2025-06-22 DIAGNOSIS — A41.9 SEPSIS, DUE TO UNSPECIFIED ORGANISM, UNSPECIFIED WHETHER ACUTE ORGAN DYSFUNCTION PRESENT (HCC): ICD-10-CM

## 2025-06-22 DIAGNOSIS — E53.8 B12 DEFICIENCY: ICD-10-CM

## 2025-06-22 DIAGNOSIS — E87.6 HYPOKALEMIA: Primary | ICD-10-CM

## 2025-06-22 LAB
ALBUMIN SERPL-MCNC: 3.7 G/DL (ref 3.5–5.2)
ALBUMIN/GLOB SERPL: 1.1 {RATIO} (ref 1–2.5)
ALP SERPL-CCNC: 104 U/L (ref 40–129)
ALT SERPL-CCNC: 54 U/L (ref 10–50)
AMPHET UR QL SCN: NEGATIVE
ANION GAP SERPL CALCULATED.3IONS-SCNC: 16 MMOL/L (ref 9–16)
AST SERPL-CCNC: 85 U/L (ref 10–50)
BARBITURATES UR QL SCN: NEGATIVE
BASOPHILS # BLD: <0.03 K/UL (ref 0–0.2)
BASOPHILS NFR BLD: 0 % (ref 0–2)
BENZODIAZ UR QL: NEGATIVE
BILIRUB SERPL-MCNC: 0.7 MG/DL (ref 0–1.2)
BILIRUB UR QL STRIP: NEGATIVE
BUN SERPL-MCNC: 7 MG/DL (ref 8–23)
CALCIUM SERPL-MCNC: 8.6 MG/DL (ref 8.8–10.2)
CANNABINOIDS UR QL SCN: POSITIVE
CHLORIDE SERPL-SCNC: 104 MMOL/L (ref 98–107)
CLARITY UR: CLEAR
CO2 SERPL-SCNC: 21 MMOL/L (ref 20–31)
COCAINE UR QL SCN: NEGATIVE
COLOR UR: YELLOW
CREAT SERPL-MCNC: 0.7 MG/DL (ref 0.7–1.2)
EOSINOPHIL # BLD: 0.03 K/UL (ref 0–0.44)
EOSINOPHILS RELATIVE PERCENT: 0 % (ref 1–4)
EPI CELLS #/AREA URNS HPF: NORMAL /HPF (ref 0–5)
ERYTHROCYTE [DISTWIDTH] IN BLOOD BY AUTOMATED COUNT: 13.7 % (ref 11.8–14.4)
ETHANOL PERCENT: 0 %
ETHANOLAMINE SERPL-MCNC: <10 MG/DL (ref 0–0.08)
FENTANYL UR QL: NEGATIVE
GFR, ESTIMATED: >90 ML/MIN/1.73M2
GLUCOSE SERPL-MCNC: 103 MG/DL (ref 82–115)
GLUCOSE UR STRIP-MCNC: NEGATIVE MG/DL
HCT VFR BLD AUTO: 42 % (ref 40.7–50.3)
HGB BLD-MCNC: 14.5 G/DL (ref 13–17)
HGB UR QL STRIP.AUTO: ABNORMAL
IMM GRANULOCYTES # BLD AUTO: 0.04 K/UL (ref 0–0.3)
IMM GRANULOCYTES NFR BLD: 0 %
KETONES UR STRIP-MCNC: NEGATIVE MG/DL
LACTATE BLDV-SCNC: 1.2 MMOL/L (ref 0.5–1.9)
LACTATE BLDV-SCNC: 1.6 MMOL/L (ref 0.5–1.9)
LEUKOCYTE ESTERASE UR QL STRIP: NEGATIVE
LYMPHOCYTES NFR BLD: 0.98 K/UL (ref 1.1–3.7)
LYMPHOCYTES RELATIVE PERCENT: 9 % (ref 24–43)
MCH RBC QN AUTO: 31.3 PG (ref 25.2–33.5)
MCHC RBC AUTO-ENTMCNC: 34.5 G/DL (ref 28.4–34.8)
MCV RBC AUTO: 90.5 FL (ref 82.6–102.9)
METHADONE UR QL: NEGATIVE
MONOCYTES NFR BLD: 1.42 K/UL (ref 0.1–1.2)
MONOCYTES NFR BLD: 13 % (ref 3–12)
NEUTROPHILS NFR BLD: 78 % (ref 36–65)
NEUTS SEG NFR BLD: 8.12 K/UL (ref 1.5–8.1)
NITRITE UR QL STRIP: NEGATIVE
NRBC BLD-RTO: 0 PER 100 WBC
OPIATES UR QL SCN: NEGATIVE
OXYCODONE UR QL SCN: NEGATIVE
PCP UR QL SCN: NEGATIVE
PH UR STRIP: 7 [PH] (ref 5–8)
PLATELET # BLD AUTO: 172 K/UL (ref 138–453)
PMV BLD AUTO: 10 FL (ref 8.1–13.5)
POTASSIUM SERPL-SCNC: 2.9 MMOL/L (ref 3.7–5.3)
PROCALCITONIN SERPL-MCNC: 0.68 NG/ML (ref 0–0.09)
PROT SERPL-MCNC: 7.1 G/DL (ref 6.6–8.7)
PROT UR STRIP-MCNC: NEGATIVE MG/DL
RBC # BLD AUTO: 4.64 M/UL (ref 4.21–5.77)
RBC #/AREA URNS HPF: NORMAL /HPF (ref 0–2)
SODIUM SERPL-SCNC: 141 MMOL/L (ref 136–145)
SP GR UR STRIP: 1.01 (ref 1–1.03)
TEST INFORMATION: ABNORMAL
TROPONIN I SERPL HS-MCNC: 48 NG/L (ref 0–22)
TROPONIN I SERPL HS-MCNC: 50 NG/L (ref 0–22)
TROPONIN I SERPL HS-MCNC: 51 NG/L (ref 0–22)
UROBILINOGEN UR STRIP-ACNC: NORMAL EU/DL (ref 0–1)
WBC #/AREA URNS HPF: NORMAL /HPF (ref 0–5)
WBC OTHER # BLD: 10.6 K/UL (ref 3.5–11.3)

## 2025-06-22 PROCEDURE — 84145 PROCALCITONIN (PCT): CPT

## 2025-06-22 PROCEDURE — 2580000003 HC RX 258

## 2025-06-22 PROCEDURE — 71045 X-RAY EXAM CHEST 1 VIEW: CPT

## 2025-06-22 PROCEDURE — G0480 DRUG TEST DEF 1-7 CLASSES: HCPCS

## 2025-06-22 PROCEDURE — 96367 TX/PROPH/DG ADDL SEQ IV INF: CPT

## 2025-06-22 PROCEDURE — 6370000000 HC RX 637 (ALT 250 FOR IP)

## 2025-06-22 PROCEDURE — 80307 DRUG TEST PRSMV CHEM ANLYZR: CPT

## 2025-06-22 PROCEDURE — 2060000000 HC ICU INTERMEDIATE R&B

## 2025-06-22 PROCEDURE — 0202U NFCT DS 22 TRGT SARS-COV-2: CPT

## 2025-06-22 PROCEDURE — 99285 EMERGENCY DEPT VISIT HI MDM: CPT

## 2025-06-22 PROCEDURE — 96365 THER/PROPH/DIAG IV INF INIT: CPT

## 2025-06-22 PROCEDURE — 84484 ASSAY OF TROPONIN QUANT: CPT

## 2025-06-22 PROCEDURE — 85025 COMPLETE CBC W/AUTO DIFF WBC: CPT

## 2025-06-22 PROCEDURE — 36415 COLL VENOUS BLD VENIPUNCTURE: CPT

## 2025-06-22 PROCEDURE — 6360000002 HC RX W HCPCS

## 2025-06-22 PROCEDURE — 80053 COMPREHEN METABOLIC PANEL: CPT

## 2025-06-22 PROCEDURE — 81001 URINALYSIS AUTO W/SCOPE: CPT

## 2025-06-22 PROCEDURE — 83605 ASSAY OF LACTIC ACID: CPT

## 2025-06-22 PROCEDURE — 87641 MR-STAPH DNA AMP PROBE: CPT

## 2025-06-22 PROCEDURE — 99222 1ST HOSP IP/OBS MODERATE 55: CPT

## 2025-06-22 PROCEDURE — 70450 CT HEAD/BRAIN W/O DYE: CPT

## 2025-06-22 PROCEDURE — 87040 BLOOD CULTURE FOR BACTERIA: CPT

## 2025-06-22 PROCEDURE — 93005 ELECTROCARDIOGRAM TRACING: CPT

## 2025-06-22 RX ORDER — ONDANSETRON 2 MG/ML
4 INJECTION INTRAMUSCULAR; INTRAVENOUS EVERY 6 HOURS PRN
Status: DISCONTINUED | OUTPATIENT
Start: 2025-06-22 | End: 2025-06-27 | Stop reason: HOSPADM

## 2025-06-22 RX ORDER — DULOXETIN HYDROCHLORIDE 60 MG/1
60 CAPSULE, DELAYED RELEASE ORAL DAILY
Status: DISCONTINUED | OUTPATIENT
Start: 2025-06-23 | End: 2025-06-27 | Stop reason: HOSPADM

## 2025-06-22 RX ORDER — GUAIFENESIN 600 MG/1
600 TABLET, EXTENDED RELEASE ORAL 2 TIMES DAILY
Status: DISCONTINUED | OUTPATIENT
Start: 2025-06-22 | End: 2025-06-27 | Stop reason: HOSPADM

## 2025-06-22 RX ORDER — IPRATROPIUM BROMIDE AND ALBUTEROL SULFATE 2.5; .5 MG/3ML; MG/3ML
1 SOLUTION RESPIRATORY (INHALATION)
Status: DISCONTINUED | OUTPATIENT
Start: 2025-06-23 | End: 2025-06-22

## 2025-06-22 RX ORDER — ALBUTEROL SULFATE 0.83 MG/ML
2.5 SOLUTION RESPIRATORY (INHALATION)
Status: DISCONTINUED | OUTPATIENT
Start: 2025-06-22 | End: 2025-06-27 | Stop reason: HOSPADM

## 2025-06-22 RX ORDER — DONEPEZIL HYDROCHLORIDE 5 MG/1
5 TABLET, FILM COATED ORAL DAILY
Status: DISCONTINUED | OUTPATIENT
Start: 2025-06-23 | End: 2025-06-23

## 2025-06-22 RX ORDER — LACOSAMIDE 100 MG/1
100 TABLET ORAL 2 TIMES DAILY
Status: DISCONTINUED | OUTPATIENT
Start: 2025-06-22 | End: 2025-06-27 | Stop reason: HOSPADM

## 2025-06-22 RX ORDER — POTASSIUM CHLORIDE 7.45 MG/ML
10 INJECTION INTRAVENOUS PRN
Status: DISCONTINUED | OUTPATIENT
Start: 2025-06-22 | End: 2025-06-27 | Stop reason: HOSPADM

## 2025-06-22 RX ORDER — 0.9 % SODIUM CHLORIDE 0.9 %
1000 INTRAVENOUS SOLUTION INTRAVENOUS ONCE
Status: COMPLETED | OUTPATIENT
Start: 2025-06-22 | End: 2025-06-22

## 2025-06-22 RX ORDER — ACETAMINOPHEN 650 MG/1
650 SUPPOSITORY RECTAL ONCE
Status: COMPLETED | OUTPATIENT
Start: 2025-06-22 | End: 2025-06-22

## 2025-06-22 RX ORDER — POTASSIUM CHLORIDE 1500 MG/1
40 TABLET, EXTENDED RELEASE ORAL PRN
Status: DISCONTINUED | OUTPATIENT
Start: 2025-06-22 | End: 2025-06-27 | Stop reason: HOSPADM

## 2025-06-22 RX ORDER — POTASSIUM CHLORIDE 7.45 MG/ML
10 INJECTION INTRAVENOUS ONCE
Status: COMPLETED | OUTPATIENT
Start: 2025-06-22 | End: 2025-06-22

## 2025-06-22 RX ORDER — LEVOFLOXACIN 5 MG/ML
750 INJECTION, SOLUTION INTRAVENOUS EVERY 24 HOURS
Status: DISCONTINUED | OUTPATIENT
Start: 2025-06-22 | End: 2025-06-24

## 2025-06-22 RX ORDER — GAUZE BANDAGE 2" X 2"
100 BANDAGE TOPICAL DAILY
Status: DISCONTINUED | OUTPATIENT
Start: 2025-06-23 | End: 2025-06-23

## 2025-06-22 RX ORDER — ACETAMINOPHEN 650 MG/1
650 SUPPOSITORY RECTAL EVERY 6 HOURS PRN
Status: DISCONTINUED | OUTPATIENT
Start: 2025-06-22 | End: 2025-06-27 | Stop reason: HOSPADM

## 2025-06-22 RX ORDER — ATORVASTATIN CALCIUM 10 MG/1
10 TABLET, FILM COATED ORAL NIGHTLY
Status: DISCONTINUED | OUTPATIENT
Start: 2025-06-22 | End: 2025-06-27 | Stop reason: HOSPADM

## 2025-06-22 RX ORDER — MAGNESIUM SULFATE 1 G/100ML
1000 INJECTION INTRAVENOUS PRN
Status: DISCONTINUED | OUTPATIENT
Start: 2025-06-22 | End: 2025-06-27 | Stop reason: HOSPADM

## 2025-06-22 RX ORDER — IPRATROPIUM BROMIDE AND ALBUTEROL SULFATE 2.5; .5 MG/3ML; MG/3ML
1 SOLUTION RESPIRATORY (INHALATION)
Status: DISCONTINUED | OUTPATIENT
Start: 2025-06-22 | End: 2025-06-23

## 2025-06-22 RX ORDER — ACETAMINOPHEN 325 MG/1
650 TABLET ORAL EVERY 6 HOURS PRN
Status: DISCONTINUED | OUTPATIENT
Start: 2025-06-22 | End: 2025-06-27 | Stop reason: HOSPADM

## 2025-06-22 RX ORDER — SPIRONOLACTONE 25 MG/1
25 TABLET ORAL DAILY
Status: DISCONTINUED | OUTPATIENT
Start: 2025-06-23 | End: 2025-06-27 | Stop reason: HOSPADM

## 2025-06-22 RX ORDER — ACETAMINOPHEN 500 MG
1000 TABLET ORAL
Status: DISCONTINUED | OUTPATIENT
Start: 2025-06-22 | End: 2025-06-22

## 2025-06-22 RX ORDER — LANOLIN ALCOHOL/MO/W.PET/CERES
1000 CREAM (GRAM) TOPICAL DAILY
Status: DISCONTINUED | OUTPATIENT
Start: 2025-06-23 | End: 2025-06-23

## 2025-06-22 RX ORDER — METOPROLOL SUCCINATE 50 MG/1
50 TABLET, EXTENDED RELEASE ORAL DAILY
Status: DISCONTINUED | OUTPATIENT
Start: 2025-06-23 | End: 2025-06-27 | Stop reason: HOSPADM

## 2025-06-22 RX ORDER — ONDANSETRON 4 MG/1
4 TABLET, ORALLY DISINTEGRATING ORAL EVERY 8 HOURS PRN
Status: DISCONTINUED | OUTPATIENT
Start: 2025-06-22 | End: 2025-06-27 | Stop reason: HOSPADM

## 2025-06-22 RX ADMIN — POTASSIUM CHLORIDE: 2 INJECTION, SOLUTION, CONCENTRATE INTRAVENOUS at 22:50

## 2025-06-22 RX ADMIN — VANCOMYCIN HYDROCHLORIDE 2500 MG: 5 INJECTION, POWDER, LYOPHILIZED, FOR SOLUTION INTRAVENOUS at 17:50

## 2025-06-22 RX ADMIN — Medication 3 MG: at 22:54

## 2025-06-22 RX ADMIN — GUAIFENESIN 600 MG: 600 TABLET, EXTENDED RELEASE ORAL at 22:54

## 2025-06-22 RX ADMIN — POTASSIUM CHLORIDE 10 MEQ: 7.46 INJECTION, SOLUTION INTRAVENOUS at 19:21

## 2025-06-22 RX ADMIN — SODIUM CHLORIDE 1000 ML: 0.9 INJECTION, SOLUTION INTRAVENOUS at 16:01

## 2025-06-22 RX ADMIN — CEFEPIME 2000 MG: 2 INJECTION, POWDER, FOR SOLUTION INTRAVENOUS at 16:59

## 2025-06-22 RX ADMIN — ACETAMINOPHEN 650 MG: 650 SUPPOSITORY RECTAL at 16:05

## 2025-06-22 NOTE — ED PROVIDER NOTES
[facility] EMERGENCY DEPARTMENT  Emergency Department Encounter  Emergency Medicine Attending     Pt Name:Mahesh Brownlee  MRN: [id number]  Birthdate 1960  Date of evaluation: 6/22/25  PCP:  Ira Roberts, RIVER - CNP  Note Started: 3:41 PM EDT      CHIEF COMPLAINT       Chief Complaint   Patient presents with    Altered Mental Status     Found by family. Not talking and not following directions. Alcoholic and seizure history       HISTORY OF PRESENT ILLNESS  (Location/Symptom, Timing/Onset, Context/Setting, Quality, Duration, Modifying Factors, Severity.)      65-year-old male presenting for altered mental status.  Patient has history of alcoholism and has presented in the past for altered mental status with sepsis.  Patient apparently is staying at a hotel.  He contacted family yesterday evening via cell phone and requested help.  Family responded to patient today according to EMS.  Patient confused and unable to provide any history.  Patient has had previous admissions for similar presentations most recently in the beginning of May 2025            PAST MEDICAL / SURGICAL / SOCIAL / FAMILY HISTORY      has a past medical history of Adjustment disorder, AF (atrial fibrillation) ([facility]), AICD (automatic cardioverter/defibrillator) present, Alcohol dependence (HCC), CAD (coronary artery disease), Cardiomyopathy (HCC), CHF (congestive heart failure) (HCC), COPD (chronic obstructive pulmonary disease) (HCC), DDD (degenerative disc disease), lumbar, Herniated cervical disc, Hyperlipidemia, Hypertension, Major depression, Post laminectomy syndrome, Sciatica, Snores, Tobacco abuse, and Traumatic brain injury (HCC).     has a past surgical history that includes back surgery; Rotator cuff repair (Right); Carpal tunnel release (Right); Nerve Block (07/23/2013); Nerve Block (N/A, 03/21/2013); other surgical history (04/25/2016); Tonsillectomy; pacemaker placement (09/2015); back surgery; other surgical history

## 2025-06-22 NOTE — H&P
Past Surgical History:   Procedure Laterality Date    BACK SURGERY      x2, Dr. Ángel Regalado     BACK SURGERY      L3-4 decompression    CARDIAC ELECTROPHYSIOLOGY STUDY AND ABLATION      CARPAL TUNNEL RELEASE Right     JOINT REPLACEMENT Right 06/2018    NERVE BLOCK  07/23/2013    dduramorph  celestone 9mg    NERVE BLOCK N/A 03/21/2013    lumbar RASHMI    OTHER SURGICAL HISTORY  04/25/2016    Lumbar RASHMI    OTHER SURGICAL HISTORY  02/09/2018    lumbosacral myelogram    PACEMAKER PLACEMENT  09/2015    with defib    ROTATOR CUFF REPAIR Right     TONSILLECTOMY      HAS NO TONSILS, NEVER HAD SURGERY        Medications Prior to Admission:     Prior to Admission medications    Medication Sig Start Date End Date Taking? Authorizing Provider   lidocaine 4 % external patch Place 1 patch onto the skin daily 5/4/25   Sivakumar Freeman MD   metoprolol succinate (TOPROL XL) 50 MG extended release tablet Take 1 tablet by mouth daily 4/2/25   Mateo Dey MD   sacubitril-valsartan (ENTRESTO) 24-26 MG per tablet Take 1 tablet by mouth 2 times daily 4/1/25   Mateo Dey MD   albuterol (PROVENTIL) (2.5 MG/3ML) 0.083% nebulizer solution Take 3 mLs by nebulization every 2 hours as needed for Wheezing 4/1/25   Mateo Dey MD   ipratropium 0.5 mg-albuterol 2.5 mg (DUONEB) 0.5-2.5 (3) MG/3ML SOLN nebulizer solution Inhale 3 mLs into the lungs every 6 hours while awake 4/1/25   Mateo Dey MD   lacosamide (VIMPAT) 100 MG TABS tablet Take 1 tablet by mouth 2 times daily.    Provider, MD Madan   DULoxetine (CYMBALTA) 60 MG extended release capsule Take 1 capsule by mouth daily 1/6/25   Ira Roberts APRN - CNP   spironolactone (ALDACTONE) 25 MG tablet Take 1 tablet by mouth daily 12/27/24   Ira Roberts APRN - CNP   rivaroxaban (XARELTO) 20 MG TABS tablet Take 1 tablet by mouth daily 12/27/24   Ira Roberts APRN - CNP   atorvastatin (LIPITOR) 10 MG tablet Take 1 tablet by mouth nightly 12/27/24    Ira Roberts, APRN - CNP   nicotine (NICODERM CQ) 7 MG/24HR Place 1 patch onto the skin daily 24   Sunny Peters, DO   thiamine 100 MG tablet Take 1 tablet by mouth daily 24   Sunny Peters, DO   donepezil (ARICEPT) 5 MG tablet Take 1 tablet by mouth daily 24   Iftikhar Lea MD   folic acid (FOLVITE) 1 MG tablet Take 1 tablet by mouth daily 24   Iftikhar Lea MD   vitamin B-12 (CYANOCOBALAMIN) 1000 MCG tablet Take 1 tablet by mouth daily 24   Iftikhar Lea MD   levothyroxine (SYNTHROID) 50 MCG tablet Take 1 tablet by mouth Daily  Patient not taking: Reported on 2020 3/25/16 7/21/22  Ira Roberts, APRN - CNP   diltiazem (CARDIZEM) 120 MG tablet Take 1 tablet by mouth 2 times daily  Patient not taking: No sig reported 12/24/15 7/21/22  Blayne Lorenz MD   levalbuterol (XOPENEX HFA) 45 MCG/ACT inhaler Inhale 1-2 puffs into the lungs every 6 hours as needed for Wheezing  16  Blayne Lornez MD        Allergies:     Augmentin es-600 [amoxicillin-pot clavulanate] and Sulfa antibiotics    Social History:     Tobacco:    reports that he has been smoking cigarettes. He has a 40 pack-year smoking history. He has never used smokeless tobacco.  Alcohol:      reports current alcohol use.  Drug Use:  reports that he does not currently use drugs after having used the following drugs: Marijuana (Weed).    Family History:     Family History   Problem Relation Age of Onset    Other Mother         dementia    Other Father        Review of Systems:     Positive and Negative as described in HPI.    Review of Systems   Unable to perform ROS: Mental status change       Physical Exam:   /87   Pulse (!) 115   Temp (!) 100.8 °F (38.2 °C) (Axillary)   Resp 24   Wt 104.3 kg (230 lb)   SpO2 92%   BMI 32.08 kg/m²   Temp (24hrs), Av.8 °F (38.2 °C), Min:100.8 °F (38.2 °C), Max:100.8 °F (38.2 °C)    No results for input(s): \"POCGLU\" in the last 72 hours.  No intake or

## 2025-06-23 LAB
ALBUMIN SERPL-MCNC: 3.5 G/DL (ref 3.5–5.2)
ALBUMIN/GLOB SERPL: 1 {RATIO} (ref 1–2.5)
ALP SERPL-CCNC: 96 U/L (ref 40–129)
ALT SERPL-CCNC: 50 U/L (ref 10–50)
ANION GAP SERPL CALCULATED.3IONS-SCNC: 16 MMOL/L (ref 9–16)
AST SERPL-CCNC: 91 U/L (ref 10–50)
B PARAP IS1001 DNA NPH QL NAA+NON-PROBE: NOT DETECTED
B PERT DNA SPEC QL NAA+PROBE: NOT DETECTED
BASOPHILS # BLD: <0.03 K/UL (ref 0–0.2)
BASOPHILS NFR BLD: 0 % (ref 0–2)
BILIRUB SERPL-MCNC: 0.9 MG/DL (ref 0–1.2)
BUN SERPL-MCNC: 7 MG/DL (ref 8–23)
C PNEUM DNA NPH QL NAA+NON-PROBE: NOT DETECTED
CALCIUM SERPL-MCNC: 8.7 MG/DL (ref 8.8–10.2)
CHLORIDE SERPL-SCNC: 103 MMOL/L (ref 98–107)
CO2 SERPL-SCNC: 19 MMOL/L (ref 20–31)
CREAT SERPL-MCNC: 0.7 MG/DL (ref 0.7–1.2)
EOSINOPHIL # BLD: <0.03 K/UL (ref 0–0.44)
EOSINOPHILS RELATIVE PERCENT: 0 % (ref 1–4)
ERYTHROCYTE [DISTWIDTH] IN BLOOD BY AUTOMATED COUNT: 13.7 % (ref 11.8–14.4)
FLUAV RNA NPH QL NAA+NON-PROBE: NOT DETECTED
FLUBV RNA NPH QL NAA+NON-PROBE: NOT DETECTED
GFR, ESTIMATED: >90 ML/MIN/1.73M2
GLUCOSE SERPL-MCNC: 115 MG/DL (ref 82–115)
HADV DNA NPH QL NAA+NON-PROBE: NOT DETECTED
HCOV 229E RNA NPH QL NAA+NON-PROBE: NOT DETECTED
HCOV HKU1 RNA NPH QL NAA+NON-PROBE: NOT DETECTED
HCOV NL63 RNA NPH QL NAA+NON-PROBE: NOT DETECTED
HCOV OC43 RNA NPH QL NAA+NON-PROBE: NOT DETECTED
HCT VFR BLD AUTO: 39 % (ref 40.7–50.3)
HGB BLD-MCNC: 13.3 G/DL (ref 13–17)
HMPV RNA NPH QL NAA+NON-PROBE: NOT DETECTED
HPIV1 RNA NPH QL NAA+NON-PROBE: NOT DETECTED
HPIV2 RNA NPH QL NAA+NON-PROBE: NOT DETECTED
HPIV3 RNA NPH QL NAA+NON-PROBE: NOT DETECTED
HPIV4 RNA NPH QL NAA+NON-PROBE: NOT DETECTED
IMM GRANULOCYTES # BLD AUTO: 0.04 K/UL (ref 0–0.3)
IMM GRANULOCYTES NFR BLD: 0 %
INR PPP: 1.1
LYMPHOCYTES NFR BLD: 1.32 K/UL (ref 1.1–3.7)
LYMPHOCYTES RELATIVE PERCENT: 14 % (ref 24–43)
M PNEUMO DNA NPH QL NAA+NON-PROBE: NOT DETECTED
MAGNESIUM SERPL-MCNC: 1.9 MG/DL (ref 1.6–2.4)
MCH RBC QN AUTO: 31.6 PG (ref 25.2–33.5)
MCHC RBC AUTO-ENTMCNC: 34.1 G/DL (ref 28.4–34.8)
MCV RBC AUTO: 92.6 FL (ref 82.6–102.9)
MONOCYTES NFR BLD: 1.2 K/UL (ref 0.1–1.2)
MONOCYTES NFR BLD: 12 % (ref 3–12)
MRSA, DNA, NASAL: NEGATIVE
NEUTROPHILS NFR BLD: 74 % (ref 36–65)
NEUTS SEG NFR BLD: 7.13 K/UL (ref 1.5–8.1)
NRBC BLD-RTO: 0 PER 100 WBC
PLATELET # BLD AUTO: 145 K/UL (ref 138–453)
PMV BLD AUTO: 9.6 FL (ref 8.1–13.5)
POTASSIUM SERPL-SCNC: 3 MMOL/L (ref 3.7–5.3)
PROCALCITONIN SERPL-MCNC: 0.7 NG/ML (ref 0–0.09)
PROT SERPL-MCNC: 6.8 G/DL (ref 6.6–8.7)
PROTHROMBIN TIME: 14.7 SEC (ref 11.5–14.2)
RBC # BLD AUTO: 4.21 M/UL (ref 4.21–5.77)
RSV RNA NPH QL NAA+NON-PROBE: NOT DETECTED
RV+EV RNA NPH QL NAA+NON-PROBE: NOT DETECTED
SARS-COV-2 RNA NPH QL NAA+NON-PROBE: NOT DETECTED
SODIUM SERPL-SCNC: 138 MMOL/L (ref 136–145)
SPECIMEN DESCRIPTION: NORMAL
SPECIMEN DESCRIPTION: NORMAL
WBC OTHER # BLD: 9.7 K/UL (ref 3.5–11.3)

## 2025-06-23 PROCEDURE — 85610 PROTHROMBIN TIME: CPT

## 2025-06-23 PROCEDURE — 36415 COLL VENOUS BLD VENIPUNCTURE: CPT

## 2025-06-23 PROCEDURE — 2060000000 HC ICU INTERMEDIATE R&B

## 2025-06-23 PROCEDURE — 6360000002 HC RX W HCPCS

## 2025-06-23 PROCEDURE — 97166 OT EVAL MOD COMPLEX 45 MIN: CPT

## 2025-06-23 PROCEDURE — 99222 1ST HOSP IP/OBS MODERATE 55: CPT | Performed by: PSYCHIATRY & NEUROLOGY

## 2025-06-23 PROCEDURE — 80053 COMPREHEN METABOLIC PANEL: CPT

## 2025-06-23 PROCEDURE — 6370000000 HC RX 637 (ALT 250 FOR IP)

## 2025-06-23 PROCEDURE — 84145 PROCALCITONIN (PCT): CPT

## 2025-06-23 PROCEDURE — 94761 N-INVAS EAR/PLS OXIMETRY MLT: CPT

## 2025-06-23 PROCEDURE — 99232 SBSQ HOSP IP/OBS MODERATE 35: CPT

## 2025-06-23 PROCEDURE — 83735 ASSAY OF MAGNESIUM: CPT

## 2025-06-23 PROCEDURE — 97535 SELF CARE MNGMENT TRAINING: CPT

## 2025-06-23 PROCEDURE — 2580000003 HC RX 258

## 2025-06-23 PROCEDURE — 97530 THERAPEUTIC ACTIVITIES: CPT

## 2025-06-23 PROCEDURE — 97162 PT EVAL MOD COMPLEX 30 MIN: CPT

## 2025-06-23 PROCEDURE — 94640 AIRWAY INHALATION TREATMENT: CPT

## 2025-06-23 PROCEDURE — 85025 COMPLETE CBC W/AUTO DIFF WBC: CPT

## 2025-06-23 RX ORDER — MIRTAZAPINE 7.5 MG/1
7.5 TABLET, FILM COATED ORAL NIGHTLY
Status: DISCONTINUED | OUTPATIENT
Start: 2025-06-23 | End: 2025-06-27 | Stop reason: HOSPADM

## 2025-06-23 RX ORDER — SACUBITRIL AND VALSARTAN 49; 51 MG/1; MG/1
1 TABLET, FILM COATED ORAL 2 TIMES DAILY
COMMUNITY

## 2025-06-23 RX ORDER — PANTOPRAZOLE SODIUM 40 MG/1
40 TABLET, DELAYED RELEASE ORAL
Status: DISCONTINUED | OUTPATIENT
Start: 2025-06-24 | End: 2025-06-27 | Stop reason: HOSPADM

## 2025-06-23 RX ORDER — MIRTAZAPINE 15 MG/1
7.5 TABLET, FILM COATED ORAL NIGHTLY
COMMUNITY

## 2025-06-23 RX ORDER — METOPROLOL SUCCINATE 25 MG/1
25 TABLET, EXTENDED RELEASE ORAL DAILY
Status: ON HOLD | COMMUNITY
End: 2025-06-25 | Stop reason: HOSPADM

## 2025-06-23 RX ORDER — PANTOPRAZOLE SODIUM 40 MG/1
40 TABLET, DELAYED RELEASE ORAL 2 TIMES DAILY
COMMUNITY

## 2025-06-23 RX ORDER — IPRATROPIUM BROMIDE AND ALBUTEROL SULFATE 2.5; .5 MG/3ML; MG/3ML
1 SOLUTION RESPIRATORY (INHALATION) 2 TIMES DAILY
Status: DISCONTINUED | OUTPATIENT
Start: 2025-06-23 | End: 2025-06-27 | Stop reason: HOSPADM

## 2025-06-23 RX ORDER — HYDROXYZINE HYDROCHLORIDE 10 MG/1
10 TABLET, FILM COATED ORAL 3 TIMES DAILY PRN
Status: DISCONTINUED | OUTPATIENT
Start: 2025-06-23 | End: 2025-06-27 | Stop reason: HOSPADM

## 2025-06-23 RX ADMIN — POTASSIUM CHLORIDE 10 MEQ: 7.46 INJECTION, SOLUTION INTRAVENOUS at 08:23

## 2025-06-23 RX ADMIN — LEVOFLOXACIN 750 MG: 750 INJECTION, SOLUTION INTRAVENOUS at 00:18

## 2025-06-23 RX ADMIN — LACOSAMIDE 100 MG: 100 TABLET, FILM COATED ORAL at 19:49

## 2025-06-23 RX ADMIN — POTASSIUM CHLORIDE: 2 INJECTION, SOLUTION, CONCENTRATE INTRAVENOUS at 12:27

## 2025-06-23 RX ADMIN — ATORVASTATIN CALCIUM 10 MG: 10 TABLET, FILM COATED ORAL at 00:20

## 2025-06-23 RX ADMIN — CEFEPIME 2000 MG: 2 INJECTION, POWDER, FOR SOLUTION INTRAVENOUS at 08:27

## 2025-06-23 RX ADMIN — Medication 100 MG: at 08:30

## 2025-06-23 RX ADMIN — IPRATROPIUM BROMIDE AND ALBUTEROL SULFATE 1 DOSE: 2.5; .5 SOLUTION RESPIRATORY (INHALATION) at 20:18

## 2025-06-23 RX ADMIN — METOPROLOL SUCCINATE 50 MG: 50 TABLET, EXTENDED RELEASE ORAL at 08:30

## 2025-06-23 RX ADMIN — POTASSIUM CHLORIDE 10 MEQ: 7.46 INJECTION, SOLUTION INTRAVENOUS at 07:04

## 2025-06-23 RX ADMIN — SACUBITRIL AND VALSARTAN 1 TABLET: 24; 26 TABLET, FILM COATED ORAL at 00:20

## 2025-06-23 RX ADMIN — DONEPEZIL HYDROCHLORIDE 5 MG: 5 TABLET, FILM COATED ORAL at 08:30

## 2025-06-23 RX ADMIN — RIVAROXABAN 20 MG: 20 TABLET, FILM COATED ORAL at 08:30

## 2025-06-23 RX ADMIN — POTASSIUM CHLORIDE 10 MEQ: 7.46 INJECTION, SOLUTION INTRAVENOUS at 12:02

## 2025-06-23 RX ADMIN — IPRATROPIUM BROMIDE AND ALBUTEROL SULFATE 1 DOSE: .5; 2.5 SOLUTION RESPIRATORY (INHALATION) at 08:34

## 2025-06-23 RX ADMIN — CYANOCOBALAMIN TAB 1000 MCG 1000 MCG: 1000 TAB at 08:31

## 2025-06-23 RX ADMIN — SACUBITRIL AND VALSARTAN 1 TABLET: 24; 26 TABLET, FILM COATED ORAL at 08:30

## 2025-06-23 RX ADMIN — SACUBITRIL AND VALSARTAN 1 TABLET: 24; 26 TABLET, FILM COATED ORAL at 19:51

## 2025-06-23 RX ADMIN — LACOSAMIDE 100 MG: 100 TABLET, FILM COATED ORAL at 08:30

## 2025-06-23 RX ADMIN — MIRTAZAPINE 7.5 MG: 7.5 TABLET, FILM COATED ORAL at 19:49

## 2025-06-23 RX ADMIN — GUAIFENESIN 600 MG: 600 TABLET, EXTENDED RELEASE ORAL at 19:49

## 2025-06-23 RX ADMIN — CEFEPIME 2000 MG: 2 INJECTION, POWDER, FOR SOLUTION INTRAVENOUS at 01:41

## 2025-06-23 RX ADMIN — POTASSIUM CHLORIDE 10 MEQ: 7.46 INJECTION, SOLUTION INTRAVENOUS at 10:40

## 2025-06-23 RX ADMIN — DULOXETINE 60 MG: 60 CAPSULE, DELAYED RELEASE ORAL at 08:30

## 2025-06-23 RX ADMIN — SPIRONOLACTONE 25 MG: 25 TABLET ORAL at 08:30

## 2025-06-23 RX ADMIN — Medication 3 MG: at 19:49

## 2025-06-23 RX ADMIN — HYDROXYZINE HYDROCHLORIDE 10 MG: 10 TABLET ORAL at 01:43

## 2025-06-23 RX ADMIN — POTASSIUM CHLORIDE 10 MEQ: 7.46 INJECTION, SOLUTION INTRAVENOUS at 06:04

## 2025-06-23 RX ADMIN — GUAIFENESIN 600 MG: 600 TABLET, EXTENDED RELEASE ORAL at 08:30

## 2025-06-23 RX ADMIN — ATORVASTATIN CALCIUM 10 MG: 10 TABLET, FILM COATED ORAL at 19:49

## 2025-06-23 RX ADMIN — HYDROXYZINE HYDROCHLORIDE 10 MG: 10 TABLET ORAL at 20:14

## 2025-06-23 RX ADMIN — POTASSIUM CHLORIDE 10 MEQ: 7.46 INJECTION, SOLUTION INTRAVENOUS at 09:35

## 2025-06-23 RX ADMIN — LEVOFLOXACIN 750 MG: 750 INJECTION, SOLUTION INTRAVENOUS at 23:17

## 2025-06-23 ASSESSMENT — ENCOUNTER SYMPTOMS
ABDOMINAL PAIN: 0
CONSTIPATION: 0
WHEEZING: 0
COUGH: 0
VOMITING: 0
ALLERGIC/IMMUNOLOGIC NEGATIVE: 1
SHORTNESS OF BREATH: 0
NAUSEA: 0
DIARRHEA: 0
EYES NEGATIVE: 1

## 2025-06-23 NOTE — PLAN OF CARE
Patient resting in bed on room air alert to self but disoriented to time, place and situation. Upon admission, patient was not speaking or responding to staff and followed few commands. Patient then became alert to self and began talking to staff. Has stated he doesn't remember why he was brought in and seems hyper focused on not having teeth and not understanding what happened to them. Writer attempted to complete med rec, patient was unaware of his medication, writer contacted daughter Brianne and left a message, and spoke to son Brad, however patient's son was unsure of medications.   Patient has 1/2 NS with 40meq of potassium running continuously at 75mL/hr  IV cefepime and IV levaquin administered, see MAR.   Patient complained of 10/10 body pain, atarax ordered and administered, see MAR.   Potassium 3.0, IV replacement protocol initiated, see MAR.  Patient safety maintained.     Problem: Chronic Conditions and Co-morbidities  Goal: Patient's chronic conditions and co-morbidity symptoms are monitored and maintained or improved  Outcome: Progressing     Problem: Discharge Planning  Goal: Discharge to home or other facility with appropriate resources  Outcome: Progressing     Problem: Pain  Goal: Verbalizes/displays adequate comfort level or baseline comfort level  Outcome: Progressing  Patient having pain on their generalized and rates it a 10. Pain interventions includemedication and regular rest periods. Patients goal for pain relief is 2. The need for pain and symptom management will be considered in the discharge planning process to ensure patients comfort.        Problem: Safety - Adult  Goal: Free from fall injury  Outcome: Progressing     Problem: Neurosensory - Adult  Goal: Achieves stable or improved neurological status  Outcome: Progressing  Goal: Absence of seizures  Outcome: Progressing  Goal: Achieves maximal functionality and self care  Outcome: Progressing     Problem: Musculoskeletal -

## 2025-06-23 NOTE — ED NOTES
ED to inpatient nurses report     Admitting Diagnosis: sepsis, AMS, sepsis  Chief Complaint   Patient presents with    Altered Mental Status     Found by family. Not talking and not following directions. Alcoholic and seizure history      Present to ED from home per EMS  LOC: Pt able to make eye contact, has not spoken during time of stay since 2000 and states \"I peed myself\" has not spoken since  Patient confused: yes  Vital signs   Vitals:    06/22/25 1915 06/22/25 1930 06/22/25 1945 06/22/25 2000   BP: (!) 150/93 104/74 126/78 (!) 138/98   Pulse: (!) 111 (!) 119 (!) 108 (!) 106   Resp:       Temp:       TempSrc:       SpO2: 95% 92% 95% 95%   Weight:          Oxygen Baseline 95% on RA    Current needs required N/A     LDAs:   Peripheral IV 06/22/25 Right Wrist (Active)   Site Assessment Clean, dry & intact 06/22/25 1531     Mobility: Requires assistance * 2  Fall Risk:  yes, pt has not been seen ambulating but has gotten up to the edge of the bed by himself  Outstanding ED orders: N/A    Consults: PHARMACY TO DOSE VANCOMYCIN  IP CONSULT TO HOSPITALIST  [x]  Hospitalist  Completed  [x] yes [] no Who: Giuliano Mims  []  Medicine  Completed  [] yes [] No Who:   []  Cardiology  Completed  [] yes [] No Who:   []  GI   Completed  [] yes [] No Who:   []  Neurology  Completed  [] yes [] No Who:   []  Nephrology Completed  [] yes [] No Who:    []  Vascular  Completed  [] yes [] No Who:   []  Ortho  Completed  [] yes [] No Who:     []  Surgery  Completed  [] yes [] No Who:    []  Urology  Completed  [] yes [] No Who:    []  CT Surgery Completed  [] yes [] No Who:   []  Podiatry  Completed  [] yes [] No Who:    []  Other    Completed  [] yes [] No Who:    Situation and Background: Pt presents to ED via EMS from home. Pt texted his daughter last night \"help me\" and checked on him this even. Pt found alert and unresponsive. Pt makes good eye contact but does not speak otherwise.    Interventions and Important Events: Pt placed

## 2025-06-23 NOTE — PLAN OF CARE
Pt A+O to self and place, disoriented to situation and time.  K of 3.0, replaced per protocol see MAR.  Fluids running at 75 ml/hr  IV Levaquin q 24 hour  Neuro consulted  Discharge planning in progress. Social work sent referral to [facility].      Problem: Chronic Conditions and Co-morbidities  Goal: Patient's chronic conditions and co-morbidity symptoms are monitored and maintained or improved  6/23/2025 0939 by Janine Eastman GN  Outcome: Progressing     Problem: Discharge Planning  Goal: Discharge to home or other facility with appropriate resources  6/23/2025 0939 by Janine Eastman GN  Outcome: Progressing     Problem: Pain  Goal: Verbalizes/displays adequate comfort level or baseline comfort level  6/23/2025 0939 by Janine Eastman GN  Outcome: Progressing     Problem: Safety - Adult  Goal: Free from fall injury  6/23/2025 0939 by Janine Eastman GN  Outcome: Progressing     Problem: Neurosensory - Adult  Goal: Achieves stable or improved neurological status  6/23/2025 0939 by Janine Eastman GN  Outcome: Progressing     Problem: Musculoskeletal - Adult  Goal: Return mobility to safest level of function  6/23/2025 0939 by Janine Eastman GN  Outcome: Progressing     Problem: Metabolic/Fluid and Electrolytes - Adult  Goal: Hemodynamic stability and optimal renal function maintained  6/23/2025 0939 by Janine Eastman GN  Outcome: Progressing

## 2025-06-23 NOTE — RT PROTOCOL NOTE
RT Nebulizer Bronchodilator Protocol Note    There is a bronchodilator order in the chart from a provider indicating to follow the RT Bronchodilator Protocol and there is an “Initiate RT Bronchodilator Protocol” order as well (see protocol at bottom of note).    CXR Findings:  XR CHEST PORTABLE  Result Date: 6/22/2025  Mild central pulmonary congestion or pulmonary artery hypertension which is more prominent. Mild bibasilar interstitial edema vs atelectasis or early infiltrates which is more prominent.       The findings from the last RT Protocol Assessment were as follows:  Smoking: Chronic pulmonary disease  Respiratory Pattern: Regular pattern and RR 12-20 bpm  Breath Sounds: Inspiratory and expiratory or bilateral wheezing and/or rhonchi  Cough: Strong, productive  Indication for Bronchodilator Therapy: On home bronchodilators  Bronchodilator Assessment Score: 9    Aerosolized bronchodilator medication orders have been revised according to the RT Nebulizer Bronchodilator Protocol below.    Respiratory Therapist to perform RT Therapy Protocol Assessment initially then follow the protocol.  Repeat RT Therapy Protocol Assessment PRN for score 0-3 or on second treatment, BID, and PRN for scores above 3.    No Indications - adjust the frequency to every 6 hours PRN wheezing or bronchospasm, if no treatments needed after 48 hours then discontinue using Per Protocol order mode.     If indication present, adjust the RT bronchodilator orders based on the Bronchodilator Assessment Score as indicated below.  If a patient is on this medication at home then do not decrease Frequency below that used at home.    0-3 - enter or revise RT bronchodilator order(s) to equivalent RT Bronchodilator order with Frequency of every 4 hours PRN for wheezing or increased work of breathing using Per Protocol order mode.       4-6 - enter or revise RT Bronchodilator order(s) to two equivalent RT bronchodilator orders with one order with BID

## 2025-06-23 NOTE — CONSULTS
[facility]   IN-PATIENT SERVICE      NEUROLOGY CONSULT  NOTE            Date:   6/23/2025  Patient name:  Mahesh Brownlee  Date of admission:  6/22/2025  YOB: 1960      Chief Complaint:     Chief Complaint   Patient presents with    Altered Mental Status     Found by family. Not talking and not following directions. Alcoholic and seizure history       Reason for Consult:      AMS    History of Present Illness:     The patient is a 65 y.o. male who presents with Altered Mental Status (Found by family. Not talking and not following directions. Alcoholic and seizure history)  . The patient was seen and examined and the chart was reviewed.     This is a 65 year old male known to our service from multiple previous admissions and evaluations. He has a history of HTN, HLD, CAD, A-fib, EtOH, prior cerebellar cva, postlaminectomy syndrome and seizure disorder. He was on Keppra and Vimpat at home but with questionable noncompliance.     He was seen by Neurology on 3/27/25 and 5/2/25 for the same reasons- altered mental status. He was found to be septic and was placed on abx therapy.     Uds positive for cannabinoids.     Diagnostic data reviewed:  CT head 5/1/2025: No acute intracranial abnormality.   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  Otherwise no significant stenosis or occlusion of the major arterial vessels of the head and neck.     Limited MRI brain 3/27/2025: No convincing evidence of acute infarct.  MRI brain 7/17/2024: Chronic infarct within cerebellar hemispheres bilaterally, left larger than right.    2D echo 3/31/2025: EF 55-60%.  No interatrial shunt visualized with color Doppler.     EKG 5/1/2025: Sinus tachycardia with premature atrial complexes; otherwise normal  of chronic infarct within the cerebellar hemispheres bilaterally,  left larger than right.  3. Minimal global parenchymal volume loss with chronic microvascular ischemic  changes.    Results for orders placed during the hospital encounter of 09/20/23    MRI BRAIN W WO CONTRAST    Narrative  EXAMINATION:  MRI OF THE BRAIN WITHOUT AND WITH CONTRAST  9/22/2023 4:18 pm    TECHNIQUE:  Multiplanar multisequence MRI of the head/brain was performed without and  with the administration of intravenous contrast.    COMPARISON:  None.    HISTORY:  ORDERING SYSTEM PROVIDED HISTORY: ams  TECHNOLOGIST PROVIDED HISTORY:  ams    FINDINGS:  INTRACRANIAL STRUCTURES/VENTRICLES:  No acute infarct or mass.  Left greater  than right cerebellar encephalomalacia.  Mild chronic white matter  microvascular ischemic changes.  No mass effect or midline shift. No evidence  of an acute intracranial hemorrhage.  The ventricles and sulci are normal in  size and configuration.  The sellar/suprasellar regions appear unremarkable.  The normal signal voids within the major intracranial vessels appear  maintained.  No abnormal focus of enhancement is seen within the brain.    ORBITS: The visualized portion of the orbits demonstrate no acute abnormality.    SINUSES: Minimal scattered ethmoid sinus mucoperiosteal thickening.  No  mastoid effusion.    BONES/SOFT TISSUES: The bone marrow signal intensity appears normal. The soft  tissues demonstrate no acute abnormality.    Impression  1.  No acute intracranial abnormality.  2. Bilateral cerebellar encephalomalacia.  3. Mild chronic white matter microvascular ischemic changes.    No results found for this or any previous visit.    Results for orders placed during the hospital encounter of 06/22/25    CT Head W/O Contrast    Narrative  EXAM:    CT Head Without Intravenous Contrast    EXAM DATE/TIME:    6/22/2025 4:02 pm    CLINICAL HISTORY:    ORDERING SYSTEM PROVIDED HISTORY: ams  TECHNOLOGIST PROVIDED  No
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
## 2025-06-23 NOTE — PROGRESS NOTES
Pharmacy Medication Review    The patient's list of current home medications has been reviewed.     PHYSICIANS AND NURSE PRACTITIONERS: please note there is no Transitions of Care/Med Rec Pharmacist available to address any discrepancies on inpatient orders. It is the responsibility of the attending provider to review the updates made to the home med list and adjust inpatient orders as appropriate.        Source(s) of information:Care Everywhere, [facility] refill report, [facility] and [facility]        Based on information provided by the above source(s), I have updated the patient's home med list as described below.     Removed   nicotine (NICODERM CQ) 7 MG/24HR   thiamine 100 MG tablet   donepezil (ARICEPT) 5 MG tablet   folic acid (FOLVITE) 1 MG tablet   vitamin B-12 (CYANOCOBALAMIN) 1000 MCG tablet   levothyroxine (SYNTHROID) 50 MCG tablet   diltiazem (CARDIZEM) 120 MG tablet   levalbuterol (XOPENEX HFA) 45 MCG/ACT inhaler   lidocaine 4 % external patch  metoprolol succinate (TOPROL XL) 50 MG extended release tablet   sacubitril-valsartan (ENTRESTO) 24-26 MG per tablet   albuterol (PROVENTIL) (2.5 MG/3ML) 0.083% nebulizer solution   ipratropium 0.5 mg-albuterol 2.5 mg (DUONEB) 0.5-2.5 (3) MG/3ML SOLN nebulizer solution      Added metoprolol succinate (TOPROL XL) 25 MG ER tablet   sacubitril-valsartan (ENTRESTO) 49-51 MG per tablet   mirtazapine (REMERON) 15 MG tablet   pantoprazole (PROTONIX) 40 MG tablet      Adjusted   None      Other notes:   Patient is non-compliant with filling his medication. Patient has not filled any of his medication since 12/2024. I verified with patients filling pharmacy newly prescribed medication from hospital stays was not changed, filled or picked up.      Are any of the medications noted above considered a 'high alert' medication? YES      Is the patient on warfarin at home? No          Please feel free to call me with any questions about this encounter. Thank
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
## 2025-06-23 NOTE — RT PROTOCOL NOTE
RT Inhaler-Nebulizer Bronchodilator Protocol Note    There is a bronchodilator order in the chart from a provider indicating to follow the RT Bronchodilator Protocol and there is an “Initiate RT Inhaler-Nebulizer Bronchodilator Protocol” order as well (see protocol at bottom of note).    CXR Findings:  XR CHEST PORTABLE  Result Date: 6/22/2025  Mild central pulmonary congestion or pulmonary artery hypertension which is more prominent. Mild bibasilar interstitial edema vs atelectasis or early infiltrates which is more prominent.       The findings from the last RT Protocol Assessment were as follows:   History Pulmonary Disease: Chronic pulmonary disease  Respiratory Pattern: Regular pattern and RR 12-20 bpm  Breath Sounds: Intermittent or unilateral wheezes  Cough: Strong, spontaneous, non-productive  Indication for Bronchodilator Therapy: On home bronchodilators  Bronchodilator Assessment Score: 6    Aerosolized bronchodilator medication orders have been revised according to the RT Inhaler-Nebulizer Bronchodilator Protocol below.    Respiratory Therapist to perform RT Therapy Protocol Assessment initially then follow the protocol.  Repeat RT Therapy Protocol Assessment PRN for score 0-3 or on second treatment, BID, and PRN for scores above 3.    No Indications - adjust the frequency to every 6 hours PRN wheezing or bronchospasm, if no treatments needed after 48 hours then discontinue using Per Protocol order mode.     If indication present, adjust the RT bronchodilator orders based on the Bronchodilator Assessment Score as indicated below.  Use Inhaler orders unless patient has one or more of the following: on home nebulizer, not able to hold breath for 10 seconds, is not alert and oriented, cannot activate and use MDI correctly, or respiratory rate 25 breaths per minute or more, then use the equivalent nebulizer order(s) with same Frequency and PRN reasons based on the score.  If a patient is on this medication at

## 2025-06-23 NOTE — PROGRESS NOTES
Occupational Therapy  Facility/Department: [facility] PROGRESSIVE CARE  Occupational Therapy Initial Assessment    Name: Mahesh Brownlee  : 1960  MRN: [id number]  Date of Service: 2025    Discharge Recommendations:  Patient would benefit from continued therapy after discharge  OT Equipment Recommendations  Equipment Needed:  (CTA)    Pt currently functioning below baseline.  Recommend daily therapy at time of discharge to maximize long term outcomes and prevent re-admission. Please refer to AM-PAC score for current level of function.    LY Mehta reports patient is medically stable for therapy treatment this date.    Chart reviewed prior to treatment and patient is agreeable for therapy.  All lines intact and patient positioned comfortably at end of treatment.  All patient needs addressed prior to ending therapy session.           Patient Diagnosis(es): The primary encounter diagnosis was Hypokalemia. Diagnoses of Altered mental status, unspecified altered mental status type and Sepsis, due to unspecified organism, unspecified whether acute organ dysfunction present (HCC) were also pertinent to this visit.  Past Medical History:  has a past medical history of Adjustment disorder, AF (atrial fibrillation) (HCC), AICD (automatic cardioverter/defibrillator) present, Alcohol dependence (HCC), CAD (coronary artery disease), Cardiomyopathy (HCC), CHF (congestive heart failure) (HCC), COPD (chronic obstructive pulmonary disease) (HCC), DDD (degenerative disc disease), lumbar, Herniated cervical disc, Hyperlipidemia, Hypertension, Major depression, Post laminectomy syndrome, Sciatica, Snores, Tobacco abuse, and Traumatic brain injury ([facility]).  Past Surgical History:  has a past surgical history that includes back surgery; Rotator cuff repair (Right); Carpal tunnel release (Right); Nerve Block (2013); Nerve Block (N/A, 2013); other surgical history (2016); Tonsillectomy; pacemaker placement (2015); back

## 2025-06-23 NOTE — PROGRESS NOTES
Patient admitted to room 1025  VS taken, telemetry placed and call light given/within reach. Patient A&O and given room orientation. Orders released/reviewed, initial assessment completed and navigator started. See chart for more detail.     Patient was unable to state which medications he took, writer contacted daughter Brianne and left a message with a call back number, writer then called son janak, son stated patient has medication for his heart and bloop pressure but is unsure if he takes them and what they are called.     [] The Home Med List was verified for accuracy and marked COMPLETED. The following sources were used to assist with Medication Reconciliation:    [] [facility] tech has already completed home med list in the ED and note reviewed   [] Patient med list was transcribed into the EHR and verified for accuracy.(Note method of verification)  [] Patient provided bottles of their medications for validation  [] Medications reviewed and confirmed with  (Please list name and relationship to patient)  [] Contacted patient's pharmacy to confirm home medications (Please list the pharmacy)  [x] Home medications reviewed using Dispense Report   [] Contacted physician office to confirm home medications  [] Medical Records received from another facility (list facility and place copy placed in chart)  []   was notified regarding changes to home med list via  on 6/23/2025 at the following time:        [x] There are one or more home medications that need clarification before Medication Reconciliation can be marked completed. The Med List Status has been marked as In Progress.   To assist with Home Medication Reconciliation the following actions have been taken:    [] Family requested to bring medications in their original bottles to the hospital for verification  [] Family requested to call hospital with list of medications  [] Call to physicians off and left message (list physician and who they spoke to,

## 2025-06-24 LAB
ALBUMIN SERPL-MCNC: 3.3 G/DL (ref 3.5–5.2)
ALBUMIN/GLOB SERPL: 1 {RATIO} (ref 1–2.5)
ALP SERPL-CCNC: 88 U/L (ref 40–129)
ALT SERPL-CCNC: 93 U/L (ref 10–50)
ANION GAP SERPL CALCULATED.3IONS-SCNC: 13 MMOL/L (ref 9–16)
AST SERPL-CCNC: 161 U/L (ref 10–50)
BASOPHILS # BLD: <0.03 K/UL (ref 0–0.2)
BASOPHILS NFR BLD: 0 % (ref 0–2)
BILIRUB SERPL-MCNC: 1 MG/DL (ref 0–1.2)
BUN SERPL-MCNC: 10 MG/DL (ref 8–23)
CALCIUM SERPL-MCNC: 8.9 MG/DL (ref 8.8–10.2)
CHLORIDE SERPL-SCNC: 102 MMOL/L (ref 98–107)
CO2 SERPL-SCNC: 21 MMOL/L (ref 20–31)
CREAT SERPL-MCNC: 0.8 MG/DL (ref 0.7–1.2)
EKG ATRIAL RATE: 119 BPM
EKG P AXIS: 80 DEGREES
EKG P-R INTERVAL: 156 MS
EKG Q-T INTERVAL: 336 MS
EKG QRS DURATION: 98 MS
EKG QTC CALCULATION (BAZETT): 472 MS
EKG R AXIS: 25 DEGREES
EKG T AXIS: 55 DEGREES
EKG VENTRICULAR RATE: 119 BPM
EOSINOPHIL # BLD: 0.03 K/UL (ref 0–0.44)
EOSINOPHILS RELATIVE PERCENT: 0 % (ref 1–4)
ERYTHROCYTE [DISTWIDTH] IN BLOOD BY AUTOMATED COUNT: 13.4 % (ref 11.8–14.4)
GFR, ESTIMATED: >90 ML/MIN/1.73M2
GLUCOSE SERPL-MCNC: 90 MG/DL (ref 82–115)
HCT VFR BLD AUTO: 39.3 % (ref 40.7–50.3)
HGB BLD-MCNC: 13.4 G/DL (ref 13–17)
IMM GRANULOCYTES # BLD AUTO: 0.02 K/UL (ref 0–0.3)
IMM GRANULOCYTES NFR BLD: 0 %
LYMPHOCYTES NFR BLD: 1.77 K/UL (ref 1.1–3.7)
LYMPHOCYTES RELATIVE PERCENT: 25 % (ref 24–43)
MCH RBC QN AUTO: 31.7 PG (ref 25.2–33.5)
MCHC RBC AUTO-ENTMCNC: 34.1 G/DL (ref 28.4–34.8)
MCV RBC AUTO: 92.9 FL (ref 82.6–102.9)
MONOCYTES NFR BLD: 1.08 K/UL (ref 0.1–1.2)
MONOCYTES NFR BLD: 15 % (ref 3–12)
NEUTROPHILS NFR BLD: 60 % (ref 36–65)
NEUTS SEG NFR BLD: 4.23 K/UL (ref 1.5–8.1)
NRBC BLD-RTO: 0 PER 100 WBC
PLATELET # BLD AUTO: 165 K/UL (ref 138–453)
PMV BLD AUTO: 10.7 FL (ref 8.1–13.5)
POTASSIUM SERPL-SCNC: 4.1 MMOL/L (ref 3.7–5.3)
PROT SERPL-MCNC: 6.6 G/DL (ref 6.6–8.7)
RBC # BLD AUTO: 4.23 M/UL (ref 4.21–5.77)
SODIUM SERPL-SCNC: 136 MMOL/L (ref 136–145)
WBC OTHER # BLD: 7.2 K/UL (ref 3.5–11.3)

## 2025-06-24 PROCEDURE — 97535 SELF CARE MNGMENT TRAINING: CPT

## 2025-06-24 PROCEDURE — 94761 N-INVAS EAR/PLS OXIMETRY MLT: CPT

## 2025-06-24 PROCEDURE — 6360000002 HC RX W HCPCS

## 2025-06-24 PROCEDURE — 97530 THERAPEUTIC ACTIVITIES: CPT

## 2025-06-24 PROCEDURE — 36415 COLL VENOUS BLD VENIPUNCTURE: CPT

## 2025-06-24 PROCEDURE — 85025 COMPLETE CBC W/AUTO DIFF WBC: CPT

## 2025-06-24 PROCEDURE — 6370000000 HC RX 637 (ALT 250 FOR IP)

## 2025-06-24 PROCEDURE — 2060000000 HC ICU INTERMEDIATE R&B

## 2025-06-24 PROCEDURE — 93010 ELECTROCARDIOGRAM REPORT: CPT | Performed by: INTERNAL MEDICINE

## 2025-06-24 PROCEDURE — 80053 COMPREHEN METABOLIC PANEL: CPT

## 2025-06-24 PROCEDURE — 94640 AIRWAY INHALATION TREATMENT: CPT

## 2025-06-24 PROCEDURE — 99232 SBSQ HOSP IP/OBS MODERATE 35: CPT | Performed by: FAMILY MEDICINE

## 2025-06-24 RX ORDER — LEVOFLOXACIN 750 MG/1
750 TABLET, FILM COATED ORAL EVERY 24 HOURS
Status: DISCONTINUED | OUTPATIENT
Start: 2025-06-24 | End: 2025-06-27 | Stop reason: HOSPADM

## 2025-06-24 RX ADMIN — PANTOPRAZOLE SODIUM 40 MG: 40 TABLET, DELAYED RELEASE ORAL at 06:58

## 2025-06-24 RX ADMIN — GUAIFENESIN 600 MG: 600 TABLET, EXTENDED RELEASE ORAL at 20:22

## 2025-06-24 RX ADMIN — GUAIFENESIN 600 MG: 600 TABLET, EXTENDED RELEASE ORAL at 09:15

## 2025-06-24 RX ADMIN — DULOXETINE 60 MG: 60 CAPSULE, DELAYED RELEASE ORAL at 09:15

## 2025-06-24 RX ADMIN — Medication 3 MG: at 20:22

## 2025-06-24 RX ADMIN — SPIRONOLACTONE 25 MG: 25 TABLET ORAL at 09:15

## 2025-06-24 RX ADMIN — LEVOFLOXACIN 750 MG: 750 TABLET, FILM COATED ORAL at 20:21

## 2025-06-24 RX ADMIN — SACUBITRIL AND VALSARTAN 1 TABLET: 24; 26 TABLET, FILM COATED ORAL at 09:15

## 2025-06-24 RX ADMIN — IPRATROPIUM BROMIDE AND ALBUTEROL SULFATE 1 DOSE: 2.5; .5 SOLUTION RESPIRATORY (INHALATION) at 09:54

## 2025-06-24 RX ADMIN — RIVAROXABAN 20 MG: 20 TABLET, FILM COATED ORAL at 09:15

## 2025-06-24 RX ADMIN — LACOSAMIDE 100 MG: 100 TABLET, FILM COATED ORAL at 09:15

## 2025-06-24 RX ADMIN — IPRATROPIUM BROMIDE AND ALBUTEROL SULFATE 1 DOSE: 2.5; .5 SOLUTION RESPIRATORY (INHALATION) at 20:47

## 2025-06-24 RX ADMIN — ATORVASTATIN CALCIUM 10 MG: 10 TABLET, FILM COATED ORAL at 20:22

## 2025-06-24 RX ADMIN — SACUBITRIL AND VALSARTAN 1 TABLET: 24; 26 TABLET, FILM COATED ORAL at 20:22

## 2025-06-24 RX ADMIN — ONDANSETRON 4 MG: 2 INJECTION, SOLUTION INTRAMUSCULAR; INTRAVENOUS at 09:21

## 2025-06-24 RX ADMIN — METOPROLOL SUCCINATE 50 MG: 50 TABLET, EXTENDED RELEASE ORAL at 12:45

## 2025-06-24 RX ADMIN — LACOSAMIDE 100 MG: 100 TABLET, FILM COATED ORAL at 20:22

## 2025-06-24 RX ADMIN — MIRTAZAPINE 7.5 MG: 7.5 TABLET, FILM COATED ORAL at 20:21

## 2025-06-24 ASSESSMENT — ENCOUNTER SYMPTOMS
CONSTIPATION: 0
SINUS PRESSURE: 0
CHOKING: 0
BACK PAIN: 1
NAUSEA: 0
ABDOMINAL PAIN: 0
RHINORRHEA: 0
COUGH: 0
DIARRHEA: 0
VOMITING: 0
VOICE CHANGE: 0
SHORTNESS OF BREATH: 0
WHEEZING: 0
CHEST TIGHTNESS: 0

## 2025-06-24 ASSESSMENT — PAIN SCALES - GENERAL
PAINLEVEL_OUTOF10: 8
PAINLEVEL_OUTOF10: 8

## 2025-06-24 ASSESSMENT — PAIN - FUNCTIONAL ASSESSMENT: PAIN_FUNCTIONAL_ASSESSMENT: ACTIVITIES ARE NOT PREVENTED

## 2025-06-24 ASSESSMENT — PAIN DESCRIPTION - ONSET: ONSET: ON-GOING

## 2025-06-24 ASSESSMENT — PAIN DESCRIPTION - FREQUENCY: FREQUENCY: CONTINUOUS

## 2025-06-24 ASSESSMENT — PAIN DESCRIPTION - LOCATION: LOCATION: OTHER (COMMENT)

## 2025-06-24 ASSESSMENT — PAIN DESCRIPTION - ORIENTATION: ORIENTATION: OTHER (COMMENT)

## 2025-06-24 ASSESSMENT — PAIN DESCRIPTION - PAIN TYPE: TYPE: CHRONIC PAIN

## 2025-06-24 ASSESSMENT — PAIN DESCRIPTION - DESCRIPTORS: DESCRIPTORS: ACHING

## 2025-06-24 NOTE — PLAN OF CARE
Problem: Respiratory - Adult  Goal: Achieves optimal ventilation and oxygenation  6/24/2025 0956 by Tierra Ding, RCP  Outcome: Progressing  6/23/2025 2236 by Cassidy Hollins RN  Outcome: Progressing  6/23/2025 2019 by Jose Ramirez, CAMILA  Outcome: Progressing

## 2025-06-24 NOTE — PROGRESS NOTES
Occupational Therapy  Occupational Therapy Daily Treatment Note  Facility/Department: [facility]   Patient Name: Mahesh Brownlee        MRN: [id number]    : 1960    Date of Service: 2025    LY Roland reports patient is medically stable for therapy treatment this date.    Chart reviewed prior to treatment and patient is agreeable for therapy.  All lines intact and patient positioned comfortably at end of treatment.  All patient needs addressed prior to ending therapy session.      Discharge Recommendations  Discharge Recommendations: Patient would benefit from continued therapy after discharge  Pt currently functioning below baseline.  Recommend daily therapy at time of discharge to maximize long term outcomes and prevent re-admission. Please refer to AM-PAC score for current level of function.    OT Equipment Recommendations  Equipment Needed: Yes  Mobility Devices: Walker;ADL Assistive Devices  Walker: Rolling  ADL Assistive Devices: Emergency Alert System    Chief Complaint   Patient presents with    Altered Mental Status     Found by family. Not talking and not following directions. Alcoholic and seizure history     Past Medical History:  has a past medical history of Adjustment disorder, AF (atrial fibrillation) ([facility]), AICD (automatic cardioverter/defibrillator) present, Alcohol dependence ([facility]), CAD (coronary artery disease), Cardiomyopathy ([facility]), CHF (congestive heart failure) ([facility]), COPD (chronic obstructive pulmonary disease) ([facility]), DDD (degenerative disc disease), lumbar, Herniated cervical disc, Hyperlipidemia, Hypertension, Major depression, Post laminectomy syndrome, Sciatica, Snores, Tobacco abuse, and Traumatic brain injury ([facility]).  Past Surgical History:  has a past surgical history that includes back surgery; Rotator cuff repair (Right); Carpal tunnel release (Right); Nerve Block (2013); Nerve Block (N/A, 2013); other surgical history (2016); Tonsillectomy;  Training  Education Provided Comments: RW use/safety, benefits of being OOB, importance of activity/mobility, OT POC, call light use/fall prevention, safety in function, proper transfer tech  Education Method: Demonstration;Verbal  Barriers to Learning: Cognition  Education Outcome: Continued education needed    Goals  Patient Goals   Patient goals : To get stronger  Short Term Goals  Time Frame for Short Term Goals: By discharge, pt to demo  Short Term Goal 1: SUP for bed mob with bed rails/controls as needed.  Short Term Goal 2: SUP for ADL transfers and functional mob with AD as needed and Good safety.  Short Term Goal 3: SBA for UB ADLs and CGA for LB ADLs with AE as needed and Good safety.  Short Term Goal 4: increase BUE strength by 1/2 grade to inc IND with self-care and be IND with HEP, with handouts as needed.  Short Term Goal 5: SBA for toileting routine with BSC/AD as needed.  Long Term Goals  Long Term Goal 1: pt/family to be IND with EC/WS, fall prevention tech, pressure relief education, AE/DME recommendations, disease specific education, discharge recommendations, cog strategies, with use of handouts as needed    Plan  Occupational Therapy Plan  Times Per Week: 4-5x per week  Current Treatment Recommendations: Strengthening, Balance training, Functional mobility training, Endurance training, Safety education & training, Patient/Caregiver education & training, Equipment evaluation, education, & procurement, Self-Care / ADL, Cognitive/Perceptual training, Positioning, Home management training    AM-PAC Daily Activities Inpatient  AM-PAC Daily Activity - Inpatient   How much help is needed for putting on and taking off regular lower body clothing?: A Lot  How much help is needed for bathing (which includes washing, rinsing, drying)?: A Lot  How much help is needed for toileting (which includes using toilet, bedpan, or urinal)?: A Lot  How much help is needed for putting on and taking off regular upper

## 2025-06-24 NOTE — PROGRESS NOTES
[facility]  Office: [phone]  Louie Rossi DO, Noah Mayers DO, Sergio Ordaz DO, Saul March DO, Willy Mc MD, Jaci Berger MD, Sivakumar Freeman MD, Mile Johnston MD,  Aric Granda MD, Garry Shepherd MD, Kelsy Wahl MD,  Evelyn Petit DO, Terry Kern MD, Addison Sol MD, Jaspreet Rossi DO, Kassy Pedro MD,  Emeka Leon DO, Krystyna Howell MD, [place] MD, Keeley Santa MD,  Chris Ashley MD, Rick Del Rio MD, Howard Brown MD, Gemma Ordaz MD, Mateo Dey MD, Pardeep Owen MD, Gómez Boykin, DO, Tatiana Guillen MD, Everette Pineda DO, Avi Mcneil MD, Evelyn Parker MD, Mohsin Reza, MD, Junior Gonsalez MD, Shirley Waterhouse, CNP,  Magdalena Oviedo, CNP, Gómez Gomez, CNP,  Fely Garcia, SCHUYLER, Lorena Garcia, CNP, Mariana Jordan, CNP, Dixie Garcia, CNP, Shea Chirinos, CNP, Suzi Tavares, PA-C, Daija Mejia, CNP, Vincent Armendariz, CNP,  Jie Leos, CNP, Felicita Nobles, CNP, Raymon Fitch, PA-C, Sandra Howard, PA-C, Asuncion Hollins, CNP,        Penny Lopez, CNS, Brianne Arnett, CNP, Miriam Martins, CNP       [facility]      Daily Progress Note     Admit Date: 6/22/2025  Bed/Room No.  1025/1025-01  Admitting Physician : Sivakumar Freeman MD  Code Status :Full Code  Hospital Day:  LOS: 2 days   Chief Complaint:     Chief Complaint   Patient presents with    Altered Mental Status     Found by family. Not talking and not following directions. Alcoholic and seizure history     Principal Problem:    Hypokalemia  Active Problems:    Paroxysmal atrial flutter (HCC)    Hyperlipidemia    Essential hypertension    Alcohol use disorder, severe, dependence (HCC)    Altered mental status    Pneumonia of both lower lobes due to infectious organism  Resolved Problems:    * No resolved hospital problems. *    Subjective :        Interval History/Significant events :  06/24/25    Patient is having difficulty in ambulating with fall risk and

## 2025-06-24 NOTE — PROGRESS NOTES
Spiritual Health History and Assessment/Progress Note  [facility]    (P) Spiritual/Emotional Needs,  ,  ,      Name: Mahesh Brownlee MRN: [id number]    Age: 65 y.o.     Sex: male   Language: English   Alevism: Bahai   Hypokalemia     Date: 6/24/2025            Total Time Calculated: (P) 54 min              Spiritual Assessment began in [facility]        Referral/Consult From: (P) Rounding   Encounter Overview/Reason: (P) Spiritual/Emotional Needs  Service Provided For: (P) Patient    Patient engaged slowly at first with  but as  displayed genuine interest in patient's personhood and worth, patient shared his life story of \"a crazy man\" with occasional prompting. Patient describes himself as a \"stupid lush with uncontrollable physical pains,\" while also reporting \"I must be some kind of genius\" to survive all I have been through.\" Patient volunteered up \"I really don't know why I'm here...but it doesn't matter.\" Patient also stated at the early portion of the visit \"what I'd like most right now is a cup of coffee.\" After checking with the patient's nurse,  brought patient a cup of coffee and one for himself, determined to be a focused listener. As  listened he would at times offer questions that were not truly confrontational, but probing to understand patient's thinking and feelings for 's understanding and patient's self reflection.  Patient shared a strict Bahai upbring with an early decision to be both respectful and rebellious along with other life review of athletic accomplishments, successful work career, while drinking heavily \"because I liked it.\"  As visit ended patient agreed readily to prayer and requested  visit again tomorrow.    Janine, Belief, Meaning:   Patient is connected with a janine tradition or spiritual practice and has beliefs or practices that help with coping during difficult times  Family/Friends No

## 2025-06-24 NOTE — PROGRESS NOTES
Patient having pain on their entire body and rates it a 7. Attempted pain interventions include frequent position changes, relaxation techniques, medication, pillow support/positioning, and diversional activities. Pt denied all interventions and requested percocet. Patients goal for pain relief is 0. The need for pain and symptom management will be considered in the discharge planning process to ensure patients comfort.

## 2025-06-24 NOTE — PLAN OF CARE
Patient resting in bed on room air, alert to self and place, uses urinal at the bedside.   Received IV Levaquin, see MAR.   Patient complained of generalized pain tylenol refused, atarax administered, see MAR.    Patient safety maintained.     Problem: Chronic Conditions and Co-morbidities  Goal: Patient's chronic conditions and co-morbidity symptoms are monitored and maintained or improved  6/23/2025 2236 by Cassidy Hollins RN  Outcome: Progressing     Problem: Discharge Planning  Goal: Discharge to home or other facility with appropriate resources  6/23/2025 2236 by Cassidy Hollins RN  Outcome: Progressing     Problem: Pain  Goal: Verbalizes/displays adequate comfort level or baseline comfort level  6/23/2025 2236 by Cassidy Hollins RN  Outcome: Progressing  Patient having pain on their generalized and rates it a 7. Pain interventions includemedication and regular rest periods. Patients goal for pain relief is 0. The need for pain and symptom management will be considered in the discharge planning process to ensure patients comfort.     Problem: Safety - Adult  Goal: Free from fall injury  6/23/2025 2236 by Cassidy Hollins RN  Outcome: Progressing     Problem: Neurosensory - Adult  Goal: Achieves stable or improved neurological status  6/23/2025 2236 by Cassidy Hollins RN  Outcome: Progressing     Problem: Neurosensory - Adult  Goal: Absence of seizures  6/23/2025 2236 by Cassidy Hollins RN  Outcome: Progressing     Problem: Neurosensory - Adult  Goal: Achieves maximal functionality and self care  6/23/2025 2236 by Cassidy Hollins RN  Outcome: Progressing     Problem: Musculoskeletal - Adult  Goal: Return mobility to safest level of function  6/23/2025 2236 by Cassidy Hollins, RN  Outcome: Progressing     Problem: Metabolic/Fluid and Electrolytes - Adult  Goal: Electrolytes maintained within normal limits  6/23/2025 2236 by Cassidy Hollins, RN  Outcome: Progressing     Problem: Metabolic/Fluid and Electrolytes -

## 2025-06-24 NOTE — RT PROTOCOL NOTE
RT Inhaler-Nebulizer Bronchodilator Protocol Note    There is a bronchodilator order in the chart from a provider indicating to follow the RT Bronchodilator Protocol and there is an “Initiate RT Inhaler-Nebulizer Bronchodilator Protocol” order as well (see protocol at bottom of note).    CXR Findings:  XR CHEST PORTABLE  Result Date: 6/22/2025  Mild central pulmonary congestion or pulmonary artery hypertension which is more prominent. Mild bibasilar interstitial edema vs atelectasis or early infiltrates which is more prominent.       The findings from the last RT Protocol Assessment were as follows:   History Pulmonary Disease: Chronic pulmonary disease  Respiratory Pattern: Regular pattern and RR 12-20 bpm  Breath Sounds: Slightly diminished and/or crackles  Cough: Strong, productive  Indication for Bronchodilator Therapy: On home bronchodilators  Bronchodilator Assessment Score: 5    Aerosolized bronchodilator medication orders have been revised according to the RT Inhaler-Nebulizer Bronchodilator Protocol below.    Respiratory Therapist to perform RT Therapy Protocol Assessment initially then follow the protocol.  Repeat RT Therapy Protocol Assessment PRN for score 0-3 or on second treatment, BID, and PRN for scores above 3.    No Indications - adjust the frequency to every 6 hours PRN wheezing or bronchospasm, if no treatments needed after 48 hours then discontinue using Per Protocol order mode.     If indication present, adjust the RT bronchodilator orders based on the Bronchodilator Assessment Score as indicated below.  Use Inhaler orders unless patient has one or more of the following: on home nebulizer, not able to hold breath for 10 seconds, is not alert and oriented, cannot activate and use MDI correctly, or respiratory rate 25 breaths per minute or more, then use the equivalent nebulizer order(s) with same Frequency and PRN reasons based on the score.  If a patient is on this medication at home then do

## 2025-06-25 LAB
ALBUMIN SERPL-MCNC: 3.2 G/DL (ref 3.5–5.2)
ALBUMIN/GLOB SERPL: 1.1 {RATIO} (ref 1–2.5)
ALP SERPL-CCNC: 92 U/L (ref 40–129)
ALT SERPL-CCNC: 81 U/L (ref 10–50)
ANION GAP SERPL CALCULATED.3IONS-SCNC: 12 MMOL/L (ref 9–16)
AST SERPL-CCNC: 103 U/L (ref 10–50)
BASOPHILS # BLD: 0.03 K/UL (ref 0–0.2)
BASOPHILS NFR BLD: 1 % (ref 0–2)
BILIRUB SERPL-MCNC: 0.8 MG/DL (ref 0–1.2)
BUN SERPL-MCNC: 12 MG/DL (ref 8–23)
CALCIUM SERPL-MCNC: 8.5 MG/DL (ref 8.8–10.2)
CHLORIDE SERPL-SCNC: 103 MMOL/L (ref 98–107)
CO2 SERPL-SCNC: 21 MMOL/L (ref 20–31)
CREAT SERPL-MCNC: 0.9 MG/DL (ref 0.7–1.2)
EOSINOPHIL # BLD: 0.09 K/UL (ref 0–0.44)
EOSINOPHILS RELATIVE PERCENT: 2 % (ref 1–4)
ERYTHROCYTE [DISTWIDTH] IN BLOOD BY AUTOMATED COUNT: 13.4 % (ref 11.8–14.4)
GFR, ESTIMATED: >90 ML/MIN/1.73M2
GLUCOSE SERPL-MCNC: 106 MG/DL (ref 82–115)
HCT VFR BLD AUTO: 37.4 % (ref 40.7–50.3)
HGB BLD-MCNC: 12.6 G/DL (ref 13–17)
IMM GRANULOCYTES # BLD AUTO: 0.03 K/UL (ref 0–0.3)
IMM GRANULOCYTES NFR BLD: 1 %
LYMPHOCYTES NFR BLD: 2.01 K/UL (ref 1.1–3.7)
LYMPHOCYTES RELATIVE PERCENT: 35 % (ref 24–43)
MCH RBC QN AUTO: 31.5 PG (ref 25.2–33.5)
MCHC RBC AUTO-ENTMCNC: 33.7 G/DL (ref 28.4–34.8)
MCV RBC AUTO: 93.5 FL (ref 82.6–102.9)
MONOCYTES NFR BLD: 0.99 K/UL (ref 0.1–1.2)
MONOCYTES NFR BLD: 17 % (ref 3–12)
NEUTROPHILS NFR BLD: 44 % (ref 36–65)
NEUTS SEG NFR BLD: 2.57 K/UL (ref 1.5–8.1)
NRBC BLD-RTO: 0 PER 100 WBC
PLATELET # BLD AUTO: 159 K/UL (ref 138–453)
PMV BLD AUTO: 10.1 FL (ref 8.1–13.5)
POTASSIUM SERPL-SCNC: 3.8 MMOL/L (ref 3.7–5.3)
PROT SERPL-MCNC: 6.1 G/DL (ref 6.6–8.7)
RBC # BLD AUTO: 4 M/UL (ref 4.21–5.77)
SODIUM SERPL-SCNC: 135 MMOL/L (ref 136–145)
WBC OTHER # BLD: 5.7 K/UL (ref 3.5–11.3)

## 2025-06-25 PROCEDURE — 36415 COLL VENOUS BLD VENIPUNCTURE: CPT

## 2025-06-25 PROCEDURE — 94640 AIRWAY INHALATION TREATMENT: CPT

## 2025-06-25 PROCEDURE — 80053 COMPREHEN METABOLIC PANEL: CPT

## 2025-06-25 PROCEDURE — 97116 GAIT TRAINING THERAPY: CPT

## 2025-06-25 PROCEDURE — 6370000000 HC RX 637 (ALT 250 FOR IP)

## 2025-06-25 PROCEDURE — 85025 COMPLETE CBC W/AUTO DIFF WBC: CPT

## 2025-06-25 PROCEDURE — 99232 SBSQ HOSP IP/OBS MODERATE 35: CPT | Performed by: FAMILY MEDICINE

## 2025-06-25 PROCEDURE — 6360000002 HC RX W HCPCS: Performed by: NURSE PRACTITIONER

## 2025-06-25 PROCEDURE — 2060000000 HC ICU INTERMEDIATE R&B

## 2025-06-25 PROCEDURE — 2500000003 HC RX 250 WO HCPCS: Performed by: NURSE PRACTITIONER

## 2025-06-25 PROCEDURE — 97110 THERAPEUTIC EXERCISES: CPT

## 2025-06-25 PROCEDURE — 6370000000 HC RX 637 (ALT 250 FOR IP): Performed by: FAMILY MEDICINE

## 2025-06-25 PROCEDURE — 94761 N-INVAS EAR/PLS OXIMETRY MLT: CPT

## 2025-06-25 RX ORDER — LANOLIN ALCOHOL/MO/W.PET/CERES
100 CREAM (GRAM) TOPICAL DAILY
Qty: 30 TABLET | Refills: 3 | Status: SHIPPED | OUTPATIENT
Start: 2025-06-25

## 2025-06-25 RX ORDER — ALPRAZOLAM 0.25 MG
0.25 TABLET ORAL 2 TIMES DAILY PRN
Status: DISCONTINUED | OUTPATIENT
Start: 2025-06-25 | End: 2025-06-27 | Stop reason: HOSPADM

## 2025-06-25 RX ORDER — METOPROLOL SUCCINATE 50 MG/1
50 TABLET, EXTENDED RELEASE ORAL DAILY
Qty: 30 TABLET | Refills: 3 | Status: SHIPPED | OUTPATIENT
Start: 2025-06-25

## 2025-06-25 RX ORDER — NICOTINE 21 MG/24HR
1 PATCH, TRANSDERMAL 24 HOURS TRANSDERMAL DAILY
Qty: 30 PATCH | Refills: 3 | Status: SHIPPED | OUTPATIENT
Start: 2025-06-25

## 2025-06-25 RX ORDER — LANOLIN ALCOHOL/MO/W.PET/CERES
1000 CREAM (GRAM) TOPICAL DAILY
Qty: 30 TABLET | Refills: 0 | Status: SHIPPED | OUTPATIENT
Start: 2025-06-25

## 2025-06-25 RX ORDER — LEVOFLOXACIN 750 MG/1
750 TABLET, FILM COATED ORAL EVERY 24 HOURS
Qty: 4 TABLET | Refills: 0 | Status: SHIPPED | OUTPATIENT
Start: 2025-06-25 | End: 2025-06-29

## 2025-06-25 RX ORDER — NICOTINE 21 MG/24HR
1 PATCH, TRANSDERMAL 24 HOURS TRANSDERMAL DAILY
Status: DISCONTINUED | OUTPATIENT
Start: 2025-06-25 | End: 2025-06-27 | Stop reason: HOSPADM

## 2025-06-25 RX ORDER — ALPRAZOLAM 0.25 MG
0.25 TABLET ORAL 2 TIMES DAILY PRN
Qty: 10 TABLET | Refills: 0 | Status: SHIPPED | OUTPATIENT
Start: 2025-06-25 | End: 2025-06-30

## 2025-06-25 RX ORDER — FOLIC ACID 1 MG/1
1 TABLET ORAL DAILY
Qty: 30 TABLET | Refills: 0 | Status: SHIPPED | OUTPATIENT
Start: 2025-06-25

## 2025-06-25 RX ADMIN — IPRATROPIUM BROMIDE AND ALBUTEROL SULFATE 1 DOSE: 2.5; .5 SOLUTION RESPIRATORY (INHALATION) at 20:10

## 2025-06-25 RX ADMIN — PANTOPRAZOLE SODIUM 40 MG: 40 TABLET, DELAYED RELEASE ORAL at 06:19

## 2025-06-25 RX ADMIN — LEVOFLOXACIN 750 MG: 750 TABLET, FILM COATED ORAL at 20:18

## 2025-06-25 RX ADMIN — GUAIFENESIN 600 MG: 600 TABLET, EXTENDED RELEASE ORAL at 10:14

## 2025-06-25 RX ADMIN — LACOSAMIDE 100 MG: 100 TABLET, FILM COATED ORAL at 10:14

## 2025-06-25 RX ADMIN — SACUBITRIL AND VALSARTAN 1 TABLET: 24; 26 TABLET, FILM COATED ORAL at 20:19

## 2025-06-25 RX ADMIN — RIVAROXABAN 20 MG: 20 TABLET, FILM COATED ORAL at 10:15

## 2025-06-25 RX ADMIN — SPIRONOLACTONE 25 MG: 25 TABLET ORAL at 10:15

## 2025-06-25 RX ADMIN — DULOXETINE 60 MG: 60 CAPSULE, DELAYED RELEASE ORAL at 10:15

## 2025-06-25 RX ADMIN — LACOSAMIDE 100 MG: 100 TABLET, FILM COATED ORAL at 20:19

## 2025-06-25 RX ADMIN — SACUBITRIL AND VALSARTAN 1 TABLET: 24; 26 TABLET, FILM COATED ORAL at 10:15

## 2025-06-25 RX ADMIN — METHYLPREDNISOLONE SODIUM SUCCINATE 40 MG: 40 INJECTION, POWDER, LYOPHILIZED, FOR SOLUTION INTRAMUSCULAR; INTRAVENOUS at 20:30

## 2025-06-25 RX ADMIN — ALPRAZOLAM 0.25 MG: 0.25 TABLET ORAL at 11:48

## 2025-06-25 RX ADMIN — MIRTAZAPINE 7.5 MG: 7.5 TABLET, FILM COATED ORAL at 20:19

## 2025-06-25 RX ADMIN — GUAIFENESIN 600 MG: 600 TABLET, EXTENDED RELEASE ORAL at 20:19

## 2025-06-25 RX ADMIN — ATORVASTATIN CALCIUM 10 MG: 10 TABLET, FILM COATED ORAL at 20:19

## 2025-06-25 RX ADMIN — Medication 3 MG: at 20:18

## 2025-06-25 RX ADMIN — IPRATROPIUM BROMIDE AND ALBUTEROL SULFATE 1 DOSE: 2.5; .5 SOLUTION RESPIRATORY (INHALATION) at 08:06

## 2025-06-25 ASSESSMENT — ENCOUNTER SYMPTOMS
VOICE CHANGE: 0
CHEST TIGHTNESS: 0
ABDOMINAL PAIN: 0
VOMITING: 0
CONSTIPATION: 0
WHEEZING: 0
SHORTNESS OF BREATH: 0
COUGH: 0
BACK PAIN: 1
CHOKING: 0
SINUS PRESSURE: 0
NAUSEA: 0
DIARRHEA: 0
RHINORRHEA: 0

## 2025-06-25 NOTE — PLAN OF CARE
Problem: Respiratory - Adult  Goal: Achieves optimal ventilation and oxygenation  6/24/2025 2047 by Jose Ramirez, RCLEONA  Outcome: Progressing

## 2025-06-25 NOTE — PLAN OF CARE
Problem: Respiratory - Adult  Goal: Achieves optimal ventilation and oxygenation  6/25/2025 0809 by Tierra Ding, RCP  Outcome: Progressing  Flowsheets (Taken 6/25/2025 0809)  Achieves optimal ventilation and oxygenation:   Respiratory therapy support as indicated   Assess for changes in respiratory status  6/24/2025 2047 by Jose Ramirez, RCP  Outcome: Progressing  6/24/2025 2042 by Cassidy Hollins, RN  Outcome: Progressing

## 2025-06-25 NOTE — PLAN OF CARE
Patient resting in bed on room air, alert and oriented with moments of confusion, up to bathroom standby assist with a walker, uses urinal at the bedside  IV replaced by tomasz vela.  Patient was found to have cannabis edibles at the bedside, writer went to place them in lockbox and found the package empty, patient stated he ate them before. Unsure if edibles were eaten on shift or prior to admission.   Patient complained of generalized pain but refused tylenol.  Asked multiple staff members for cigarettes. Xanax, and percocet.   Patient safety maintained.     Problem: Chronic Conditions and Co-morbidities  Goal: Patient's chronic conditions and co-morbidity symptoms are monitored and maintained or improved  6/24/2025 2042 by Cassidy Hollins RN  Outcome: Progressing     Problem: Discharge Planning  Goal: Discharge to home or other facility with appropriate resources  6/24/2025 2042 by Cassidy Hollins RN  Outcome: Progressing     Problem: Pain  Goal: Verbalizes/displays adequate comfort level or baseline comfort level  6/24/2025 2042 by Cassidy Hollins RN  Outcome: Progressing  Patient having pain on their generalized and rates it a 10. Pain interventions includemedication and regular rest periods. Which patient refused. Patients goal for pain relief is 0. The need for pain and symptom management will be considered in the discharge planning process to ensure patients comfort.      Problem: Safety - Adult  Goal: Free from fall injury  6/24/2025 2042 by Cassidy Hollins RN  Outcome: Progressing     Problem: Neurosensory - Adult  Goal: Achieves stable or improved neurological status  6/24/2025 2042 by Cassidy Hollins RN  Outcome: Progressing     Problem: Neurosensory - Adult  Goal: Absence of seizures  6/24/2025 2042 by Cassidy Hollins RN  Outcome: Progressing     Problem: Neurosensory - Adult  Goal: Achieves maximal functionality and self care  6/24/2025 2042 by Cassidy Hollins RN  Outcome: Progressing     Problem:

## 2025-06-25 NOTE — PROGRESS NOTES
Physician Progress Note      PATIENT:               EFREM FORREST  CSN #:                  [id number]  :                       1960  ADMIT DATE:       2025 3:23 PM  DISCH DATE:  RESPONDING  PROVIDER #:        Sivakumar Freeman MD          QUERY TEXT:    *Pneumonia is documented in the medical record.  Please clarify any associated   diagnoses:    The clinical indicators include:  65 y.o. male admitted with PNA, AMS & hypokalemia    Temp 100.8 axillary, Procal 0.68    TX: 1,000ml bolus, Cefepime & Levaquin  Options provided:  -- Sepsis, present on admission  -- Localized infection without sepsis  -- Other - I will add my own diagnosis  -- Disagree - Not applicable / Not valid  -- Disagree - Clinically unable to determine / Unknown  -- Refer to Clinical Documentation Reviewer    PROVIDER RESPONSE TEXT:    This patient has localized infection without sepsis.    Query created by: Patricia Adler on 2025 3:43 PM      QUERY TEXT:    Based on your medical judgment, please clarify these findings and document if   any of the following are being evaluated and/or treated:    The clinical indicators include:  Paroxysmal atrial fibrillation    65 y.o. male with HTN, CAD, HX of CVA    TX: Xarelto  Options provided:  -- Secondary hypercoagulable state in a patient with atrial fibrillation  -- Other - I will add my own diagnosis  -- Disagree - Not applicable / Not valid  -- Disagree - Clinically unable to determine / Unknown  -- Refer to Clinical Documentation Reviewer    PROVIDER RESPONSE TEXT:    This patient has secondary hypercoagulable state in a patient with atrial   fibrillation.    Query created by: Patricia Adler on 2025 3:45 PM      Electronically signed by:  Sivakumar Freeman MD 2025 4:00 PM

## 2025-06-25 NOTE — PLAN OF CARE
Pt has been resting in bed most of the day  Pt impulsive and not agreeable to call light use  1 one assist with walker and gait belt, unsteady gait and relies a lot of the walker  Pt requesting unprescribed medications and cigarettes  Attempted multiple times to transfer to chair by self  Attempted to get up by himself and walk down ortega stating \"I have to use the restroom\"  Tele sitter placed in room   PRN Xanax given  21mg Nicotine patch applied  Discharge orders are in but awaiting facility placement  All questions and concerns were addressed  Safety maintained    Problem: Chronic Conditions and Co-morbidities  Goal: Patient's chronic conditions and co-morbidity symptoms are monitored and maintained or improved  Outcome: Progressing  Flowsheets (Taken 6/25/2025 0800)  Care Plan - Patient's Chronic Conditions and Co-Morbidity Symptoms are Monitored and Maintained or Improved: Monitor and assess patient's chronic conditions and comorbid symptoms for stability, deterioration, or improvement     Problem: Discharge Planning  Goal: Discharge to home or other facility with appropriate resources  Outcome: Progressing  Flowsheets (Taken 6/25/2025 0800)  Discharge to home or other facility with appropriate resources: Identify barriers to discharge with patient and caregiver     Problem: Pain  Goal: Verbalizes/displays adequate comfort level or baseline comfort level  Outcome: Progressing     Problem: Safety - Adult  Goal: Free from fall injury  Outcome: Progressing     Problem: Neurosensory - Adult  Goal: Achieves stable or improved neurological status  Outcome: Progressing  Flowsheets (Taken 6/25/2025 0800)  Achieves stable or improved neurological status: Assess for and report changes in neurological status     Problem: Neurosensory - Adult  Goal: Absence of seizures  Outcome: Progressing  Flowsheets (Taken 6/25/2025 0800)  Absence of seizures: Monitor for seizure activity.  If seizure occurs, document type and location

## 2025-06-25 NOTE — DISCHARGE INSTR - COC
Continuity of Care Form    Patient Name: Mahesh Brownlee   :  1960  MRN:  [id number]    Admit date:  2025  Discharge date:  2025    Code Status Order: Full Code   Advance Directives:     Admitting Physician:  Willy Mc MD  PCP: Ira Roberts, APRN - CNP    Discharging Nurse: LY Kay  Discharging Hospital Unit/Room#: 1025/1025-01  Discharging Unit Phone Number: [phone]    Emergency Contact:   Extended Emergency Contact Information  Primary Emergency Contact: Brianne Whitley  Home Phone: [phone]  Mobile Phone: [phone]  Relation: Child  Secondary Emergency Contact: Brad Whitley  Home Phone: [phone]  Mobile Phone: [phone]  Relation: Child    Past Surgical History:  Past Surgical History:   Procedure Laterality Date    BACK SURGERY      x2, Dr. Ángel Regalado     BACK SURGERY      L3-4 decompression    CARDIAC ELECTROPHYSIOLOGY STUDY AND ABLATION      CARPAL TUNNEL RELEASE Right     JOINT REPLACEMENT Right 2018    NERVE BLOCK  2013    dduramorph  celestone 9mg    NERVE BLOCK N/A 2013    lumbar RASHMI    OTHER SURGICAL HISTORY  2016    Lumbar RASHMI    OTHER SURGICAL HISTORY  2018    lumbosacral myelogram    PACEMAKER PLACEMENT  2015    with defib    ROTATOR CUFF REPAIR Right     TONSILLECTOMY      HAS NO TONSILS, NEVER HAD SURGERY       Immunization History:   Immunization History   Administered Date(s) Administered    COVID-19, J&J, (age 18y+), IM, 0.5 mL 2021    Influenza, FLUARIX, FLULAVAL, FLUZONE (age 6 mo+) and AFLURIA, (age 3 y+), Quadv PF, 0.5mL 2020    Pneumococcal, PPSV23, PNEUMOVAX 23, (age 2y+), SC/IM, 0.5mL 2017       Active Problems:  Patient Active Problem List   Diagnosis Code    Postlaminectomy syndrome, lumbar region M96.1    Cervical spondylosis with myelopathy M47.12    Arthropathy, unspecified, other specified sites M12.9    Cerebral contusion (HCC) S06.33AA    DDD (degenerative disc disease), lumbar  Chronic combined systolic and diastolic CHF (congestive heart failure) ([facility]) I50.42    Alcohol withdrawal syndrome with complication ([facility]) F10.939    Suicidal ideation R45.851    Heart failure with recovered ejection fraction (HFrecEF) ([facility]) I50.32    Paroxysmal atrial flutter ([facility]) I48.92    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    History of seizures Z87.898    Metabolic encephalopathy G93.41    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    Severe episode of recurrent major depressive disorder, without psychotic features ([facility]) F33.2    Severe dementia associated with alcoholism, with mood disturbance ([facility]) F10.27    B12 deficiency E53.8    Leukocytosis D72.829    Seizures ([facility]) R56.9    Acute alcoholic intoxication with complication F10.929    Atrial fibrillation ([facility]) I48.91    Breakthrough seizure ([facility]) G40.919    Non-traumatic rhabdomyolysis M62.82    Projectile vomiting with nausea R11.12    Community acquired pneumonia of right lung, unspecified part of lung J18.9    Non compliance w medication regimen Z91.148    Alcohol intoxication delirium F10.121    ETOH abuse F10.10    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 ([facility]) G96.9, F02.80    Polysubstance abuse ([facility]) F19.10    Pneumonia of both lower lobes due to infectious organism J18.9    Septic encephalopathy G93.41       Isolation/Infection:   Isolation            No Isolation          Patient Infection Status    None to display              Nurse Assessment:  Last Vital Signs: /67   Pulse 79   Temp 97.5 °F (36.4 °C) (Oral)   Resp 18   Ht 1.803 m (5' 11\")   Wt 94.2 kg (207 lb 11.2 oz)   SpO2 96%   BMI 28.97 kg/m²     Last documented pain score (0-10 scale): Pain Level: 8  Last Weight:   Wt Readings from Last 1 Encounters:   06/25/25 94.2 kg (207 lb 11.2 oz)     Mental Status:  oriented and alert    IV Access:  - None    Nursing Mobility/ADLs:  Walking   Assisted  Transfer  Assisted  Bathing  Assisted  Dressing

## 2025-06-25 NOTE — CARE COORDINATION
Transitional Planning  Call placed to [facility] at [phone].  Admissions is unavailable.  Message left with  requesting return call on acceptance and if accepted to please start pre cert.  Await return call.    [address] [facility] is declining patient.    0935 Text sent to CHRIS Decker at [phone] with list of facility choices.  Notified Brianne that [facility] declined.  Provided writers return phone number to Brianne.    1243 Call placed to Brianne PEREZ at     [phone]    HIPAA compliant VM left notifying a text with SNF choices had been sent to her phone number.  Will need 3-4 additional choices.  Awaiting return phone call.

## 2025-06-25 NOTE — PROGRESS NOTES
Physical Therapy  Facility/Department: [facility] PROGRESSIVE CARE  Daily Treatment Note  NAME: Mahesh Brownlee  : 1960  MRN: [id number]    Date of Service: 2025    Discharge Recommendations:  Patient would benefit from continued therapy after discharge   Patient Diagnosis(es): The primary encounter diagnosis was Hypokalemia. Diagnoses of Altered mental status, unspecified altered mental status type, Sepsis, due to unspecified organism, unspecified whether acute organ dysfunction present (HCC), B12 deficiency, and Anxiety disorder, unspecified type were also pertinent to this visit.  Assessment  Assessment: Pt continues with deficits in cognition and balance requiring min to mod A for mobility this session. Pt is slightly impulsive and  requires use of a rw for safety when ambulating. Pt would benefit from continued skilled therapy to increase strength and improve balance when performing all functional mobility.  Activity Tolerance: Treatment limited secondary to decreased cognition;Patient limited by endurance;Patient limited by pain  Plan  Physical Therapy Plan  General Plan: 5-7 times per week  Current Treatment Recommendations: Strengthening;ROM;Functional mobility training;Transfer training;Stair training;Gait training;Home exercise program;Safety education & training;Patient/Caregiver education & training;Therapeutic activities  Restrictions  Restrictions/Precautions  Restrictions/Precautions: Isolation, Fall Risk  Activity Level: Up with Assist  Required Braces or Orthoses?: No  Position Activity Restriction  Other Position/Activity Restrictions: RUE IV   Subjective   Subjective  Subjective: Pt agreeable to PT session (Nurse gives approval for PT session)  Objective  Bed Mobility Training  Overall Level of Assistance: Contact guard assistance  Interventions: Safety awareness training;Tactile cues;Verbal cues  Rolling: Contact guard assistance  Supine to Sit: Contact guard assistance  Scooting: Contact

## 2025-06-25 NOTE — RT PROTOCOL NOTE
RT Inhaler-Nebulizer Bronchodilator Protocol Note    There is a bronchodilator order in the chart from a provider indicating to follow the RT Bronchodilator Protocol and there is an “Initiate RT Inhaler-Nebulizer Bronchodilator Protocol” order as well (see protocol at bottom of note).    CXR Findings:  No results found.    The findings from the last RT Protocol Assessment were as follows:   History Pulmonary Disease: Chronic pulmonary disease  Respiratory Pattern: Regular pattern and RR 12-20 bpm  Breath Sounds: Slightly diminished and/or crackles  Cough: Strong, productive  Indication for Bronchodilator Therapy: On home bronchodilators  Bronchodilator Assessment Score: 5    Aerosolized bronchodilator medication orders have been revised according to the RT Inhaler-Nebulizer Bronchodilator Protocol below.    Respiratory Therapist to perform RT Therapy Protocol Assessment initially then follow the protocol.  Repeat RT Therapy Protocol Assessment PRN for score 0-3 or on second treatment, BID, and PRN for scores above 3.    No Indications - adjust the frequency to every 6 hours PRN wheezing or bronchospasm, if no treatments needed after 48 hours then discontinue using Per Protocol order mode.     If indication present, adjust the RT bronchodilator orders based on the Bronchodilator Assessment Score as indicated below.  Use Inhaler orders unless patient has one or more of the following: on home nebulizer, not able to hold breath for 10 seconds, is not alert and oriented, cannot activate and use MDI correctly, or respiratory rate 25 breaths per minute or more, then use the equivalent nebulizer order(s) with same Frequency and PRN reasons based on the score.  If a patient is on this medication at home then do not decrease Frequency below that used at home.    0-3 - enter or revise RT bronchodilator order(s) to equivalent RT Bronchodilator order with Frequency of every 4 hours PRN for wheezing or increased work of breathing

## 2025-06-25 NOTE — PROGRESS NOTES
[facility]  Office: [phone]  Louie Rossi DO, Noah Mayers DO, Sergio Ordaz DO, Saul March DO, Willy Mc MD, Jaci Berger MD, Sivakumar Freeman MD, Mile Johnston MD,  Aric Granda MD, Garry Shepherd MD, Kelsy Wahl MD,  Evelyn Petit DO, Terry Kern MD, Addison Sol MD, Jaspreet Rossi DO, Kassy Pedro MD,  Emeka Leon DO, Krystyna Howell MD, Kitty Hawk MD, Keeley Santa MD,  Chris Ashley MD, Rick Del Rio MD, Howard Brown MD, Gemma Ordaz MD, Mateo Dey MD, Pardeep Owen MD, Gómez Boykin, DO, Tatiana Guillen MD, Everette Pineda DO, Avi Mcneil MD, Evelyn Parker MD, Mohsin Reza, MD, Junior Gonsalez MD, Shirley Waterhouse, CNP,  Magdalena Oviedo, CNP, Gómez Gomez, CNP,  Fely Garcia, SCHUYLER, Lorena Garcia, CNP, Mariana Jordan, CNP, Dixie Garcia, CNP, Shea Chirinos, CNP, Suzi Tavares, PA-C, Daija Mejia, CNP, Vincent Armendariz, CNP,  Jie Leos, CNP, Felicita Nobles, CNP, Raymon Fitch, PA-C, Sandra Howard, PA-C, Asuncion Hollins, CNP,        Penny Lopez, CNS, Brianne Arnett, CNP, Miriam Martins, CNP       [facility]      Daily Progress Note     Admit Date: 6/22/2025  Bed/Room No.  1025/1025-01  Admitting Physician : Sivakumar Freeman MD  Code Status :Full Code  Hospital Day:  LOS: 3 days   Chief Complaint:     Chief Complaint   Patient presents with    Altered Mental Status     Found by family. Not talking and not following directions. Alcoholic and seizure history     Principal Problem:    Hypokalemia  Active Problems:    Paroxysmal atrial flutter (HCC)    Hyperlipidemia    Essential hypertension    Alcohol use disorder, severe, dependence (HCC)    Altered mental status    Pneumonia of both lower lobes due to infectious organism  Resolved Problems:    * No resolved hospital problems. *    Subjective :        Interval History/Significant events :  06/25/25    Patient is feeling anxious . He wants to go out and smoke.

## 2025-06-26 PROCEDURE — 99232 SBSQ HOSP IP/OBS MODERATE 35: CPT | Performed by: FAMILY MEDICINE

## 2025-06-26 PROCEDURE — 6370000000 HC RX 637 (ALT 250 FOR IP)

## 2025-06-26 PROCEDURE — 94640 AIRWAY INHALATION TREATMENT: CPT

## 2025-06-26 PROCEDURE — 97116 GAIT TRAINING THERAPY: CPT

## 2025-06-26 PROCEDURE — 97530 THERAPEUTIC ACTIVITIES: CPT

## 2025-06-26 PROCEDURE — 97110 THERAPEUTIC EXERCISES: CPT

## 2025-06-26 PROCEDURE — 2060000000 HC ICU INTERMEDIATE R&B

## 2025-06-26 PROCEDURE — 6360000002 HC RX W HCPCS: Performed by: NURSE PRACTITIONER

## 2025-06-26 PROCEDURE — 6370000000 HC RX 637 (ALT 250 FOR IP): Performed by: FAMILY MEDICINE

## 2025-06-26 PROCEDURE — 6370000000 HC RX 637 (ALT 250 FOR IP): Performed by: NURSE PRACTITIONER

## 2025-06-26 PROCEDURE — 94761 N-INVAS EAR/PLS OXIMETRY MLT: CPT

## 2025-06-26 PROCEDURE — 2500000003 HC RX 250 WO HCPCS: Performed by: NURSE PRACTITIONER

## 2025-06-26 RX ORDER — HYDROCODONE BITARTRATE AND ACETAMINOPHEN 5; 325 MG/1; MG/1
1 TABLET ORAL ONCE
Refills: 0 | Status: COMPLETED | OUTPATIENT
Start: 2025-06-26 | End: 2025-06-26

## 2025-06-26 RX ADMIN — ATORVASTATIN CALCIUM 10 MG: 10 TABLET, FILM COATED ORAL at 21:01

## 2025-06-26 RX ADMIN — HYDROCODONE BITARTRATE AND ACETAMINOPHEN 1 TABLET: 5; 325 TABLET ORAL at 21:48

## 2025-06-26 RX ADMIN — LACOSAMIDE 100 MG: 100 TABLET, FILM COATED ORAL at 08:16

## 2025-06-26 RX ADMIN — MIRTAZAPINE 7.5 MG: 7.5 TABLET, FILM COATED ORAL at 21:01

## 2025-06-26 RX ADMIN — ALPRAZOLAM 0.25 MG: 0.25 TABLET ORAL at 16:03

## 2025-06-26 RX ADMIN — SPIRONOLACTONE 25 MG: 25 TABLET ORAL at 08:13

## 2025-06-26 RX ADMIN — LACOSAMIDE 100 MG: 100 TABLET, FILM COATED ORAL at 21:01

## 2025-06-26 RX ADMIN — PANTOPRAZOLE SODIUM 40 MG: 40 TABLET, DELAYED RELEASE ORAL at 06:21

## 2025-06-26 RX ADMIN — SACUBITRIL AND VALSARTAN 1 TABLET: 24; 26 TABLET, FILM COATED ORAL at 21:01

## 2025-06-26 RX ADMIN — DULOXETINE 60 MG: 60 CAPSULE, DELAYED RELEASE ORAL at 08:12

## 2025-06-26 RX ADMIN — SACUBITRIL AND VALSARTAN 1 TABLET: 24; 26 TABLET, FILM COATED ORAL at 08:13

## 2025-06-26 RX ADMIN — RIVAROXABAN 20 MG: 20 TABLET, FILM COATED ORAL at 08:12

## 2025-06-26 RX ADMIN — IPRATROPIUM BROMIDE AND ALBUTEROL SULFATE 1 DOSE: 2.5; .5 SOLUTION RESPIRATORY (INHALATION) at 20:16

## 2025-06-26 RX ADMIN — GUAIFENESIN 600 MG: 600 TABLET, EXTENDED RELEASE ORAL at 08:13

## 2025-06-26 RX ADMIN — METHYLPREDNISOLONE SODIUM SUCCINATE 40 MG: 40 INJECTION, POWDER, LYOPHILIZED, FOR SOLUTION INTRAMUSCULAR; INTRAVENOUS at 08:13

## 2025-06-26 RX ADMIN — GUAIFENESIN 600 MG: 600 TABLET, EXTENDED RELEASE ORAL at 21:01

## 2025-06-26 RX ADMIN — METOPROLOL SUCCINATE 50 MG: 50 TABLET, EXTENDED RELEASE ORAL at 08:12

## 2025-06-26 RX ADMIN — LEVOFLOXACIN 750 MG: 750 TABLET, FILM COATED ORAL at 21:01

## 2025-06-26 RX ADMIN — Medication 3 MG: at 21:01

## 2025-06-26 ASSESSMENT — ENCOUNTER SYMPTOMS
DIARRHEA: 0
VOICE CHANGE: 0
COUGH: 0
SHORTNESS OF BREATH: 0
ABDOMINAL PAIN: 0
CHEST TIGHTNESS: 0
NAUSEA: 0
VOMITING: 0
BACK PAIN: 1
CHOKING: 0
CONSTIPATION: 0
WHEEZING: 0
RHINORRHEA: 0
SINUS PRESSURE: 0

## 2025-06-26 ASSESSMENT — PAIN - FUNCTIONAL ASSESSMENT: PAIN_FUNCTIONAL_ASSESSMENT: ACTIVITIES ARE NOT PREVENTED

## 2025-06-26 ASSESSMENT — PAIN DESCRIPTION - LOCATION: LOCATION: BACK;HIP

## 2025-06-26 ASSESSMENT — PAIN SCALES - GENERAL
PAINLEVEL_OUTOF10: 8
PAINLEVEL_OUTOF10: 10

## 2025-06-26 ASSESSMENT — PAIN DESCRIPTION - DESCRIPTORS: DESCRIPTORS: ACHING;DISCOMFORT

## 2025-06-26 NOTE — RT PROTOCOL NOTE
RT Nebulizer Bronchodilator Protocol Note    There is a bronchodilator order in the chart from a provider indicating to follow the RT Bronchodilator Protocol and there is an “Initiate RT Bronchodilator Protocol” order as well (see protocol at bottom of note).    CXR Findings:  No results found.    The findings from the last RT Protocol Assessment were as follows:  Smoking: Chronic pulmonary disease  Respiratory Pattern: Regular pattern and RR 12-20 bpm  Breath Sounds: Intermittent or unilateral wheezes  Cough: Strong, productive  Indication for Bronchodilator Therapy: Wheezing associated with pulm disorder  Bronchodilator Assessment Score: 7    Aerosolized bronchodilator medication orders have been revised according to the RT Nebulizer Bronchodilator Protocol below.    Respiratory Therapist to perform RT Therapy Protocol Assessment initially then follow the protocol.  Repeat RT Therapy Protocol Assessment PRN for score 0-3 or on second treatment, BID, and PRN for scores above 3.    No Indications - adjust the frequency to every 6 hours PRN wheezing or bronchospasm, if no treatments needed after 48 hours then discontinue using Per Protocol order mode.     If indication present, adjust the RT bronchodilator orders based on the Bronchodilator Assessment Score as indicated below.  If a patient is on this medication at home then do not decrease Frequency below that used at home.    0-3 - enter or revise RT bronchodilator order(s) to equivalent RT Bronchodilator order with Frequency of every 4 hours PRN for wheezing or increased work of breathing using Per Protocol order mode.       4-6 - enter or revise RT Bronchodilator order(s) to two equivalent RT bronchodilator orders with one order with BID Frequency and one order with Frequency of every 4 hours PRN wheezing or increased work of breathing using Per Protocol order mode.         7-10 - enter or revise RT Bronchodilator order(s) to two equivalent RT bronchodilator

## 2025-06-26 NOTE — PLAN OF CARE
Problem: Chronic Conditions and Co-morbidities  Goal: Patient's chronic conditions and co-morbidity symptoms are monitored and maintained or improved  6/26/2025 1156 by Kirstie Watson RN  Outcome: Progressing     Problem: Discharge Planning  Goal: Discharge to home or other facility with appropriate resources  Outcome: Progressing     Problem: Pain  Goal: Verbalizes/displays adequate comfort level or baseline comfort level  Outcome: Progressing     Problem: Safety - Adult  Goal: Free from fall injury  Outcome: Progressing     Problem: Neurosensory - Adult  Goal: Achieves stable or improved neurological status  Outcome: Progressing     Problem: Neurosensory - Adult  Goal: Absence of seizures  Outcome: Progressing     Problem: Neurosensory - Adult  Goal: Achieves maximal functionality and self care  Outcome: Progressing     Problem: Musculoskeletal - Adult  Goal: Return mobility to safest level of function  Outcome: Progressing     Problem: Metabolic/Fluid and Electrolytes - Adult  Goal: Electrolytes maintained within normal limits  Outcome: Progressing     Problem: Metabolic/Fluid and Electrolytes - Adult  Goal: Hemodynamic stability and optimal renal function maintained  Outcome: Progressing     Problem: Respiratory - Adult  Goal: Achieves optimal ventilation and oxygenation  Outcome: Progressing

## 2025-06-26 NOTE — PROGRESS NOTES
Physical Therapy  Facility/Department: [facility] PROGRESSIVE CARE  Daily Treatment Note  NAME: Mahesh Brownlee  : 1960  MRN: [id number]    Date of Service: 2025    Discharge Recommendations:  Patient would benefit from continued therapy after discharge   Patient Diagnosis(es): The primary encounter diagnosis was Hypokalemia. Diagnoses of Altered mental status, unspecified altered mental status type, Sepsis, due to unspecified organism, unspecified whether acute organ dysfunction present (HCC), B12 deficiency, and Anxiety disorder, unspecified type were also pertinent to this visit.  Assessment  Assessment: Pt mobility is limited by R hip pain however he was able to increase gait distance this session with janet CHASE. Pt would benefit from continued skilled therapy to increase strength, balance, and independence with all functional tasks.   Activity Tolerance: Patient limited by pain;Patient limited by endurance  Plan  Physical Therapy Plan  General Plan: 5-7 times per week  Current Treatment Recommendations: Strengthening;ROM;Functional mobility training;Transfer training;Stair training;Gait training;Home exercise program;Safety education & training;Patient/Caregiver education & training;Therapeutic activities  Restrictions  Restrictions/Precautions  Restrictions/Precautions: Fall Risk, General Precautions  Activity Level: Up with Assist  Required Braces or Orthoses?: No  Position Activity Restriction  Other Position/Activity Restrictions: LUE IV, telemetry   Subjective   Subjective  Subjective: Pt agreeable to PT session (Nurse gives approval for PT session)  Objective  Bed Mobility Training  No (Pt up in chair upon arrival for PT session)  Balance  Sitting: Intact  Standing: With support  Transfer Training  Overall Level of Assistance: Minimal assistance  Interventions: Safety awareness training;Tactile cues;Verbal cues  Sit to Stand: Minimal assistance  Stand to Sit: Minimal assistance  Bed to Chair: Minimal  assistance  Gait 1  Overall Level of Assistance: Minimal assistance  Distance (ft): 45 Feet  Assistive Device: Gait belt;Walker, rolling  Interventions: Safety awareness training;Tactile cues;Verbal cues  Speed/Lashae: Slow  Gait Abnormalities: Decreased step clearance;Step to gait (max cues for pattern when using a rw d/t R hip pain)  Gait 2  Overall Level of Assistance: Minimal assistance  Distance (ft): 30 Feet  Assistive Device: Gait belt;Walker, rolling  Interventions: Safety awareness training;Tactile cues;Verbal cues  Speed/Lashae: Slow  Gait Abnormalities: Decreased step clearance;Step to gait (max cues for pattern when using a rw d/t R hip pain)  PT Exercises  Exercise Treatment: Attempted seated exercises however pt was not able to complete d/t R hip pain  Circulation/Endurance Exercises: ankle pumps  Functional Mobility Circuit Training: sit to stand x 2 with min A  Goals  Short Term Goals  Time Frame for Short Term Goals: 12 visits  Short Term Goal 1: Patient will demonstrate bed mobility with MI  Short Term Goal 2: Patient will demonstrate functional transfers with AD with good safety awareness with SBA  Short Term Goal 3: Patient will demonstrate fair dynamic standing balance in order to reduce risk for falls  Short Term Goal 4: Patient will ambulate >75' using AD with good safety awareness and without LOB with SBA  Short Term Goal 5: Patient will be indep with HEP for B LE following handout  Patient Goals   Patient Goals : Return to PLOF  Education  Patient Education  Education Given To: Patient  Education Provided: Role of Therapy;Plan of Care;Transfer Training;Mobility Training  Education Provided Comments: Pt educated on safety with all functional mobiltiy  Education Method: Verbal  Barriers to Learning: Cognition  Education Outcome: Verbalized understanding;Demonstrated understanding;Continued education needed  AM-PAC - Mobility  AM-PAC Basic Mobility - Inpatient   How much help is needed

## 2025-06-26 NOTE — PLAN OF CARE
Problem: Respiratory - Adult  Goal: Achieves optimal ventilation and oxygenation  6/25/2025 2011 by Ángel Nowak RCP  Outcome: Progressing

## 2025-06-26 NOTE — PLAN OF CARE
Patient pleasant and cooperative. Telesitter at bedside. Iv steroids for wheezes, nonproductive cough.   Patient ambulates with assist and walker.   Problem: Chronic Conditions and Co-morbidities  Goal: Patient's chronic conditions and co-morbidity symptoms are monitored and maintained or improved  6/26/2025 0509 by Angie Sharma, RN  Outcome: Progressing  Flowsheets (Taken 6/26/2025 0509)  Care Plan - Patient's Chronic Conditions and Co-Morbidity Symptoms are Monitored and Maintained or Improved:   Monitor and assess patient's chronic conditions and comorbid symptoms for stability, deterioration, or improvement   Collaborate with multidisciplinary team to address chronic and comorbid conditions and prevent exacerbation or deterioration   Update acute care plan with appropriate goals if chronic or comorbid symptoms are exacerbated and prevent overall improvement and discharge

## 2025-06-26 NOTE — PROGRESS NOTES
[facility]  Office: [phone]  Louie Rossi DO, Noah Mayers DO, Sergio Ordaz DO, Saul March DO, Willy Mc MD, Jaci Berger MD, Sivakumar Freeman MD, Mile Johnston MD,  Aric Granda MD, Garry Shepherd MD, Kelsy Wahl MD,  Evelyn Petit DO, Terry Kern MD, Addison Sol MD, Jaspreet Rossi DO, Kassy Pedro MD,  Emeka Leon DO, Krystyna Howell MD, Kitty Hawk MD, Keeley Santa MD,  Chris Ashley MD, Rick Del Rio MD, Howard Brown MD, Gemma Ordaz MD, Mateo Dey MD, Pardeep Owen MD, Gómez Boykin, DO, Tatiana Guillen MD, Everette Pineda DO, Avi Mcneil MD, Evelyn Parker MD, Mohsin Reza, MD, Junior Gonsalez MD, Shirley Waterhouse, CNP,  Magdalena Oviedo, CNP, Gómez Gomez, CNP,  Fely Garcia, SCHUYLER, Lorena Garcia, CNP, Mariana Jordan, CNP, Dixie Garcia, CNP, Shea Chirinos, CNP, Suzi Tavares, PA-C, Daija Mejia, CNP, Vincent Armendariz, CNP,  Jie Leos, CNP, Felicita Nobles, CNP, Raymon Fitch, PA-C, Sandra Howard, PA-C, Asuncion Hollins, CNP,        Penny Lopez, CNS, Brianen Arnett, CNP, Miriam Martins, CNP       [facility]      Daily Progress Note     Admit Date: 6/22/2025  Bed/Room No.  1025/1025-01  Admitting Physician : Sivakumar Freeman MD  Code Status :Full Code  Hospital Day:  LOS: 4 days   Chief Complaint:     Chief Complaint   Patient presents with    Altered Mental Status     Found by family. Not talking and not following directions. Alcoholic and seizure history     Principal Problem:    Hypokalemia  Active Problems:    Paroxysmal atrial flutter (HCC)    Hyperlipidemia    Essential hypertension    Alcohol use disorder, severe, dependence (HCC)    Altered mental status    Pneumonia of both lower lobes due to infectious organism  Resolved Problems:    * No resolved hospital problems. *    Subjective :        Interval History/Significant events :  06/26/25    Patient is feeling much better controlled anxiety today.  He is

## 2025-06-26 NOTE — PROGRESS NOTES
Spiritual Health History and Assessment/Progress Note  [facility]    (P) Spiritual/Emotional Needs,  ,  ,      Name: Mahesh Brownlee MRN: [id number]    Age: 65 y.o.     Sex: male   Language: English   Synagogue: Sh[employer]   Hypokalemia     Date: 6/26/2025            Total Time Calculated: (P) 27 min              Spiritual Assessment continued in [facility] PROGRESSIVE CARE        Referral/Consult From: (P) Patient   Encounter Overview/Reason: (P) Spiritual/Emotional Needs  Service Provided For: (P) Patient    Patient expressed to  \"I am dying and want to know my soul will be saved.\"  shared the plan of God's salvation and love for all the world that human beings can be saved by the death, burial and resurrection of God's Son. Patient expressed his thanks for 's support and help, requesting continued visits.    Janine, Belief, Meaning:   Patient identifies as spiritual  Family/Friends No family/friends present      Importance and Influence:  Patient has spiritual/personal beliefs that influence decisions regarding their health  Family/Friends No family/friends present    Community:  Patient expresses feelings of isolation: disconnected from family/friends  Family/Friends No family/friends present    Assessment and Plan of Care:     Patient Interventions include: Facilitated expression of thoughts and feelings, Explored spiritual coping/struggle/distress, Engaged in theological reflection, and Affirmed coping skills/support systems  Family/Friends Interventions include: No family/friends present    Patient Plan of Care: Spiritual Care available upon further referral  Family/Friends Plan of Care: Spiritual Care available upon further referral    Electronically signed by Chaplain Yariel on 6/26/2025 at 12:49 PM

## 2025-06-26 NOTE — CARE COORDINATION
Transitional Planning  Notified by Lead RN: No Tele sitter.  2ND Call to Brianne, daughter. ([phone]) VM received.  Notified patient has been discharged and need choice on SNF.  Gave CM phone number as well as Unit phone number.    Call placed to Brad, son at [phone].  Brad states his sister does this all of the time.  Writer requested he contact his sister for choices on SNF.  Brad noted he has not spoken with his sister since November and will call his brother to try and get her to return our call.      1428 Met with patient at bedside.  No telesitter.  Patient alert, oriented.  Discussed above.  Patient states he is not in contact with his family.  Provided patient with choice list, await choice for SNF.                       Post Acute Facility/Agency List     Provided patient with the following list, the list includes the overall star ratings obtained from CMS per the Medicare Web site (www.Medicare.gov):     [] Long Term Acute Care Facilities  [] Acute Inpatient Rehabilitation Facilities  [x] Skilled Nursing Facilities  [] Hospice Facilities  [] Home Care    Provided verbal instructions on how to utilize the QR Code to obtain additional detailed star ratings from www.Medicare.gov     offered to print and provide the detailed list:    [x]Accepted   []Declined    1526 Met with patient at bedside.  Patient requesting  multiple referrals from list provided.  Referrals sent via [employer].

## 2025-06-26 NOTE — PROGRESS NOTES
Occupational Therapy Daily Treatment Note  Facility/Department: [place] [facility] CARE   Patient Name: Mahesh Brownlee        MRN: [id number]    : 1960    Date of Service: 2025    LY Thacker reports patient is medically stable for therapy treatment this date.    Chart reviewed prior to treatment and patient is agreeable for therapy.  All lines intact and patient positioned comfortably at end of treatment.  All patient needs addressed prior to ending therapy session.       Pt currently functioning below baseline.  Recommend daily therapy at time of discharge to maximize long term outcomes and prevent re-admission. Please refer to AM-PAC score for current level of function.     Discharge Recommendations  Discharge Recommendations: Patient would benefit from continued therapy after discharge  OT Equipment Recommendations  Equipment Needed: Yes  Mobility Devices: Walker;ADL Assistive Devices  Walker: Standard  ADL Assistive Devices: Emergency Alert System    Chief Complaint   Patient presents with    Altered Mental Status     Found by family. Not talking and not following directions. Alcoholic and seizure history     Past Medical History:  has a past medical history of Adjustment disorder, AF (atrial fibrillation) ([facility]), AICD (automatic cardioverter/defibrillator) present, Alcohol dependence ([facility]), CAD (coronary artery disease), Cardiomyopathy ([facility]), CHF (congestive heart failure) ([facility]), COPD (chronic obstructive pulmonary disease) ([facility]), DDD (degenerative disc disease), lumbar, Herniated cervical disc, Hyperlipidemia, Hypertension, Major depression, Post laminectomy syndrome, Sciatica, Snores, Tobacco abuse, and Traumatic brain injury ([facility]).  Past Surgical History:  has a past surgical history that includes back surgery; Rotator cuff repair (Right); Carpal tunnel release (Right); Nerve Block (2013); Nerve Block (N/A, 2013); other surgical history (2016); Tonsillectomy; pacemaker placement

## 2025-06-27 VITALS
HEIGHT: 71 IN | RESPIRATION RATE: 18 BRPM | OXYGEN SATURATION: 93 % | WEIGHT: 209 LBS | DIASTOLIC BLOOD PRESSURE: 75 MMHG | TEMPERATURE: 98.4 F | SYSTOLIC BLOOD PRESSURE: 127 MMHG | BODY MASS INDEX: 29.26 KG/M2 | HEART RATE: 81 BPM

## 2025-06-27 PROCEDURE — 2500000003 HC RX 250 WO HCPCS: Performed by: NURSE PRACTITIONER

## 2025-06-27 PROCEDURE — 97535 SELF CARE MNGMENT TRAINING: CPT

## 2025-06-27 PROCEDURE — 6370000000 HC RX 637 (ALT 250 FOR IP)

## 2025-06-27 PROCEDURE — 94640 AIRWAY INHALATION TREATMENT: CPT

## 2025-06-27 PROCEDURE — 6370000000 HC RX 637 (ALT 250 FOR IP): Performed by: FAMILY MEDICINE

## 2025-06-27 PROCEDURE — 97530 THERAPEUTIC ACTIVITIES: CPT

## 2025-06-27 PROCEDURE — 6360000002 HC RX W HCPCS: Performed by: NURSE PRACTITIONER

## 2025-06-27 PROCEDURE — 99232 SBSQ HOSP IP/OBS MODERATE 35: CPT | Performed by: FAMILY MEDICINE

## 2025-06-27 RX ADMIN — ALPRAZOLAM 0.25 MG: 0.25 TABLET ORAL at 05:32

## 2025-06-27 RX ADMIN — IPRATROPIUM BROMIDE AND ALBUTEROL SULFATE 1 DOSE: 2.5; .5 SOLUTION RESPIRATORY (INHALATION) at 10:02

## 2025-06-27 RX ADMIN — DULOXETINE 60 MG: 60 CAPSULE, DELAYED RELEASE ORAL at 09:00

## 2025-06-27 RX ADMIN — METHYLPREDNISOLONE SODIUM SUCCINATE 40 MG: 40 INJECTION, POWDER, LYOPHILIZED, FOR SOLUTION INTRAMUSCULAR; INTRAVENOUS at 08:59

## 2025-06-27 RX ADMIN — LACOSAMIDE 100 MG: 100 TABLET, FILM COATED ORAL at 09:00

## 2025-06-27 RX ADMIN — METOPROLOL SUCCINATE 50 MG: 50 TABLET, EXTENDED RELEASE ORAL at 08:59

## 2025-06-27 RX ADMIN — SPIRONOLACTONE 25 MG: 25 TABLET ORAL at 09:00

## 2025-06-27 RX ADMIN — GUAIFENESIN 600 MG: 600 TABLET, EXTENDED RELEASE ORAL at 09:00

## 2025-06-27 RX ADMIN — PANTOPRAZOLE SODIUM 40 MG: 40 TABLET, DELAYED RELEASE ORAL at 05:22

## 2025-06-27 RX ADMIN — SACUBITRIL AND VALSARTAN 1 TABLET: 24; 26 TABLET, FILM COATED ORAL at 09:00

## 2025-06-27 RX ADMIN — RIVAROXABAN 20 MG: 20 TABLET, FILM COATED ORAL at 09:00

## 2025-06-27 ASSESSMENT — ENCOUNTER SYMPTOMS
ABDOMINAL PAIN: 0
CHOKING: 0
COUGH: 0
NAUSEA: 0
RHINORRHEA: 0
DIARRHEA: 0
BACK PAIN: 1
CONSTIPATION: 0
SHORTNESS OF BREATH: 0
CHEST TIGHTNESS: 0
SINUS PRESSURE: 0
VOMITING: 0
WHEEZING: 0
VOICE CHANGE: 0

## 2025-06-27 ASSESSMENT — PAIN SCALES - GENERAL: PAINLEVEL_OUTOF10: 8

## 2025-06-27 NOTE — PROGRESS NOTES
Patient discharged via [facility]. Report attempted to be called to facility by LY Kay. No answer.   EVY completed and signed per writer and physician for [facility]  Discharge packet given to EMS to deliver to RN receiving patient       Telemetry monitor returned         Patient/Family Stroke paperwork including personal risk factors reviewed and given to patient

## 2025-06-27 NOTE — CARE COORDINATION
Transitional planning  Accepting facilities: [facility], [facility], [facility] and [facility]    Met with patient at bedside to discuss accepting facilities.  Patients choice is [facility].  Message left with [facility] requesting pre cert to be started and provide writer with nurse report phone number.  Await return call.    0915 Per [facility]: Pending auth number [id number] Report number [phone] ext [phone]    9828 Met with patient at bedside.  IMM letter provided to patient.  Patient offered four hours to make informed decision regarding appeal process; patient agreeable to discharge.     Fax to [facility] requesting 1pm transport via [employer].  Awaiting confirmation.  HENS completed.  AVS and HENS sent via [facility] to [facility].    1206 MOOK Mckeon called with transport 4 pm via [employer].    1213 Notified LY Kay on transport time.  Provided report phone number    Transport packet at .

## 2025-06-27 NOTE — PROGRESS NOTES
Occupational Therapy Daily Treatment Note  Facility/Department: [facility] CARE   Patient Name: Mahesh Brownlee        MRN: [id number]    : 1960    Date of Service: 2025    LY Kay reports patient is medically stable for therapy treatment this date.  Chart reviewed prior to treatment and patient is agreeable for therapy.  All lines intact and patient positioned comfortably at end of treatment.  All patient needs addressed prior to ending therapy session.       Pt currently functioning below baseline. Recommend daily therapy at time of discharge to maximize long term outcomes and prevent re-admission. Please refer to AM-PAC score for current level of function.     Discharge Recommendations  Discharge Recommendations: Patient would benefit from continued therapy after discharge  OT Equipment Recommendations  Equipment Needed: Yes  Mobility Devices: Walker;ADL Assistive Devices  Walker: Rolling  ADL Assistive Devices: Emergency Alert System    Chief Complaint   Patient presents with    Altered Mental Status     Found by family. Not talking and not following directions. Alcoholic and seizure history     Past Medical History:  has a past medical history of Adjustment disorder, AF (atrial fibrillation) ([facility]), AICD (automatic cardioverter/defibrillator) present, Alcohol dependence ([facility]), CAD (coronary artery disease), Cardiomyopathy ([facility]), CHF (congestive heart failure) ([facility]), COPD (chronic obstructive pulmonary disease) ([facility]), DDD (degenerative disc disease), lumbar, Herniated cervical disc, Hyperlipidemia, Hypertension, Major depression, Post laminectomy syndrome, Sciatica, Snores, Tobacco abuse, and Traumatic brain injury ([facility]).  Past Surgical History:  has a past surgical history that includes back surgery; Rotator cuff repair (Right); Carpal tunnel release (Right); Nerve Block (2013); Nerve Block (N/A, 2013); other surgical history (2016); Tonsillectomy; pacemaker placement  UB ADLs and CGA for LB ADLs with AE as needed and Good safety.  Short Term Goal 4: increase BUE strength by 1/2 grade to inc IND with self-care and be IND with HEP, with handouts as needed.  Short Term Goal 5: SBA for toileting routine with BSC/AD as needed.  Long Term Goals  Long Term Goal 1: pt/family to be IND with EC/WS, fall prevention tech, pressure relief education, AE/DME recommendations, disease specific education, discharge recommendations, cog strategies, with use of handouts as needed    Plan  Occupational Therapy Plan  Times Per Week: 4-5x per week  Current Treatment Recommendations: Strengthening, Balance training, Functional mobility training, Endurance training, Safety education & training, Patient/Caregiver education & training, Equipment evaluation, education, & procurement, Self-Care / ADL, Cognitive/Perceptual training, Positioning, Home management training    AM-PAC Daily Activities Inpatient  AM-PAC Daily Activity - Inpatient   How much help is needed for putting on and taking off regular lower body clothing?: A Lot  How much help is needed for bathing (which includes washing, rinsing, drying)?: A Lot  How much help is needed for toileting (which includes using toilet, bedpan, or urinal)?: A Little  How much help is needed for putting on and taking off regular upper body clothing?: A Little  How much help is needed for taking care of personal grooming?: A Little  How much help for eating meals?: None  AM-PAC Inpatient Daily Activity Raw Score: 17  AM-PAC Inpatient ADL T-Scale Score : 37.26  ADL Inpatient CMS 0-100% Score: 50.11  ADL Inpatient CMS G-Code Modifier : CK    Minutes  OT Individual Minutes  Time In: 1100  Time Out: 1127  Minutes: 27    Electronically signed by Diana Stafford OT on 6/27/25 at 12:40 PM EDT

## 2025-06-27 NOTE — PLAN OF CARE
Problem: Respiratory - Adult  Goal: Achieves optimal ventilation and oxygenation  6/26/2025 2016 by Ángel Nowak RCP  Outcome: Progressing

## 2025-06-27 NOTE — DISCHARGE SUMMARY
Discharge Medications:      Medication List        START taking these medications      ALPRAZolam 0.25 MG tablet  Commonly known as: XANAX  Take 1 tablet by mouth 2 times daily as needed for Anxiety for up to 5 days. Max Daily Amount: 0.5 mg     atorvastatin 10 MG tablet  Commonly known as: LIPITOR  Take 1 tablet by mouth nightly     DULoxetine 60 MG extended release capsule  Commonly known as: CYMBALTA  Take 1 capsule by mouth daily     Entresto 49-51 MG per tablet  Generic drug: sacubitril-valsartan     lacosamide 100 MG Tabs tablet  Commonly known as: VIMPAT     levoFLOXacin 750 MG tablet  Commonly known as: LEVAQUIN  Take 1 tablet by mouth every 24 hours for 4 doses     melatonin 3 MG Tabs tablet  Take 1 tablet by mouth nightly     mirtazapine 15 MG tablet  Commonly known as: REMERON     nicotine 21 MG/24HR  Commonly known as: NICODERM CQ  Place 1 patch onto the skin daily  Replaces: nicotine 7 MG/24HR     pantoprazole 40 MG tablet  Commonly known as: PROTONIX     rivaroxaban 20 MG Tabs tablet  Commonly known as: Xarelto  Take 1 tablet by mouth daily     spironolactone 25 MG tablet  Commonly known as: ALDACTONE  Take 1 tablet by mouth daily            CHANGE how you take these medications      metoprolol succinate 50 MG extended release tablet  Commonly known as: TOPROL XL  Take 1 tablet by mouth daily  What changed: Another medication with the same name was removed. Continue taking this medication, and follow the directions you see here.            CONTINUE taking these medications      folic acid 1 MG tablet  Commonly known as: FOLVITE  Take 1 tablet by mouth daily     thiamine 100 MG tablet  Take 1 tablet by mouth daily     vitamin B-12 1000 MCG tablet  Commonly known as: CYANOCOBALAMIN  Take 1 tablet by mouth daily            STOP taking these medications      dilTIAZem 120 MG immediate release tablet  Commonly known as: CARDIZEM     levothyroxine 50 MCG tablet  Commonly known as: SYNTHROID     nicotine

## 2025-06-27 NOTE — PROGRESS NOTES
[facility]  Office: [phone]  Louie Rossi DO, Noah Mayers DO, Sergio Ordaz DO, Saul March DO, Willy Mc MD, Jaci Berger MD, Sivakumar Freeman MD, Mile Johnston MD,  Aric Granda MD, Garry Shepherd MD, Kelsy Wahl MD,  Evelyn Petit DO, Terry Kern MD, Addison Sol MD, Jaspreet Rossi DO, Kassy Pedro MD,  Emeka Leon DO, Krystyna Howell MD, [place] MD, Keeley Santa MD,  Chris Ashley MD, Rick Del Rio MD, Howard Brown MD, Gemma Ordaz MD, Mateo Dey MD, Pardeep Owen MD, Gómez Boykin, DO, Tatiana Guillen MD, Everette Pineda DO, Avi Mcneil MD, Evelyn Parker MD, Mohsin Reza, MD, Junior Gonsalez MD, Shirley Waterhouse, CNP,  Magdalena Oviedo, CNP, Gómez Gomez, CNP,  Fely Garcia, SCHUYLER, Lorena Garcia, CNP, Mariana Jordan, CNP, Dixie Garcia, CNP, Shea Chirinos, CNP, Suzi Tavares, PA-C, Daija Mejia, CNP, Vincent Armendairz, CNP,  Jie Leos, CNP, Felicita Nobles, CNP, Raymon Fitch, PA-C, Sandra Howard, PA-C, Asuncion Hollins, CNP,        Penny Lopez, CNS, Brianne Arnett, CNP, Miriam Martins, CNP       [facility]      Daily Progress Note     Admit Date: 6/22/2025  Bed/Room No.  1025/1025-01  Admitting Physician : Sivakumar Freeman MD  Code Status :Full Code  Hospital Day:  LOS: 5 days   Chief Complaint:     Chief Complaint   Patient presents with    Altered Mental Status     Found by family. Not talking and not following directions. Alcoholic and seizure history     Principal Problem:    Hypokalemia  Active Problems:    Paroxysmal atrial flutter (HCC)    Hyperlipidemia    Essential hypertension    Alcohol use disorder, severe, dependence (HCC)    Altered mental status    Pneumonia of both lower lobes due to infectious organism  Resolved Problems:    * No resolved hospital problems. *    Subjective :        Interval History/Significant events :  06/27/25    Patient is still waiting for placement.  His symptoms are  0.9   CALCIUM 8.5*     Recent Labs     06/25/25  0451   *   ALT 81*   ALKPHOS 92   BILITOT 0.8          Protein, UA   Date Value Ref Range Status   06/22/2025 NEGATIVE NEGATIVE mg/dL Final     RBC, UA   Date Value Ref Range Status   06/22/2025 0 TO 2 0 - 2 /HPF Final     Bacteria, UA   Date Value Ref Range Status   05/02/2025 RARE (A) None Final     Nitrite, Urine   Date Value Ref Range Status   06/22/2025 NEGATIVE NEGATIVE Final     WBC, UA   Date Value Ref Range Status   06/22/2025 None 0 - 5 /HPF Final     Leukocyte Esterase, Urine   Date Value Ref Range Status   06/22/2025 NEGATIVE NEGATIVE Final       Imaging / Clinical Data :-   XR CHEST PORTABLE   Final Result   Mild central pulmonary congestion or pulmonary artery hypertension which is   more prominent.      Mild bibasilar interstitial edema vs atelectasis or early infiltrates which   is more prominent.         CT Head W/O Contrast   Final Result      1.  No evidence of acute intracranial process.      2.  Findings of presumed mild small vessel ischemic deep white matter disease.              Clinical Course : stable  Assessment and Plan  :        Hypokalemia - replaced electrolytes.  Pneumonia -treated with oral Levaquin.  Hypertension -well-controlled on Toprol-XL, Entresto, Aldactone.  PAF -Toprol-XL, Xarelto  Hyperlipidemia -on statins.  Seizure disorder  On Vimpat.  Alcohol abuse-recommend complete abstinence.  Tobacco abuse - heavy smoker - > 3 Pack per day and unwilling to quit. Nicotine 21 mg per day. Xanax as needed.    Chronic back pain -Tylenol, Norco as needed.    PT OT.  Awaiting placement.  Stable for discharge  Continue to monitor vitals , Intake / output ,  Cell count , HGB , Kidney function, oxygenation  as indicated .   Plan and updates discussed with patient ,  answers  explained to satisfaction.   Plan discussed with Staff Alessandra MODI     (This note is created with the assistance of a speech recognition program. While intending to

## 2025-06-27 NOTE — PLAN OF CARE
Problem: Respiratory - Adult  Goal: Achieves optimal ventilation and oxygenation  6/27/2025 1004 by Tierra Ding RCP  Outcome: Progressing  6/27/2025 0432 by Jeffery Herron RN  Outcome: Progressing  6/26/2025 2016 by Ángel Nowak RCP  Outcome: Progressing

## 2025-06-27 NOTE — PLAN OF CARE
Pt had an uneventful night.  All medications given, see MAR.  Pt remained A&OX4.  Called out appropriately after tele-sitter was Dc'd during day yesterday.  Possible DC today to SNF.  Pt refused IV site change today.  Pt had complaints of chronic back pain, on call NP notified, 1x dose of hydrocodone ordered and given.  PRN xanax given 1x d/t pt anxiety    Patient having pain on their back and rates it a 10. Pain interventions includefrequent position changes, correct body alignment/body mechanics, relaxation techniques, and medication. Patients goal for pain relief is 8. The need for pain and symptom management will be considered in the discharge planning process to ensure patients comfort.      Standard safety measures in place. Call light within reach. Bed in locked and lowest position.       Problem: Chronic Conditions and Co-morbidities  Goal: Patient's chronic conditions and co-morbidity symptoms are monitored and maintained or improved  Outcome: Progressing     Problem: Discharge Planning  Goal: Discharge to home or other facility with appropriate resources  Outcome: Progressing     Problem: Pain  Goal: Verbalizes/displays adequate comfort level or baseline comfort level  Outcome: Progressing     Problem: Safety - Adult  Goal: Free from fall injury  Outcome: Progressing     Problem: Neurosensory - Adult  Goal: Achieves stable or improved neurological status  Outcome: Progressing  Goal: Absence of seizures  Outcome: Progressing  Goal: Achieves maximal functionality and self care  Outcome: Progressing     Problem: Musculoskeletal - Adult  Goal: Return mobility to safest level of function  Outcome: Progressing     Problem: Metabolic/Fluid and Electrolytes - Adult  Goal: Electrolytes maintained within normal limits  Outcome: Progressing  Goal: Hemodynamic stability and optimal renal function maintained  Outcome: Progressing     Problem: Respiratory - Adult  Goal: Achieves optimal ventilation and